# Patient Record
Sex: MALE | Race: WHITE | NOT HISPANIC OR LATINO | Employment: OTHER | URBAN - METROPOLITAN AREA
[De-identification: names, ages, dates, MRNs, and addresses within clinical notes are randomized per-mention and may not be internally consistent; named-entity substitution may affect disease eponyms.]

---

## 2017-05-30 ENCOUNTER — AMBULATORY - RIVER FALLS (OUTPATIENT)
Dept: FAMILY MEDICINE | Facility: CLINIC | Age: 52
End: 2017-05-30

## 2018-04-04 ENCOUNTER — HOSPITAL ENCOUNTER (EMERGENCY)
Facility: CLINIC | Age: 53
Discharge: HOME | End: 2018-04-04
Attending: EMERGENCY MEDICINE | Admitting: EMERGENCY MEDICINE
Payer: MEDICARE

## 2018-04-04 VITALS
HEIGHT: 73 IN | WEIGHT: 225 LBS | BODY MASS INDEX: 29.82 KG/M2 | OXYGEN SATURATION: 95 % | SYSTOLIC BLOOD PRESSURE: 101 MMHG | DIASTOLIC BLOOD PRESSURE: 66 MMHG | TEMPERATURE: 97.7 F | RESPIRATION RATE: 13 BRPM

## 2018-04-04 DIAGNOSIS — F10.229 ACUTE ALCOHOLIC INTOXICATION IN ALCOHOLISM WITH COMPLICATION (H): ICD-10-CM

## 2018-04-04 DIAGNOSIS — F19.10 POLYSUBSTANCE ABUSE (H): ICD-10-CM

## 2018-04-04 DIAGNOSIS — R45.1 AGITATION REQUIRING SEDATION PROTOCOL: ICD-10-CM

## 2018-04-04 DIAGNOSIS — R44.3 HALLUCINATIONS: ICD-10-CM

## 2018-04-04 DIAGNOSIS — R41.82 ALTERED MENTAL STATUS, UNSPECIFIED ALTERED MENTAL STATUS TYPE: ICD-10-CM

## 2018-04-04 LAB
ALBUMIN UR-MCNC: 30 MG/DL
AMPHETAMINES UR QL SCN: POSITIVE
ANION GAP SERPL CALCULATED.3IONS-SCNC: 14 MMOL/L (ref 3–14)
APAP SERPL-MCNC: <2 MG/L (ref 10–20)
APPEARANCE UR: CLEAR
BARBITURATES UR QL: NEGATIVE
BASOPHILS # BLD AUTO: 0.1 10E9/L (ref 0–0.2)
BASOPHILS NFR BLD AUTO: 1.3 %
BENZODIAZ UR QL: POSITIVE
BILIRUB UR QL STRIP: NEGATIVE
BUN SERPL-MCNC: 36 MG/DL (ref 7–30)
CALCIUM SERPL-MCNC: 7.7 MG/DL (ref 8.5–10.1)
CANNABINOIDS UR QL SCN: NEGATIVE
CHLORIDE SERPL-SCNC: 103 MMOL/L (ref 94–109)
CO2 SERPL-SCNC: 18 MMOL/L (ref 20–32)
COCAINE UR QL: NEGATIVE
COLOR UR AUTO: YELLOW
CREAT SERPL-MCNC: 1.03 MG/DL (ref 0.66–1.25)
DIFFERENTIAL METHOD BLD: ABNORMAL
EOSINOPHIL # BLD AUTO: 0 10E9/L (ref 0–0.7)
EOSINOPHIL NFR BLD AUTO: 0.5 %
ERYTHROCYTE [DISTWIDTH] IN BLOOD BY AUTOMATED COUNT: 13.1 % (ref 10–15)
ETHANOL SERPL-MCNC: 0.2 G/DL
GFR SERPL CREATININE-BSD FRML MDRD: 75 ML/MIN/1.7M2
GLUCOSE BLDC GLUCOMTR-MCNC: 58 MG/DL (ref 70–99)
GLUCOSE BLDC GLUCOMTR-MCNC: 81 MG/DL (ref 70–99)
GLUCOSE BLDC GLUCOMTR-MCNC: 87 MG/DL (ref 70–99)
GLUCOSE BLDC GLUCOMTR-MCNC: 98 MG/DL (ref 70–99)
GLUCOSE SERPL-MCNC: 63 MG/DL (ref 70–99)
GLUCOSE UR STRIP-MCNC: NEGATIVE MG/DL
HCT VFR BLD AUTO: 36.5 % (ref 40–53)
HGB BLD-MCNC: 12.4 G/DL (ref 13.3–17.7)
HGB UR QL STRIP: ABNORMAL
HYALINE CASTS #/AREA URNS LPF: 9 /LPF (ref 0–2)
IMM GRANULOCYTES # BLD: 0 10E9/L (ref 0–0.4)
IMM GRANULOCYTES NFR BLD: 0 %
INTERPRETATION ECG - MUSE: NORMAL
KETONES UR STRIP-MCNC: 10 MG/DL
LEUKOCYTE ESTERASE UR QL STRIP: NEGATIVE
LYMPHOCYTES # BLD AUTO: 0.5 10E9/L (ref 0.8–5.3)
LYMPHOCYTES NFR BLD AUTO: 12.1 %
MCH RBC QN AUTO: 31.2 PG (ref 26.5–33)
MCHC RBC AUTO-ENTMCNC: 34 G/DL (ref 31.5–36.5)
MCV RBC AUTO: 92 FL (ref 78–100)
MONOCYTES # BLD AUTO: 0.4 10E9/L (ref 0–1.3)
MONOCYTES NFR BLD AUTO: 10 %
MUCOUS THREADS #/AREA URNS LPF: PRESENT /LPF
NEUTROPHILS # BLD AUTO: 2.8 10E9/L (ref 1.6–8.3)
NEUTROPHILS NFR BLD AUTO: 76.1 %
NITRATE UR QL: NEGATIVE
NRBC # BLD AUTO: 0 10*3/UL
NRBC BLD AUTO-RTO: 0 /100
OPIATES UR QL SCN: NEGATIVE
PCP UR QL SCN: NEGATIVE
PH UR STRIP: 5.5 PH (ref 5–7)
PLATELET # BLD AUTO: 125 10E9/L (ref 150–450)
POTASSIUM SERPL-SCNC: 4 MMOL/L (ref 3.4–5.3)
RBC # BLD AUTO: 3.97 10E12/L (ref 4.4–5.9)
RBC #/AREA URNS AUTO: <1 /HPF (ref 0–2)
SALICYLATES SERPL-MCNC: <2 MG/DL
SODIUM SERPL-SCNC: 135 MMOL/L (ref 133–144)
SOURCE: ABNORMAL
SP GR UR STRIP: 1.02 (ref 1–1.03)
TSH SERPL DL<=0.005 MIU/L-ACNC: 1.3 MU/L (ref 0.4–4)
UROBILINOGEN UR STRIP-MCNC: 2 MG/DL (ref 0–2)
WBC # BLD AUTO: 3.7 10E9/L (ref 4–11)
WBC #/AREA URNS AUTO: 1 /HPF (ref 0–5)

## 2018-04-04 PROCEDURE — 25000128 H RX IP 250 OP 636: Performed by: EMERGENCY MEDICINE

## 2018-04-04 PROCEDURE — 96372 THER/PROPH/DIAG INJ SC/IM: CPT | Mod: XS

## 2018-04-04 PROCEDURE — 84443 ASSAY THYROID STIM HORMONE: CPT | Performed by: EMERGENCY MEDICINE

## 2018-04-04 PROCEDURE — 27210995 ZZH RX 272

## 2018-04-04 PROCEDURE — 80307 DRUG TEST PRSMV CHEM ANLYZR: CPT | Performed by: EMERGENCY MEDICINE

## 2018-04-04 PROCEDURE — 99285 EMERGENCY DEPT VISIT HI MDM: CPT | Mod: 25

## 2018-04-04 PROCEDURE — 96361 HYDRATE IV INFUSION ADD-ON: CPT

## 2018-04-04 PROCEDURE — 80329 ANALGESICS NON-OPIOID 1 OR 2: CPT | Performed by: EMERGENCY MEDICINE

## 2018-04-04 PROCEDURE — 81001 URINALYSIS AUTO W/SCOPE: CPT | Performed by: EMERGENCY MEDICINE

## 2018-04-04 PROCEDURE — 80048 BASIC METABOLIC PNL TOTAL CA: CPT | Performed by: EMERGENCY MEDICINE

## 2018-04-04 PROCEDURE — 85025 COMPLETE CBC W/AUTO DIFF WBC: CPT | Performed by: EMERGENCY MEDICINE

## 2018-04-04 PROCEDURE — 80320 DRUG SCREEN QUANTALCOHOLS: CPT | Performed by: EMERGENCY MEDICINE

## 2018-04-04 PROCEDURE — 93005 ELECTROCARDIOGRAM TRACING: CPT

## 2018-04-04 PROCEDURE — 25800025 ZZH RX 258: Performed by: EMERGENCY MEDICINE

## 2018-04-04 PROCEDURE — 90791 PSYCH DIAGNOSTIC EVALUATION: CPT

## 2018-04-04 PROCEDURE — A9270 NON-COVERED ITEM OR SERVICE: HCPCS | Mod: GY | Performed by: EMERGENCY MEDICINE

## 2018-04-04 PROCEDURE — 00000146 ZZHCL STATISTIC GLUCOSE BY METER IP

## 2018-04-04 PROCEDURE — 96374 THER/PROPH/DIAG INJ IV PUSH: CPT

## 2018-04-04 PROCEDURE — 25000132 ZZH RX MED GY IP 250 OP 250 PS 637: Mod: GY | Performed by: EMERGENCY MEDICINE

## 2018-04-04 PROCEDURE — 25000125 ZZHC RX 250

## 2018-04-04 RX ORDER — BUPRENORPHINE HYDROCHLORIDE AND NALOXONE HYDROCHLORIDE DIHYDRATE 8; 2 MG/1; MG/1
1 TABLET SUBLINGUAL 2 TIMES DAILY
COMMUNITY
End: 2018-12-19

## 2018-04-04 RX ORDER — DIAZEPAM 5 MG
5 TABLET ORAL ONCE
Status: COMPLETED | OUTPATIENT
Start: 2018-04-04 | End: 2018-04-04

## 2018-04-04 RX ORDER — OLANZAPINE 10 MG/2ML
10 INJECTION, POWDER, FOR SOLUTION INTRAMUSCULAR ONCE
Status: COMPLETED | OUTPATIENT
Start: 2018-04-04 | End: 2018-04-04

## 2018-04-04 RX ORDER — DEXTROSE MONOHYDRATE 25 G/50ML
50 INJECTION, SOLUTION INTRAVENOUS ONCE
Status: COMPLETED | OUTPATIENT
Start: 2018-04-04 | End: 2018-04-04

## 2018-04-04 RX ORDER — WATER 10 ML/10ML
INJECTION INTRAMUSCULAR; INTRAVENOUS; SUBCUTANEOUS
Status: COMPLETED
Start: 2018-04-04 | End: 2018-04-04

## 2018-04-04 RX ORDER — DIAZEPAM 5 MG
5 TABLET ORAL EVERY 6 HOURS PRN
Qty: 10 TABLET | Refills: 0 | Status: SHIPPED | OUTPATIENT
Start: 2018-04-04 | End: 2019-01-30

## 2018-04-04 RX ORDER — OLANZAPINE 10 MG/1
10 TABLET, ORALLY DISINTEGRATING ORAL ONCE
Status: DISCONTINUED | OUTPATIENT
Start: 2018-04-04 | End: 2018-04-04

## 2018-04-04 RX ORDER — OLANZAPINE 10 MG/2ML
INJECTION, POWDER, FOR SOLUTION INTRAMUSCULAR
Status: COMPLETED
Start: 2018-04-04 | End: 2018-04-04

## 2018-04-04 RX ORDER — TESTOSTERONE CYPIONATE 200 MG/ML
200 INJECTION, SOLUTION INTRAMUSCULAR
Status: ON HOLD | COMMUNITY
End: 2019-04-27

## 2018-04-04 RX ADMIN — OLANZAPINE 10 MG: 10 INJECTION, POWDER, FOR SOLUTION INTRAMUSCULAR at 04:06

## 2018-04-04 RX ADMIN — SODIUM CHLORIDE 1000 ML: 9 INJECTION, SOLUTION INTRAVENOUS at 03:13

## 2018-04-04 RX ADMIN — DIAZEPAM 5 MG: 5 TABLET ORAL at 13:17

## 2018-04-04 RX ADMIN — WATER: 1 INJECTION INTRAMUSCULAR; INTRAVENOUS; SUBCUTANEOUS at 04:06

## 2018-04-04 RX ADMIN — DEXTROSE MONOHYDRATE 50 ML: 25 INJECTION, SOLUTION INTRAVENOUS at 05:12

## 2018-04-04 RX ADMIN — OLANZAPINE 10 MG: 10 INJECTION, POWDER, LYOPHILIZED, FOR SOLUTION INTRAMUSCULAR at 04:06

## 2018-04-04 NOTE — ED NOTES
"Pt started yelling, calling out, then proceeded to pull out his IV. Pt repositioning self in bed with eyes closed, muttering and yelling, not answering direct questions. Pt agreed to take some oral juice for blood sugar since he dc'd his iv. Pt drank some juice with eyes closed, MD at bedside. Pt then yelling and swearing at staff, but appears to fall asleep in between yelling. Pt agreed to allow staff to give him some medication \"to help him calm down\". Given IM injection with several staff members present.   "

## 2018-04-04 NOTE — ED NOTES
Patient signed out to me by my colleague, Dr. Kenney of the previous care team. Mr Collado is a 52-year-old gentleman who was found intoxicated and was unable to give convincing history or reassuring history upon his arrival.  He is now quite sober, and has been evaluated by our mental health liaison.  I also reexamined him, and as a team both the mental health liaison and I have decided the patient does not appear to pose a risk to himself.  Mr. Collado states adamantly he has no desire to hurt himself hurt other people, he has a place to stay today, and he states that he is here in the ER because he reported a dream that he had, and he does not appear to have any fixed delusions.  Will discharge as patient appears to be of sound mentation and meets no qualification for a legal hold in my opinion.  Will discharge with supportive care and mild treatment for alcohol withdrawal which the patient is exhibiting to a mild extent      Rafael Hurtado MD  04/04/18 0353

## 2018-04-04 NOTE — ED NOTES
Pt attempting to climb out of bed, put feet through side rails and leaning backwards out bottom of bed. Redirected by staff and assisted to reposition himself safely in stretcher.

## 2018-04-04 NOTE — ED AVS SNAPSHOT
Emergency Department    [address] [facility] [address]    Phone:  [phone]    Fax:  [phone]                                       Andrea Collado   MRN: [id number]    Department:   Emergency Department   Date of Visit:  4/4/2018           Patient Information     Date Of Birth          1965        Your diagnoses for this visit were:     Hallucinations     Polysubstance abuse     Acute alcoholic intoxication in alcoholism with complication (H)     Altered mental status, unspecified altered mental status type     Agitation requiring sedation protocol        You were seen by Osmel Kenney MD and Rafael Hurtado MD.      Follow-up Information     Follow up with Rian Myers MD. Schedule an appointment as soon as possible for a visit in 2 days.    Specialty:  Psychiatry    Why:  As needed, For repeat evaluation and symptom check    Contact information:    [address]  [facility] [id number] [phone]          Follow up with  Emergency Department.    Specialty:  EMERGENCY MEDICINE    Why:  If symptoms worsen    Contact information:    [address] [facility] [address] [phone]        Discharge Instructions           Discharge References/Attachments     ALCOHOL INTOXICATION (ENGLISH)    ALCOHOL WITHDRAWAL: WHAT TO EXPECT (ENGLISH)      24 Hour Appointment Hotline       To make an appointment at any [facility], call [phone] ([phone]). If you don't have a family doctor or clinic, we will help you find one. [facility] clinics are conveniently located to serve the needs of you and your family.             Review of your medicines      START taking        Dose / Directions Last dose taken    diazepam 5 MG tablet   Commonly known as:  VALIUM   Dose:  5 mg   Quantity:  10 tablet        Take 1 tablet (5 mg) by mouth every 6 hours as needed for anxiety or agitation (MUSCLE SPASM)   Refills:  0          Our records  show that you are taking the medicines listed below. If these are incorrect, please call your family doctor or clinic.        Dose / Directions Last dose taken    * ADDERALL PO   Dose:  20 mg        Take 20 mg by mouth daily   Refills:  0        * ADDERALL PO   Dose:  10 mg        Take 10 mg by mouth daily as needed (ADD) In the afternoon if needed   Refills:  0        buprenorphine-naloxone 8-2 MG Subl sublingual tablet   Commonly known as:  SUBOXONE   Dose:  1 tablet        Place 1 tablet under the tongue 2 times daily   Refills:  0        KLONOPIN PO   Dose:  0.5 mg        Take 0.5 mg by mouth 2 times daily as needed for anxiety   Refills:  0        testosterone cypionate 200 MG/ML injection   Commonly known as:  DEPOTESTOTERONE   Dose:  200 mg        Inject 200 mg into the muscle every 14 days   Refills:  0        WELLBUTRIN SR PO   Dose:  150 mg        Take 150 mg by mouth daily   Refills:  0        * Notice:  This list has 2 medication(s) that are the same as other medications prescribed for you. Read the directions carefully, and ask your doctor or other care provider to review them with you.            Prescriptions were sent or printed at these locations (1 Prescription)                   [facility] [address] [place], [place] - [address] AT [facility] [address] & [facility]   [address] [facility], [facility] [address]    Telephone:  [phone]   Fax:  [phone]   Hours:                  Printed at Department/Unit printer (1 of 1)         diazepam (VALIUM) 5 MG tablet                Procedures and tests performed during your visit     Procedure/Test Number of Times Performed    Acetaminophen level 1    Alcohol level blood 1    Basic metabolic panel 1    CBC with platelets differential 1    Drug abuse screen 77 urine (FL, RH, SH) 1    EKG 12 lead 1    Glucose by meter 4    Glucose monitor nursing POCT 1    Salicylate level 1    Straight cath for urine 1    TSH with free T4  reflex 1    UA with Microscopic 1      Orders Needing Specimen Collection     None      Pending Results     No orders found from 4/2/2018 to 4/5/2018.            Pending Culture Results     No orders found from 4/2/2018 to 4/5/2018.            Pending Results Instructions     If you had any lab results that were not finalized at the time of your Discharge, you can call the ED Lab Result RN at [phone]. You will be contacted by this team for any positive Lab results or changes in treatment. The nurses are available 7 days a week from 10A to 6:30P.  You can leave a message 24 hours per day and they will return your call.        Test Results From Your Hospital Stay        4/4/2018  3:20 AM      Component Results     Component Value Ref Range & Units Status    WBC 3.7 (L) 4.0 - 11.0 10e9/L Final    RBC Count 3.97 (L) 4.4 - 5.9 10e12/L Final    Hemoglobin 12.4 (L) 13.3 - 17.7 g/dL Final    Hematocrit 36.5 (L) 40.0 - 53.0 % Final    MCV 92 78 - 100 fl Final    MCH 31.2 26.5 - 33.0 pg Final    MCHC 34.0 31.5 - 36.5 g/dL Final    RDW 13.1 10.0 - 15.0 % Final    Platelet Count 125 (L) 150 - 450 10e9/L Final    Diff Method Automated Method  Final    % Neutrophils 76.1 % Final    % Lymphocytes 12.1 % Final    % Monocytes 10.0 % Final    % Eosinophils 0.5 % Final    % Basophils 1.3 % Final    % Immature Granulocytes 0.0 % Final    Nucleated RBCs 0 0 /100 Final    Absolute Neutrophil 2.8 1.6 - 8.3 10e9/L Final    Absolute Lymphocytes 0.5 (L) 0.8 - 5.3 10e9/L Final    Absolute Monocytes 0.4 0.0 - 1.3 10e9/L Final    Absolute Eosinophils 0.0 0.0 - 0.7 10e9/L Final    Absolute Basophils 0.1 0.0 - 0.2 10e9/L Final    Abs Immature Granulocytes 0.0 0 - 0.4 10e9/L Final    Absolute Nucleated RBC 0.0  Final         4/4/2018  3:44 AM      Component Results     Component Value Ref Range & Units Status    TSH 1.30 0.40 - 4.00 mU/L Final         4/4/2018  3:36 AM      Component Results     Component Value Ref Range & Units Status     Sodium 135 133 - 144 mmol/L Final    Potassium 4.0 3.4 - 5.3 mmol/L Final    Chloride 103 94 - 109 mmol/L Final    Carbon Dioxide 18 (L) 20 - 32 mmol/L Final    Anion Gap 14 3 - 14 mmol/L Final    Glucose 63 (L) 70 - 99 mg/dL Final    Urea Nitrogen 36 (H) 7 - 30 mg/dL Final    Creatinine 1.03 0.66 - 1.25 mg/dL Final    GFR Estimate 75 >60 mL/min/1.7m2 Final    Non  GFR Calc    GFR Estimate If Black >90 >60 mL/min/1.7m2 Final    African American GFR Calc    Calcium 7.7 (L) 8.5 - 10.1 mg/dL Final         4/4/2018  3:43 AM      Component Results     Component Value Ref Range & Units Status    Amphetamine Qual Urine Positive (A) NEG^Negative Final    Cutoff for a positive amphetamine is greater than 500 ng/mL. This is an   unconfirmed screening result to be used for medical purposes only.      Barbiturates Qual Urine Negative NEG^Negative Final    Cutoff for a negative barbiturate is 200 ng/mL or less.    Benzodiazepine Qual Urine Positive (A) NEG^Negative Final    Cutoff for a positive benzodiazepine is greater than 200 ng/mL. This is an   unconfirmed screening result to be used for medical purposes only.      Cannabinoids Qual Urine Negative NEG^Negative Final    Cutoff for a negative cannabinoid is 50 ng/mL or less.    Cocaine Qual Urine Negative NEG^Negative Final    Cutoff for a negative cocaine is 300 ng/mL or less.    Opiates Qualitative Urine Negative NEG^Negative Final    Cutoff for a negative opiate is 300 ng/mL or less.    PCP Qual Urine Negative NEG^Negative Final    Cutoff for a negative PCP is 25 ng/mL or less.         4/4/2018  3:34 AM      Component Results     Component Value Ref Range & Units Status    Acetaminophen Level <2 mg/L Final    Therapeutic range: 10-20 mg/L         4/4/2018  3:34 AM      Component Results     Component Value Ref Range & Units Status    Salicylate Level <2 mg/dL Final    Therapeutic:        <20  Anti inflammatory:  15-30           4/4/2018  3:36 AM       Component Results     Component Value Ref Range & Units Status    Ethanol g/dL 0.20 (H) <0.01 g/dL Final         4/4/2018  3:27 AM      Component Results     Component Value Ref Range & Units Status    Color Urine Yellow  Final    Appearance Urine Clear  Final    Glucose Urine Negative NEG^Negative mg/dL Final    Bilirubin Urine Negative NEG^Negative Final    Ketones Urine 10 (A) NEG^Negative mg/dL Final    Specific Gravity Urine 1.021 1.003 - 1.035 Final    Blood Urine Moderate (A) NEG^Negative Final    pH Urine 5.5 5.0 - 7.0 pH Final    Protein Albumin Urine 30 (A) NEG^Negative mg/dL Final    Urobilinogen mg/dL 2.0 0.0 - 2.0 mg/dL Final    Nitrite Urine Negative NEG^Negative Final    Leukocyte Esterase Urine Negative NEG^Negative Final    Source Catheterized Urine  Final    WBC Urine 1 0 - 5 /HPF Final    RBC Urine <1 0 - 2 /HPF Final    Mucous Urine Present (A) NEG^Negative /LPF Final    Hyaline Casts 9 (H) 0 - 2 /LPF Final         4/4/2018  7:04 AM      Component Results     Component Value Ref Range & Units Status    Glucose 81 70 - 99 mg/dL Final         4/4/2018  7:04 AM      Component Results     Component Value Ref Range & Units Status    Glucose 98 70 - 99 mg/dL Final         4/4/2018  8:42 AM      Component Results     Component Value Ref Range & Units Status    Glucose 58 (L) 70 - 99 mg/dL Final         4/4/2018 10:42 AM      Component Results     Component Value Ref Range & Units Status    Glucose 87 70 - 99 mg/dL Final                Clinical Quality Measure: Blood Pressure Screening     Your blood pressure was checked while you were in the emergency department today. The last reading we obtained was  BP: 101/66 . Please read the guidelines below about what these numbers mean and what you should do about them.  If your systolic blood pressure (the top number) is less than 120 and your diastolic blood pressure (the bottom number) is less than 80, then your blood pressure is normal. There is nothing  "more that you need to do about it.  If your systolic blood pressure (the top number) is 120-139 or your diastolic blood pressure (the bottom number) is 80-89, your blood pressure may be higher than it should be. You should have your blood pressure rechecked within a year by a primary care provider.  If your systolic blood pressure (the top number) is 140 or greater or your diastolic blood pressure (the bottom number) is 90 or greater, you may have high blood pressure. High blood pressure is treatable, but if left untreated over time it can put you at risk for heart attack, stroke, or kidney failure. You should have your blood pressure rechecked by a primary care provider within the next 4 weeks.  If your provider in the emergency department today gave you specific instructions to follow-up with your doctor or provider even sooner than that, you should follow that instruction and not wait for up to 4 weeks for your follow-up visit.        Thank you for choosing [facility]       Thank you for choosing [facility] for your care. Our goal is always to provide you with excellent care. Hearing back from our patients is one way we can continue to improve our services. Please take a few minutes to complete the written survey that you may receive in the mail after you visit with us. Thank you!        [employer] Information     [employer] lets you send messages to your doctor, view your test results, renew your prescriptions, schedule appointments and more. To sign up, go to [email or web address] . Click on \"Log in\" on the left side of the screen, which will take you to the Welcome page. Then click on \"Sign up Now\" on the right side of the page.     You will be asked to enter the access code listed below, as well as some personal information. Please follow the directions to create your username and password.     Your access code is: [email or web address]  Expires: 7/3/2018 12:58 PM     Your access code will  in 90 days. If you need help " or a new code, please call your [facility] clinic or [phone].        Care EveryWhere ID     This is your Care EveryWhere ID. This could be used by other organizations to access your [facility] medical records  [id number]        Equal Access to Services     NESS HERNANDEZ : Alvarado Anton, missy serrano, pablo dumontalclaudio jacome, serg gould. [facility] [phone].    ATENCIÓN: Si habla español, tiene a hager disposición servicios gratuitos de asistencia lingüística. Llame al [phone].    We comply with applicable federal civil rights laws and [place] laws. We do not discriminate on the basis of race, color, national origin, age, disability, sex, sexual orientation, or gender identity.            After Visit Summary       This is your record. Keep this with you and show to your community pharmacist(s) and doctor(s) at your next visit.

## 2018-04-04 NOTE — PHARMACY-ADMISSION MEDICATION HISTORY
Admission medication history interview status for the 4/4/2018  admission is complete. See EPIC admission navigator for prior to admission medications     Medication history source reliability:Moderate    Actions taken by pharmacist (provider contacted, etc):called Hannah and interviewed patient     Additional medication history information not noted on PTA med list :None    Medication reconciliation/reorder completed by provider prior to medication history? No    Time spent in this activity: 35 minutes    Prior to Admission medications    Medication Sig Last Dose Taking? Auth Provider   buprenorphine-naloxone (SUBOXONE) 8-2 MG SUBL sublingual tablet Place 1 tablet under the tongue 2 times daily 4/3/2018 at Unknown time Yes Unknown, Entered By History   Amphetamine-Dextroamphetamine (ADDERALL PO) Take 20 mg by mouth daily  Yes Unknown, Entered By History   Amphetamine-Dextroamphetamine (ADDERALL PO) Take 10 mg by mouth daily as needed (ADD) In the afternoon if needed  Yes Unknown, Entered By History   testosterone cypionate (DEPOTESTOTERONE) 200 MG/ML injection Inject 200 mg into the muscle every 14 days past due Yes Unknown, Entered By History   BuPROPion HCl (WELLBUTRIN SR PO) Take 150 mg by mouth daily  Yes Unknown, Entered By History   ClonazePAM (KLONOPIN PO) Take 0.5 mg by mouth 2 times daily as needed for anxiety   Yes Reported, Patient

## 2018-04-04 NOTE — ED AVS SNAPSHOT
Emergency Department    [address] [address]    Phone:  [phone]    Fax:  [phone]                                       Andrea Collado   MRN: [id number]    Department:   Emergency Department   Date of Visit:  4/4/2018           After Visit Summary Signature Page     I have received my discharge instructions, and my questions have been answered. I have discussed any challenges I see with this plan with the nurse or doctor.    ..........................................................................................................................................  Patient/Patient Representative Signature      ..........................................................................................................................................  Patient Representative Print Name and Relationship to Patient    ..................................................               ................................................  Date                                            Time    ..........................................................................................................................................  Reviewed by Signature/Title    ...................................................              ..............................................  Date                                                            Time

## 2018-12-19 ENCOUNTER — HOSPITAL ENCOUNTER (INPATIENT)
Facility: CLINIC | Age: 53
LOS: 2 days | Discharge: HOME | DRG: 897 | End: 2018-12-21
Attending: EMERGENCY MEDICINE | Admitting: HOSPITALIST
Payer: MEDICARE

## 2018-12-19 ENCOUNTER — APPOINTMENT (OUTPATIENT)
Dept: CT IMAGING | Facility: CLINIC | Age: 53
DRG: 897 | End: 2018-12-19
Attending: EMERGENCY MEDICINE
Payer: MEDICARE

## 2018-12-19 DIAGNOSIS — F10.931 ALCOHOL WITHDRAWAL, WITH DELIRIUM (H): ICD-10-CM

## 2018-12-19 DIAGNOSIS — F10.10 ALCOHOL ABUSE: Primary | ICD-10-CM

## 2018-12-19 LAB
ALBUMIN SERPL-MCNC: 4.2 G/DL (ref 3.4–5)
ALBUMIN UR-MCNC: 30 MG/DL
ALP SERPL-CCNC: 97 U/L (ref 40–150)
ALT SERPL W P-5'-P-CCNC: 274 U/L (ref 0–70)
AMPHETAMINES UR QL SCN: POSITIVE
ANION GAP SERPL CALCULATED.3IONS-SCNC: 10 MMOL/L (ref 3–14)
APPEARANCE UR: CLEAR
AST SERPL W P-5'-P-CCNC: 356 U/L (ref 0–45)
BARBITURATES UR QL: NEGATIVE
BASOPHILS # BLD AUTO: 0 10E9/L (ref 0–0.2)
BASOPHILS NFR BLD AUTO: 0.9 %
BENZODIAZ UR QL: POSITIVE
BILIRUB SERPL-MCNC: 0.5 MG/DL (ref 0.2–1.3)
BILIRUB UR QL STRIP: NEGATIVE
BUN SERPL-MCNC: 13 MG/DL (ref 7–30)
CALCIUM SERPL-MCNC: 8.4 MG/DL (ref 8.5–10.1)
CANNABINOIDS UR QL SCN: NEGATIVE
CHLORIDE SERPL-SCNC: 108 MMOL/L (ref 94–109)
CO2 SERPL-SCNC: 26 MMOL/L (ref 20–32)
COCAINE UR QL: NEGATIVE
COLOR UR AUTO: YELLOW
CREAT SERPL-MCNC: 0.67 MG/DL (ref 0.66–1.25)
DIFFERENTIAL METHOD BLD: ABNORMAL
EOSINOPHIL # BLD AUTO: 0.2 10E9/L (ref 0–0.7)
EOSINOPHIL NFR BLD AUTO: 5.2 %
ERYTHROCYTE [DISTWIDTH] IN BLOOD BY AUTOMATED COUNT: 13.3 % (ref 10–15)
ETHANOL SERPL-MCNC: 0.34 G/DL
GFR SERPL CREATININE-BSD FRML MDRD: >90 ML/MIN/{1.73_M2}
GLUCOSE SERPL-MCNC: 76 MG/DL (ref 70–99)
GLUCOSE UR STRIP-MCNC: NEGATIVE MG/DL
HCT VFR BLD AUTO: 44.7 % (ref 40–53)
HGB BLD-MCNC: 16 G/DL (ref 13.3–17.7)
HGB UR QL STRIP: NEGATIVE
IMM GRANULOCYTES # BLD: 0 10E9/L (ref 0–0.4)
IMM GRANULOCYTES NFR BLD: 0.3 %
INR PPP: 0.95 (ref 0.86–1.14)
INTERPRETATION ECG - MUSE: NORMAL
KETONES UR STRIP-MCNC: NEGATIVE MG/DL
LACTATE BLD-SCNC: 0.9 MMOL/L (ref 0.7–2)
LEUKOCYTE ESTERASE UR QL STRIP: NEGATIVE
LIPASE SERPL-CCNC: 151 U/L (ref 73–393)
LYMPHOCYTES # BLD AUTO: 1.2 10E9/L (ref 0.8–5.3)
LYMPHOCYTES NFR BLD AUTO: 37.6 %
MAGNESIUM SERPL-MCNC: 1.9 MG/DL (ref 1.6–2.3)
MCH RBC QN AUTO: 32.5 PG (ref 26.5–33)
MCHC RBC AUTO-ENTMCNC: 35.8 G/DL (ref 31.5–36.5)
MCV RBC AUTO: 91 FL (ref 78–100)
MONOCYTES # BLD AUTO: 0.3 10E9/L (ref 0–1.3)
MONOCYTES NFR BLD AUTO: 10.1 %
MUCOUS THREADS #/AREA URNS LPF: PRESENT /LPF
NEUTROPHILS # BLD AUTO: 1.5 10E9/L (ref 1.6–8.3)
NEUTROPHILS NFR BLD AUTO: 45.9 %
NITRATE UR QL: NEGATIVE
NRBC # BLD AUTO: 0 10*3/UL
NRBC BLD AUTO-RTO: 0 /100
OPIATES UR QL SCN: NEGATIVE
PCP UR QL SCN: NEGATIVE
PH UR STRIP: 5.5 PH (ref 5–7)
PLATELET # BLD AUTO: 134 10E9/L (ref 150–450)
POTASSIUM SERPL-SCNC: 3.9 MMOL/L (ref 3.4–5.3)
PROT SERPL-MCNC: 8.8 G/DL (ref 6.8–8.8)
RBC # BLD AUTO: 4.93 10E12/L (ref 4.4–5.9)
RBC #/AREA URNS AUTO: 0 /HPF (ref 0–2)
SODIUM SERPL-SCNC: 144 MMOL/L (ref 133–144)
SOURCE: ABNORMAL
SP GR UR STRIP: 1.02 (ref 1–1.03)
SQUAMOUS #/AREA URNS AUTO: <1 /HPF (ref 0–1)
UROBILINOGEN UR STRIP-MCNC: NORMAL MG/DL (ref 0–2)
WBC # BLD AUTO: 3.3 10E9/L (ref 4–11)
WBC #/AREA URNS AUTO: 1 /HPF (ref 0–5)

## 2018-12-19 PROCEDURE — 25000125 ZZHC RX 250: Performed by: EMERGENCY MEDICINE

## 2018-12-19 PROCEDURE — 96365 THER/PROPH/DIAG IV INF INIT: CPT

## 2018-12-19 PROCEDURE — 25000128 H RX IP 250 OP 636: Performed by: EMERGENCY MEDICINE

## 2018-12-19 PROCEDURE — 83690 ASSAY OF LIPASE: CPT | Performed by: EMERGENCY MEDICINE

## 2018-12-19 PROCEDURE — 70450 CT HEAD/BRAIN W/O DYE: CPT

## 2018-12-19 PROCEDURE — 80053 COMPREHEN METABOLIC PANEL: CPT | Performed by: EMERGENCY MEDICINE

## 2018-12-19 PROCEDURE — A9270 NON-COVERED ITEM OR SERVICE: HCPCS | Mod: GY | Performed by: HOSPITALIST

## 2018-12-19 PROCEDURE — C9113 INJ PANTOPRAZOLE SODIUM, VIA: HCPCS | Performed by: EMERGENCY MEDICINE

## 2018-12-19 PROCEDURE — 96366 THER/PROPH/DIAG IV INF ADDON: CPT

## 2018-12-19 PROCEDURE — 12000000 ZZH R&B MED SURG/OB

## 2018-12-19 PROCEDURE — 96376 TX/PRO/DX INJ SAME DRUG ADON: CPT

## 2018-12-19 PROCEDURE — 80320 DRUG SCREEN QUANTALCOHOLS: CPT | Performed by: EMERGENCY MEDICINE

## 2018-12-19 PROCEDURE — HZ2ZZZZ DETOXIFICATION SERVICES FOR SUBSTANCE ABUSE TREATMENT: ICD-10-PCS | Performed by: HOSPITALIST

## 2018-12-19 PROCEDURE — 25000132 ZZH RX MED GY IP 250 OP 250 PS 637: Mod: GY | Performed by: HOSPITALIST

## 2018-12-19 PROCEDURE — 99223 1ST HOSP IP/OBS HIGH 75: CPT | Mod: AI | Performed by: HOSPITALIST

## 2018-12-19 PROCEDURE — 99285 EMERGENCY DEPT VISIT HI MDM: CPT | Mod: 25

## 2018-12-19 PROCEDURE — 96375 TX/PRO/DX INJ NEW DRUG ADDON: CPT

## 2018-12-19 PROCEDURE — 83605 ASSAY OF LACTIC ACID: CPT | Performed by: HOSPITALIST

## 2018-12-19 PROCEDURE — 85610 PROTHROMBIN TIME: CPT | Performed by: EMERGENCY MEDICINE

## 2018-12-19 PROCEDURE — 93005 ELECTROCARDIOGRAM TRACING: CPT

## 2018-12-19 PROCEDURE — 36415 COLL VENOUS BLD VENIPUNCTURE: CPT | Performed by: HOSPITALIST

## 2018-12-19 PROCEDURE — 80307 DRUG TEST PRSMV CHEM ANLYZR: CPT | Performed by: EMERGENCY MEDICINE

## 2018-12-19 PROCEDURE — 81001 URINALYSIS AUTO W/SCOPE: CPT | Performed by: EMERGENCY MEDICINE

## 2018-12-19 PROCEDURE — 85025 COMPLETE CBC W/AUTO DIFF WBC: CPT | Performed by: EMERGENCY MEDICINE

## 2018-12-19 PROCEDURE — 83735 ASSAY OF MAGNESIUM: CPT | Performed by: EMERGENCY MEDICINE

## 2018-12-19 PROCEDURE — 25000128 H RX IP 250 OP 636: Performed by: HOSPITALIST

## 2018-12-19 RX ORDER — POTASSIUM CL/LIDO/0.9 % NACL 10MEQ/0.1L
10 INTRAVENOUS SOLUTION, PIGGYBACK (ML) INTRAVENOUS
Status: DISCONTINUED | OUTPATIENT
Start: 2018-12-19 | End: 2018-12-21 | Stop reason: HOSPADM

## 2018-12-19 RX ORDER — ONDANSETRON 4 MG/1
4 TABLET, ORALLY DISINTEGRATING ORAL EVERY 6 HOURS PRN
Status: DISCONTINUED | OUTPATIENT
Start: 2018-12-19 | End: 2018-12-21 | Stop reason: HOSPADM

## 2018-12-19 RX ORDER — DEXTROAMPHETAMINE SACCHARATE, AMPHETAMINE ASPARTATE, DEXTROAMPHETAMINE SULFATE AND AMPHETAMINE SULFATE 5; 5; 5; 5 MG/1; MG/1; MG/1; MG/1
20 TABLET ORAL DAILY
Status: DISCONTINUED | OUTPATIENT
Start: 2018-12-20 | End: 2018-12-20

## 2018-12-19 RX ORDER — AMOXICILLIN 250 MG
1 CAPSULE ORAL 2 TIMES DAILY
Status: DISCONTINUED | OUTPATIENT
Start: 2018-12-19 | End: 2018-12-21 | Stop reason: HOSPADM

## 2018-12-19 RX ORDER — ONDANSETRON 2 MG/ML
4 INJECTION INTRAMUSCULAR; INTRAVENOUS EVERY 30 MIN PRN
Status: DISCONTINUED | OUTPATIENT
Start: 2018-12-19 | End: 2018-12-19

## 2018-12-19 RX ORDER — LORAZEPAM 2 MG/ML
1 INJECTION INTRAMUSCULAR EVERY 30 MIN PRN
Status: DISCONTINUED | OUTPATIENT
Start: 2018-12-19 | End: 2018-12-19

## 2018-12-19 RX ORDER — NALOXONE HYDROCHLORIDE 0.4 MG/ML
.1-.4 INJECTION, SOLUTION INTRAMUSCULAR; INTRAVENOUS; SUBCUTANEOUS
Status: DISCONTINUED | OUTPATIENT
Start: 2018-12-19 | End: 2018-12-21 | Stop reason: HOSPADM

## 2018-12-19 RX ORDER — POTASSIUM CHLORIDE 1.5 G/1.58G
20-40 POWDER, FOR SOLUTION ORAL
Status: DISCONTINUED | OUTPATIENT
Start: 2018-12-19 | End: 2018-12-21 | Stop reason: HOSPADM

## 2018-12-19 RX ORDER — POTASSIUM CHLORIDE 29.8 MG/ML
20 INJECTION INTRAVENOUS
Status: DISCONTINUED | OUTPATIENT
Start: 2018-12-19 | End: 2018-12-21 | Stop reason: HOSPADM

## 2018-12-19 RX ORDER — AMOXICILLIN 250 MG
2 CAPSULE ORAL 2 TIMES DAILY
Status: DISCONTINUED | OUTPATIENT
Start: 2018-12-19 | End: 2018-12-21 | Stop reason: HOSPADM

## 2018-12-19 RX ORDER — ONDANSETRON 2 MG/ML
4 INJECTION INTRAMUSCULAR; INTRAVENOUS EVERY 6 HOURS PRN
Status: DISCONTINUED | OUTPATIENT
Start: 2018-12-19 | End: 2018-12-21 | Stop reason: HOSPADM

## 2018-12-19 RX ORDER — MAGNESIUM SULFATE HEPTAHYDRATE 40 MG/ML
4 INJECTION, SOLUTION INTRAVENOUS EVERY 4 HOURS PRN
Status: DISCONTINUED | OUTPATIENT
Start: 2018-12-19 | End: 2018-12-21 | Stop reason: HOSPADM

## 2018-12-19 RX ORDER — DIAZEPAM 5 MG
5 TABLET ORAL DAILY PRN
COMMUNITY
End: 2019-01-30

## 2018-12-19 RX ORDER — LORAZEPAM 2 MG/ML
1-2 INJECTION INTRAMUSCULAR EVERY 30 MIN PRN
Status: DISCONTINUED | OUTPATIENT
Start: 2018-12-19 | End: 2018-12-21 | Stop reason: HOSPADM

## 2018-12-19 RX ORDER — LANOLIN ALCOHOL/MO/W.PET/CERES
100 CREAM (GRAM) TOPICAL DAILY
Status: DISCONTINUED | OUTPATIENT
Start: 2018-12-20 | End: 2018-12-21 | Stop reason: HOSPADM

## 2018-12-19 RX ORDER — MULTIPLE VITAMINS W/ MINERALS TAB 9MG-400MCG
1 TAB ORAL DAILY
Status: DISCONTINUED | OUTPATIENT
Start: 2018-12-20 | End: 2018-12-21 | Stop reason: HOSPADM

## 2018-12-19 RX ORDER — POTASSIUM CHLORIDE 1500 MG/1
20-40 TABLET, EXTENDED RELEASE ORAL
Status: DISCONTINUED | OUTPATIENT
Start: 2018-12-19 | End: 2018-12-21 | Stop reason: HOSPADM

## 2018-12-19 RX ORDER — LORAZEPAM 1 MG/1
1-2 TABLET ORAL EVERY 30 MIN PRN
Status: DISCONTINUED | OUTPATIENT
Start: 2018-12-19 | End: 2018-12-21 | Stop reason: HOSPADM

## 2018-12-19 RX ORDER — QUETIAPINE FUMARATE 25 MG/1
25-50 TABLET, FILM COATED ORAL EVERY 6 HOURS PRN
Status: DISCONTINUED | OUTPATIENT
Start: 2018-12-19 | End: 2018-12-21 | Stop reason: HOSPADM

## 2018-12-19 RX ORDER — SODIUM CHLORIDE, SODIUM LACTATE, POTASSIUM CHLORIDE, CALCIUM CHLORIDE 600; 310; 30; 20 MG/100ML; MG/100ML; MG/100ML; MG/100ML
INJECTION, SOLUTION INTRAVENOUS CONTINUOUS
Status: DISCONTINUED | OUTPATIENT
Start: 2018-12-19 | End: 2018-12-21 | Stop reason: HOSPADM

## 2018-12-19 RX ORDER — POTASSIUM CHLORIDE 7.45 MG/ML
10 INJECTION INTRAVENOUS
Status: DISCONTINUED | OUTPATIENT
Start: 2018-12-19 | End: 2018-12-21 | Stop reason: HOSPADM

## 2018-12-19 RX ORDER — FOLIC ACID 1 MG/1
1 TABLET ORAL DAILY
Status: DISCONTINUED | OUTPATIENT
Start: 2018-12-20 | End: 2018-12-21 | Stop reason: HOSPADM

## 2018-12-19 RX ORDER — BUPROPION HYDROCHLORIDE 150 MG/1
150 TABLET, EXTENDED RELEASE ORAL DAILY
Status: DISCONTINUED | OUTPATIENT
Start: 2018-12-19 | End: 2018-12-21 | Stop reason: HOSPADM

## 2018-12-19 RX ADMIN — LORAZEPAM 1 MG: 1 TABLET ORAL at 14:20

## 2018-12-19 RX ADMIN — LORAZEPAM 2 MG: 1 TABLET ORAL at 20:21

## 2018-12-19 RX ADMIN — LORAZEPAM 2 MG: 1 TABLET ORAL at 17:19

## 2018-12-19 RX ADMIN — LORAZEPAM 2 MG: 1 TABLET ORAL at 21:27

## 2018-12-19 RX ADMIN — QUETIAPINE 50 MG: 25 TABLET ORAL at 20:21

## 2018-12-19 RX ADMIN — LORAZEPAM 1 MG: 2 INJECTION INTRAMUSCULAR; INTRAVENOUS at 11:25

## 2018-12-19 RX ADMIN — LORAZEPAM 1 MG: 2 INJECTION INTRAMUSCULAR; INTRAVENOUS at 09:34

## 2018-12-19 RX ADMIN — ONDANSETRON 4 MG: 2 INJECTION INTRAMUSCULAR; INTRAVENOUS at 06:52

## 2018-12-19 RX ADMIN — PANTOPRAZOLE SODIUM 40 MG: 40 INJECTION, POWDER, FOR SOLUTION INTRAVENOUS at 06:58

## 2018-12-19 RX ADMIN — LORAZEPAM 1 MG: 1 TABLET ORAL at 15:25

## 2018-12-19 RX ADMIN — SODIUM CHLORIDE, POTASSIUM CHLORIDE, SODIUM LACTATE AND CALCIUM CHLORIDE: 600; 310; 30; 20 INJECTION, SOLUTION INTRAVENOUS at 14:20

## 2018-12-19 RX ADMIN — SENNOSIDES AND DOCUSATE SODIUM 1 TABLET: 8.6; 5 TABLET ORAL at 20:21

## 2018-12-19 RX ADMIN — LORAZEPAM 1 MG: 2 INJECTION INTRAMUSCULAR; INTRAVENOUS at 06:49

## 2018-12-19 RX ADMIN — FOLIC ACID: 5 INJECTION, SOLUTION INTRAMUSCULAR; INTRAVENOUS; SUBCUTANEOUS at 06:49

## 2018-12-19 RX ADMIN — BUPROPION HYDROCHLORIDE 150 MG: 150 TABLET, FILM COATED, EXTENDED RELEASE ORAL at 17:17

## 2018-12-19 RX ADMIN — SODIUM CHLORIDE 1000 ML: 9 INJECTION, SOLUTION INTRAVENOUS at 06:58

## 2018-12-19 RX ADMIN — ENOXAPARIN SODIUM 40 MG: 40 INJECTION SUBCUTANEOUS at 14:20

## 2018-12-19 ASSESSMENT — ENCOUNTER SYMPTOMS
BLOOD IN STOOL: 1
VOMITING: 1
HEMATURIA: 1
ABDOMINAL PAIN: 1

## 2018-12-19 ASSESSMENT — ACTIVITIES OF DAILY LIVING (ADL)
ADLS_ACUITY_SCORE: 14
ADLS_ACUITY_SCORE: 15

## 2018-12-19 NOTE — PLAN OF CARE
RECEIVING UNIT ED HANDOFF REVIEW    ED Nurse Handoff Report was reviewed by: Yehuda Hayden on December 19, 2018 at 1:09 PM

## 2018-12-19 NOTE — H&P
[facility]    History and Physical - Hospitalist Service       Date of Admission:  12/19/2018    Assessment & Plan   Andrea Collado is a 53 year old male with a history of alcoholism and substance abuse, bipolar, low testosterone, and reported seizure history who was admitted 12/19/2018 with alcohol intoxication.     1. Alcohol dependence      Transaminitis likely 2/2 alcohol use  - 1L + of vodka or Everclear daily for multiple years - wishes to quit   - s/p IVF - banana bag in ED; continue maintenance with /hr  - , , Alk phos 97, lipase 151 - likely secondary to alcohol intake - RUQ nontender; will trend  - Folate, thiamine, and multivitamin initiated  - Social work for Chem Dep placed  - CIWA with lorazepam continued (shorter half life preferred in hepatitis/transaminitis)    2. Hallucinations with ?history of depression (bipolar noted in epic)  - wellbutrin 150 daily resumed  - Psychiatry consultation placed given the above and his questionable stories of his history    3. Reported hematochezia and hematuria    - Hgb normal at 16 on admission, will monitor clinically and recheck tomorrow morning  - RN in ED reports grossly normal appearing urine collecting  - Pantoprazole IV started and will continue in case of GI bleed  - With any sign/concern of bleeding will ensure 2 large bore peripheral IVs and type and screen    4. ADHD  - continue adderall 20mg daily tomorrow  - Psychiatry consult pending as above    5. Low testosterone  - will determine when next depotestosterone injection would be due - takes every 14 days, likely not during admission      Diet: regular  DVT Prophylaxis: Enoxaparin (Lovenox) SQ  Lino Catheter: not present  Code Status: Full Code    Disposition Plan   Expected discharge: Possibly 2-3 days or more pending alcohol cessation management and Psychiatry evaluation.   Entered: Yehuda Blevins MD 12/19/2018, 12:42 PM     The patient's care was discussed with  "the Patient and RN.    Yehuda Blevins MD  [facility]    ______________________________________________________________________    Chief Complaint   Alcohol intoxication    History is obtained from the patient    History of Present Illness   Andrea Collado is a 53 year old male with a history of alcoholism and substance abuse, bipolar, low testosterone, and reported seizure history who was admitted 12/19/2018 with alcohol intoxication. He has been drinking at least 1 liter of liquor daily for years and cannot recall the last time he went a couple days without it. He desires to stop drinking but interestingly vehemently refuses \"any treatment program like AA because they do not work. He denies any recent drug use but his chart notes prior cocaine abuse.  He came into the ED today after waking up on the floor around 2am this morning at his home where he lives alone.  He was unsure how he got there and had been drinking prior to that.  He reports frequent falls in the last few weeks but says that when he drinks he does not get intoxicated.  He says he gets dizzy regardless of whether or not he is drinking.  He does think he may have hit his head last night he had as been completed-unremarkable.  There is a reported seizure history and he had told ED staff that he thinks he might of had one last night-of note he had not lost continence of bowel or bladder and he had not bitten his tongue. Looking at ED notes, it seems the stories are not entirely consistent. He previously reported that he did not lose consciousness when he fell.  He endorses visual and auditory hallucinations in past few days and they are now better. The voices are not threatening - often times it's his dad whom passed away this April. He denies suicidal ideation but admits he probably has depression.  He takes bupropion 150 daily and adderall daily/as needed. He also reports that his PCP has given him valium to assist him in alcohol " cessation.    Patient denies any fevers, chills, coughing, shortness of breath, chest pain, or lower extremity swelling.  He does report intermittent lower extremity tingling in his feet but is currently not present.  He also says he has been having bloody urine and blood in his stools.  He is not sure if he has a history of hemorrhoids and is somewhat vague about whether it is filling the bowl or if it is just on the paper when he wipes, but he denies pain during defecation.  No flank pain, no burning with urination or increase in frequency.     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  These happened apparently between the last 3 and 5 years.  He says he was previously a  for [employer] for [employer] internationally.  He had to retire around 3 years ago but reasons for this are somewhat vague-possibly due to injury and/or alcohol use.     Review of Systems    The 10 point Review of Systems is negative other than noted in the HPI or here.     Past Medical History    I have reviewed this patient's medical history and updated it with pertinent information if needed.   Past Medical History:   Diagnosis Date     Chemical dependency (H)      Cocaine abuse (H)      Depressive disorder      DVT (deep venous thrombosis) (H) 5 y ago    left foot, treated with coumadin     Flail chest     broken sterum, wiring      History of total hip arthroplasty     right     Hypertension      Seizures (H)      Substance abuse (H)        Past Surgical History   I have reviewed this patient's surgical history and updated it with pertinent information if needed.  Past Surgical History:   Procedure Laterality Date     CHEST SURGERY  1987    flail chest, sterum fractured     HIP SURGERY       KNEE SURGERY       ORTHOPEDIC SURGERY      KIMBER right. [place] left shoulder surgery, debridement right shoulder, neck surgery- fracture cervical  spine. 6 knee surgeries- bucket handle meniscal tear and debridement. 3 heel surgeries.      ORTHOPEDIC SURGERY         Social History   I have reviewed this patient's social history and updated it with pertinent information if needed.  Social History     Tobacco Use     Smoking status: Current Some Day Smoker     Smokeless tobacco: Current User   Substance Use Topics     Alcohol use: Yes     Drug use: No     Comment: denies taki       Family History   I have reviewed this patient's family history and updated it with pertinent information if needed.   Family History   Problem Relation Age of Onset     Substance Abuse Mother      Substance Abuse Father      Substance Abuse Sister        Prior to Admission Medications   Prior to Admission Medications   Prescriptions Last Dose Informant Patient Reported? Taking?   Amphetamine-Dextroamphetamine (ADDERALL PO) Past Week at Unknown time Self Yes Yes   Sig: Take 20 mg by mouth daily   Amphetamine-Dextroamphetamine (ADDERALL PO) Past Week at Unknown time Self Yes Yes   Sig: Take 10 mg by mouth daily as needed (ADD) In the afternoon if needed   BuPROPion HCl (WELLBUTRIN SR PO) Past Week at Unknown time Self Yes Yes   Sig: Take 150 mg by mouth daily   diazepam (VALIUM) 5 MG tablet  at prn Self Yes Yes   Sig: Take 5 mg by mouth daily as needed for anxiety (Wanting an alcoholic drink)   testosterone cypionate (DEPOTESTOTERONE) 200 MG/ML injection  Self Yes Yes   Sig: Inject 200 mg into the muscle every 14 days      Facility-Administered Medications: None     Allergies   No Known Allergies    Physical Exam   Vital Signs: Temp: 98.1  F (36.7  C) Temp src: Oral BP: 110/74 Pulse: 84 Heart Rate: 87 Resp: 15 SpO2: 96 %      Weight: 0 lbs 0 oz    GEN: nad, pleasant  HEENT: normocephalic, eomi, mmm  CV: RRR, s1s2, no murmur heard  Resp: CTABL, no wheeze or crackle  Abdo: S, NT, ND, +BS  Ext: no edema, well perfused  Neuro: AAOx3, no focal deficits, moving all 4 with normal  strength  Data   Data reviewed today: I reviewed all medications, new labs and imaging results over the last 24 hours. I personally reviewed no images or EKG's today.    Recent Labs   Lab 12/19/18  0600   WBC 3.3*   HGB 16.0   MCV 91   *   INR 0.95      POTASSIUM 3.9   CHLORIDE 108   CO2 26   BUN 13   CR 0.67   ANIONGAP 10   JOHANA 8.4*   GLC 76   ALBUMIN 4.2   PROTTOTAL 8.8   BILITOTAL 0.5   ALKPHOS 97   *   *   LIPASE 151

## 2018-12-19 NOTE — PLAN OF CARE
3p-7: pA&O x4. CIWA 15, 2mg ativan given. Cooperative, anxious.Visual hallucinations reported, tremors, nausea. Up with one. VSS, 2L NC. Lungs clear. Tolerating reg diet. IVF. Voiding without difficulty. Plan is to monitor on CIWA protocol. Pt has LR at 100/hr. Pt requesting soboxone, Dr. Blevins stated that Psych have to address that, but they haven't seen pt yet. Ativan ordered for withdrawal. Continue to monitor.

## 2018-12-19 NOTE — ED PROVIDER NOTES
History     Chief Complaint:  Alcohol intoxication    HPI   Andrea Collado is a 53 year old male who presents with concern for Alcohol intoxication. The patient reports that he has a history of alcohol withdrawal seizures, and states that he fell one hour ago and believes he had a seizure, and so called PD, who transported him here. He does not claim with certainty that he had a seizure, though knows that he fell. He states he did hit his head during the fall, but denies any loss of consciousness, or current head/neck pain. He notes chest pain which he states has been present for a week, as well as abdominal pain which he notes coincides with his multiple episodes of emesis. He endorses hematemesis, hematuria, as well as blood in his stool. He reports that he has not eaten well recently. He states that he takes his Adderall as needed, and denies taking Klonopin but endorses taking valium.     Allergies:  NKDA    Medications:    Depotestosterone  Valium  Adderall    Past Medical History:    Cocaine abuse  Chemical dependency  DVT  Flail chest  Total hip arthroplasty  Seizuers  ETOH abuse  HTN  Depression  Psychosis    Past Surgical History:    Orthopedic surgery    Family History:    Substance abuse    Social History:  Marital Status:  Single [1]  Some day smoker  Positive for alcohol use.     Review of Systems   Cardiovascular: Positive for chest pain.   Gastrointestinal: Positive for abdominal pain, blood in stool and vomiting.   Genitourinary: Positive for hematuria.   all other systems reviewed, but not necessarily accurate due to alcohol withdrawal/intoxication    Physical Exam     Patient Vitals for the past 24 hrs:   BP Temp Temp src Pulse Heart Rate Resp SpO2   12/19/18 0800 110/74 -- -- 84 87 15 96 %   12/19/18 0700 111/83 -- -- 87 95 19 93 %   12/19/18 0645 96/68 -- -- 87 88 14 93 %   12/19/18 0630 120/76 -- -- 92 96 25 97 %   12/19/18 0615 (!) 119/96 -- -- 94 108 8 98 %   12/19/18 0611 -- 98.1  F (36.7   C) Oral -- 100 8 97 %   12/19/18 0539 -- -- -- -- -- -- 94 %   12/19/18 0538 (!) 138/100 -- -- 94 -- 16 --         Physical Exam    Constitutional: white male laying supine. Smells of alcohol. Jittery.   HENT: No signs of trauma. No swelling. Tenderness to right side no ecchymosis.   Eyes: EOM are normal. Pupils are equal, round, and reactive to light. lateral nystagmus present.  Neck: Normal range of motion. No JVD present. No cervical adenopathy.  Cardiovascular: Regular rhythm.  Exam reveals no gallop and no friction rub.    No murmur heard.  Pulmonary/Chest: Bilateral breath sounds normal. No wheezes, rhonchi or rales. Sternotomy incision.   Abdominal: Soft. Mild upper abdominal tenderness no rebound no guarding.   Musculoskeletal: No edema. No tenderness.   Lymphadenopathy: No lymphadenopathy.   Neurological: Awake and alert. Normal strength. Coordination normal.   Skin: Skin is warm and dry. No rash noted. No erythema.       Emergency Department Course   ECG:  Indication: chest pain  Time: 0551  Vent. Rate 91 bpm. AZ interval 182. QRS duration 96. QT/QTc 374/460. P-R-T axis 65 -18 59.  Normal sinus rhythm. Read time: 0557    Imaging:  Radiographic findings were communicated with the patient and Admitting MD who voiced understanding of the findings.    CT Head without contrast:   Normal head CT as per radiology.    Laboratory:  CBC: WBC: 3.3, HGB: 16.0, PLT: 134  CMP: Calcium: 8.4, ALT: 274, AST: 356, o/w WNL (Creatinine: 0.67)  Lipase: 151  INR: 0.95    Alcohol ethyl: 0.34  UA with Microscopic: protein albumin: 30, Mucous: present, o/w WNL  Drug screen: Amphetamine: positive, Benzodiazepine: positive    Interventions:  The patient's symptoms were improved with parenteral benzodiazapines.  0649 Ativan 1 mg IV   NS with INFUVITE 1L IV  0.652 Zofran, 4 mg, IV injection  0658 NS 1L IV   Protonix 40 mg IV  0934 Ativan 1 mg IV      Emergency Department Course:  Nursing notes and vitals reviewed. (3470) I performed  an exam of the patient as documented above.     IV inserted. Medicine administered as documented above. Blood drawn. This was sent to the lab for further testing, results above.    The patient was sent for a head CT while in the emergency department, findings above.     The patient provided a urine sample here in the emergency department. This was sent for laboratory testing, findings above.     (8095) I rechecked the patient and discussed the results of his workup thus far. We discussed his care plan going forward.    (8007)  I consulted with Dr. Blevins of the hospitalist services. They are in agreement to accept the patient for admission.    Findings and plan explained to the Patient who consents to admission. Discussed the patient with Dr. Belvins, who will admit the patient to a psychiatric bed for further monitoring, evaluation, and treatment.    Impression & Plan      Medical Decision Making:    Andrea Collado is a 53 year old male presenting to the ED by police. He was concerned because he had been drinking a lot and had been vomiting. He states he had blood in his vomit, his stool, and his urine. He also thinks he may have had a seizure. He denies other substance use, however he has klonopin, Suboxone, and Adderall listed in his med list. He also has a previous history of bipolar. Patient initially went on about how he came up to take care of his dad who just . He has 120 million dollars, but has to live in a hotel. He also states he is a . Patient seems to be confabulating and it is hard to make much out of his history. On exam, he is tremulous, he smells of alcohol, he has nystagmus, he was not gait tested. Patient had a basic labs obtained, he received IV Protonix, Zofran, ativan, and a banana bag. He did not have emesis here. His urine shows no blood. His labs reveal elevated Alcohol level. Tox screen is positive. Patient still is rather confused , is agitated, showing some mild hypertension, and  does not seem able to go safely to detox so we will admit him to the medical inpatient unit on the sixth floor.     Diagnosis:    ICD-10-CM    1.    2.    3. Alcohol withdrawal, with delirium (H)    Reported Hematemesis    History of Bipolar disorder   F10.231        Disposition:  Admitted to psych bed under care of Dr. Blveins.    Scribe Disclosure:  I, Tyree Durham, am serving as a scribe on 12/19/2018 at 6:39 AM to personally document services performed by Sal Harding MD based on my observations and the provider's statements to me.     Tyree Durham  12/19/2018    EMERGENCY DEPARTMENT       Sal Harding MD  12/19/18 [id number]

## 2018-12-19 NOTE — ED NOTES
Writer introduced self to patient.  Hot breakfast ordered.  CIWA done.  Pt A and O x3  Denies pain, slightly tremulous.

## 2018-12-19 NOTE — ED NOTES
"[facility]  ED Nurse Handoff Report    ED Chief complaint: Alcohol Intoxication (afraid he is withdrawing from alcohol) and Hematemesis (blood in stool and urine)      ED Diagnosis:   Final diagnoses:   Alcohol withdrawal, with delirium (H)       Code Status: Full Code    Allergies: No Known Allergies    Activity level - Baseline/Home:  Independent    Activity Level - Current:   Stand with Assist of 2     Needed?: No    Isolation: No  Infection: Not Applicable  Bariatric?: No    Vital Signs:   Vitals:    12/19/18 0630 12/19/18 0645 12/19/18 0700 12/19/18 0800   BP: 120/76 96/68 111/83 110/74   Pulse: 92 87 87 84   Resp: 25 14 19 15   Temp:       TempSrc:       SpO2: 97% 93% 93% 96%       Cardiac Rhythm: ,        Pain level:      Is this patient confused?: No   Does this patient have a guardian?  No         If yes, is there guardianship documents in the Epic \"Code/ACP\" activity?  N/A         Guardian Notified?  N/A  [place] - Suicide Severity Rating Scale Completed?  Yes  If yes, what color did the patient score?  White    Patient Report: Initial Complaint: Alcohol intoxication  Focused Assessment: Patient arrived at the ED via EMS.  He called 911 as he thought he had had an alcohol withdrawal seizure.  Pt lives at an [facility] at this time.  Pt has a hx of withdrawal seizures.  Seizure pads placed in the ED and pt on all monitors.  Tremulous.  States black s only void urine during my care.  Ativan protocol in the ED for withdrawal.  Pt ate a good breakfast but then felt nauseated.  CIWA 8 in the ED. Labs drawn in ED prior to transfer to floor per Dr. Blevins orders.  Attempt to assist pt to ambulate with standby of 2 staff unsuccessful.  Pt too weak and tremulous  Tests Performed: CT Head, basic labs UA  Abnormal Results:   Labs Ordered and Resulted from Time of ED Arrival Up to the Time of Departure from the ED   CBC WITH PLATELETS DIFFERENTIAL - Abnormal; Notable for the following " components:       Result Value    WBC 3.3 (*)     Platelet Count 134 (*)     Absolute Neutrophil 1.5 (*)     All other components within normal limits   COMPREHENSIVE METABOLIC PANEL - Abnormal; Notable for the following components:    Calcium 8.4 (*)      (*)      (*)     All other components within normal limits   ALCOHOL ETHYL - Abnormal; Notable for the following components:    Ethanol g/dL 0.34 (*)     All other components within normal limits   ROUTINE UA WITH MICROSCOPIC - Abnormal; Notable for the following components:    Protein Albumin Urine 30 (*)     Mucous Urine Present (*)     All other components within normal limits   DRUG ABUSE SCREEN 77 URINE (FL, RH, SH) - Abnormal; Notable for the following components:    Amphetamine Qual Urine Positive (*)     Benzodiazepine Qual Urine Positive (*)     All other components within normal limits   INR   LIPASE   PERIPHERAL IV CATHETER       Treatments provided: IV Banana bag 1 litre and 1 litre NS.  O2 at 2 litre as pt desat to 88 when resting and ativan given, medications and emotional support given    Family Comments: N/A    OBS brochure/video discussed/provided to patient/family: N/A              Name of person given brochure if not patient: NA              Relationship to patient: NA    ED Medications:   Medications   ondansetron (ZOFRAN) injection 4 mg (4 mg Intravenous Given 12/19/18 0652)   LORazepam (ATIVAN) injection 1 mg (1 mg Intravenous Given 12/19/18 1125)   0.9% sodium chloride BOLUS (0 mLs Intravenous Stopped 12/19/18 0933)   pantoprazole (PROTONIX) 40 mg IV push injection (40 mg Intravenous Given 12/19/18 0658)   sodium chloride 0.9 % 1,000 mL with INFUVITE ADULT 10 mL, thiamine 100 mg, folic acid 1 mg infusion ( Intravenous Stopped 12/19/18 0933)       Drips infusing?:  No    For the majority of the shift this patient was Green.   Interventions performed were NA.    Severe Sepsis OR Septic Shock Diagnosis Present: No    To be  done/followed up on inpatient unit:  Alcohol withdrawal protocol. Pt a fall risk    ED NURSE PHONE NUMBER: [phone]

## 2018-12-19 NOTE — PHARMACY-ADMISSION MEDICATION HISTORY
Admission medication history interview status for the 12/19/2018  admission is complete. See EPIC admission navigator for prior to admission medications     Medication history source reliability:Moderate    Actions taken by pharmacist (provider contacted, etc):  Spoke w/ patient.  Reviewed information in Care Everywhere.       Additional medication history information not noted on PTA med list :   - Bupropion SR prescribed as 150 mg PO BID; however, patient states he must have read the prescription wrong as he has only been taking it daily.    Medication reconciliation/reorder completed by provider prior to medication history? No    Time spent in this activity: 15 minutes    Prior to Admission medications    Medication Sig Last Dose Taking? Auth Provider   Amphetamine-Dextroamphetamine (ADDERALL PO) Take 20 mg by mouth daily Past Week at Unknown time Yes Unknown, Entered By History   Amphetamine-Dextroamphetamine (ADDERALL PO) Take 10 mg by mouth daily as needed (ADD) In the afternoon if needed Past Week at Unknown time Yes Unknown, Entered By History   BuPROPion HCl (WELLBUTRIN SR PO) Take 150 mg by mouth daily Past Week at Unknown time Yes Unknown, Entered By History   diazepam (VALIUM) 5 MG tablet Take 5 mg by mouth daily as needed for anxiety (Wanting an alcoholic drink)  at prn Yes Unknown, Entered By History   testosterone cypionate (DEPOTESTOTERONE) 200 MG/ML injection Inject 200 mg into the muscle every 14 days  Yes Unknown, Entered By History     Aracelis Godfrey, PharmD, BCPS

## 2018-12-19 NOTE — PLAN OF CARE
A&O x4. CIWA went from 11-8. Pt received two mg of ativan per shift. Up with one. VSS. Lungs clear. Tolerating diet. IVF. Voiding without difficulty. Plan is to monitor on CIWA protocol. Pt has LR at 100/hr. Pt requesting soboxone. Ativan ordered for withdrawal.

## 2018-12-20 LAB
ABO + RH BLD: NORMAL
ABO + RH BLD: NORMAL
ALBUMIN SERPL-MCNC: 3.3 G/DL (ref 3.4–5)
ALP SERPL-CCNC: 81 U/L (ref 40–150)
ALT SERPL W P-5'-P-CCNC: 184 U/L (ref 0–70)
ANION GAP SERPL CALCULATED.3IONS-SCNC: 10 MMOL/L (ref 3–14)
AST SERPL W P-5'-P-CCNC: 198 U/L (ref 0–45)
BILIRUB SERPL-MCNC: 1.3 MG/DL (ref 0.2–1.3)
BLD GP AB SCN SERPL QL: NORMAL
BLOOD BANK CMNT PATIENT-IMP: NORMAL
BUN SERPL-MCNC: 11 MG/DL (ref 7–30)
CALCIUM SERPL-MCNC: 8.5 MG/DL (ref 8.5–10.1)
CHLORIDE SERPL-SCNC: 102 MMOL/L (ref 94–109)
CO2 SERPL-SCNC: 26 MMOL/L (ref 20–32)
CREAT SERPL-MCNC: 0.67 MG/DL (ref 0.66–1.25)
ERYTHROCYTE [DISTWIDTH] IN BLOOD BY AUTOMATED COUNT: 12.9 % (ref 10–15)
GFR SERPL CREATININE-BSD FRML MDRD: >90 ML/MIN/{1.73_M2}
GLUCOSE SERPL-MCNC: 113 MG/DL (ref 70–99)
HCT VFR BLD AUTO: 37 % (ref 40–53)
HGB BLD-MCNC: 12.8 G/DL (ref 13.3–17.7)
MAGNESIUM SERPL-MCNC: 1.6 MG/DL (ref 1.6–2.3)
MCH RBC QN AUTO: 31.4 PG (ref 26.5–33)
MCHC RBC AUTO-ENTMCNC: 34.6 G/DL (ref 31.5–36.5)
MCV RBC AUTO: 91 FL (ref 78–100)
PLATELET # BLD AUTO: 98 10E9/L (ref 150–450)
POTASSIUM SERPL-SCNC: 3.5 MMOL/L (ref 3.4–5.3)
PROT SERPL-MCNC: 7.1 G/DL (ref 6.8–8.8)
RBC # BLD AUTO: 4.08 10E12/L (ref 4.4–5.9)
SODIUM SERPL-SCNC: 138 MMOL/L (ref 133–144)
SPECIMEN EXP DATE BLD: NORMAL
WBC # BLD AUTO: 3.1 10E9/L (ref 4–11)

## 2018-12-20 PROCEDURE — 99221 1ST HOSP IP/OBS SF/LOW 40: CPT | Performed by: PSYCHIATRY & NEUROLOGY

## 2018-12-20 PROCEDURE — 25000131 ZZH RX MED GY IP 250 OP 636 PS 637: Mod: GY | Performed by: HOSPITALIST

## 2018-12-20 PROCEDURE — 80053 COMPREHEN METABOLIC PANEL: CPT | Performed by: HOSPITALIST

## 2018-12-20 PROCEDURE — 36415 COLL VENOUS BLD VENIPUNCTURE: CPT | Performed by: HOSPITALIST

## 2018-12-20 PROCEDURE — 25000128 H RX IP 250 OP 636: Performed by: HOSPITALIST

## 2018-12-20 PROCEDURE — 86850 RBC ANTIBODY SCREEN: CPT | Performed by: HOSPITALIST

## 2018-12-20 PROCEDURE — 99232 SBSQ HOSP IP/OBS MODERATE 35: CPT | Performed by: HOSPITALIST

## 2018-12-20 PROCEDURE — 85027 COMPLETE CBC AUTOMATED: CPT | Performed by: HOSPITALIST

## 2018-12-20 PROCEDURE — A9270 NON-COVERED ITEM OR SERVICE: HCPCS | Mod: GY | Performed by: HOSPITALIST

## 2018-12-20 PROCEDURE — 86901 BLOOD TYPING SEROLOGIC RH(D): CPT | Performed by: HOSPITALIST

## 2018-12-20 PROCEDURE — 86900 BLOOD TYPING SEROLOGIC ABO: CPT | Performed by: HOSPITALIST

## 2018-12-20 PROCEDURE — 12000000 ZZH R&B MED SURG/OB

## 2018-12-20 PROCEDURE — 25000132 ZZH RX MED GY IP 250 OP 250 PS 637: Mod: GY | Performed by: HOSPITALIST

## 2018-12-20 PROCEDURE — 83735 ASSAY OF MAGNESIUM: CPT | Performed by: HOSPITALIST

## 2018-12-20 PROCEDURE — C9113 INJ PANTOPRAZOLE SODIUM, VIA: HCPCS | Performed by: HOSPITALIST

## 2018-12-20 RX ORDER — PROCHLORPERAZINE MALEATE 5 MG
10 TABLET ORAL EVERY 6 HOURS PRN
Status: DISCONTINUED | OUTPATIENT
Start: 2018-12-20 | End: 2018-12-21 | Stop reason: HOSPADM

## 2018-12-20 RX ORDER — OLANZAPINE 5 MG/1
5 TABLET, ORALLY DISINTEGRATING ORAL EVERY 6 HOURS PRN
Status: DISCONTINUED | OUTPATIENT
Start: 2018-12-20 | End: 2018-12-21 | Stop reason: HOSPADM

## 2018-12-20 RX ORDER — PROCHLORPERAZINE 25 MG
25 SUPPOSITORY, RECTAL RECTAL EVERY 12 HOURS PRN
Status: DISCONTINUED | OUTPATIENT
Start: 2018-12-20 | End: 2018-12-21 | Stop reason: HOSPADM

## 2018-12-20 RX ADMIN — LORAZEPAM 1 MG: 1 TABLET ORAL at 19:52

## 2018-12-20 RX ADMIN — SODIUM CHLORIDE, POTASSIUM CHLORIDE, SODIUM LACTATE AND CALCIUM CHLORIDE: 600; 310; 30; 20 INJECTION, SOLUTION INTRAVENOUS at 10:28

## 2018-12-20 RX ADMIN — PROCHLORPERAZINE EDISYLATE 10 MG: 5 INJECTION INTRAMUSCULAR; INTRAVENOUS at 10:23

## 2018-12-20 RX ADMIN — LORAZEPAM 1 MG: 1 TABLET ORAL at 18:07

## 2018-12-20 RX ADMIN — FOLIC ACID 1 MG: 1 TABLET ORAL at 08:33

## 2018-12-20 RX ADMIN — LORAZEPAM 2 MG: 1 TABLET ORAL at 02:18

## 2018-12-20 RX ADMIN — LORAZEPAM 2 MG: 1 TABLET ORAL at 09:46

## 2018-12-20 RX ADMIN — BUPROPION HYDROCHLORIDE 150 MG: 150 TABLET, FILM COATED, EXTENDED RELEASE ORAL at 08:34

## 2018-12-20 RX ADMIN — LORAZEPAM 1 MG: 1 TABLET ORAL at 13:43

## 2018-12-20 RX ADMIN — LORAZEPAM 2 MG: 1 TABLET ORAL at 16:51

## 2018-12-20 RX ADMIN — ONDANSETRON 4 MG: 4 TABLET, ORALLY DISINTEGRATING ORAL at 20:45

## 2018-12-20 RX ADMIN — LORAZEPAM 1 MG: 2 INJECTION INTRAMUSCULAR; INTRAVENOUS at 23:12

## 2018-12-20 RX ADMIN — MULTIPLE VITAMINS W/ MINERALS TAB 1 TABLET: TAB at 08:34

## 2018-12-20 RX ADMIN — ONDANSETRON 4 MG: 4 TABLET, ORALLY DISINTEGRATING ORAL at 08:33

## 2018-12-20 RX ADMIN — Medication 100 MG: at 08:33

## 2018-12-20 RX ADMIN — SODIUM CHLORIDE, POTASSIUM CHLORIDE, SODIUM LACTATE AND CALCIUM CHLORIDE: 600; 310; 30; 20 INJECTION, SOLUTION INTRAVENOUS at 00:36

## 2018-12-20 RX ADMIN — ONDANSETRON 4 MG: 4 TABLET, ORALLY DISINTEGRATING ORAL at 13:43

## 2018-12-20 RX ADMIN — PANTOPRAZOLE SODIUM 40 MG: 40 INJECTION, POWDER, FOR SOLUTION INTRAVENOUS at 08:33

## 2018-12-20 RX ADMIN — ENOXAPARIN SODIUM 40 MG: 40 INJECTION SUBCUTANEOUS at 13:44

## 2018-12-20 RX ADMIN — DEXTROAMPHETAMINE SACCHARATE, AMPHETAMINE ASPARTATE MONOHYDRATE, DEXTROAMPHETAMINE SULFATE AND AMPHETAMINE SULFATE 20 MG: 5; 5; 5; 5 TABLET ORAL at 08:34

## 2018-12-20 RX ADMIN — LORAZEPAM 2 MG: 1 TABLET ORAL at 08:32

## 2018-12-20 RX ADMIN — PROCHLORPERAZINE MALEATE 10 MG: 5 TABLET, FILM COATED ORAL at 16:51

## 2018-12-20 RX ADMIN — LORAZEPAM 2 MG: 1 TABLET ORAL at 04:22

## 2018-12-20 ASSESSMENT — ACTIVITIES OF DAILY LIVING (ADL)
ADLS_ACUITY_SCORE: 15
ADLS_ACUITY_SCORE: 14
ADLS_ACUITY_SCORE: 15
ADLS_ACUITY_SCORE: 15

## 2018-12-20 ASSESSMENT — PAIN DESCRIPTION - DESCRIPTORS: DESCRIPTORS: HEADACHE

## 2018-12-20 NOTE — PLAN OF CARE
A&Ox4. VSS ex 2L NC. Up with A1, unsteady at times. CWIA scores: 24, 16 & 5. Reports anxious, tremors, hallucinations. PRN Ativan 2mg given x2 and seroquel given x1. Denies pain, n/v. IVF @ 100ml/hr. Plan for psych consult.

## 2018-12-20 NOTE — CONSULTS
Pt seen for initial psychiatric evaluation, please see my dictation for details and recommendations. He is an unreliable historian who denies hx of bipolar disorder but claims to be on Suboxone which he claims he is weaning on his own. Wants Suboxone resumed - pharmacy to verify. Past records suggest 2 psychiatric admissions for bipolar disorder to [facility] in 2014. He is not interested in CD treatment.

## 2018-12-20 NOTE — PLAN OF CARE
Pt A&O x3, disoriented to time occasionally. VSS on room air 95% CIWA 14, 4, 13, 2. PO ativan given, pt calm/ cooperative. Up with SBA to bathroom. X2 episodes of emesis, green. 2x brown loose stools. Pt reporting blood in stools, requested x2 to see pt flushed toilet, next BM had no blood in it and was light brown. Pt repeated requests for suboxone.  Sleeping between cares. Hgb down today, 12.8. LR @ 100/hr. Discharge pending. Psych following.

## 2018-12-20 NOTE — CONSULTS
"Consult Date:  12/20/2018      REASON FOR CONSULTATION:  Query psychosis.  History of bipolar alcoholism.  Query hallucinations, auditory and visual.        REQUESTING PHYSICIAN:  Yehuda Blevins MD      PRIMARY CARE PHYSICIAN: Rian Myers MD      OUTPATIENT PSYCHIATRIST: None.      IDENTIFYING DATA:  Andrea Collado is a 53-year-old man who reports he is  and has 3 adult children.  He tells me he resides independently and had previously worked as a  and wrestler.  He reports chronic pain issues as well as alcohol use problems. He presented to [facility] ED via PD account of the presumed seizure activity as he could not explain why he found himself on the floor of his home.  Information was gathered through direct patient contact as well as chart review.      CHIEF COMPLAINT:  \"I'm withdrawing from Suboxone really badly and I need to get back on my Suboxone.\"      HISTORY OF PRESENT ILLNESS:  Andrea Collado and reports a longstanding history of alcohol use problems.  He reports that he has been consuming a bottle of Everclear vodka daily for several weeks.  He reports that this is 151 proof alcohol.  He reports he has a history of alcohol withdrawal seizures and admits that he has been through chemical use treatment in the past.  He states he just does not accept the philosophy of repeating to oneself that one is an addict, hence his refusal to pursue CD treatment.  He also admits that he had become dependent on opioids following injuries sustained while wrestling and tells me that he had been placed on Suboxone to wean him off opioids and claims that this had worked.  He is unable to tell me the name of his prescribing provider, but reports that he receives his Suboxone through [facility] [address] in [place].  He states he last used half of the 8 mg tablet 3 days ago.  They had been concerns that the patient was experiencing auditory and visual " hallucinations on admission, but these appear to have been in the context of his acute withdrawal.  The patient does not endorse other illicit drug use, but records review suggests that he had abused cocaine in the past.  With respect to his mood, the patient denies history of bipolar disorder even though he has had documented admissions for bipolar disorder on station 77 on 2 locations in 2014.  He reports that he experiences significant anxiety.  Records review suggests that he came to the ED on account of waking up on the floor around 2:00 a.m. in the morning at his home where he lives alone.  He was unsure how he got there had been drinking prior to that.  He reports frequent falls in the last few weeks but indicated that when he drinks, he does not get intoxicated.  He did think he may have hit his head the night prior to presentation.  Of note, the patient has given inconsistent accounts of his presentation and he endorsed visual and auditory hallucinations in the preceding days to his admission.  He claimed to the ED that the voices are not threatening, oftentimes his father's voice who reportedly passed away this past April.  He told the ED that he had been on bupropion 150 mg daily as well as Adderall p.r.n.      PAST PSYCHIATRIC HISTORY:  As described in history of present illness.      CHEMICAL USE HISTORY:  The patient reportedly has been through chemical health treatment on a couple occasions in the past.  He continues to drink alcohol on a regular basis.  He does not see a psychiatrist at this time, but sees a psychologist, Dr. Rahman, in [place] every week.      CHEMICAL USE HISTORY:  As described in history of present illness.      PAST MEDICAL HISTORY:     1.  Deep vein thrombosis of left foot.   2.  Flail chest.    3.  History of total hip arthroplasty on right.   4.  Hypertension.   5.  Seizures.   6.  Polysubstance abuse.   7.  Male hypogonadism.    ALLERGIES:  No known drug allergies.       PAST SURGICAL HISTORY:   1.  Repair of flail chest.   2.  Hip surgery.   3.  Knee surgery.   4.  Orthopedic surgery.      MEDICATIONS PRIOR TO ADMISSION:   1.  Adderall 20 mg p.o. daily.   2.  Adderall 10 mg daily p.r.n.   3.  Wellbutrin- mg p.o. daily.   4.  Valium 5 mg p.o. daily p.r.n.   5.  Depo testosterone 200 mg IM every 14 days.      FAMILY PSYCHIATRIC HISTORY:  None reported.      SOCIAL HISTORY:  The patient reports he was born and raised in [place].  His parents were .  His father is now .  His youngest of his parents' 3 children.  Patient holds a degree in management in [employer] and 3 master's degrees in psychology, business administration and  as well as a PhD of Psychology, per records, this not be verified.  He also reportedly worked as a  for [employer] for several years until he was forced to retire 3 years ago on account of his chemical use problems.  He has been  twice and has 3 children.      REVIEW OF SYSTEMS:  I refer the reader to the 10-point review of systems documented by Yehuda Blevins MD      VITAL SIGNS:  Blood pressure 128/82, pulse 58, respirations 16, temperature 98.6, weight 94.7 kg.      MENTAL STATUS EXAMINATION:  This is a middle-aged man who appears his stated age of 53.  He is seen at his bedside where he is dressed in hospital gown and is notably tremulous.  He is receiving IV hydration.  His speech is clear and coherent.  His mood is anxious, tense affect.  His thought process is mostly logical and relevant.  He denies the presence of auditory or visual hallucinations.  He is somewhat paranoid.  He does not endorse any self-harm thoughts, plans, or intent.  His gait and station are not assessed as he is currently bed bound.  His attention span and concentration are limited.  His muscle strength is good.  His associations are tight and his language is appropriate.  He displays limited insight and  judgment.  His impulse control is marginal.  Risk assessment at this time is considered moderate.      DIAGNOSTIC IMPRESSION:  Arlen Collado is a 53-year-old, twice  father of 3 who previously worked as  but had to retire 3 years ago on account of his persistent alcohol use disorder.  He is currently admitted on account of alcohol withdrawal symptoms with evidence of liver disease.  He also reports opioid use disorder on Suboxone.      DIAGNOSES:   1.  Alcohol use disorder with alcohol withdrawal symptoms.   2.  Cocaine use disorder, in reported remission.   3.  Opioid use disorder, on agonist therapy.   4.  Bipolar disorder, most recent episode depressed.  5.  Male hypogonadism, on testosterone replacement.      RECOMMENDATIONS:   1.  Medical management as you are.   2.  It will be prudent to verify if the patient indeed was on Suboxone prior to his admission, particularly given the fact that he is not a very reliable historian.  He is unable to give me the name of his prescribing provider, but tells me that he gets the prescriptions through [facility] in [place].  He is currently tremulous and obviously in alcohol withdrawal.  He denies history of bipolar disorder but given other reports of auditory or visual hallucinations, it will be recommended that psychostimulants be held during this hospitalization.  He is not interested in pursuing chemical health treatment and at this time he is not holdable.  I will make p.r.n. olanzapine available should he require antipsychotics for breakthrough psychosis or agitation.      Thanks for the consult.         DONOVAN MILLER MD             D: 2018   T: 2018   MT: [id number]      Name:     ARLEN COLLADO   MRN:      [id number]        Account:       [id number]   :      1965           Consult Date:  2018      Document: [id number]       cc: Yehdua Myers MD

## 2018-12-20 NOTE — PROGRESS NOTES
[facility]    Hospitalist Progress Note  December 20, 2018    Assessment & Plan   Andrea Collado is a 53 year old male with a history of alcoholism and substance abuse, bipolar, low testosterone, and reported seizure history who was admitted 12/19/2018 with alcohol intoxication. He had reported BRBPR and bloody urine but has had witnessed BM and voiding without signs of either, multiple times.      1. Alcohol dependence      Transaminitis likely 2/2 alcohol use  - 1L + of vodka or Everclear daily for multiple years - wishes to quit   - s/p IVF - banana bag in ED; continued maintenance with /hr  -  --> 198,  --> 184, Alk phos 97 -- 81 - improving, RUQ/abdo remains nontender  - Folate, thiamine, and multivitamin initiated  - Social work for Chem Dep placed  - CIWA with lorazepam continued (shorter half life preferred in hepatitis/transaminitis)  - CIWA 5-11       2. Hallucinations with ?history of depression (bipolar noted in epic)  - wellbutrin 150 daily resumed  - Psychiatry consultation placed given the above and his questionable stories of his history - await recs, appreciated     3. Reported hematochezia and hematuria    - Hgb normal at 16 on admission, decreased to 12.8, seems a little much for dilutional etiology  --> will Type and Screen today  --> continue Protonix IV  - patient reports bloody BM, RN visualized stool- NO blood/maroon/black noted     4. ADHD  - continue adderall 20mg daily tomorrow  - Psychiatry consult pending as above     5. Low testosterone  - given every 14 days at  home     Diet: regular  DVT Prophylaxis: Enoxaparin (Lovenox) SQ  Lino Catheter: not present  Code Status: Full Code  Dispo: pending resolved withdrawal, tolerating PO intake, plan from Psychiatry    Yehuda Blevins MD    Text Page (7am to 6pm, M-F)    Interval History   Continues withdrawal from alcohol. Some nonbloody vomiting today - confirmed with nursing. Patient reported bloody stool  x2, however RN confirms it was NOT bloody whatsoever and patient apparently attempted to not allow her to see it. Otherwise no new symptoms.     -Data reviewed today: I reviewed all new labs and imaging results over the last 24 hours. I personally reviewed no images or EKG's today.    Physical Exam   Temp: 98.6  F (37  C) Temp src: Oral BP: 128/82 Pulse: 58 Heart Rate: 66 Resp: 16 SpO2: 96 % O2 Device: None (Room air) Oxygen Delivery: 2 LPM  Vitals:    12/20/18 0600   Weight: 94.7 kg (208 lb 12.4 oz)     Vital Signs with Ranges  Temp:  [97.7  F (36.5  C)-98.6  F (37  C)] 98.6  F (37  C)  Pulse:  [58] 58  Heart Rate:  [62-83] 66  Resp:  [16-21] 16  BP: (107-133)/(66-89) 128/82  SpO2:  [91 %-97 %] 96 %  I/O last 3 completed shifts:  In: -   Out: 1250 [Urine:750; Emesis/NG output:500]    General:  Pleasant, no acute distress, but tremors noted at times  HEENT: normocephalic, atraumatic, EOMI, MMM  CV: reg rate and rhythm, s1 s2 heard  Resp: clear to auscultation bilaterally, no crackles or wheezing  Abdo: soft, nontender, nondistended, BS present, no rebound  Ext: nontender, well perfused, no edema  Neuro: AAOx3, no focal deficits, moving all extremities      Medications     lactated ringers 100 mL/hr at 12/20/18 1028       buPROPion  150 mg Oral Daily     enoxaparin  40 mg Subcutaneous Q24H     folic acid  1 mg Oral Daily     multivitamin w/minerals  1 tablet Oral Daily     pantoprazole (PROTONIX) IV  40 mg Intravenous Daily with breakfast     senna-docusate  1 tablet Oral BID    Or     senna-docusate  2 tablet Oral BID     vitamin B1  100 mg Oral Daily       Data   Recent Labs   Lab 12/20/18  1003 12/19/18  0600   WBC 3.1* 3.3*   HGB 12.8* 16.0   MCV 91 91   PLT 98* 134*   INR  --  0.95    144   POTASSIUM 3.5 3.9   CHLORIDE 102 108   CO2 26 26   BUN 11 13   CR 0.67 0.67   ANIONGAP 10 10   JOHANA 8.5 8.4*   * 76   ALBUMIN 3.3* 4.2   PROTTOTAL 7.1 8.8   BILITOTAL 1.3 0.5   ALKPHOS 81 97   * 274*   AST  198* 356*   LIPASE  --  151       No results found for this or any previous visit (from the past 24 hour(s)).

## 2018-12-20 NOTE — PLAN OF CARE
A&Ox3-4. Occ d/o to time. CIWA 18, 4, 17. Medicated as ordered. UOP good. BM x1. Tremulous. Reporting chills, afebrile. Plan for psych consult today.

## 2018-12-21 VITALS
OXYGEN SATURATION: 95 % | SYSTOLIC BLOOD PRESSURE: 123 MMHG | TEMPERATURE: 98.9 F | BODY MASS INDEX: 27.17 KG/M2 | WEIGHT: 205.91 LBS | DIASTOLIC BLOOD PRESSURE: 82 MMHG | HEART RATE: 58 BPM | RESPIRATION RATE: 18 BRPM

## 2018-12-21 LAB
ALBUMIN SERPL-MCNC: 3.7 G/DL (ref 3.4–5)
ALP SERPL-CCNC: 85 U/L (ref 40–150)
ALT SERPL W P-5'-P-CCNC: 273 U/L (ref 0–70)
ANION GAP SERPL CALCULATED.3IONS-SCNC: 11 MMOL/L (ref 3–14)
AST SERPL W P-5'-P-CCNC: 315 U/L (ref 0–45)
BILIRUB SERPL-MCNC: 1.4 MG/DL (ref 0.2–1.3)
BUN SERPL-MCNC: 8 MG/DL (ref 7–30)
CALCIUM SERPL-MCNC: 9.2 MG/DL (ref 8.5–10.1)
CHLORIDE SERPL-SCNC: 103 MMOL/L (ref 94–109)
CO2 SERPL-SCNC: 25 MMOL/L (ref 20–32)
CREAT SERPL-MCNC: 0.71 MG/DL (ref 0.66–1.25)
ERYTHROCYTE [DISTWIDTH] IN BLOOD BY AUTOMATED COUNT: 12.9 % (ref 10–15)
GFR SERPL CREATININE-BSD FRML MDRD: >90 ML/MIN/{1.73_M2}
GLUCOSE SERPL-MCNC: 93 MG/DL (ref 70–99)
HCT VFR BLD AUTO: 40.6 % (ref 40–53)
HGB BLD-MCNC: 14.2 G/DL (ref 13.3–17.7)
MCH RBC QN AUTO: 31.6 PG (ref 26.5–33)
MCHC RBC AUTO-ENTMCNC: 35 G/DL (ref 31.5–36.5)
MCV RBC AUTO: 90 FL (ref 78–100)
PLATELET # BLD AUTO: 119 10E9/L (ref 150–450)
POTASSIUM SERPL-SCNC: 3.5 MMOL/L (ref 3.4–5.3)
PROT SERPL-MCNC: 8 G/DL (ref 6.8–8.8)
RBC # BLD AUTO: 4.5 10E12/L (ref 4.4–5.9)
SODIUM SERPL-SCNC: 139 MMOL/L (ref 133–144)
WBC # BLD AUTO: 3.9 10E9/L (ref 4–11)

## 2018-12-21 PROCEDURE — 25000132 ZZH RX MED GY IP 250 OP 250 PS 637: Mod: GY | Performed by: HOSPITALIST

## 2018-12-21 PROCEDURE — 36415 COLL VENOUS BLD VENIPUNCTURE: CPT | Performed by: HOSPITALIST

## 2018-12-21 PROCEDURE — 25000132 ZZH RX MED GY IP 250 OP 250 PS 637: Mod: GY | Performed by: INTERNAL MEDICINE

## 2018-12-21 PROCEDURE — 25000128 H RX IP 250 OP 636: Performed by: HOSPITALIST

## 2018-12-21 PROCEDURE — A9270 NON-COVERED ITEM OR SERVICE: HCPCS | Mod: GY | Performed by: HOSPITALIST

## 2018-12-21 PROCEDURE — 85027 COMPLETE CBC AUTOMATED: CPT | Performed by: HOSPITALIST

## 2018-12-21 PROCEDURE — 80053 COMPREHEN METABOLIC PANEL: CPT | Performed by: HOSPITALIST

## 2018-12-21 PROCEDURE — C9113 INJ PANTOPRAZOLE SODIUM, VIA: HCPCS | Performed by: HOSPITALIST

## 2018-12-21 PROCEDURE — 99239 HOSP IP/OBS DSCHRG MGMT >30: CPT | Performed by: HOSPITALIST

## 2018-12-21 PROCEDURE — A9270 NON-COVERED ITEM OR SERVICE: HCPCS | Mod: GY | Performed by: INTERNAL MEDICINE

## 2018-12-21 RX ORDER — MULTIPLE VITAMINS W/ MINERALS TAB 9MG-400MCG
1 TAB ORAL DAILY
Qty: 30 TABLET | Refills: 0 | Status: SHIPPED | OUTPATIENT
Start: 2018-12-22 | End: 2019-01-30

## 2018-12-21 RX ORDER — LANOLIN ALCOHOL/MO/W.PET/CERES
100 CREAM (GRAM) TOPICAL DAILY
Qty: 30 TABLET | Refills: 0 | Status: SHIPPED | OUTPATIENT
Start: 2018-12-22 | End: 2019-01-30

## 2018-12-21 RX ORDER — ACETAMINOPHEN 325 MG/1
650 TABLET ORAL EVERY 6 HOURS PRN
Status: DISCONTINUED | OUTPATIENT
Start: 2018-12-21 | End: 2018-12-21 | Stop reason: HOSPADM

## 2018-12-21 RX ORDER — FOLIC ACID 1 MG/1
1 TABLET ORAL DAILY
Qty: 30 TABLET | Refills: 0 | Status: SHIPPED | OUTPATIENT
Start: 2018-12-22 | End: 2019-01-30

## 2018-12-21 RX ADMIN — BUPROPION HYDROCHLORIDE 150 MG: 150 TABLET, FILM COATED, EXTENDED RELEASE ORAL at 08:09

## 2018-12-21 RX ADMIN — LORAZEPAM 1 MG: 1 TABLET ORAL at 02:46

## 2018-12-21 RX ADMIN — FOLIC ACID 1 MG: 1 TABLET ORAL at 08:09

## 2018-12-21 RX ADMIN — ACETAMINOPHEN 650 MG: 325 TABLET, FILM COATED ORAL at 00:15

## 2018-12-21 RX ADMIN — MULTIPLE VITAMINS W/ MINERALS TAB 1 TABLET: TAB at 08:09

## 2018-12-21 RX ADMIN — PANTOPRAZOLE SODIUM 40 MG: 40 INJECTION, POWDER, FOR SOLUTION INTRAVENOUS at 08:09

## 2018-12-21 RX ADMIN — Medication 100 MG: at 08:10

## 2018-12-21 ASSESSMENT — ACTIVITIES OF DAILY LIVING (ADL)
TRANSFERRING: 0-->INDEPENDENT
ADLS_ACUITY_SCORE: 19
SWALLOWING: 0-->SWALLOWS FOODS/LIQUIDS WITHOUT DIFFICULTY
COGNITION: 0 - NO COGNITION ISSUES REPORTED
ADLS_ACUITY_SCORE: 17
AMBULATION: 0-->INDEPENDENT
ADLS_ACUITY_SCORE: 15
DRESS: 0-->INDEPENDENT
RETIRED_EATING: 0-->INDEPENDENT
BATHING: 0-->INDEPENDENT
ADLS_ACUITY_SCORE: 17
NUMBER_OF_TIMES_PATIENT_HAS_FALLEN_WITHIN_LAST_SIX_MONTHS: 2
RETIRED_COMMUNICATION: 0-->UNDERSTANDS/COMMUNICATES WITHOUT DIFFICULTY
FALL_HISTORY_WITHIN_LAST_SIX_MONTHS: YES
TOILETING: 0-->INDEPENDENT

## 2018-12-21 NOTE — PLAN OF CARE
Patient alert/orient X4, up independently in room/hallway.  Lungs clear on RA.  CIWA score 4, anxious at times. Vss, denies any pain.  Discharging to home today.

## 2018-12-21 NOTE — CONSULTS
Care Transition Initial Assessment - CARO     Met with: Patient    Active Problems:    ETOH abuse    Psychosis (H)    Alcohol abuse       DATA  Lives With: alone   Identified issues/concerns regarding health management: Pt will discharge today. CARO provided pt with information on [facility] and how to obtain a CD assessment [facility]. However, pt not stating a strong desire for treatment. Reporting no interest in AA. Reports he can stay sober with thoughts similar to positive thinking. Pt states he has no money for a ride home. SW can provide a ride through [facility]. Pt wants to [facility] [employer] [facility].     ASSESSMENT  Cognitive Status: Appears alert and oriented.  Concerns to be addressed: None further.     PLAN  Financial costs for the patient includes: None.  Patient given options and choices for discharge: Yes.  Patient/family is agreeable to the plan? YES  Patient Goals and Preferences: discharge home.  Patient anticipates discharging to: home.    ED Saxena, SW  [id number]

## 2018-12-21 NOTE — PLAN OF CARE
"A/O x3, disoriented to time. Forgetful. VSS on ra. Pt c/o pain \"everywhere\" and given prn tylenol. Ciwa 3 and 9, treated w oral ativan x1. C/o vomiting and diarrhea but not seen by nurse. One stool that was seen by nurse was soft and formed. Pt repeatedly requesting suboxone. Psychiatry following. Nursing will continue to monitor  "

## 2018-12-21 NOTE — PROGRESS NOTES
"MD Notification    Notified Person: MD    Notified Person Name: Milton    Notification Date/Time: 12/21, 0000    Notification Interaction: phone    Purpose of Notification: Pt in pain \"all over\" requesting tylenol or ibuprofen.    Orders Received: tylenol prn    Comments:      "
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
## 2018-12-22 NOTE — DISCHARGE SUMMARY
[facility]  Hospitalist Discharge Summary       Date of Admission:  12/19/2018  Date of Discharge:  12/21/2018  Discharging Provider: Yehuda Blevins MD      Discharge Diagnoses   1. Alcohol dependence  2. Transaminitis likely 2/2 #1 - improving upon discharge  3. Hallucinations - auditory and visual - resolved at discharge  4. History of depression  5. History of ADHD   6. History of hypogonadism    Follow-ups Needed After Discharge   Follow-up Appointments     Follow-up and recommended labs and tests       PCP in 3-5 days with CMP and CBC               Unresulted Labs Ordered in the Past 30 Days of this Admission     No orders found from 10/20/2018 to 12/20/2018.          Hospital Course      1. Alcohol dependence      Transaminitis likely 2/2 alcohol use  - 1L + of vodka or Everclear daily for multiple years - wishes to quit   - s/p IVF - banana bag in ED; continued maintenance with /hr  -  --> 198,  --> 184, Alk phos 97 -- 81 - improving, RUQ/abdo remains nontender  - Folate, thiamine, and multivitamin initiated  - Social work for Chem Dep placed  - CIWA with lorazepam continued during admission (shorter half life preferred in hepatitis/transaminitis)  - CIWA decreasing prior to discharge; tremors resolving and patient feeling ready to discharge  - Discharged with folate, thiamine and multivitamin daily  - Social work and chemical dependence information provided for patient to seek treatment as outpatient     2. Hallucinations with ?history of depression (bipolar noted in epic)  - wellbutrin 150 daily resumed  - Psychiatry consultation completed - recommendations to avoid psychostimulants given history - adderall discontinued  - Will follow up with PCP for any further Rx     3. ADHD  - initially resumed adderall 20 daily (PTA) but discontinued after Psychiatry consultation as above     4. Low testosterone  - PTA depo injection every 14 days at home - no change during  admission    Of note, patient initially reported hematochezia and hematuria    - Hgb normal at 16 on admission, decreased to 12.8, likely dilutional; prior to discharge Hgb 14.2  - patient reports bloody BM, RN visualized stool- NO blood/maroon/black noted during admission  - PPI and typed and screened during admission    Consultations This Hospital Stay   PSYCHIATRY IP CONSULT  SOCIAL WORK IP CONSULT    Code Status   Full Code    Time Spent on this Encounter   I, Yehuda Blevins, personally saw the patient today and spent greater than 30 minutes discharging this patient.       Yehuda Blevins MD  [facility]  ______________________________________________________________________    Physical Exam   Vital Signs: Temp: 98.9  F (37.2  C) Temp src: Oral BP: 123/82   Heart Rate: 82 Resp: 18 SpO2: 95 % O2 Device: None (Room air)    Weight: 205 lbs 14.55 oz    Gen: NAD, pleasant, withdrawal tremors subsiding  HEENT: normocephalic, eomi, mmm  CV: rrr, s1s2 heard  Resp: ctabl, no crackles or wheezing  Abdo: s, nt, nd, +bs  Ext: well perfused, nontender, no edema  Neuro: AAOx3, no focal deficits, tremors nearly gone       Primary Care Physician   Rian Myers    Discharge Disposition   Discharged to home  Condition at discharge: Stable    Significant Results and Procedures   Most Recent 3 CBC's:  Recent Labs   Lab Test 12/21/18  0847 12/20/18  1003 12/19/18  0600   WBC 3.9* 3.1* 3.3*   HGB 14.2 12.8* 16.0   MCV 90 91 91   * 98* 134*     Most Recent 3 BMP's:  Recent Labs   Lab Test 12/21/18  0847 12/20/18  1003 12/19/18  0600    138 144   POTASSIUM 3.5 3.5 3.9   CHLORIDE 103 102 108   CO2 25 26 26   BUN 8 11 13   CR 0.71 0.67 0.67   ANIONGAP 11 10 10   JOHANA 9.2 8.5 8.4*   GLC 93 113* 76     Most Recent 2 LFT's:  Recent Labs   Lab Test 12/21/18  0847 12/20/18  1003   * 198*   * 184*   ALKPHOS 85 81   BILITOTAL 1.4* 1.3       Discharge Orders      Reason for your hospital stay     Alcohol withdrawal and a fall     Follow-up and recommended labs and tests     PCP in 3-5 days with CMP and CBC     Activity    Your activity upon discharge: activity as tolerated     Discharge Instructions    Abstain from alcohol and drug use. Seek treatment on outpatient basis with information provided.     Full Code     Diet    Follow this diet upon discharge: Orders Placed This Encounter      Combination Diet Regular Diet Adult     Discharge Medications   Discharge Medication List as of 12/21/2018 12:35 PM      START taking these medications    Details   folic acid (FOLVITE) 1 MG tablet Take 1 tablet (1 mg) by mouth daily, Disp-30 tablet, R-0, Local Print      multivitamin w/minerals (THERA-VIT-M) tablet Take 1 tablet by mouth daily, Disp-30 tablet, R-0, E-Prescribe      vitamin B1 (THIAMINE) 100 MG tablet Take 1 tablet (100 mg) by mouth daily, Disp-30 tablet, R-0, E-Prescribe         CONTINUE these medications which have NOT CHANGED    Details   !! Amphetamine-Dextroamphetamine (ADDERALL PO) Take 20 mg by mouth daily, Historical      !! Amphetamine-Dextroamphetamine (ADDERALL PO) Take 10 mg by mouth daily as needed (ADD) In the afternoon if needed, Historical      BuPROPion HCl (WELLBUTRIN SR PO) Take 150 mg by mouth daily, Historical      diazepam (VALIUM) 5 MG tablet Take 5 mg by mouth daily as needed for anxiety (Wanting an alcoholic drink), Historical      testosterone cypionate (DEPOTESTOTERONE) 200 MG/ML injection Inject 200 mg into the muscle every 14 days, Historical       !! - Potential duplicate medications found. Please discuss with provider.        Allergies   No Known Allergies

## 2019-01-30 ENCOUNTER — HOSPITAL ENCOUNTER (INPATIENT)
Facility: CLINIC | Age: 54
LOS: 2 days | Discharge: HOME | DRG: 897 | End: 2019-02-01
Attending: EMERGENCY MEDICINE | Admitting: STUDENT IN AN ORGANIZED HEALTH CARE EDUCATION/TRAINING PROGRAM
Payer: MEDICARE

## 2019-01-30 DIAGNOSIS — F10.220 ACUTE ALCOHOLIC INTOXICATION IN ALCOHOLISM WITHOUT COMPLICATION (H): ICD-10-CM

## 2019-01-30 DIAGNOSIS — F10.930 ALCOHOL WITHDRAWAL SYNDROME WITHOUT COMPLICATION (H): ICD-10-CM

## 2019-01-30 PROBLEM — E34.9 TESTOSTERONE DEFICIENCY: Status: ACTIVE | Noted: 2017-05-09

## 2019-01-30 PROBLEM — G89.4 CHRONIC PAIN DISORDER: Status: ACTIVE | Noted: 2017-12-04

## 2019-01-30 PROBLEM — Z76.5 DRUG-SEEKING BEHAVIOR: Status: ACTIVE | Noted: 2019-01-30

## 2019-01-30 PROBLEM — F17.200 TOBACCO DEPENDENCE: Status: ACTIVE | Noted: 2019-01-30

## 2019-01-30 PROBLEM — F32.A ANXIETY AND DEPRESSION: Status: ACTIVE | Noted: 2017-12-04

## 2019-01-30 PROBLEM — F41.9 ANXIETY AND DEPRESSION: Status: ACTIVE | Noted: 2017-12-04

## 2019-01-30 PROBLEM — R45.851 SUICIDAL IDEATION: Status: ACTIVE | Noted: 2019-01-14

## 2019-01-30 LAB
ALBUMIN SERPL-MCNC: 3.9 G/DL (ref 3.4–5)
ALCOHOL BREATH TEST: 0.2 (ref 0–0.01)
ALP SERPL-CCNC: 72 U/L (ref 40–150)
ALT SERPL W P-5'-P-CCNC: 117 U/L (ref 0–70)
AMPHETAMINES UR QL SCN: NEGATIVE
ANION GAP SERPL CALCULATED.3IONS-SCNC: 6 MMOL/L (ref 3–14)
AST SERPL W P-5'-P-CCNC: 72 U/L (ref 0–45)
BARBITURATES UR QL: NEGATIVE
BASOPHILS # BLD AUTO: 0.1 10E9/L (ref 0–0.2)
BASOPHILS NFR BLD AUTO: 1.6 %
BENZODIAZ UR QL: POSITIVE
BILIRUB SERPL-MCNC: 0.3 MG/DL (ref 0.2–1.3)
BUN SERPL-MCNC: 9 MG/DL (ref 7–30)
CALCIUM SERPL-MCNC: 8.7 MG/DL (ref 8.5–10.1)
CANNABINOIDS UR QL SCN: NEGATIVE
CHLORIDE SERPL-SCNC: 110 MMOL/L (ref 94–109)
CO2 SERPL-SCNC: 31 MMOL/L (ref 20–32)
COCAINE UR QL: NEGATIVE
CREAT SERPL-MCNC: 0.8 MG/DL (ref 0.66–1.25)
DIFFERENTIAL METHOD BLD: ABNORMAL
EOSINOPHIL # BLD AUTO: 0.2 10E9/L (ref 0–0.7)
EOSINOPHIL NFR BLD AUTO: 3.4 %
ERYTHROCYTE [DISTWIDTH] IN BLOOD BY AUTOMATED COUNT: 15.4 % (ref 10–15)
ETHANOL SERPL-MCNC: 0.38 G/DL
GFR SERPL CREATININE-BSD FRML MDRD: >90 ML/MIN/{1.73_M2}
GLUCOSE SERPL-MCNC: 94 MG/DL (ref 70–99)
HCT VFR BLD AUTO: 43.6 % (ref 40–53)
HGB BLD-MCNC: 15 G/DL (ref 13.3–17.7)
IMM GRANULOCYTES # BLD: 0 10E9/L (ref 0–0.4)
IMM GRANULOCYTES NFR BLD: 0.4 %
LYMPHOCYTES # BLD AUTO: 1.6 10E9/L (ref 0.8–5.3)
LYMPHOCYTES NFR BLD AUTO: 31.2 %
MCH RBC QN AUTO: 32.3 PG (ref 26.5–33)
MCHC RBC AUTO-ENTMCNC: 34.4 G/DL (ref 31.5–36.5)
MCV RBC AUTO: 94 FL (ref 78–100)
MONOCYTES # BLD AUTO: 0.6 10E9/L (ref 0–1.3)
MONOCYTES NFR BLD AUTO: 12.5 %
NEUTROPHILS # BLD AUTO: 2.6 10E9/L (ref 1.6–8.3)
NEUTROPHILS NFR BLD AUTO: 50.9 %
NRBC # BLD AUTO: 0 10*3/UL
NRBC BLD AUTO-RTO: 0 /100
OPIATES UR QL SCN: NEGATIVE
PCP UR QL SCN: NEGATIVE
PLATELET # BLD AUTO: 183 10E9/L (ref 150–450)
POTASSIUM SERPL-SCNC: 4 MMOL/L (ref 3.4–5.3)
PROT SERPL-MCNC: 8.1 G/DL (ref 6.8–8.8)
RBC # BLD AUTO: 4.65 10E12/L (ref 4.4–5.9)
SODIUM SERPL-SCNC: 147 MMOL/L (ref 133–144)
WBC # BLD AUTO: 5.1 10E9/L (ref 4–11)

## 2019-01-30 PROCEDURE — 12000000 ZZH R&B MED SURG/OB

## 2019-01-30 PROCEDURE — 85025 COMPLETE CBC W/AUTO DIFF WBC: CPT | Performed by: EMERGENCY MEDICINE

## 2019-01-30 PROCEDURE — 99285 EMERGENCY DEPT VISIT HI MDM: CPT | Mod: 25

## 2019-01-30 PROCEDURE — 96376 TX/PRO/DX INJ SAME DRUG ADON: CPT

## 2019-01-30 PROCEDURE — 96375 TX/PRO/DX INJ NEW DRUG ADDON: CPT

## 2019-01-30 PROCEDURE — 21000001 ZZH R&B HEART CARE

## 2019-01-30 PROCEDURE — 25000128 H RX IP 250 OP 636: Performed by: EMERGENCY MEDICINE

## 2019-01-30 PROCEDURE — 99223 1ST HOSP IP/OBS HIGH 75: CPT | Mod: AI | Performed by: STUDENT IN AN ORGANIZED HEALTH CARE EDUCATION/TRAINING PROGRAM

## 2019-01-30 PROCEDURE — HZ2ZZZZ DETOXIFICATION SERVICES FOR SUBSTANCE ABUSE TREATMENT: ICD-10-PCS | Performed by: INTERNAL MEDICINE

## 2019-01-30 PROCEDURE — 25000125 ZZHC RX 250: Performed by: EMERGENCY MEDICINE

## 2019-01-30 PROCEDURE — 25000132 ZZH RX MED GY IP 250 OP 250 PS 637: Mod: GY | Performed by: STUDENT IN AN ORGANIZED HEALTH CARE EDUCATION/TRAINING PROGRAM

## 2019-01-30 PROCEDURE — 80307 DRUG TEST PRSMV CHEM ANLYZR: CPT | Performed by: EMERGENCY MEDICINE

## 2019-01-30 PROCEDURE — 96365 THER/PROPH/DIAG IV INF INIT: CPT

## 2019-01-30 PROCEDURE — 25800025 ZZH RX 258: Performed by: STUDENT IN AN ORGANIZED HEALTH CARE EDUCATION/TRAINING PROGRAM

## 2019-01-30 PROCEDURE — 96366 THER/PROPH/DIAG IV INF ADDON: CPT

## 2019-01-30 PROCEDURE — A9270 NON-COVERED ITEM OR SERVICE: HCPCS | Mod: GY | Performed by: STUDENT IN AN ORGANIZED HEALTH CARE EDUCATION/TRAINING PROGRAM

## 2019-01-30 PROCEDURE — 80320 DRUG SCREEN QUANTALCOHOLS: CPT | Performed by: EMERGENCY MEDICINE

## 2019-01-30 PROCEDURE — 80053 COMPREHEN METABOLIC PANEL: CPT | Performed by: EMERGENCY MEDICINE

## 2019-01-30 RX ORDER — NITROGLYCERIN 0.4 MG/1
0.4 TABLET SUBLINGUAL EVERY 5 MIN PRN
Status: DISCONTINUED | OUTPATIENT
Start: 2019-01-30 | End: 2019-02-01 | Stop reason: HOSPADM

## 2019-01-30 RX ORDER — LORAZEPAM 2 MG/ML
1 INJECTION INTRAMUSCULAR ONCE
Status: COMPLETED | OUTPATIENT
Start: 2019-01-30 | End: 2019-01-30

## 2019-01-30 RX ORDER — FOLIC ACID 1 MG/1
1 TABLET ORAL DAILY
Status: DISCONTINUED | OUTPATIENT
Start: 2019-01-31 | End: 2019-01-31

## 2019-01-30 RX ORDER — LORAZEPAM 2 MG/ML
4 INJECTION INTRAMUSCULAR ONCE
Status: COMPLETED | OUTPATIENT
Start: 2019-01-30 | End: 2019-01-30

## 2019-01-30 RX ORDER — MULTIPLE VITAMINS W/ MINERALS TAB 9MG-400MCG
1 TAB ORAL DAILY
Status: DISCONTINUED | OUTPATIENT
Start: 2019-01-31 | End: 2019-02-01 | Stop reason: HOSPADM

## 2019-01-30 RX ORDER — LORAZEPAM 2 MG/ML
1-2 INJECTION INTRAMUSCULAR EVERY 30 MIN PRN
Status: DISCONTINUED | OUTPATIENT
Start: 2019-01-30 | End: 2019-01-31

## 2019-01-30 RX ORDER — BUPROPION HYDROCHLORIDE 150 MG/1
150 TABLET, EXTENDED RELEASE ORAL 2 TIMES DAILY
Status: DISCONTINUED | OUTPATIENT
Start: 2019-01-30 | End: 2019-01-31

## 2019-01-30 RX ORDER — QUETIAPINE FUMARATE 25 MG/1
25-50 TABLET, FILM COATED ORAL EVERY 6 HOURS PRN
Status: DISCONTINUED | OUTPATIENT
Start: 2019-01-30 | End: 2019-02-01 | Stop reason: HOSPADM

## 2019-01-30 RX ORDER — LIDOCAINE 40 MG/G
CREAM TOPICAL
Status: DISCONTINUED | OUTPATIENT
Start: 2019-01-30 | End: 2019-02-01 | Stop reason: HOSPADM

## 2019-01-30 RX ORDER — LANOLIN ALCOHOL/MO/W.PET/CERES
100 CREAM (GRAM) TOPICAL DAILY
Status: DISCONTINUED | OUTPATIENT
Start: 2019-01-31 | End: 2019-02-01

## 2019-01-30 RX ORDER — HYDROMORPHONE HYDROCHLORIDE 2 MG/1
2 TABLET ORAL ONCE
Status: COMPLETED | OUTPATIENT
Start: 2019-01-30 | End: 2019-01-30

## 2019-01-30 RX ORDER — LORAZEPAM 2 MG/ML
2 INJECTION INTRAMUSCULAR ONCE
Status: COMPLETED | OUTPATIENT
Start: 2019-01-30 | End: 2019-01-30

## 2019-01-30 RX ORDER — DEXTROAMPHETAMINE SACCHARATE, AMPHETAMINE ASPARTATE, DEXTROAMPHETAMINE SULFATE AND AMPHETAMINE SULFATE 2.5; 2.5; 2.5; 2.5 MG/1; MG/1; MG/1; MG/1
10 TABLET ORAL ONCE
Status: COMPLETED | OUTPATIENT
Start: 2019-01-30 | End: 2019-01-30

## 2019-01-30 RX ORDER — LORAZEPAM 1 MG/1
1-2 TABLET ORAL EVERY 30 MIN PRN
Status: DISCONTINUED | OUTPATIENT
Start: 2019-01-30 | End: 2019-01-31

## 2019-01-30 RX ORDER — NALOXONE HYDROCHLORIDE 0.4 MG/ML
.1-.4 INJECTION, SOLUTION INTRAMUSCULAR; INTRAVENOUS; SUBCUTANEOUS
Status: DISCONTINUED | OUTPATIENT
Start: 2019-01-30 | End: 2019-02-01 | Stop reason: HOSPADM

## 2019-01-30 RX ORDER — KETOROLAC TROMETHAMINE 30 MG/ML
15 INJECTION, SOLUTION INTRAMUSCULAR; INTRAVENOUS ONCE
Status: COMPLETED | OUTPATIENT
Start: 2019-01-30 | End: 2019-01-30

## 2019-01-30 RX ADMIN — FOLIC ACID: 5 INJECTION, SOLUTION INTRAMUSCULAR; INTRAVENOUS; SUBCUTANEOUS at 15:01

## 2019-01-30 RX ADMIN — LORAZEPAM 4 MG: 2 INJECTION INTRAMUSCULAR; INTRAVENOUS at 19:33

## 2019-01-30 RX ADMIN — LORAZEPAM 1 MG: 2 INJECTION INTRAMUSCULAR; INTRAVENOUS at 14:23

## 2019-01-30 RX ADMIN — DEXTROAMPHETAMINE SACCHARATE, AMPHETAMINE ASPARTATE, DEXTROAMPHETAMINE SULFATE AND AMPHETAMINE SULFATE 10 MG: 2.5; 2.5; 2.5; 2.5 TABLET ORAL at 22:54

## 2019-01-30 RX ADMIN — LORAZEPAM 1 MG: 1 TABLET ORAL at 23:47

## 2019-01-30 RX ADMIN — BUPROPION HYDROCHLORIDE 150 MG: 150 TABLET, FILM COATED, EXTENDED RELEASE ORAL at 22:54

## 2019-01-30 RX ADMIN — HYDROMORPHONE HYDROCHLORIDE 2 MG: 2 TABLET ORAL at 22:54

## 2019-01-30 RX ADMIN — DEXTROSE AND SODIUM CHLORIDE: 5; 450 INJECTION, SOLUTION INTRAVENOUS at 22:39

## 2019-01-30 RX ADMIN — KETOROLAC TROMETHAMINE 15 MG: 30 INJECTION, SOLUTION INTRAMUSCULAR at 15:00

## 2019-01-30 RX ADMIN — LORAZEPAM 4 MG: 2 INJECTION, SOLUTION INTRAMUSCULAR; INTRAVENOUS at 20:50

## 2019-01-30 RX ADMIN — LORAZEPAM 2 MG: 2 INJECTION, SOLUTION INTRAMUSCULAR; INTRAVENOUS at 19:32

## 2019-01-30 ASSESSMENT — MIFFLIN-ST. JEOR
SCORE: 1842
SCORE: 1785.19
SCORE: 1841

## 2019-01-30 ASSESSMENT — ENCOUNTER SYMPTOMS
AGITATION: 1
VOMITING: 0

## 2019-01-30 NOTE — ED NOTES
DATE:  1/30/2019   TIME OF RECEIPT FROM LAB: 1445  LAB TEST:  Ethanol  LAB VALUE:  0.38  RESULTS GIVEN WITH READ-BACK TO (PROVIDER):  Len Hart,*  TIME LAB VALUE REPORTED TO PROVIDER:   2:50 PM

## 2019-01-30 NOTE — ED NOTES
Bed: Located within [facility]  Expected date:   Expected time:   Means of arrival:   Comments:  Lucien 432 ETOH 53 male

## 2019-01-31 LAB
ANION GAP SERPL CALCULATED.3IONS-SCNC: 5 MMOL/L (ref 3–14)
BUN SERPL-MCNC: 14 MG/DL (ref 7–30)
CALCIUM SERPL-MCNC: 7.7 MG/DL (ref 8.5–10.1)
CHLORIDE SERPL-SCNC: 105 MMOL/L (ref 94–109)
CO2 SERPL-SCNC: 28 MMOL/L (ref 20–32)
CREAT SERPL-MCNC: 0.76 MG/DL (ref 0.66–1.25)
ERYTHROCYTE [DISTWIDTH] IN BLOOD BY AUTOMATED COUNT: 15 % (ref 10–15)
GFR SERPL CREATININE-BSD FRML MDRD: >90 ML/MIN/{1.73_M2}
GLUCOSE BLDC GLUCOMTR-MCNC: 100 MG/DL (ref 70–99)
GLUCOSE BLDC GLUCOMTR-MCNC: 90 MG/DL (ref 70–99)
GLUCOSE SERPL-MCNC: 104 MG/DL (ref 70–99)
HCT VFR BLD AUTO: 35.3 % (ref 40–53)
HGB BLD-MCNC: 11.9 G/DL (ref 13.3–17.7)
LACTATE BLD-SCNC: 1.5 MMOL/L (ref 0.7–2)
MAGNESIUM SERPL-MCNC: 2.2 MG/DL (ref 1.6–2.3)
MCH RBC QN AUTO: 31.6 PG (ref 26.5–33)
MCHC RBC AUTO-ENTMCNC: 33.7 G/DL (ref 31.5–36.5)
MCV RBC AUTO: 94 FL (ref 78–100)
PHOSPHATE SERPL-MCNC: 3.5 MG/DL (ref 2.5–4.5)
PLATELET # BLD AUTO: 121 10E9/L (ref 150–450)
POTASSIUM SERPL-SCNC: 4 MMOL/L (ref 3.4–5.3)
RBC # BLD AUTO: 3.76 10E12/L (ref 4.4–5.9)
SODIUM SERPL-SCNC: 138 MMOL/L (ref 133–144)
WBC # BLD AUTO: 3.3 10E9/L (ref 4–11)

## 2019-01-31 PROCEDURE — 83735 ASSAY OF MAGNESIUM: CPT | Performed by: STUDENT IN AN ORGANIZED HEALTH CARE EDUCATION/TRAINING PROGRAM

## 2019-01-31 PROCEDURE — 00000146 ZZHCL STATISTIC GLUCOSE BY METER IP

## 2019-01-31 PROCEDURE — 36415 COLL VENOUS BLD VENIPUNCTURE: CPT | Performed by: STUDENT IN AN ORGANIZED HEALTH CARE EDUCATION/TRAINING PROGRAM

## 2019-01-31 PROCEDURE — 83605 ASSAY OF LACTIC ACID: CPT | Performed by: INTERNAL MEDICINE

## 2019-01-31 PROCEDURE — 12000000 ZZH R&B MED SURG/OB

## 2019-01-31 PROCEDURE — 25000132 ZZH RX MED GY IP 250 OP 250 PS 637: Mod: GY | Performed by: STUDENT IN AN ORGANIZED HEALTH CARE EDUCATION/TRAINING PROGRAM

## 2019-01-31 PROCEDURE — 25000125 ZZHC RX 250: Performed by: NURSE PRACTITIONER

## 2019-01-31 PROCEDURE — A9270 NON-COVERED ITEM OR SERVICE: HCPCS | Mod: GY | Performed by: NURSE PRACTITIONER

## 2019-01-31 PROCEDURE — 25800025 ZZH RX 258: Performed by: STUDENT IN AN ORGANIZED HEALTH CARE EDUCATION/TRAINING PROGRAM

## 2019-01-31 PROCEDURE — 25000132 ZZH RX MED GY IP 250 OP 250 PS 637: Mod: GY | Performed by: INTERNAL MEDICINE

## 2019-01-31 PROCEDURE — 99207 ZZC APP CREDIT; MD BILLING SHARED VISIT: CPT | Performed by: NURSE PRACTITIONER

## 2019-01-31 PROCEDURE — 85027 COMPLETE CBC AUTOMATED: CPT | Performed by: STUDENT IN AN ORGANIZED HEALTH CARE EDUCATION/TRAINING PROGRAM

## 2019-01-31 PROCEDURE — 80048 BASIC METABOLIC PNL TOTAL CA: CPT | Performed by: STUDENT IN AN ORGANIZED HEALTH CARE EDUCATION/TRAINING PROGRAM

## 2019-01-31 PROCEDURE — 84100 ASSAY OF PHOSPHORUS: CPT | Performed by: STUDENT IN AN ORGANIZED HEALTH CARE EDUCATION/TRAINING PROGRAM

## 2019-01-31 PROCEDURE — A9270 NON-COVERED ITEM OR SERVICE: HCPCS | Mod: GY | Performed by: INTERNAL MEDICINE

## 2019-01-31 PROCEDURE — 25000128 H RX IP 250 OP 636: Performed by: NURSE PRACTITIONER

## 2019-01-31 PROCEDURE — 25000128 H RX IP 250 OP 636: Performed by: STUDENT IN AN ORGANIZED HEALTH CARE EDUCATION/TRAINING PROGRAM

## 2019-01-31 PROCEDURE — 25000132 ZZH RX MED GY IP 250 OP 250 PS 637: Mod: GY | Performed by: HOSPITALIST

## 2019-01-31 PROCEDURE — 99233 SBSQ HOSP IP/OBS HIGH 50: CPT | Performed by: INTERNAL MEDICINE

## 2019-01-31 PROCEDURE — 25000132 ZZH RX MED GY IP 250 OP 250 PS 637: Mod: GY | Performed by: NURSE PRACTITIONER

## 2019-01-31 PROCEDURE — 25000128 H RX IP 250 OP 636: Performed by: INTERNAL MEDICINE

## 2019-01-31 PROCEDURE — A9270 NON-COVERED ITEM OR SERVICE: HCPCS | Mod: GY | Performed by: HOSPITALIST

## 2019-01-31 PROCEDURE — A9270 NON-COVERED ITEM OR SERVICE: HCPCS | Mod: GY | Performed by: STUDENT IN AN ORGANIZED HEALTH CARE EDUCATION/TRAINING PROGRAM

## 2019-01-31 PROCEDURE — 36415 COLL VENOUS BLD VENIPUNCTURE: CPT | Performed by: INTERNAL MEDICINE

## 2019-01-31 RX ORDER — MAGNESIUM SULFATE HEPTAHYDRATE 40 MG/ML
2 INJECTION, SOLUTION INTRAVENOUS ONCE
Status: COMPLETED | OUTPATIENT
Start: 2019-01-31 | End: 2019-01-31

## 2019-01-31 RX ORDER — BUPROPION HYDROCHLORIDE 150 MG/1
150 TABLET, EXTENDED RELEASE ORAL 2 TIMES DAILY
Status: DISCONTINUED | OUTPATIENT
Start: 2019-01-31 | End: 2019-02-01 | Stop reason: HOSPADM

## 2019-01-31 RX ORDER — LANOLIN ALCOHOL/MO/W.PET/CERES
100 CREAM (GRAM) TOPICAL
Status: DISCONTINUED | OUTPATIENT
Start: 2019-02-05 | End: 2019-02-01 | Stop reason: HOSPADM

## 2019-01-31 RX ORDER — LANOLIN ALCOHOL/MO/W.PET/CERES
100 CREAM (GRAM) TOPICAL DAILY
Status: DISCONTINUED | OUTPATIENT
Start: 2019-02-19 | End: 2019-02-01 | Stop reason: HOSPADM

## 2019-01-31 RX ORDER — GABAPENTIN 300 MG/1
900 CAPSULE ORAL 3 TIMES DAILY
Status: DISCONTINUED | OUTPATIENT
Start: 2019-01-31 | End: 2019-02-01 | Stop reason: HOSPADM

## 2019-01-31 RX ORDER — POTASSIUM CHLORIDE 1.5 G/1.58G
20-40 POWDER, FOR SOLUTION ORAL
Status: DISCONTINUED | OUTPATIENT
Start: 2019-01-31 | End: 2019-02-01 | Stop reason: HOSPADM

## 2019-01-31 RX ORDER — POTASSIUM CHLORIDE 7.45 MG/ML
10 INJECTION INTRAVENOUS
Status: DISCONTINUED | OUTPATIENT
Start: 2019-01-31 | End: 2019-02-01 | Stop reason: HOSPADM

## 2019-01-31 RX ORDER — POTASSIUM CL/LIDO/0.9 % NACL 10MEQ/0.1L
10 INTRAVENOUS SOLUTION, PIGGYBACK (ML) INTRAVENOUS
Status: DISCONTINUED | OUTPATIENT
Start: 2019-01-31 | End: 2019-02-01 | Stop reason: HOSPADM

## 2019-01-31 RX ORDER — IBUPROFEN 200 MG
200 TABLET ORAL EVERY 6 HOURS PRN
Status: DISCONTINUED | OUTPATIENT
Start: 2019-01-31 | End: 2019-02-01 | Stop reason: HOSPADM

## 2019-01-31 RX ORDER — POTASSIUM CHLORIDE 1500 MG/1
20-40 TABLET, EXTENDED RELEASE ORAL
Status: DISCONTINUED | OUTPATIENT
Start: 2019-01-31 | End: 2019-02-01 | Stop reason: HOSPADM

## 2019-01-31 RX ORDER — DEXTROAMPHETAMINE SACCHARATE, AMPHETAMINE ASPARTATE, DEXTROAMPHETAMINE SULFATE AND AMPHETAMINE SULFATE 2.5; 2.5; 2.5; 2.5 MG/1; MG/1; MG/1; MG/1
10 TABLET ORAL DAILY
Status: DISCONTINUED | OUTPATIENT
Start: 2019-02-01 | End: 2019-01-31

## 2019-01-31 RX ORDER — DEXTROAMPHETAMINE SACCHARATE, AMPHETAMINE ASPARTATE, DEXTROAMPHETAMINE SULFATE AND AMPHETAMINE SULFATE 2.5; 2.5; 2.5; 2.5 MG/1; MG/1; MG/1; MG/1
20 TABLET ORAL DAILY
Status: DISCONTINUED | OUTPATIENT
Start: 2019-01-31 | End: 2019-01-31

## 2019-01-31 RX ORDER — POTASSIUM CHLORIDE 29.8 MG/ML
20 INJECTION INTRAVENOUS
Status: DISCONTINUED | OUTPATIENT
Start: 2019-01-31 | End: 2019-02-01 | Stop reason: HOSPADM

## 2019-01-31 RX ORDER — GABAPENTIN 300 MG/1
900 CAPSULE ORAL 3 TIMES DAILY
Status: DISCONTINUED | OUTPATIENT
Start: 2019-01-31 | End: 2019-01-31

## 2019-01-31 RX ORDER — FOLIC ACID 5 MG/ML
1 INJECTION, SOLUTION INTRAMUSCULAR; INTRAVENOUS; SUBCUTANEOUS DAILY
Status: DISCONTINUED | OUTPATIENT
Start: 2019-01-31 | End: 2019-02-01 | Stop reason: HOSPADM

## 2019-01-31 RX ORDER — LORAZEPAM 2 MG/ML
1-4 INJECTION INTRAMUSCULAR
Status: DISCONTINUED | OUTPATIENT
Start: 2019-01-31 | End: 2019-02-01 | Stop reason: HOSPADM

## 2019-01-31 RX ORDER — MAGNESIUM SULFATE HEPTAHYDRATE 40 MG/ML
4 INJECTION, SOLUTION INTRAVENOUS EVERY 4 HOURS PRN
Status: DISCONTINUED | OUTPATIENT
Start: 2019-01-31 | End: 2019-02-01 | Stop reason: HOSPADM

## 2019-01-31 RX ORDER — SODIUM CHLORIDE 9 MG/ML
INJECTION, SOLUTION INTRAVENOUS CONTINUOUS
Status: DISCONTINUED | OUTPATIENT
Start: 2019-01-31 | End: 2019-02-01 | Stop reason: HOSPADM

## 2019-01-31 RX ADMIN — LORAZEPAM 2 MG: 2 INJECTION INTRAMUSCULAR; INTRAVENOUS at 22:42

## 2019-01-31 RX ADMIN — LORAZEPAM 2 MG: 2 INJECTION INTRAMUSCULAR; INTRAVENOUS at 19:23

## 2019-01-31 RX ADMIN — FOLIC ACID 1 MG: 5 INJECTION, SOLUTION INTRAMUSCULAR; INTRAVENOUS; SUBCUTANEOUS at 11:21

## 2019-01-31 RX ADMIN — GABAPENTIN 900 MG: 300 CAPSULE ORAL at 04:53

## 2019-01-31 RX ADMIN — LORAZEPAM 2 MG: 2 INJECTION INTRAMUSCULAR; INTRAVENOUS at 17:05

## 2019-01-31 RX ADMIN — LORAZEPAM 2 MG: 2 INJECTION, SOLUTION INTRAMUSCULAR; INTRAVENOUS at 00:29

## 2019-01-31 RX ADMIN — LORAZEPAM 2 MG: 2 INJECTION, SOLUTION INTRAMUSCULAR; INTRAVENOUS at 01:12

## 2019-01-31 RX ADMIN — THIAMINE HYDROCHLORIDE 250 MG: 100 INJECTION, SOLUTION INTRAMUSCULAR; INTRAVENOUS at 11:21

## 2019-01-31 RX ADMIN — GABAPENTIN 900 MG: 300 CAPSULE ORAL at 17:05

## 2019-01-31 RX ADMIN — LORAZEPAM 4 MG: 2 INJECTION INTRAMUSCULAR; INTRAVENOUS at 03:07

## 2019-01-31 RX ADMIN — LORAZEPAM 2 MG: 2 INJECTION, SOLUTION INTRAMUSCULAR; INTRAVENOUS at 02:20

## 2019-01-31 RX ADMIN — BUPROPION HYDROCHLORIDE 150 MG: 150 TABLET, FILM COATED, EXTENDED RELEASE ORAL at 20:29

## 2019-01-31 RX ADMIN — SODIUM CHLORIDE: 9 INJECTION, SOLUTION INTRAVENOUS at 05:18

## 2019-01-31 RX ADMIN — MAGNESIUM SULFATE HEPTAHYDRATE 2 G: 40 INJECTION, SOLUTION INTRAVENOUS at 04:48

## 2019-01-31 RX ADMIN — LORAZEPAM 2 MG: 2 INJECTION, SOLUTION INTRAMUSCULAR; INTRAVENOUS at 01:40

## 2019-01-31 RX ADMIN — LORAZEPAM 2 MG: 2 INJECTION INTRAMUSCULAR; INTRAVENOUS at 14:08

## 2019-01-31 RX ADMIN — QUETIAPINE 50 MG: 25 TABLET ORAL at 03:05

## 2019-01-31 RX ADMIN — DEXTROSE AND SODIUM CHLORIDE: 5; 450 INJECTION, SOLUTION INTRAVENOUS at 08:38

## 2019-01-31 RX ADMIN — DEXTROAMPHETAMINE SACCHARATE, AMPHETAMINE ASPARTATE, DEXTROAMPHETAMINE SULFATE AND AMPHETAMINE SULFATE 20 MG: 2.5; 2.5; 2.5; 2.5 TABLET ORAL at 17:14

## 2019-01-31 RX ADMIN — LORAZEPAM 4 MG: 2 INJECTION INTRAMUSCULAR; INTRAVENOUS at 03:40

## 2019-01-31 RX ADMIN — GABAPENTIN 900 MG: 300 CAPSULE ORAL at 21:44

## 2019-01-31 RX ADMIN — LORAZEPAM 4 MG: 2 INJECTION INTRAMUSCULAR; INTRAVENOUS at 03:31

## 2019-01-31 RX ADMIN — DEXMEDETOMIDINE 0.2 MCG/KG/HR: 100 INJECTION, SOLUTION, CONCENTRATE INTRAVENOUS at 04:38

## 2019-01-31 RX ADMIN — Medication 1 MG: at 03:05

## 2019-01-31 RX ADMIN — LORAZEPAM 2 MG: 2 INJECTION INTRAMUSCULAR; INTRAVENOUS at 21:44

## 2019-01-31 RX ADMIN — LORAZEPAM 4 MG: 2 INJECTION INTRAMUSCULAR; INTRAVENOUS at 04:17

## 2019-01-31 RX ADMIN — LORAZEPAM 2 MG: 2 INJECTION INTRAMUSCULAR; INTRAVENOUS at 20:28

## 2019-01-31 RX ADMIN — Medication 1 MG: at 20:28

## 2019-01-31 RX ADMIN — LORAZEPAM 4 MG: 2 INJECTION INTRAMUSCULAR; INTRAVENOUS at 03:19

## 2019-01-31 RX ADMIN — IBUPROFEN 200 MG: 200 TABLET, FILM COATED ORAL at 21:44

## 2019-01-31 ASSESSMENT — ACTIVITIES OF DAILY LIVING (ADL)
ADLS_ACUITY_SCORE: 15
ADLS_ACUITY_SCORE: 14
ADLS_ACUITY_SCORE: 15
ADLS_ACUITY_SCORE: 15

## 2019-01-31 ASSESSMENT — MIFFLIN-ST. JEOR: SCORE: 1818

## 2019-01-31 NOTE — PROGRESS NOTES
ICU Multi-Disciplinary Note  Mr. Collado is a 53 year old male with PMH EtOH abuse and withdrawal seizures initially presented to the hospital on 1/30 with diffuse pain and a ROSEMARY of 0.38. He was admitted to [facility] level of care but had increasing lorazepam requirements with concern for DTs and seizures, so transferred to the ICU and started on dexmedetomidine. Patient condition reviewed and discussed while on multidisciplinary rounds today. He is currently hemodynamically stable without the support of vasoactives and oxygenating adequately on room air. He is obtunded with a RASS of -4; he will follow commands once aroused.     Please note these minor interventions that were initiated:  1. Changed folic acid to IV dosing  2. Changed thiamine to IV and increased dosing to 250 mg IV x5 days followed by 100 mg PO x2 weeks followed by 100 mg daily  3. Asked RN to hold precedex while he is obtunded    The Critical Care service will continue to follow peripherally while the patient is within the ICU. We are readily available should issues arise. Please feel free to contact us for critical care issues with which we may be of assistance. For all other concerns, please contact primary service first.   ALYSSIA De Guzman

## 2019-01-31 NOTE — ED NOTES
Patient placed his call light on several times and states that he is still shaking severely and wanted more ativan. Patients vital signs are stable and he has had a significant reduction in his tremors and tachycardia after initial doses of ativan.

## 2019-01-31 NOTE — PROVIDER NOTIFICATION
Brief update:    Paged as pt w/ worsening EtOH withdrawal. 6 mg Ativan in past ~1.5 hr w/o improvement; CIWA in 30 range, now with hallucinosis.    Transferring to ICU for increasing ativan needs. May require precedex (not currently ordered) pending total ativan needs.    Discussed with ICU staff to alert them of transfer    Carlitos Dooley MD  1:58 AM

## 2019-01-31 NOTE — ED PROVIDER NOTES
Patient signed out to my by my partner Dr. Hart at 1500. Please see his note for patient care prior to that time.    Laboratory:   1845: Alcohol breath test POCT: 0.197    Interventions:  1501: Infuvite adult 2 G IV  1932: Ativan 2 MG IV  1933: Ativan 4 MG IV    ED course:  1500: Patient signed out to me by my partner, Dr. Hart.    1842: I rechecked the patient, who reported he was not feeling well.    1845: Alcohol breath test performed, result above.    Findings and plan explained to the Patient who consents to admission.     1942: Discussed the patient with Dr. Wilkerson, who will admit the patient to a [facility] SUDS bed for further monitoring, evaluation, and treatment.     Medical Decision Making:  Andrea Collado is a 53 year old male who was signed out to me by previous team pending metabolization of his alcohol and reassessment. While here in the ER, he has begun to develop signs of alcohol withdrawal which have ameliorated with IV Ativan. He is clear to me he does not want to drink, and would like to detox here in the ER. I think that he needs IV antibiotics and admission in order to do this. No suicidality, no indication for psychiatric help at this time, will plan for medical treatment of his alcohol withdrawal and admit to the care of Dr. Monk, with whom I have spoken.     Diagnosis:   Encounter Diagnoses   Name Primary?     Acute alcoholic intoxication in alcoholism without complication (H)      Alcohol withdrawal syndrome without complication (H)        Disposition:  Admitted    Scribe Disclosure:  I, Sarah Castillo, am serving as a scribe at 3:00 PM on 1/30/2019 to document services personally performed by Rafael Hurtado MD based on my observations and the provider's statements to me.        Rafael Hurtado MD  01/30/19 7257

## 2019-01-31 NOTE — PLAN OF CARE
Pt obtunded this am, precedex gtt stopped 1030am. At 1400 Pt alert, disoriented to time. Calm and cooperative, very pleasant. CIWA 12/10, given 2mg IV ativan. VSS on room air. Tolerating regular diet well. Lino patent with adequate UO. Plan to tx to st [facility]. Report called to RN. Will cont to monitor.

## 2019-01-31 NOTE — CONSULTS
1/31/2019    CD consult acknowledged. Per EMR, patient has Medicare and would need to seek substance use services from Medicare eligible facility for substance use. Social work can assist with resource and referral for patient.     Nikkie Graves, [facility]  [phone]

## 2019-01-31 NOTE — ED NOTES
"Patient is resting in bed with television on. He is continuously pressing his call light and requesting food and beverages. After giving 4mg more ativan he states he is finally feeling a bit better and shows little to no tremors. Patient did however make comments while giving the ativan that the Nurse was pushing the ativan in to the IV to slow. Nurse told him that ativan should not be given to fast in order to prevent the patient from getting a head rush. Patient stated \"Well there has to be some good feelings to go along with this.\"   "

## 2019-01-31 NOTE — PROGRESS NOTES
[facility]    Medicine Progress Note - Hospitalist Service       Date of Admission:  1/30/2019  Assessment & Plan     Andrea Collado is a 53 year old male with a history of alcoholism and substance abuse, bipolar, low testosterone, and reported seizure history who was admitted 01/30/19 with alcohol intoxication.      Alcohol dependence      Alcohol Withdrawl      Transaminitis likely 2/2 alcohol use  Drinks 1L + of vodka or Everclear daily for multiple years - wishes to quit. On admission, mildly elevated transaminases otherwise unremarkable. Patient reports that he does not wish for any inpatient treatment programs.  No suicidal ideations.  -Was on benzodiazepine with CIWA however overnight became agitated needing increased dose of benzos so was transferred to ICU and started on Precedex drip.  Precedex drip has been discontinued since earlier this morning when he became obtunded.  Transfer back to floor and continue CIWA with benzodiazepine     2. Hallucinations      Depression (bipolar noted in epic)   ADHD  - Wellbutrin 150 daily resumed  - Psychiatry consultation last admission recommended hold psychostimulants during the previous admission on December where he was on withdrawal, but patient reports he is still taking as he mentions that he has been taking it for several years for his ADHD  - One dose of adderall 10 mg given on admission, patient request further doses of Adderall as he has been taking it for several years.    -Looking at care everywhere he had been requesting Adderall through his PCP.  His PCP had declined and advised him to follow-up with psychiatry at which point he apparently was upset and wanted to change PCP.  -He has also not followed up with psychiatry in a while and has missed appointment.  Will consult psychiatry to readdress his medication    Low testosterone  - Continue outpatient testosterone injections, follow with PCP.     Chronic pain syndrome   Assessment: past  "regular use of opioids, the suboxone, now off both. Did have some abdominal pain in ED, possibly from gastritis.  Was given one dose of oral dilaudid. But was explicitly told by previous provider patient that we would not provide him with anymore opioids either during this admission or at discharge.  - avoid opioids, can order ibuprofen as needed    Diet: Combination Diet Regular Diet Adult    DVT Prophylaxis: Pneumatic Compression Devices  Lino Catheter: in place, indication: Strict 1-2 Hour I&O  Code Status: Full Code      Disposition Plan   Expected discharge: 2 - 3 days, recommended to prior living arrangement once Out of withdrawal.  Entered: Ann Cotton MD 01/31/2019, 3:17 PM       The patient's care was discussed with the Bedside Nurse and Patient.    Ann Cotton MD  Hospitalist Service  [facility]    ______________________________________________________________________    Interval History   Overnight event noted.  RRT was called for worsening withdrawal and patient had to be transferred to ICU and started on Precedex drip.  Patient appeared obtunded earlier today so ICU staff stopped Precedex drip around 1030.  Patient was wide awake at the time of my evaluation around 3:00.  Requesting to be put back on Precedex as it gave him really good \"KICK\".  Did advise him Precedex is not a long-term solution.  Agreed to be managed with as needed Ativan.    Data reviewed today: I reviewed all medications, new labs and imaging results over the last 24 hours. I personally reviewed no images or EKG's today.    Physical Exam   Vital Signs: Temp: 97.4  F (36.3  C) Temp src: Axillary BP: 122/85 Pulse: 61 Heart Rate: 72 Resp: 14 SpO2: 98 % O2 Device: None (Room air)    Weight: 207 lbs 3.72 oz  Exam:  Constitutional: Awake, alert and no distress. Appears comfortable  Head: Normocephalic. No masses, lesions, tenderness or abnormalities  ENT: ENT exam normal, no neck nodes or sinus " tenderness  Cardiovascular: RRR.  No murmurs, no rubs or JVD  Respiratory: Normal WOB,b/l equal air entry, no wheezes or crackles   Gastrointestinal: Abdomen soft, non-tender. BS normal. No masses, organomegaly  : Deferred   extremities : No edema , no clubbing or cyanosis      Data   Recent Labs   Lab 01/31/19  0640 01/30/19  1405   WBC 3.3* 5.1   HGB 11.9* 15.0   MCV 94 94   * 183    147*   POTASSIUM 4.0 4.0   CHLORIDE 105 110*   CO2 28 31   BUN 14 9   CR 0.76 0.80   ANIONGAP 5 6   JOHANA 7.7* 8.7   * 94   ALBUMIN  --  3.9   PROTTOTAL  --  8.1   BILITOTAL  --  0.3   ALKPHOS  --  72   ALT  --  117*   AST  --  72*     No results found for this or any previous visit (from the past 24 hour(s)).  Medications     dextrose 5% and 0.45% NaCl Stopped (01/31/19 1500)     sodium chloride Stopped (01/31/19 1500)       buPROPion  150 mg Oral BID     folic acid  1 mg Intravenous Daily     gabapentin  900 mg Oral TID     multivitamin w/minerals  1 tablet Oral Daily     sodium chloride (PF)  3 mL Intracatheter Q8H     thiamine  250 mg Intravenous Daily    Followed by     [START ON 2/5/2019] vitamin B1  100 mg Oral TID    Followed by     [START ON 2/19/2019] vitamin B1  100 mg Oral Daily     vitamin B1  100 mg Oral Daily

## 2019-01-31 NOTE — CODE/RAPID RESPONSE
[facility]  House DEEJAY Consult Note  1/31/2019   Time Called: 0255  RRT called for: Alcohol Withdrawal  Code Status: Full Code    Assessment & Plan   I was paged to the bedside to evaluate Mr. Andrea Collado for an acute worsening of alcohol withdrawal symptoms. Patient was originally admitted to station 66, our SUDS unit, for alcohol withdrawal with a  ETOH level of 0.38 on admission. Patient was originally supposed to be placed in IMC and subsequently moved to CCU bed for appropriate level of care. While in CCU there was concern for delirium tremens and seizures consistent with prior reported history. Patient was then transferred to ICU for increased ativan requirements.  My initial exam was concerning for anxiety, agitation, tangential thought process, severe tremors with tongue tremor, rapid pressured speech, visual hallucinations, and diaphoresis. Patient reports he has been drinking 1L of grain alcohol 151 for the last week, additionally he reports that he will drink up to 3 liters of vodka a day and has had reported ETOH levels of 0.7 in the past. Ativan 4mg IV ordered x5 for observed symptoms of withdrawal while bedside. Patient continues to have visual hallucinations with only mild improvement in tremors after ativan administration plan to initiate Dexmedetomidine infusion and high dose gabapentin 900mg TID x4 with plan for hospitalist to taper dosing after 4 days. Patient asked about hepatitis C and HIV testing as he has previously used IV drugs.     Diagnosis:  -- Alcohol Withdrawal with Delirium Tremens    Interventions ordered/provided:  -- Ativan 4mg IV q15min x5 doses while at bedside for symptom control  -- Dexmedetomidine infusion, RASS goal 0- -1  -- Gabapentin 900mg PO TID x4 days for alcohol withdrawal, hospitalist to taper down after 4 days  -- Magnesium 2gm IV once now  -- Wellbutrin discontinued due to lowering seizure threshold  -- Would caution in the future administration of  adderall with lowering seizure threshold  -- HIV/Hep C testing deferred to day-time hospitalist    At the conclusion of this evaluation patient remains in ICU for monitoring of alcohol withdrawal with dexemetomidine infusion. I will update the intensivist about this patient and the potential need for airway management.     Interval History     Mr. Andrea Collado is a 53 year old male who was admitted on 1/30/2019 for alcohol withdrawal.    His history is significant for:  Past Medical History:   Diagnosis Date     Chemical dependency (H)      Cocaine abuse (H)      Depressive disorder      DVT (deep venous thrombosis) (H) 5 y ago    left foot, treated with coumadin     Flail chest     broken sterum, wiring      History of total hip arthroplasty     right     Hypertension      Seizures (H)      Substance abuse (H)      Past Surgical History:   Procedure Laterality Date     CHEST SURGERY  1987    flail chest, sterum fractured     HIP SURGERY       KNEE SURGERY       ORTHOPEDIC SURGERY      KIMBER right. Stephanie left shoulder surgery, debridement right shoulder, neck surgery- fracture cervical spine. 6 knee surgeries- bucket handle meniscal tear and debridement. 3 heel surgeries.      ORTHOPEDIC SURGERY         Allergies   No Known Allergies    Physical Exam   Physical Exam   Constitutional: He is oriented to person, place, and time. He appears distressed.   HENT:   Head: Normocephalic and atraumatic.   Eyes: EOM are normal. Pupils are equal, round, and reactive to light.   Neck: Normal range of motion. No JVD present. No tracheal deviation present.   Cardiovascular: Normal rate, regular rhythm and normal heart sounds. Exam reveals no friction rub.   No murmur heard.  Pulmonary/Chest: Effort normal and breath sounds normal. No respiratory distress.   Abdominal: Soft. He exhibits no distension.   Musculoskeletal: Normal range of motion.   Neurological: He is alert and oriented to person, place, and time. No cranial nerve  deficit or sensory deficit. GCS eye subscore is 4. GCS verbal subscore is 5. GCS motor subscore is 6.   Skin: Skin is warm. Capillary refill takes less than 2 seconds. He is diaphoretic.   Psychiatric: His mood appears anxious. His affect is labile. His speech is tangential and slurred. He is agitated, hyperactive and actively hallucinating. Thought content is paranoid. He expresses impulsivity. He exhibits a depressed mood. He exhibits abnormal recent memory. He is inattentive.       Vital Signs with Ranges:  Temp:  [97.5  F (36.4  C)-98.9  F (37.2  C)] 98.9  F (37.2  C)  Pulse:  [] 89  Heart Rate:  [] 92  Resp:  [15-25] 16  BP: (102-137)/(69-96) 102/92  SpO2:  [93 %-98 %] 94 %  No intake/output data recorded.    Data     EKG: -- Not performed  ABG:  -No lab results found in last 7 days.    Troponin:    Recent Labs   Lab Test 04/25/13 2051  09/28/12  2141   TROPI  --   --  0.012   TROPONIN 0.01   < >  --     < > = values in this interval not displayed.       IMAGING: (X-ray/CT/MRI)   No results found for this or any previous visit (from the past 24 hour(s)).    CBC with Diff:  Recent Labs   Lab Test 01/30/19  1405  12/19/18  0600   WBC 5.1   < > 3.3*   HGB 15.0   < > 16.0   MCV 94   < > 91      < > 134*   INR  --   --  0.95    < > = values in this interval not displayed.      No results found for: RETICABSCT  No results found for: RETP    Lactic Acid:    No results found for: LACT  Lactate for Sepsis Protocol   Date Value Ref Range Status   12/19/2018 0.9 0.7 - 2.0 mmol/L Final        Comprehensive Metabolic Panel:  Recent Labs   Lab 01/30/19  1405   *   POTASSIUM 4.0   CHLORIDE 110*   CO2 31   ANIONGAP 6   GLC 94   BUN 9   CR 0.80   GFRESTIMATED >90   GFRESTBLACK >90   JOHANA 8.7   PROTTOTAL 8.1   ALBUMIN 3.9   BILITOTAL 0.3   ALKPHOS 72   AST 72*   *       INR:    Recent Labs   Lab Test 12/19/18  0600   INR 0.95       D-DIMER:  No components found for: DDIMER    BNP:  No results  found for: BNP    UA:  No results for input(s): COLOR, APPEARANCE, URINEGLC, URINEBILI, URINEKETONE, SG, UBLD, URINEPH, PROTEIN, UROBILINOGEN, NITRITE, LEUKEST, RBCU, WBCU in the last 168 hours.    Time Spent on this Encounter   I spent 80 minutes (5818 - 1117) of critical care time on the unit/floor managing the care of Andrea Collado. Upon evaluation, this patient had a high probability of imminent or life-threatening deterioration due to acute alcohol withdrawal and subsequent hemodynamic instability/demise, which required my direct attention, intervention, and personal management. 100% of my time was spent at the bedside counseling the patient and/or coordinating care regarding services listed in this note.    Triny Martinez, ACNP student participated in this patient's care as part of a learning opportunity. I provided all medical decision making as listed in this note.     RAYMUNDO Clements, CNP  Hospitalist - House DEEJAY  Text Page  [phone])

## 2019-01-31 NOTE — PROGRESS NOTES
RECEIVING UNIT ED HANDOFF REVIEW    ED Nurse Handoff Report was reviewed by: Brenda Mckeon on January 30, 2019 at 9:54 PM

## 2019-01-31 NOTE — PROGRESS NOTES
Hand-off report given to Altaf NORRIS in ICU. Pt transferred on cart and monitor accompanied by flying squad. Pt sent with all belongings. Pt's glasses and cell phone on his lap.

## 2019-01-31 NOTE — H&P
[facility]    History and Physical - Hospitalist Service       Date of Admission:  1/30/2019    Assessment & Plan      Andrea Collado is a 53 year old male with a history of alcoholism and substance abuse, bipolar, low testosterone, and reported seizure history who was admitted 01/30/19 with alcohol intoxication.      1. Alcohol dependence      Alcohol Withdrawl      Transaminitis likely 2/2 alcohol use  Drinks 1L + of vodka or Everclear daily for multiple years - wishes to quit. On admission, no significant metabolic derangements.  AST 72, , Alk phos 72.  Patient reports that he does not wish for any inpatient treatment programs.  No suicidal ideations.  Plan:  - Admit to inpatient with IMC  - Telemetry  - continue maintenance with Dextrose with 0.45NS  - Folate, thiamine, and multivitamin PO   - Social work for Chem Dep placed  - CIWA with lorazepam continued (shorter half life preferred in hepatitis/transaminitis)     2. Hallucinations      Depression (bipolar noted in epic)  - Wellbutrin 150 daily resumed  - Psychiatry consultation last admission recommended hold Adderrall and stimulants, but patient still taking     4. ADHD  - One dose of adderall 10 mg given on admission, no further doses will be ordered. agreeable.     5. Low testosterone  - Continue outpatient testosterone injections, follow with PCP.    6. Chronic pain syndrome   Assessment: past regular use of opioids, the suboxone, now off both. Did have some abdominal pain in ED, possibly from gastritis. Will give one dose of oral dilaudid. But explicitly told patient that we would not provide him with anymore opioids either during this admission or at discharge.  Plan:   - avoid opioids, can order ibuprofen as needed       Diet: Regular Diet Adult    DVT Prophylaxis: Ambulate every shift  Lino Catheter: not present  Code Status: FULL CODE    Disposition Plan   Expected discharge: 2 - 3 days, recommended to prior living  arrangement once alcohol withdrawls stable.  Entered: Stuart Wilkerson MD 01/30/2019, 8:03 PM     The patient's care was discussed with the alcohol withdrawl treated.    Stuart Wilkerson MD  [facility]    ______________________________________________________________________    Chief Complaint     Alcohol dependence and withdrwal    History is obtained from the patient    History of Present Illness      Andrea Collado is a 53 year old male with PMH of with history of alcohol and polysubstance abuse, withdrawal seizures who presents for further evaluation of alcohol intoxication withdrawal.    Patient reports that at baseline he drinks Everclear, roughly 1.75 L to 3 L some days.  His last drink was earlier this morning.  He reports that he developed diffuse pain and thus called the police to bring him to the emergency department for evaluation.  He reports that he knows he drinks far too much, and he is seeking help.  He reports that he does not believe in Alcoholics Anonymous or NA.  He does not endorse any suicidal ideations or hallucinations, he does not endorse any other drug abuse.  He reports he takes Adderall for ADD.  He reports that he had a history of withdrawal seizures in the past as he was trying to quit his alcohol dependence.  He otherwise denies any chest pain/shortness of breath, he denies any nausea/vomiting.  Patient reports he does not want his family notified of this admission.    Review of Systems    The 10 point Review of Systems is negative other than noted in the HPI or here.     Past Medical History    I have reviewed this patient's medical history and updated it with pertinent information if needed.   Past Medical History:   Diagnosis Date     Chemical dependency (H)      Cocaine abuse (H)      Depressive disorder      DVT (deep venous thrombosis) (H) 5 y ago    left foot, treated with coumadin     Flail chest     broken sterum, wiring      History of total hip arthroplasty     right      Hypertension      Seizures (H)      Substance abuse (H)        Past Surgical History   I have reviewed this patient's surgical history and updated it with pertinent information if needed.  Past Surgical History:   Procedure Laterality Date     CHEST SURGERY  1987    flail chest, sterum fractured     HIP SURGERY       KNEE SURGERY       ORTHOPEDIC SURGERY      KIMBER right. Stephanie left shoulder surgery, debridement right shoulder, neck surgery- fracture cervical spine. 6 knee surgeries- bucket handle meniscal tear and debridement. 3 heel surgeries.      ORTHOPEDIC SURGERY         Social History   I have reviewed this patient's social history and updated it with pertinent information if needed.  Social History     Tobacco Use     Smoking status: Current Some Day Smoker     Smokeless tobacco: Current User   Substance Use Topics     Alcohol use: Yes     Comment: drinks daily     Drug use: No     Comment: denies taki       Family History   I have reviewed this patient's family history and updated it with pertinent information if needed.   Family History   Problem Relation Age of Onset     Substance Abuse Mother      Substance Abuse Father      Substance Abuse Sister      Prior to Admission Medications   Prior to Admission Medications   Prescriptions Last Dose Informant Patient Reported? Taking?   Amphetamine-Dextroamphetamine (ADDERALL PO)  Self Yes No   Sig: Take 20 mg by mouth daily   Amphetamine-Dextroamphetamine (ADDERALL PO)  Self Yes No   Sig: Take 10 mg by mouth daily as needed (ADD) In the afternoon if needed   BuPROPion HCl (WELLBUTRIN SR PO)  Self Yes No   Sig: Take 150 mg by mouth daily   diazepam (VALIUM) 5 MG tablet   No No   Sig: Take 1 tablet (5 mg) by mouth every 6 hours as needed for anxiety or agitation (MUSCLE SPASM)   diazepam (VALIUM) 5 MG tablet  Self Yes No   Sig: Take 5 mg by mouth daily as needed for anxiety (Wanting an alcoholic drink)   folic acid (FOLVITE) 1 MG tablet   No No   Sig: Take 1  tablet (1 mg) by mouth daily   multivitamin w/minerals (THERA-VIT-M) tablet   No No   Sig: Take 1 tablet by mouth daily   testosterone cypionate (DEPOTESTOTERONE) 200 MG/ML injection  Self Yes No   Sig: Inject 200 mg into the muscle every 14 days   vitamin B1 (THIAMINE) 100 MG tablet   No No   Sig: Take 1 tablet (100 mg) by mouth daily      Facility-Administered Medications: None     Allergies   No Known Allergies    Physical Exam   Vital Signs: Temp: 97.5  F (36.4  C) Temp src: Oral BP: (!) 120/91 Pulse: 140 Heart Rate: 98 Resp: 16 SpO2: 96 % O2 Device: None (Room air)    Weight: 200 lbs 0 oz    Constitutional: Disheveled appearing, otherwise in no acute distress.  Eyes: Lids and lashes normal, pupils equal, round and reactive to light, extra ocular muscles intact, sclera clear, conjunctiva normal  ENT: Normocephalic, without obvious abnormality, atraumatic, sinuses nontender on palpation, external ears without lesions, oral pharynx with moist mucous membranes.  Hematologic / Lymphatic: no cervical lymphadenopathy  Respiratory: No increased work of breathing, good air exchange, clear to auscultation bilaterally, no crackles or wheezing  Cardiovascular: Normal apical impulse, tachycardic rate and regular rhythm, normal S1 and S2, no S3 or S4, and no murmur noted  GI: No scars, normal bowel sounds, soft, non-distended, mild RUQ tenderness, no masses palpated, no hepatosplenomegally  Skin: normal skin color, texture, turgor  Musculoskeletal: There is no redness, warmth, or swelling of the joints.  Full range of motion noted.  Motor strength is 5 out of 5 all extremities bilaterally.  Tone is normal.  Neurologic: Awake, alert, oriented to name, place and time.  Cranial nerves II-XII are grossly intact.  Motor is 5 out of 5 bilaterally. Patient is tremulous.  Neuropsychiatric: anxious appearing    Data   Data reviewed today: I reviewed all medications, new labs and imaging results over the last 24 hours. I personally  reviewed no images or EKG's today.    Most Recent 3 CBC's:  Recent Labs   Lab Test 01/30/19  1405 12/21/18  0847 12/20/18  1003   WBC 5.1 3.9* 3.1*   HGB 15.0 14.2 12.8*   MCV 94 90 91    119* 98*     Most Recent 3 BMP's:  Recent Labs   Lab Test 01/30/19  1405 12/21/18  0847 12/20/18  1003   * 139 138   POTASSIUM 4.0 3.5 3.5   CHLORIDE 110* 103 102   CO2 31 25 26   BUN 9 8 11   CR 0.80 0.71 0.67   ANIONGAP 6 11 10   JOHANA 8.7 9.2 8.5   GLC 94 93 113*     Most Recent 2 LFT's:  Recent Labs   Lab Test 01/30/19  1405 12/21/18  0847   AST 72* 315*   * 273*   ALKPHOS 72 85   BILITOTAL 0.3 1.4*     Most Recent 3 Troponin's:  Recent Labs   Lab Test 04/25/13  2051 12/13/12  1724 12/09/12  1809  09/28/12  2141 09/22/12  1805   TROPI  --   --   --   --  0.012 <0.012   TROPONIN 0.01 0.00 0.00   < >  --   --     < > = values in this interval not displayed.     Most Recent 3 BNP's:No lab results found.  No results found for this or any previous visit (from the past 24 hour(s)).

## 2019-01-31 NOTE — PHARMACY-ADMISSION MEDICATION HISTORY
Admission medication history interview status for the 1/30/2019  admission is complete. See EPIC admission navigator for prior to admission medications     Medication history source reliability:Good    Actions taken by pharmacist (provider contacted, etc):None     Additional medication history information not noted on PTA med list :None    Medication reconciliation/reorder completed by provider prior to medication history? No    Time spent in this activity: 10 min    Prior to Admission medications    Medication Sig Last Dose Taking? Auth Provider   Amphetamine-Dextroamphetamine (ADDERALL PO) Take 10-30 mg by mouth daily as needed (ADD) In the afternoon if needed  1/29/2019 at Unknown time Yes Unknown, Entered By History   BuPROPion HCl (WELLBUTRIN SR PO) Take 150 mg by mouth 2 times daily  1/29/2019 at Unknown time Yes Unknown, Entered By History   testosterone cypionate (DEPOTESTOTERONE) 200 MG/ML injection Inject 200 mg into the muscle every 14 days 2 wks ago Yes Unknown, Entered By History

## 2019-01-31 NOTE — PROVIDER NOTIFICATION
Pt having increasing CIWA scores. Have given a total of 6 mg Ativan IVP in the past ~ 1.5 hours. Dr. Dooley notified. Will transfer pt to ICU.

## 2019-01-31 NOTE — PLAN OF CARE
Pt neurologically when transferred to ICU approx. 0300 was A&Ox4, slurred speech, cooperative but anxious/restless. Since pt is hallucinating and inappropriate orientation to place/situation/time. Pt has been given ativan dosing q.15 mins since transfer but was deemed to benefit from precedex gtt. Precedex titrated throughout morning hours. Pt is calm with improvement in tremors/withdrawal symptoms. Pt NSR with VSS. Pt has good appetite. Pt is cooperative with plan of care/treatment and made comments about treatment for etoh use. All questions were answered and reassurance provided.

## 2019-01-31 NOTE — ED NOTES
"[facility]  ED Nurse Handoff Report    ED Chief complaint: Alcohol Intoxication (Pt called 911 drinking Everclear all day. Off suboxone for 10 months. Hurts all over.)      ED Diagnosis:   Final diagnoses:   Acute alcoholic intoxication in alcoholism without complication (H)   Alcohol withdrawal syndrome without complication (H)       Code Status: Full Code    Allergies: No Known Allergies    Activity level - Baseline/Home:  Independent    Activity Level - Current:   Stand with Assist     Needed?: No    Isolation: No  Infection: Not Applicable  Bariatric?: No    Vital Signs:   Vitals:    19 1341 19 1800   BP: 118/79 (!) 110/94   Pulse:  140   Resp: 16    Temp: 97.5  F (36.4  C)    TempSrc: Oral    SpO2: 94% 96%   Weight: 90.7 kg (200 lb)    Height: 1.829 m (6')        Cardiac Rhythm: ,        Pain level: 0-10 Pain Scale: 5    Is this patient confused?: No   Does this patient have a guardian?  No         If yes, is there guardianship documents in the Epic \"Code/ACP\" activity?  N/A         Guardian Notified?  N/A  [place] - Suicide Severity Rating Scale Completed?  Yes  If yes, what color did the patient score?  White    Patient Report: Initial Complaint: alcoholism, worried about withdrawal seizures  Focused Assessment: Andrea Collado is a 53 year old male with a history of alcohol and substance abuse, as well as withdrawal seizures who presents to the emergency department via the police department for evaluation of alcohol intoxication. Today, he reports drinking Everclear all day long and now states he \"hurts all over.\" He called the police from his home to present to the ED for evaluation. Here, he reports that he moved to the area to care for his dad 14 months ago, however his dad has recently . Since then, he reports he has been drinking \"too much\" and now has pain all over his body. He wants to get help and \"find peace,\" however due to bad experiences in the past with " NA and AA, he is very resistant to the idea of hospitalization or treatment programs. He denies any suicidal ideations, nor has he taken any other drugs today. He states his last drink was sometime today. He reports a history of withdrawal seizures and that they occur every time he tries to quit. He denies any other medical problems. Of note, he is adamant in not wanting his family to be notified.  He has now decided he does want help with his drinking.       Tests Performed: labs, urine  Abnormal Results:   Results for orders placed or performed during the hospital encounter of 01/30/19   Alcohol ethyl   Result Value Ref Range    Ethanol g/dL 0.38 (HH) <0.01 g/dL   CBC with platelets differential   Result Value Ref Range    WBC 5.1 4.0 - 11.0 10e9/L    RBC Count 4.65 4.4 - 5.9 10e12/L    Hemoglobin 15.0 13.3 - 17.7 g/dL    Hematocrit 43.6 40.0 - 53.0 %    MCV 94 78 - 100 fl    MCH 32.3 26.5 - 33.0 pg    MCHC 34.4 31.5 - 36.5 g/dL    RDW 15.4 (H) 10.0 - 15.0 %    Platelet Count 183 150 - 450 10e9/L    Diff Method Automated Method     % Neutrophils 50.9 %    % Lymphocytes 31.2 %    % Monocytes 12.5 %    % Eosinophils 3.4 %    % Basophils 1.6 %    % Immature Granulocytes 0.4 %    Nucleated RBCs 0 0 /100    Absolute Neutrophil 2.6 1.6 - 8.3 10e9/L    Absolute Lymphocytes 1.6 0.8 - 5.3 10e9/L    Absolute Monocytes 0.6 0.0 - 1.3 10e9/L    Absolute Eosinophils 0.2 0.0 - 0.7 10e9/L    Absolute Basophils 0.1 0.0 - 0.2 10e9/L    Abs Immature Granulocytes 0.0 0 - 0.4 10e9/L    Absolute Nucleated RBC 0.0    Comprehensive metabolic panel   Result Value Ref Range    Sodium 147 (H) 133 - 144 mmol/L    Potassium 4.0 3.4 - 5.3 mmol/L    Chloride 110 (H) 94 - 109 mmol/L    Carbon Dioxide 31 20 - 32 mmol/L    Anion Gap 6 3 - 14 mmol/L    Glucose 94 70 - 99 mg/dL    Urea Nitrogen 9 7 - 30 mg/dL    Creatinine 0.80 0.66 - 1.25 mg/dL    GFR Estimate >90 >60 mL/min/[1.73_m2]    GFR Estimate If Black >90 >60 mL/min/[1.73_m2]    Calcium 8.7  8.5 - 10.1 mg/dL    Bilirubin Total 0.3 0.2 - 1.3 mg/dL    Albumin 3.9 3.4 - 5.0 g/dL    Protein Total 8.1 6.8 - 8.8 g/dL    Alkaline Phosphatase 72 40 - 150 U/L     (H) 0 - 70 U/L    AST 72 (H) 0 - 45 U/L   Drug abuse screen 77 urine ([place],[facility])   Result Value Ref Range    Amphetamine Qual Urine Negative NEG^Negative    Barbiturates Qual Urine Negative NEG^Negative    Benzodiazepine Qual Urine Positive (A) NEG^Negative    Cannabinoids Qual Urine Negative NEG^Negative    Cocaine Qual Urine Negative NEG^Negative    Opiates Qualitative Urine Negative NEG^Negative    PCP Qual Urine Negative NEG^Negative   Alcohol breath test POCT   Result Value Ref Range    Alcohol Breath Test 0.197 (A) 0.00 - 0.01     Treatments provided: IV fluids, pain meds, po meal    Family Comments: no family here but patient has been on the phone with family    OBS brochure/video discussed/provided to patient/family: No              Name of person given brochure if not patient: n/a              Relationship to patient: n/a    ED Medications:   Medications   LORazepam (ATIVAN) injection 2 mg (not administered)   sodium chloride 0.9 % 1,000 mL with INFUVITE ADULT 10 mL, thiamine 100 mg, folic acid 1 mg, magnesium sulfate 2 g infusion ( Intravenous Stopped 1/30/19 1742)   LORazepam (ATIVAN) injection 1 mg (1 mg Intravenous Given 1/30/19 1423)   ketorolac (TORADOL) injection 15 mg (15 mg Intravenous Given 1/30/19 1500)       Drips infusing?:  No    For the majority of the shift this patient was Green.   Interventions performed were n/a    Severe Sepsis OR Septic Shock Diagnosis Present: No    To be done/followed up on inpatient unit:  n/a    ED NURSE PHONE NUMBER: *[phone]

## 2019-01-31 NOTE — ED NOTES
Report received. Patient given 6mg ativan per MD order. Patient is visibly withdrawing from alcohol and complains of pain, discomfort and has severe tremors. Patient reconnected to the vital signs monitors and is in sinus tachycardia. Will continue to monitor.

## 2019-02-01 VITALS
TEMPERATURE: 97.8 F | SYSTOLIC BLOOD PRESSURE: 132 MMHG | DIASTOLIC BLOOD PRESSURE: 86 MMHG | HEIGHT: 72 IN | HEART RATE: 75 BPM | RESPIRATION RATE: 18 BRPM | WEIGHT: 206.79 LBS | OXYGEN SATURATION: 98 % | BODY MASS INDEX: 28.01 KG/M2

## 2019-02-01 LAB
ALBUMIN SERPL-MCNC: 3.5 G/DL (ref 3.4–5)
ALP SERPL-CCNC: 78 U/L (ref 40–150)
ALT SERPL W P-5'-P-CCNC: 85 U/L (ref 0–70)
ANION GAP SERPL CALCULATED.3IONS-SCNC: 7 MMOL/L (ref 3–14)
AST SERPL W P-5'-P-CCNC: 48 U/L (ref 0–45)
BILIRUB SERPL-MCNC: 0.8 MG/DL (ref 0.2–1.3)
BUN SERPL-MCNC: 12 MG/DL (ref 7–30)
CALCIUM SERPL-MCNC: 8.7 MG/DL (ref 8.5–10.1)
CHLORIDE SERPL-SCNC: 105 MMOL/L (ref 94–109)
CO2 SERPL-SCNC: 26 MMOL/L (ref 20–32)
CREAT SERPL-MCNC: 0.76 MG/DL (ref 0.66–1.25)
ERYTHROCYTE [DISTWIDTH] IN BLOOD BY AUTOMATED COUNT: 14.6 % (ref 10–15)
GFR SERPL CREATININE-BSD FRML MDRD: >90 ML/MIN/{1.73_M2}
GLUCOSE SERPL-MCNC: 118 MG/DL (ref 70–99)
HCT VFR BLD AUTO: 40 % (ref 40–53)
HGB BLD-MCNC: 13.9 G/DL (ref 13.3–17.7)
MAGNESIUM SERPL-MCNC: 1.9 MG/DL (ref 1.6–2.3)
MCH RBC QN AUTO: 32.6 PG (ref 26.5–33)
MCHC RBC AUTO-ENTMCNC: 34.8 G/DL (ref 31.5–36.5)
MCV RBC AUTO: 94 FL (ref 78–100)
PLATELET # BLD AUTO: 140 10E9/L (ref 150–450)
POTASSIUM SERPL-SCNC: 4.2 MMOL/L (ref 3.4–5.3)
PROT SERPL-MCNC: 7.4 G/DL (ref 6.8–8.8)
RBC # BLD AUTO: 4.26 10E12/L (ref 4.4–5.9)
SODIUM SERPL-SCNC: 138 MMOL/L (ref 133–144)
WBC # BLD AUTO: 4.5 10E9/L (ref 4–11)

## 2019-02-01 PROCEDURE — 36415 COLL VENOUS BLD VENIPUNCTURE: CPT | Performed by: INTERNAL MEDICINE

## 2019-02-01 PROCEDURE — 83735 ASSAY OF MAGNESIUM: CPT | Performed by: INTERNAL MEDICINE

## 2019-02-01 PROCEDURE — A9270 NON-COVERED ITEM OR SERVICE: HCPCS | Mod: GY | Performed by: INTERNAL MEDICINE

## 2019-02-01 PROCEDURE — 99239 HOSP IP/OBS DSCHRG MGMT >30: CPT | Performed by: INTERNAL MEDICINE

## 2019-02-01 PROCEDURE — 25000125 ZZHC RX 250: Performed by: NURSE PRACTITIONER

## 2019-02-01 PROCEDURE — 99222 1ST HOSP IP/OBS MODERATE 55: CPT | Performed by: PSYCHIATRY & NEUROLOGY

## 2019-02-01 PROCEDURE — 80053 COMPREHEN METABOLIC PANEL: CPT | Performed by: INTERNAL MEDICINE

## 2019-02-01 PROCEDURE — 25000128 H RX IP 250 OP 636: Performed by: NURSE PRACTITIONER

## 2019-02-01 PROCEDURE — 25000132 ZZH RX MED GY IP 250 OP 250 PS 637: Mod: GY | Performed by: STUDENT IN AN ORGANIZED HEALTH CARE EDUCATION/TRAINING PROGRAM

## 2019-02-01 PROCEDURE — 25000132 ZZH RX MED GY IP 250 OP 250 PS 637: Mod: GY | Performed by: INTERNAL MEDICINE

## 2019-02-01 PROCEDURE — 85027 COMPLETE CBC AUTOMATED: CPT | Performed by: INTERNAL MEDICINE

## 2019-02-01 RX ADMIN — THIAMINE HYDROCHLORIDE 250 MG: 100 INJECTION, SOLUTION INTRAMUSCULAR; INTRAVENOUS at 09:02

## 2019-02-01 RX ADMIN — MULTIPLE VITAMINS W/ MINERALS TAB 1 TABLET: TAB at 08:53

## 2019-02-01 RX ADMIN — BUPROPION HYDROCHLORIDE 150 MG: 150 TABLET, FILM COATED, EXTENDED RELEASE ORAL at 08:53

## 2019-02-01 RX ADMIN — FOLIC ACID 1 MG: 5 INJECTION, SOLUTION INTRAMUSCULAR; INTRAVENOUS; SUBCUTANEOUS at 09:02

## 2019-02-01 ASSESSMENT — ACTIVITIES OF DAILY LIVING (ADL)
ADLS_ACUITY_SCORE: 15

## 2019-02-01 ASSESSMENT — MIFFLIN-ST. JEOR: SCORE: 1816

## 2019-02-01 NOTE — PROGRESS NOTES
X cover [id number]    Ibuprofen prn ordered for back pain; reviewed the notes with plans to avoid narcotics; has chronic pain syndrome, withdrawing from alcohol

## 2019-02-01 NOTE — CONSULTS
Patient seen for initial psychiatric consultation. Refer to dictated note. Not holdable. Advised cessation of Adderall given history of bipolar disorder and reported history of seizures and Wellbutrin given reported history of seizures.    Jeff Cazares DO

## 2019-02-01 NOTE — DISCHARGE SUMMARY
"[facility]  Hospitalist Discharge Summary       Date of Admission:  1/30/2019  Date of Discharge:  2/1/2019 12:32 PM  Discharging Provider: Ann Cotton MD      Discharge Diagnoses   Alcohol dependence  Alcohol withdrawal  Transaminitis secondary to alcohol use improved prior to discharge  History of bipolar  History of ADHD  History of low testosterone  Chronic pain syndrome    Follow-ups Needed After Discharge   Follow-up Appointments     Follow-up and recommended labs and tests       Follow up with primary care provider, Rian Myers, within 7 days for hospital follow- up.  The following labs/tests are recommended: CBC/CMP.  Follow-up with your regular psychiatrist next available               Unresulted Labs Ordered in the Past 30 Days of this Admission     No orders found from 12/1/2018 to 1/31/2019.          Hospital Course        Andrea Collado is a 53 year old male with a history of alcoholism and substance abuse, bipolar, low testosterone, and reported seizure history who was admitted 01/30/19 with alcohol intoxication.      Alcohol dependence      Alcohol Withdrawl      Transaminitis likely 2/2 alcohol use  Drinks 1L + of vodka or Everclear daily for multiple years - wishes to quit. On admission, mildly elevated transaminases otherwise unremarkable. Patient reports that he does not wish for any inpatient treatment programs.  No suicidal ideations.  -Was on benzodiazepine with CIWA however the night of admission became agitated needing increased dose of benzos so was transferred to ICU and started on Precedex drip.  Precedex drip was discontinued  he became obtunded.  Subsequently he woke up with holding his Precedex and his withdrawals were being managed with Ativan  -At some point patient also requested if Precedex could be reinitiated as it helped him \"be out\".  -On the day of discharge he was not needing any further Ativan.  He demanded to be discharged as his Adderall was not " reinitiated in the hospital(see below)  -Psychiatry was consulted for recommendation regarding his ongoing alcohol abuse, to see if any medication needed to be adjusted.  Patient refused inpatient treatment.  He was advised to stay in the hospital for possibility of going into glen withdrawal, however he refused to stay.  Psychiatry did not deem him holdable.     2. Hallucinations      Depression (bipolar noted in epic)   ADHD  - Wellbutrin 150 daily resumed  - Psychiatry consultation last admission had recommended hold psychostimulants however patient insisted that he was still taking it.  On reviewing records from care everywhere he recently fired his PCP as PCP referred him to psychiatry and he was not prescribing him his Adderall.  He had missed multiple psychiatry appointment. - One dose of adderall 10 mg given on admission, patient request further doses of Adderall as he has been taking it for several years.    -Psychiatry was consulted in-house who reetiriated to hold stimulants and Wellbutrin during hospitalization given his potential history of seizures in the past overlaid with a history of bipolar disorder.    Low testosterone  - Continue outpatient testosterone injections, follow with PCP.     Chronic pain syndrome   Assessment: past regular use of opioids, the suboxone, now off both. Did have some abdominal pain in ED, possibly from gastritis.  Was given one dose of oral dilaudid.  No more opoids during hospital stay.  - ibuprofen as needed      Consultations This Hospital Stay   CHEMICAL DEPENDENCY IP CONSULT  PSYCHIATRY IP CONSULT    Code Status   Full Code    Time Spent on this Encounter   IAnn, personally saw the patient today and spent greater than 30 minutes discharging this patient.       Ann Cotton MD  [facility]  ______________________________________________________________________    Physical Exam   Vital Signs: Temp: 97.8  F (36.6  C) Temp src: Oral BP: 132/86  Pulse: 75 Heart Rate: 95 Resp: 18 SpO2: 98 % O2 Device: None (Room air)    Weight: 206 lbs 12.66 oz  Exam:  Constitutional: Awake, alert and no distress. Appears comfortable  Head: Normocephalic. No masses, lesions, tenderness or abnormalities  ENT: ENT exam normal, no neck nodes or sinus tenderness  Cardiovascular: RRR.  No murmurs, no rubs or JVD  Respiratory: Normal WOB,b/l equal air entry, no wheezes or crackles   Gastrointestinal: Abdomen soft, non-tender. BS normal. No masses, organomegaly  : Deferred   extremities : No edema , no clubbing or cyanosis         Primary Care Physician   Rian Myers    Discharge Disposition   Discharged to home  Condition at discharge: Stable    Significant Results and Procedures   Most Recent 3 CBC's:  Recent Labs   Lab Test 02/01/19  0753 01/31/19  0640 01/30/19  1405   WBC 4.5 3.3* 5.1   HGB 13.9 11.9* 15.0   MCV 94 94 94   * 121* 183     Most Recent 3 BMP's:  Recent Labs   Lab Test 02/01/19  0753 01/31/19  0640 01/30/19  1405    138 147*   POTASSIUM 4.2 4.0 4.0   CHLORIDE 105 105 110*   CO2 26 28 31   BUN 12 14 9   CR 0.76 0.76 0.80   ANIONGAP 7 5 6   JOHANA 8.7 7.7* 8.7   * 104* 94     Most Recent 2 LFT's:  Recent Labs   Lab Test 02/01/19  0753 01/30/19  1405   AST 48* 72*   ALT 85* 117*   ALKPHOS 78 72   BILITOTAL 0.8 0.3     Most Recent 3 INR's:  Recent Labs   Lab Test 12/19/18  0600 10/03/12  1440   INR 0.95 0.91   ,   Results for orders placed or performed during the hospital encounter of 12/19/18   Head CT w/o contrast    Narrative    CT OF THE HEAD WITHOUT CONTRAST 12/19/2018 11:40 AM     COMPARISON: Head CT 7/14/2014    HISTORY: See the clinical information for interpreting provider.  Alcohol withdrawal; question of seizure.    TECHNIQUE: Axial CT images of the head from the skull base to the  vertex were acquired without IV contrast.    FINDINGS: The ventricles and basal cisterns are within normal limits  in configuration. There is no midline  shift. There are no extra-axial  fluid collections. Gray-white differentiation is well maintained.    No intracranial hemorrhage, mass or recent infarct.    The visualized paranasal sinuses are well-aerated. There is no  mastoiditis. There are no fractures of the visualized bones.      Impression    IMPRESSION: Normal head CT.      Radiation dose for this scan was reduced using automated exposure  control, adjustment of the mA and/or kV according to patient size, or  iterative reconstruction technique.    SHIRA PICKARD MD       Discharge Orders      Reason for your hospital stay    Alcohol withdrawal     Follow-up and recommended labs and tests     Follow up with primary care provider, Rian Myers, within 7 days for hospital follow- up.  The following labs/tests are recommended: CBC/CMP.  Follow-up with your regular psychiatrist next available     Activity    Your activity upon discharge: activity as tolerated     Discharge Instructions    Do not drink alcohol     Full Code     Diet    Follow this diet upon discharge: Orders Placed This Encounter      Combination Diet Regular Diet Adult     Discharge Medications   Current Discharge Medication List      CONTINUE these medications which have NOT CHANGED    Details   Amphetamine-Dextroamphetamine (ADDERALL PO) Take 10-30 mg by mouth daily as needed (ADD) In the afternoon if needed       BuPROPion HCl (WELLBUTRIN SR PO) Take 150 mg by mouth 2 times daily       testosterone cypionate (DEPOTESTOTERONE) 200 MG/ML injection Inject 200 mg into the muscle every 14 days           Allergies   No Known Allergies

## 2019-02-01 NOTE — PLAN OF CARE
VSS. AOx3, intermittently disoriented to time, thought Jos was still president. Up with A1, and GB, IV SL. IV ativan given for withdrawal symptoms. Pt insists he is doing better and has argued with bedside nurse that he'd like to leave asap and I clearly mentioned I am not the one making that decision. Lino out voiding adequately, skin WDL. Lactic acid advisory popped up and lactic came back negative. C/o pain in back and some joints, ibuprofen PRN available.

## 2019-02-01 NOTE — PLAN OF CARE
Discharge    Patient discharged to home via cab.  Care plan note A&O x4. VSS on RA. SBA. LS clear, denies SOB. BS+, tolerating regular diet. Denies pain. CIWA 6, anxious. Seen by psych, no changes. Discharge home today.     Listed belongings gathered and returned to patient. Yes  Care Plan and Patient education resolved: Yes  Prescriptions if needed, hard copies sent with patient  NA  Home and hospital acquired medications returned to patient: NA  Medication Bin checked and emptied on discharge Yes  Follow up appointment made for patient: No

## 2019-02-01 NOTE — PLAN OF CARE
Pt alert and oriented x3: disoriented to time.  He slept most of the night. CIWA 0(sleeping),1.VSS on RA. Tolerating regular diet. He is voiding adequately. He is insisting that he will be discharging today, agreed to wait until doctors round on him this morning. Pt refused Tele, stated that he does not need it and will not wear it. Up with assist of 1.

## 2019-02-01 NOTE — CONSULTS
Consult Date:  02/01/2019      REASON FOR CONSULTATION:  Assess holdability, history of ADHD (medication recommendation).      REQUESTING PHYSICIAN:  Stuart Wilkerson MD      CHIEF COMPLIANT:  I want to go home.       HISTORY OF PRESENT ILLNESS:  Andrea Collado is a 53-year-old male with a history of alcohol and substance use disorder, bipolar disorder, low testosterone, and potential complicated alcohol withdrawal to include seizures.  He was admitted to the hospital after contacting the police for help for diffuse pain.  There are reports of withdrawal seizures in the past, though none present on this hospitalization.  He has been managed via the [facility] protocol and has received significant Ativan dosings targeting withdrawal symptoms.  Of note, the patient was recently seen by psychiatry on 12/20/2018, by our consult service, also for alcohol withdrawal overlaid with endorsements of withdrawing from Suboxone, though the veracity of that Suboxone prescription was in question, as he was not able to give the name of the prescriber and was deemed to be an unreliable historian.  He was not interested in chemical health treatment at that time and was deemed non-holdable and no psychiatric or chemical dependency commitment was pursued.      On my interview today, the patient states that he would like to go home and feels as though he is no longer in withdrawal.  He tells me he is not tremulous and holds up his hands demonstrating such.  He says that his LFTs are reducing and that he is comfortable returning home and has no intention of doing anything unsafe.  He reports 17 past chemical dependency treatments of various intensities, but tells me he has not done any in about 4 years (last was in [place] and he cannot recall the name of the facility).  He tells me that formal treatment does not work well for him, as he does not believe in consistently endorsing a label that he is an alcoholic, as he feels as though this will imprint  "upon him a negative internal label and he prefers a more optimistic approach.  He does report things have been somewhat difficult for him since his dad passed away on 04/28, but denies any notable decompensations from a mental health perspective.  He denies any suicidal ideation.  He tells me, \"I stay in my hotel when I drink.  If I want to have a beer or so, I'll have a beer, I'm not hurting anybody.\"  He indicates he has been off Suboxone for about a year, and [id number] Adderall, he has been off of for about a month.  He tells me he continues to take Wellbutrin however, and finds it helpful for restoration of energy.  He reports Adderall is historically helpful for calming him down and making him more focused.  He tells me these were last prescribed by Dr. Davis at [facility], but at present there is no prescriber.  Ultimately he expresses a desire to return home and cites his 2 friends as Andreina and Brenda as people who will him maintain sobriety.  He denies hallucinosis or any other aberrant sensory phenomenon.        PAST PSYCHIATRIC HISTORY: Patient with reported past diagnoses of alcohol use disorder, cocaine use disorder, opioid use disorder, and bipolar disorder.  He has been [place] [address] on 2 occasions for bipolar disorder in 2014.  Reports previously seeing Dr. Davis at [facility], but states he has no current prescriber.  He has been on Suboxone in the past, but states he has not been on this in over a year (which conflicts with endorsements to Dr. Taylor on his 12/20/2019 consultation).        PAST MEDICAL HISTORY:      1.  DVT, left foot.    2.  Flail chest.    3.  Total hip arthroplasty on the right.    4.  Hypertension.    5.  Seizures.   6.  Polysubstance abuse.    7.  Hypogonadism.       FAMILY PSYCHIATRIC HISTORY:  None.       SOCIAL HISTORY:  The patient was born and raised in [place].  His father passed away on 04/28/2018.  He has 2 siblings.  Reports a degree in " management, in [employer] and 3 Masters degrees, in psychology, business administration and , as well as a psychology PhD (per records, none of this has been independently verified).  He indicated he was a  for [employer] for several years, but was forced to retire 3 years ago due to chemical use problems.   twice with 3 children.        REVIEW OF SYSTEMS:  Ten-point review of systems completed and negative other than noted in HPI.      ALLERGIES:  NO KNOWN DRUG ALLERGIES.      [id number] MEDICATIONS:  Adderall 10-30 mg daily p.r.n. (reports being off for over a month), Wellbutrin  mg b.i.d., testosterone injection 200 mg every 2 weeks.        MENTAL STATUS EXAMINATION:  Age-appearing male with situationally appropriate grooming and hygiene.  Calm and cooperative with good eye contact.  Awake, alert and globally oriented.  Cooperative, though the reliability of his history is suspect.  Mood was described as euthymic.  Affect was mood congruent, stable and appropriately reactive.  Speech was fluent, spontaneous, clear, and nonpressured.  Thoughts were linear, logical and goal directed.  No observation of delusional content.  No observation of response to internal stimuli.  No fluctuation in cognition appreciated.  Intelligence estimate is average by way of vocabulary and conversational understanding.  Memory is grossly intact.  Attention and concentration are well maintained.  Muscle strength and tone appeared normal on visual exam.  Coordination, station and gait were not assessed.  Insight and judgment are fair.  Denies suicidal or homicidal ideation, intent or plan.      VITAL SIGNS:  Temperature 97.8, pulse 75, respirations 18, blood pressure 132/86, oxygen saturation 98% on room air.      DIAGNOSES:   1.  Major depressive disorder, recurrent, mild to moderate.   2.  Panic attacks.   3.  Multiple medical comorbidities.      LABORATORY DATA:  Sodium 138, potassium 4,  creatinine 0.76.  Glucose 100.  White count 3.3, hemoglobin 11.9 and platelets 121,000.  MCV 94.  Ethanol 0.38 on 01/30/2019.  UDAS positive for benzodiazepines.         DIAGNOSES:   1.  Alcohol use disorder leading to alcohol withdrawal.    2.  Cocaine use disorder in reported remission.    3.  Opioid use disorder, previously on Suboxone, though none currently.    4.  History of bipolar disorder, most recent episode depressed.     5.  Hypogonadism on testosterone replacement.       IMPRESSION:  Andrea Collado is a 53-year-old male with a psychiatric history of alcohol use disorder; bipolar disorder; cocaine use disorder, in reported remission, and opioid use disorder, in reported remission.  The patient was brought to the hospital after he independently sought help for concerns of diffuse body pain.  The patient was intoxicated on arrival with a blood alcohol of 0.38 and has been treated for alcohol withdrawal on the [facility] protocol with benzodiazepines.  The patient was previously on both Wellbutrin and Adderall, though states he has been off the Adderall for about a month, but continues on the Wellbutrin.  He also has been on Suboxone in the past as well, though states he has been off this medication for about a year.  Thus far, the patient is disinterested in chemical dependency resources, as he feels philosophically opposed to the treatment strategies of labeling oneself an alcoholic.  At this time the patient is requesting to leave the hospital and I do not see indication that he is holdable.  We will not pursue chemical dependency commitment at this time.  The patient is understanding of the risks of discharge from the hospital to include potentially fatal alcohol withdrawal, but tells me that he would seek help should any concerns in this regard reemerge.  With respect to his hwlgw-kf-dmlsygtjj medications, I would not recommend reinitiation of a stimulant, nor would I recommend reinitiating Wellbutrin, given the  potential history of seizures in the past, overlaid with a history of bipolar disorder (referring specifically to the Adderall).  Wellbutrin is used for bipolar depression, but again, if he is vulnerable to seizures and has a history therein, I would avoid this medication.        PLAN:   1.  Withhold stimulants and Wellbutrin during hospitalization.    2.  The patient is deemed not holdable from a psychiatric perspective.    3.  Discharge per medical team.         URIEL PRATT DO             D: 2019   T: 2019   MT: CRUZ      Name:     ARLEN MARX   MRN:      [id number]        Account:       [id number]   :      1965           Consult Date:  2019      Document: [id number]

## 2019-02-01 NOTE — PROGRESS NOTES
SW:  D:  Patient requested transportation to the residence he is currently living at, it is an extended type hotel in [place].  He reports he does not have money to pay for a taxi and is not on MA.  He requests a vocher.  Writer did set him up with an account ride.

## 2019-02-17 ENCOUNTER — HOSPITAL ENCOUNTER (INPATIENT)
Facility: CLINIC | Age: 54
LOS: 3 days | Discharge: HOME | DRG: 897 | End: 2019-02-20
Attending: PSYCHIATRY & NEUROLOGY | Admitting: PSYCHIATRY & NEUROLOGY
Payer: MEDICARE

## 2019-02-17 ENCOUNTER — HOSPITAL ENCOUNTER (EMERGENCY)
Facility: CLINIC | Age: 54
Discharge: ANOTHER HOSPITAL | End: 2019-02-17
Attending: EMERGENCY MEDICINE | Admitting: EMERGENCY MEDICINE
Payer: MEDICARE

## 2019-02-17 VITALS
HEART RATE: 106 BPM | DIASTOLIC BLOOD PRESSURE: 77 MMHG | RESPIRATION RATE: 26 BRPM | HEIGHT: 71 IN | SYSTOLIC BLOOD PRESSURE: 111 MMHG | TEMPERATURE: 98.5 F | OXYGEN SATURATION: 97 % | BODY MASS INDEX: 28.84 KG/M2

## 2019-02-17 DIAGNOSIS — F10.930 ALCOHOL WITHDRAWAL SYNDROME WITHOUT COMPLICATION (H): ICD-10-CM

## 2019-02-17 DIAGNOSIS — B17.10 ACUTE HEPATITIS C VIRUS INFECTION WITHOUT HEPATIC COMA: ICD-10-CM

## 2019-02-17 DIAGNOSIS — E55.9 VITAMIN D DEFICIENCY: ICD-10-CM

## 2019-02-17 DIAGNOSIS — F19.10 POLYSUBSTANCE ABUSE (H): ICD-10-CM

## 2019-02-17 DIAGNOSIS — F10.10 ETOH ABUSE: ICD-10-CM

## 2019-02-17 DIAGNOSIS — F10.10 ALCOHOL ABUSE: ICD-10-CM

## 2019-02-17 DIAGNOSIS — F90.1 ATTENTION DEFICIT HYPERACTIVITY DISORDER (ADHD), PREDOMINANTLY HYPERACTIVE TYPE: Primary | ICD-10-CM

## 2019-02-17 PROBLEM — F19.20 CHEMICAL DEPENDENCY (H): Status: ACTIVE | Noted: 2019-02-17

## 2019-02-17 LAB
ALBUMIN SERPL-MCNC: 4 G/DL (ref 3.4–5)
ALBUMIN UR-MCNC: NEGATIVE MG/DL
ALP SERPL-CCNC: 82 U/L (ref 40–150)
ALT SERPL W P-5'-P-CCNC: 190 U/L (ref 0–70)
AMPHETAMINES UR QL SCN: POSITIVE
ANION GAP SERPL CALCULATED.3IONS-SCNC: 12 MMOL/L (ref 3–14)
APPEARANCE UR: CLEAR
AST SERPL W P-5'-P-CCNC: 159 U/L (ref 0–45)
BARBITURATES UR QL: NEGATIVE
BASOPHILS # BLD AUTO: 0.1 10E9/L (ref 0–0.2)
BASOPHILS NFR BLD AUTO: 1.1 %
BENZODIAZ UR QL: POSITIVE
BILIRUB SERPL-MCNC: 1.3 MG/DL (ref 0.2–1.3)
BILIRUB UR QL STRIP: NEGATIVE
BUN SERPL-MCNC: 17 MG/DL (ref 7–30)
CALCIUM SERPL-MCNC: 8.8 MG/DL (ref 8.5–10.1)
CANNABINOIDS UR QL SCN: NEGATIVE
CHLORIDE SERPL-SCNC: 102 MMOL/L (ref 94–109)
CO2 SERPL-SCNC: 21 MMOL/L (ref 20–32)
COCAINE UR QL: NEGATIVE
COLOR UR AUTO: YELLOW
CREAT SERPL-MCNC: 0.89 MG/DL (ref 0.66–1.25)
DIFFERENTIAL METHOD BLD: ABNORMAL
EOSINOPHIL # BLD AUTO: 0.1 10E9/L (ref 0–0.7)
EOSINOPHIL NFR BLD AUTO: 1.5 %
ERYTHROCYTE [DISTWIDTH] IN BLOOD BY AUTOMATED COUNT: 14.1 % (ref 10–15)
ETHANOL SERPL-MCNC: 0.06 G/DL
GFR SERPL CREATININE-BSD FRML MDRD: >90 ML/MIN/{1.73_M2}
GLUCOSE SERPL-MCNC: 78 MG/DL (ref 70–99)
GLUCOSE UR STRIP-MCNC: NEGATIVE MG/DL
HCT VFR BLD AUTO: 38.6 % (ref 40–53)
HGB BLD-MCNC: 13.6 G/DL (ref 13.3–17.7)
HGB UR QL STRIP: NEGATIVE
IMM GRANULOCYTES # BLD: 0 10E9/L (ref 0–0.4)
IMM GRANULOCYTES NFR BLD: 0.2 %
KETONES UR STRIP-MCNC: 5 MG/DL
LEUKOCYTE ESTERASE UR QL STRIP: ABNORMAL
LIPASE SERPL-CCNC: 172 U/L (ref 73–393)
LYMPHOCYTES # BLD AUTO: 0.7 10E9/L (ref 0.8–5.3)
LYMPHOCYTES NFR BLD AUTO: 12.5 %
MCH RBC QN AUTO: 32.9 PG (ref 26.5–33)
MCHC RBC AUTO-ENTMCNC: 35.2 G/DL (ref 31.5–36.5)
MCV RBC AUTO: 94 FL (ref 78–100)
MONOCYTES # BLD AUTO: 0.7 10E9/L (ref 0–1.3)
MONOCYTES NFR BLD AUTO: 12.5 %
NEUTROPHILS # BLD AUTO: 3.9 10E9/L (ref 1.6–8.3)
NEUTROPHILS NFR BLD AUTO: 72.2 %
NITRATE UR QL: NEGATIVE
NRBC # BLD AUTO: 0 10*3/UL
NRBC BLD AUTO-RTO: 0 /100
OPIATES UR QL SCN: NEGATIVE
PCP UR QL SCN: NEGATIVE
PH UR STRIP: 6 PH (ref 5–7)
PLATELET # BLD AUTO: 153 10E9/L (ref 150–450)
POTASSIUM SERPL-SCNC: 3.5 MMOL/L (ref 3.4–5.3)
PROT SERPL-MCNC: 8.3 G/DL (ref 6.8–8.8)
RBC # BLD AUTO: 4.13 10E12/L (ref 4.4–5.9)
RBC #/AREA URNS AUTO: 1 /HPF (ref 0–2)
SODIUM SERPL-SCNC: 135 MMOL/L (ref 133–144)
SOURCE: ABNORMAL
SP GR UR STRIP: 1.01 (ref 1–1.03)
UROBILINOGEN UR STRIP-MCNC: 2 MG/DL (ref 0–2)
WBC # BLD AUTO: 5.3 10E9/L (ref 4–11)
WBC #/AREA URNS AUTO: 3 /HPF (ref 0–5)

## 2019-02-17 PROCEDURE — 25800030 ZZH RX IP 258 OP 636: Performed by: EMERGENCY MEDICINE

## 2019-02-17 PROCEDURE — A9270 NON-COVERED ITEM OR SERVICE: HCPCS | Mod: GY | Performed by: EMERGENCY MEDICINE

## 2019-02-17 PROCEDURE — 81001 URINALYSIS AUTO W/SCOPE: CPT | Performed by: EMERGENCY MEDICINE

## 2019-02-17 PROCEDURE — 99285 EMERGENCY DEPT VISIT HI MDM: CPT | Mod: 25

## 2019-02-17 PROCEDURE — 80053 COMPREHEN METABOLIC PANEL: CPT | Performed by: EMERGENCY MEDICINE

## 2019-02-17 PROCEDURE — 96361 HYDRATE IV INFUSION ADD-ON: CPT

## 2019-02-17 PROCEDURE — 25000128 H RX IP 250 OP 636: Performed by: EMERGENCY MEDICINE

## 2019-02-17 PROCEDURE — 25000132 ZZH RX MED GY IP 250 OP 250 PS 637: Mod: GY | Performed by: PSYCHIATRY & NEUROLOGY

## 2019-02-17 PROCEDURE — 83690 ASSAY OF LIPASE: CPT | Performed by: EMERGENCY MEDICINE

## 2019-02-17 PROCEDURE — 25000125 ZZHC RX 250: Performed by: EMERGENCY MEDICINE

## 2019-02-17 PROCEDURE — 96365 THER/PROPH/DIAG IV INF INIT: CPT

## 2019-02-17 PROCEDURE — 96376 TX/PRO/DX INJ SAME DRUG ADON: CPT

## 2019-02-17 PROCEDURE — 80307 DRUG TEST PRSMV CHEM ANLYZR: CPT | Performed by: EMERGENCY MEDICINE

## 2019-02-17 PROCEDURE — 96366 THER/PROPH/DIAG IV INF ADDON: CPT

## 2019-02-17 PROCEDURE — 25000132 ZZH RX MED GY IP 250 OP 250 PS 637: Mod: GY | Performed by: EMERGENCY MEDICINE

## 2019-02-17 PROCEDURE — 80320 DRUG SCREEN QUANTALCOHOLS: CPT | Performed by: EMERGENCY MEDICINE

## 2019-02-17 PROCEDURE — A9270 NON-COVERED ITEM OR SERVICE: HCPCS | Mod: GY | Performed by: PSYCHIATRY & NEUROLOGY

## 2019-02-17 PROCEDURE — 12800008 ZZH R&B CD ADULT

## 2019-02-17 PROCEDURE — 85025 COMPLETE CBC W/AUTO DIFF WBC: CPT | Performed by: EMERGENCY MEDICINE

## 2019-02-17 PROCEDURE — HZ2ZZZZ DETOXIFICATION SERVICES FOR SUBSTANCE ABUSE TREATMENT: ICD-10-PCS | Performed by: PSYCHIATRY & NEUROLOGY

## 2019-02-17 PROCEDURE — 96374 THER/PROPH/DIAG INJ IV PUSH: CPT | Mod: 59

## 2019-02-17 RX ORDER — DIAZEPAM 5 MG
5 TABLET ORAL ONCE
Status: COMPLETED | OUTPATIENT
Start: 2019-02-17 | End: 2019-02-17

## 2019-02-17 RX ORDER — LORAZEPAM 2 MG/ML
1 INJECTION INTRAMUSCULAR EVERY 30 MIN PRN
Status: DISCONTINUED | OUTPATIENT
Start: 2019-02-17 | End: 2019-02-17 | Stop reason: HOSPADM

## 2019-02-17 RX ORDER — ATENOLOL 50 MG/1
50 TABLET ORAL DAILY PRN
Status: DISCONTINUED | OUTPATIENT
Start: 2019-02-17 | End: 2019-02-20 | Stop reason: HOSPADM

## 2019-02-17 RX ORDER — LORAZEPAM 2 MG/ML
1 INJECTION INTRAMUSCULAR
Status: COMPLETED | OUTPATIENT
Start: 2019-02-17 | End: 2019-02-17

## 2019-02-17 RX ORDER — ACETAMINOPHEN 325 MG/1
650 TABLET ORAL EVERY 4 HOURS PRN
Status: DISCONTINUED | OUTPATIENT
Start: 2019-02-17 | End: 2019-02-20 | Stop reason: HOSPADM

## 2019-02-17 RX ORDER — HYDROXYZINE HYDROCHLORIDE 25 MG/1
25 TABLET, FILM COATED ORAL EVERY 4 HOURS PRN
Status: DISCONTINUED | OUTPATIENT
Start: 2019-02-17 | End: 2019-02-20 | Stop reason: HOSPADM

## 2019-02-17 RX ORDER — DIAZEPAM 5 MG
5-20 TABLET ORAL EVERY 30 MIN PRN
Status: DISCONTINUED | OUTPATIENT
Start: 2019-02-17 | End: 2019-02-20 | Stop reason: HOSPADM

## 2019-02-17 RX ORDER — FOLIC ACID 1 MG/1
1 TABLET ORAL DAILY
Status: DISCONTINUED | OUTPATIENT
Start: 2019-02-17 | End: 2019-02-20 | Stop reason: HOSPADM

## 2019-02-17 RX ORDER — LANOLIN ALCOHOL/MO/W.PET/CERES
100 CREAM (GRAM) TOPICAL DAILY
Status: COMPLETED | OUTPATIENT
Start: 2019-02-17 | End: 2019-02-19

## 2019-02-17 RX ORDER — TRAZODONE HYDROCHLORIDE 50 MG/1
50 TABLET, FILM COATED ORAL
Status: DISCONTINUED | OUTPATIENT
Start: 2019-02-17 | End: 2019-02-20 | Stop reason: HOSPADM

## 2019-02-17 RX ORDER — MULTIPLE VITAMINS W/ MINERALS TAB 9MG-400MCG
1 TAB ORAL DAILY
Status: DISCONTINUED | OUTPATIENT
Start: 2019-02-17 | End: 2019-02-20 | Stop reason: HOSPADM

## 2019-02-17 RX ORDER — BISACODYL 10 MG
10 SUPPOSITORY, RECTAL RECTAL DAILY PRN
Status: DISCONTINUED | OUTPATIENT
Start: 2019-02-17 | End: 2019-02-20 | Stop reason: HOSPADM

## 2019-02-17 RX ORDER — ALUMINA, MAGNESIA, AND SIMETHICONE 2400; 2400; 240 MG/30ML; MG/30ML; MG/30ML
30 SUSPENSION ORAL EVERY 4 HOURS PRN
Status: DISCONTINUED | OUTPATIENT
Start: 2019-02-17 | End: 2019-02-20 | Stop reason: HOSPADM

## 2019-02-17 RX ADMIN — LORAZEPAM 1 MG: 2 INJECTION INTRAMUSCULAR; INTRAVENOUS at 10:06

## 2019-02-17 RX ADMIN — LORAZEPAM 1 MG: 2 INJECTION INTRAMUSCULAR; INTRAVENOUS at 16:52

## 2019-02-17 RX ADMIN — DIAZEPAM 10 MG: 5 TABLET ORAL at 19:08

## 2019-02-17 RX ADMIN — TRAZODONE HYDROCHLORIDE 50 MG: 50 TABLET ORAL at 21:05

## 2019-02-17 RX ADMIN — LORAZEPAM 1 MG: 2 INJECTION INTRAMUSCULAR; INTRAVENOUS at 08:31

## 2019-02-17 RX ADMIN — FOLIC ACID 1 MG: 1 TABLET ORAL at 19:08

## 2019-02-17 RX ADMIN — Medication 100 MG: at 19:08

## 2019-02-17 RX ADMIN — DIAZEPAM 10 MG: 5 TABLET ORAL at 21:05

## 2019-02-17 RX ADMIN — MULTIPLE VITAMINS W/ MINERALS TAB 1 TABLET: TAB at 19:08

## 2019-02-17 RX ADMIN — FOLIC ACID: 5 INJECTION, SOLUTION INTRAMUSCULAR; INTRAVENOUS; SUBCUTANEOUS at 06:34

## 2019-02-17 RX ADMIN — LORAZEPAM 1 MG: 2 INJECTION INTRAMUSCULAR; INTRAVENOUS at 05:48

## 2019-02-17 RX ADMIN — SODIUM CHLORIDE 1000 ML: 9 INJECTION, SOLUTION INTRAVENOUS at 10:14

## 2019-02-17 RX ADMIN — DIAZEPAM 5 MG: 5 TABLET ORAL at 06:46

## 2019-02-17 RX ADMIN — LORAZEPAM 1 MG: 2 INJECTION INTRAMUSCULAR; INTRAVENOUS at 14:36

## 2019-02-17 ASSESSMENT — ENCOUNTER SYMPTOMS
HEMATURIA: 1
WOUND: 1

## 2019-02-17 ASSESSMENT — MIFFLIN-ST. JEOR: SCORE: 1882.72

## 2019-02-17 ASSESSMENT — ACTIVITIES OF DAILY LIVING (ADL): HYGIENE/GROOMING: INDEPENDENT

## 2019-02-17 NOTE — ED PROVIDER NOTES
History     Chief Complaint:  Withdrawal     HPI   Andrea Collado is a 53 year old male with a history of alcohol abuse and polysubstance abuse who presents to the emergency department today for evaluation of withdrawal. Patients states he is going through withdrawal from alcohol and his last drink was 12 hours ago. He endorses injecting methamphetamines again and has been injecting for 5 days straight, his last injection being yesterday. Patient states he started drinking again after his father passed away back in April and before that he was sober from alcohol for 5 years and sober from methamphetamines for 24 years. He states that the site he has been injecting on his left arm in his AC is starting to get swollen because he has been constantly using the same site. Additionally, the patient reports that he was vomiting bright red blood three weeks ago, had bright red bloody and some tarry stools two weeks ago, and has had hematuria for the past two weeks. He endorses that the bloody emesis and stools have subsided, however, he states he still slightly has some blood in his urine. The patient decided to come to the ED because he wants to get help and go to detox because he states he knows he can get sober again if he goes. He denies suicidal ideation.    Allergies:  No Known Drug Allergies    Medications:    Adderall  Wellbutrin  Depotestosterone     Past Medical History:    Chemical dependency  Depressive disorder  DVT  Hypertension  Seizures  Substance abuse  Alcohol abuse  Depression  Polysubstance abuse    Past Surgical History:    Flail chest surgery  Hip surgery  Knee surgery  Orthopedic surgery x2    Family History:    Mother: substance abuse  Father: substance abuse  Sister: substance abuse    Social History:  The patient was accompanied to the ED by himself.  Smoking Status: Current every day smoker  Smokeless Tobacco: current user  Alcohol Use: Positive  Drug Use: Positive methamphetamines  Marital  "Status:  Single     Review of Systems   Genitourinary: Positive for hematuria.   Skin: Positive for wound (left AC swollen injection site).   Psychiatric/Behavioral: Negative for suicidal ideas.   All other systems reviewed and are negative.      Physical Exam     Patient Vitals for the past 24 hrs:   BP Temp Temp src Pulse Heart Rate Resp SpO2 Height   02/17/19 1315 -- -- -- -- 116 26 -- --   02/17/19 1300 107/71 -- -- 106 105 13 -- --   02/17/19 1230 118/74 -- -- 101 -- -- -- --   02/17/19 1130 102/73 -- -- 108 110 24 95 % --   02/17/19 1100 101/67 -- -- 112 110 24 -- --   02/17/19 1030 101/61 -- -- 121 120 23 95 % --   02/17/19 1000 116/72 -- -- 120 125 18 -- --   02/17/19 0910 96/59 -- -- 128 129 15 97 % --   02/17/19 0855 -- -- -- -- 123 14 93 % --   02/17/19 0830 -- -- -- -- 118 20 93 % --   02/17/19 0800 101/75 -- -- 116 122 13 -- --   02/17/19 0730 101/66 -- -- 128 117 15 94 % --   02/17/19 0715 -- -- -- -- 108 12 95 % --   02/17/19 0630 117/78 -- -- 121 105 17 94 % --   02/17/19 0615 (!) 107/91 -- -- 118 118 15 94 % --   02/17/19 0600 (!) 84/77 -- -- 128 122 14 95 % --   02/17/19 0550 116/82 -- -- 128 124 23 97 % --   02/17/19 0529 -- 98.5  F (36.9  C) Temporal -- -- -- -- --   02/17/19 0520 133/80 -- -- -- 126 18 96 % 1.803 m (5' 11\")       Physical Exam  Nursing note and vitals reviewed.  Constitutional:  Oriented to person, place, and time. Cooperative. Smells of alcohol.  HENT:   Nose:    Nose normal.   Mouth/Throat:   Mucous membranes are normal.   Eyes:    Conjunctivae normal and EOM are normal.      Pupils are equal, round, and reactive to light.   Neck:    Trachea normal.   Cardiovascular:  Tachycardic, regular rhythm, normal heart sounds and normal pulses. No murmur heard.  Pulmonary/Chest:  Effort normal and breath sounds normal.   Abdominal:   Soft. Normal appearance and bowel sounds are normal.      Mild diffuse tenderness to palpation.     There is no rebound and no CVA tenderness. "   Musculoskeletal:  Extremities atraumatic x 4.   Lymphadenopathy:  No cervical adenopathy.   Neurological:   Alert and oriented to person, place, and time. Normal strength. Tongue fasciculations and some tremors present. No cranial nerve deficit or sensory deficit. GCS eye subscore is 4. GCS verbal subscore is 5. GCS motor subscore is 6.   Skin:    Track marks in the left AC with what appears to be a small amount of scar tissue present, but no significant erythema, fluctuance, or warmth.  Psychiatric:   Denies any suicidal ideation.    Emergency Department Course     Laboratory:  Laboratory findings were communicated with the patient who voiced understanding of the findings.    UA with microscopic: ketones 5(A), leukocyte esterase moderate(A) o/w WNL  Drug abuse screen 77 urine: amphetamine positive(A), benzodiazepines positive(A) o/w WNL  Lipase: 172  Alcohol ethyl: 0.06(H)  CBC: WBC 5.3, HGB 13.6,   CMP: (H), (H) o/w WNL (Creatinine 0.89)    Interventions:  0548 Ativan 1 mg IV  0634 NS banana bag 1000 ml IV  0646 Valium 5 mg PO  0831 ativan 1 mg IV  1006 ativan 1 mg IV  1014 NS 1000 ml IV    Emergency Department Course:    0538 IV was inserted and blood was drawn for laboratory testing, results above.    0545 The patient provided a urine sample here in the emergency department. This was sent for laboratory testing, findings above.    0602 Nursing notes and vitals reviewed.    0626 I performed an exam of the patient as documented above.     0800 Patient is waiting on a bed for detox.    1410 Patient will be signed out to my colleague, Dr. Estrada    Impression & Plan      Medical Decision Making:  Andrea Collado is a 53 year old male who presents to the emergency department today for evaluation of alcohol withdrawal.  Apparently he was brought into the fact that he kept calling the police department multiple times last night.  He indicates that he is not suicidal currently and that he wants to  get into detox and treatment.  He had a few other complaints as well including the blood in his urine, emesis, and stools.  He apparently no longer has the hematemesis or bloody stools though.  While he does appear to be in alcohol withdrawal, he does not appear septic or toxic or in DTs. The area where he has been injecting methamphetamines does not look infected currently, although I recommended using warm wet compresses to the area and seeking immediate reevaluation if it does start to show signs of infection.  I am not completely convinced that he has been vomiting blood or having bloody stools, as his urine does not have currently despite the fact that he claims he has ongoing hematuria.  His hemoglobin also is stable and normal.  He was provided with IV Ativan and oral Valium for his withdrawal symptoms.  He preferred to go to a detox facility, and therefore we are looking into that, however he then became more tachycardic and somewhat confused.  Therefore he had to be watched further and required more IV fluids and Ativan.  Both his heart rate and his confusion improved though.  Ultimately, we were able to obtain a bed at [facility].  All the appropriate paperwork has been filled out for transfer.  I am signing him out to my partner Dr. Estrada in case anything else comes up that needs to be addressed.    Diagnosis:    ICD-10-CM    1. Alcohol withdrawal syndrome without complication (H) F10.230    2. Alcohol abuse F10.10    3. Polysubstance abuse (H) F19.10      Disposition:   The patient is signed out to my colleague, Dr. Estrada, pending transfer to [facility].    Scribe Disclosure:  I, Jodi Chery, am serving as a scribe at 7:12 AM on 2/17/2019 to document services personally performed by Jose Alfredo Chang MD based on my observations and the provider's statements to me.      EMERGENCY DEPARTMENT       Jose Alfredo Chang MD  02/17/19 [id number]

## 2019-02-18 LAB
ALBUMIN SERPL-MCNC: 3.8 G/DL (ref 3.4–5)
ALP SERPL-CCNC: 74 U/L (ref 40–150)
ALT SERPL W P-5'-P-CCNC: 159 U/L (ref 0–70)
ANION GAP SERPL CALCULATED.3IONS-SCNC: 11 MMOL/L (ref 3–14)
AST SERPL W P-5'-P-CCNC: 127 U/L (ref 0–45)
BILIRUB SERPL-MCNC: 0.9 MG/DL (ref 0.2–1.3)
BUN SERPL-MCNC: 15 MG/DL (ref 7–30)
CALCIUM SERPL-MCNC: 9.4 MG/DL (ref 8.5–10.1)
CHLORIDE SERPL-SCNC: 104 MMOL/L (ref 94–109)
CO2 SERPL-SCNC: 21 MMOL/L (ref 20–32)
CREAT SERPL-MCNC: 0.82 MG/DL (ref 0.66–1.25)
DEPRECATED CALCIDIOL+CALCIFEROL SERPL-MC: 15 UG/L (ref 20–75)
GFR SERPL CREATININE-BSD FRML MDRD: >90 ML/MIN/{1.73_M2}
GGT SERPL-CCNC: 363 U/L (ref 0–75)
GLUCOSE SERPL-MCNC: 84 MG/DL (ref 70–99)
HIV 1+2 AB+HIV1 P24 AG SERPL QL IA: NONREACTIVE
MAGNESIUM SERPL-MCNC: 1.5 MG/DL (ref 1.6–2.3)
PHOSPHATE SERPL-MCNC: 2.6 MG/DL (ref 2.5–4.5)
POTASSIUM SERPL-SCNC: 3.8 MMOL/L (ref 3.4–5.3)
PROT SERPL-MCNC: 7.7 G/DL (ref 6.8–8.8)
SODIUM SERPL-SCNC: 136 MMOL/L (ref 133–144)

## 2019-02-18 PROCEDURE — 83735 ASSAY OF MAGNESIUM: CPT | Performed by: NURSE PRACTITIONER

## 2019-02-18 PROCEDURE — 87389 HIV-1 AG W/HIV-1&-2 AB AG IA: CPT | Performed by: PSYCHIATRY & NEUROLOGY

## 2019-02-18 PROCEDURE — A9270 NON-COVERED ITEM OR SERVICE: HCPCS | Mod: GY | Performed by: PSYCHIATRY & NEUROLOGY

## 2019-02-18 PROCEDURE — 83735 ASSAY OF MAGNESIUM: CPT | Performed by: PSYCHIATRY & NEUROLOGY

## 2019-02-18 PROCEDURE — 99232 SBSQ HOSP IP/OBS MODERATE 35: CPT | Performed by: NURSE PRACTITIONER

## 2019-02-18 PROCEDURE — 12800008 ZZH R&B CD ADULT

## 2019-02-18 PROCEDURE — 82977 ASSAY OF GGT: CPT | Performed by: PSYCHIATRY & NEUROLOGY

## 2019-02-18 PROCEDURE — 99207 ZZC CONSULT E&M CHANGED TO SUBSEQUENT LEVEL: CPT | Performed by: NURSE PRACTITIONER

## 2019-02-18 PROCEDURE — 25000132 ZZH RX MED GY IP 250 OP 250 PS 637: Mod: GY | Performed by: NURSE PRACTITIONER

## 2019-02-18 PROCEDURE — 25000132 ZZH RX MED GY IP 250 OP 250 PS 637: Mod: GY | Performed by: PSYCHIATRY & NEUROLOGY

## 2019-02-18 PROCEDURE — A9270 NON-COVERED ITEM OR SERVICE: HCPCS | Mod: GY | Performed by: NURSE PRACTITIONER

## 2019-02-18 PROCEDURE — 87902 NFCT AGT GNTYP ALYS HEP C: CPT | Performed by: PSYCHIATRY & NEUROLOGY

## 2019-02-18 PROCEDURE — 36415 COLL VENOUS BLD VENIPUNCTURE: CPT | Performed by: PSYCHIATRY & NEUROLOGY

## 2019-02-18 PROCEDURE — 87522 HEPATITIS C REVRS TRNSCRPJ: CPT | Performed by: PSYCHIATRY & NEUROLOGY

## 2019-02-18 PROCEDURE — 80053 COMPREHEN METABOLIC PANEL: CPT | Performed by: PSYCHIATRY & NEUROLOGY

## 2019-02-18 PROCEDURE — 82306 VITAMIN D 25 HYDROXY: CPT | Performed by: PSYCHIATRY & NEUROLOGY

## 2019-02-18 PROCEDURE — G0472 HEP C SCREEN HIGH RISK/OTHER: HCPCS | Performed by: PSYCHIATRY & NEUROLOGY

## 2019-02-18 PROCEDURE — 84100 ASSAY OF PHOSPHORUS: CPT | Performed by: PSYCHIATRY & NEUROLOGY

## 2019-02-18 RX ORDER — ATOMOXETINE 18 MG/1
18 CAPSULE ORAL DAILY
Status: DISCONTINUED | OUTPATIENT
Start: 2019-02-18 | End: 2019-02-20 | Stop reason: HOSPADM

## 2019-02-18 RX ORDER — MAGNESIUM OXIDE 400 MG/1
400 TABLET ORAL 2 TIMES DAILY
Status: DISCONTINUED | OUTPATIENT
Start: 2019-02-18 | End: 2019-02-20 | Stop reason: HOSPADM

## 2019-02-18 RX ORDER — MAGNESIUM OXIDE 400 MG/1
400 TABLET ORAL 2 TIMES DAILY
Status: DISCONTINUED | OUTPATIENT
Start: 2019-02-18 | End: 2019-02-18

## 2019-02-18 RX ADMIN — MAGNESIUM OXIDE TAB 400 MG (241.3 MG ELEMENTAL MG) 400 MG: 400 (241.3 MG) TAB at 20:46

## 2019-02-18 RX ADMIN — VITAMIN D, TAB 1000IU (100/BT) 1000 UNITS: 25 TAB at 16:27

## 2019-02-18 RX ADMIN — DIAZEPAM 10 MG: 5 TABLET ORAL at 16:27

## 2019-02-18 RX ADMIN — FOLIC ACID 1 MG: 1 TABLET ORAL at 08:35

## 2019-02-18 RX ADMIN — HYDROXYZINE HYDROCHLORIDE 25 MG: 25 TABLET ORAL at 08:35

## 2019-02-18 RX ADMIN — NICOTINE POLACRILEX 8 MG: 4 GUM, CHEWING ORAL at 09:16

## 2019-02-18 RX ADMIN — DIAZEPAM 10 MG: 5 TABLET ORAL at 04:00

## 2019-02-18 RX ADMIN — Medication 100 MG: at 08:35

## 2019-02-18 RX ADMIN — DIAZEPAM 10 MG: 5 TABLET ORAL at 12:43

## 2019-02-18 RX ADMIN — MULTIPLE VITAMINS W/ MINERALS TAB 1 TABLET: TAB at 08:35

## 2019-02-18 RX ADMIN — DIAZEPAM 10 MG: 5 TABLET ORAL at 00:55

## 2019-02-18 RX ADMIN — MAGNESIUM OXIDE TAB 400 MG (241.3 MG ELEMENTAL MG) 400 MG: 400 (241.3 MG) TAB at 16:27

## 2019-02-18 RX ADMIN — DIAZEPAM 10 MG: 5 TABLET ORAL at 08:35

## 2019-02-18 RX ADMIN — DIAZEPAM 10 MG: 5 TABLET ORAL at 20:45

## 2019-02-18 ASSESSMENT — ACTIVITIES OF DAILY LIVING (ADL)
HYGIENE/GROOMING: INDEPENDENT
LAUNDRY: WITH SUPERVISION
DRESS: SCRUBS (BEHAVIORAL HEALTH)
ORAL_HYGIENE: INDEPENDENT

## 2019-02-18 NOTE — H&P
Admitted:     02/17/2019      Mr. Collado is a 53-year-old man admitted to the [facility] detox unit on 2/17/19.  He was admitted to the hospital to detoxify from alcohol.  He has been drinking 1 liter per day and using methamphetamine with his last use of methamphetamine being on the morning of admission.  He notes that he moved from [place] to take care of his ailing father about 2 years ago, and in April 2018, his father passed away.  He notes that due to property settlement issues with the house which was left to him and coveted by his siblings, he was living in a hotel and relapsed with alcohol.  He had been taking Adderall for attention deficit disorder and he states that his brother who had previously been a  reported that the patient has been abusing his Adderall falsely and this was stopped.  Since he was on no Adderall, a friend suggested he try methine and that got him started on methamphetamine.  He notes a history of chemical dependency treatment about 20 years ago when he had been using cocaine while playing hockey, and again for opiates at age 41 when he had been using opiates while he was a .  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  He notes attention deficit disorder has been diagnosed, but no mood disorder, although the chart does list a mood disorder diagnosis of depression.  Laboratory work came back with vitamin D being low at 15, glucose normal at 84, GGT elevated at 363, AST at 127, ALT at 154 and the remainder of liver functions and chemistry profile are normal.  Platelets are 153, on 02/01.  They were 140, and white count is normal.  The remainder of his CBC is unremarkable.  Urinalysis is unremarkable.  He had an alcohol level on the 17th and 0.06 and HIV came back reactive for the antibody and nonreactive for HIV.      MENTAL  STATUS EXAMINATION:  Shows an alert, cooperative man.  Speech production is increased, and he is mildly circumstantial with mild pressure of speech.  He has some mood lability and has becomes tearful, especially when talking about the death of his father.  There are no signs of psychosis, no signs of delusions, no signs of hallucinations, and his associations are intact.      DIAGNOSTIC IMPRESSION:   1.  Alcohol use disorder, severe with withdrawal.   2.  Attention deficit disorder.  I believe his current presentation is likely heavily influenced by alcohol withdrawal and methamphetamine.      PLAN:  Strattera for the ADHD and naltrexone for alcohol.  He will be detoxed using an [facility] protocol.  Estimated length of stay is 3-4 days to stabilize.         ARLEN CARDOZA MD             D: 2019   T: 2019   MT: MS      Name:     ARLEN MARX   MRN:      [id number]        Account:      [id number]   :      1965        Admitted:     2019                   Document: [id number]

## 2019-02-18 NOTE — PLAN OF CARE
Behavioral Team Discussion: (2/18/2019)    Continued Stay Criteria/Rationale: Patient admitted for Chemical Use Issues.  Plan: The following services will be provided to the patient; psychiatric assessment, medication management, therapeutic milieu, individual and group support, and skills groups.   Participants: 3A Provider: Dr. Andrea Escoto MD; 3A RN's: Aubrey Dale, RN, Tyree Barros, RN, Sarah Capone, RN and Sally Selisker, RN; 3A CM's: Duane Scherer and Swapna Figueroa.  Summary/Recommendation: Providers will assess today for treatment recommendations, discharge planning, and aftercare plans. CM will meet with pt for discharge planning.   Medical/Physical: Deferred (see medical notes).  Precautions:   Behavioral Orders   Procedures     Code 1 - Restrict to Unit     Routine Programming     As clinically indicated     Status 15     Every 15 minutes.     Withdrawal precautions     Rationale for change in precautions or plan: N/A  Progress: Improving.

## 2019-02-18 NOTE — CONSULTS
"  Internal Medicine Consult - Initial Visit       Andrea Collado MRN# [id number]   YOB: 1965 Age: 53 year old   Date of Admission: 2/17/2019  PCP: Rian Myers  Date of Service: 2/18/2019    Referring Provider: Andrea Escoto MD  Reason for Consult: Medical co-management of detox          Assessment and Recommendations:   Andrea Colaldo is a 53 year old male with a history of polysubstance abuse, depression, anxiety, and ADHD admitted to station 3A for detox from alcohol and IV drug use.     # Alcohol withdrawal, hx of polysubstance abuse - MSSA 10 this shift.  No hx withdrawal seizures.  Utox positive for benzos and amphetamines.  Reportedly uses valium PRN for \"anxiety attacks\".      - Continue MSSA   - Folvite, multi-vites, thiamine supplementation   - Further management per Psychiatry     # Depression, ADHD - Previously on Wellbutrin 150mg BID and Adderall 10-30mg BID PRN.  Very anxious and hyperverbal on interview.  Has been lifelong issue for pt, reports needing to take stimulants since grade school.    - Defer resuming Wellbutrin until discharge due to potential for lowered seizure threshold in setting of acute alcohol withdrawal   - Further management per Psychiatry     # Elevated LFTs - Initially elevated on admission, , , AP wnl.  Likely 2/2 alcohol use.  Improved this am, , .  Asymptomatic.    - Recheck CMP in 1 week w/ PCP  - Avoid/minimize hepatotoxic agents     # Hypomagnesemia - Mag 1.5 this admission.  Likely 2/2 dehydration vs alcohol use.    - Start MagOx 400mg BID x 3 days   - Recheck mag level prior to discharge     # Vitamin D deficiency - Vitamin D level 15 this admission.    - Start vitamin D3 1000 units daily, continue at discharge         Medicine will sign off, no further recommendations at this time.  Please feel free to reconsult if patient's symptoms worsen or if new problems arise.  Thank you for the opportunity to care for this patient. " "      Josefina Lezama CNP  Hospitalist Service   Pager: [phone]             History of Present Illness:   History is obtained from the patient and medical record.     This patient is a 53 year old male with a history of polysubstance abuse, depression, anxiety, and ADHD admitted to station 3A for detox from alcohol and IV drug use.     Internal Medicine service was asked to see patient for medical co-management of detox.  Andrea is pleasant and cooperative with interview.  He is quite fixated on complicated family dynamics surrounding the legality of his father's will.  He tells me that he moved up to [place] from [place] to care for his ailing father in March 2017, and has been struggling with substance abuse since his father passed away in April 2018.  He reports taking \"a nip of hard liquor here and there\" but that has since escalated.  Mostly drinking hard liquor but also reports drinking beer.    He reports abusing cocaine and opiates in the past, with the opiate abuse stemming from multiple surgeries and sports injuries.  He reports having been on Suboxone previously but claims to have stopped this on his own about 1 year ago (unclear how long he was on Suboxone).      He reports chronic issues with depression, anxiety, and ADHD and injected meth for the first time recently because he no longer has access to his Adderall.  He denies previous IVDU and reports using clean needles.  Does not share needles.  He denies alcohol withdrawal seizures but did experience visual hallucinations after using meth.  He reports period of insomnia over the last 3 weeks and then claims to have slept \"for 32 hours\" on this admission.  Otherwise he has no acute medical complaints.             Review of Systems:   A 10 point ROS was performed and negative unless otherwise noted in HPI.           Past Medical History:   Reviewed and updated in Epic.  Past Medical History:   Diagnosis Date     Chemical dependency (H)      Cocaine abuse (H)  "     Depressive disorder      DTs (delirium tremens) (H)      DVT (deep venous thrombosis) (H) 5 y ago    left foot, treated with coumadin     Flail chest     broken sterum, wiring      History of total hip arthroplasty     right     Hypertension      Seizures (H)      Substance abuse (H)     alcohol, opiates             Past Surgical History:   Reviewed and updated in Epic.  Past Surgical History:   Procedure Laterality Date     CHEST SURGERY  1987    flail chest, sterum fractured     HIP SURGERY       KNEE SURGERY       ORTHOPEDIC SURGERY      KIMBER right. Stephanie left shoulder surgery, debridement right shoulder, neck surgery- fracture cervical spine. 6 knee surgeries- bucket handle meniscal tear and debridement. 3 heel surgeries.      ORTHOPEDIC SURGERY               Social History:   Reviewed and updated in [employer].  Social History     Socioeconomic History     Marital status: Single     Spouse name: Not on file     Number of children: Not on file     Years of education: Not on file     Highest education level: Not on file   Social Needs     Financial resource strain: Not on file     Food insecurity - worry: Not on file     Food insecurity - inability: Not on file     Transportation needs - medical: Not on file     Transportation needs - non-medical: Not on file   Occupational History     Not on file   Tobacco Use     Smoking status: Current Some Day Smoker     Smokeless tobacco: Current User   Substance and Sexual Activity     Alcohol use: Yes     Comment: drinks 1.75L grain alcohol daily since 4/28/18     Drug use: Yes     Types: Methamphetamines     Comment: pt has been injecting meth for past 5 days     Sexual activity: Not Currently   Other Topics Concern     Parent/sibling w/ CABG, MI or angioplasty before 65F 55M? Not Asked   Social History Narrative    ** Merged History Encounter **                   Family History:   Reviewed and updated in Epic.  Family History   Problem Relation Age of Onset     Substance  "Abuse Mother      Substance Abuse Father      Substance Abuse Sister              Allergies:   No Known Allergies          Medications:     Current Facility-Administered Medications   Medication     acetaminophen (TYLENOL) tablet 650 mg     alum & mag hydroxide-simethicone (MYLANTA ES/MAALOX  ES) suspension 30 mL     atenolol (TENORMIN) tablet 50 mg     bisacodyl (DULCOLAX) Suppository 10 mg     diazepam (VALIUM) tablet 5-20 mg     folic acid (FOLVITE) tablet 1 mg     hydrOXYzine (ATARAX) tablet 25 mg     magnesium hydroxide (MILK OF MAGNESIA) suspension 30 mL     multivitamin w/minerals (THERA-VIT-M) tablet 1 tablet     nicotine polacrilex (NICORETTE) gum 4-8 mg     traZODone (DESYREL) tablet 50 mg     vitamin B1 (THIAMINE) tablet 100 mg            Physical Exam:   Blood pressure 116/79, pulse 76, temperature 99.5  F (37.5  C), temperature source Tympanic, resp. rate 16, height 1.803 m (5' 11\"), weight 102.1 kg (225 lb).  Body mass index is 31.38 kg/m .    GENERAL: Alert and oriented x 3. Well nourished, well developed.  Anxious, tangential, and hyperverbal.   HEENT: Normocephalic, atraumatic. Anicteric sclera. Mucous membranes moist.   CV: RRR. S1, S2. No murmurs appreciated.   RESPIRATORY: Effort normal on room air. Lungs CTAB with no wheezing, rales, or rhonchi.   GI: Abdomen soft and non distended, bowel sounds present x all 4 quadrants. No tenderness, rebound, or guarding.   NEUROLOGICAL: No focal deficits. Follows commands.  Strength equal in upper and lower extremities.   MUSCULOSKELETAL: No joint swelling or tenderness. Moves all extremities.   EXTREMITIES: No gross deformities. No peripheral edema.   SKIN: Grossly warm, dry, and intact. No jaundice. No rashes. Left AC injection w/ slight bruising but no erythema, swelling, or drainage             Data:   I personally reviewed the following studies:    ROUTINE IP LABS (Last four results)  CMP   Recent Labs   Lab 02/17/19  0538      POTASSIUM 3.5 "   CHLORIDE 102   CO2 21   ANIONGAP 12   GLC 78   BUN 17   CR 0.89   JOHANA 8.8   PROTTOTAL 8.3   ALBUMIN 4.0   BILITOTAL 1.3   ALKPHOS 82   *   *     CBC   Recent Labs   Lab 02/17/19  0538   WBC 5.3   RBC 4.13*   HGB 13.6   HCT 38.6*   MCV 94   MCH 32.9   MCHC 35.2   RDW 14.1        INR No lab results found in last 7 days.        Unresulted Labs Ordered in the Past 30 Days of this Admission     Date and Time Order Name Status Description    2/18/2019 0030 Hepatitis C Screen Reflex to HCV RNA Quant and Genotype In process     2/18/2019 0030 HIV Antigen Antibody Combo In process     2/18/2019 0030 Vitamin D In process

## 2019-02-18 NOTE — PROGRESS NOTES
The patient was up around 4:00 and he asked if he could have a couple of crystal light and a couple of string cheese. He was given what he requested and he ate it in the lounge.         He also requested if he could request a special breakfast. Robertoigned said that he could write it down and I will send it to the dietary but it is not assurance that they will send him what he requested.        The patient again got out of his room at around 4:45 am and met another patient in the nix (Terry Polk) and they both start to do jumping benedict in the hallway.I called their attention and informed them that they cannot do that because all the patients are still sleeping.          Andrea asked where they could do it, I offered the lounge for only 10 mins ( Terry was having a hard time sleeping)  because we don't open it till 6:00 am. He tried to bargain for 20.   I said that  if I find another patient going in the lounge I will send them all back to their rooms           In less than 5 mins. Another patient came out and went to the lounge.So,  I sent them all back to their rooms. Andrea even asked if they could do it in Terry's room since it is a private room and I said no because no one is allowed in another patient's room. The patient was not happy about it.

## 2019-02-18 NOTE — PROGRESS NOTES
"ADWOA      Andrea Collado is a 53 year old year old male with a chief complaint of alcohol problem  S = Situation:   Admit  B  = Background:   Pt admitted for alcohol withdrawal.  Pt reports drinking about a liter of vodka a day.  He has been drinking this volume for approximately the last 10 months.  He has had many family stressors including the death of his father.  He has been in a legal magaña with his siblings over his father's will.  Pt has been living in a hotel.  Pt reports using methamphetamines IV and oral benzodiazepines occasionally.  A  =  Assessment:   Vital Signs: /67 (BP Location: Left arm)   Pulse 119   Temp 97.4  F (36.3  C) (Oral)   Resp 16   Ht 1.803 m (5' 11\")   Wt 102.1 kg (225 lb)   BMI 31.38 kg/m    Alert and oriented X 3, no SI, no SIB.  Pt reports that he has been on Wellbutrin and adderall for a long time.  When he isn't drinking, the medications work.  He understands that he will not be receiving either of these medications during his stay here and he says, \"that's okay for a while.\"  Pt is hyperverbal, hypomanic, tachycardic and anxious.  He reports that he only wants detox for now.  He intends to go back to his hotel room after discharge.  Instructed to meet with  and consider his options when he is not in acute withdrawal.  R =   Request or Recommendation:   Alcohol withdrawal monitoring, Dr. Escoto to evaluate,  to see     "

## 2019-02-18 NOTE — PLAN OF CARE
Pt MSSA scores today are 10 and 10, 10 mg po valium x 2 administered. Pt reports seeing spiders and his towel move when in the shower today. Denies any  suicidal ideation plans or intent. Endorses anxiety 10 of 10, given prn hydroxyzine. Pt states wants to return home after detox is complete.     Given handouts from [facility] [employer] for naltrexone and strattera and informed him if he has any questions after reading the material to ask any RN.

## 2019-02-18 NOTE — PROGRESS NOTES
SPIRITUAL HEALTH SERVICES  SPIRITUAL ASSESSMENT Progress Note  [facility] [place] [place] [facility] [place]   ON-CALL VISIT    REFERRAL SOURCE: Epic request at Admission    Reviewed documentation and spoke with unit staff who indicated that it would be best to have a visit tomorrow.    PLAN: I will communicate with unit  regarding the request for a visit on 2/19.    Alison Reddy  Chaplain Resident  Pager  [phone]

## 2019-02-18 NOTE — PROGRESS NOTES
02/17/19 1820   Patient Belongings   Did you bring any home meds/supplements to the hospital?  No   Patient Belongings other (see comments)   Belongings Search Yes   Clothing Search Yes   Second Staff MYRNA Bolivar   Comment See Notes     Big Bin: pair of jeans, belt, Tennis shoes, Jacket, sweat shirt  Small Bin: Necklace, phone, , ear buzz  NOTHING at Security  A               Admission:  I am responsible for any personal items that are not sent to the safe or pharmacy.  [facility] is not responsible for loss, theft or damage of any property in my possession.    Signature:  _________________________________ Date: _______  Time: _____                                              Staff Signature:  ____________________________ Date: ________  Time: _____      2nd Staff person, if patient is unable/unwilling to sign:    Signature: ________________________________ Date: ________  Time: _____     Discharge:  [facility] has returned all of my personal belongings:    Signature: _________________________________ Date: ________  Time: _____                                          Staff Signature:  ____________________________ Date: ________  Time: _____

## 2019-02-18 NOTE — PROGRESS NOTES
Case Management Note  2/18/2019    Writer met with pt to initiate discharge planning. Pt reports his plan is to find housing secure housing, continue working with his therapist and exercise to maintain sobriety. Pt reports he has been to treatment before. He does not like AA or NA. He reports he has a PhD in psychology, so he is aware of what he needs to do, to address his mental health. Pt reports he works with a therapist weekly. Pt declined the need for case management, assessment and/or referral at this time. Writer provided pt with resources for [place] [facility]. Pt encouraged to seek assistance as needed. Case management complete.    Duane Scherer MA, LADC

## 2019-02-18 NOTE — PROGRESS NOTES
"Pt reports that Dr. Escoto informed him of positive Hep C results and he had several follow up questions. He wants to know how he got Hep C and when we are going to redraw his blood.  I explained pending RNA quantitative lab.  I gave him printed material and asked him to write down all questions for NP to discuss with him tomorrow. He declined to take the strattera \"it might be bad for my liver.\" He did take valium for MSSA 11. He is quite anxious, rambling, hyper verbal, repeats questions, is impulse and interrupted  several conversations with another patient. Pt is redirectable and was  reassured we would do all necessary lab work and MD could discuss all of his questions and concerns tomorrow.   "

## 2019-02-18 NOTE — PLAN OF CARE
"MSSA score for alcohol withdrawal is a 10, 10 mg po valium adminstered. Pt appears to have somewhat affected tremulousness when he presents to the nursing station more so than when he is seen drinking a cup of hot coffee (very minor tremulousness noted when drinking from a cup).  He also states he is having visual hallucinations \"I see spiders\" and \"I see towels move\" when in the shower.  Does state \"I feel agitated\". Endorse anxiety rated 10 of 10 in severity. Denies depression. Denies any suicidal ideation plans or intent. Does state he is \"happy I'm doing this\". Says plan after detox is to return home.  "

## 2019-02-19 PROCEDURE — 25000132 ZZH RX MED GY IP 250 OP 250 PS 637: Mod: GY | Performed by: PSYCHIATRY & NEUROLOGY

## 2019-02-19 PROCEDURE — A9270 NON-COVERED ITEM OR SERVICE: HCPCS | Mod: GY | Performed by: NURSE PRACTITIONER

## 2019-02-19 PROCEDURE — 12800008 ZZH R&B CD ADULT

## 2019-02-19 PROCEDURE — A9270 NON-COVERED ITEM OR SERVICE: HCPCS | Mod: GY | Performed by: PSYCHIATRY & NEUROLOGY

## 2019-02-19 PROCEDURE — 25000132 ZZH RX MED GY IP 250 OP 250 PS 637: Mod: GY | Performed by: NURSE PRACTITIONER

## 2019-02-19 RX ADMIN — MULTIPLE VITAMINS W/ MINERALS TAB 1 TABLET: TAB at 08:48

## 2019-02-19 RX ADMIN — VITAMIN D, TAB 1000IU (100/BT) 1000 UNITS: 25 TAB at 08:48

## 2019-02-19 RX ADMIN — MAGNESIUM OXIDE TAB 400 MG (241.3 MG ELEMENTAL MG) 400 MG: 400 (241.3 MG) TAB at 08:46

## 2019-02-19 RX ADMIN — FOLIC ACID 1 MG: 1 TABLET ORAL at 08:47

## 2019-02-19 RX ADMIN — NICOTINE POLACRILEX 8 MG: 4 GUM, CHEWING ORAL at 08:48

## 2019-02-19 RX ADMIN — Medication 100 MG: at 08:47

## 2019-02-19 RX ADMIN — TRAZODONE HYDROCHLORIDE 50 MG: 50 TABLET ORAL at 20:47

## 2019-02-19 RX ADMIN — DIAZEPAM 10 MG: 5 TABLET ORAL at 01:15

## 2019-02-19 RX ADMIN — DIAZEPAM 10 MG: 5 TABLET ORAL at 05:09

## 2019-02-19 RX ADMIN — MAGNESIUM OXIDE TAB 400 MG (241.3 MG ELEMENTAL MG) 400 MG: 400 (241.3 MG) TAB at 20:47

## 2019-02-19 ASSESSMENT — ACTIVITIES OF DAILY LIVING (ADL)
DRESS: SCRUBS (BEHAVIORAL HEALTH)
LAUNDRY: WITH SUPERVISION
ORAL_HYGIENE: INDEPENDENT
HYGIENE/GROOMING: INDEPENDENT

## 2019-02-19 NOTE — PROGRESS NOTES
"Patient presents to staff saying, \"Hey. I am really shaky man. I feel really anxious etc.\" Patient was hyperverbal in conversation but pleasant and cooperative with staff. He was appropriate when awaiting RN to perform vital sign assessment.   "

## 2019-02-19 NOTE — PROGRESS NOTES
Patient consistently wants to exercise in the early morning hours (0445am) (per chart review 2nd night in a row see previous notes). Patient luckily did not have a roommate tonight, so he could exercise and not be upset by not being able to. Patient may need a private room to accommodate this behavior as he is unwilling or unable to not exercise while he at detox. Will continue to monitor.

## 2019-02-19 NOTE — PROGRESS NOTES
SPIRITUAL HEALTH SERVICES    [facility] ([facility] [place]) Unit 3[facility]      REFERRAL SOURCE: patient/family request at admission for chaplaincy support    Brief visit with pt Andrea who clarified that his only request was for a Bible, and that he had no request for conversation or other SHS support at this time. No Bible on unit, and we agreed I would bring one down for him.    PLAN: SHS remains available to Andrea for the duration of hospitalization.                                                                                                                          Lashay Smart MDiv, [facility]  Lead , Adult Behavioral Health  Pager [phone]

## 2019-02-20 VITALS
OXYGEN SATURATION: 97 % | DIASTOLIC BLOOD PRESSURE: 70 MMHG | RESPIRATION RATE: 16 BRPM | BODY MASS INDEX: 31.5 KG/M2 | TEMPERATURE: 97.5 F | HEIGHT: 71 IN | SYSTOLIC BLOOD PRESSURE: 101 MMHG | WEIGHT: 225 LBS | HEART RATE: 58 BPM

## 2019-02-20 PROCEDURE — G0472 HEP C SCREEN HIGH RISK/OTHER: HCPCS | Performed by: PSYCHIATRY & NEUROLOGY

## 2019-02-20 PROCEDURE — A9270 NON-COVERED ITEM OR SERVICE: HCPCS | Mod: GY | Performed by: PSYCHIATRY & NEUROLOGY

## 2019-02-20 PROCEDURE — 25000132 ZZH RX MED GY IP 250 OP 250 PS 637: Mod: GY | Performed by: PSYCHIATRY & NEUROLOGY

## 2019-02-20 PROCEDURE — 25000132 ZZH RX MED GY IP 250 OP 250 PS 637: Mod: GY | Performed by: NURSE PRACTITIONER

## 2019-02-20 PROCEDURE — A9270 NON-COVERED ITEM OR SERVICE: HCPCS | Mod: GY | Performed by: NURSE PRACTITIONER

## 2019-02-20 PROCEDURE — 36415 COLL VENOUS BLD VENIPUNCTURE: CPT | Performed by: PSYCHIATRY & NEUROLOGY

## 2019-02-20 RX ORDER — ACAMPROSATE CALCIUM 333 MG/1
666 TABLET, DELAYED RELEASE ORAL 3 TIMES DAILY
Status: DISCONTINUED | OUTPATIENT
Start: 2019-02-20 | End: 2019-02-20 | Stop reason: HOSPADM

## 2019-02-20 RX ORDER — FOLIC ACID 1 MG/1
1 TABLET ORAL DAILY
Qty: 30 TABLET | Refills: 0 | Status: SHIPPED | OUTPATIENT
Start: 2019-02-21 | End: 2019-03-12

## 2019-02-20 RX ORDER — ACAMPROSATE CALCIUM 333 MG/1
666 TABLET, DELAYED RELEASE ORAL 3 TIMES DAILY
Qty: 180 TABLET | Refills: 0 | Status: SHIPPED | OUTPATIENT
Start: 2019-02-20 | End: 2019-03-12

## 2019-02-20 RX ORDER — ATOMOXETINE 18 MG/1
18 CAPSULE ORAL DAILY
Qty: 30 CAPSULE | Refills: 0 | Status: SHIPPED | OUTPATIENT
Start: 2019-02-21 | End: 2019-03-12

## 2019-02-20 RX ORDER — MULTIPLE VITAMINS W/ MINERALS TAB 9MG-400MCG
1 TAB ORAL DAILY
Qty: 30 TABLET | Refills: 0 | Status: ON HOLD | OUTPATIENT
Start: 2019-02-21 | End: 2019-04-27

## 2019-02-20 RX ADMIN — FOLIC ACID 1 MG: 1 TABLET ORAL at 08:21

## 2019-02-20 RX ADMIN — Medication 25 MG: at 08:21

## 2019-02-20 RX ADMIN — MULTIPLE VITAMINS W/ MINERALS TAB 1 TABLET: TAB at 08:22

## 2019-02-20 RX ADMIN — ATOMOXETINE HYDROCHLORIDE 18 MG: 18 CAPSULE ORAL at 08:23

## 2019-02-20 RX ADMIN — VITAMIN D, TAB 1000IU (100/BT) 1000 UNITS: 25 TAB at 08:21

## 2019-02-20 RX ADMIN — MAGNESIUM OXIDE TAB 400 MG (241.3 MG ELEMENTAL MG) 400 MG: 400 (241.3 MG) TAB at 08:21

## 2019-02-20 NOTE — PROGRESS NOTES
PT. Expressed anxiety over discharge and pending labs for HCV RNA.  Pt indicated frustration with his addiction and shame with relapse. Pt anxious to go home. He will discharge as soon as his results are in for RNA.

## 2019-02-20 NOTE — DISCHARGE SUMMARY
Admitted:     02/17/2019      Mr. Collado is a 53-year-old man admitted to the [facility] detox unit on 2/17/19.  He was admitted to the hospital to detoxify from alcohol.  He has been drinking 1 liter per day and using methamphetamine with his last use of methamphetamine being on the morning of admission.  He notes that he moved from [place] to take care of his ailing father about 2 years ago, and in April 2018, his father passed away.  He notes that due to property settlement issues with the house which was left to him and coveted by his siblings, he was living in a hotel and relapsed with alcohol.  He had been taking Adderall for attention deficit disorder and he states that his brother who had previously been a  reported that the patient has been abusing his Adderall falsely and this was stopped.  Since he was on no Adderall, a friend suggested he try methine and that got him started on methamphetamine.  He notes a history of chemical dependency treatment about 20 years ago when he had been using cocaine while playing hockey, and again for opiates at age 41 when he had been using opiates while he was a .  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  He notes attention deficit disorder has been diagnosed, but no mood disorder, although the chart does list a mood disorder diagnosis of depression.  Laboratory work came back with vitamin D being low at 15, glucose normal at 84, GGT elevated at 363, AST at 127, ALT at 154 and the remainder of liver functions and chemistry profile are normal.  Platelets are 153, on 02/01.  They were 140, and white count is normal.  The remainder of his CBC is unremarkable.  Urinalysis is unremarkable.  He had an alcohol level on the 17th and 0.06 and HIV came back reactive for the antibody and nonreactive for HIV.      MENTAL  STATUS EXAMINATION:  Shows an alert, cooperative man.  Speech production is increased, and he is mildly circumstantial with mild pressure of speech.  He has some mood lability and has becomes tearful, especially when talking about the death of his father.  There are no signs of psychosis, no signs of delusions, no signs of hallucinations, and his associations are intact.      DIAGNOSTIC IMPRESSION:   1.  Alcohol use disorder, severe with withdrawal.   2.  Attention deficit disorder.  I believe his current presentation is likely heavily influenced by alcohol withdrawal and methamphetamine.      PLAN:  Strattera for the ADHD and naltrexone for alcohol.  He will be detoxed using an [facility] protocol.  Estimated length of stay is 3-4 days to stabilize.      Naltrexone was recommended, but changed to   Campral when Hep C antibody returned as positive.    RNA was pending at time of discharge.      He was discharged to OP care.      Current Facility-Administered Medications:      acamprosate (CAMPRAL) EC tablet 666 mg, 666 mg, Oral, TID, Andrea Escoto MD     acetaminophen (TYLENOL) tablet 650 mg, 650 mg, Oral, Q4H PRN, Andrea Escoto MD     alum & mag hydroxide-simethicone (MYLANTA ES/MAALOX  ES) suspension 30 mL, 30 mL, Oral, Q4H PRN, Andrea Escoto MD     atenolol (TENORMIN) tablet 50 mg, 50 mg, Oral, Daily PRN, Andrea Escoto MD     atomoxetine (STRATTERA) capsule 18 mg, 18 mg, Oral, Daily, Andrea Escoto MD, 18 mg at 02/20/19 0823     bisacodyl (DULCOLAX) Suppository 10 mg, 10 mg, Rectal, Daily PRN, Andrea Escoto MD     diazepam (VALIUM) tablet 5-20 mg, 5-20 mg, Oral, Q30 Min PRN, Andrea Escoto MD, 10 mg at 02/19/19 0509     folic acid (FOLVITE) tablet 1 mg, 1 mg, Oral, Daily, Andrea Escoto MD, 1 mg at 02/20/19 0821     hydrOXYzine (ATARAX) tablet 25 mg, 25 mg, Oral, Q4H PRN, Andrea Escoto MD, 25 mg at 02/18/19 0835     magnesium hydroxide (MILK OF MAGNESIA) suspension 30 mL, 30 mL, Oral, At Bedtime PRN, Andrea Escoto  MD CRUZITO     magnesium oxide (MAG-OX) tablet 400 mg, 400 mg, Oral, BID, Josefina Lezama APRN CNP, 400 mg at 02/20/19 0821     multivitamin w/minerals (THERA-VIT-M) tablet 1 tablet, 1 tablet, Oral, Daily, Andrea Escoto MD, 1 tablet at 02/20/19 0822     nicotine polacrilex (NICORETTE) gum 4-8 mg, 4-8 mg, Buccal, Q1H PRN, Andrea Escoto MD, 8 mg at 02/19/19 0848     traZODone (DESYREL) tablet 50 mg, 50 mg, Oral, At Bedtime PRN, Andrea Escoto MD, 50 mg at 02/19/19 2047     vitamin D3 (CHOLECALCIFEROL) 1000 units (25 mcg) tablet 1,000 Units, 1,000 Units, Oral, Daily, Josefina Lezama APRN CNP, 1,000 Units at 02/20/19 0821  Recent Results (from the past 168 hour(s))   CBC with platelets differential    Collection Time: 02/17/19  5:38 AM   Result Value Ref Range    WBC 5.3 4.0 - 11.0 10e9/L    RBC Count 4.13 (L) 4.4 - 5.9 10e12/L    Hemoglobin 13.6 13.3 - 17.7 g/dL    Hematocrit 38.6 (L) 40.0 - 53.0 %    MCV 94 78 - 100 fl    MCH 32.9 26.5 - 33.0 pg    MCHC 35.2 31.5 - 36.5 g/dL    RDW 14.1 10.0 - 15.0 %    Platelet Count 153 150 - 450 10e9/L    Diff Method Automated Method     % Neutrophils 72.2 %    % Lymphocytes 12.5 %    % Monocytes 12.5 %    % Eosinophils 1.5 %    % Basophils 1.1 %    % Immature Granulocytes 0.2 %    Nucleated RBCs 0 0 /100    Absolute Neutrophil 3.9 1.6 - 8.3 10e9/L    Absolute Lymphocytes 0.7 (L) 0.8 - 5.3 10e9/L    Absolute Monocytes 0.7 0.0 - 1.3 10e9/L    Absolute Eosinophils 0.1 0.0 - 0.7 10e9/L    Absolute Basophils 0.1 0.0 - 0.2 10e9/L    Abs Immature Granulocytes 0.0 0 - 0.4 10e9/L    Absolute Nucleated RBC 0.0    Comprehensive metabolic panel    Collection Time: 02/17/19  5:38 AM   Result Value Ref Range    Sodium 135 133 - 144 mmol/L    Potassium 3.5 3.4 - 5.3 mmol/L    Chloride 102 94 - 109 mmol/L    Carbon Dioxide 21 20 - 32 mmol/L    Anion Gap 12 3 - 14 mmol/L    Glucose 78 70 - 99 mg/dL    Urea Nitrogen 17 7 - 30 mg/dL    Creatinine 0.89 0.66 - 1.25 mg/dL    GFR  Estimate >90 >60 mL/min/[1.73_m2]    GFR Estimate If Black >90 >60 mL/min/[1.73_m2]    Calcium 8.8 8.5 - 10.1 mg/dL    Bilirubin Total 1.3 0.2 - 1.3 mg/dL    Albumin 4.0 3.4 - 5.0 g/dL    Protein Total 8.3 6.8 - 8.8 g/dL    Alkaline Phosphatase 82 40 - 150 U/L     (H) 0 - 70 U/L     (H) 0 - 45 U/L   Alcohol ethyl    Collection Time: 02/17/19  5:38 AM   Result Value Ref Range    Ethanol g/dL 0.06 (H) <0.01 g/dL   Lipase    Collection Time: 02/17/19  5:38 AM   Result Value Ref Range    Lipase 172 73 - 393 U/L   Drug abuse screen 77 urine ([place],[facility])    Collection Time: 02/17/19  5:45 AM   Result Value Ref Range    Amphetamine Qual Urine Positive (A) NEG^Negative    Barbiturates Qual Urine Negative NEG^Negative    Benzodiazepine Qual Urine Positive (A) NEG^Negative    Cannabinoids Qual Urine Negative NEG^Negative    Cocaine Qual Urine Negative NEG^Negative    Opiates Qualitative Urine Negative NEG^Negative    PCP Qual Urine Negative NEG^Negative   UA with Microscopic    Collection Time: 02/17/19  5:45 AM   Result Value Ref Range    Color Urine Yellow     Appearance Urine Clear     Glucose Urine Negative NEG^Negative mg/dL    Bilirubin Urine Negative NEG^Negative    Ketones Urine 5 (A) NEG^Negative mg/dL    Specific Gravity Urine 1.010 1.003 - 1.035    Blood Urine Negative NEG^Negative    pH Urine 6.0 5.0 - 7.0 pH    Protein Albumin Urine Negative NEG^Negative mg/dL    Urobilinogen mg/dL 2.0 0.0 - 2.0 mg/dL    Nitrite Urine Negative NEG^Negative    Leukocyte Esterase Urine Moderate (A) NEG^Negative    Source Midstream Urine     WBC Urine 3 0 - 5 /HPF    RBC Urine 1 0 - 2 /HPF   Vitamin D    Collection Time: 02/18/19  7:27 AM   Result Value Ref Range    Vitamin D Deficiency screening 15 (L) 20 - 75 ug/L   GGT    Collection Time: 02/18/19  7:27 AM   Result Value Ref Range     (H) 0 - 75 U/L   HIV Antigen Antibody Combo    Collection Time: 02/18/19  7:27 AM   Result Value Ref Range    HIV Antigen  Antibody Combo Nonreactive NR^Nonreactive       Hepatitis C Screen Reflex to HCV RNA Quant and Genotype    Collection Time: 02/18/19  7:27 AM   Result Value Ref Range    Hepatitis C Antibody Reactive (AA) NR^Nonreactive   Comprehensive metabolic panel    Collection Time: 02/18/19  7:27 AM   Result Value Ref Range    Sodium 136 133 - 144 mmol/L    Potassium 3.8 3.4 - 5.3 mmol/L    Chloride 104 94 - 109 mmol/L    Carbon Dioxide 21 20 - 32 mmol/L    Anion Gap 11 3 - 14 mmol/L    Glucose 84 70 - 99 mg/dL    Urea Nitrogen 15 7 - 30 mg/dL    Creatinine 0.82 0.66 - 1.25 mg/dL    GFR Estimate >90 >60 mL/min/[1.73_m2]    GFR Estimate If Black >90 >60 mL/min/[1.73_m2]    Calcium 9.4 8.5 - 10.1 mg/dL    Bilirubin Total 0.9 0.2 - 1.3 mg/dL    Albumin 3.8 3.4 - 5.0 g/dL    Protein Total 7.7 6.8 - 8.8 g/dL    Alkaline Phosphatase 74 40 - 150 U/L     (H) 0 - 70 U/L     (H) 0 - 45 U/L   Magnesium    Collection Time: 02/18/19  7:27 AM   Result Value Ref Range    Magnesium 1.5 (L) 1.6 - 2.3 mg/dL   Phosphorus    Collection Time: 02/18/19  7:27 AM   Result Value Ref Range    Phosphorus 2.6 2.5 - 4.5 mg/dL

## 2019-02-21 LAB
HCV AB SERPL QL IA: REACTIVE
HCV AB SERPL QL IA: REACTIVE
HCV RNA SERPL NAA+PROBE-ACNC: ABNORMAL [IU]/ML
HCV RNA SERPL NAA+PROBE-LOG IU: 6.1 LOG IU/ML

## 2019-02-21 NOTE — PROGRESS NOTES
S:  Pt called and asked for the results of his quantitative RNA results from his blood draw for Hep C.  The results have not been posted as of yet.  R:  Advised pt he can call the unit up to four days post discharge and a nurse can look it up.  After that he would need to contact his primary care physician.

## 2019-02-22 ENCOUNTER — TELEPHONE (OUTPATIENT)
Dept: BEHAVIORAL HEALTH | Facility: CLINIC | Age: 54
End: 2019-02-22

## 2019-02-22 ENCOUNTER — NURSE TRIAGE (OUTPATIENT)
Dept: NURSING | Facility: CLINIC | Age: 54
End: 2019-02-22

## 2019-02-22 NOTE — TELEPHONE ENCOUNTER
Andrea's level of frustration is at the top.He and I were on the phone for 35 minutes with him asking questions and me researching to find answers.    He was told he has or has had hepatitis C    He's done his own research and wants to know what his numbers are for Hep C RNA Quantitative.      At the time of my note, that test is still in process.    I will check back throughout the day today to see if final results are posted.     Andrea would like a call back if/when the result is in.    Routed: Dr Andrea Escoto [id number] I'm unsure of a pool number so am routing directly to provider.  Stacia GLOVER RN [facility]     Close encounter once addressed

## 2019-02-22 NOTE — TELEPHONE ENCOUNTER
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 He can't get to the detox clinic to have other labs drawn. The MD said they could add on to what was drawn to get a \"viral load\" done.. I connected the patient, after giving the phone number to the [facility]. Dr Escoto may practice with them or they may have phone numbers to Detox so they can follow through with this.  Dayna Dugan RN-[facility]    "

## 2019-02-22 NOTE — TELEPHONE ENCOUNTER
Andrea's level of frustration is at the top.He and I were on the phone for 35 minutes with him asking questions and me researching to find answers.    He was told he has or has had hepatitis C    He's done his own research and wants to know what his numbers are for Hep C RNA Quantitative.      At the time of my note, that test is still in process. The results are reported within 4 days.    Hepatitis C RNA, Quantitation      Sunquest Code: HCQNT     Epic Code: [id number] Epic Name: Hepatitis C RNA Quantitative   Synonyms: HCV Viral Load; Hep C; HEPC; HCQNT   Methodology: Real-time polymerase chain reaction (PCR)   Turnaround Time: Performed twice weekly; results are reported within 4 days.   Associated Links: Hep C Screening with Reflex Cascade Algorithm         I will check back throughout the day today to see if final results are posted.     Andrea would like a call back if/when the result is in.    Routed: Dr Andrea Escoto [id number] I'm unsure of a pool number so am routing directly to provider.  Stacia GLOVER RN [facility]     Close encounter once addressed

## 2019-02-25 LAB
HCV GENTYP SERPL NAA+PROBE: NORMAL
HCV RNA SERPL NAA+PROBE-ACNC: ABNORMAL [IU]/ML
HCV RNA SERPL NAA+PROBE-LOG IU: 6.1 LOG IU/ML

## 2019-03-11 ENCOUNTER — HOSPITAL ENCOUNTER (EMERGENCY)
Facility: CLINIC | Age: 54
Discharge: HOME | End: 2019-03-12
Attending: EMERGENCY MEDICINE | Admitting: EMERGENCY MEDICINE
Payer: MEDICARE

## 2019-03-11 DIAGNOSIS — F10.920 ALCOHOLIC INTOXICATION WITHOUT COMPLICATION (H): ICD-10-CM

## 2019-03-11 DIAGNOSIS — R46.89 AGGRESSIVE BEHAVIOR: ICD-10-CM

## 2019-03-11 DIAGNOSIS — S00.531A CONTUSION OF LIP, INITIAL ENCOUNTER: ICD-10-CM

## 2019-03-11 PROCEDURE — 25000125 ZZHC RX 250

## 2019-03-11 PROCEDURE — 82075 ASSAY OF BREATH ETHANOL: CPT

## 2019-03-11 PROCEDURE — 99285 EMERGENCY DEPT VISIT HI MDM: CPT | Mod: 25

## 2019-03-11 PROCEDURE — 96372 THER/PROPH/DIAG INJ SC/IM: CPT

## 2019-03-11 PROCEDURE — 25800030 ZZH RX IP 258 OP 636: Performed by: EMERGENCY MEDICINE

## 2019-03-11 PROCEDURE — 25000128 H RX IP 250 OP 636: Performed by: EMERGENCY MEDICINE

## 2019-03-11 RX ORDER — PIPERACILLIN SODIUM, TAZOBACTAM SODIUM 4; .5 G/20ML; G/20ML
4.5 INJECTION, POWDER, LYOPHILIZED, FOR SOLUTION INTRAVENOUS ONCE
Status: DISCONTINUED | OUTPATIENT
Start: 2019-03-11 | End: 2019-03-11

## 2019-03-11 RX ORDER — SODIUM CHLORIDE, SODIUM LACTATE, POTASSIUM CHLORIDE, CALCIUM CHLORIDE 600; 310; 30; 20 MG/100ML; MG/100ML; MG/100ML; MG/100ML
INJECTION, SOLUTION INTRAVENOUS CONTINUOUS
Status: DISCONTINUED | OUTPATIENT
Start: 2019-03-11 | End: 2019-03-11

## 2019-03-11 RX ORDER — WATER 10 ML/10ML
INJECTION INTRAMUSCULAR; INTRAVENOUS; SUBCUTANEOUS
Status: COMPLETED
Start: 2019-03-11 | End: 2019-03-11

## 2019-03-11 RX ORDER — OLANZAPINE 10 MG/2ML
10 INJECTION, POWDER, FOR SOLUTION INTRAMUSCULAR DAILY PRN
Status: DISCONTINUED | OUTPATIENT
Start: 2019-03-11 | End: 2019-03-12 | Stop reason: HOSPADM

## 2019-03-11 RX ADMIN — OLANZAPINE 10 MG: 10 INJECTION, POWDER, FOR SOLUTION INTRAMUSCULAR at 23:06

## 2019-03-11 RX ADMIN — WATER 10 ML: 1 INJECTION INTRAMUSCULAR; INTRAVENOUS; SUBCUTANEOUS at 23:12

## 2019-03-11 ASSESSMENT — ENCOUNTER SYMPTOMS
WOUND: 1
AGITATION: 1

## 2019-03-11 NOTE — ED AVS SNAPSHOT
Emergency Department  [address] [facility] [address]  Phone:  [phone]  Fax:  [phone]                                    Andrea Collado   MRN: [id number]    Department:   Emergency Department   Date of Visit:  3/11/2019           After Visit Summary Signature Page    I have received my discharge instructions, and my questions have been answered. I have discussed any challenges I see with this plan with the nurse or doctor.    ..........................................................................................................................................  Patient/Patient Representative Signature      ..........................................................................................................................................  Patient Representative Print Name and Relationship to Patient    ..................................................               ................................................  Date                                   Time    ..........................................................................................................................................  Reviewed by Signature/Title    ...................................................              ..............................................  Date                                               Time          22EPIC Rev 08/18

## 2019-03-12 VITALS
RESPIRATION RATE: 18 BRPM | HEART RATE: 78 BPM | TEMPERATURE: 97.8 F | BODY MASS INDEX: 31.38 KG/M2 | SYSTOLIC BLOOD PRESSURE: 130 MMHG | DIASTOLIC BLOOD PRESSURE: 74 MMHG | HEIGHT: 71 IN | OXYGEN SATURATION: 100 %

## 2019-03-12 PROCEDURE — 25000132 ZZH RX MED GY IP 250 OP 250 PS 637: Mod: GY | Performed by: EMERGENCY MEDICINE

## 2019-03-12 PROCEDURE — A9270 NON-COVERED ITEM OR SERVICE: HCPCS | Mod: GY | Performed by: EMERGENCY MEDICINE

## 2019-03-12 RX ORDER — OLANZAPINE 10 MG/2ML
10 INJECTION, POWDER, FOR SOLUTION INTRAMUSCULAR
Status: DISCONTINUED | OUTPATIENT
Start: 2019-03-12 | End: 2019-03-12 | Stop reason: HOSPADM

## 2019-03-12 RX ORDER — FOLIC ACID 1 MG/1
1 TABLET ORAL ONCE
Status: COMPLETED | OUTPATIENT
Start: 2019-03-12 | End: 2019-03-12

## 2019-03-12 RX ORDER — LORAZEPAM 1 MG/1
1 TABLET ORAL EVERY 8 HOURS PRN
Status: DISCONTINUED | OUTPATIENT
Start: 2019-03-12 | End: 2019-03-12 | Stop reason: HOSPADM

## 2019-03-12 RX ORDER — OLANZAPINE 10 MG/1
10 TABLET, ORALLY DISINTEGRATING ORAL
Status: DISCONTINUED | OUTPATIENT
Start: 2019-03-12 | End: 2019-03-12 | Stop reason: HOSPADM

## 2019-03-12 RX ORDER — MAGNESIUM OXIDE 400 MG/1
800 TABLET ORAL ONCE
Status: COMPLETED | OUTPATIENT
Start: 2019-03-12 | End: 2019-03-12

## 2019-03-12 RX ORDER — MULTIVITAMIN,THERAPEUTIC
1 TABLET ORAL ONCE
Status: COMPLETED | OUTPATIENT
Start: 2019-03-12 | End: 2019-03-12

## 2019-03-12 RX ORDER — LANOLIN ALCOHOL/MO/W.PET/CERES
100 CREAM (GRAM) TOPICAL ONCE
Status: COMPLETED | OUTPATIENT
Start: 2019-03-12 | End: 2019-03-12

## 2019-03-12 RX ADMIN — FOLIC ACID 1 MG: 1 TABLET ORAL at 08:09

## 2019-03-12 RX ADMIN — Medication 100 MG: at 08:09

## 2019-03-12 RX ADMIN — MAGNESIUM OXIDE TAB 400 MG (241.3 MG ELEMENTAL MG) 800 MG: 400 (241.3 MG) TAB at 08:09

## 2019-03-12 RX ADMIN — THERA TABS 1 TABLET: TAB at 08:09

## 2019-03-12 NOTE — DISCHARGE INSTRUCTIONS
"    Alcohol Intoxication  Alcohol intoxication occurs when you drink alcohol faster than your liver can remove it from your system. The following facts are important to remember:    It can take 10 minutes or more to start to feel the effects of a drink, so you can easily get more intoxicated than you intended.    One drink may be more than 1 serving of alcohol. Depending on the drink, it can be 2 to 4 servings.    It takes about an hour for your body to metabolize (clear) 1 serving. If you have more than 1 drink, it can take a couple of hours or more.    Many things affect how drinks will affect you, including whether you ve eaten, how fast you drink, your size, how much you normally drink (or not), medicines you take, chronic diseases you have, and gender.  Signs and symptoms of alcohol poisoning  The following are signs and symptoms of alcohol poisoning:  Mild impairment    Reduced inhibitions    Slurred speech    Drowsiness    Decreased fine motor skills  Moderate impairment    Erratic behavior, aggression, depression    Impaired judgment    Confusion    Concentration difficulties    Coordination problems  Severe impairment    Vomiting    Seizures    Unconsciousness    Cold, clammy    Slow or irregular breathing    Hypothermia (low body temperature)    Coma  Health effects  Alcohol abuse causes health problems. Sometimes this can happen after only drinking a  little.\" There is no set number of drinks or amount of alcohol that defines too much. The more you drink at one time, and the more frequently you drink determine both the short-term and long-term health effects. It affects all parts of your body and your health, including your:    Brain. Alcohol is a central nervous system depressant. It can damage parts of the brain that affect your balance, memory, thinking, and emotions. It can cause memory loss, blackouts, depression, agitation, sleep cycle changes, and seizures. These changes may or may not be " reversible.    Heart and vascular system. Alcohol affects multiple areas. It can damage heart muscle causing cardiomyopathy, which is a weakening and stretching of the heart muscle. This can lead to trouble breathing, an irregular heartbeat, atrial fibrillation, leg swelling, and heart failure. It makes the blood vessels stiffen causing hypertension (high blood pressure). All of these problems increase your risk of having heart attacks or strokes.    Liver. Alcohol causes fat to build up in the liver, affecting its normal function. This increases the risk for hepatitis, leading to abdominal pain, appetite loss, jaundice, bleeding problems, liver fibrosis, and cirrhosis. This in turn can affect your ability to fight off infections, and can cause diabetes. The liver changes prevent it from removing toxins in your blood that can cause encephalopathy. Signs of this are confusion, altered level of consciousness, personality changes, memory loss, seizures, coma, and death.    Pancreas. Alcohol can cause inflammation of the pancreas, or pancreatitis. This can cause pain in your abdomen, fever, and diabetes.    Immune system. Alcohol weakens your immune system in a number of ways. It suppresses your immune system making it harder to fight off infections and colds. You will also have a higher risk of certain infections like pneumonia and tuberculosis.    Cancer risk. Alcohol raises your risk of cancer of the mouth, esophagus, pharynx, larynx, liver, and breast.    Sexual function. Alcohol abuse can also lead to sexual problems.  Alcohol use during pregnancy may cause permanent damage to the growing baby.  Home care  The following guidelines will help you care for yourself at home:    Don't drink any more alcohol.    Don't drive until all effects of the alcohol have worn off.    Don't operate machinery that can cause injuries.    Get lots of rest over the next few days. Drink plenty of water and other non-alcoholic liquids.  Try to eat regular meals.    If you have been drinking heavily on a daily basis, you may go through alcohol withdrawal. The usual symptoms last 3 to 4 days and may include nervousness, shakiness, nausea, sweating, sleeplessness, and can even cause seizures and a serious withdrawal symptom called delirium tremens, or DTs. During this time, it is best that you stay with family or friends who can help and support you. You can also admit yourself to a residential detox program. If your symptoms are severe (seizures, severe shakiness, confusion), contact your doctor or call an ambulance for help (see below).   Follow-up care  If alcohol is a problem in your life, these are some organizations that can help you:    Alcoholics Anonymous offers support through a self-help fellowship. There are no dues or fees. See the Yellow Pages and call for time and place of meetings. Find AA online at www.aa.org.    [employer] offers support to families of alcohol users. Contact 871-279-6083, or online at www.al-anoleah.org.    National [place] on Alcoholism and Drug Dependence can be reached at 275-195-9809, or online at www.ncadd.org.    There are also inpatient and residential alcohol detox programs. Check the Internet or phonebook Yellow Pages under  D[facility] Abuse [facility] Treatment [facility].   Call 911  Call 911 if any of these occur:    Trouble breathing or slow irregular breathing    Chest pain    Sudden weakness on one side of your body or sudden trouble speaking    Heavy bleeding or vomiting blood    Very drowsy or trouble awakening    Fainting or loss of consciousness    Rapid heart rate    Seizure  When to seek medical advice  Call your healthcare provider right away if any of these occur:    Severe shakiness     Fever of 100.4 F (38 C) or higher, or as directed by your healthcare provider    Confusion or hallucinations (seeing, hearing, or feeling things that are not there)    Pain in your upper abdomen that gets worse    Repeated  vomiting  Date Last Reviewed: 6/1/2016 2000-2018 [employer] [address] [place] [address], [place] [address]. All rights reserved. This information is not intended as a substitute for professional medical care. Always follow your healthcare professional's instructions.          Bruises (Contusions)    A contusion is a bruise. A bruise happens when a blow to your body doesn't break the skin but does break blood vessels beneath the skin. Blood leaking from the broken vessels causes redness and swelling. As it heals, your bruise is likely to turn colors like purple, green, and yellow. This is normal. The bruise should fade in 2 or 3 weeks.  Factors that make you more likely to bruise  Almost everyone bruises now and then. Certain people do bruise more easily than others. You're more prone to bruising as you get older. That's because blood vessels become more fragile with age. You're also more likely to bruise if you have a clotting disorder such as hemophilia or take medicines that reduce clotting, including aspirin and coumadin.  When to go to the emergency room (ER)  Bruises almost always heal on their own without special treatment. But for some people, a bad bruise can be serious. Seek medical care if you:    Have a clotting disorder such as hemophilia    Have cirrhosis or other serious liver disease    Take blood-thinning medicines such as warfarin  What to expect in the ER  A doctor will examine your bruise and ask about any health conditions you have. In some cases, you may have a test to check how well your blood clots. Other treatment will depend on your needs.  Follow-up care  Sometimes a bruise gets worse instead of better. It may become larger and more swollen. This can occur when your body walls off a small pool of blood under the skin (hematoma). In very rare cases, your doctor may need to drain extra blood from the area.  Tip:  Apply an ice pack or bag of frozen peas to a bruise. Keep a thin  cloth between the ice or frozen peas and your skin. The cold can help reduce redness and swelling.   Date Last Reviewed: 12/1/2016 2000-2018 [facility]. [address], [place] [address]. All rights reserved. This information is not intended as a substitute for professional medical care. Always follow your healthcare professional's instructions.            Discharge Instructions  Alcohol Intoxication    You have been seen today with alcohol intoxication. This means that you have enough alcohol in your system to impair your ability to mentally and physically function, perhaps to the extent that you were unable to care for yourself.    Generally, every Emergency Department visit should have a follow-up clinic visit with either a primary or a specialty clinic/provider. Please follow-up as instructed by your emergency provider today.    You may have come to the Emergency Department because of your intoxication, or for another reason, such as because of an injury. No matter what the case is, this visit is a  red flag  regarding alcohol use, and you should consider whether your drinking pattern is a problem for you.     You may be at risk for alcohol-related problems if:      Men: you drink more than 14 drinks per week, or more than 4 drinks per occasion.      Women: you drink more than 7 drinks per week or more than 3 drinks per occasion.      You have black-outs.    You do things you regret while drinking.    You have legal problems because of drinking.    You have job problems because of drinking (you call in sick to work because of drinking).    CAGE Questions    Have you ever felt you should cut down on your drinking?    Have people annoyed you by criticizing your drinking?    Have you ever felt bad or guilty about your drinking?    Have you ever had a drink first thing in the morning to steady your nerves or get rid of a hangover (eye opener)?    If you answer yes to any of the CAGE  questions, you may have a problem with alcohol.      Return to the Emergency Department if:    You become shaky or tremble when you try to stop drinking.     You have severe abdominal pain (belly pain).     You have a seizure or pass out.      You vomit (throw up) blood or have blood in your stool. This may be bright red or it may look like black coffee grounds.    You become lightheaded or faint.      For further help, contact:     Your caregiver.      Alcoholics Anonymous (AA).    o [facility]: [phone]  o [facility] Central Office: [phone]     A drug or alcohol rehabilitation program.      You can get information on alcohol resources and groups by calling the number 211 or 1-160.816.7915 on any phone.     Seek medical care if:    You have persistent vomiting.     You have persistent pain in any part of your body.      You do not feel better after a few days.    If you were given a prescription for medicine here today, be sure to read all of the information (including the package insert) that comes with your prescription.  This will include important information about the medicine, its side effects, and any warnings that you need to know about.  The pharmacist who fills the prescription can provide more information and answer questions you may have about the medicine.  If you have questions or concerns that the pharmacist cannot address, please call or return to the Emergency Department.   Remember that you can always come back to the Emergency Department if you are not able to see your regular doctor in the amount of time listed above, if you get any new symptoms, or if there is anything that worries you.
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
## 2019-03-12 NOTE — PHARMACY-ADMISSION MEDICATION HISTORY
Admission medication history interview status for the 3/11/2019  admission is complete. See EPIC admission navigator for prior to admission medications     Medication history source reliability:Good    Actions taken by pharmacist (provider contacted, etc): spoke to pt     Additional medication history information not noted on PTA med list :None    Medication reconciliation/reorder completed by provider prior to medication history? No    Time spent in this activity: 5 minutes    Prior to Admission medications    Medication Sig Last Dose Taking? Auth Provider   BuPROPion HCl (WELLBUTRIN SR PO) Take 150 mg by mouth 2 times daily  3/11/2019 at am Yes Unknown, Entered By History   multivitamin w/minerals (THERA-VIT-M) tablet Take 1 tablet by mouth daily 3/11/2019 at Unknown time Yes Andrea Escoto MD   testosterone cypionate (DEPOTESTOTERONE) 200 MG/ML injection Inject 200 mg into the muscle every 14 days 2/26/2019 Yes Unknown, Entered By History   Alondra Tamez, PharmD

## 2019-03-12 NOTE — PROGRESS NOTES
Reviewed discharge information with patient, education provided about Alcohol Intake and symptoms.
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
## 2019-03-12 NOTE — ED NOTES
Patient no longer fighting against restraints.  Patient no longer yelling at staff.  Restraints removed.

## 2019-03-12 NOTE — ED NOTES
Patient continues to yell during assessments.  Patient does not follow commands.  Will continue to monitor.

## 2019-04-22 ENCOUNTER — HOSPITAL ENCOUNTER (INPATIENT)
Facility: CLINIC | Age: 54
LOS: 4 days | Discharge: HOME | DRG: 897 | End: 2019-04-27
Attending: EMERGENCY MEDICINE | Admitting: INTERNAL MEDICINE
Payer: MEDICARE

## 2019-04-22 DIAGNOSIS — F15.10 METHAMPHETAMINE ABUSE (H): ICD-10-CM

## 2019-04-22 DIAGNOSIS — F10.10 ALCOHOL ABUSE: ICD-10-CM

## 2019-04-22 DIAGNOSIS — F33.3 SEVERE EPISODE OF RECURRENT MAJOR DEPRESSIVE DISORDER, WITH PSYCHOTIC FEATURES (H): Primary | ICD-10-CM

## 2019-04-22 LAB
ALBUMIN SERPL-MCNC: 3.9 G/DL (ref 3.4–5)
ALCOHOL BREATH TEST: 0.07 (ref 0–0.01)
ALP SERPL-CCNC: 92 U/L (ref 40–150)
ALT SERPL W P-5'-P-CCNC: 234 U/L (ref 0–70)
AMPHETAMINES UR QL SCN: POSITIVE
ANION GAP SERPL CALCULATED.3IONS-SCNC: 11 MMOL/L (ref 3–14)
AST SERPL W P-5'-P-CCNC: 115 U/L (ref 0–45)
BARBITURATES UR QL: NEGATIVE
BASOPHILS # BLD AUTO: 0 10E9/L (ref 0–0.2)
BASOPHILS NFR BLD AUTO: 0.9 %
BENZODIAZ UR QL: POSITIVE
BILIRUB SERPL-MCNC: 1.3 MG/DL (ref 0.2–1.3)
BUN SERPL-MCNC: 20 MG/DL (ref 7–30)
CALCIUM SERPL-MCNC: 8.3 MG/DL (ref 8.5–10.1)
CANNABINOIDS UR QL SCN: NEGATIVE
CHLORIDE SERPL-SCNC: 104 MMOL/L (ref 94–109)
CO2 SERPL-SCNC: 25 MMOL/L (ref 20–32)
COCAINE UR QL: NEGATIVE
CREAT SERPL-MCNC: 0.84 MG/DL (ref 0.66–1.25)
DIFFERENTIAL METHOD BLD: ABNORMAL
EOSINOPHIL # BLD AUTO: 0.1 10E9/L (ref 0–0.7)
EOSINOPHIL NFR BLD AUTO: 3.1 %
ERYTHROCYTE [DISTWIDTH] IN BLOOD BY AUTOMATED COUNT: 13.1 % (ref 10–15)
ETHANOL UR QL SCN: POSITIVE
GFR SERPL CREATININE-BSD FRML MDRD: >90 ML/MIN/{1.73_M2}
GLUCOSE SERPL-MCNC: 79 MG/DL (ref 70–99)
HCT VFR BLD AUTO: 39.2 % (ref 40–53)
HGB BLD-MCNC: 13.4 G/DL (ref 13.3–17.7)
IMM GRANULOCYTES # BLD: 0 10E9/L (ref 0–0.4)
IMM GRANULOCYTES NFR BLD: 0.2 %
LYMPHOCYTES # BLD AUTO: 0.6 10E9/L (ref 0.8–5.3)
LYMPHOCYTES NFR BLD AUTO: 13.4 %
MCH RBC QN AUTO: 32.8 PG (ref 26.5–33)
MCHC RBC AUTO-ENTMCNC: 34.2 G/DL (ref 31.5–36.5)
MCV RBC AUTO: 96 FL (ref 78–100)
MONOCYTES # BLD AUTO: 0.8 10E9/L (ref 0–1.3)
MONOCYTES NFR BLD AUTO: 16.9 %
NEUTROPHILS # BLD AUTO: 2.9 10E9/L (ref 1.6–8.3)
NEUTROPHILS NFR BLD AUTO: 65.5 %
NRBC # BLD AUTO: 0 10*3/UL
NRBC BLD AUTO-RTO: 0 /100
OPIATES UR QL SCN: NEGATIVE
PLATELET # BLD AUTO: 197 10E9/L (ref 150–450)
POTASSIUM SERPL-SCNC: 3.2 MMOL/L (ref 3.4–5.3)
PROT SERPL-MCNC: 7.5 G/DL (ref 6.8–8.8)
RBC # BLD AUTO: 4.09 10E12/L (ref 4.4–5.9)
SODIUM SERPL-SCNC: 140 MMOL/L (ref 133–144)
WBC # BLD AUTO: 4.5 10E9/L (ref 4–11)

## 2019-04-22 PROCEDURE — 99285 EMERGENCY DEPT VISIT HI MDM: CPT | Mod: Z6 | Performed by: EMERGENCY MEDICINE

## 2019-04-22 PROCEDURE — 25000128 H RX IP 250 OP 636: Performed by: EMERGENCY MEDICINE

## 2019-04-22 PROCEDURE — 80307 DRUG TEST PRSMV CHEM ANLYZR: CPT | Performed by: EMERGENCY MEDICINE

## 2019-04-22 PROCEDURE — 85025 COMPLETE CBC W/AUTO DIFF WBC: CPT | Performed by: EMERGENCY MEDICINE

## 2019-04-22 PROCEDURE — 96365 THER/PROPH/DIAG IV INF INIT: CPT | Performed by: EMERGENCY MEDICINE

## 2019-04-22 PROCEDURE — 25800025 ZZH RX 258: Performed by: EMERGENCY MEDICINE

## 2019-04-22 PROCEDURE — 25000125 ZZHC RX 250: Performed by: EMERGENCY MEDICINE

## 2019-04-22 PROCEDURE — 80320 DRUG SCREEN QUANTALCOHOLS: CPT | Performed by: EMERGENCY MEDICINE

## 2019-04-22 PROCEDURE — 80053 COMPREHEN METABOLIC PANEL: CPT | Performed by: EMERGENCY MEDICINE

## 2019-04-22 PROCEDURE — 99285 EMERGENCY DEPT VISIT HI MDM: CPT | Mod: 25 | Performed by: EMERGENCY MEDICINE

## 2019-04-22 RX ORDER — OLANZAPINE 10 MG/1
10 TABLET, ORALLY DISINTEGRATING ORAL ONCE
Status: COMPLETED | OUTPATIENT
Start: 2019-04-22 | End: 2019-04-23

## 2019-04-22 RX ADMIN — FOLIC ACID: 5 INJECTION, SOLUTION INTRAMUSCULAR; INTRAVENOUS; SUBCUTANEOUS at 19:52

## 2019-04-22 ASSESSMENT — ENCOUNTER SYMPTOMS
HALLUCINATIONS: 1
AGITATION: 1
SLEEP DISTURBANCE: 1

## 2019-04-22 NOTE — ED NOTES
Bed: HW03  Expected date: 4/22/19  Expected time: 6:10 PM  Means of arrival:   Comments:  [id number] 54yo etoh withdrawal / hallucinations

## 2019-04-23 PROBLEM — F10.239 ALCOHOL DEPENDENCE WITH WITHDRAWAL (H): Status: ACTIVE | Noted: 2019-04-23

## 2019-04-23 LAB
ALBUMIN SERPL-MCNC: 3 G/DL (ref 3.4–5)
ALP SERPL-CCNC: 81 U/L (ref 40–150)
ALT SERPL W P-5'-P-CCNC: 193 U/L (ref 0–70)
ANION GAP SERPL CALCULATED.3IONS-SCNC: 8 MMOL/L (ref 3–14)
AST SERPL W P-5'-P-CCNC: 92 U/L (ref 0–45)
BILIRUB SERPL-MCNC: 1 MG/DL (ref 0.2–1.3)
BUN SERPL-MCNC: 13 MG/DL (ref 7–30)
CALCIUM SERPL-MCNC: 8 MG/DL (ref 8.5–10.1)
CHLORIDE SERPL-SCNC: 105 MMOL/L (ref 94–109)
CO2 SERPL-SCNC: 26 MMOL/L (ref 20–32)
CREAT SERPL-MCNC: 0.74 MG/DL (ref 0.66–1.25)
GFR SERPL CREATININE-BSD FRML MDRD: >90 ML/MIN/{1.73_M2}
GLUCOSE SERPL-MCNC: 77 MG/DL (ref 70–99)
MAGNESIUM SERPL-MCNC: 2 MG/DL (ref 1.6–2.3)
PHOSPHATE SERPL-MCNC: 2.8 MG/DL (ref 2.5–4.5)
POTASSIUM SERPL-SCNC: 3.8 MMOL/L (ref 3.4–5.3)
PROT SERPL-MCNC: 6.1 G/DL (ref 6.8–8.8)
SODIUM SERPL-SCNC: 139 MMOL/L (ref 133–144)

## 2019-04-23 PROCEDURE — 25000132 ZZH RX MED GY IP 250 OP 250 PS 637: Performed by: EMERGENCY MEDICINE

## 2019-04-23 PROCEDURE — A9270 NON-COVERED ITEM OR SERVICE: HCPCS | Performed by: HOSPITALIST

## 2019-04-23 PROCEDURE — 25000132 ZZH RX MED GY IP 250 OP 250 PS 637: Performed by: INTERNAL MEDICINE

## 2019-04-23 PROCEDURE — A9270 NON-COVERED ITEM OR SERVICE: HCPCS | Performed by: EMERGENCY MEDICINE

## 2019-04-23 PROCEDURE — 84100 ASSAY OF PHOSPHORUS: CPT | Performed by: INTERNAL MEDICINE

## 2019-04-23 PROCEDURE — 83735 ASSAY OF MAGNESIUM: CPT | Performed by: INTERNAL MEDICINE

## 2019-04-23 PROCEDURE — 25800030 ZZH RX IP 258 OP 636: Performed by: EMERGENCY MEDICINE

## 2019-04-23 PROCEDURE — 99221 1ST HOSP IP/OBS SF/LOW 40: CPT | Performed by: PSYCHIATRY & NEUROLOGY

## 2019-04-23 PROCEDURE — 12000001 ZZH R&B MED SURG/OB UMMC

## 2019-04-23 PROCEDURE — HZ2ZZZZ DETOXIFICATION SERVICES FOR SUBSTANCE ABUSE TREATMENT: ICD-10-PCS | Performed by: INTERNAL MEDICINE

## 2019-04-23 PROCEDURE — A9270 NON-COVERED ITEM OR SERVICE: HCPCS | Performed by: INTERNAL MEDICINE

## 2019-04-23 PROCEDURE — 80053 COMPREHEN METABOLIC PANEL: CPT | Performed by: INTERNAL MEDICINE

## 2019-04-23 PROCEDURE — 96361 HYDRATE IV INFUSION ADD-ON: CPT | Performed by: EMERGENCY MEDICINE

## 2019-04-23 PROCEDURE — 96367 TX/PROPH/DG ADDL SEQ IV INF: CPT | Performed by: EMERGENCY MEDICINE

## 2019-04-23 PROCEDURE — 25000128 H RX IP 250 OP 636: Performed by: EMERGENCY MEDICINE

## 2019-04-23 PROCEDURE — 36415 COLL VENOUS BLD VENIPUNCTURE: CPT | Performed by: INTERNAL MEDICINE

## 2019-04-23 PROCEDURE — 25000132 ZZH RX MED GY IP 250 OP 250 PS 637: Performed by: HOSPITALIST

## 2019-04-23 RX ORDER — ONDANSETRON 2 MG/ML
4 INJECTION INTRAMUSCULAR; INTRAVENOUS EVERY 6 HOURS PRN
Status: DISCONTINUED | OUTPATIENT
Start: 2019-04-23 | End: 2019-04-27 | Stop reason: HOSPADM

## 2019-04-23 RX ORDER — ONDANSETRON 4 MG/1
4 TABLET, ORALLY DISINTEGRATING ORAL EVERY 6 HOURS PRN
Status: DISCONTINUED | OUTPATIENT
Start: 2019-04-23 | End: 2019-04-27 | Stop reason: HOSPADM

## 2019-04-23 RX ORDER — SODIUM CHLORIDE 9 MG/ML
INJECTION, SOLUTION INTRAVENOUS ONCE
Status: COMPLETED | OUTPATIENT
Start: 2019-04-23 | End: 2019-04-23

## 2019-04-23 RX ORDER — POTASSIUM CHLORIDE 750 MG/1
40 TABLET, EXTENDED RELEASE ORAL ONCE
Status: COMPLETED | OUTPATIENT
Start: 2019-04-23 | End: 2019-04-23

## 2019-04-23 RX ORDER — OLANZAPINE 2.5 MG/1
2.5 TABLET, FILM COATED ORAL 2 TIMES DAILY PRN
Status: DISCONTINUED | OUTPATIENT
Start: 2019-04-23 | End: 2019-04-27 | Stop reason: HOSPADM

## 2019-04-23 RX ORDER — DIAZEPAM 5 MG
5-20 TABLET ORAL EVERY 30 MIN PRN
Status: DISCONTINUED | OUTPATIENT
Start: 2019-04-23 | End: 2019-04-25

## 2019-04-23 RX ORDER — POTASSIUM CHLORIDE 7.45 MG/ML
10 INJECTION INTRAVENOUS CONTINUOUS
Status: DISCONTINUED | OUTPATIENT
Start: 2019-04-23 | End: 2019-04-24

## 2019-04-23 RX ORDER — BUPROPION HYDROCHLORIDE 150 MG/1
150 TABLET, EXTENDED RELEASE ORAL 2 TIMES DAILY
Status: DISCONTINUED | OUTPATIENT
Start: 2019-04-23 | End: 2019-04-23

## 2019-04-23 RX ORDER — POTASSIUM CHLORIDE 1.5 G/1.58G
40 POWDER, FOR SOLUTION ORAL ONCE
Status: DISCONTINUED | OUTPATIENT
Start: 2019-04-23 | End: 2019-04-23

## 2019-04-23 RX ORDER — DIAZEPAM 5 MG
5-20 TABLET ORAL EVERY 30 MIN PRN
Status: DISCONTINUED | OUTPATIENT
Start: 2019-04-23 | End: 2019-04-24

## 2019-04-23 RX ORDER — LANOLIN ALCOHOL/MO/W.PET/CERES
100 CREAM (GRAM) TOPICAL DAILY
Status: DISCONTINUED | OUTPATIENT
Start: 2019-04-23 | End: 2019-04-27 | Stop reason: HOSPADM

## 2019-04-23 RX ORDER — NALOXONE HYDROCHLORIDE 0.4 MG/ML
.1-.4 INJECTION, SOLUTION INTRAMUSCULAR; INTRAVENOUS; SUBCUTANEOUS
Status: DISCONTINUED | OUTPATIENT
Start: 2019-04-23 | End: 2019-04-27 | Stop reason: HOSPADM

## 2019-04-23 RX ORDER — CALCIUM CARBONATE 500 MG/1
1000 TABLET, CHEWABLE ORAL 4 TIMES DAILY PRN
Status: DISCONTINUED | OUTPATIENT
Start: 2019-04-23 | End: 2019-04-27 | Stop reason: HOSPADM

## 2019-04-23 RX ADMIN — POTASSIUM CHLORIDE 10 MEQ: 10 INJECTION, SOLUTION INTRAVENOUS at 05:47

## 2019-04-23 RX ADMIN — SODIUM CHLORIDE: 9 INJECTION, SOLUTION INTRAVENOUS at 05:46

## 2019-04-23 RX ADMIN — SODIUM CHLORIDE: 9 INJECTION, SOLUTION INTRAVENOUS at 03:56

## 2019-04-23 RX ADMIN — DIAZEPAM 10 MG: 5 TABLET ORAL at 20:51

## 2019-04-23 RX ADMIN — DIAZEPAM 10 MG: 5 TABLET ORAL at 17:11

## 2019-04-23 RX ADMIN — DIAZEPAM 20 MG: 5 TABLET ORAL at 03:10

## 2019-04-23 RX ADMIN — DIAZEPAM 10 MG: 5 TABLET ORAL at 12:21

## 2019-04-23 RX ADMIN — POTASSIUM CHLORIDE 40 MEQ: 750 TABLET, EXTENDED RELEASE ORAL at 08:52

## 2019-04-23 RX ADMIN — DIAZEPAM 10 MG: 5 TABLET ORAL at 08:52

## 2019-04-23 RX ADMIN — DIAZEPAM 10 MG: 5 TABLET ORAL at 07:11

## 2019-04-23 RX ADMIN — DIAZEPAM 5 MG: 5 TABLET ORAL at 10:10

## 2019-04-23 RX ADMIN — DIAZEPAM 10 MG: 5 TABLET ORAL at 14:41

## 2019-04-23 RX ADMIN — THIAMINE HCL TAB 100 MG 100 MG: 100 TAB at 08:52

## 2019-04-23 RX ADMIN — NICOTINE POLACRILEX 2 MG: 2 GUM, CHEWING BUCCAL at 22:13

## 2019-04-23 RX ADMIN — OLANZAPINE 10 MG: 10 TABLET, ORALLY DISINTEGRATING ORAL at 01:48

## 2019-04-23 RX ADMIN — DIAZEPAM 10 MG: 5 TABLET ORAL at 01:02

## 2019-04-23 ASSESSMENT — ACTIVITIES OF DAILY LIVING (ADL)
TRANSFERRING: 0-->INDEPENDENT
DRESS: 0-->INDEPENDENT
COGNITION: 0 - NO COGNITION ISSUES REPORTED
AMBULATION: 0-->INDEPENDENT
RETIRED_EATING: 0-->INDEPENDENT
TOILETING: 0-->INDEPENDENT
ADLS_ACUITY_SCORE: 16
RETIRED_COMMUNICATION: 0-->UNDERSTANDS/COMMUNICATES WITHOUT DIFFICULTY
FALL_HISTORY_WITHIN_LAST_SIX_MONTHS: YES
BATHING: 0-->INDEPENDENT
SWALLOWING: 0-->SWALLOWS FOODS/LIQUIDS WITHOUT DIFFICULTY
ADLS_ACUITY_SCORE: 16

## 2019-04-23 ASSESSMENT — MIFFLIN-ST. JEOR: SCORE: 1762.51

## 2019-04-23 NOTE — PLAN OF CARE
VS:   /55 (BP Location: Right arm)   Pulse 93   Temp 97.4  F (36.3  C) (Oral)   Resp 16   Ht 1.829 m (6')   Wt 88.5 kg (195 lb)   SpO2 97%   BMI 26.45 kg/m       Output:   WDL    Lungs LS clear equal bilaterally on RA   Activity:   Independent. Up w/ SBA    Skin: WDL   Pain:   denies   Neuro/CMS:   A & O x3. Some confusion to time/date   Dressing(s):   none   Diet:   Regular diet w/ boosts   LDA:   PIV L forearm SL   Equipment:   none   Plan:   Continue current POC. Pt. Able to make needs known.    Additional Info:   MSSA scoring last score 16 Valium given at 1440.

## 2019-04-23 NOTE — CONSULTS
Consult Date:  04/23/2019      INITIAL PSYCHIATRIC CONSULT      REASON FOR CONSULTATION:  Chronic depression.      REQUESTING PHYSICIAN:  Dr. Rm Barbour.      IDENTIFYING INFORMATION:  The patient is a 53-year-old  male.  He is presently unemployed on disability living in a hotel.  He has a therapist that he sees by the name of Dr. Rahman.      HISTORY OF PRESENT ILLNESS:  The patient came to the emergency room when he called 911 from his hotel.  Apparently, he was here in detox in February and his plan was to do outpatient treatment.  He did not follow through and relapsed back to using alcohol and meth.  He called 911.  He was drinking alcohol and he was having hallucinations.  He says that he sees the law [employer] putting cameras in the head of his skin.  He also says he saw  pull a gun in the laundry room of the hotel and he did meth 3 days ago.      The patient's drugs of choice include alcohol and meth.  He has tolerance to alcohol, withdrawal, progressive use, spent more time, more amount, used despite having negative consequences, impacting his family, money, relationships.  He is also using meth since last year.  Apparently he gives a very distorted story about his use of meth.  Apparently has been taking Adderall for ADHD and he says that his brother was previously a  and he had reported that the patient was abusing his Adderall and this was stopped.  Since he was not using Adderall a friend suggested that he try methamphetamine and methine and that got him into methamphetamine.  He has been using methamphetamine.  He rates it.  He has progressive use with loss of control, use despite negative consequences, family, money, relationships.  He does not use any street drugs, he does not torres.  He has a previous issue opiate addiction, but she does not have any at this time.  He is presently in alcohol withdrawal and he sees spiders and whines and he feels like things are moving  and he feels like  are working out.      He has numerous stressors.  He says that he moved here to take care of his ailing father and his father passed away and there are property settlement.  He feels like his siblings have not included him and are trying to sell his father's house.  He feels he is lonely.  His mother is in a nursing home.  Apparently, believes his siblings changed her name.  He also lives in a hotel and he says this is depressing for him because he cannot date.  He also has hepatitis C.  He says that he barbie poorly using substances.  He says he feels hopeless.  He feels nothing can change.  He isolates.  He feels like there is no light at the end of the tunnel.  He has lack of interest in working out.  His energy, motivation is down.  He does not have any active suicidal ideation, plan or intent.      PAST PSYCHIATRIC HISTORY:  Psychiatrically hospitalized numerous times.  He has been in 2 chemical dependency treatments.      FAMILY HISTORY:  No known family psychiatric or chemical dependency issues.      SOCIAL HISTORY:  Born and raised in [place].  Parents are .  Father is .  He has a degree in management in [employer] and 3 master's degrees in psychology, business administration and  as well as a PhD of Psychology, as per records, this not be verified.  He also reported that he worked as a  for several years until he was forced to retire but this is also not confirmed.  He is  and has 3 children.        PAST MEDICAL HISTORY:  Please review the detailed physical examination and review of systems done on this patient by Dr. Pawel Carroll on 2019.  The patient's vitals are as below.      VITAL SIGNS:  Temperature of 97.4, pulse of 104, respiratory rate of 16, blood pressure 110/55.      MENTAL STATUS EXAMINATION:  The patient is a 53-year-old  male lying in bed.  He has IV fluids running through him.  He is disheveled with  poor grooming, poor hygiene, cooperative.  His mood is sad and hopeless.  Affect is congruent.  Speech is spontaneous, a normal in rate and volume.  There is logical in thinking, no loose association.  Judgment is limited.  Thought process is tangential and circumstantial.  No loose association.  Insight and judgment are partial.  Alert, oriented x3.  Recent and remote memory, language, fund of knowledge are all adequate.  The patient does not have any active suicidal or homicidal ideation, plan or intent.      DIAGNOSES:   1.  Alcohol use disorder, severe.   2.  Methamphetamine use disorder, severe.   3.  Adjustment disorder with depressed mood.   4.  Rule out major depressive disorder.      RECOMMENDATIONS:   1.  Medical stabilization as per Internal Medicine.   2.  Detox off alcohol using MSSA protocol and Valium.   3.  Wellbutrin will be held until detox is completed.   4.  The patient is willing to do CD treatment.  If the patient's mentation decompensates and patient becomes suicidal please consult Psychiatry again.      Thank you for this interesting consult.  Consult was given by Dr. Barbour.         POLLO MICHAEL MD             D: 2019   T: 2019   MT: [id number]      Name:     ARLEN MARX   MRN:      [id number]        Account:       [id number]   :      1965           Consult Date:  2019      Document: [id number]       cc: Rm Barbour MD

## 2019-04-23 NOTE — DISCHARGE INSTRUCTIONS
There are no detox beds available at this time at [facility]  You may call the following numbers tomorrow to check on bed availability:    [facility]    [phone] [address] [facility]         [phone]  [facility]        [phone]    Get a Rule 25 evaluation and get into chemical dependency treatment

## 2019-04-23 NOTE — PROGRESS NOTES
SW attempted to meet w/pt, He did not wake to writer's voice, knock or presence. SW attempted twice to meet w/pt and will return when pt is awake.

## 2019-04-23 NOTE — ED NOTES
Attempted to call report. After being on hold prolonged period of time, informed the admitting RN doesn't have phone and on her and can't be located. To call ED back.

## 2019-04-23 NOTE — ED PROVIDER NOTES
Emergency Department Patient Sign-out       Brief HPI:  This is a 53 year old male signed out to me by Dr. Galloway.  See initial ED Provider note for details of the presentation.       Significant Events prior to my assuming care: Patient with alcohol intoxication and methamphetamine use.  Plan to discharge when sober.      Exam:   Patient Vitals for the past 24 hrs:   BP Temp Temp src Pulse Heart Rate Resp SpO2   04/23/19 0306 99/75 98.2  F (36.8  C) Oral 110 -- 18 100 %   04/23/19 0141 -- -- -- -- -- -- 100 %   04/23/19 0140 124/72 98.2  F (36.8  C) Oral 132 -- 18 98 %   04/23/19 0033 -- 97.8  F (36.6  C) -- -- 98 -- 99 %   04/22/19 2100 -- -- -- -- -- -- 97 %   04/22/19 2000 -- -- -- -- -- -- 96 %   04/22/19 1900 -- -- -- -- -- -- 99 %   04/22/19 1824 114/87 97.8  F (36.6  C) Oral 94 -- 20 96 %           ED RESULTS:   Results for orders placed or performed during the hospital encounter of 04/22/19 (from the past 24 hour(s))   Alcohol breath test POCT     Status: Abnormal    Collection Time: 04/22/19  6:39 PM   Result Value Ref Range    Alcohol Breath Test 0.068 (A) 0.00 - 0.01   CBC with platelets differential     Status: Abnormal    Collection Time: 04/22/19  7:45 PM   Result Value Ref Range    WBC 4.5 4.0 - 11.0 10e9/L    RBC Count 4.09 (L) 4.4 - 5.9 10e12/L    Hemoglobin 13.4 13.3 - 17.7 g/dL    Hematocrit 39.2 (L) 40.0 - 53.0 %    MCV 96 78 - 100 fl    MCH 32.8 26.5 - 33.0 pg    MCHC 34.2 31.5 - 36.5 g/dL    RDW 13.1 10.0 - 15.0 %    Platelet Count 197 150 - 450 10e9/L    Diff Method Automated Method     % Neutrophils 65.5 %    % Lymphocytes 13.4 %    % Monocytes 16.9 %    % Eosinophils 3.1 %    % Basophils 0.9 %    % Immature Granulocytes 0.2 %    Nucleated RBCs 0 0 /100    Absolute Neutrophil 2.9 1.6 - 8.3 10e9/L    Absolute Lymphocytes 0.6 (L) 0.8 - 5.3 10e9/L    Absolute Monocytes 0.8 0.0 - 1.3 10e9/L    Absolute Eosinophils 0.1 0.0 - 0.7 10e9/L    Absolute Basophils 0.0 0.0 - 0.2 10e9/L    Abs  Immature Granulocytes 0.0 0 - 0.4 10e9/L    Absolute Nucleated RBC 0.0    Comprehensive metabolic panel     Status: Abnormal    Collection Time: 04/22/19  7:45 PM   Result Value Ref Range    Sodium 140 133 - 144 mmol/L    Potassium 3.2 (L) 3.4 - 5.3 mmol/L    Chloride 104 94 - 109 mmol/L    Carbon Dioxide 25 20 - 32 mmol/L    Anion Gap 11 3 - 14 mmol/L    Glucose 79 70 - 99 mg/dL    Urea Nitrogen 20 7 - 30 mg/dL    Creatinine 0.84 0.66 - 1.25 mg/dL    GFR Estimate >90 >60 mL/min/[1.73_m2]    GFR Estimate If Black >90 >60 mL/min/[1.73_m2]    Calcium 8.3 (L) 8.5 - 10.1 mg/dL    Bilirubin Total 1.3 0.2 - 1.3 mg/dL    Albumin 3.9 3.4 - 5.0 g/dL    Protein Total 7.5 6.8 - 8.8 g/dL    Alkaline Phosphatase 92 40 - 150 U/L     (H) 0 - 70 U/L     (H) 0 - 45 U/L   Drug abuse screen 6 urine (chem dep)     Status: Abnormal    Collection Time: 04/22/19 10:28 PM   Result Value Ref Range    Amphetamine Qual Urine Positive (A) NEG^Negative    Barbiturates Qual Urine Negative NEG^Negative    Benzodiazepine Qual Urine Positive (A) NEG^Negative    Cannabinoids Qual Urine Negative NEG^Negative    Cocaine Qual Urine Negative NEG^Negative    Ethanol Qual Urine Positive (A) NEG^Negative    Opiates Qualitative Urine Negative NEG^Negative       ED MEDICATIONS:   Medications   diazepam (VALIUM) tablet 5-20 mg (20 mg Oral Given 4/23/19 0310)   potassium chloride (KLOR-CON) Packet 40 mEq (has no administration in time range)   dextrose 5% and 0.45% NaCl 1,000 mL with INFUVITE ADULT 10 mL, thiamine 100 mg, folic acid 1 mg infusion ( Intravenous Stopped 4/22/19 2057)   OLANZapine zydis (zyPREXA) ODT tab 10 mg (10 mg Oral Given 4/23/19 0148)         Impression:    ICD-10-CM    1. Methamphetamine abuse (H) F15.10 Drug abuse screen 6 urine (chem dep)   2. Alcohol abuse F10.10        Plan:    Patient started having auditory hallucinations in addition to tremor.  He does have mild tachycardia and does need oral Valium.  There is now a  bed available through detox and the patient would like to be admitted.  I do feel this is reasonable and will try to admit him if there were no bad.  Patient agrees with stay.  He was given 20 mg oral total of Valium at this point..        Gatito Vázquez MD  04/23/19 4180    Addendum: Patient does be coming more agitated and needing redirection.  His heart rate did improve after Valium however he is gone significant amount of Valium at this point and I do not feel that detox bed is appropriate so the patient will be admitted for further care.  [facility] [place]       Gatito Wade MD  04/23/19 0678

## 2019-04-23 NOTE — H&P
Internal Medicine Admission Note    Reason for admission: EtOH withdrawal    History of Present Illness:    Mr. Collado is a 52 y/o male with history of EtOH abuse, methamphetamine use, hepatitis C, with prior ER visits and Detox admissions who presented to the ER today intoxicated with agitation, auditory and visual hallucinations secondary to EtOH and self-reported methamphetamine use.  In the ER he was hoping to be admitted to Detox and endorsed homelessness and family conflict.      In the ER he was given valium and stabilized.  He was noted to be tachycardiac and ultimately the decision to admit him to a monitor inpatient unit was made.  I visited with the patient in the ER prior to transfer to the floor.  During my visit he was very sleepy (after having received valium) and did not provide any additional history.    ROS:  Unable to obtain as patient is sleeping (he is able to have arousal)    Past Medical History:   Diagnosis Date     Chemical dependency (H)      Cocaine abuse (H)      Depressive disorder      DTs (delirium tremens) (H)      DVT (deep venous thrombosis) (H) 5 y ago    left foot, treated with coumadin     Flail chest     broken sterum, wiring      History of total hip arthroplasty     right     Hypertension      Seizures (H)      Substance abuse (H)     alcohol, opiates     Family History   Problem Relation Age of Onset     Substance Abuse Mother      Substance Abuse Father      Substance Abuse Sister      /69   Pulse 85   Temp 97.6  F (36.4  C) (Oral)   Resp 16   SpO2 97%     Exam:  Constitutional: healthy, no distress and sleeping, smells of urine  Head: Normocephalic. No masses, lesions, tenderness or abnormalities  Neck: Neck supple. No adenopathy. Thyroid symmetric, normal size,  Cardiovascular: negative, PMI normal. No lifts, heaves, or thrills. tachycardic. No murmurs, clicks gallops or rub  Respiratory: negative, Good diaphragmatic excursion. Lungs clear  Gastrointestinal:  negative, Abdomen soft, non-tender. BS normal. No masses, organomegaly  : Deferred  Musculoskeletal: extremities normal- no gross deformities noted and normal muscle tone  Skin: no suspicious lesions or rashes  Neurologic: Tongue fasciculations reported in ER, patient would not open his mouth  Psychiatric: asleep but arouses with some difficulty  Hematologic/Lymphatic/Immunologic: Normal cervical lymph nodes  Negative    Assessment and Plan:    #1 EtOH withdrawal  #2 Methamphetamine use  #3 Hallucinations  #4Hepatitis C  #5 Multiple psychosocial stressors  --Admit for detox and treatment of withdrawal  --MSSA withdrawal protocol ordered  --Restart home bupropion  --Monitored bed due to tachycardia  --monitor daily electrolytes    Code status: Full code - patient nodded his head regarding being Full Code but did not engage in conversation regarding this.

## 2019-04-23 NOTE — ED NOTES
Hallucinations getting progressively worse. Now auditory and visual. MSSA=25 on last check. Dr. Wade updated and into see pt.

## 2019-04-23 NOTE — PROGRESS NOTES
CLINICAL NUTRITION SERVICES - ASSESSMENT NOTE     Nutrition Prescription    RECOMMENDATIONS FOR MDs/PROVIDERS TO ORDER:  None today    Malnutrition Status:    Patient does not meet two of the criteria necessary for diagnosing malnutrition    Recommendations already ordered by Registered Dietitian (RD):  Boost Plus QID (meals and HS snack) + PRN (variety of flavors)    Future/Additional Recommendations:  Adjust supplements pending pt preference      REASON FOR ASSESSMENT  Andrea Collado is a/an 53 year old male assessed by the dietitian for Admission Nutrition Risk Screen for unintentional loss of 10# or more in the past two months    NUTRITION HISTORY  - Pt reports not eating for 2 week PTA. He may have eaten snacks but difficult to obtain history.  - He likes drinking Boost Plus and typically drinks 4 daily. Unclear if he was drinking these just PTA.     CURRENT NUTRITION ORDERS  Diet: Regular, Boost Plus TID  Intake/Tolerance: pt reports he ate all of his breakfast and lunch.     LABS  Labs reviewed    MEDICATIONS  Medications reviewed  - Vitamin B1    ANTHROPOMETRICS  Ht Readings from Last 1 Encounters:   04/23/19 1.829 m (6')   Most Recent Weight: 88.5 kg (195 lb)  IBW: 80.9 kg (109% IBW)  BMI: Overweight BMI 25-29.9  Weight History: pt has lost 12 lbs (6%) over the last ~3 months. Pt reports UBW of 200-205 lbs. Pt reports losing 25/30 lbs over the last 2 weeks but this is not documented.   Wt Readings from Last 10 Encounters:   04/23/19 88.5 kg (195 lb)   02/01/19 93.8 kg (206 lb 12.7 oz)   12/21/18 93.4 kg (205 lb 14.6 oz)   04/04/18 102.1 kg (225 lb)   01/10/15 95.3 kg (210 lb)     Dosing Weight: 89 kg - current wt    ASSESSED NUTRITION NEEDS  Estimated Energy Needs: 2462-5819 kcals/day (20 - 25 kcals/kg)  Justification: Maintenance and Overweight  Estimated Protein Needs: 71-89 grams protein/day (0.8 - 1 grams of pro/kg)  Justification: Maintenance  Estimated Fluid Needs: 1 mL/kcal  Justification: Per  provider pending fluid status    PHYSICAL FINDINGS  See malnutrition section below.    MALNUTRITION  % Intake: Decreased intake does not meet criteria  % Weight Loss: Up to 7.5% in 3 months (non-severe)  Subcutaneous Fat Loss: None observed  Muscle Loss: None observed  Fluid Accumulation/Edema: None noted  Malnutrition Diagnosis: Patient does not meet two of the above criteria necessary for diagnosing malnutrition    NUTRITION DIAGNOSIS  No nutrition diagnosis at this time      INTERVENTIONS  Implementation  Discussed nutrition history and PO since admission. Discussed menu ordering and snacks available on the unit. Showed pt the menu and how to call and he was able to order lunch himself. Discussed oral nutrition supplements and he would like Boost Plus QID. He prefers Ensure Enlive but this is not available. Pt feels he is currently eating well and denies any questions or concerns. Has many nutrition books on bed and reports he is studying nutrition. Encouraged adequate PO of food and fluids.     Monitoring/Evaluation  No nutrition follow-up warranted at this time. RD to sign off. Please consult if further needs arise.       Selena Curtis RD, LD  Unit Pager: [phone]

## 2019-04-23 NOTE — ED NOTES
[facility], [place]   ED Nurse to Floor Handoff     Andrea Collado is a 53 year old male who speaks English and lives unknown,  in a home  They arrived in the ED by ambulance from home    ED Chief Complaint: Alcohol Intoxication (hallucinations  last drink today at 1724)    ED Dx;   Final diagnoses:   Methamphetamine abuse (H)   Alcohol abuse         Needed?: No    Allergies: No Known Allergies.  Past Medical Hx:   Past Medical History:   Diagnosis Date     Chemical dependency (H)      Cocaine abuse (H)      Depressive disorder      DTs (delirium tremens) (H)      DVT (deep venous thrombosis) (H) 5 y ago    left foot, treated with coumadin     Flail chest     broken sterum, wiring      History of total hip arthroplasty     right     Hypertension      Seizures (H)      Substance abuse (H)     alcohol, opiates      Baseline Mental status: WDL  Current Mental Status changes: at basesline    Infection present or suspected this encounter: no  Sepsis suspected: No  Isolation type: No active isolations     Activity level - Baseline/Home:  Independent  Activity Level - Current:   Independent    Bariatric equipment needed?: No       In the ED these meds were given:   Medications   diazepam (VALIUM) tablet 5-20 mg (20 mg Oral Given 4/23/19 0310)   potassium chloride (KLOR-CON) Packet 40 mEq (0 mEq Oral Hold 4/23/19 0355)   sodium chloride 0.9% infusion (has no administration in time range)   naloxone (NARCAN) injection 0.1-0.4 mg (has no administration in time range)   melatonin tablet 1 mg (has no administration in time range)   ondansetron (ZOFRAN-ODT) ODT tab 4 mg (has no administration in time range)     Or   ondansetron (ZOFRAN) injection 4 mg (has no administration in time range)   calcium carbonate (TUMS) chewable tablet 1,000 mg (has no administration in time range)   diazepam (VALIUM) tablet 5-20 mg (has no administration in time range)   vitamin B1 (THIAMINE) tablet 100 mg (has  no administration in time range)   dextrose 5% and 0.45% NaCl 1,000 mL with INFUVITE ADULT 10 mL, thiamine 100 mg, folic acid 1 mg infusion ( Intravenous Stopped 4/22/19 2057)   OLANZapine zydis (zyPREXA) ODT tab 10 mg (10 mg Oral Given 4/23/19 0148)   sodium chloride 0.9% infusion ( Intravenous New Bag 4/23/19 0377)       Drips running?  No    Home pump  No           Current LDAs  Peripheral IV 04/22/19 Left;Posterior Upper forearm (Active)   Number of days: 1       Labs results:   Labs Ordered and Resulted from Time of ED Arrival Up to the Time of Departure from the ED   DRUG ABUSE SCREEN 6 CHEM DEP URINE ([facility]) - Abnormal; Notable for the following components:       Result Value    Amphetamine Qual Urine Positive (*)     Benzodiazepine Qual Urine Positive (*)     Ethanol Qual Urine Positive (*)     All other components within normal limits   CBC WITH PLATELETS DIFFERENTIAL - Abnormal; Notable for the following components:    RBC Count 4.09 (*)     Hematocrit 39.2 (*)     Absolute Lymphocytes 0.6 (*)     All other components within normal limits   COMPREHENSIVE METABOLIC PANEL - Abnormal; Notable for the following components:    Potassium 3.2 (*)     Calcium 8.3 (*)      (*)      (*)     All other components within normal limits   ALCOHOL BREATH TEST POCT - Abnormal; Notable for the following components:    Alcohol Breath Test 0.068 (*)     All other components within normal limits   MSSA SCORE AND VS   NOTIFY   IP ASSIGN PROVIDER TEAM TO TREATMENT TEAM   VITAL SIGNS   TELEMETRY MONITORING MED/SURG   PAIN ASSESSMENT   INTAKE AND OUTPUT   NO INDWELLING URINARY CATHETER (ORTIZ) PRESENT OR NEEDED   BLADDER SCAN   APPLY PNEUMATIC COMPRESSION DEVICE (PCD)   MSSA SCORE AND VS   NOTIFY   ALCOHOL BREATH TEST POCT       Imaging Studies: No results found for this or any previous visit (from the past 24 hour(s)).    Recent vital signs:   BP 99/73   Pulse 93   Temp 97.6  F (36.4  C) (Oral)   Resp 16   SpO2  96%             Cardiac Rhythm: Other N/A  Pt needs tele? No  Skin/wound Issues: None    Code Status: Full Code    Pain control: pt had none    Nausea control: pt had none    Abnormal labs/tests/findings requiring intervention: Potassium 3.2, unable to take oral replacement due to drowsy state post valium, will attempt before pt comes up    Family present during ED course? No   Family Comments/Social Situation comments: None    Tasks needing completion: None    Uma Murphy, RN  [phone] [facility]

## 2019-04-23 NOTE — PROGRESS NOTES
Pt arrived on 10A via cart and security staff. Pt was given cell phone. Vital signs, height and weight were obtained. MSSA 14, pt has tremors and reports visual and auditory hallucinations. Tele initiated and is NSR. Pt is cooperative, slightly unsteady. Changed clothes. Oriented about call light and ordering from menu. Call light is in reach cont to assess.

## 2019-04-23 NOTE — ED PROVIDER NOTES
History     Chief Complaint   Patient presents with     Alcohol Intoxication     hallucinations  last drink today at 1724     The history is provided by the patient and medical records.     Andrea Collado is a 53 year old male with a history of alcohol abuse, currently intoxicated.  He also is a history of methamphetamine abuse, currently agitated with poor sleeping and having auditory and visual hallucinations.  He acknowledges his symptoms are related to his meth abuse and says he likes to come back in the hospital to the place he was previously.  He felt that that was very good for him.  He is unwilling to go to any other form of detox.  He acknowledges he needs to stop using the methamphetamine.  He is currently homeless and in conflict with other family members.  He is not suicidal.  He says the meth has made it difficult for him to sleep and this is exacerbating his agitation hallucinations.    This part of the medical record was transcribed by Doris Cloud Medical Scribe, from a dictation done by Andreas Galloway MD.  Past Medical History:   Diagnosis Date     Chemical dependency (H)      Cocaine abuse (H)      Depressive disorder      DTs (delirium tremens) (H)      DVT (deep venous thrombosis) (H) 5 y ago    left foot, treated with coumadin     Flail chest     broken sterum, wiring      History of total hip arthroplasty     right     Hypertension      Seizures (H)      Substance abuse (H)     alcohol, opiates     Social History     Socioeconomic History     Marital status: Single     Spouse name: Not on file     Number of children: Not on file     Years of education: Not on file     Highest education level: Not on file   Occupational History     Not on file   Social Needs     Financial resource strain: Not on file     Food insecurity:     Worry: Not on file     Inability: Not on file     Transportation needs:     Medical: Not on file     Non-medical: Not on file   Tobacco Use     Smoking status: Current  Some Day Smoker     Smokeless tobacco: Current User   Substance and Sexual Activity     Alcohol use: Yes     Alcohol/week: 14.4 oz     Types: 24 Cans of beer per week     Comment: drinks 1.75L grain alcohol daily since 4/28/18     Drug use: Yes     Types: Methamphetamines     Comment: pt has been injecting meth for past 5 days     Sexual activity: Not Currently   Lifestyle     Physical activity:     Days per week: Not on file     Minutes per session: Not on file     Stress: Not on file   Relationships     Social connections:     Talks on phone: Not on file     Gets together: Not on file     Attends Anabaptist service: Not on file     Active member of club or organization: Not on file     Attends meetings of clubs or organizations: Not on file     Relationship status: Not on file     Intimate partner violence:     Fear of current or ex partner: Not on file     Emotionally abused: Not on file     Physically abused: Not on file     Forced sexual activity: Not on file   Other Topics Concern     Parent/sibling w/ CABG, MI or angioplasty before 65F 55M? Not Asked   Social History Narrative    ** Merged History Encounter **            I have reviewed the Medications, Allergies, Past Medical and Surgical History, and Social History in the Epic system.    Review of Systems   Constitutional:        Positive for alcohol intoxication   Psychiatric/Behavioral: Positive for agitation, hallucinations (Auditory and visual) and sleep disturbance. Negative for suicidal ideas.   All other systems reviewed and are negative.      Physical Exam   BP: 114/87  Pulse: 94  Heart Rate: 98  Temp: 97.8  F (36.6  C)  Resp: 20  Height: 182.9 cm (6')  Weight: 88.5 kg (195 lb)  SpO2: 96 %      Physical Exam   Constitutional: He appears distressed.   Disheveled and agitated   HENT:   Head: Atraumatic.   Eyes: Pupils are equal, round, and reactive to light.   Cardiovascular: Normal rate and regular rhythm.   Pulmonary/Chest: Effort normal and breath  sounds normal.   Neurological: He is alert.   Psychiatric: His mood appears anxious. His speech is rapid and/or pressured. He is agitated. Thought content is delusional. He expresses impulsivity and inappropriate judgment.   Nursing note and vitals reviewed.      ED Course        Procedures               Labs Ordered and Resulted from Time of ED Arrival Up to the Time of Departure from the ED   DRUG ABUSE SCREEN 6 CHEM DEP URINE ([facility]) - Abnormal; Notable for the following components:       Result Value    Amphetamine Qual Urine Positive (*)     Benzodiazepine Qual Urine Positive (*)     Ethanol Qual Urine Positive (*)     All other components within normal limits   CBC WITH PLATELETS DIFFERENTIAL - Abnormal; Notable for the following components:    RBC Count 4.09 (*)     Hematocrit 39.2 (*)     Absolute Lymphocytes 0.6 (*)     All other components within normal limits   COMPREHENSIVE METABOLIC PANEL - Abnormal; Notable for the following components:    Potassium 3.2 (*)     Calcium 8.3 (*)      (*)      (*)     All other components within normal limits   ALCOHOL BREATH TEST POCT - Abnormal; Notable for the following components:    Alcohol Breath Test 0.068 (*)     All other components within normal limits            Assessments & Plan (with Medical Decision Making)   Homeless 53-year-old male with history of alcohol and other substance abuse primarily methamphetamine.  Here with decompensation either secondary to alcohol abuse or methamphetamine use.  He is agitated and anxious complaining of auditory and visual hallucinations.  Drug screen confirms alcohol and methamphetamine.  He will be allowed to sleep in the department overnight and be reassessed in the morning with respect to the need for inpatient versus discharge.    I have reviewed the nursing notes.    I have reviewed the findings, diagnosis, plan and need for follow up with the patient.       Medication List      Modified    buPROPion 150 MG  12 hr tablet  Commonly known as:  WELLBUTRIN SR  150 mg, Oral, 2 TIMES DAILY  What changed:  medication strength        Discontinued    multivitamin w/minerals tablet     testosterone cypionate 200 MG/ML injection  Commonly known as:  DEPOTESTOSTERONE            Final diagnoses:   Methamphetamine abuse (H)   Alcohol abuse       4/22/2019   [facility], [place], EMERGENCY DEPARTMENT     Andreas Galloway MD  04/29/19 2206

## 2019-04-24 LAB
ALBUMIN SERPL-MCNC: 3.1 G/DL (ref 3.4–5)
ALP SERPL-CCNC: 89 U/L (ref 40–150)
ALT SERPL W P-5'-P-CCNC: 164 U/L (ref 0–70)
ANION GAP SERPL CALCULATED.3IONS-SCNC: 4 MMOL/L (ref 3–14)
AST SERPL W P-5'-P-CCNC: 67 U/L (ref 0–45)
BILIRUB SERPL-MCNC: 0.5 MG/DL (ref 0.2–1.3)
BUN SERPL-MCNC: 14 MG/DL (ref 7–30)
CALCIUM SERPL-MCNC: 8.5 MG/DL (ref 8.5–10.1)
CHLORIDE SERPL-SCNC: 109 MMOL/L (ref 94–109)
CO2 SERPL-SCNC: 27 MMOL/L (ref 20–32)
CREAT SERPL-MCNC: 0.76 MG/DL (ref 0.66–1.25)
GFR SERPL CREATININE-BSD FRML MDRD: >90 ML/MIN/{1.73_M2}
GLUCOSE SERPL-MCNC: 75 MG/DL (ref 70–99)
POTASSIUM SERPL-SCNC: 3.6 MMOL/L (ref 3.4–5.3)
PROT SERPL-MCNC: 6.3 G/DL (ref 6.8–8.8)
SODIUM SERPL-SCNC: 140 MMOL/L (ref 133–144)

## 2019-04-24 PROCEDURE — 80053 COMPREHEN METABOLIC PANEL: CPT | Performed by: INTERNAL MEDICINE

## 2019-04-24 PROCEDURE — A9270 NON-COVERED ITEM OR SERVICE: HCPCS | Performed by: INTERNAL MEDICINE

## 2019-04-24 PROCEDURE — 36415 COLL VENOUS BLD VENIPUNCTURE: CPT | Performed by: INTERNAL MEDICINE

## 2019-04-24 PROCEDURE — 25000132 ZZH RX MED GY IP 250 OP 250 PS 637: Performed by: INTERNAL MEDICINE

## 2019-04-24 PROCEDURE — 12000001 ZZH R&B MED SURG/OB UMMC

## 2019-04-24 PROCEDURE — 99232 SBSQ HOSP IP/OBS MODERATE 35: CPT | Performed by: INTERNAL MEDICINE

## 2019-04-24 RX ADMIN — DIAZEPAM 10 MG: 5 TABLET ORAL at 01:10

## 2019-04-24 RX ADMIN — DIAZEPAM 10 MG: 5 TABLET ORAL at 05:43

## 2019-04-24 RX ADMIN — THIAMINE HCL TAB 100 MG 100 MG: 100 TAB at 07:58

## 2019-04-24 RX ADMIN — DIAZEPAM 10 MG: 5 TABLET ORAL at 07:58

## 2019-04-24 RX ADMIN — DIAZEPAM 5 MG: 5 TABLET ORAL at 15:53

## 2019-04-24 RX ADMIN — OLANZAPINE 2.5 MG: 2.5 TABLET, FILM COATED ORAL at 14:40

## 2019-04-24 ASSESSMENT — ACTIVITIES OF DAILY LIVING (ADL)
ADLS_ACUITY_SCORE: 16
ADLS_ACUITY_SCORE: 15

## 2019-04-24 NOTE — PLAN OF CARE
VS:   /75 (BP Location: Left arm)   Pulse 102   Temp 97.4  F (36.3  C) (Oral)   Resp 18   Ht 1.829 m (6')   Wt 88.5 kg (195 lb)   SpO2 99%   BMI 26.45 kg/m       Output:   Independent in bathroom.  passing flatus, last BM 4/23/19   Lungs No SOB LS Clear equal bilaterally    Activity:   Independent    Skin: WDL    Pain:   Denies   Neuro/CMS:   Intact A & O x4   Dressing(s):   none   Diet:   Regular plus boost   LDA:   PIV L forearm intact SL    Equipment:   none   Plan:   Continue POC. MSSA score 14 this AM Valium given, reassessed score 7 no interventions.  Zyprexa given for hallucinations.  Continue to monitor. Pt. Anxious.   Additional Info:   Hopes to discontinue to chemical dependency treatment. Pt very stressed about family issues active listening/support provided.

## 2019-04-24 NOTE — CONSULTS
4/24/2019    CD consult acknowledged. Per EMR, patient has Medicare insurance. Patient would need to seek substance use services from a Medicare eligible facility for substance use. Social work can assist with resource and referral for patient.     Nikkie Graves, [facility]  [phone]

## 2019-04-24 NOTE — PROGRESS NOTES
SW attempted to see pt today, but he was not in his room or on unit. SW  to see when pt is on unit

## 2019-04-24 NOTE — PLAN OF CARE
VS: /88 (BP Location: Left arm)   Pulse 100   Temp 97.3  F (36.3  C) (Oral)   Resp 18   Ht 1.829 m (6')   Wt 88.5 kg (195 lb)   SpO2 98%   BMI 26.45 kg/m    A&O x4 at start of shift. Pt more confused at end of shift.   O2: Room air, denies SOB.   Output: Voiding independently in bathroom.   Last BM: 4/23/19   Activity: Independent.   Skin: Intact.   Pain: Denies.   CMS: Intact. Denies N/T.   Dressing: None.   Diet: Regular, tolerating well.   LDA: Left FA PIV SL.   Equipment:    Plan: Continue to monitor.   Additional Info: MSSA 11 & 15. Valium 10mg x2.

## 2019-04-24 NOTE — PLAN OF CARE
VS:    Temp: 97.7  F (36.5  C) Temp src: Oral BP: 124/70 Pulse: 89 Heart Rate: 104 Resp: 18 SpO2: 97 % O2 Device: None (Room air)    Vitals stable.   Lung sounds clear.    Output:    Voiding spontaneously without difficulty. Passing flatus. Pt unsure of date of last BM.    Activity:    Ambulating in room and to bathroom independently.    Skin: Intact.    Pain:    Denies.   Neuro/CMS:    Disoriented to time, alert and otherwise oriented. Denies numbness/tingling.    Dressing(s):    None.   Diet:    Tolerating regular diet with no nausea/vomitting.    LDA:    Left forearm PIV - Saline locked.    Equipment:    None.   Plan:    Pt would like to go to inpatient treatment.    Additional Info:    MSSA 11 and 14  Valium 10mg given X2 (see eMAR)   Able to make needs known. Will continue with plan of care.

## 2019-04-24 NOTE — PROGRESS NOTES
Patient has been educated on potential risks of choosing to leave the unit and the responsibility for patient well-being will belong to the patient. Pt has been informed that admission to hospital is due to need for medical treatment. Education given to the patient on some of the potential risks included but is not limited to:            lack of access to nursing and medical intervention            possible missed appointments with MD, therapies, tests            possible missed medications, antibiotics, management of IV's    Patient Response: understood     Pt. Acknowledged risk and returned safely during shift.

## 2019-04-24 NOTE — PROGRESS NOTES
Patient was seen, case reviewed with nursing staff and .    Patient reports feeling improved overall.  He denies acute physical concerns.  He notes that auditory and visual hallucinations are present though less intense and less bothersome to him.    He states he would like to discharge to either a mental health program or a combination, dependency and mental health program.    He relates his recent chemical use to stress related to family issues related to his father's will.  He currently is homeless and has no finances.    He has been using diazepam 10 mg every 3 hours since midnight.      He has been noted to be walking around the unit without any obvious discomfort.      Afebrile blood pressure normal heart rate 80s to low 100s    Patient is alert, pleasant.  Speech seems somewhat pressured.  Thoughts seem grandiose.  He is focused on contacting his  regarding matters of his father's will  HEENT exam oral mucosa moist sclera anicteric  Lungs clear  CV RRR   Adomen soft, nontender   no tremors, patient is fully oriented    Results for FRANCISCO J ARLEN PROSPER (MRN [id number]) as of 4/24/2019 10:13   Ref. Range 4/24/2019 06:34   Sodium Latest Ref Range: 133 - 144 mmol/L 140   Potassium Latest Ref Range: 3.4 - 5.3 mmol/L 3.6   Chloride Latest Ref Range: 94 - 109 mmol/L 109   Carbon Dioxide Latest Ref Range: 20 - 32 mmol/L 27   Urea Nitrogen Latest Ref Range: 7 - 30 mg/dL 14   Creatinine Latest Ref Range: 0.66 - 1.25 mg/dL 0.76   GFR Estimate Latest Ref Range: >60 mL/min/1.73_m2 >90   GFR Estimate If Black Latest Ref Range: >60 mL/min/1.73_m2 >90   Calcium Latest Ref Range: 8.5 - 10.1 mg/dL 8.5   Anion Gap Latest Ref Range: 3 - 14 mmol/L 4   Albumin Latest Ref Range: 3.4 - 5.0 g/dL 3.1 (L)   Protein Total Latest Ref Range: 6.8 - 8.8 g/dL 6.3 (L)   Bilirubin Total Latest Ref Range: 0.2 - 1.3 mg/dL 0.5   Alkaline Phosphatase Latest Ref Range: 40 - 150 U/L 89   ALT Latest Ref Range: 0 - 70 U/L 164 (H)   AST  Latest Ref Range: 0 - 45 U/L 67 (H)   Glucose Latest Ref Range: 70 - 99 mg/dL 75         Assessment    Polysubstance abuse including current use of ethanol and methamphetamines, continuous.  Patient has been receiving significant amounts of oral diazepam for MSSA scoring, primarily related to hallucinations     Tachycardia, likely secondary to volume depletion as well as chemical use, resolved with IV fluids.  Oral intake remains good good     Mild hypokalemia likely secondary to volume depletion and increased adrenergic state, resolved.    Auditory and visual hallucinations on presentation to the ER, likely secondary to alcohol withdrawal and or methamphetamine use.    The patient appears quite comfortable describing hallucinations.  There may be some component of malingering as the patient is homeless.  He continues to desire CD and/or inpatient mental health treatment.    Elevated transaminases patient with history of hepatitis C, not yet treated,  improved Possible acute exacerbation in the setting of chemical use     History of depression, not recently compliant with Wellbutrin.  Psychiatry recommends resumption of Wellbutrin following stabilization of withdrawal    Plan  Continue to monitor mental status and vital signs.  Suspect patient can rapidly be weaned off diazepam.   will assist patient in discharge planning.  It is not clear if the patient requires inpatient CD or mental health care at this time

## 2019-04-25 PROCEDURE — 12000001 ZZH R&B MED SURG/OB UMMC

## 2019-04-25 PROCEDURE — 99231 SBSQ HOSP IP/OBS SF/LOW 25: CPT | Performed by: INTERNAL MEDICINE

## 2019-04-25 PROCEDURE — A9270 NON-COVERED ITEM OR SERVICE: HCPCS | Performed by: INTERNAL MEDICINE

## 2019-04-25 PROCEDURE — A9270 NON-COVERED ITEM OR SERVICE: HCPCS | Performed by: HOSPITALIST

## 2019-04-25 PROCEDURE — 25000132 ZZH RX MED GY IP 250 OP 250 PS 637: Performed by: INTERNAL MEDICINE

## 2019-04-25 PROCEDURE — 25000132 ZZH RX MED GY IP 250 OP 250 PS 637: Performed by: HOSPITALIST

## 2019-04-25 RX ORDER — BUPROPION HYDROCHLORIDE 150 MG/1
150 TABLET, EXTENDED RELEASE ORAL 2 TIMES DAILY
Status: DISCONTINUED | OUTPATIENT
Start: 2019-04-25 | End: 2019-04-27 | Stop reason: HOSPADM

## 2019-04-25 RX ORDER — DIAZEPAM 5 MG
5 TABLET ORAL EVERY 6 HOURS PRN
Status: DISCONTINUED | OUTPATIENT
Start: 2019-04-25 | End: 2019-04-27 | Stop reason: HOSPADM

## 2019-04-25 RX ADMIN — NICOTINE POLACRILEX 2 MG: 2 GUM, CHEWING BUCCAL at 19:12

## 2019-04-25 RX ADMIN — BUPROPION HYDROCHLORIDE 150 MG: 150 TABLET, FILM COATED, EXTENDED RELEASE ORAL at 18:16

## 2019-04-25 RX ADMIN — DIAZEPAM 5 MG: 5 TABLET ORAL at 09:29

## 2019-04-25 RX ADMIN — DIAZEPAM 5 MG: 5 TABLET ORAL at 01:15

## 2019-04-25 RX ADMIN — BUPROPION HYDROCHLORIDE 150 MG: 150 TABLET, FILM COATED, EXTENDED RELEASE ORAL at 13:42

## 2019-04-25 RX ADMIN — NICOTINE POLACRILEX 2 MG: 2 GUM, CHEWING BUCCAL at 10:29

## 2019-04-25 RX ADMIN — THIAMINE HCL TAB 100 MG 100 MG: 100 TAB at 09:20

## 2019-04-25 RX ADMIN — DIAZEPAM 5 MG: 5 TABLET ORAL at 23:50

## 2019-04-25 ASSESSMENT — ACTIVITIES OF DAILY LIVING (ADL)
ADLS_ACUITY_SCORE: 16
ADLS_ACUITY_SCORE: 15

## 2019-04-25 NOTE — PLAN OF CARE
VS: /85 (BP Location: Left arm)   Pulse 101   Temp 97.1  F (36.2  C) (Oral)   Resp 18   Ht 1.829 m (6')   Wt 88.5 kg (195 lb)   SpO2 97%   BMI 26.45 kg/m    A&O, confused at times.   O2: Room air, denies SOB.   Output: Voiding spontaneously in bathroom.   Last BM: Passing flatus.   Activity: Independent.   Skin: Intact.   Pain: Denies.   CMS: Intact, denies N/T.   Dressing: None.   Diet: Regular, tolerating well.   LDA: L CAROLINA SL.   Equipment:    Plan: Continue to monitor.   Additional Info: MSSA 11 & 6 Valuim 5mg given x1.

## 2019-04-25 NOTE — PROGRESS NOTES
"Pt was seen, case reviewed with team, .    Patient states he is experiencing more severe auditory and visual hallucinations.  He describes \"lines moving from my head then disappearing in front of my hands\"  He notes increased tremors this morning, the staff have not noted this interactions with him.  Patient states he thinks he needs to go to a mental health type facility where his hallucinations and anxiety can be better managed.  He states he generally experiences hallucinations for 4 to 5 days after drinking alcohol, that he requires Valium during this time.  He does not desire antipsychotic medications for his hallucinations as he states the cause \"restless legs\".  He remains quite focused regarding legal issues surrounding his father's will.  He is anxious to meet with  to review his homeless situation.  He does not feel he is ready to discharge secondary to his anxiety, tremors and hallucinations.  Appetite has been good.  He would like to resume Wellbutrin as this is helped anxiety and tremors in the past.    Nursing staff notes that he is often off the unit downstairs or on the telephone discussing legal matters      Afebrile  Blood pressure normal  Heart rate 80s to low 100s  Alert, fully oriented, speech is somewhat pressured though he is pleasant.  He has coarse resting tremors of both hands, increased in intensity since yesterday, though per staff quite variable as he has been able to write notes without apparent difficulty  Lungs clear  CV RRR  Abdomen soft      Assessment     polysubstance abuse including current use of ethanol and methamphetamines, continuous.    Diazepam use has decreased significantly the last 24 hours as his withdrawal symptoms and signs have been difficult to assess given the variability of his tremors and complaints of hallucinations.  Suspect significant functional component to his symptoms.  Vital signs remained very stable     Mild hypokalemia " likely secondary to volume depletion and increased adrenergic state, resolved.     Auditory and visual hallucinations on presentation to the ER, likely secondary to alcohol withdrawal and or methamphetamine use.   Differential would also include primary psychiatric illness versus malingering.     Elevated transaminases patient with history of hepatitis C, not yet treated,  improved Possible acute exacerbation in the setting of chemical use     History of depression, not recently compliant with Wellbutrin.  Psychiatry recommends resumption of Wellbutrin following stabilization of withdrawal.  This is complicated by homeless status with expectation that  will find a place for him.    Plan  Use diazepam as needed for anxiety and signs of autonomic instability  Resume Wellbutrin  Psychiatry reassessment   to assist in discharge planning

## 2019-04-25 NOTE — PROGRESS NOTES
"SPIRITUAL HEALTH SERVICES  SPIRITUAL ASSESSMENT Progress Note  [facility] ([facility]) Unit 10A     REFERRAL SOURCE: Hospital  visit request    I visited patient, Andrea Rehman, in response to his visit request. He said, \"All I really want is a Bible. Can you bring me one?\"  I provided Andrea with a Alex New Testament/Psalms Bible. He said he was grateful, and reiterated that he didn't need anything else at this time.    PLAN: No follow-up. [facility] support available if requested.    Luis Wade  Chaplain Resident  Pager [phone]    "

## 2019-04-25 NOTE — CONSULTS
"Social Work: Assessment with Discharge Plan    Patient Name:  Andrea Collado  :  1965  Age:  53 year old  MRN:  [id number]  Risk/Complexity Score:  Filed Complexity Screen Score: 8  Completed assessment with: 10A IDT, pt    Presenting Information   Reason for Referral:  Discharge plan  Date of Intake:  2019  Referral Source:  Physician  Decision Maker:  pt  Alternate Decision Maker:  Not identified. Pt describes conflict with his siblings    Living Situation:  Homeless  Previous Functional Status:  Independent  Patient and family understanding of hospitalization:  Seeking detox from alcohol and methamphetamine  Cultural/Language/Spiritual Considerations:  , having difficulty accepting barriers to entering CD treatment (Medicare insurance), barriers to entering sober housing (lack of [facility] funding source and need for R 25 Funding)  Adjustment to Illness:  Anxious about discharge \"If I go out to the street with no where to go, I will use and end up back in detox\"    Physical Health  Reason for Admission:    1. Methamphetamine abuse (H)    2. Alcohol abuse      Services Needed/Recommended:  Other:  pt needs R25 funding due to Medicare insurance    Mental Health/Chemical Dependency  Diagnosis:  Alcohol use disorder, severe, Methamphetamine use disorder, severe, adjustment disorder with depressed mood, R/O major depressive disorder  Support/Services in Place:  Pt has psychiatry-Dr Rahman  Services Needed/Recommended:  MI/CD treatment. Barrier to this is that pt need R 25 assessment    Support System  Significant relationship at present time:  Not evident at bedside  Family of origin is available for support:  None noted-pt reports having strained relationships with his siblings  Other support available:  None noted  Gaps in support system:  Sober support, stable housing  Patient is caregiver to:  None     Provider Information   Primary Care Physician:  Rian Myers   [phone]   Clinic:  " "[address] [id number] / [facility] [address]      :      Financial   Income Source:  SSDI  Financial Concerns:  Pt states he has no money to pay for hotel  Insurance:    Payor/Plan Subscriber Name Rel Member # Group #   MEDICARE - MEDICARE ARLEN MARX  [id number]       ATTN CLAIMS, [address]       Discharge Plan   Patient and family discharge goal:  Pt expresses desire to enter residential MI/CD treatment directly from hospital  Provided education on discharge plan:  YES  Patient agreeable to discharge plan:  NO- \"If I go out of here with no place to go or stay, I will use. And when I use, I use A LOT.\" SW counseled pt on using resources available-sober friends, family, AA/NA meetings. Pt declined    General information regarding anticipated insurance coverage and possible out of pocket cost was discussed. Patient and patient's family are aware patient may incur the cost of transportation to the facility, pending insurance payment: YES  Barriers to discharge:  Pt's statements of not feeling safe    Discharge Recommendations   Anticipated Disposition:  shelter v family or friend home  Transportation Needs:  Other:  to be determined  Name of Transportation Company and Phone:  To be determined    Additional comments   CARO introduced role/reason for visit. Pt was receptive. He states hope that he can go directly into residential treatment for MI/CD. Pt has not had R 25 assessment and only has Medicare insurance. He denies having worked to have R 25 prior to seeking detox.    CARO provided education about resources, pt refuses to consider homeless shelters, he thinks he can pay privately for sober living (he currently has no money but is anticipating his SSDI check on 5/3/19).    CARO provided pt w/contact information for R 25 in [facility]. CARO provided list of Sober Living resources that may accept pt on Private pay basis in [place] and [facility].    Pt eager to see Psychiatry as he c/o of " hallucinations and believes he is experiencing DTs. SW reminded pt of role of MD in determining when someone is healthy enough to leave hospital setting, provided education about need for R25 for getting into CD treatment, discussed wait list for  [facility] and [facility] programs. Pt is aware [facility] is no longer medicare certified. Pt and SW discussed accessing shelter system through [facility], each time this is discussed, pt states intent to use upon discharge from hospital or inability to carry his stuff.  SW to remain available per request/referral

## 2019-04-25 NOTE — PLAN OF CARE
VSS- occasionally tachycardic low 100-110. Discontinued from MSSA protocol, started Wellbutrin today and has PRN Valium for anxiety. Up independently. Voiding without difficulty, passing gas. Skin intact. Oriented x4, pt states he is still having visual hallucinations. Reports numbness and tingling in bilateral hands and feet- states this has been intermittent since his withdrawal started. Tolerating regular diet well. Plans to discharge today or tomorrow. Waiting for psych consult. Able to make needs known.

## 2019-04-25 NOTE — PLAN OF CARE
VS:    Temp: 98.1  F (36.7  C) Temp src: Oral BP: 111/77 Pulse: 94   Resp: 18 SpO2: 99 % O2 Device: None (Room air)    Vitals stable.   Lung sounds clear.    Output:    Voiding spontaneously without difficulty. Passing flatus. Pt unsure of date of last BM.    Activity:    Ambulating independently in room and nix.    Skin: Intact.    Pain:    Denies.   Neuro/CMS:    Disoriented to time, alert and otherwise oriented. Denies numbness/tingling.    Dressing(s):    None.   Diet:    Tolerating regular diet with no nausea/vomitting.    LDA:    Left forearm PIV - Saline locked.    Equipment:    None.   Plan:    Pt would like to go to inpatient treatment.    Additional Info:    MSSA 11 and 7  Valium 5mg given X1 (see eMAR)   Able to make needs known. Will continue with plan of care.

## 2019-04-26 PROCEDURE — 99231 SBSQ HOSP IP/OBS SF/LOW 25: CPT | Performed by: PSYCHIATRY & NEUROLOGY

## 2019-04-26 PROCEDURE — 99231 SBSQ HOSP IP/OBS SF/LOW 25: CPT | Performed by: INTERNAL MEDICINE

## 2019-04-26 PROCEDURE — 25000132 ZZH RX MED GY IP 250 OP 250 PS 637: Performed by: INTERNAL MEDICINE

## 2019-04-26 PROCEDURE — A9270 NON-COVERED ITEM OR SERVICE: HCPCS | Performed by: INTERNAL MEDICINE

## 2019-04-26 PROCEDURE — 12000001 ZZH R&B MED SURG/OB UMMC

## 2019-04-26 RX ADMIN — BUPROPION HYDROCHLORIDE 150 MG: 150 TABLET, FILM COATED, EXTENDED RELEASE ORAL at 07:54

## 2019-04-26 RX ADMIN — THIAMINE HCL TAB 100 MG 100 MG: 100 TAB at 07:54

## 2019-04-26 RX ADMIN — BUPROPION HYDROCHLORIDE 150 MG: 150 TABLET, FILM COATED, EXTENDED RELEASE ORAL at 16:48

## 2019-04-26 RX ADMIN — DIAZEPAM 5 MG: 5 TABLET ORAL at 09:39

## 2019-04-26 RX ADMIN — DIAZEPAM 5 MG: 5 TABLET ORAL at 20:59

## 2019-04-26 ASSESSMENT — ACTIVITIES OF DAILY LIVING (ADL)
ADLS_ACUITY_SCORE: 16

## 2019-04-26 ASSESSMENT — PAIN DESCRIPTION - DESCRIPTORS: DESCRIPTORS: ACHING

## 2019-04-26 NOTE — PLAN OF CARE
VS:    Temp: 98.4  F (36.9  C) Temp src: Oral BP: 122/80 Pulse: 104   Resp: 18 SpO2: 99 % O2 Device: None (Room air)    Vitals stable.   Lung sounds clear.    Output:    Voiding spontaneously without difficulty. Passing flatus.    Activity:    Ambulating independently in room and nix.    Skin: Intact.    Pain:    Denies.   Neuro/CMS:    On previous assessment pt denied numbness/tingling - now states he has had N/T since the start of his withdrawal. Alert and oriented X4. States he continues to have visual hallucinations - refuses zyprexa.     Dressing(s):    None.   Diet:    Tolerating regular diet with no nausea/vomitting.    LDA:    None.    Equipment:    None.   Plan:    Pt would like to go to inpatient treatment.    Additional Info:    Able to make needs known. Will continue with plan of care.

## 2019-04-26 NOTE — CONSULTS
"[facility], [place]   Psychiatry Consultation - Follow-up note      Interim History:   Reason for consultation: Evaluate subjective reports of psychosis.    On examination today, the patient was in his room sleeping.  He awoke easily and comfortably participated in the interview.     He tells me that his mood is fair without significant depressed or anxious mood.  He adamantly denied suicidal and homicidal thoughts.    The patient reports that over the past several days, he continues to experience visual hallucinations which she describes as seeing spiders on the curtains and walls, as if they are coming down from a spiderweb.  These hallucinations do not cause him any significant distress as he explains \"in the hospital I can tell that are not real but outside of here I might really start the freak out.\"  He explains that he is hopeful to gain admission to a residential treatment facility in [place] on Monday.  He is hoping to stay with his mother on Sunday after she returns home from an out of town trip.    He emphasized the importance of continuing hospitalization to prevent relapse on alcohol.  He does not believe he can maintain sobriety outside of a controlled setting.  He does not care to utilize the shelter system.  He currently does not have money to stay in a motel however anticipates he will be able to do so upon receiving his next disability check at the beginning of the month.    He is currently content with his medications and is not seeking any adjustments to his psychotropic medications today.  He explains that Wellbutrin works very well at maintaining mood stability and preventing depressive symptoms.           Medications:       buPROPion  150 mg Oral BID     vitamin B1  100 mg Oral Daily          Allergies:   No Known Allergies       Labs:   No results found for this or any previous visit (from the past 24 hour(s)).       Psychiatric Examination:     /78 (BP " Location: Left arm)   Pulse 98   Temp 97.5  F (36.4  C) (Oral)   Resp 18   Ht 1.829 m (6')   Wt 88.5 kg (195 lb)   SpO2 98%   BMI 26.45 kg/m    Weight is 195 lbs 0 oz  Body mass index is 26.45 kg/m .  Orthostatic Vitals     None            Appearance: awake, alert  Attitude:  cooperative  Eye Contact:  fair  Mood:  better  Affect:  appropriate and in normal range  Speech:  clear, coherent  Psychomotor Behavior:  no evidence of tardive dyskinesia, dystonia, or tics  Throught Process:  linear  Associations:  no loose associations  Thought Content:  no evidence of suicidal ideation or homicidal ideation and no evidence of psychotic thought  Insight:  fair  Judgement:  intact  Oriented to:  time, person, and place  Attention Span and Concentration:  intact  Recent and Remote Memory:  intact           DIagnoses:     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  There are no overt signs of psychosis on exam today and he does not exhibit any impairments in his sensorium or instability of his vital signs to suspect DTs.  There does appear to be a strong focus for secondary gain as he hopes to continue hospitalization until he can gain admission to treatment or to his mother's house on Sunday evening (she will be returning from an out-of-town trip).    Low-dose Zyprexa remains available as needed for hallucinations.  The patient did not seem very interested or motivated to utilize antipsychotic medications to alleviate his reported visual hallucinations which again lessens the concern for a primary psychiatric illness contributing to this issue.    At this time, the  patient does not meet criteria for inpatient psychiatric hospitalization noting that he is denying suicidal and homicidal thoughts and the subjective visual hallucinations that he is reporting does not seem to place him at any immediate risk of self-harm or harm to others.    Please reconsult with Psychiatry as needed.

## 2019-04-26 NOTE — PLAN OF CARE
VS:   /79 (BP Location: Left arm)   Pulse 89   Temp 97.3  F (36.3  C) (Oral)   Resp 16   Ht 1.829 m (6')   Wt 88.5 kg (195 lb)   SpO2 98%   BMI 26.45 kg/m       Output:   Independent    Lungs LS clear equal bilaterally. No SOB    Activity:   Independent. Ambulated in hallway    Skin: WDL    Pain:   Denies   Neuro/CMS:   A & O x4. Numbness and tingling reported baseline.    Dressing(s):   none   Diet:   Regular with boosts   LDA:   none   Equipment:      Plan:   Awaiting another psych consult. Pt able to call appropriately    Additional Info:   Pt stated to nurse that he has been having bloody stools and blood when he wipes. Pt advised to let staff see next BM.

## 2019-04-26 NOTE — PLAN OF CARE
Pt c/o generalized aches over body. Refused pain medication. Up ad reese in room doing exercises. Pt stating his goal is to go to [place] and get into chemical dependency program there. Appetite good. Pt admits that he still continues to see spiders and floating threads.

## 2019-04-26 NOTE — PLAN OF CARE
1500: Patient denies pain but anxiety. Gave Valium 5 mg with relief. LS clear, BS active.Tolerating intakes. Numbness and tingling in hands and feet LBM 4/26.

## 2019-04-26 NOTE — PROGRESS NOTES
Patient was seen, course reviewed with nursing staff.    Patient continues to complain of anxiety and hallucinations.  He was started on Wellbutrin yesterday which he states generally takes 24 hours to kick in.  He is anxious to see psychiatry.  He now plans to enter a treatment program in [place] and states that he will be flying down to [place] tomorrow.  We do not have independent confirmation of this.    He has used 2 doses of diazepam 5 mg over the last 12 hours    He denies any specific physical concerns      Vital signs stable  Afebrile  Alert, pleasant, fully oriented  Lungs clear  CV regular rate and rhythm  Abdomen soft  Coarse resting tremors of hands      Assessment    Polysubstance abuse involving ethanol and methamphetamines, continuous.  Continue complaints of hallucinations.  No clear evidence of significant withdrawal from diazepam.  Unclear if hallucinations represent withdrawal versus effects of methamphetamine use versus malingering.    Plan  Continue low-dose diazepam as needed  Psychiatry to see  Wellbutrin has been started  Attempt to confirm patient plans for discharge tomorrow.  He is medically stable

## 2019-04-27 VITALS
SYSTOLIC BLOOD PRESSURE: 114 MMHG | HEART RATE: 93 BPM | DIASTOLIC BLOOD PRESSURE: 79 MMHG | TEMPERATURE: 97.6 F | RESPIRATION RATE: 16 BRPM | WEIGHT: 195 LBS | HEIGHT: 72 IN | BODY MASS INDEX: 26.41 KG/M2 | OXYGEN SATURATION: 97 %

## 2019-04-27 PROCEDURE — 99238 HOSP IP/OBS DSCHRG MGMT 30/<: CPT | Performed by: INTERNAL MEDICINE

## 2019-04-27 PROCEDURE — 25000132 ZZH RX MED GY IP 250 OP 250 PS 637: Performed by: INTERNAL MEDICINE

## 2019-04-27 PROCEDURE — A9270 NON-COVERED ITEM OR SERVICE: HCPCS | Performed by: INTERNAL MEDICINE

## 2019-04-27 RX ORDER — BUPROPION HYDROCHLORIDE 150 MG/1
150 TABLET, EXTENDED RELEASE ORAL 2 TIMES DAILY
Qty: 60 TABLET | Refills: 1 | Status: ON HOLD | OUTPATIENT
Start: 2019-04-27 | End: 2019-11-22

## 2019-04-27 RX ORDER — BUPROPION HYDROCHLORIDE 150 MG/1
150 TABLET, EXTENDED RELEASE ORAL 2 TIMES DAILY
Qty: 60 TABLET | Refills: 1 | Status: SHIPPED | OUTPATIENT
Start: 2019-04-27 | End: 2019-04-27

## 2019-04-27 RX ADMIN — DIAZEPAM 5 MG: 5 TABLET ORAL at 10:05

## 2019-04-27 RX ADMIN — THIAMINE HCL TAB 100 MG 100 MG: 100 TAB at 08:39

## 2019-04-27 RX ADMIN — BUPROPION HYDROCHLORIDE 150 MG: 150 TABLET, FILM COATED, EXTENDED RELEASE ORAL at 08:39

## 2019-04-27 ASSESSMENT — ACTIVITIES OF DAILY LIVING (ADL)
ADLS_ACUITY_SCORE: 16

## 2019-04-27 NOTE — PROGRESS NOTES
"Patient seen, case reviewed with nursing staff.    Patient states he continues to feel better from the physical standpoint.  Anxiety and hallucinations are gradually improving.  He has been working feverishly to arrange for admission to a CD program in [place] and expects to enter the program on Wednesday.  He is hoping to discharge from the hospital tomorrow and stay with friends until he leaves for [place].    He states he had a good meeting with psychiatry yesterday and agrees a plan to continue Wellbutrin and to gradually wean off Valium.    He is using minimal amounts of Valium for anxiety    He is frequently off the unit, on the telephone apparently making plans for discharge    Vital signs stable  BP normal  Alert, fully oriented, pleasant  Lungs clear  CV RRR  No tremors of hands noted this morning    Assessment       Polysubstance abuse involving ethanol and methamphetamines, continuous.    Patient reports gradual improvement in anxiety and hallucinations but states \"usually takes about a week for the hallucinations to completely go away\"  As above, he is planning to discharge tomorrow and enter a CD program in [place] mid week    Plan  Continue Wellbutrin  Continue very low-dose diazepam with plan to discontinue diazepam on discharge.  Discharge plan for tomorrow      "

## 2019-04-27 NOTE — PLAN OF CARE
VS:   /79   Pulse 93   Temp 97.6  F (36.4  C) (Oral)   Resp 16   Ht 1.829 m (6')   Wt 88.5 kg (195 lb)   SpO2 97%   BMI 26.45 kg/m    LS clear   Output:   Voiding spontaneously, last BM 4/26 passing flatus BS+   Activity:   Independent walked down to lobby to charge phone   Skin: intact   Pain:   denies   Neuro/CMS:   Baseline numbness/tingling BLEs   Dressing(s):   none   Diet:   reg   LDA:   none   Equipment:   Call light within reach   Plan:   Possible discharge tomorrow to go to [place] for cd treatment   Additional Info:

## 2019-04-27 NOTE — DISCHARGE SUMMARY
Admit Date:     04/22/2019   Discharge Date:           Andrea Collado is a 53-year-old male with a history of chemical abuse involving alcohol and methamphetamines, ADHD, hepatitis C who was admitted through the emergency room to the medical unit for evaluation of presumed alcohol withdrawal.  The patient presented with symptoms of agitation and anxiety, auditory and visual hallucinations.  He is noted to be tachycardic though otherwise hemodynamically stable on presentation to the ER.  Laboratory studies on admission were remarkable for mild hypokalemia as well as ALT of 234, AST of 115.  The patient has a history of hepatitis C infection and has a prescription to start Mavyret, though he has not started this yet.  He actually brought his medication supply to the hospital.      The patient has a significant psychosocial as a significant psychosocial complications.  He is homeless and reports having no money as he is involved in a legal dispute with other family members regarding the distribution of his father's will.       The patient was admitted for the management of the above symptoms which were felt secondary to either alcohol withdrawal versus methamphetamine use.  The patient does have a history of depression, ADHD and does take Wellbutrin chronically, though not recently been taking this.      HOSPITAL COURSE: The patient was admitted to the hospital, was given intravenous fluids.  Potassium was replaced.  He was started on diazepam per MSSA protocol.  He did require considerable amount of diazepam for the first 48 hours.  MSSA score was primarily high secondary to tremors and hallucinations.  In spite of his complaints of hallucinations, he seemed quite comfortable.  There is some thought that his tremors were intentional and not necessarily reflective of alcohol withdrawal.  He was seen by Psychiatry on 2 occasions.  Chemical dependency treatment was recommended.  The patient was restarted on Wellbutrin after  the third day of hospitalization.  Low-dose Zyprexa was offered to the patient, but refused.  The patient's diazepam use did rapidly decline in the 24-48 hours prior to discharge.      The patient's LFTs were monitored.  ALT prior to discharge was down to 164, AST is 67.  Bilirubin remained normal.      The patient was seen by chemical dependency counselors as well as by .  He was at times vague regarding his desires regarding further psychiatric assessment versus CD treatment.  Complicating this was his financial and homeless status.      Ultimately, the patient did arrange to enter a CD program in [place], which will occur on 05/01.  He will discharge to home of a friend in the meantime.      MEDICATIONS ON DISCHARGE:  Only Wellbutrin- mg twice daily.  The patient did not desire Zyprexa, as noted above.  The patient was encouraged to start treatment for hepatitis C when he has been absent from chemicals for a few weeks and when he is reasonably sure that he will be able to take an uninterrupted course of therapy.      On the day of discharge, the patient was alert, fully oriented, quite pleasant.  He had intermittent mild tremors of his hands and described mild visual and auditory hallucinations.  He was independent with his cares, was eating and drinking well, had normal vital signs.        ASSESSMENT:     1.  Chemical dependency involving alcohol and methamphetamines with continuous use of both.   2.  Tremors and hallucinations secondary either to alcohol withdrawal versus methamphetamine use.  There was some question as to whether there was a malingering factor as well.   3.  Hepatitis C with treatment ordered as noted above.     4.  Discharge medications are Wellbutrin  mg twice daily.     5.  The patient will start treatment for hepatitis C when he has abstained from chemicals for 2 weeks and the conditions as noted above.     6.  The patient was strongly advised to follow through  with plans to enter CD treatment in [place] next week.         MIRZA CARUSO MD             D: 2019   T: 2019   MT: BILLY      Name:     ARLEN MARX   MRN:      [id number]        Account:        [id number]   :      1965           Admit Date:     2019                                  Discharge Date:       Document: [id number]

## 2019-04-27 NOTE — PLAN OF CARE
States he still has visual hallucination which is not new and states it usually fades away in a mth of sobriety.Out to hallway/family lounge multiple times last night Valium given for anxiety.Did sleep on and off.Makes needs known.Appetite excellent.Plans to go back to [place] on Wednesday to pursue CD tx.Doesn't have family to hang in to here and no money to pay for hotel in case he's to be d/cd today.He thinks he needs to stay here yet for his safety.

## 2019-04-28 ENCOUNTER — PATIENT OUTREACH (OUTPATIENT)
Dept: CARE COORDINATION | Facility: CLINIC | Age: 54
End: 2019-04-28

## 2019-11-19 ENCOUNTER — HOSPITAL ENCOUNTER (INPATIENT)
Facility: CLINIC | Age: 54
LOS: 3 days | Discharge: PSYCHIATRIC OR SUBSTANCE USE FACILITY | DRG: 896 | End: 2019-11-23
Attending: EMERGENCY MEDICINE | Admitting: INTERNAL MEDICINE
Payer: MEDICARE

## 2019-11-19 DIAGNOSIS — F33.3 SEVERE EPISODE OF RECURRENT MAJOR DEPRESSIVE DISORDER, WITH PSYCHOTIC FEATURES (H): ICD-10-CM

## 2019-11-19 DIAGNOSIS — R45.1 AGITATION: ICD-10-CM

## 2019-11-19 DIAGNOSIS — J69.0 ASPIRATION PNEUMONIA DUE TO VOMIT, UNSPECIFIED LATERALITY, UNSPECIFIED PART OF LUNG (H): Primary | ICD-10-CM

## 2019-11-19 DIAGNOSIS — T50.904A INGESTION OF UNKNOWN DRUG, UNDETERMINED INTENT, INITIAL ENCOUNTER: ICD-10-CM

## 2019-11-19 DIAGNOSIS — B00.9 HERPES SIMPLEX INFECTION: ICD-10-CM

## 2019-11-19 PROCEDURE — 93005 ELECTROCARDIOGRAM TRACING: CPT

## 2019-11-19 PROCEDURE — 99291 CRITICAL CARE FIRST HOUR: CPT | Mod: 25

## 2019-11-19 PROCEDURE — 99292 CRITICAL CARE ADDL 30 MIN: CPT

## 2019-11-20 ENCOUNTER — APPOINTMENT (OUTPATIENT)
Dept: GENERAL RADIOLOGY | Facility: CLINIC | Age: 54
DRG: 896 | End: 2019-11-20
Attending: INTERNAL MEDICINE
Payer: MEDICARE

## 2019-11-20 ENCOUNTER — APPOINTMENT (OUTPATIENT)
Dept: GENERAL RADIOLOGY | Facility: CLINIC | Age: 54
DRG: 896 | End: 2019-11-20
Attending: EMERGENCY MEDICINE
Payer: MEDICARE

## 2019-11-20 ENCOUNTER — APPOINTMENT (OUTPATIENT)
Dept: CT IMAGING | Facility: CLINIC | Age: 54
DRG: 896 | End: 2019-11-20
Attending: EMERGENCY MEDICINE
Payer: MEDICARE

## 2019-11-20 PROBLEM — R41.82 ALTERED MENTAL STATUS: Status: ACTIVE | Noted: 2019-11-20

## 2019-11-20 LAB
ALBUMIN SERPL-MCNC: 2.6 G/DL (ref 3.4–5)
ALBUMIN SERPL-MCNC: 3.3 G/DL (ref 3.4–5)
ALP SERPL-CCNC: 206 U/L (ref 40–150)
ALP SERPL-CCNC: 334 U/L (ref 40–150)
ALT SERPL W P-5'-P-CCNC: 152 U/L (ref 0–70)
ALT SERPL W P-5'-P-CCNC: 202 U/L (ref 0–70)
AMPHETAMINES UR QL SCN: POSITIVE
ANION GAP SERPL CALCULATED.3IONS-SCNC: 5 MMOL/L (ref 3–14)
ANION GAP SERPL CALCULATED.3IONS-SCNC: 8 MMOL/L (ref 3–14)
AST SERPL W P-5'-P-CCNC: 153 U/L (ref 0–45)
AST SERPL W P-5'-P-CCNC: 304 U/L (ref 0–45)
BARBITURATES UR QL: NEGATIVE
BASOPHILS # BLD AUTO: 0 10E9/L (ref 0–0.2)
BASOPHILS NFR BLD AUTO: 0 %
BENZODIAZ UR QL: POSITIVE
BILIRUB DIRECT SERPL-MCNC: 0.2 MG/DL (ref 0–0.2)
BILIRUB SERPL-MCNC: 0.4 MG/DL (ref 0.2–1.3)
BILIRUB SERPL-MCNC: 0.9 MG/DL (ref 0.2–1.3)
BUN SERPL-MCNC: 20 MG/DL (ref 7–30)
BUN SERPL-MCNC: 24 MG/DL (ref 7–30)
CALCIUM SERPL-MCNC: 7.5 MG/DL (ref 8.5–10.1)
CALCIUM SERPL-MCNC: 8.1 MG/DL (ref 8.5–10.1)
CANNABINOIDS UR QL SCN: NEGATIVE
CHLORIDE SERPL-SCNC: 106 MMOL/L (ref 94–109)
CHLORIDE SERPL-SCNC: 109 MMOL/L (ref 94–109)
CK SERPL-CCNC: 112 U/L (ref 30–300)
CK SERPL-CCNC: 217 U/L (ref 30–300)
CO2 BLDCOV-SCNC: 21 MMOL/L (ref 21–28)
CO2 BLDCOV-SCNC: 23 MMOL/L (ref 21–28)
CO2 SERPL-SCNC: 22 MMOL/L (ref 20–32)
CO2 SERPL-SCNC: 23 MMOL/L (ref 20–32)
COCAINE UR QL: NEGATIVE
CREAT SERPL-MCNC: 0.91 MG/DL (ref 0.66–1.25)
CREAT SERPL-MCNC: 1.11 MG/DL (ref 0.66–1.25)
DIFFERENTIAL METHOD BLD: ABNORMAL
EOSINOPHIL # BLD AUTO: 0 10E9/L (ref 0–0.7)
EOSINOPHIL NFR BLD AUTO: 0.5 %
ERYTHROCYTE [DISTWIDTH] IN BLOOD BY AUTOMATED COUNT: 13.4 % (ref 10–15)
ERYTHROCYTE [DISTWIDTH] IN BLOOD BY AUTOMATED COUNT: 13.9 % (ref 10–15)
ETHANOL SERPL-MCNC: 0.06 G/DL
FLUAV+FLUBV AG SPEC QL: NEGATIVE
FLUAV+FLUBV AG SPEC QL: NEGATIVE
GFR SERPL CREATININE-BSD FRML MDRD: 74 ML/MIN/{1.73_M2}
GFR SERPL CREATININE-BSD FRML MDRD: >90 ML/MIN/{1.73_M2}
GLUCOSE BLDC GLUCOMTR-MCNC: 101 MG/DL (ref 70–99)
GLUCOSE BLDC GLUCOMTR-MCNC: 102 MG/DL (ref 70–99)
GLUCOSE BLDC GLUCOMTR-MCNC: 105 MG/DL (ref 70–99)
GLUCOSE BLDC GLUCOMTR-MCNC: 95 MG/DL (ref 70–99)
GLUCOSE SERPL-MCNC: 115 MG/DL (ref 70–99)
GLUCOSE SERPL-MCNC: 92 MG/DL (ref 70–99)
HCT VFR BLD AUTO: 33.8 % (ref 40–53)
HCT VFR BLD AUTO: 39.6 % (ref 40–53)
HGB BLD-MCNC: 11.3 G/DL (ref 13.3–17.7)
HGB BLD-MCNC: 13.2 G/DL (ref 13.3–17.7)
IMM GRANULOCYTES # BLD: 0 10E9/L (ref 0–0.4)
IMM GRANULOCYTES NFR BLD: 0.5 %
INTERPRETATION ECG - MUSE: NORMAL
INTERPRETATION ECG - MUSE: NORMAL
LACTATE BLD-SCNC: 1.5 MMOL/L (ref 0.7–2)
LACTATE BLD-SCNC: 1.6 MMOL/L (ref 0.7–2.1)
LACTATE BLD-SCNC: 1.8 MMOL/L (ref 0.7–2)
LACTATE BLD-SCNC: 2.5 MMOL/L (ref 0.7–2)
LACTATE BLD-SCNC: 2.9 MMOL/L (ref 0.7–2.1)
LYMPHOCYTES # BLD AUTO: 0.1 10E9/L (ref 0.8–5.3)
LYMPHOCYTES NFR BLD AUTO: 2.5 %
MAGNESIUM SERPL-MCNC: 1.2 MG/DL (ref 1.6–2.3)
MAGNESIUM SERPL-MCNC: 1.9 MG/DL (ref 1.6–2.3)
MCH RBC QN AUTO: 29.9 PG (ref 26.5–33)
MCH RBC QN AUTO: 30.1 PG (ref 26.5–33)
MCHC RBC AUTO-ENTMCNC: 33.3 G/DL (ref 31.5–36.5)
MCHC RBC AUTO-ENTMCNC: 33.4 G/DL (ref 31.5–36.5)
MCV RBC AUTO: 90 FL (ref 78–100)
MCV RBC AUTO: 90 FL (ref 78–100)
MONOCYTES # BLD AUTO: 0 10E9/L (ref 0–1.3)
MONOCYTES NFR BLD AUTO: 0.5 %
NEUTROPHILS # BLD AUTO: 1.9 10E9/L (ref 1.6–8.3)
NEUTROPHILS NFR BLD AUTO: 96 %
NRBC # BLD AUTO: 0 10*3/UL
NRBC BLD AUTO-RTO: 0 /100
OPIATES UR QL SCN: NEGATIVE
PCO2 BLDV: 36 MM HG (ref 40–50)
PCO2 BLDV: 37 MM HG (ref 40–50)
PCP UR QL SCN: NEGATIVE
PH BLDV: 7.37 PH (ref 7.32–7.43)
PH BLDV: 7.4 PH (ref 7.32–7.43)
PHOSPHATE SERPL-MCNC: 3.3 MG/DL (ref 2.5–4.5)
PLATELET # BLD AUTO: 108 10E9/L (ref 150–450)
PLATELET # BLD AUTO: 99 10E9/L (ref 150–450)
PO2 BLDV: 26 MM HG (ref 25–47)
PO2 BLDV: 29 MM HG (ref 25–47)
POTASSIUM SERPL-SCNC: 3.7 MMOL/L (ref 3.4–5.3)
POTASSIUM SERPL-SCNC: 3.8 MMOL/L (ref 3.4–5.3)
PROT SERPL-MCNC: 5.4 G/DL (ref 6.8–8.8)
PROT SERPL-MCNC: 6.5 G/DL (ref 6.8–8.8)
RBC # BLD AUTO: 3.75 10E12/L (ref 4.4–5.9)
RBC # BLD AUTO: 4.41 10E12/L (ref 4.4–5.9)
SAO2 % BLDV FROM PO2: 49 %
SAO2 % BLDV FROM PO2: 55 %
SODIUM SERPL-SCNC: 136 MMOL/L (ref 133–144)
SODIUM SERPL-SCNC: 137 MMOL/L (ref 133–144)
SPECIMEN SOURCE: NORMAL
TROPONIN I SERPL-MCNC: <0.015 UG/L (ref 0–0.04)
WBC # BLD AUTO: 2 10E9/L (ref 4–11)
WBC # BLD AUTO: 8.2 10E9/L (ref 4–11)

## 2019-11-20 PROCEDURE — 83605 ASSAY OF LACTIC ACID: CPT | Performed by: INTERNAL MEDICINE

## 2019-11-20 PROCEDURE — 40000275 ZZH STATISTIC RCP TIME EA 10 MIN

## 2019-11-20 PROCEDURE — 83735 ASSAY OF MAGNESIUM: CPT | Performed by: EMERGENCY MEDICINE

## 2019-11-20 PROCEDURE — 25000128 H RX IP 250 OP 636: Performed by: INTERNAL MEDICINE

## 2019-11-20 PROCEDURE — 96366 THER/PROPH/DIAG IV INF ADDON: CPT

## 2019-11-20 PROCEDURE — 36415 COLL VENOUS BLD VENIPUNCTURE: CPT | Performed by: INTERNAL MEDICINE

## 2019-11-20 PROCEDURE — 84100 ASSAY OF PHOSPHORUS: CPT | Performed by: INTERNAL MEDICINE

## 2019-11-20 PROCEDURE — 82550 ASSAY OF CK (CPK): CPT | Performed by: INTERNAL MEDICINE

## 2019-11-20 PROCEDURE — 96361 HYDRATE IV INFUSION ADD-ON: CPT

## 2019-11-20 PROCEDURE — 96376 TX/PRO/DX INJ SAME DRUG ADON: CPT

## 2019-11-20 PROCEDURE — 87040 BLOOD CULTURE FOR BACTERIA: CPT | Performed by: EMERGENCY MEDICINE

## 2019-11-20 PROCEDURE — 85025 COMPLETE CBC W/AUTO DIFF WBC: CPT | Performed by: EMERGENCY MEDICINE

## 2019-11-20 PROCEDURE — 87040 BLOOD CULTURE FOR BACTERIA: CPT | Performed by: INTERNAL MEDICINE

## 2019-11-20 PROCEDURE — 40000914 ZZH STATISTIC SITTER, DAY HOURS

## 2019-11-20 PROCEDURE — 85027 COMPLETE CBC AUTOMATED: CPT | Performed by: INTERNAL MEDICINE

## 2019-11-20 PROCEDURE — 99223 1ST HOSP IP/OBS HIGH 75: CPT | Mod: AI | Performed by: INTERNAL MEDICINE

## 2019-11-20 PROCEDURE — 99207 ZZC CDG-MDM COMPONENT: MEETS MODERATE - UP CODED: CPT | Performed by: INTERNAL MEDICINE

## 2019-11-20 PROCEDURE — 80320 DRUG SCREEN QUANTALCOHOLS: CPT | Performed by: EMERGENCY MEDICINE

## 2019-11-20 PROCEDURE — 71045 X-RAY EXAM CHEST 1 VIEW: CPT

## 2019-11-20 PROCEDURE — HZ2ZZZZ DETOXIFICATION SERVICES FOR SUBSTANCE ABUSE TREATMENT: ICD-10-PCS | Performed by: EMERGENCY MEDICINE

## 2019-11-20 PROCEDURE — 12000000 ZZH R&B MED SURG/OB

## 2019-11-20 PROCEDURE — 87804 INFLUENZA ASSAY W/OPTIC: CPT | Performed by: INTERNAL MEDICINE

## 2019-11-20 PROCEDURE — 25000128 H RX IP 250 OP 636: Performed by: EMERGENCY MEDICINE

## 2019-11-20 PROCEDURE — 25000132 ZZH RX MED GY IP 250 OP 250 PS 637: Mod: GY | Performed by: INTERNAL MEDICINE

## 2019-11-20 PROCEDURE — 80307 DRUG TEST PRSMV CHEM ANLYZR: CPT | Performed by: EMERGENCY MEDICINE

## 2019-11-20 PROCEDURE — 25800030 ZZH RX IP 258 OP 636: Performed by: EMERGENCY MEDICINE

## 2019-11-20 PROCEDURE — 80053 COMPREHEN METABOLIC PANEL: CPT | Performed by: EMERGENCY MEDICINE

## 2019-11-20 PROCEDURE — 83605 ASSAY OF LACTIC ACID: CPT

## 2019-11-20 PROCEDURE — 82803 BLOOD GASES ANY COMBINATION: CPT

## 2019-11-20 PROCEDURE — 96375 TX/PRO/DX INJ NEW DRUG ADDON: CPT

## 2019-11-20 PROCEDURE — 96365 THER/PROPH/DIAG IV INF INIT: CPT

## 2019-11-20 PROCEDURE — 25000125 ZZHC RX 250: Performed by: EMERGENCY MEDICINE

## 2019-11-20 PROCEDURE — 71046 X-RAY EXAM CHEST 2 VIEWS: CPT

## 2019-11-20 PROCEDURE — 80048 BASIC METABOLIC PNL TOTAL CA: CPT | Performed by: INTERNAL MEDICINE

## 2019-11-20 PROCEDURE — 70450 CT HEAD/BRAIN W/O DYE: CPT

## 2019-11-20 PROCEDURE — 83735 ASSAY OF MAGNESIUM: CPT | Performed by: INTERNAL MEDICINE

## 2019-11-20 PROCEDURE — 93005 ELECTROCARDIOGRAM TRACING: CPT | Mod: 76

## 2019-11-20 PROCEDURE — 00000146 ZZHCL STATISTIC GLUCOSE BY METER IP

## 2019-11-20 PROCEDURE — 25000125 ZZHC RX 250: Performed by: INTERNAL MEDICINE

## 2019-11-20 PROCEDURE — 80076 HEPATIC FUNCTION PANEL: CPT | Performed by: INTERNAL MEDICINE

## 2019-11-20 PROCEDURE — 25000128 H RX IP 250 OP 636

## 2019-11-20 PROCEDURE — 25800030 ZZH RX IP 258 OP 636: Performed by: INTERNAL MEDICINE

## 2019-11-20 PROCEDURE — 25000132 ZZH RX MED GY IP 250 OP 250 PS 637: Mod: GY | Performed by: EMERGENCY MEDICINE

## 2019-11-20 PROCEDURE — 82550 ASSAY OF CK (CPK): CPT | Performed by: EMERGENCY MEDICINE

## 2019-11-20 PROCEDURE — 84484 ASSAY OF TROPONIN QUANT: CPT | Performed by: EMERGENCY MEDICINE

## 2019-11-20 RX ORDER — ETOMIDATE 2 MG/ML
30 INJECTION INTRAVENOUS ONCE
Status: DISCONTINUED | OUTPATIENT
Start: 2019-11-20 | End: 2019-11-20

## 2019-11-20 RX ORDER — TESTOSTERONE CYPIONATE 200 MG/ML
100 INJECTION, SOLUTION INTRAMUSCULAR
Status: ON HOLD | COMMUNITY
End: 2021-12-25

## 2019-11-20 RX ORDER — PROCHLORPERAZINE 25 MG
25 SUPPOSITORY, RECTAL RECTAL EVERY 12 HOURS PRN
Status: DISCONTINUED | OUTPATIENT
Start: 2019-11-20 | End: 2019-11-23 | Stop reason: HOSPADM

## 2019-11-20 RX ORDER — DEXMEDETOMIDINE HYDROCHLORIDE 4 UG/ML
0.2-0.7 INJECTION, SOLUTION INTRAVENOUS CONTINUOUS
Status: DISCONTINUED | OUTPATIENT
Start: 2019-11-20 | End: 2019-11-20

## 2019-11-20 RX ORDER — BISACODYL 10 MG
10 SUPPOSITORY, RECTAL RECTAL DAILY PRN
Status: DISCONTINUED | OUTPATIENT
Start: 2019-11-20 | End: 2019-11-23 | Stop reason: HOSPADM

## 2019-11-20 RX ORDER — METOPROLOL TARTRATE 1 MG/ML
5 INJECTION, SOLUTION INTRAVENOUS EVERY 6 HOURS PRN
Status: DISCONTINUED | OUTPATIENT
Start: 2019-11-20 | End: 2019-11-20

## 2019-11-20 RX ORDER — DEXMEDETOMIDINE HYDROCHLORIDE 4 UG/ML
.2-.7 INJECTION, SOLUTION INTRAVENOUS CONTINUOUS
Status: DISCONTINUED | OUTPATIENT
Start: 2019-11-20 | End: 2019-11-20

## 2019-11-20 RX ORDER — FOLIC ACID 5 MG/ML
1 INJECTION, SOLUTION INTRAMUSCULAR; INTRAVENOUS; SUBCUTANEOUS ONCE
Status: COMPLETED | OUTPATIENT
Start: 2019-11-20 | End: 2019-11-20

## 2019-11-20 RX ORDER — POTASSIUM CHLORIDE 1500 MG/1
20-40 TABLET, EXTENDED RELEASE ORAL
Status: DISCONTINUED | OUTPATIENT
Start: 2019-11-20 | End: 2019-11-23 | Stop reason: HOSPADM

## 2019-11-20 RX ORDER — PIPERACILLIN SODIUM, TAZOBACTAM SODIUM 3; .375 G/15ML; G/15ML
3.38 INJECTION, POWDER, LYOPHILIZED, FOR SOLUTION INTRAVENOUS EVERY 6 HOURS
Status: DISCONTINUED | OUTPATIENT
Start: 2019-11-20 | End: 2019-11-21

## 2019-11-20 RX ORDER — LANOLIN ALCOHOL/MO/W.PET/CERES
100 CREAM (GRAM) TOPICAL 3 TIMES DAILY
Status: DISCONTINUED | OUTPATIENT
Start: 2019-11-22 | End: 2019-11-20

## 2019-11-20 RX ORDER — DIAZEPAM 10 MG/2ML
INJECTION, SOLUTION INTRAMUSCULAR; INTRAVENOUS
Status: COMPLETED
Start: 2019-11-20 | End: 2019-11-20

## 2019-11-20 RX ORDER — ACETAMINOPHEN 500 MG
1000 TABLET ORAL ONCE
Status: DISCONTINUED | OUTPATIENT
Start: 2019-11-20 | End: 2019-11-20

## 2019-11-20 RX ORDER — DIAZEPAM 10 MG/2ML
5 INJECTION, SOLUTION INTRAMUSCULAR; INTRAVENOUS ONCE
Status: COMPLETED | OUTPATIENT
Start: 2019-11-20 | End: 2019-11-20

## 2019-11-20 RX ORDER — OXYCODONE HYDROCHLORIDE 5 MG/1
5 TABLET ORAL EVERY 4 HOURS PRN
Status: COMPLETED | OUTPATIENT
Start: 2019-11-20 | End: 2019-11-21

## 2019-11-20 RX ORDER — LIDOCAINE 40 MG/G
CREAM TOPICAL
Status: DISCONTINUED | OUTPATIENT
Start: 2019-11-20 | End: 2019-11-23 | Stop reason: HOSPADM

## 2019-11-20 RX ORDER — LANOLIN ALCOHOL/MO/W.PET/CERES
100 CREAM (GRAM) TOPICAL DAILY
Status: DISCONTINUED | OUTPATIENT
Start: 2019-11-27 | End: 2019-11-23 | Stop reason: HOSPADM

## 2019-11-20 RX ORDER — ACETAMINOPHEN 650 MG/1
650 SUPPOSITORY RECTAL ONCE
Status: COMPLETED | OUTPATIENT
Start: 2019-11-20 | End: 2019-11-20

## 2019-11-20 RX ORDER — DIAZEPAM 10 MG/2ML
2.5 INJECTION, SOLUTION INTRAMUSCULAR; INTRAVENOUS ONCE
Status: COMPLETED | OUTPATIENT
Start: 2019-11-20 | End: 2019-11-20

## 2019-11-20 RX ORDER — LORAZEPAM 1 MG/1
1-2 TABLET ORAL EVERY 30 MIN PRN
Status: DISCONTINUED | OUTPATIENT
Start: 2019-11-20 | End: 2019-11-23 | Stop reason: HOSPADM

## 2019-11-20 RX ORDER — SODIUM CHLORIDE, SODIUM LACTATE, POTASSIUM CHLORIDE, CALCIUM CHLORIDE 600; 310; 30; 20 MG/100ML; MG/100ML; MG/100ML; MG/100ML
INJECTION, SOLUTION INTRAVENOUS ONCE
Status: COMPLETED | OUTPATIENT
Start: 2019-11-20 | End: 2019-11-20

## 2019-11-20 RX ORDER — MAGNESIUM SULFATE HEPTAHYDRATE 40 MG/ML
2 INJECTION, SOLUTION INTRAVENOUS ONCE
Status: COMPLETED | OUTPATIENT
Start: 2019-11-20 | End: 2019-11-20

## 2019-11-20 RX ORDER — FOLIC ACID 1 MG/1
1 TABLET ORAL DAILY
Status: DISCONTINUED | OUTPATIENT
Start: 2019-11-23 | End: 2019-11-23 | Stop reason: HOSPADM

## 2019-11-20 RX ORDER — AMOXICILLIN 250 MG
2 CAPSULE ORAL 2 TIMES DAILY PRN
Status: DISCONTINUED | OUTPATIENT
Start: 2019-11-20 | End: 2019-11-23 | Stop reason: HOSPADM

## 2019-11-20 RX ORDER — LORAZEPAM 2 MG/ML
1-2 INJECTION INTRAMUSCULAR EVERY 30 MIN PRN
Status: DISCONTINUED | OUTPATIENT
Start: 2019-11-20 | End: 2019-11-23 | Stop reason: HOSPADM

## 2019-11-20 RX ORDER — FOLIC ACID 5 MG/ML
1 INJECTION, SOLUTION INTRAMUSCULAR; INTRAVENOUS; SUBCUTANEOUS DAILY
Status: DISCONTINUED | OUTPATIENT
Start: 2019-11-21 | End: 2019-11-20

## 2019-11-20 RX ORDER — AMOXICILLIN 250 MG
1 CAPSULE ORAL 2 TIMES DAILY PRN
Status: DISCONTINUED | OUTPATIENT
Start: 2019-11-20 | End: 2019-11-23 | Stop reason: HOSPADM

## 2019-11-20 RX ORDER — ONDANSETRON 2 MG/ML
4 INJECTION INTRAMUSCULAR; INTRAVENOUS EVERY 6 HOURS PRN
Status: DISCONTINUED | OUTPATIENT
Start: 2019-11-20 | End: 2019-11-23 | Stop reason: HOSPADM

## 2019-11-20 RX ORDER — PROPOFOL 10 MG/ML
INJECTION, EMULSION INTRAVENOUS
Status: DISCONTINUED
Start: 2019-11-20 | End: 2019-11-20 | Stop reason: HOSPADM

## 2019-11-20 RX ORDER — POTASSIUM CHLORIDE 1.5 G/1.58G
20-40 POWDER, FOR SOLUTION ORAL
Status: DISCONTINUED | OUTPATIENT
Start: 2019-11-20 | End: 2019-11-23 | Stop reason: HOSPADM

## 2019-11-20 RX ORDER — SODIUM CHLORIDE 9 MG/ML
INJECTION, SOLUTION INTRAVENOUS CONTINUOUS
Status: DISCONTINUED | OUTPATIENT
Start: 2019-11-20 | End: 2019-11-21

## 2019-11-20 RX ORDER — POTASSIUM CHLORIDE 29.8 MG/ML
20 INJECTION INTRAVENOUS
Status: DISCONTINUED | OUTPATIENT
Start: 2019-11-20 | End: 2019-11-23 | Stop reason: HOSPADM

## 2019-11-20 RX ORDER — ONDANSETRON 4 MG/1
4 TABLET, ORALLY DISINTEGRATING ORAL EVERY 6 HOURS PRN
Status: DISCONTINUED | OUTPATIENT
Start: 2019-11-20 | End: 2019-11-23 | Stop reason: HOSPADM

## 2019-11-20 RX ORDER — LORAZEPAM 2 MG/ML
1 INJECTION INTRAMUSCULAR ONCE
Status: COMPLETED | OUTPATIENT
Start: 2019-11-20 | End: 2019-11-20

## 2019-11-20 RX ORDER — POTASSIUM CHLORIDE 7.45 MG/ML
10 INJECTION INTRAVENOUS
Status: DISCONTINUED | OUTPATIENT
Start: 2019-11-20 | End: 2019-11-23 | Stop reason: HOSPADM

## 2019-11-20 RX ORDER — MULTIPLE VITAMINS W/ MINERALS TAB 9MG-400MCG
1 TAB ORAL DAILY
Status: DISCONTINUED | OUTPATIENT
Start: 2019-11-20 | End: 2019-11-23 | Stop reason: HOSPADM

## 2019-11-20 RX ORDER — NALOXONE HYDROCHLORIDE 0.4 MG/ML
.1-.4 INJECTION, SOLUTION INTRAMUSCULAR; INTRAVENOUS; SUBCUTANEOUS
Status: DISCONTINUED | OUTPATIENT
Start: 2019-11-20 | End: 2019-11-23 | Stop reason: HOSPADM

## 2019-11-20 RX ORDER — ACETAMINOPHEN 325 MG/1
650 TABLET ORAL EVERY 4 HOURS PRN
Status: DISCONTINUED | OUTPATIENT
Start: 2019-11-20 | End: 2019-11-23 | Stop reason: HOSPADM

## 2019-11-20 RX ORDER — PROCHLORPERAZINE MALEATE 5 MG
10 TABLET ORAL EVERY 6 HOURS PRN
Status: DISCONTINUED | OUTPATIENT
Start: 2019-11-20 | End: 2019-11-23 | Stop reason: HOSPADM

## 2019-11-20 RX ORDER — POTASSIUM CL/LIDO/0.9 % NACL 10MEQ/0.1L
10 INTRAVENOUS SOLUTION, PIGGYBACK (ML) INTRAVENOUS
Status: DISCONTINUED | OUTPATIENT
Start: 2019-11-20 | End: 2019-11-23 | Stop reason: HOSPADM

## 2019-11-20 RX ORDER — AMPICILLIN AND SULBACTAM 2; 1 G/1; G/1
3 INJECTION, POWDER, FOR SOLUTION INTRAMUSCULAR; INTRAVENOUS EVERY 6 HOURS
Status: DISCONTINUED | OUTPATIENT
Start: 2019-11-20 | End: 2019-11-20

## 2019-11-20 RX ADMIN — SODIUM CHLORIDE 1000 ML: 9 INJECTION, SOLUTION INTRAVENOUS at 19:08

## 2019-11-20 RX ADMIN — SODIUM CHLORIDE, POTASSIUM CHLORIDE, SODIUM LACTATE AND CALCIUM CHLORIDE: 600; 310; 30; 20 INJECTION, SOLUTION INTRAVENOUS at 04:36

## 2019-11-20 RX ADMIN — SODIUM CHLORIDE, POTASSIUM CHLORIDE, SODIUM LACTATE AND CALCIUM CHLORIDE: 600; 310; 30; 20 INJECTION, SOLUTION INTRAVENOUS at 02:04

## 2019-11-20 RX ADMIN — OXYCODONE HYDROCHLORIDE 5 MG: 5 TABLET ORAL at 23:41

## 2019-11-20 RX ADMIN — MAGNESIUM SULFATE HEPTAHYDRATE 2 G: 40 INJECTION, SOLUTION INTRAVENOUS at 00:58

## 2019-11-20 RX ADMIN — SODIUM CHLORIDE: 9 INJECTION, SOLUTION INTRAVENOUS at 16:52

## 2019-11-20 RX ADMIN — DIAZEPAM 5 MG: 10 INJECTION, SOLUTION INTRAMUSCULAR; INTRAVENOUS at 04:52

## 2019-11-20 RX ADMIN — FOLIC ACID 1 MG: 5 INJECTION, SOLUTION INTRAMUSCULAR; INTRAVENOUS; SUBCUTANEOUS at 06:19

## 2019-11-20 RX ADMIN — ACETAMINOPHEN 650 MG: 650 SUPPOSITORY RECTAL at 01:35

## 2019-11-20 RX ADMIN — AMPICILLIN SODIUM AND SULBACTAM SODIUM 3 G: 2; 1 INJECTION, POWDER, FOR SOLUTION INTRAMUSCULAR; INTRAVENOUS at 08:34

## 2019-11-20 RX ADMIN — DIAZEPAM 5 MG: 10 INJECTION, SOLUTION INTRAMUSCULAR; INTRAVENOUS at 04:14

## 2019-11-20 RX ADMIN — DIAZEPAM 5 MG: 5 INJECTION, SOLUTION INTRAMUSCULAR; INTRAVENOUS at 04:52

## 2019-11-20 RX ADMIN — DIAZEPAM 2.5 MG: 5 INJECTION, SOLUTION INTRAMUSCULAR; INTRAVENOUS at 02:25

## 2019-11-20 RX ADMIN — SODIUM CHLORIDE, POTASSIUM CHLORIDE, SODIUM LACTATE AND CALCIUM CHLORIDE 1000 ML: 600; 310; 30; 20 INJECTION, SOLUTION INTRAVENOUS at 00:10

## 2019-11-20 RX ADMIN — MIDAZOLAM HYDROCHLORIDE 2 MG: 1 INJECTION, SOLUTION INTRAMUSCULAR; INTRAVENOUS at 01:23

## 2019-11-20 RX ADMIN — MIDAZOLAM HYDROCHLORIDE 1 MG: 1 INJECTION, SOLUTION INTRAMUSCULAR; INTRAVENOUS at 00:43

## 2019-11-20 RX ADMIN — AMPICILLIN SODIUM AND SULBACTAM SODIUM 3 G: 2; 1 INJECTION, POWDER, FOR SOLUTION INTRAMUSCULAR; INTRAVENOUS at 13:42

## 2019-11-20 RX ADMIN — DIAZEPAM 5 MG: 5 INJECTION, SOLUTION INTRAMUSCULAR; INTRAVENOUS at 04:14

## 2019-11-20 RX ADMIN — LORAZEPAM 2 MG: 2 INJECTION INTRAMUSCULAR; INTRAVENOUS at 21:00

## 2019-11-20 RX ADMIN — MULTIPLE VITAMINS W/ MINERALS TAB 1 TABLET: TAB at 08:42

## 2019-11-20 RX ADMIN — SODIUM CHLORIDE, POTASSIUM CHLORIDE, SODIUM LACTATE AND CALCIUM CHLORIDE 1000 ML: 600; 310; 30; 20 INJECTION, SOLUTION INTRAVENOUS at 00:19

## 2019-11-20 RX ADMIN — DEXMEDETOMIDINE 0.2 MCG/KG/HR: 100 INJECTION, SOLUTION, CONCENTRATE INTRAVENOUS at 04:26

## 2019-11-20 RX ADMIN — LORAZEPAM 1 MG: 2 INJECTION INTRAMUSCULAR; INTRAVENOUS at 19:08

## 2019-11-20 RX ADMIN — PIPERACILLIN AND TAZOBACTAM 3.38 G: 3; .375 INJECTION, POWDER, LYOPHILIZED, FOR SOLUTION INTRAVENOUS at 22:21

## 2019-11-20 RX ADMIN — ACETAMINOPHEN 650 MG: 325 TABLET, FILM COATED ORAL at 21:27

## 2019-11-20 RX ADMIN — SODIUM CHLORIDE: 9 INJECTION, SOLUTION INTRAVENOUS at 05:32

## 2019-11-20 RX ADMIN — THIAMINE HYDROCHLORIDE 200 MG: 100 INJECTION, SOLUTION INTRAMUSCULAR; INTRAVENOUS at 08:38

## 2019-11-20 RX ADMIN — LORAZEPAM 1 MG: 2 INJECTION, SOLUTION INTRAMUSCULAR; INTRAVENOUS at 01:38

## 2019-11-20 RX ADMIN — LORAZEPAM 2 MG: 2 INJECTION INTRAMUSCULAR; INTRAVENOUS at 22:20

## 2019-11-20 ASSESSMENT — ACTIVITIES OF DAILY LIVING (ADL)
ADLS_ACUITY_SCORE: 15
ADLS_ACUITY_SCORE: 16
ADLS_ACUITY_SCORE: 14
ADLS_ACUITY_SCORE: 16

## 2019-11-20 NOTE — ED TRIAGE NOTES
Agitation on scene.  Admits to taking Adderall and ETOH.  Droperidol 5mg and Zofran 4mg.  Now appropriate answering questions.

## 2019-11-20 NOTE — PROGRESS NOTES
Pt transferred from ED via cart, monitored and accompanied by ER personnel. Pt is confused, agitated, hostile, moves all extremities.  Pt is  hypotensive, NP 2L

## 2019-11-20 NOTE — ED NOTES
Pt continues to have intermittent outbursts of thrashing in bed and yelling out. Will drift off to sleep for a short time.

## 2019-11-20 NOTE — ED NOTES
Pt attempting to sit up in bed. Short burst of yelling. Pt told he was going upstairs to the ICU. Pt calmed back down. Transport monitor placed on pt.

## 2019-11-20 NOTE — PROGRESS NOTES
"[facility]    Hospitalist Progress Note    Interval History   - Agitated this morning requiring continuing restraints, Precedex. Precedex was weaned off and restraints removed around lunchtime  - Patient notes that yesterday, his friend gave him something to \"sip\" without telling him what it was, and then over two hours developed severe pain, agitation, uncontrolled, and was \"flopping around like a fish.\" Vomited. Drank 3 beers. He felt as if he was going to die. Currently the patient reports feeling much better. He still feels mildly agitated but otherwise is hungry. Denies pain, chest pain, nausea, vomiting. Denies any other drug use in the past week, and reports drinking only 3 beers just yesterday, otherwise no beer in the past week. No cough currently.  - Transfer out of ICU for continued monitoring    Assessment & Plan   Summary: Andrea Collado is a 54 year old male with PMH polysubstance abuse including alcohol and meth, tobacco use disorder, hepatitis C, who was admitted on 11/19/2019 with agitated delirium secondary to unknown drug ingestion.    Agitated delirium secondary to unknown drug ingestion, suspect methamphetamine, improved  Fever, tachycardia, agitation secondary to above  Patient reports friend gave him unknown substance which caused him to have significant pain, vomit, and become agitated. In ED was significantly agitated requiring Precedex and restraints. Noted fever and tachycardia. Restraints removed around noon on 11/20. Urine tox positive for benzos and amphetamine.   No clear evidence of infection or aspiration--patient's lungs are clear, and all vital signs back to normal, other than soft blood pressure.  - Stop antibiotics, restraints, I&Os, cardiac monitoring  - Continue IVF  - Okay to continue 1:1 for now  - Psychiatry consult  - Chem dep consult    Polysubstance abuse  Alcohol use disorder  Prior hospitalizations for alcohol withdrawal and substance abuse. EtOh level " "on admission was 0.06. Patient reports only 3 beers yesterday, and no other alcohol use this week  - CIWA, thiamine, folate, Ativan PRN    DVT Prophylaxis: Pneumatic Compression Devices  Code Status: Full Code  PT/OT: not needed    Disposition: Expected discharge probably tomorrow    Claudio Hall MD  Text Page  (7am to 6pm)  -Data reviewed today: I reviewed all new labs and imaging results over the last 24 hours.    Physical Exam   Temp: 97  F (36.1  C) Temp src: Bladder BP: (!) 80/63 Pulse: 66 Heart Rate: 71 Resp: 24 SpO2: 96 % O2 Device: None (Room air) Oxygen Delivery: 2 LPM  Vitals:    11/20/19 0321 11/20/19 0527   Weight: 90 kg (198 lb 6.6 oz) 92.5 kg (203 lb 14.8 oz)     Vital Signs with Ranges  Temp:  [97  F (36.1  C)-104.3  F (40.2  C)] 97  F (36.1  C)  Pulse:  [] 66  Heart Rate:  [] 71  Resp:  [7-31] 24  BP: ()/(37-75) 80/63  SpO2:  [91 %-100 %] 96 %  I/O last 3 completed shifts:  In: 1057.82 [I.V.:1057.82]  Out: 250 [Urine:250]  O2 requirements: none    Constitutional: Male in NAD  HEENT: Eyes nonicteric, oral mucosa moist, sore just superior to right upper lip  Cardiovascular: RRR, normal S1/2, no m/r/g  Respiratory: CTAB, no wheezing or crackles  Vascular: No LE pitting edema  GI: Normoactive bowel sounds, nontender, nondistended  Skin/Integumen: No rashes  Neuro/Psych: \"Fidgety\" otherwise appropriate mood and affect. A&Ox3, moves all extremities    Medications     dexmedetomidine Stopped (11/20/19 1230)     sodium chloride 100 mL/hr at 11/20/19 0532       ampicillin-sulbactam (UNASYN) IV  3 g Intravenous Q6H     [START ON 11/23/2019] folic acid  1 mg Oral Daily     [START ON 11/21/2019] folic acid  1 mg Intravenous Daily     multivitamin w/minerals  1 tablet Oral Daily     rocuronium  90 mg Intravenous Once     sodium chloride (PF)  3 mL Intracatheter Q8H     thiamine  200 mg Intravenous TID     [START ON 11/22/2019] thiamine  100 mg Oral TID     [START ON 11/27/2019] thiamine  " 100 mg Oral Daily       Data   Recent Labs   Lab 11/20/19  0000   WBC 2.0*   HGB 13.2*   MCV 90   *      POTASSIUM 3.7   CHLORIDE 106   CO2 22   BUN 20   CR 1.11   ANIONGAP 8   JOHANA 8.1*   GLC 92   ALBUMIN 3.3*   PROTTOTAL 6.5*   BILITOTAL 0.9   ALKPHOS 334*   *   *   TROPI <0.015       Imaging:   Recent Results (from the past 24 hour(s))   XR Chest Port 1 View    Narrative    CHEST SINGLE VIEW PORTABLE  11/20/2019 12:07 AM     HISTORY: Altered mental status.    COMPARISON: 4/25/2013.    FINDINGS: Hypoinflated lungs. No convincing pulmonary opacities.      Impression    IMPRESSION: No convincing evidence of active cardiopulmonary disease.    PACO BYNUM MD   Head CT w/o contrast    Narrative    EXAM: CT HEAD W/O CONTRAST  LOCATION: [facility]  DATE/TIME: 11/20/2019 12:36 AM    INDICATION: Altered mental status.  COMPARISON: None.  TECHNIQUE: Routine without IV contrast. Multiplanar reformats. Dose reduction techniques were used.    FINDINGS:  INTRACRANIAL CONTENTS: No intracranial hemorrhage, extraaxial collection, or mass effect.  No CT evidence of acute infarct. Normal parenchymal attenuation. Normal ventricles and sulci.     VISUALIZED ORBITS/SINUSES/MASTOIDS: No intraorbital abnormality. Mild mucosal thickening scattered about the paranasal sinuses. No middle ear or mastoid effusion.    BONES/SOFT TISSUES: No acute abnormality.      Impression    IMPRESSION:  1.  Normal intracranial contents.  2.  Mild paranasal sinus inflammation. No air-fluid level.

## 2019-11-20 NOTE — PHARMACY-ADMISSION MEDICATION HISTORY
Pharmacy Medication History  Admission medication history interview status for the 11/19/2019  admission is complete. See EPIC admission navigator for prior to admission medications     Medication history sources: Patient  Medication history source reliability: Moderate  Adherence assessment: Good    Significant changes made to the medication list:  Called Tamara to clarify strength of Testosterone      Additional medication history information:   .    Medication reconciliation completed by provider prior to medication history? No    Time spent in this activity: 10      Prior to Admission medications    Medication Sig Last Dose Taking? Auth Provider   buPROPion (WELLBUTRIN SR) 150 MG 12 hr tablet Take 1 tablet (150 mg) by mouth 2 times daily 11/19/2019 at am Yes Rm Barbour MD   testosterone cypionate (DEPOTESTOSTERONE) 200 MG/ML injection Inject 200 mg into the muscle every 14 days 11/6/2019 Yes Unknown, Entered By History

## 2019-11-20 NOTE — H&P
[facility]    History and Physical - Hospitalist Service       Date of Admission:  11/19/2019    Assessment & Plan   Andrea Collado is a 54 year old male past medical history of polysubstance abuse including alcohol and methamphetamine, tobacco use disorder, chronic hepatitis C who is brought into the ED due to altered mental status and agitation.  He is initially noted to be febrile to 104, tachycardic to 130s.  He had received fluids, benzodiazepines with improving temperature as well as heart rate.  Due to ongoing short outbursts, he is initiated on Precedex drip and admission requested for further management.      Altered mental status  Suspected unknown substance ingestion versus overdose  Hx of polysubstance abuse  Presented with agitation, noted to be febrile to 104, tachycardic and altered mental status.  Patient has a history of paralysis but does abuse.  He did also ingest alcohol with ethanol level of 0.06 in the ED.  Concern is it with his fever, agitation, tachycardia, concern for some stimulant ingestion.  This seems to have improved with Tylenol, Valium, IV fluids.  He is initiated on Precedex drip due to ongoing short outbursts and agitation.  -Continue with Precedex drip and wean as able  -Initiated him on CIWA protocol with Ativan  -Urine drug screen was able to urinate  -Monitor mental status  - consider psych and CD consult once he is able to participate    Fever  -Suspect this is related to his substance ingestion.  At this time no clear source of infection.  Chest x-ray is clear.  -We will monitor fever curve  -Follow blood culture results  -Monitor off antibiotics for now    Abnormal LFTs  Suspect this is related to alcohol consumption.  AST is greater than ALT.  Bilirubin is within normal range.  CK is also in the normal range.  -Monitor LFTs    Pancytopenia  -All his counts including WBC, hemoglobin and platelets are low.  Suspect this may be related to alcohol.  -Monitor  mental counts         Diet: NPO  DVT Prophylaxis: Pneumatic Compression Devices  Lino Catheter: not present  Code Status: Full by default, unable to discuss with patient due to mental status    Disposition Plan   Expected discharge: 2 - 3 days, recommended to prior living arrangement once Pending improved mental status..  Entered: Fanta Rose MD 11/20/2019, 5:18 AM         Fanta Rose MD  [facility]    ______________________________________________________________________    Chief Complaint   After mental status, agitation    History is obtained from chart review and discussion with ED physician.  Unable to obtain history from patient due to his altered mental status.    History of Present Illness   Andrea Collado is a 54 year old male past medical history of polysubstance abuse including alcohol and methamphetamine, tobacco use disorder, chronic hepatitis C who is brought into the ED due to altered mental status and agitation.  Per ED note, patient was found by EMS agitated and laying on the ground.  Per EMS report, patient took 6 Adderall.  However patient was recently able to say in the ED that he took 2 Adderall and had a bottle of alcohol.  He also denied other drug use it to the ED physician.    During my visit to the ED, he is sedated with Precedex and has also received Valium.  He does wake up however he yells out  And is  agitated when he is awaken,then goes back to sleep.  I was not able to get any meaningful history from the patient.    In the ED, he was tachycardic in the 130s and febrile to 104 upon arrival.  He was given IV fluids and benzodiazepine due to concern for stimulant use.  He remained calm, however upon awakening or attempting to track patient continues to have brief outbursts in the ED.    Laboratory shows a normal basic metabolic panel with sodium of 136, potassium 3.7, BUN 20, creatinine 1.11.  Magnesium was low at 1.2.  LFTs showed alk phos of 334, ALT  of 152, AST of 304, , lactic acid normalized to 1.6 after IV fluids.  Troponin is below the reference range.  Alcohol is 0.06.  CT head is negative.  CXR was negative for acute abnormality.  Urine drug screen was ordered, but not obtained since patient did not make urine.  He is initiated on Precedex drip while in the emergency room and admission requested for further management.        Review of Systems    Review of systems not obtained due to patient factors - mental status    Past Medical History    I have reviewed this patient's medical history and updated it with pertinent information if needed.   Past Medical History:   Diagnosis Date     Chemical dependency (H)      Cocaine abuse (H)      Depressive disorder      DTs (delirium tremens) (H)      DVT (deep venous thrombosis) (H) 5 y ago    left foot, treated with coumadin     Flail chest     broken sterum, wiring      History of total hip arthroplasty     right     Hypertension      Seizures (H)      Substance abuse (H)     alcohol, opiates   Polysubstance abuse,    Past Surgical History   I have reviewed this patient's surgical history and updated it with pertinent information if needed.  Past Surgical History:   Procedure Laterality Date     CHEST SURGERY  1987    flail chest, sterum fractured     HIP SURGERY       KNEE SURGERY       ORTHOPEDIC SURGERY      KIMBER right. [place] left shoulder surgery, debridement right shoulder, neck surgery- fracture cervical spine. 6 knee surgeries- bucket handle meniscal tear and debridement. 3 heel surgeries.      ORTHOPEDIC SURGERY         Social History   I have reviewed this patient's social history and updated it with pertinent information if needed.  Social History     Tobacco Use     Smoking status: Current Some Day Smoker     Smokeless tobacco: Current User   Substance Use Topics     Alcohol use: Yes     Alcohol/week: 24.0 standard drinks     Types: 24 Cans of beer per week     Comment: drinks 1.75L grain  alcohol daily since 4/28/18     Drug use: Yes     Types: Methamphetamines     Comment: pt has been injecting meth for past 5 days       Family History   I have reviewed this patient's family history and updated it with pertinent information if needed.   Family History   Problem Relation Age of Onset     Substance Abuse Mother      Substance Abuse Father      Substance Abuse Sister        Prior to Admission Medications   Prior to Admission Medications   Prescriptions Last Dose Informant Patient Reported? Taking?   buPROPion (WELLBUTRIN SR) 150 MG 12 hr tablet   No No   Sig: Take 1 tablet (150 mg) by mouth 2 times daily      Facility-Administered Medications: None     Allergies   No Known Allergies    Physical Exam   Vital Signs: Temp: 100.3  F (37.9  C) Temp src: Temporal BP: 92/50 Pulse: 123 Heart Rate: 108 Resp: 25 SpO2: 98 % O2 Device: Nasal cannula Oxygen Delivery: 4 LPM  Weight: 198 lbs 6.62 oz    General Appearance: Sedated, he yells out and agitated when awakening, but goes back to sleep  HEENT: normocephalic, pupils appear constricted, mild conjunctival injection, oral mucosa not examined  Respiratory: clear to auscultation bilaterally, no wheezing  Cardiovascular: Regular rate and rhythm, tachycardic  GI: Soft and nontender  Skin: Warm and dry  Musculoskeletal: No obvious joint effusion or swelling   Neurologic: Moves all extremities, sedated, but agitated and yells out upon awakening then goes back to sleep  Psychiatric: Unable to assess    Data   Data reviewed today: I reviewed all medications, new labs and imaging results over the last 24 hours. I personally reviewed the chest x-ray image(s) showing As mentioned above and the head CT image(s) showing As mentioned above.    Recent Labs   Lab 11/20/19  0000   WBC 2.0*   HGB 13.2*   MCV 90   *      POTASSIUM 3.7   CHLORIDE 106   CO2 22   BUN 20   CR 1.11   ANIONGAP 8   JOHANA 8.1*   GLC 92   ALBUMIN 3.3*   PROTTOTAL 6.5*   BILITOTAL 0.9   ALKPHOS  334*   *   *   TROPI <0.015     Recent Results (from the past 24 hour(s))   XR Chest Port 1 View    Narrative    CHEST SINGLE VIEW PORTABLE  11/20/2019 12:07 AM     HISTORY: Altered mental status.    COMPARISON: 4/25/2013.    FINDINGS: Hypoinflated lungs. No convincing pulmonary opacities.      Impression    IMPRESSION: No convincing evidence of active cardiopulmonary disease.    PACO BYNUM MD   Head CT w/o contrast    Narrative    EXAM: CT HEAD W/O CONTRAST  LOCATION: [facility]  DATE/TIME: 11/20/2019 12:36 AM    INDICATION: Altered mental status.  COMPARISON: None.  TECHNIQUE: Routine without IV contrast. Multiplanar reformats. Dose reduction techniques were used.    FINDINGS:  INTRACRANIAL CONTENTS: No intracranial hemorrhage, extraaxial collection, or mass effect.  No CT evidence of acute infarct. Normal parenchymal attenuation. Normal ventricles and sulci.     VISUALIZED ORBITS/SINUSES/MASTOIDS: No intraorbital abnormality. Mild mucosal thickening scattered about the paranasal sinuses. No middle ear or mastoid effusion.    BONES/SOFT TISSUES: No acute abnormality.      Impression    IMPRESSION:  1.  Normal intracranial contents.  2.  Mild paranasal sinus inflammation. No air-fluid level.

## 2019-11-20 NOTE — PROGRESS NOTES
[facility]  History and Physical/ Consult  Critical Care Service  Date of Admission:  11/19/2019  Date of Service (when I saw the patient): 11/20/19  Assessment & Plan   Andrea Collado is a 54 year old male with a past medical history of polysubstance abuse including alcohol, methamphetamine and tobacco use disorder, and also a history of chronic hepatitis C. He was brought into the ED due to altered mental status and agitation.  He is initially noted to be febrile to 104, tachycardic to 130's.  He received fluids and benzodiazepines with improving temperature as well as heart rate.  Due to ongoing outbursts and agitation, he was initiated on a Precedex drip and admitted to the ICU for further management.    Neuro  #Encephalopathy 2/2 polysubstance ingestion  #Agitation  Plan:  -- Precedex for agitation  -- CIWA protocol  -- Monitor mental status    CV  #Tachycardia, resolved  #Hypotensive  -- Secondary to agitation and drug ingestion  -- Continue to monitor  -- Resuscitated with IVF  -- Titrate dex drip down to help soft blood pressures    Resp:  No acute issues  -- Continue to monitor  -- Supplemental oxygen to keep Sp02 >90%    GI/Nutrition  #Chronic hepatitis C  #Abnormal LFTs  -- AST greater than ALT, likely related to alcohol consumption  -- Continue to monitor liver function    Renal  No acute issues  -- Lino catheter in place with adequate output    ID  #Febrile, resolved  -- Temp up to 104 on admission, normothermic now.    Endocrine  No acute issue    Heme:  #Pancytopenia  -- WBC, Hemoglobin, and Platelets are low, presumably related to alcohol ingestion  -- Monitor CBC    MSK  No acute issues    Skin  No acute issues    General cares:  DVT Prophylaxis: Pneumatic Compression Devices  GI Prophylaxis: Not indicated  Restraints: Restraints for medical healing needed: YES  Family update by me today: No  Current lines are required for patient management  Access:      Renuka Ballard  Spent on this Encounter   Billing:  I spent 30 minutes bedside and on the inpatient unit today managing the critical care of Andrea PROSPER Collado in relation to the issues listed in this note.    Code Status   Full Code    Primary Care Physician   Rian Myers    Chief Complaint   Altered Mental Status and agitation  History is obtained from the patient and electronic health record    History of Present Illness   See Above    Past Medical History    I have reviewed this patient's medical history and updated it with pertinent information if needed.   Past Medical History:   Diagnosis Date     Chemical dependency (H)      Cocaine abuse (H)      Depressive disorder      DTs (delirium tremens) (H)      DVT (deep venous thrombosis) (H) 5 y ago    left foot, treated with coumadin     Flail chest     broken sterum, wiring      History of total hip arthroplasty     right     Hypertension      Seizures (H)      Substance abuse (H)     alcohol, opiates       Past Surgical History   I have reviewed this patient's surgical history and updated it with pertinent information if needed.  Past Surgical History:   Procedure Laterality Date     CHEST SURGERY  1987    flail chest, sterum fractured     HIP SURGERY       KNEE SURGERY       ORTHOPEDIC SURGERY      KIMBER right. [place] left shoulder surgery, debridement right shoulder, neck surgery- fracture cervical spine. 6 knee surgeries- bucket handle meniscal tear and debridement. 3 heel surgeries.      ORTHOPEDIC SURGERY         Prior to Admission Medications   Prior to Admission Medications   Prescriptions Last Dose Informant Patient Reported? Taking?   buPROPion (WELLBUTRIN SR) 150 MG 12 hr tablet   No No   Sig: Take 1 tablet (150 mg) by mouth 2 times daily      Facility-Administered Medications: None     Allergies   No Known Allergies    Social History   I have reviewed this patient's social history and updated it with pertinent information if needed. Andrea Collado  reports that he  has been smoking. He uses smokeless tobacco. He reports current alcohol use of about 24.0 standard drinks of alcohol per week. He reports current drug use. Drug: Methamphetamines.    Family History   I have reviewed this patient's family history and updated it with pertinent information if needed.   Family History   Problem Relation Age of Onset     Substance Abuse Mother      Substance Abuse Father      Substance Abuse Sister        Review of Systems   The 10 point Review of Systems is negative other than noted in the HPI or here.     Physical Exam   Temp: 97.9  F (36.6  C) Temp src: Bladder Temp  Min: 97.9  F (36.6  C)  Max: 104.3  F (40.2  C) BP: 100/65 Pulse: 91 Heart Rate: 79 Resp: 20 SpO2: 98 % O2 Device: Nasal cannula Oxygen Delivery: 2 LPM  Vital Signs with Ranges  Temp:  [97.9  F (36.6  C)-104.3  F (40.2  C)] 97.9  F (36.6  C)  Pulse:  [] 91  Heart Rate:  [] 79  Resp:  [7-31] 20  BP: ()/(37-75) 100/65  SpO2:  [91 %-99 %] 98 %  203 lbs 14.81 oz    GEN: Sleeping, arousable to voice.  He is oriented x4, appropriate conversation  EYES: PERRL,  HEENT:  Normocephalic, atraumatic  CV: RRR, no gallops, rubs, or murmurs  PULM/CHEST: Clear breath sounds bilaterally without rhonchi, crackles or wheeze, symmetric chest rise  GI: normal bowel sounds, soft, non-tender, no rebound tenderness or guarding, no masses  : polanco catheter in place, urine yellow and clear  EXTREMITIES: no peripheral edema, moving all extremities, peripheral pulses intact  SKIN: No rashes, sores or ulcerations  PSYCH:  Affect: appropriate     Data   Results for orders placed or performed during the hospital encounter of 11/19/19 (from the past 24 hour(s))   EKG 12-lead, tracing only   Result Value Ref Range    Interpretation ECG Click View Image link to view waveform and result    CBC with platelets differential   Result Value Ref Range    WBC 2.0 (L) 4.0 - 11.0 10e9/L    RBC Count 4.41 4.4 - 5.9 10e12/L    Hemoglobin 13.2 (L)  13.3 - 17.7 g/dL    Hematocrit 39.6 (L) 40.0 - 53.0 %    MCV 90 78 - 100 fl    MCH 29.9 26.5 - 33.0 pg    MCHC 33.3 31.5 - 36.5 g/dL    RDW 13.4 10.0 - 15.0 %    Platelet Count 108 (L) 150 - 450 10e9/L    Diff Method Automated Method     % Neutrophils 96.0 %    % Lymphocytes 2.5 %    % Monocytes 0.5 %    % Eosinophils 0.5 %    % Basophils 0.0 %    % Immature Granulocytes 0.5 %    Nucleated RBCs 0 0 /100    Absolute Neutrophil 1.9 1.6 - 8.3 10e9/L    Absolute Lymphocytes 0.1 (L) 0.8 - 5.3 10e9/L    Absolute Monocytes 0.0 0.0 - 1.3 10e9/L    Absolute Eosinophils 0.0 0.0 - 0.7 10e9/L    Absolute Basophils 0.0 0.0 - 0.2 10e9/L    Abs Immature Granulocytes 0.0 0 - 0.4 10e9/L    Absolute Nucleated RBC 0.0    Comprehensive metabolic panel   Result Value Ref Range    Sodium 136 133 - 144 mmol/L    Potassium 3.7 3.4 - 5.3 mmol/L    Chloride 106 94 - 109 mmol/L    Carbon Dioxide 22 20 - 32 mmol/L    Anion Gap 8 3 - 14 mmol/L    Glucose 92 70 - 99 mg/dL    Urea Nitrogen 20 7 - 30 mg/dL    Creatinine 1.11 0.66 - 1.25 mg/dL    GFR Estimate 74 >60 mL/min/[1.73_m2]    GFR Estimate If Black 86 >60 mL/min/[1.73_m2]    Calcium 8.1 (L) 8.5 - 10.1 mg/dL    Bilirubin Total 0.9 0.2 - 1.3 mg/dL    Albumin 3.3 (L) 3.4 - 5.0 g/dL    Protein Total 6.5 (L) 6.8 - 8.8 g/dL    Alkaline Phosphatase 334 (H) 40 - 150 U/L     (H) 0 - 70 U/L     (H) 0 - 45 U/L   Troponin I   Result Value Ref Range    Troponin I ES <0.015 0.000 - 0.045 ug/L   Alcohol ethyl   Result Value Ref Range    Ethanol g/dL 0.06 (H) <0.01 g/dL   Magnesium   Result Value Ref Range    Magnesium 1.2 (L) 1.6 - 2.3 mg/dL   CK total   Result Value Ref Range    CK Total 217 30 - 300 U/L   XR Chest Port 1 View    Narrative    CHEST SINGLE VIEW PORTABLE  11/20/2019 12:07 AM     HISTORY: Altered mental status.    COMPARISON: 4/25/2013.    FINDINGS: Hypoinflated lungs. No convincing pulmonary opacities.      Impression    IMPRESSION: No convincing evidence of active  cardiopulmonary disease.    PACO BYNUM MD   ISTAT gases lactate [place] POCT   Result Value Ref Range    Ph Venous 7.37 7.32 - 7.43 pH    PCO2 Venous 36 (L) 40 - 50 mm Hg    PO2 Venous 29 25 - 47 mm Hg    Bicarbonate Venous 21 21 - 28 mmol/L    O2 Sat Venous 55 %    Lactic Acid 2.9 (H) 0.7 - 2.1 mmol/L   Head CT w/o contrast    Narrative    EXAM: CT HEAD W/O CONTRAST  LOCATION: [facility]  DATE/TIME: 11/20/2019 12:36 AM    INDICATION: Altered mental status.  COMPARISON: None.  TECHNIQUE: Routine without IV contrast. Multiplanar reformats. Dose reduction techniques were used.    FINDINGS:  INTRACRANIAL CONTENTS: No intracranial hemorrhage, extraaxial collection, or mass effect.  No CT evidence of acute infarct. Normal parenchymal attenuation. Normal ventricles and sulci.     VISUALIZED ORBITS/SINUSES/MASTOIDS: No intraorbital abnormality. Mild mucosal thickening scattered about the paranasal sinuses. No middle ear or mastoid effusion.    BONES/SOFT TISSUES: No acute abnormality.      Impression    IMPRESSION:  1.  Normal intracranial contents.  2.  Mild paranasal sinus inflammation. No air-fluid level.   Blood culture   Result Value Ref Range    Specimen Description Blood Left Arm     Special Requests Aerobic and anaerobic bottles received     Culture Micro No growth after 5 hours    Blood culture   Result Value Ref Range    Specimen Description Blood Left Arm     Special Requests Aerobic and anaerobic bottles received     Culture Micro No growth after 4 hours    ISTAT gases lactate [place] POCT   Result Value Ref Range    Ph Venous 7.40 7.32 - 7.43 pH    PCO2 Venous 37 (L) 40 - 50 mm Hg    PO2 Venous 26 25 - 47 mm Hg    Bicarbonate Venous 23 21 - 28 mmol/L    O2 Sat Venous 49 %    Lactic Acid 1.6 0.7 - 2.1 mmol/L   EKG 12-lead, tracing only   Result Value Ref Range    Interpretation ECG Click View Image link to view waveform and result    Drug abuse screen 77 urine ([place], [place], SH)   Result Value Ref Range     Amphetamine Qual Urine Positive (A) NEG^Negative    Barbiturates Qual Urine Negative NEG^Negative    Benzodiazepine Qual Urine Positive (A) NEG^Negative    Cannabinoids Qual Urine Negative NEG^Negative    Cocaine Qual Urine Negative NEG^Negative    Opiates Qualitative Urine Negative NEG^Negative    PCP Qual Urine Negative NEG^Negative   Glucose by meter   Result Value Ref Range    Glucose 105 (H) 70 - 99 mg/dL   Magnesium   Result Value Ref Range    Magnesium 1.9 1.6 - 2.3 mg/dL   Phosphorus   Result Value Ref Range    Phosphorus 3.3 2.5 - 4.5 mg/dL   Lactic acid whole blood   Result Value Ref Range    Lactic Acid 1.5 0.7 - 2.0 mmol/L   Glucose by meter   Result Value Ref Range    Glucose 95 70 - 99 mg/dL

## 2019-11-20 NOTE — ED PROVIDER NOTES
"  History     Chief Complaint:  Altered Mental Status and Agitation    The history is provided by the patient and the EMS personnel. The history is limited by the condition of the patient.      Andrea Collado is a 54 year old male with a history of attention deficit disorder, cocaine abuse, and methamphetamine use disorder who presents with altered mental status and agitation. The patient was found by EMS tonight highly agitated and laying on the ground. EMS reports that the patient took 6 adderall which he is prescribed. The patient was not witnessed by anyone taking the adderall. EMS reports a blood pressure of 100/64 and a heart rate of 160 while en route. The patient's blood sugar was 77.  EMS gave the patient 5 mg Droperidol and 4 mg of Zofran. While in the ER, the patient admits to taking only 2 adderall and states that he had a bottle of alcohol. He denies any drug use, IV drug use, or taking other pills. He states that he feels his heart racing and feels like he can't breathe. He also notes that he felt sick today.     Later on, patient reported his friend had given him a drug to take orally.  He did not know what it was, but only that is \"messed me up.\"    Allergies:  No Known Allergies     Medications:    Wellbutrin   adderall   Atarax   mavyret   Vistaril   Testosterone   Mavyret     Past Medical History:    Chemical dependency    Cocaine abuse    Depressive disorder   delirium tremens  deep venous thrombosis   Flail chest    Hypertension   Seizures   Substance abuse  Attention deficit disorder   Methamphetamine use disorder   Alcohol use disorder   Chronic pain syndrome   Anxiety   Tobacco dependence   Polysubstance abuse   Chronic anemia   Psychosis     Past Surgical History:    Chest surgery   Hip surgery   Knee surgery   Orthopedic surgery     Family History:    Substance abuse     Social History:  Smoking Status: current some day smoker  Smokeless Tobacco: current user  Alcohol Use: Yes  Drug Use: " yes  PCP: Rian Myers   Marital Status:  Single     Review of Systems   Unable to perform ROS: Acuity of condition     Physical Exam   First Vitals:  Patient Vitals for the past 24 hrs:   BP Temp Temp src Pulse Heart Rate Resp SpO2 Weight   11/20/19 0507 92/50 -- -- -- 108 -- 98 % --   11/20/19 0501 92/50 -- -- 123 121 25 99 % --   11/20/19 0450 92/46 -- -- 118 -- -- -- --   11/20/19 0445 101/53 -- -- 130 129 25 97 % --   11/20/19 0442 105/57 -- -- 133 122 15 98 % --   11/20/19 0426 -- -- -- -- 115 (!) 31 -- --   11/20/19 0345 99/41 -- -- 122 128 -- 97 % --   11/20/19 0330 124/75 -- -- 123 131 20 93 % --   11/20/19 0321 -- -- -- -- -- -- -- 90 kg (198 lb 6.6 oz)   11/20/19 0320 103/46 -- -- 121 121 25 94 % --   11/20/19 0306 -- 100.3  F (37.9  C) Temporal -- -- -- -- --   11/20/19 0239 108/56 -- -- 121 -- -- -- --   11/20/19 0221 -- 102.2  F (39  C) Temporal -- -- -- -- --   11/20/19 0153 111/63 -- -- -- -- -- -- --   11/20/19 0145 -- -- -- -- 123 -- 96 % --   11/20/19 0129 110/57 -- -- 128 126 -- -- --   11/20/19 0108 -- -- -- -- 131 23 -- --   11/20/19 0048 107/49 -- -- 140 130 -- -- --   11/20/19 0008 -- -- -- -- 149 -- -- --   11/19/19 2358 -- 104.3  F (40.2  C) -- -- -- -- -- --   11/19/19 2357 110/41 -- Axillary 156 -- 20 92 % --          Physical Exam   General: Appears dry.  Calm when lying still, but yells out when interacted with.  Redirectable.    Head: No signs of trauma.   Mouth/Throat: Oropharynx is clear and moist.   Eyes: Conjunctivae are normal. Pupils are equal, small, round, and reactive to light.   Neck: Normal range of motion.   CV: Tachycardic and regular rhythm.    Resp: Effort normal and breath sounds normal. No respiratory distress.   GI: Soft. There is no tenderness.  No rebound or guarding.  Normal bowel sounds.    MSK: Normal range of motion. no edema. No Calf tenderness.  Neuro: The patient initially sedated with short outbursts.  Later on could give some history, but still  limited.  Moving all extremities.    Skin: Skin is warm and dry.   Psych: calm with brief periods of agitation if overly stimulated.  Hallucinations present.    Emergency Department Course   ECG:  ECG taken at 2353  Sinus tachycardia   Nonspecific ST and T wave abnormality   Abnormal ECG  Rate 152 bpm. OH interval 138 ms. QRS duration 80 ms. QT/QTc 328/521 ms. P-R-T axes 49 17 64.     ECG:  ECG taken at 0232, ECG read at 0234  Sinus tachycardia  Otherwise normal ECG  Rate 133 bpm. OH interval 146 ms. QRS duration 88 ms. QT/QTc 286/425 ms. P-R-T axes 72 48 74.     Imaging:  Radiology findings were communicated with the patient who voiced understanding of the findings.    Head CT w/o contrast  1.  Normal intracranial contents.  2.  Mild paranasal sinus inflammation. No air-fluid level.  Reading per radiology     XR Chest Port 1 View  No convincing evidence of active cardiopulmonary disease.  PACO BYNUM MD  Reading per radiology     Laboratory:    ISTAT gases lactate [place] POCT (0010): pH: 7.37, PCO2: 36 (L), PO2: 29, Bicarbonate: 21, O2 Sat: 55, Lactic acid: 2.9 (H)  ISTAT gases lactate [place] POCT(0219): pH: 7.40, PCO2: 37 (L), PO2: 26, Bicarbonate: 23, O2 Sat: 49, Lactic acid: 1.6    Blood culture: pending    Blood culture: pending    CBC: WBC 2.0 (L), HGB 13.2 (L),  (L)  CMP: calcium 8.1 (L), albumin 3.3 (L), protein total 6.5 (L), alkphos 334 (H), alt 152 (H), ast 304 (H) o/w WNL (Creatinine 1.11)   Troponin(0000):  <0.015     Alcohol ethyl: 0.06 (H)    Magnesium: 1.2 (L)  CK total: 217     Interventions:  0010 Lactated ringers bolus 1000 mL IV  0019 Lactated ringers bolus 1000 mL IV  0043 Versed 1 mg IV  0054 Tylenol 1000 mg oral   0058 magnesium sulfate 2 g in water intermittent infusion 2 g IV  0123 Versed 2 mg IV  0135 Tylenol 650 mg rectal   0138 Ativan 1 mg IV  0204 lactated ringers infusion  0225 Valium 2.5 mg IV  0414 Valium 5 mg IV  0426 precedex titrated to effect    0436 lactated ringers infusion  IV  0452 Valium 5 mg IV    Emergency Department Course:  Past medical records, nursing notes, and vitals reviewed.    2346  The patient arrived by EMS.     2347 I performed an exam of the patient as documented above.     2353  The nurse took the patient's axillary temperature which was 104.3.    An EKG was obtained.      IV was inserted and blood was drawn for laboratory testing, results above.      2356  The patient was given a liter of LR.     2357  I ordered ice packs for the patient.     2359 The patient was placed on a nasal canula.     0000  X-ray arrived      The patient was received a chest x-ray and head CT while in the emergency department, results above.       0010  I returned to check on the patient.     0025 Patient rechecked and updated.      0043 Patient rechecked and updated.      0052 Temporal temperature was taken and was 104.7     0253 I rechecked the patient     0247 I spoke with Dr. Rose of the Hospitalist service from [facility] regarding patient's presentation, findings, and plan of care.      0330 I returned to check on the patient.     0442 I spoke with Dr. Rose of the Hospitalist service from [facility] regarding patient's presentation, findings, and plan of care.     0443 I rechecked the patient.     Findings and plan explained to the Patient who consents to admission. Discussed the patient with Dr. Rose, who will admit the patient to a adult ICU bed for further monitoring, evaluation, and treatment.      Impression & Plan   Medical Decision Making:  Andrea Collado is a 54 year old male who presents to the emergency department today with agitation.  Apparently people he was with called EMS given patient was agitated.  EMS reports that he did seem altered and agitated and was ultimately given droperidol in route.  On arrival he was calm although when stimulated would yell out some but then would again be calm.  There is report that he had taken Adderall and there is concern that he  may have crushed some pills and snorted them as there was straw with the patient.  Patient also has a history of other drug use such as cocaine and methamphetamine along with alcohol.  On arrival, the patient was tachycardic and febrile along with dry.  These findings would be consistent with stimulant use and overdose.  He was given IV fluids along with benzodiazepine and over time temperature did improve along with his heart rate.  He would remain calm until he was interacted with and then would have a short outbursts.  His initial EKG did show somewhat long QT but after magnesium and fluids and above treatments, this did normalize.  Lactic acid was mildly elevated which is not surprising given the tachycardia, but this also normalized.  I did obtain cultures, but given the history of stimulant use and reported use this evening, I believe his exam findings are secondary to this as opposed to an infectious etiology.  Patient's sensorium did clear somewhat and he did report that he had taken a drug that his friend had given him, although he could not state what it was.  Withdrawal from alcohol may also be a consideration, although this would not typical cause the dry mouth and fever findings, which is more consistent with stimulant use.  There was some delay with getting the patient admitted as initially there were no ICU beds available here at [facility].  I then proceeded with attempting transfer to the , but they were dealing with a critical patient as well and were unable to call back until they had stabilize the patient.  During this time, the house supervisor was able to make some changes and ultimately an ICU bed did become available here.  Given this, I did initiate the patient on Precedex to help with sedation and he was given repeated doses of Valium.  Given he is not being transported by ambulance, I do not feel that intubation and paralyzation was necessary at this time as he would remain calm and  redirectable overall and did not represent an immediate danger to himself or others at time of admission.  Patient was admitted to the ICU for continued monitoring.    Critical Care Time for this patient, exclusive of procedures, is 120 minutes.      Discharge Diagnosis:    ICD-10-CM    1. Agitation R45.1    2. Ingestion of unknown drug, undetermined intent, initial encounter T50.904A      Disposition:  The patient is admitted into the care of Dr. Rose.     Scribe Disclosure:  I, Jose Luis Joseph, am serving as a scribe at 12:15 AM on 11/20/2019 to document services personally performed by Andrea Mccann MD based on my observations and the provider's statements to me.       I, Janna Hollis, am serving as a scribe at 12:46 AM on 11/20/2019 to document services personally performed by Andrea Mccann MD based on my observations and the provider's statements to me.  11/19/2019    EMERGENCY DEPARTMENT       Andrea Mccann MD  11/20/19 0615

## 2019-11-20 NOTE — ED NOTES
Bed: ST01  Expected date:   Expected time:   Means of arrival:   Comments:  E  54M Adderall/Agitated   1564

## 2019-11-20 NOTE — ED NOTES
Pt thrashing around bed and yelling. States he needs to use a urinal. Urinal placed. Unable to follow command void.

## 2019-11-21 LAB
ALBUMIN SERPL-MCNC: 2.8 G/DL (ref 3.4–5)
ALP SERPL-CCNC: 177 U/L (ref 40–150)
ALT SERPL W P-5'-P-CCNC: 169 U/L (ref 0–70)
ANION GAP SERPL CALCULATED.3IONS-SCNC: 2 MMOL/L (ref 3–14)
AST SERPL W P-5'-P-CCNC: 100 U/L (ref 0–45)
BILIRUB SERPL-MCNC: 0.5 MG/DL (ref 0.2–1.3)
BUN SERPL-MCNC: 15 MG/DL (ref 7–30)
CALCIUM SERPL-MCNC: 8 MG/DL (ref 8.5–10.1)
CHLORIDE SERPL-SCNC: 109 MMOL/L (ref 94–109)
CO2 SERPL-SCNC: 25 MMOL/L (ref 20–32)
CREAT SERPL-MCNC: 0.87 MG/DL (ref 0.66–1.25)
ERYTHROCYTE [DISTWIDTH] IN BLOOD BY AUTOMATED COUNT: 14.1 % (ref 10–15)
GFR SERPL CREATININE-BSD FRML MDRD: >90 ML/MIN/{1.73_M2}
GLUCOSE SERPL-MCNC: 113 MG/DL (ref 70–99)
HCT VFR BLD AUTO: 36.6 % (ref 40–53)
HGB BLD-MCNC: 12.1 G/DL (ref 13.3–17.7)
MCH RBC QN AUTO: 29.7 PG (ref 26.5–33)
MCHC RBC AUTO-ENTMCNC: 33.1 G/DL (ref 31.5–36.5)
MCV RBC AUTO: 90 FL (ref 78–100)
PLATELET # BLD AUTO: 109 10E9/L (ref 150–450)
POTASSIUM SERPL-SCNC: 4 MMOL/L (ref 3.4–5.3)
PROT SERPL-MCNC: 5.9 G/DL (ref 6.8–8.8)
RBC # BLD AUTO: 4.07 10E12/L (ref 4.4–5.9)
SODIUM SERPL-SCNC: 136 MMOL/L (ref 133–144)
WBC # BLD AUTO: 7.4 10E9/L (ref 4–11)

## 2019-11-21 PROCEDURE — 99221 1ST HOSP IP/OBS SF/LOW 40: CPT | Performed by: PSYCHIATRY & NEUROLOGY

## 2019-11-21 PROCEDURE — 99238 HOSP IP/OBS DSCHRG MGMT 30/<: CPT | Performed by: INTERNAL MEDICINE

## 2019-11-21 PROCEDURE — 25000132 ZZH RX MED GY IP 250 OP 250 PS 637: Mod: GY | Performed by: INTERNAL MEDICINE

## 2019-11-21 PROCEDURE — 12000000 ZZH R&B MED SURG/OB

## 2019-11-21 PROCEDURE — 85027 COMPLETE CBC AUTOMATED: CPT | Performed by: INTERNAL MEDICINE

## 2019-11-21 PROCEDURE — 36415 COLL VENOUS BLD VENIPUNCTURE: CPT | Performed by: INTERNAL MEDICINE

## 2019-11-21 PROCEDURE — 40000007 ZZH STATISTIC ADULT CD FACE TO FACE-NO CHRG

## 2019-11-21 PROCEDURE — 80053 COMPREHEN METABOLIC PANEL: CPT | Performed by: INTERNAL MEDICINE

## 2019-11-21 PROCEDURE — 25000128 H RX IP 250 OP 636: Performed by: INTERNAL MEDICINE

## 2019-11-21 PROCEDURE — 25800030 ZZH RX IP 258 OP 636: Performed by: INTERNAL MEDICINE

## 2019-11-21 RX ORDER — BUPROPION HYDROCHLORIDE 150 MG/1
150 TABLET, EXTENDED RELEASE ORAL 2 TIMES DAILY
Status: DISCONTINUED | OUTPATIENT
Start: 2019-11-21 | End: 2019-11-23 | Stop reason: HOSPADM

## 2019-11-21 RX ORDER — IBUPROFEN 400 MG/1
400 TABLET, FILM COATED ORAL EVERY 6 HOURS PRN
Status: DISCONTINUED | OUTPATIENT
Start: 2019-11-21 | End: 2019-11-23 | Stop reason: HOSPADM

## 2019-11-21 RX ORDER — MULTIPLE VITAMINS W/ MINERALS TAB 9MG-400MCG
1 TAB ORAL DAILY
Qty: 30 TABLET | Refills: 0 | Status: SHIPPED | OUTPATIENT
Start: 2019-11-22 | End: 2021-12-23

## 2019-11-21 RX ADMIN — AMOXICILLIN AND CLAVULANATE POTASSIUM 1 TABLET: 875; 125 TABLET, FILM COATED ORAL at 14:21

## 2019-11-21 RX ADMIN — OXYCODONE HYDROCHLORIDE 5 MG: 5 TABLET ORAL at 04:08

## 2019-11-21 RX ADMIN — AMOXICILLIN AND CLAVULANATE POTASSIUM 1 TABLET: 875; 125 TABLET, FILM COATED ORAL at 21:22

## 2019-11-21 RX ADMIN — LORAZEPAM 1 MG: 1 TABLET ORAL at 03:02

## 2019-11-21 RX ADMIN — OXYCODONE HYDROCHLORIDE 5 MG: 5 TABLET ORAL at 12:32

## 2019-11-21 RX ADMIN — BUPROPION HYDROCHLORIDE 150 MG: 150 TABLET, EXTENDED RELEASE ORAL at 21:22

## 2019-11-21 RX ADMIN — PIPERACILLIN AND TAZOBACTAM 3.38 G: 3; .375 INJECTION, POWDER, LYOPHILIZED, FOR SOLUTION INTRAVENOUS at 03:07

## 2019-11-21 RX ADMIN — MULTIPLE VITAMINS W/ MINERALS TAB 1 TABLET: TAB at 09:13

## 2019-11-21 RX ADMIN — PIPERACILLIN AND TAZOBACTAM 3.38 G: 3; .375 INJECTION, POWDER, LYOPHILIZED, FOR SOLUTION INTRAVENOUS at 09:13

## 2019-11-21 RX ADMIN — SODIUM CHLORIDE: 9 INJECTION, SOLUTION INTRAVENOUS at 07:24

## 2019-11-21 RX ADMIN — IBUPROFEN 400 MG: 400 TABLET ORAL at 17:10

## 2019-11-21 RX ADMIN — BUPROPION HYDROCHLORIDE 150 MG: 150 TABLET, EXTENDED RELEASE ORAL at 10:51

## 2019-11-21 RX ADMIN — OXYCODONE HYDROCHLORIDE 5 MG: 5 TABLET ORAL at 08:34

## 2019-11-21 ASSESSMENT — ACTIVITIES OF DAILY LIVING (ADL)
ADLS_ACUITY_SCORE: 14
ADLS_ACUITY_SCORE: 10.5
ADLS_ACUITY_SCORE: 10.5
ADLS_ACUITY_SCORE: 11
ADLS_ACUITY_SCORE: 10
ADLS_ACUITY_SCORE: 11

## 2019-11-21 NOTE — PROGRESS NOTES
"Ambulated halls with patient after breakfast. Patient complained of shortness of breath and dizziness. Patient had to sit 2 times to prevent himself from \"passing out\". While in room patient continues to complain of light headedness, and dizziness. Patient wobbly while ambulating to bathroom through out shift. Orthostatic blood pressure taken where sitting to standing dropped from 111/65 to 90/65, pt staggering feet while standing to maintain balance.     Sam Drew RN on 11/21/2019 at 2:51 PM   "

## 2019-11-21 NOTE — PROGRESS NOTES
Sepsis Evaluation Progress Note    I was called to see Andrea Collado due to abnormal vital signs triggering the Sepsis SIRS screening alert. He is not known to have an infection.     Physical Exam   Vital Signs:  Temp: 98.2  F (36.8  C) Temp src: Oral BP: 137/47 Pulse: 90 Heart Rate: 89 Resp: 20 SpO2: 99 % O2 Device: None (Room air) Oxygen Delivery: 2 LPM    Lab:  Lactic Acid   Date Value Ref Range Status   11/20/2019 1.5 0.7 - 2.0 mmol/L Final     Lactate for Sepsis Protocol   Date Value Ref Range Status   11/20/2019 2.5 (H) 0.7 - 2.0 mmol/L Final     Comment:     Significant value called to and read back by  GINA VILLALOBOS IN 66 AT 1832 MA         The patient is at baseline mental status.     The rest of their physical exam is significant for he is alert and fully oriented but slightly anxious, clear to auscultation bilaterally, regular rate and rhythm, not tachycardic, breathing easily, abdomen soft nondistended.  Normal bowel sounds possible discomfort in the right upper quadrant but vague.  No rash or edema.  From a neurologic standpoint he is fully oriented.  No abnormal movements care.  Normal tone.  No clonus.  No increased tone.  Strength 5 out of 5 in extremities x4 normal speech and mentation.   Lino catheter in place, neck is supple    Assessment & Plan   Andrea Collado meets SIRS criteria . does have a lactate >2 or other evidence of acute organ damage.  These vital sign, lab and physical exam findings are consistent with possible SEPSIS.  Did have a temperature of 104.  Now down to 100 with Tylenol  Complains of diffuse muscle aches.  Alk phos 334 and ALT  100 respectively.  Chest x-ray earlier today without infiltrate.  Head CT without new findings.  Normal ciwa   No tremors or diaphoresis.  Fever of unclear etiology.    Sepsis Time-Zero (time Sepsis diagnosis confirmed): 1900 11/20/19    Anti-infectives (From now, onward)    None        Current antibiotic coverage no antibiotics are  indicated at this time as there are no clinical signs of infection.     Disposition: The patient will remain on the current unit. We will continue to monitor this patient closely.  Will obtain blood cultures, urinalysis, chest x-ray, LFTs and lipase.  Will check influenza testing.  Hold on empiric antibiotics.  1 L normal saline bolus with recheck of lactate in 2 hours.  Following bolus, check LFTs, BMP, CBC, CK.  Influenza, blood cultures    Addendum.  Patient received 1 L normal saline bolus.  White blood cell count is increased from 2-8.  Influenza swab is pending.  Chest x-ray shows possible new left lower lobe infiltrate.  LFTs are improved.  Alk phos is elevated but bilirubin is normal therefore doubt cholangitis or clear obstructive pattern to the LFT changes.  Likely alcohol-related hepatitis.  May have had aspiration pneumonia.  Will cover with Zosyn.  CK normal.  Per RN patient is improved.  He has received Ativan per protocol.  He is without focal complaints.  He has generalized muscle aches.  Gordon Mcdonald MD    Sepsis Criteria   Sepsis: 2+ SIRS criteria due to infection  Severe Sepsis: Sepsis AND 1+ new sign of acute organ dysfunction (Note: lactate >2 is organ dysfunction)  Septic Shock: Sepsis AND hypotension despite volume resuscitation with 30 ml/kg crystalloid

## 2019-11-21 NOTE — PLAN OF CARE
DATE & TIME: 11/21 Day                  Cognitive Concerns/ Orientation : A&Ox4.   BEHAVIOR & AGGRESSION TOOL COLOR: Green  CIWA SCORE: n/a- order discontinued  ABNL VS/O2: VSS on RA, orthostatic drop in BP noted.  MOBILITY: Independent  PAIN MANAGMENT: Oxy twice for generalized soreness, reported relief.  DIET: Regular  BOWEL/BLADDER: Continent, Lino removed and has voided.  ABNL LAB/BG: AST & ALT elevated.  DRAIN/DEVICES: PIV X2 SL  TELEMETRY RHYTHM: n/a  SKIN: Intact  TESTS/PROCEDURES: n/a  D/C DAY/GOALS/PLACE: Discharge order in place, patient stating that he will appeal order d/t not wanting to leave. CM following. Contacted MD who canceled order. Probably discharge tomorrow.  OTHER IMPORTANT INFO: Reported CAMARA, dizziness and lightheadedness w/ ambulation.

## 2019-11-21 NOTE — PROGRESS NOTES
MD Notification    Notified Person: MD    Notified Person Name: Dino    Notification Date/Time: 2255 11/20/2019    Notification Interaction: phone call    Purpose of Notification: Pt shouting in pain continually, no relief from tylenol and ativan.    Orders Received: 4 doses of PRN Oxy    Comments:

## 2019-11-21 NOTE — PLAN OF CARE
Patient alert and oriented x 4, moves all extremities, vital signs stable. Precedex weaned to off earlier this afternoon.Patient transferred to station 66, report called to RN. All patient belongings sent with patient.

## 2019-11-21 NOTE — CONSULTS
"11/21/19 chem dep consult completed.      Met with patient.  He reported that he has been sober for 24 years, then 6 months and then 3 months and then used this one time.  He reported he has been trying to get into sober housing and had been staying in a \"bad place.\"  He reports he has been in treatment at least 17 times and he is not interested in any further treatment.  He reported he does attend [facility] meetings and CBT workbooks.  During our conversation he got a phone call from a sober house he would like to go to and he said they have a room for him.  I left him with a business card and we dicussed programs that would accept Medicare should he be interested in additional treatment and he reported he did like [facility] and would return there.  I gave him name of [facility] as well.    Swapna Mora, [facility]  [phone]  "

## 2019-11-21 NOTE — DISCHARGE SUMMARY
[facility]    Hospitalist Discharge Summary       Date of Admission:  11/19/2019  Date of Discharge:  11/22/2019  Discharging Provider: Claudio Hall MD      Discharge Diagnoses   Agitated delirium secondary to unknown drug ingestion, suspect methamphetamine  Polysubstance abuse  Probably aspiration pneumonia  Elevated LFTs    Follow-ups Needed After Discharge   Follow-up Appointments     Follow-up and recommended labs and tests       Follow up with primary care provider, Rian Myers, within 7 days   for hospital follow- up.  Recommend checking complete metabolic panel in   one week.           Unresulted Labs Ordered in the Past 30 Days of this Admission     Date and Time Order Name Status Description    11/20/2019 1914 Blood culture Preliminary     11/20/2019 1914 Blood culture Preliminary     11/20/2019 0132 Blood culture Preliminary     11/20/2019 0132 Blood culture Preliminary       These results will be followed up by PCP    Hospital Course   Andrea Collado is a 54 year old male with PMH polysubstance abuse including alcohol and meth, tobacco use disorder, hepatitis C, who was admitted on 11/19/2019 with agitated delirium secondary to unknown drug ingestion. Patient reports friend gave him unknown substance which caused him to have significant pain, vomit, and become agitated. In ED was significantly agitated requiring Precedex and restraints. Noted fever and tachycardia. Restraints removed around noon on 11/20. Urine tox positive for benzos and amphetamine. Patient symptomatically continued to improve with supportive treatment.   Patient developed lactic acidosis and mildly hypotensive on 11/20, repeat CXR shows left sided infiltrate, patient endorsing cough--probably aspiration pneumonia. Will complete 7 day treatment for aspiration pneumonia at discharge.    Polysubstance abuse  Alcohol use disorder  Prior hospitalizations for alcohol withdrawal and substance abuse. EtOh level on  admission was 0.06. Patient reports only 3 beers yesterday, and no other alcohol use this week No alcohol withdrawal noted this admission. LFTs were elevated this admission, suspect more likely due to drug ingestion rather than alcohol use.  - Recheck CMP in about one week    Consultations This Hospital Stay   CHEMICAL DEPENDENCY IP CONSULT  PSYCHIATRY IP CONSULT    Code Status   Full Code    Time Spent on this Encounter   I, Claudio Hall, personally saw the patient today and spent approximately 25 minutes discharging this patient.       Claudio Hall MD  [facility]  ______________________________________________________________________    Physical Exam   Vital Signs: Temp: 97.7  F (36.5  C) Temp src: Oral BP: 106/62 Pulse: 81 Heart Rate: 81 Resp: 18 SpO2: 95 % O2 Device: None (Room air)    Weight: 196 lbs 12.8 oz    Constitutional: Male in NAD  HEENT: Eyes nonicteric, oral mucosa moist, sore just superior to right upper lip  Cardiovascular: RRR, normal S1/2, no m/r/g  Respiratory: CTAB, no wheezing or crackles  Vascular: No LE pitting edema  GI: Normoactive bowel sounds, nontender, nondistended  Skin/Integumen: No rashes  Neuro/Psych: Apropriate mood and affect. A&Ox3, moves all extremities       Primary Care Physician   Rian Myers    Discharge Disposition   Discharged to home  Condition at discharge: Stable    Significant Results and Procedures   Most Recent 3 CBC's:  Recent Labs   Lab Test 11/21/19  0846 11/20/19  1932 11/20/19  0000   WBC 7.4 8.2 2.0*   HGB 12.1* 11.3* 13.2*   MCV 90 90 90   * 99* 108*     Most Recent 3 BMP's:  Recent Labs   Lab Test 11/21/19  0846 11/20/19  1932 11/20/19  0000    137 136   POTASSIUM 4.0 3.8 3.7   CHLORIDE 109 109 106   CO2 25 23 22   BUN 15 24 20   CR 0.87 0.91 1.11   ANIONGAP 2* 5 8   JOHANA 8.0* 7.5* 8.1*   * 115* 92     Most Recent 2 LFT's:  Recent Labs   Lab Test 11/21/19  0846 11/20/19 1932   * 153*   * 202*    ALKPHOS 177* 206*   BILITOTAL 0.5 0.4   ,   Results for orders placed or performed during the hospital encounter of 11/19/19   XR Chest Port 1 View    Narrative    CHEST SINGLE VIEW PORTABLE  11/20/2019 12:07 AM     HISTORY: Altered mental status.    COMPARISON: 4/25/2013.    FINDINGS: Hypoinflated lungs. No convincing pulmonary opacities.      Impression    IMPRESSION: No convincing evidence of active cardiopulmonary disease.    PACO BYNUM MD   Head CT w/o contrast    Narrative    EXAM: CT HEAD W/O CONTRAST  LOCATION: [facility]  DATE/TIME: 11/20/2019 12:36 AM    INDICATION: Altered mental status.  COMPARISON: None.  TECHNIQUE: Routine without IV contrast. Multiplanar reformats. Dose reduction techniques were used.    FINDINGS:  INTRACRANIAL CONTENTS: No intracranial hemorrhage, extraaxial collection, or mass effect.  No CT evidence of acute infarct. Normal parenchymal attenuation. Normal ventricles and sulci.     VISUALIZED ORBITS/SINUSES/MASTOIDS: No intraorbital abnormality. Mild mucosal thickening scattered about the paranasal sinuses. No middle ear or mastoid effusion.    BONES/SOFT TISSUES: No acute abnormality.      Impression    IMPRESSION:  1.  Normal intracranial contents.  2.  Mild paranasal sinus inflammation. No air-fluid level.   XR Chest 2 Views    Narrative    CHEST TWO VIEWS  11/20/2019 7:53 PM     HISTORY: Fever.    COMPARISON: 11/20/2019.      Impression    IMPRESSION: Mild hazy opacity in the left midlung could represent a  mild pneumonia. There is mild scarring or linear atelectasis in the  right lower lung laterally. Heart size and pulmonary vascularity are  within normal limits.    KALEN PEACE MD       Discharge Orders      Comprehensive metabolic panel     Reason for your hospital stay    You were hospitalized for a drug overdose, possibly methamphetamine. You also developed pneumonia.     Activity    Your activity upon discharge: activity as tolerated     When to  contact your care team    Call your primary doctor if you have any of the following: fever over 100.4F, worsening cough, worsening shortness of breath     Follow-up and recommended labs and tests     Follow up with primary care provider, Rian Myers, within 7 days for hospital follow- up.  Recommend checking complete metabolic panel in one week.     Full Code     Diet    Follow this diet upon discharge: Orders Placed This Encounter      Regular Diet Adult     Discharge Medications   Current Discharge Medication List      START taking these medications    Details   amoxicillin-clavulanate (AUGMENTIN) 875-125 MG tablet Take 1 tablet by mouth every 12 hours for 5 days  Qty: 10 tablet, Refills: 0    Associated Diagnoses: Aspiration pneumonia due to vomit, unspecified laterality, unspecified part of lung (H)      multivitamin w/minerals (THERA-VIT-M) tablet Take 1 tablet by mouth daily  Qty: 30 tablet, Refills: 0    Associated Diagnoses: Ingestion of unknown drug, undetermined intent, initial encounter      valACYclovir (VALTREX) 1000 mg tablet Take 1 tablet (1,000 mg) by mouth every 12 hours for 2 days  Qty: 4 tablet, Refills: 0    Associated Diagnoses: Herpes simplex infection         CONTINUE these medications which have CHANGED    Details   buPROPion (WELLBUTRIN SR) 150 MG 12 hr tablet Take 1 tablet (150 mg) by mouth 2 times daily  Qty: 6 tablet, Refills: 0    Associated Diagnoses: Severe episode of recurrent major depressive disorder, with psychotic features (H)         CONTINUE these medications which have NOT CHANGED    Details   testosterone cypionate (DEPOTESTOSTERONE) 200 MG/ML injection Inject 200 mg into the muscle every 14 days           Allergies   No Known Allergies

## 2019-11-21 NOTE — PROGRESS NOTES
D: CARO following for discharge planning, per protocol.   I: CARO met with pt. He reports he made plans to discharge to a sober living house on Friday. He declines SW assistance with this. However, he will need a ride. He is waiting for the admissions coordinator to call him back with the address.   P: SW will assist Friday with transportation.     ED Saxena, LGSW  [phone]  [facility]

## 2019-11-21 NOTE — CONSULTS
Pt seen for initial psychiatric evaluation, please see my dictation for details and recommendations. He says he'll be going to a sober house tomorrow and will retain his current Wellbutrin prescription.      Drew Taylor MD

## 2019-11-21 NOTE — PLAN OF CARE
"DATE & TIME: 11/21/2019 3275-8907  Cognitive Concerns/ Orientation : A&Ox3; disoriented to place - pt goes from oriented to confused frequently, improved throughout night    BEHAVIOR & AGGRESSION TOOL COLOR: green/yellow, can be agitated at times   CIWA SCORE: 7,10, 7 - Ativan 1mg given on shift   ABNL VS/O2: VSS on RA  MOBILITY: SBA to bathroom  PAIN MANAGMENT: oxycodone 5mg x 2 on shift w/ relief. Pt states generalized pain, and \"doesn't know where it's coming from\"    DIET: regular, tolerating well per pt   BOWEL/BLADDER: Polanco in place - pt complaining of pain from polanco, and feeling the urge to void. Polanco had great output, bladder scanned to give reassurance to pt it is working, 2mL and 11mL scanned. No BM on shift   ABNL LAB/BG: na  DRAIN/DEVICES: Polanco, PIV infusing NS 100ml/hr. PIV SL.   TELEMETRY RHYTHM: na  SKIN: WDL  TESTS/PROCEDURES: none  D/C DAY/GOALS/PLACE: pending improvement   OTHER IMPORTANT INFO: Pt can become agitated quickly, pleasant otherwise. UA not obtained because pt complains of \"the worst pain ever\" when clamping the polanco. Pt up throughout night. Hep C antibodies positive.      "

## 2019-11-21 NOTE — PROGRESS NOTES
visited with pt who was agitated over not knowing where he would be going when he was discharged. Pt had a long period of recovery from drug and alcohol use but has been using again recently. Pt recounted a lengthy story of the lead-up to his hospitalization. Story was interrupted by visit from staff psychiatrist.  Pt asked that he receive a visit tomorrow. SH will follow up as time allows.

## 2019-11-21 NOTE — PROGRESS NOTES
Reported positive orthostatic hypotension, SBP 110s to 90/60, HR increasing from 80 to 97 between laying down and standing, per nurse. Okay to encourage PO intake and hold discharge and reassess tomorrow.

## 2019-11-21 NOTE — PROGRESS NOTES
Admission    Patient arrives to room 633-1 via cart from ICU.  Care plan note: Pt arrived to unit, SBA to bed. A&Ox4    Inpatient nursing criteria listed below were met:    PCD's Documented: Yes  Skin issues/needs documented :Yes  Isolation education started/completed NA  Patient allergies verified with patient: Yes  Verified completion of [place] Risk Assessment Tool:  Yes  Verified completion of Guardianship screening tool: Yes  Fall Prevention: Care plan updated, Education given and documented Yes  Care Plan initiated: Yes  Home medications documented in belongings flowsheet: Yes  Patient belongings documented in belongings flowsheet: Yes  Reminder note (belongings/ medications) placed in discharge instructions:Yes  Admission profile/ required documentation complete: Yes  Bedside Report Letter given and explained to patient Yes

## 2019-11-21 NOTE — PLAN OF CARE
"DATE & TIME: 11/20/2019 4011-3718         Cognitive Concerns/ Orientation : A&Ox4   BEHAVIOR & AGGRESSION TOOL COLOR: Yellow at times  CIWA SCORE: 9, 13,12         ABNL VS/O2: VSS on RA  MOBILITY: SBA to BR  PAIN MANAGMENT: generalized pain \"all over\", PRN tylenol given as well as PRN ativan  DIET: Reg- tolerating well  BOWEL/BLADDER: continent of bowel, polanco in place for retention.  ABNL LAB/BG: Lactic acid 2.5- bolus, labs, chest XR. Lactic recheck 1.8  DRAIN/DEVICES: Polanco, 2 PIV in L arm  TELEMETRY RHYTHM: n/a  SKIN: WDL  TESTS/PROCEDURES: none  D/C DAY/GOALS/PLACE: pending  OTHER IMPORTANT INFO: Pt getting agitated quickly when he can't find license (in belongings bag), and when nurse clamped polanco for 10 min to collect urine sample said it felt like his bladder \"was going to explode\".   "

## 2019-11-21 NOTE — PROGRESS NOTES
MD Notification    Notified Person: MD    Notified Person Name: Tamara    Notification Date/Time: 1835 11/20/2019    Notification Interaction: text    Purpose of Notification: Lactic acid 2.5    Orders Received: pending    Comments:

## 2019-11-22 PROCEDURE — 25000132 ZZH RX MED GY IP 250 OP 250 PS 637: Mod: GY | Performed by: INTERNAL MEDICINE

## 2019-11-22 PROCEDURE — 12000000 ZZH R&B MED SURG/OB

## 2019-11-22 PROCEDURE — 99231 SBSQ HOSP IP/OBS SF/LOW 25: CPT | Performed by: INTERNAL MEDICINE

## 2019-11-22 RX ORDER — VALACYCLOVIR HYDROCHLORIDE 1 G/1
1000 TABLET, FILM COATED ORAL EVERY 12 HOURS SCHEDULED
Status: DISCONTINUED | OUTPATIENT
Start: 2019-11-22 | End: 2019-11-23 | Stop reason: HOSPADM

## 2019-11-22 RX ORDER — HYDROXYZINE HYDROCHLORIDE 25 MG/1
25 TABLET, FILM COATED ORAL EVERY 6 HOURS PRN
Status: DISCONTINUED | OUTPATIENT
Start: 2019-11-22 | End: 2019-11-23 | Stop reason: HOSPADM

## 2019-11-22 RX ORDER — BUPROPION HYDROCHLORIDE 150 MG/1
150 TABLET, EXTENDED RELEASE ORAL 2 TIMES DAILY
Qty: 6 TABLET | Refills: 0 | Status: SHIPPED | OUTPATIENT
Start: 2019-11-22 | End: 2021-12-23

## 2019-11-22 RX ORDER — VALACYCLOVIR HYDROCHLORIDE 1 G/1
1000 TABLET, FILM COATED ORAL EVERY 12 HOURS
Qty: 4 TABLET | Refills: 0 | Status: SHIPPED | OUTPATIENT
Start: 2019-11-22 | End: 2020-12-01

## 2019-11-22 RX ADMIN — IBUPROFEN 400 MG: 400 TABLET ORAL at 18:48

## 2019-11-22 RX ADMIN — VALACYCLOVIR HYDROCHLORIDE 1000 MG: 1 TABLET, FILM COATED ORAL at 20:45

## 2019-11-22 RX ADMIN — HYDROXYZINE HYDROCHLORIDE 25 MG: 25 TABLET, FILM COATED ORAL at 15:09

## 2019-11-22 RX ADMIN — AMOXICILLIN AND CLAVULANATE POTASSIUM 1 TABLET: 875; 125 TABLET, FILM COATED ORAL at 09:53

## 2019-11-22 RX ADMIN — AMOXICILLIN AND CLAVULANATE POTASSIUM 1 TABLET: 875; 125 TABLET, FILM COATED ORAL at 22:12

## 2019-11-22 RX ADMIN — VALACYCLOVIR HYDROCHLORIDE 1000 MG: 1 TABLET, FILM COATED ORAL at 14:36

## 2019-11-22 RX ADMIN — BUPROPION HYDROCHLORIDE 150 MG: 150 TABLET, EXTENDED RELEASE ORAL at 20:45

## 2019-11-22 RX ADMIN — BUPROPION HYDROCHLORIDE 150 MG: 150 TABLET, EXTENDED RELEASE ORAL at 08:35

## 2019-11-22 RX ADMIN — MULTIPLE VITAMINS W/ MINERALS TAB 1 TABLET: TAB at 08:35

## 2019-11-22 ASSESSMENT — ACTIVITIES OF DAILY LIVING (ADL)
ADLS_ACUITY_SCORE: 12
ADLS_ACUITY_SCORE: 12
ADLS_ACUITY_SCORE: 13
ADLS_ACUITY_SCORE: 14
ADLS_ACUITY_SCORE: 13
ADLS_ACUITY_SCORE: 12

## 2019-11-22 NOTE — CONSULTS
"Consult Date:  11/21/2019      REASON FOR CONSULTATION:  Polysubstance abuse.      REQUESTING PHYSICIAN:  Claudio Hall MD      PRIMARY CARE PHYSICIAN:  Rian Myers MD      IDENTIFYING DATA:  Andrea Collado is a 54-year-old man who reports he is a  father of 3, who is an unemployed  and form a wrestler, who presented to [facility] ED on 11/19/2019 as a result of altered mental status and agitation.  He has a history of ADHD and polysubstance dependence.  Psychiatry was consulted to render an opinion on his chemical use problems.  Information was gathered through direct patient contact as well as chart review.      CHIEF COMPLAINT:  \"I am going to a sober house tomorrow.\"      HISTORY OF PRESENT ILLNESS:  Andrea Collado reports a longstanding history of sobriety and chemical use problems and treatments.  He tells me that after his first foray into drug and alcohol treatment, he stayed sober for 24 years.  He reports that he was able to go out into the world and earn a living for himself.  He claims it was after his initial CD treatment that he completed several degree programs, qualified as a psychologist and  and secured employment with [employer], before moving on to [employer] that was subsequently acquired by [facility].  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  He states he underwent multiple surgeries on " his lower extremities.  He also reports that he remained an avid fitness freak, who used a lot of testosterone cypionate to facilitate his workouts.  It was not long after this that he got on Suboxone and was able to successfully get off narcotics, about 5 or 6 years ago.  Unfortunately for him, he picked up alcohol and before long, was abusing cocaine and methamphetamines.  He tells me he has a history of ADHD, for which at some point he was on psychostimulants, which he ultimately ended up abusing.  He states he has been in treatment more than 17 times, but he found his last foray very beneficial.  He states he was in treatment at [facility] in [place] and claims he found that very helpful.  Per his account, he had been sober for several weeks until the day prior to presentation when a friend offered him a drink to help with his fatigue, which he, per his report, did not question.  He states after taking a couple sips, he realized that he was drinking meth, which reportedly got him to start feeling very sick.  However, in the ED, he admitted to taking 2 Adderall pills along with a bottle of alcohol.  He described his heart racing and felt he could not breathe and also felt like he was burning up.  He claims he was staying with an alcoholic who was manifesting psychotic symptoms, as a result of which he had moved out and was looking for a sober house.  The patient also reports other psychosocial stressors including his estrangement from his brother and sister, who reportedly are trying to dupe him out of his inheritance.  He states he also has 3 children that do not talk to him.  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  He is projecting that this will occur in about a year.  He states he has found Wellbutrin very beneficial in addressing both depression and his attention problems.       PAST PSYCHIATRIC HISTORY:  Records suggest previous diagnoses of bipolar disorder, opioid use disorder, cocaine use disorder, amphetamine use disorder, as well as alcohol use disorder.  He was admitted on [address] on 2 occasions in  and was seeing Dr. Mcgill at [facility].      CHEMICAL USE HISTORY:  As indicated in the history of present illness.      PAST MEDICAL HISTORY:   1.  History of delirium tremens.   2.  Deep venous thrombosis of the left foot.   3.  Flail chest.   4.  History of total hip arthroplasty of the right.   5.  Hypertension.   6.  Seizures.   7.  Polysubstance abuse.      PAST SURGICAL HISTORY:   1.  Chest surgery for flail chest, fractured sternum.     2.  Right hip surgery.   3.  Knee surgery.   4.  Orthopedic surgery.      MEDICATIONS PRIOR TO ADMISSION:   1.  Wellbutrin- mg p.o. b.i.d.   2.  Depo-Testosterone 200 mg IM every 2 weeks.      ALLERGIES:  NO KNOWN DRUG ALLERGIES.      SOCIAL HISTORY:  The patient was born and raised in [place].  His parents were .  His father is now .  His mother is in a nursing home.  He has an older brother and sister.  He reportedly holds multiple degrees in liberal arts, psychology and aeronautical engineering.  He was forced to retire from working as a  4 years ago, due to his chemical health problems.  He has been  twice and has 3 children.  He denies  or criminal history.      REVIEW OF SYSTEMS:  I refer the reader to the review of systems documented by Fanta Rose MD on 2019 at 5:18 a.m.      VITAL SIGNS:  Blood pressure 114/66, pulse 90, respirations 18, temperature 97.3, 95% oxygen sats.      MENTAL STATUS EXAMINATION:  This is a tall, heavyset, muscular man, who appears his stated age of 54.  He is dressed in hospital garb and makes good eye contact.  He is very boisterous and speaks rather rapidly.  His mood is described as good and his affect is expansive and dramatic.  His thought process is  logical, relevant and goal directed.  He denies the presence of auditory or visual hallucinations.  There are no delusions elicited.  He is future oriented.  His muscle strength is good.  His gait and station are not assessed, as he is currently bed bound.  His impulse control is marginal.  His recall of recent and remote events is adequate.  His attention and concentration are fair.  He displays limited insight and judgment.  Risk assessment at this time is considered moderate to high.      DIAGNOSTIC IMPRESSION:  Arlen Collado is a 54-year-old man with established history of mental illness and chemical use problems, who presented to the hospital following ingestion of methamphetamines and alcohol, resulting in his altered mental status on admission.  He tells me he is not interested in pursuing CD treatment, but has been accepted to a sober house in [place] tomorrow.  He denies any self-harm thoughts, plans or intent and reports future orientation.      DIAGNOSES:   1.  Alcohol use disorder.   2.  Amphetamine use disorder.   3.  Attention deficit hyperactivity disorder by history.   4.  Adjustment disorder with depressed mood.   5.  Male hypogonadism, on testosterone replacement.      RECOMMENDATIONS:   1.  Medical management as you are.   2.  The patient is not interested in pursuing chemical health treatment at this point and is only focused on returning to sober housing.  He claims he has secured accommodation at a facility in [place] for tomorrow.  He has been as advised to abstain from psychostimulants and other illicit chemicals and he verbalized understanding and willingness to be sober.  No further recommendations are given at this time.      Thanks for the consult.         DONOVAN MILLER MD             D: 2019   T: 2019   MT: VINEET      Name:     ARLEN COLLADO   MRN:      [id number]        Account:       [id number]   :      1965           Consult Date:  2019       Document: [id number]

## 2019-11-22 NOTE — PLAN OF CARE
DATE & TIME: 11/22/2019 2844-9269         Cognitive Concerns/ Orientation : A&Ox4.   BEHAVIOR & AGGRESSION TOOL COLOR: Green  CIWA SCORE: n/a  ABNL VS/O2: VSS on RA  MOBILITY: Independent   PAIN MANAGMENT: denies pain   DIET: Regular  BOWEL/BLADDER: Continent, voiding adequately  ABNL LAB/BG: n/a  DRAIN/DEVICES: n/a IVs removed for discharge  TELEMETRY RHYTHM: n/a  SKIN: Rash to L cheek, MD aware, valtrex ordered.   TESTS/PROCEDURES: n/a  D/C DAY/GOALS/PLACE: Discharged planned for today, pt appealed discharge, pending   OTHER IMPORTANT INFO: Orthostatic BPs neg, pt c/o some dizziness. Emesis x1 per pt. Pt c/o anxiety, atarax ordered and given.

## 2019-11-22 NOTE — PROGRESS NOTES
Nsg note from yesterday noted that his discharge was cancelled due to him threatening to appeal his discharge.  The reason discharge  Hospitalist cancelled discharge was because he was orthostatic and complaining of dizziness.  Planning hospital discharge today if no longer symptomatic.  It is pt's Right to appeal to medicare if he feels he is not medically stable for discharge.

## 2019-11-22 NOTE — PROGRESS NOTES
D: CARO following for discharge planning.   I: Met with pt. He reports he needs $600 to get in to his sober living house that he does not have. He is now looking for other options. Provided him information with [facility], as another option.   P: CARO following.     ED Saxena, LGSW  [phone]  [facility]

## 2019-11-22 NOTE — PROGRESS NOTES
"SPIRITUAL HEALTH SERVICES Progress Note  [facility]    Initiated visit due to pt request for f/u.  Pt stated that he was feeling \"overwhelmed,\" but stated that he \"had nothing to say,\" and did not want visit.  However, pt was open to prayer.  SH provided prayer.  SH will f/u per pt request.      Mika Randle  Chaplain Resident    "

## 2019-11-22 NOTE — PROGRESS NOTES
Progress Note    Andrea Collado is a 53yo M with PMH of polysubstance abuse who was admitted for agitated delirium and developed aspiration pneumonia while inpatient. He was planned to discharge yesterday but was orthostatic with walking and discharge was held. He reports feeling improved this morning and less lightheaded.    /62 (BP Location: Left arm)   Pulse 81   Temp 97.7  F (36.5  C) (Oral)   Resp 18   Wt 89.3 kg (196 lb 12.8 oz)   SpO2 95%   BMI 26.69 kg/m    Male in NAD  RRR no m/r/g  CTAB  Abd nontender  No LE edema    A/P  1. Andrea is stable to discharge to sober house today. Antibiotic duration adjusted, otherwise no changes made to discharge, please refer to discharge summary written yesterday  2. Addendum: Patient complaining of a rash on his left cheek. He notes it appeared today and is slightly itchy. On exam, there is a vesicular slightly draining rash in a circular distribution. He denies any pain associated with the rash. Of note, he has a healing cold sore above the right side of his lip. This rash appears to be most consistent with orolabial herpes, and does not have the clinical presentation of Shingles at this early stage.  - Valacyclovir 1g BID x3 days    Claudio Hall MD

## 2019-11-22 NOTE — PLAN OF CARE
DATE & TIME: 11/22/2019 0500      Cognitive Concerns/ Orientation : A&Ox4.   BEHAVIOR & AGGRESSION TOOL COLOR: Green  CIWA SCORE: n/a  ABNL VS/O2: VSS on RA  MOBILITY: Independent   PAIN MANAGMENT: NA  DIET: Regular  BOWEL/BLADDER: Continent, voiding adequately  ABNL LAB/BG: AST & ALT elevated.  DRAIN/DEVICES: PIV X2 SL  TELEMETRY RHYTHM: n/a  SKIN: Intact  TESTS/PROCEDURES: n/a  D/C DAY/GOALS/PLACE: Discharge order in place, leaving today hopefully, refused to discharge yesterday  OTHER IMPORTANT INFO: slept throughout shift

## 2019-11-22 NOTE — PLAN OF CARE
DATE & TIME: 11/21/2019 7275-0241         Cognitive Concerns/ Orientation : A&Ox4.   BEHAVIOR & AGGRESSION TOOL COLOR: Green  CIWA SCORE: n/a  ABNL VS/O2: VSS on RA, BP soft  MOBILITY: SBA  PAIN MANAGMENT: Ibuprofen given x1- relief  DIET: Regular  BOWEL/BLADDER: Continent. Lino removed in AM shift, voiding adequately  ABNL LAB/BG: AST & ALT elevated.  DRAIN/DEVICES: PIV X2 SL  TELEMETRY RHYTHM: n/a  SKIN: Intact  TESTS/PROCEDURES: n/a  D/C DAY/GOALS/PLACE: Discharge order in place, pt agreeing to leave tomorrow  OTHER IMPORTANT INFO: Reported CAMARA, dizziness and lightheadedness w/ ambulation

## 2019-11-23 VITALS
BODY MASS INDEX: 27.18 KG/M2 | OXYGEN SATURATION: 93 % | HEART RATE: 63 BPM | WEIGHT: 200.4 LBS | SYSTOLIC BLOOD PRESSURE: 102 MMHG | TEMPERATURE: 97.8 F | DIASTOLIC BLOOD PRESSURE: 68 MMHG | RESPIRATION RATE: 18 BRPM

## 2019-11-23 PROCEDURE — 25000132 ZZH RX MED GY IP 250 OP 250 PS 637: Mod: GY | Performed by: INTERNAL MEDICINE

## 2019-11-23 PROCEDURE — 99231 SBSQ HOSP IP/OBS SF/LOW 25: CPT | Performed by: HOSPITALIST

## 2019-11-23 RX ADMIN — BUPROPION HYDROCHLORIDE 150 MG: 150 TABLET, EXTENDED RELEASE ORAL at 09:09

## 2019-11-23 RX ADMIN — HYDROXYZINE HYDROCHLORIDE 25 MG: 25 TABLET, FILM COATED ORAL at 09:11

## 2019-11-23 RX ADMIN — VALACYCLOVIR HYDROCHLORIDE 1000 MG: 1 TABLET, FILM COATED ORAL at 09:09

## 2019-11-23 RX ADMIN — MULTIPLE VITAMINS W/ MINERALS TAB 1 TABLET: TAB at 09:09

## 2019-11-23 RX ADMIN — AMOXICILLIN AND CLAVULANATE POTASSIUM 1 TABLET: 875; 125 TABLET, FILM COATED ORAL at 09:09

## 2019-11-23 RX ADMIN — FOLIC ACID 1 MG: 1 TABLET ORAL at 09:09

## 2019-11-23 ASSESSMENT — ACTIVITIES OF DAILY LIVING (ADL)
ADLS_ACUITY_SCORE: 12

## 2019-11-23 NOTE — PLAN OF CARE
Discharge    Patient discharged to sober house via car with friend  Care plan note:  DATE & TIME: 11/23/2019 0700-discharge      Cognitive Concerns/ Orientation : A&Ox4; anxious at times  BEHAVIOR & AGGRESSION TOOL COLOR: Green  CIWA SCORE: n/a  ABNL VS/O2: VSS on RA  MOBILITY: Independent   PAIN MANAGMENT: denies pain   DIET: Regular, tolerating, good appetite  BOWEL/BLADDER: Continent, up to bathroom   ABNL LAB/BG: n/a  DRAIN/DEVICES: n/a   TELEMETRY RHYTHM: n/a  SKIN: Rash to L cheek  TESTS/PROCEDURES: n/a  D/C DAY/GOALS/PLACE: Today   OTHER IMPORTANT INFO: Continues with some dizziness, but improving per pt. Atarax x1 for anxiety.      Listed belongings gathered and returned to patient. Yes  Care Plan and Patient education resolved: Yes  Prescriptions if needed, hard copies sent with patient  Yes  Home and hospital acquired medications returned to patient: NA  Medication Bin checked and emptied on discharge Yes  Follow up appointment made for patient: No

## 2019-11-23 NOTE — PLAN OF CARE
DATE & TIME: 11/22/2019 6060-3060      Cognitive Concerns/ Orientation : A&Ox4.   BEHAVIOR & AGGRESSION TOOL COLOR: Green  CIWA SCORE: n/a  ABNL VS/O2: VSS on RA  MOBILITY: Independent   PAIN MANAGMENT: c/o generalized pain, 7/10. Tylenol given x1 with decrease in pain  DIET: Regular  BOWEL/BLADDER: Continent, voiding adequately  ABNL LAB/BG: n/a  DRAIN/DEVICES: n/a IVs removed for discharge  TELEMETRY RHYTHM: n/a  SKIN: Rash to L cheek, on scheduled valtrex.   TESTS/PROCEDURES: n/a  D/C DAY/GOALS/PLACE: Discharged planned for today, pt appealed discharge, pending   OTHER IMPORTANT INFO: Orthostatic BPs neg, pt c/o some dizziness. Pt c/o anxiety, atarax available PRN.

## 2019-11-23 NOTE — PLAN OF CARE
DATE & TIME: 11/23/2019 9848-3240    Cognitive Concerns/ Orientation : A&Ox4; pleasant on shift    BEHAVIOR & AGGRESSION TOOL COLOR: green  CIWA SCORE: na   ABNL VS/O2: VSS on RA  MOBILITY: independent in room   PAIN MANAGMENT: denies on shift  DIET: regular diet   BOWEL/BLADDER: continent of bowel and bladder, ambulates to bathroom, no BM on shift   ABNL LAB/BG: na   DRAIN/DEVICES: na - no IV access   TELEMETRY RHYTHM: na   SKIN: Rash to L cheek, on scheduled valtrex.  TESTS/PROCEDURES: na  D/C DAY/GOALS/PLACE: 11/22 discharge, pt appealed discharge, pending process   OTHER IMPORTANT INFO: pt slept through shift, no complaints of dizziness

## 2019-11-23 NOTE — DISCHARGE SUMMARY
[facility]    Discharge Summary  Hospitalist    Date of Admission:  11/19/2019  Date of Discharge:  11/23/2019  Discharging Provider: Long Brar  Date of Service (when I saw the patient): 11/23/19    History of Present Illness   Andrea Collado is a 54 year old male past medical history of polysubstance abuse including alcohol and methamphetamine, tobacco use disorder, chronic hepatitis C who is brought into the ED due to altered mental status and agitation.  Per ED note, patient was found by EMS agitated and laying on the ground.  Per EMS report, patient took 6 Adderall.  However patient was recently able to say in the ED that he took 2 Adderall and had a bottle of alcohol.  He also denied other drug use it to the ED physician.     During my visit to the ED, he is sedated with Precedex and has also received Valium.  He does wake up however he yells out  And is  agitated when he is awaken,then goes back to sleep.  I was not able to get any meaningful history from the patient.     In the ED, he was tachycardic in the 130s and febrile to 104 upon arrival.  He was given IV fluids and benzodiazepine due to concern for stimulant use.  He remained calm, however upon awakening or attempting to track patient continues to have brief outbursts in the ED.    Laboratory shows a normal basic metabolic panel with sodium of 136, potassium 3.7, BUN 20, creatinine 1.11.  Magnesium was low at 1.2.  LFTs showed alk phos of 334, ALT of 152, AST of 304, , lactic acid normalized to 1.6 after IV fluids.  Troponin is below the reference range.  Alcohol is 0.06.  CT head is negative.  CXR was negative for acute abnormality.  Urine drug screen was ordered, but not obtained since patient did not make urine.  He is initiated on Precedex drip while in the emergency room and admission requested for further management.    Hospital Course   Andrea Collado was admitted on 11/19/2019.  The following problems were  addressed during his hospitalization:    Andrea Collado is a 54 year old male with PMH polysubstance abuse including alcohol and meth, tobacco use disorder, hepatitis C, who was admitted on 11/19/2019 with agitated delirium secondary to unknown drug ingestion. Patient reports friend gave him unknown substance which caused him to have significant pain, vomit, and become agitated. In ED was significantly agitated requiring Precedex and restraints. Noted fever and tachycardia. Restraints removed around noon on 11/20. Urine tox positive for benzos and amphetamine. Patient symptomatically continued to improve with supportive treatment.                Patient developed lactic acidosis and mildly hypotensive on 11/20, repeat CXR shows left sided infiltrate, patient endorsing cough--probably aspiration pneumonia. Will complete 7 day treatment for aspiration pneumonia at discharge.     Polysubstance abuse  Alcohol use disorder  Prior hospitalizations for alcohol withdrawal and substance abuse. EtOh level on admission was 0.06. Patient reports only 3 beers yesterday, and no other alcohol use this week No alcohol withdrawal noted this admission. LFTs were elevated this admission, suspect more likely due to drug ingestion rather than alcohol use.  - Recheck CMP in about one week  - Close outpatient follow up.     Orolabial herpes: Patient complaining of a rash on his left cheek. He notes it is slightly itchy. vesicular slightly draining rash. He denies any pain associated with the rash. he has a healing cold sore above the right side of his lip. This rash appears to be most consistent with orolabial herpes, and does not have the clinical presentation of Shingles at this early stage.  - Valacyclovir 1g BID x3 days    Pending Results   These results will be followed up by PCP  Unresulted Labs Ordered in the Past 30 Days of this Admission     Date and Time Order Name Status Description    11/20/2019 1914 Blood culture Preliminary      11/20/2019 1914 Blood culture Preliminary     11/20/2019 0132 Blood culture Preliminary     11/20/2019 0132 Blood culture Preliminary           Code Status   Full Code       Primary Care Physician   Rian Myers    Physical Exam   Temp: 97.8  F (36.6  C) Temp src: Oral BP: 102/68 Pulse: 63 Heart Rate: 66 Resp: 18 SpO2: 93 % O2 Device: None (Room air)    Vitals:    11/20/19 0527 11/22/19 0533 11/23/19 0628   Weight: 92.5 kg (203 lb 14.8 oz) 89.3 kg (196 lb 12.8 oz) 90.9 kg (200 lb 6.4 oz)     Vital Signs with Ranges  Temp:  [97.6  F (36.4  C)-97.8  F (36.6  C)] 97.8  F (36.6  C)  Pulse:  [63-68] 63  Heart Rate:  [66-68] 66  Resp:  [17-18] 18  BP: ()/(59-68) 102/68  SpO2:  [93 %-97 %] 93 %  No intake/output data recorded.    GENERAL: Alert and oriented. NAD. Conversational, appropriate.   HEENT: Normocephalic. EOMI. No icterus or injection. Nares normal. Vesicular rash to lip, stable.   LUNGS: Clear to auscultation. No dyspnea at rest.   HEART: Regular rate. Extremities perfused.   ABDOMEN: Soft, nontender, and nondistended. Positive bowel sounds.   EXTREMITIES: No LE edema noted.   NEUROLOGIC: Moves extremities x4 on command. No acute focal neurologic abnormalities noted.       Discharge Disposition   Discharged to outpatient treatment facility.   Condition at discharge: Stable    Consultations This Hospital Stay   CHEMICAL DEPENDENCY IP CONSULT  PSYCHIATRY IP CONSULT  CARE TRANSITION RN/SW IP CONSULT    Time Spent on this Encounter   ILong DO, personally saw the patient today and spent greater than 30 minutes discharging this patient.    Discharge Orders      Comprehensive metabolic panel     Reason for your hospital stay    You were hospitalized for a drug overdose, possibly methamphetamine. You also developed pneumonia.     Activity    Your activity upon discharge: activity as tolerated     When to contact your care team    Call your primary doctor if you have any of the following:  fever over 100.4F, worsening cough, worsening shortness of breath     Follow-up and recommended labs and tests     Follow up with primary care provider, Rian Myers, within 7 days for hospital follow- up.  Recommend checking complete metabolic panel in one week.     Full Code     Diet    Follow this diet upon discharge: Orders Placed This Encounter      Regular Diet Adult     Discharge Medications   Current Discharge Medication List      START taking these medications    Details   amoxicillin-clavulanate (AUGMENTIN) 875-125 MG tablet Take 1 tablet by mouth every 12 hours for 5 days  Qty: 10 tablet, Refills: 0    Associated Diagnoses: Aspiration pneumonia due to vomit, unspecified laterality, unspecified part of lung (H)      multivitamin w/minerals (THERA-VIT-M) tablet Take 1 tablet by mouth daily  Qty: 30 tablet, Refills: 0    Associated Diagnoses: Ingestion of unknown drug, undetermined intent, initial encounter      valACYclovir (VALTREX) 1000 mg tablet Take 1 tablet (1,000 mg) by mouth every 12 hours for 2 days  Qty: 4 tablet, Refills: 0    Associated Diagnoses: Herpes simplex infection         CONTINUE these medications which have CHANGED    Details   buPROPion (WELLBUTRIN SR) 150 MG 12 hr tablet Take 1 tablet (150 mg) by mouth 2 times daily  Qty: 6 tablet, Refills: 0    Associated Diagnoses: Severe episode of recurrent major depressive disorder, with psychotic features (H)         CONTINUE these medications which have NOT CHANGED    Details   testosterone cypionate (DEPOTESTOSTERONE) 200 MG/ML injection Inject 200 mg into the muscle every 14 days           Allergies   No Known Allergies  Data   Most Recent 3 CBC's:  Recent Labs   Lab Test 11/21/19  0846 11/20/19 1932 11/20/19  0000   WBC 7.4 8.2 2.0*   HGB 12.1* 11.3* 13.2*   MCV 90 90 90   * 99* 108*      Most Recent 3 BMP's:  Recent Labs   Lab Test 11/21/19  0846 11/20/19 1932 11/20/19  0000    137 136   POTASSIUM 4.0 3.8 3.7   CHLORIDE  109 109 106   CO2 25 23 22   BUN 15 24 20   CR 0.87 0.91 1.11   ANIONGAP 2* 5 8   JOHANA 8.0* 7.5* 8.1*   * 115* 92     Most Recent 2 LFT's:  Recent Labs   Lab Test 11/21/19  0846 11/20/19 1932   * 153*   * 202*   ALKPHOS 177* 206*   BILITOTAL 0.5 0.4     Most Recent INR's and Anticoagulation Dosing History:  Anticoagulation Dose History     Recent Dosing and Labs Latest Ref Rng & Units 10/3/2012 12/19/2018    INR 0.86 - 1.14 0.91 0.95        Most Recent 3 Troponin's:  Recent Labs   Lab Test 11/20/19  0000 04/25/13  2051 12/13/12  1724 12/09/12  1809  09/28/12  2141 09/22/12  1805   TROPI <0.015  --   --   --   --  0.012 <0.012   TROPONIN  --  0.01 0.00 0.00   < >  --   --     < > = values in this interval not displayed.     Most Recent Cholesterol Panel:No lab results found.  Most Recent 6 Bacteria Isolates From Any Culture (See EPIC Reports for Culture Details):  Recent Labs   Lab Test 11/20/19 1959 11/20/19 1931 11/20/19  0217 11/20/19  0147 06/27/14  0615 06/27/14  0020   CULT No growth after 3 days No growth after 3 days No growth after 3 days No growth after 3 days <10,000 colonies/mL Alpha hemolytic Streptococcus  Susceptibility testing not routinely done  * 10,000 to 50,000 colonies/mL Mixed gram positive rajeev  Multiple species present, probable perineal contamination.  Susceptibility testing not routinely done       Most Recent TSH, T4 and A1c Labs:  Recent Labs   Lab Test 04/04/18  0310   TSH 1.30     Results for orders placed or performed during the hospital encounter of 11/19/19   XR Chest Port 1 View    Narrative    CHEST SINGLE VIEW PORTABLE  11/20/2019 12:07 AM     HISTORY: Altered mental status.    COMPARISON: 4/25/2013.    FINDINGS: Hypoinflated lungs. No convincing pulmonary opacities.      Impression    IMPRESSION: No convincing evidence of active cardiopulmonary disease.    PACO BYNUM MD   Head CT w/o contrast    Narrative    EXAM: CT HEAD W/O CONTRAST  LOCATION:  [facility]  DATE/TIME: 11/20/2019 12:36 AM    INDICATION: Altered mental status.  COMPARISON: None.  TECHNIQUE: Routine without IV contrast. Multiplanar reformats. Dose reduction techniques were used.    FINDINGS:  INTRACRANIAL CONTENTS: No intracranial hemorrhage, extraaxial collection, or mass effect.  No CT evidence of acute infarct. Normal parenchymal attenuation. Normal ventricles and sulci.     VISUALIZED ORBITS/SINUSES/MASTOIDS: No intraorbital abnormality. Mild mucosal thickening scattered about the paranasal sinuses. No middle ear or mastoid effusion.    BONES/SOFT TISSUES: No acute abnormality.      Impression    IMPRESSION:  1.  Normal intracranial contents.  2.  Mild paranasal sinus inflammation. No air-fluid level.   XR Chest 2 Views    Narrative    CHEST TWO VIEWS  11/20/2019 7:53 PM     HISTORY: Fever.    COMPARISON: 11/20/2019.      Impression    IMPRESSION: Mild hazy opacity in the left midlung could represent a  mild pneumonia. There is mild scarring or linear atelectasis in the  right lower lung laterally. Heart size and pulmonary vascularity are  within normal limits.    KALEN PEACE MD

## 2019-11-26 LAB
BACTERIA SPEC CULT: NO GROWTH
Lab: NORMAL
SPECIMEN SOURCE: NORMAL

## 2020-07-08 ENCOUNTER — HOSPITAL ENCOUNTER (EMERGENCY)
Facility: CLINIC | Age: 55
Discharge: HOME | End: 2020-07-08
Attending: EMERGENCY MEDICINE | Admitting: EMERGENCY MEDICINE
Payer: MEDICARE

## 2020-07-08 VITALS
RESPIRATION RATE: 20 BRPM | BODY MASS INDEX: 26.6 KG/M2 | DIASTOLIC BLOOD PRESSURE: 67 MMHG | WEIGHT: 190 LBS | HEIGHT: 71 IN | OXYGEN SATURATION: 99 % | SYSTOLIC BLOOD PRESSURE: 105 MMHG | TEMPERATURE: 98.7 F

## 2020-07-08 DIAGNOSIS — R44.3 HALLUCINATIONS: ICD-10-CM

## 2020-07-08 DIAGNOSIS — F19.10 POLYSUBSTANCE ABUSE (H): ICD-10-CM

## 2020-07-08 DIAGNOSIS — R45.1 AGITATION: ICD-10-CM

## 2020-07-08 LAB
ALBUMIN SERPL-MCNC: 4 G/DL (ref 3.4–5)
ALCOHOL BREATH TEST: NORMAL (ref 0–0.01)
ALP SERPL-CCNC: 81 U/L (ref 40–150)
ALT SERPL W P-5'-P-CCNC: 52 U/L (ref 0–70)
ANION GAP SERPL CALCULATED.3IONS-SCNC: 8 MMOL/L (ref 3–14)
APAP SERPL-MCNC: <2 MG/L (ref 10–20)
AST SERPL W P-5'-P-CCNC: 39 U/L (ref 0–45)
BASOPHILS # BLD AUTO: 0 10E9/L (ref 0–0.2)
BASOPHILS NFR BLD AUTO: 0.6 %
BILIRUB SERPL-MCNC: 0.7 MG/DL (ref 0.2–1.3)
BUN SERPL-MCNC: 21 MG/DL (ref 7–30)
CALCIUM SERPL-MCNC: 9.1 MG/DL (ref 8.5–10.1)
CHLORIDE SERPL-SCNC: 109 MMOL/L (ref 94–109)
CO2 SERPL-SCNC: 22 MMOL/L (ref 20–32)
CREAT SERPL-MCNC: 1 MG/DL (ref 0.66–1.25)
DIFFERENTIAL METHOD BLD: ABNORMAL
EOSINOPHIL # BLD AUTO: 0 10E9/L (ref 0–0.7)
EOSINOPHIL NFR BLD AUTO: 0.3 %
ERYTHROCYTE [DISTWIDTH] IN BLOOD BY AUTOMATED COUNT: 13.1 % (ref 10–15)
ETHANOL SERPL-MCNC: <0.01 G/DL
GFR SERPL CREATININE-BSD FRML MDRD: 84 ML/MIN/{1.73_M2}
GLUCOSE SERPL-MCNC: 91 MG/DL (ref 70–99)
HCT VFR BLD AUTO: 40.9 % (ref 40–53)
HGB BLD-MCNC: 13.6 G/DL (ref 13.3–17.7)
HIV EXPOSURE DRAW AND HOLD: NORMAL
HIV1+2 AB SPEC QL IA.RAPID: NONREACTIVE
IMM GRANULOCYTES # BLD: 0 10E9/L (ref 0–0.4)
IMM GRANULOCYTES NFR BLD: 0.1 %
LYMPHOCYTES # BLD AUTO: 0.5 10E9/L (ref 0.8–5.3)
LYMPHOCYTES NFR BLD AUTO: 7.1 %
MAGNESIUM SERPL-MCNC: 2.3 MG/DL (ref 1.6–2.3)
MCH RBC QN AUTO: 30.4 PG (ref 26.5–33)
MCHC RBC AUTO-ENTMCNC: 33.3 G/DL (ref 31.5–36.5)
MCV RBC AUTO: 92 FL (ref 78–100)
MONOCYTES # BLD AUTO: 0.4 10E9/L (ref 0–1.3)
MONOCYTES NFR BLD AUTO: 5.7 %
NEUTROPHILS # BLD AUTO: 6.2 10E9/L (ref 1.6–8.3)
NEUTROPHILS NFR BLD AUTO: 86.2 %
NRBC # BLD AUTO: 0 10*3/UL
NRBC BLD AUTO-RTO: 0 /100
PLATELET # BLD AUTO: 173 10E9/L (ref 150–450)
POTASSIUM SERPL-SCNC: 3.8 MMOL/L (ref 3.4–5.3)
PROT SERPL-MCNC: 7.4 G/DL (ref 6.8–8.8)
RBC # BLD AUTO: 4.47 10E12/L (ref 4.4–5.9)
SALICYLATES SERPL-MCNC: 2 MG/DL
SODIUM SERPL-SCNC: 139 MMOL/L (ref 133–144)
WBC # BLD AUTO: 7.2 10E9/L (ref 4–11)

## 2020-07-08 PROCEDURE — 83735 ASSAY OF MAGNESIUM: CPT | Performed by: EMERGENCY MEDICINE

## 2020-07-08 PROCEDURE — G0499 HEPB SCREEN HIGH RISK INDIV: HCPCS | Performed by: EMERGENCY MEDICINE

## 2020-07-08 PROCEDURE — 87389 HIV-1 AG W/HIV-1&-2 AB AG IA: CPT | Performed by: EMERGENCY MEDICINE

## 2020-07-08 PROCEDURE — 99284 EMERGENCY DEPT VISIT MOD MDM: CPT | Mod: 25

## 2020-07-08 PROCEDURE — 25800030 ZZH RX IP 258 OP 636: Performed by: EMERGENCY MEDICINE

## 2020-07-08 PROCEDURE — 96372 THER/PROPH/DIAG INJ SC/IM: CPT | Mod: 59

## 2020-07-08 PROCEDURE — 96360 HYDRATION IV INFUSION INIT: CPT

## 2020-07-08 PROCEDURE — 80329 ANALGESICS NON-OPIOID 1 OR 2: CPT | Mod: 59 | Performed by: EMERGENCY MEDICINE

## 2020-07-08 PROCEDURE — 96361 HYDRATE IV INFUSION ADD-ON: CPT

## 2020-07-08 PROCEDURE — 25000125 ZZHC RX 250

## 2020-07-08 PROCEDURE — 87522 HEPATITIS C REVRS TRNSCRPJ: CPT | Performed by: EMERGENCY MEDICINE

## 2020-07-08 PROCEDURE — 85025 COMPLETE CBC W/AUTO DIFF WBC: CPT | Performed by: EMERGENCY MEDICINE

## 2020-07-08 PROCEDURE — 80329 ANALGESICS NON-OPIOID 1 OR 2: CPT | Performed by: EMERGENCY MEDICINE

## 2020-07-08 PROCEDURE — 80320 DRUG SCREEN QUANTALCOHOLS: CPT | Performed by: EMERGENCY MEDICINE

## 2020-07-08 PROCEDURE — 25000128 H RX IP 250 OP 636: Performed by: EMERGENCY MEDICINE

## 2020-07-08 PROCEDURE — 80053 COMPREHEN METABOLIC PANEL: CPT | Performed by: EMERGENCY MEDICINE

## 2020-07-08 PROCEDURE — 86703 HIV-1/HIV-2 1 RESULT ANTBDY: CPT | Performed by: EMERGENCY MEDICINE

## 2020-07-08 PROCEDURE — 36415 COLL VENOUS BLD VENIPUNCTURE: CPT | Performed by: EMERGENCY MEDICINE

## 2020-07-08 RX ORDER — WATER 10 ML/10ML
INJECTION INTRAMUSCULAR; INTRAVENOUS; SUBCUTANEOUS
Status: COMPLETED
Start: 2020-07-08 | End: 2020-07-08

## 2020-07-08 RX ORDER — OLANZAPINE 10 MG/2ML
10 INJECTION, POWDER, FOR SOLUTION INTRAMUSCULAR ONCE
Status: COMPLETED | OUTPATIENT
Start: 2020-07-08 | End: 2020-07-08

## 2020-07-08 RX ORDER — DIAZEPAM 10 MG/2ML
5 INJECTION, SOLUTION INTRAMUSCULAR; INTRAVENOUS ONCE
Status: DISCONTINUED | OUTPATIENT
Start: 2020-07-08 | End: 2020-07-08

## 2020-07-08 RX ADMIN — OLANZAPINE 10 MG: 10 INJECTION, POWDER, FOR SOLUTION INTRAMUSCULAR at 08:55

## 2020-07-08 RX ADMIN — SODIUM CHLORIDE 1000 ML: 9 INJECTION, SOLUTION INTRAVENOUS at 12:53

## 2020-07-08 RX ADMIN — WATER 2.1 ML: 1 INJECTION INTRAMUSCULAR; INTRAVENOUS; SUBCUTANEOUS at 08:55

## 2020-07-08 ASSESSMENT — MIFFLIN-ST. JEOR: SCORE: 1718.96

## 2020-07-08 ASSESSMENT — ENCOUNTER SYMPTOMS
AGITATION: 1
HALLUCINATIONS: 1
NERVOUS/ANXIOUS: 1
HYPERACTIVE: 1

## 2020-07-08 NOTE — ED NOTES
"Pt fidgety, placed a pillow between his legs.  Rocking back and forth on the cart and sticking his hands down his pants.  Writer walked into room with  and asked pt if he needed a urinal to pee. Pt asked \"why? Don't you do that?\" Writer redirected conversation.  Pt denied need for urinal and was mumbling wwords   "

## 2020-07-08 NOTE — ED NOTES
Aleyda GUZMAN RN attempted IV access x 2 with US and assist of Alyx NICHOLE.  Again pt exhibiting intermittent flailing of arms and legs which is not seizure activity.  Pt states he has restless leg syndrome.  Dr. Mathews notified.  VORB to butterfly stick pt to obtain basic blood work.  Pt is able to swallow and take oral meds if needed.

## 2020-07-08 NOTE — ED AVS SNAPSHOT
Emergency Department  [address] [facility] [address]  Phone:  [phone]  Fax:  [phone]                                    Andrea Collado   MRN: [id number]    Department:   Emergency Department   Date of Visit:  7/8/2020           After Visit Summary Signature Page    I have received my discharge instructions, and my questions have been answered. I have discussed any challenges I see with this plan with the nurse or doctor.    ..........................................................................................................................................  Patient/Patient Representative Signature      ..........................................................................................................................................  Patient Representative Print Name and Relationship to Patient    ..................................................               ................................................  Date                                   Time    ..........................................................................................................................................  Reviewed by Signature/Title    ...................................................              ..............................................  Date                                               Time          22EPIC Rev 08/18
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
## 2020-07-08 NOTE — ED NOTES
"Pt hyperactive on arrival flailing arms and legs with paranoid statements of seeing \"People in the TV(the TV was off0 and verbally responding to what he was seeing with nonsensical sentences. Pt was asking his hallucinations questions.  Seizure pads placed.  Emotional support given.  2 security officers at bedside with writer.  Belongings secured in front of patient and pt/writer/Alyx NICHOLE all signed the seal.     "
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
## 2020-07-08 NOTE — ED NOTES
Writer attempted IV x 1 with assist of security.  Pt has a startle response when he intermittently wakes.  Unable to obtain access and pt moving arms and legs anxiously.

## 2020-07-09 LAB
HBV SURFACE AG SERPL QL IA: NONREACTIVE
HCV RNA SERPL NAA+PROBE-ACNC: NORMAL [IU]/ML
HCV RNA SERPL NAA+PROBE-LOG IU: NORMAL LOG IU/ML
HIV 1+2 AB+HIV1 P24 AG SERPL QL IA: NONREACTIVE

## 2020-07-18 ENCOUNTER — HOSPITAL ENCOUNTER (EMERGENCY)
Facility: CLINIC | Age: 55
Discharge: HOME | End: 2020-07-19
Attending: EMERGENCY MEDICINE | Admitting: EMERGENCY MEDICINE
Payer: MEDICARE

## 2020-07-18 DIAGNOSIS — F19.10 POLYSUBSTANCE ABUSE (H): ICD-10-CM

## 2020-07-18 DIAGNOSIS — G89.29 OTHER CHRONIC PAIN: ICD-10-CM

## 2020-07-18 DIAGNOSIS — R45.1 AGITATION: ICD-10-CM

## 2020-07-18 LAB
BASOPHILS # BLD AUTO: 0 10E9/L (ref 0–0.2)
BASOPHILS NFR BLD AUTO: 0.4 %
DIFFERENTIAL METHOD BLD: ABNORMAL
EOSINOPHIL # BLD AUTO: 0 10E9/L (ref 0–0.7)
EOSINOPHIL NFR BLD AUTO: 0.2 %
ERYTHROCYTE [DISTWIDTH] IN BLOOD BY AUTOMATED COUNT: 12.8 % (ref 10–15)
HCT VFR BLD AUTO: 44.1 % (ref 40–53)
HGB BLD-MCNC: 15 G/DL (ref 13.3–17.7)
IMM GRANULOCYTES # BLD: 0 10E9/L (ref 0–0.4)
IMM GRANULOCYTES NFR BLD: 0.2 %
LYMPHOCYTES # BLD AUTO: 0.7 10E9/L (ref 0.8–5.3)
LYMPHOCYTES NFR BLD AUTO: 8.1 %
MCH RBC QN AUTO: 30.9 PG (ref 26.5–33)
MCHC RBC AUTO-ENTMCNC: 34 G/DL (ref 31.5–36.5)
MCV RBC AUTO: 91 FL (ref 78–100)
MONOCYTES # BLD AUTO: 0.3 10E9/L (ref 0–1.3)
MONOCYTES NFR BLD AUTO: 3.5 %
NEUTROPHILS # BLD AUTO: 7.5 10E9/L (ref 1.6–8.3)
NEUTROPHILS NFR BLD AUTO: 87.6 %
NRBC # BLD AUTO: 0 10*3/UL
NRBC BLD AUTO-RTO: 0 /100
PLATELET # BLD AUTO: 239 10E9/L (ref 150–450)
RBC # BLD AUTO: 4.85 10E12/L (ref 4.4–5.9)
WBC # BLD AUTO: 8.6 10E9/L (ref 4–11)

## 2020-07-18 PROCEDURE — 84484 ASSAY OF TROPONIN QUANT: CPT | Performed by: EMERGENCY MEDICINE

## 2020-07-18 PROCEDURE — 80320 DRUG SCREEN QUANTALCOHOLS: CPT | Performed by: EMERGENCY MEDICINE

## 2020-07-18 PROCEDURE — 99285 EMERGENCY DEPT VISIT HI MDM: CPT | Mod: 25

## 2020-07-18 PROCEDURE — 96375 TX/PRO/DX INJ NEW DRUG ADDON: CPT

## 2020-07-18 PROCEDURE — 85025 COMPLETE CBC W/AUTO DIFF WBC: CPT | Performed by: EMERGENCY MEDICINE

## 2020-07-18 PROCEDURE — 80329 ANALGESICS NON-OPIOID 1 OR 2: CPT | Performed by: EMERGENCY MEDICINE

## 2020-07-18 PROCEDURE — 25000128 H RX IP 250 OP 636: Performed by: EMERGENCY MEDICINE

## 2020-07-18 PROCEDURE — 80053 COMPREHEN METABOLIC PANEL: CPT | Performed by: EMERGENCY MEDICINE

## 2020-07-18 RX ORDER — LORAZEPAM 2 MG/ML
1-2 INJECTION INTRAMUSCULAR
Status: DISCONTINUED | OUTPATIENT
Start: 2020-07-18 | End: 2020-07-19 | Stop reason: HOSPADM

## 2020-07-18 RX ORDER — SODIUM CHLORIDE 9 MG/ML
INJECTION, SOLUTION INTRAVENOUS CONTINUOUS
Status: DISCONTINUED | OUTPATIENT
Start: 2020-07-19 | End: 2020-07-19 | Stop reason: HOSPADM

## 2020-07-18 RX ADMIN — LORAZEPAM 2 MG: 2 INJECTION INTRAMUSCULAR; INTRAVENOUS at 23:50

## 2020-07-18 ASSESSMENT — MIFFLIN-ST. JEOR: SCORE: 1576.08

## 2020-07-18 NOTE — ED AVS SNAPSHOT
Emergency Department  [address] [facility] [address]  Phone:  [phone]  Fax:  [phone]                                    Andrea Collado   MRN: [id number]    Department:   Emergency Department   Date of Visit:  7/18/2020           After Visit Summary Signature Page    I have received my discharge instructions, and my questions have been answered. I have discussed any challenges I see with this plan with the nurse or doctor.    ..........................................................................................................................................  Patient/Patient Representative Signature      ..........................................................................................................................................  Patient Representative Print Name and Relationship to Patient    ..................................................               ................................................  Date                                   Time    ..........................................................................................................................................  Reviewed by Signature/Title    ...................................................              ..............................................  Date                                               Time          22EPIC Rev 08/18

## 2020-07-19 VITALS
SYSTOLIC BLOOD PRESSURE: 110 MMHG | HEIGHT: 72 IN | HEART RATE: 82 BPM | RESPIRATION RATE: 20 BRPM | OXYGEN SATURATION: 99 % | DIASTOLIC BLOOD PRESSURE: 78 MMHG | WEIGHT: 155 LBS | TEMPERATURE: 98.3 F | BODY MASS INDEX: 20.99 KG/M2

## 2020-07-19 LAB
ALBUMIN SERPL-MCNC: 4.5 G/DL (ref 3.4–5)
ALP SERPL-CCNC: 81 U/L (ref 40–150)
ALT SERPL W P-5'-P-CCNC: 52 U/L (ref 0–70)
ANION GAP SERPL CALCULATED.3IONS-SCNC: 10 MMOL/L (ref 3–14)
APAP SERPL-MCNC: <2 MG/L (ref 10–20)
AST SERPL W P-5'-P-CCNC: 36 U/L (ref 0–45)
BILIRUB SERPL-MCNC: 0.7 MG/DL (ref 0.2–1.3)
BUN SERPL-MCNC: 21 MG/DL (ref 7–30)
CALCIUM SERPL-MCNC: 8.9 MG/DL (ref 8.5–10.1)
CHLORIDE SERPL-SCNC: 107 MMOL/L (ref 94–109)
CO2 SERPL-SCNC: 21 MMOL/L (ref 20–32)
CREAT SERPL-MCNC: 1.21 MG/DL (ref 0.66–1.25)
ETHANOL SERPL-MCNC: 0.05 G/DL
GFR SERPL CREATININE-BSD FRML MDRD: 67 ML/MIN/{1.73_M2}
GLUCOSE SERPL-MCNC: 74 MG/DL (ref 70–99)
INTERPRETATION ECG - MUSE: NORMAL
POTASSIUM SERPL-SCNC: 4.1 MMOL/L (ref 3.4–5.3)
PROT SERPL-MCNC: 7.9 G/DL (ref 6.8–8.8)
SODIUM SERPL-SCNC: 138 MMOL/L (ref 133–144)
TROPONIN I SERPL-MCNC: <0.015 UG/L (ref 0–0.04)

## 2020-07-19 PROCEDURE — 93005 ELECTROCARDIOGRAM TRACING: CPT

## 2020-07-19 PROCEDURE — 25000125 ZZHC RX 250: Performed by: EMERGENCY MEDICINE

## 2020-07-19 PROCEDURE — 96372 THER/PROPH/DIAG INJ SC/IM: CPT

## 2020-07-19 PROCEDURE — 96375 TX/PRO/DX INJ NEW DRUG ADDON: CPT

## 2020-07-19 PROCEDURE — 25000128 H RX IP 250 OP 636: Performed by: EMERGENCY MEDICINE

## 2020-07-19 PROCEDURE — 96365 THER/PROPH/DIAG IV INF INIT: CPT

## 2020-07-19 PROCEDURE — 25800030 ZZH RX IP 258 OP 636: Performed by: EMERGENCY MEDICINE

## 2020-07-19 PROCEDURE — 96376 TX/PRO/DX INJ SAME DRUG ADON: CPT

## 2020-07-19 RX ORDER — OLANZAPINE 10 MG/2ML
10 INJECTION, POWDER, FOR SOLUTION INTRAMUSCULAR
Status: COMPLETED | OUTPATIENT
Start: 2020-07-19 | End: 2020-07-19

## 2020-07-19 RX ADMIN — LORAZEPAM 1 MG: 2 INJECTION INTRAMUSCULAR; INTRAVENOUS at 12:08

## 2020-07-19 RX ADMIN — SODIUM CHLORIDE 1000 ML: 9 INJECTION, SOLUTION INTRAVENOUS at 00:01

## 2020-07-19 RX ADMIN — OLANZAPINE 10 MG: 10 INJECTION, POWDER, FOR SOLUTION INTRAMUSCULAR at 01:10

## 2020-07-19 RX ADMIN — LORAZEPAM 2 MG: 2 INJECTION INTRAMUSCULAR; INTRAVENOUS at 07:38

## 2020-07-19 RX ADMIN — LORAZEPAM 2 MG: 2 INJECTION INTRAMUSCULAR; INTRAVENOUS at 00:45

## 2020-07-19 RX ADMIN — FOLIC ACID 1000 ML/HR: 5 INJECTION, SOLUTION INTRAMUSCULAR; INTRAVENOUS; SUBCUTANEOUS at 00:15

## 2020-07-19 RX ADMIN — FAMOTIDINE 20 MG: 10 INJECTION, SOLUTION INTRAVENOUS at 00:13

## 2020-07-19 NOTE — ED TRIAGE NOTES
PT states last drink 6 hours ago.  States vomited blood.  Brought in by friends who picked him up at hotel

## 2020-07-19 NOTE — ED NOTES
Pt alternating between resting on cot with eyes closed and being restless in bed and calling out. Speech remains mumbled and does not answer appropriately but is able to follow commands, such as lifting arm to apply BP cuff.

## 2020-07-19 NOTE — DISCHARGE INSTRUCTIONS

## 2020-07-19 NOTE — ED NOTES
Pt attempted to get out of bed, pt swung at RN, he has a difficult time following directions, he attempt to urinate in urinal, he was unsuccessful; pt assistant back to bed with assist of 3

## 2020-07-19 NOTE — ED NOTES
EKG delayed due to a new patient arriving that needed assistance. Patient was also too agitated at this time.

## 2020-07-19 NOTE — ED NOTES
Friends Andreina Walden [phone] and [address] [phone].  Friends state pt has been committed for drinking and mental health problems in the past.  Friends also state they are willing to come get him when he is discharged.

## 2020-07-19 NOTE — ED PROVIDER NOTES
History     Chief Complaint:    Alcohol Problem and Hematemesis       HPI   Andrea Collado is a 54 year old male who presents with concern for alcohol withdrawal.  He said he had a drink on his way here, because he was afraid to have seizures.  He also describes all over body pain, due to chronic low back pain and joint replacements.  He is so concerned because he has a feeling of a lump in his left neck.  He said it makes it difficult for him to swallow.  The patient is feeling anxious and agitated.  He admits to using some methamphetamine today.  He was brought in by his friends because of his symptoms.    Patient also reports his chest feels tight and he feels short of breath.  He has not been coughing.  He has not had fever or chills.  He is not feeling suicidal or homicidal.  Patient's history is limited by his agitation and likely intoxication    Allergies:  No Known Allergies     Medications:    buPROPion (WELLBUTRIN SR) 150 MG 12 hr tablet  multivitamin w/minerals (THERA-VIT-M) tablet  testosterone cypionate (DEPOTESTOSTERONE) 200 MG/ML injection  valACYclovir (VALTREX) 1000 mg tablet        Past Medical History:    Past Medical History:   Diagnosis Date     Chemical dependency (H)      Cocaine abuse (H)      Depressive disorder      DTs (delirium tremens) (H)      DVT (deep venous thrombosis) (H) 5 y ago     Flail chest      History of total hip arthroplasty      Hypertension      Seizures (H)      Substance abuse (H)        Patient Active Problem List    Diagnosis Date Noted     Altered mental status 11/20/2019     Priority: Medium     Alcohol dependence with withdrawal (H) 04/23/2019     Priority: Medium     Chemical dependency (H) 02/17/2019     Priority: Medium     Drug-seeking behavior 01/30/2019     Priority: Medium     Tobacco dependence 01/30/2019     Priority: Medium     Suicidal ideation 01/14/2019     Priority: Medium     Alcohol abuse 12/19/2018     Priority: Medium     Anxiety and depression  "12/04/2017     Priority: Medium     Chronic pain disorder 12/04/2017     Priority: Medium     Testosterone deficiency 05/09/2017     Priority: Medium     Right hip pain 11/01/2014     Priority: Medium     Overview:    Unclear if he is really having pain as he claims given very inconsistent exams when he claims \"he has dislocated his right hip\" when he can move in bed, stand up and bear weight just fine. Like drug seeking behavior. Acetaminophen, NSAIDs, lidoderm and heating pad as needed.       Depression, major, severe recurrence (H) 07/16/2014     Priority: Medium     Bipolar affective disorder, current episode manic with psychotic symptoms (H) 07/15/2014     Priority: Medium     Psychosis (H) 06/27/2014     Priority: Medium     Polysubstance abuse (H) 07/20/2013     Priority: Medium     Alcohol withdrawal (H) 01/23/2013     Priority: Medium     CARDIOVASCULAR SCREENING; LDL GOAL LESS THAN 160 12/18/2012     Priority: Medium     Low testosterone 12/07/2012     Priority: Medium     Depression 10/03/2012     Priority: Medium     ETOH abuse 09/18/2012     Priority: Medium        Past Surgical History:    Past Surgical History:   Procedure Laterality Date     CHEST SURGERY  1987    flail chest, sterum fractured     HIP SURGERY       KNEE SURGERY       ORTHOPEDIC SURGERY      KIMBER right. Stephanie left shoulder surgery, debridement right shoulder, neck surgery- fracture cervical spine. 6 knee surgeries- bucket handle meniscal tear and debridement. 3 heel surgeries.      ORTHOPEDIC SURGERY          Family History:    family history includes Substance Abuse in his father, mother, and sister.    Social History:   reports that he has been smoking. He uses smokeless tobacco. He reports current alcohol use of about 24.0 standard drinks of alcohol per week. He reports current drug use. Drug: Methamphetamines.    PCP: Rian Myers     Review of Systems    Review of systems positive and negative as stated in the HPI, " otherwise reviewed and negative, but unreliable given the patient's mental state and intoxication    Physical Exam     Patient Vitals for the past 24 hrs:   BP Temp Temp src Heart Rate Resp SpO2 Height Weight   07/18/20 2241 (!) 89/47 98.3  F (36.8  C) Oral 106 18 97 % 1.829 m (6') 70.3 kg (155 lb)        Physical Exam  Constitutional:  Disheveled, anxious, agitated, rapid and pressured speech.  Hyperverbal  HENT:   Head:    Atraumatic.   Mouth/Throat:   Oropharynx is without erythema or exudate and mucous     membranes are tacky.   Eyes:    Conjunctivae normal and EOM are normal.      Pupils are equal, round, and reactive to light.   Neck:    Normal range of motion. Neck supple.   Cardiovascular:  Cardiac rate, regular rhythm, normal heart sounds and radial and    dorsalis pedis pulses are 2+ and symmetric.    Pulmonary/Chest:  Effort normal and breath sounds normal.   Abdominal:   Soft. Bowel sounds are normal.      No splenomegaly or hepatomegaly. No tenderness. No rebound.   Musculoskeletal:  Normal range of motion. No edema and no tenderness.   Neurological:  Alert. Normal strength. No cranial nerve deficit. GCS 15.  Skin:    Skin is warm and dry.   Psychiatric:   Rapid, pressured speech, hyperverbal, anxious, good eye contact, animated affect  Emergency Department Course     EKG:  Sinus tachycardia with a rate of 110.  Intervals are normal.  Axis is normal.  Compared to an EKG from November 20 of 2019 his rate slowed, but is otherwise not significantly changed.  Aside from the tachycardia this is a normal EKG.    Laboratory:    Labs Ordered and Resulted from Time of ED Arrival Up to the Time of Departure from the ED   CBC WITH PLATELETS DIFFERENTIAL - Abnormal; Notable for the following components:       Result Value    Absolute Lymphocytes 0.7 (*)     All other components within normal limits   ALCOHOL ETHYL - Abnormal; Notable for the following components:    Ethanol g/dL 0.05 (*)     All other components  within normal limits   COMPREHENSIVE METABOLIC PANEL   ACETAMINOPHEN LEVEL   TROPONIN I   PULSE OXIMETRY NURSING   CARDIAC CONTINUOUS MONITORING        Interventions:    Medications   0.9% sodium chloride BOLUS (has no administration in time range)     Followed by   sodium chloride 0.9% infusion (has no administration in time range)   sodium chloride 0.9 % 1,000 mL with Infuvite Adult 10 mL, thiamine 100 mg, folic acid 1 mg, magnesium sulfate 2 g infusion (has no administration in time range)   LORazepam (ATIVAN) injection 1-2 mg (has no administration in time range)   famotidine (PEPCID) injection 20 mg (has no administration in time range)        Emergency Department Course:  Past medical records, nursing notes, and vitals reviewed.  I performed an exam of the patient and obtained history, as documented above.    I rechecked the patient he is sleeping soundly following Ativan and then Zyprexa.  I attempted to discuss findings and plan, but he does not appear to be fully able to understand me at this point.    Patient was signed out to , awaiting sober reassessment    Impression & Plan      Medical Decision Making:  Patient presents with concern for alcohol withdrawal.  He also describes allover body pain, anxiety, and reported an episode of hematemesis, one time yesterday.  He has a diffusely positive review of systems, but is anxious, agitated and likely intoxicated.    EKG looked unremarkable aside from tachycardia.  Laboratory testing does not suggest coingestions.  Patient denies being suicidal or homicidal.  He admits to being intoxicated with methamphetamine.  He got Ativan for agitation, and with concern for possible alcohol withdrawal.  He was tachycardic but hypotensive and not tremulous.  I suspect the agitation is more due to his meth use.  He continued to be restless, and was given some Zyprexa.  After this he was able to sleep.    With his complaint of chest pain and hematemesis I am not  finding any sign of acute coronary syndrome.  His hemoglobin is stable.  He did not have any vomiting or bloody stools here.  Abdominal exam is benign.    The plan is to let him sleep and he will need a reevaluation when he is more awake and alert.  With our initial conversation he is quite grandiose and with some flight of ideas.  He has a history of psychiatric illness.  Is unclear if tonight's episode was purely due to intoxication or if he is decompensating psychiatrically.    I signed him out to Dr. Cruz at 10 AM, awaiting sober reevaluation.    Diagnosis:    ICD-10-CM    1. Polysubstance abuse (H)  F19.10    2. Other chronic pain  G89.29    3. Agitation  R45.1         Discharge Medications:     Medication List      There are no discharge medications for this visit.          7/18/2020   Canelo Powell MD Isely, Karel Lynn, MD  07/19/20 1002       Canelo Powell MD  08/07/20 0736

## 2020-07-19 NOTE — ED NOTES
Pt lying on cot with eyes closed and pants pulled USP down. Pt's speech mumbled and difficult to understand. Assisted to pull up pants and covered with blanket.

## 2020-08-02 ENCOUNTER — AMBULATORY - HEALTHEAST (OUTPATIENT)
Dept: BEHAVIORAL HEALTH | Facility: CLINIC | Age: 55
End: 2020-08-02

## 2020-08-03 ENCOUNTER — AMBULATORY - HEALTHEAST (OUTPATIENT)
Dept: BEHAVIORAL HEALTH | Facility: CLINIC | Age: 55
End: 2020-08-03

## 2020-08-03 ENCOUNTER — COMMUNICATION - HEALTHEAST (OUTPATIENT)
Dept: SCHEDULING | Facility: CLINIC | Age: 55
End: 2020-08-03

## 2020-09-07 NOTE — ED NOTES
Bed: [facility]  Expected date:   Expected time:   Means of arrival:   Comments:  ángel - 512 - 54 M psych restrained eta 4815   · Prior lab work reviewed, baseline Cr appears to be closer to 1 3-1 5   · Hold ACE-I   · Avoid nephrotoxins and hypotension  · Monitor intake and output

## 2020-11-30 ENCOUNTER — HOSPITAL ENCOUNTER (EMERGENCY)
Facility: CLINIC | Age: 55
Discharge: HOME | End: 2020-12-02
Attending: FAMILY MEDICINE | Admitting: FAMILY MEDICINE
Payer: MEDICARE

## 2020-11-30 DIAGNOSIS — R41.82 ALTERED MENTAL STATUS, UNSPECIFIED ALTERED MENTAL STATUS TYPE: ICD-10-CM

## 2020-11-30 DIAGNOSIS — F19.10 POLYSUBSTANCE ABUSE (H): ICD-10-CM

## 2020-11-30 LAB
ALBUMIN SERPL-MCNC: 3.8 G/DL (ref 3.4–5)
ALP SERPL-CCNC: 95 U/L (ref 40–150)
ALT SERPL W P-5'-P-CCNC: 20 U/L (ref 0–70)
ANION GAP SERPL CALCULATED.3IONS-SCNC: 9 MMOL/L (ref 3–14)
APAP SERPL-MCNC: <2 MG/L (ref 10–20)
AST SERPL W P-5'-P-CCNC: 24 U/L (ref 0–45)
BASOPHILS # BLD AUTO: 0.1 10E9/L (ref 0–0.2)
BASOPHILS NFR BLD AUTO: 1.1 %
BILIRUB SERPL-MCNC: 0.9 MG/DL (ref 0.2–1.3)
BUN SERPL-MCNC: 31 MG/DL (ref 7–30)
CALCIUM SERPL-MCNC: 9 MG/DL (ref 8.5–10.1)
CHLORIDE SERPL-SCNC: 104 MMOL/L (ref 94–109)
CO2 SERPL-SCNC: 23 MMOL/L (ref 20–32)
CREAT SERPL-MCNC: 1.33 MG/DL (ref 0.66–1.25)
DIFFERENTIAL METHOD BLD: ABNORMAL
EOSINOPHIL # BLD AUTO: 0.1 10E9/L (ref 0–0.7)
EOSINOPHIL NFR BLD AUTO: 1.5 %
ERYTHROCYTE [DISTWIDTH] IN BLOOD BY AUTOMATED COUNT: 13.2 % (ref 10–15)
ETHANOL SERPL-MCNC: <0.01 G/DL
GFR SERPL CREATININE-BSD FRML MDRD: 59 ML/MIN/{1.73_M2}
GLUCOSE SERPL-MCNC: 75 MG/DL (ref 70–99)
HCT VFR BLD AUTO: 39.7 % (ref 40–53)
HGB BLD-MCNC: 12.9 G/DL (ref 13.3–17.7)
IMM GRANULOCYTES # BLD: 0 10E9/L (ref 0–0.4)
IMM GRANULOCYTES NFR BLD: 0.5 %
LIPASE SERPL-CCNC: 296 U/L (ref 73–393)
LYMPHOCYTES # BLD AUTO: 1.3 10E9/L (ref 0.8–5.3)
LYMPHOCYTES NFR BLD AUTO: 17.1 %
MCH RBC QN AUTO: 28.4 PG (ref 26.5–33)
MCHC RBC AUTO-ENTMCNC: 32.5 G/DL (ref 31.5–36.5)
MCV RBC AUTO: 87 FL (ref 78–100)
MONOCYTES # BLD AUTO: 0.6 10E9/L (ref 0–1.3)
MONOCYTES NFR BLD AUTO: 8.3 %
NEUTROPHILS # BLD AUTO: 5.3 10E9/L (ref 1.6–8.3)
NEUTROPHILS NFR BLD AUTO: 71.5 %
NRBC # BLD AUTO: 0 10*3/UL
NRBC BLD AUTO-RTO: 0 /100
PLATELET # BLD AUTO: 218 10E9/L (ref 150–450)
POTASSIUM SERPL-SCNC: 4 MMOL/L (ref 3.4–5.3)
PROT SERPL-MCNC: 7.8 G/DL (ref 6.8–8.8)
RBC # BLD AUTO: 4.54 10E12/L (ref 4.4–5.9)
SALICYLATES SERPL-MCNC: <2 MG/DL
SODIUM SERPL-SCNC: 136 MMOL/L (ref 133–144)
WBC # BLD AUTO: 7.4 10E9/L (ref 4–11)

## 2020-11-30 PROCEDURE — 85025 COMPLETE CBC W/AUTO DIFF WBC: CPT | Performed by: FAMILY MEDICINE

## 2020-11-30 PROCEDURE — 258N000003 HC RX IP 258 OP 636: Performed by: FAMILY MEDICINE

## 2020-11-30 PROCEDURE — 99292 CRITICAL CARE ADDL 30 MIN: CPT | Mod: 25 | Performed by: FAMILY MEDICINE

## 2020-11-30 PROCEDURE — 96361 HYDRATE IV INFUSION ADD-ON: CPT | Performed by: FAMILY MEDICINE

## 2020-11-30 PROCEDURE — 96376 TX/PRO/DX INJ SAME DRUG ADON: CPT | Performed by: FAMILY MEDICINE

## 2020-11-30 PROCEDURE — 250N000011 HC RX IP 250 OP 636

## 2020-11-30 PROCEDURE — 80329 ANALGESICS NON-OPIOID 1 OR 2: CPT | Performed by: FAMILY MEDICINE

## 2020-11-30 PROCEDURE — 96375 TX/PRO/DX INJ NEW DRUG ADDON: CPT | Performed by: FAMILY MEDICINE

## 2020-11-30 PROCEDURE — 99291 CRITICAL CARE FIRST HOUR: CPT | Performed by: FAMILY MEDICINE

## 2020-11-30 PROCEDURE — 99291 CRITICAL CARE FIRST HOUR: CPT | Mod: 25 | Performed by: FAMILY MEDICINE

## 2020-11-30 PROCEDURE — 96372 THER/PROPH/DIAG INJ SC/IM: CPT | Mod: 59 | Performed by: FAMILY MEDICINE

## 2020-11-30 PROCEDURE — 99292 CRITICAL CARE ADDL 30 MIN: CPT | Performed by: FAMILY MEDICINE

## 2020-11-30 PROCEDURE — 96374 THER/PROPH/DIAG INJ IV PUSH: CPT | Performed by: FAMILY MEDICINE

## 2020-11-30 PROCEDURE — 99292 CRITICAL CARE ADDL 30 MIN: CPT | Mod: 25,59

## 2020-11-30 PROCEDURE — 80053 COMPREHEN METABOLIC PANEL: CPT | Performed by: FAMILY MEDICINE

## 2020-11-30 PROCEDURE — 83690 ASSAY OF LIPASE: CPT | Performed by: FAMILY MEDICINE

## 2020-11-30 PROCEDURE — 250N000011 HC RX IP 250 OP 636: Performed by: FAMILY MEDICINE

## 2020-11-30 PROCEDURE — 80307 DRUG TEST PRSMV CHEM ANLYZR: CPT | Performed by: FAMILY MEDICINE

## 2020-11-30 PROCEDURE — 36415 COLL VENOUS BLD VENIPUNCTURE: CPT | Performed by: FAMILY MEDICINE

## 2020-11-30 PROCEDURE — 80320 DRUG SCREEN QUANTALCOHOLS: CPT | Performed by: FAMILY MEDICINE

## 2020-11-30 PROCEDURE — 250N000009 HC RX 250: Performed by: FAMILY MEDICINE

## 2020-11-30 PROCEDURE — 80329 ANALGESICS NON-OPIOID 1 OR 2: CPT | Mod: 59 | Performed by: FAMILY MEDICINE

## 2020-11-30 RX ORDER — HALOPERIDOL 5 MG/ML
5 INJECTION INTRAMUSCULAR ONCE
Status: COMPLETED | OUTPATIENT
Start: 2020-11-30 | End: 2020-11-30

## 2020-11-30 RX ORDER — OLANZAPINE 10 MG/2ML
10 INJECTION, POWDER, FOR SOLUTION INTRAMUSCULAR ONCE
Status: COMPLETED | OUTPATIENT
Start: 2020-11-30 | End: 2020-11-30

## 2020-11-30 RX ORDER — LORAZEPAM 2 MG/ML
1 INJECTION INTRAMUSCULAR ONCE
Status: COMPLETED | OUTPATIENT
Start: 2020-11-30 | End: 2020-11-30

## 2020-11-30 RX ORDER — LORAZEPAM 2 MG/ML
INJECTION INTRAMUSCULAR
Status: DISPENSED
Start: 2020-11-30 | End: 2020-12-01

## 2020-11-30 RX ORDER — LORAZEPAM 2 MG/ML
1 INJECTION INTRAMUSCULAR EVERY 10 MIN PRN
Status: COMPLETED | OUTPATIENT
Start: 2020-11-30 | End: 2020-11-30

## 2020-11-30 RX ORDER — KETAMINE HYDROCHLORIDE 100 MG/ML
3 INJECTION, SOLUTION INTRAMUSCULAR; INTRAVENOUS ONCE
Status: COMPLETED | OUTPATIENT
Start: 2020-11-30 | End: 2020-11-30

## 2020-11-30 RX ORDER — DIPHENHYDRAMINE HYDROCHLORIDE 50 MG/ML
INJECTION INTRAMUSCULAR; INTRAVENOUS
Status: COMPLETED
Start: 2020-11-30 | End: 2020-11-30

## 2020-11-30 RX ORDER — DIPHENHYDRAMINE HYDROCHLORIDE 50 MG/ML
50 INJECTION INTRAMUSCULAR; INTRAVENOUS ONCE
Status: COMPLETED | OUTPATIENT
Start: 2020-11-30 | End: 2020-11-30

## 2020-11-30 RX ADMIN — HALOPERIDOL LACTATE 5 MG: 5 INJECTION, SOLUTION INTRAMUSCULAR at 21:47

## 2020-11-30 RX ADMIN — LORAZEPAM 1 MG: 2 INJECTION INTRAMUSCULAR; INTRAVENOUS at 22:04

## 2020-11-30 RX ADMIN — KETAMINE HYDROCHLORIDE 211 MG: 100 INJECTION INTRAMUSCULAR; INTRAVENOUS at 22:18

## 2020-11-30 RX ADMIN — LORAZEPAM 1 MG: 2 INJECTION INTRAMUSCULAR; INTRAVENOUS at 20:29

## 2020-11-30 RX ADMIN — OLANZAPINE 10 MG: 10 INJECTION, POWDER, FOR SOLUTION INTRAMUSCULAR at 20:05

## 2020-11-30 RX ADMIN — LORAZEPAM 1 MG: 2 INJECTION INTRAMUSCULAR; INTRAVENOUS at 20:05

## 2020-11-30 RX ADMIN — DIPHENHYDRAMINE HYDROCHLORIDE 50 MG: 50 INJECTION INTRAMUSCULAR; INTRAVENOUS at 21:03

## 2020-11-30 RX ADMIN — LORAZEPAM 1 MG: 2 INJECTION INTRAMUSCULAR; INTRAVENOUS at 20:40

## 2020-11-30 RX ADMIN — DIPHENHYDRAMINE HYDROCHLORIDE 50 MG: 50 INJECTION, SOLUTION INTRAMUSCULAR; INTRAVENOUS at 21:03

## 2020-11-30 RX ADMIN — SODIUM CHLORIDE, POTASSIUM CHLORIDE, SODIUM LACTATE AND CALCIUM CHLORIDE 1000 ML: 600; 310; 30; 20 INJECTION, SOLUTION INTRAVENOUS at 20:07

## 2020-11-30 ASSESSMENT — MIFFLIN-ST. JEOR: SCORE: 1555.21

## 2020-11-30 NOTE — ED AVS SNAPSHOT
[facility] Emergency Dept  [address] [facility] [address]  Phone: [phone]  Fax: [phone]                                    Andrea Collado   MRN: [id number]    Department: [facility] Emergency Dept   Date of Visit: 11/30/2020           After Visit Summary Signature Page    I have received my discharge instructions, and my questions have been answered. I have discussed any challenges I see with this plan with the nurse or doctor.    ..........................................................................................................................................  Patient/Patient Representative Signature      ..........................................................................................................................................  Patient Representative Print Name and Relationship to Patient    ..................................................               ................................................  Date                                   Time    ..........................................................................................................................................  Reviewed by Signature/Title    ...................................................              ..............................................  Date                                               Time          22EPIC Rev 08/18

## 2020-12-01 ENCOUNTER — APPOINTMENT (OUTPATIENT)
Dept: CT IMAGING | Facility: CLINIC | Age: 55
End: 2020-12-01
Attending: EMERGENCY MEDICINE
Payer: MEDICARE

## 2020-12-01 LAB
ALBUMIN SERPL-MCNC: 3.4 G/DL (ref 3.4–5)
ALP SERPL-CCNC: 96 U/L (ref 40–150)
ALT SERPL W P-5'-P-CCNC: 20 U/L (ref 0–70)
AMPHETAMINES UR QL SCN: POSITIVE
ANION GAP SERPL CALCULATED.3IONS-SCNC: 6 MMOL/L (ref 3–14)
AST SERPL W P-5'-P-CCNC: 28 U/L (ref 0–45)
BARBITURATES UR QL: NEGATIVE
BASOPHILS # BLD AUTO: 0 10E9/L (ref 0–0.2)
BASOPHILS NFR BLD AUTO: 0.5 %
BENZODIAZ UR QL: POSITIVE
BILIRUB SERPL-MCNC: 0.7 MG/DL (ref 0.2–1.3)
BUN SERPL-MCNC: 16 MG/DL (ref 7–30)
CALCIUM SERPL-MCNC: 8.5 MG/DL (ref 8.5–10.1)
CANNABINOIDS UR QL SCN: NEGATIVE
CHLORIDE SERPL-SCNC: 107 MMOL/L (ref 94–109)
CO2 SERPL-SCNC: 24 MMOL/L (ref 20–32)
COCAINE UR QL: NEGATIVE
CREAT SERPL-MCNC: 0.82 MG/DL (ref 0.66–1.25)
DIFFERENTIAL METHOD BLD: NORMAL
EOSINOPHIL # BLD AUTO: 0.2 10E9/L (ref 0–0.7)
EOSINOPHIL NFR BLD AUTO: 3.6 %
ERYTHROCYTE [DISTWIDTH] IN BLOOD BY AUTOMATED COUNT: 13.4 % (ref 10–15)
GFR SERPL CREATININE-BSD FRML MDRD: >90 ML/MIN/{1.73_M2}
GLUCOSE BLDC GLUCOMTR-MCNC: 71 MG/DL (ref 70–99)
GLUCOSE SERPL-MCNC: 83 MG/DL (ref 70–99)
HCT VFR BLD AUTO: 42.3 % (ref 40–53)
HGB BLD-MCNC: 13.8 G/DL (ref 13.3–17.7)
IMM GRANULOCYTES # BLD: 0 10E9/L (ref 0–0.4)
IMM GRANULOCYTES NFR BLD: 0.2 %
LYMPHOCYTES # BLD AUTO: 1.1 10E9/L (ref 0.8–5.3)
LYMPHOCYTES NFR BLD AUTO: 19.4 %
MCH RBC QN AUTO: 28.3 PG (ref 26.5–33)
MCHC RBC AUTO-ENTMCNC: 32.6 G/DL (ref 31.5–36.5)
MCV RBC AUTO: 87 FL (ref 78–100)
MONOCYTES # BLD AUTO: 0.4 10E9/L (ref 0–1.3)
MONOCYTES NFR BLD AUTO: 6.8 %
NEUTROPHILS # BLD AUTO: 3.9 10E9/L (ref 1.6–8.3)
NEUTROPHILS NFR BLD AUTO: 69.5 %
NRBC # BLD AUTO: 0 10*3/UL
NRBC BLD AUTO-RTO: 0 /100
OPIATES UR QL SCN: NEGATIVE
PCP UR QL SCN: NEGATIVE
PLATELET # BLD AUTO: 228 10E9/L (ref 150–450)
POTASSIUM SERPL-SCNC: 3.8 MMOL/L (ref 3.4–5.3)
PROT SERPL-MCNC: 7.3 G/DL (ref 6.8–8.8)
RBC # BLD AUTO: 4.87 10E12/L (ref 4.4–5.9)
SODIUM SERPL-SCNC: 137 MMOL/L (ref 133–144)
WBC # BLD AUTO: 5.6 10E9/L (ref 4–11)

## 2020-12-01 PROCEDURE — 80053 COMPREHEN METABOLIC PANEL: CPT | Performed by: EMERGENCY MEDICINE

## 2020-12-01 PROCEDURE — 250N000011 HC RX IP 250 OP 636

## 2020-12-01 PROCEDURE — 85025 COMPLETE CBC W/AUTO DIFF WBC: CPT | Performed by: EMERGENCY MEDICINE

## 2020-12-01 PROCEDURE — 96361 HYDRATE IV INFUSION ADD-ON: CPT | Performed by: FAMILY MEDICINE

## 2020-12-01 PROCEDURE — 250N000011 HC RX IP 250 OP 636: Performed by: FAMILY MEDICINE

## 2020-12-01 PROCEDURE — 999N001017 HC STATISTIC GLUCOSE BY METER IP

## 2020-12-01 PROCEDURE — 258N000003 HC RX IP 258 OP 636: Performed by: EMERGENCY MEDICINE

## 2020-12-01 PROCEDURE — 250N000009 HC RX 250: Performed by: EMERGENCY MEDICINE

## 2020-12-01 PROCEDURE — 250N000011 HC RX IP 250 OP 636: Performed by: EMERGENCY MEDICINE

## 2020-12-01 PROCEDURE — 96365 THER/PROPH/DIAG IV INF INIT: CPT | Performed by: FAMILY MEDICINE

## 2020-12-01 PROCEDURE — 250N000013 HC RX MED GY IP 250 OP 250 PS 637: Mod: 59 | Performed by: EMERGENCY MEDICINE

## 2020-12-01 PROCEDURE — 96375 TX/PRO/DX INJ NEW DRUG ADDON: CPT | Performed by: FAMILY MEDICINE

## 2020-12-01 PROCEDURE — 258N000003 HC RX IP 258 OP 636: Performed by: FAMILY MEDICINE

## 2020-12-01 PROCEDURE — 70450 CT HEAD/BRAIN W/O DYE: CPT

## 2020-12-01 PROCEDURE — 36415 COLL VENOUS BLD VENIPUNCTURE: CPT | Performed by: EMERGENCY MEDICINE

## 2020-12-01 PROCEDURE — 96376 TX/PRO/DX INJ SAME DRUG ADON: CPT | Performed by: FAMILY MEDICINE

## 2020-12-01 RX ORDER — FOLIC ACID 5 MG/ML
1 INJECTION, SOLUTION INTRAMUSCULAR; INTRAVENOUS; SUBCUTANEOUS ONCE
Status: COMPLETED | OUTPATIENT
Start: 2020-12-01 | End: 2020-12-01

## 2020-12-01 RX ORDER — SODIUM CHLORIDE, SODIUM LACTATE, POTASSIUM CHLORIDE, CALCIUM CHLORIDE 600; 310; 30; 20 MG/100ML; MG/100ML; MG/100ML; MG/100ML
1000 INJECTION, SOLUTION INTRAVENOUS CONTINUOUS
Status: DISCONTINUED | OUTPATIENT
Start: 2020-12-01 | End: 2020-12-02 | Stop reason: HOSPADM

## 2020-12-01 RX ORDER — MULTIPLE VITAMINS W/ MINERALS TAB 9MG-400MCG
1 TAB ORAL DAILY
COMMUNITY
Start: 2020-08-07 | End: 2020-12-01

## 2020-12-01 RX ORDER — HALOPERIDOL 5 MG/ML
5 INJECTION INTRAMUSCULAR ONCE
Status: COMPLETED | OUTPATIENT
Start: 2020-12-01 | End: 2020-12-01

## 2020-12-01 RX ORDER — LANOLIN ALCOHOL/MO/W.PET/CERES
3 CREAM (GRAM) TOPICAL
COMMUNITY
Start: 2020-08-06 | End: 2021-12-23

## 2020-12-01 RX ORDER — HALOPERIDOL 5 MG/ML
5 INJECTION INTRAMUSCULAR ONCE
Status: DISCONTINUED | OUTPATIENT
Start: 2020-12-01 | End: 2020-12-01

## 2020-12-01 RX ORDER — LORAZEPAM 0.5 MG/1
0.5 TABLET ORAL ONCE
Status: COMPLETED | OUTPATIENT
Start: 2020-12-01 | End: 2020-12-01

## 2020-12-01 RX ORDER — BUPROPION HYDROCHLORIDE 150 MG/1
150 TABLET, EXTENDED RELEASE ORAL 2 TIMES DAILY
COMMUNITY
Start: 2020-06-10 | End: 2020-12-01

## 2020-12-01 RX ORDER — LORAZEPAM 0.5 MG/1
0.25 TABLET ORAL
COMMUNITY
Start: 2020-11-23 | End: 2021-12-23

## 2020-12-01 RX ORDER — DIVALPROEX SODIUM 500 MG/1
1000 TABLET, EXTENDED RELEASE ORAL AT BEDTIME
COMMUNITY
Start: 2020-11-05 | End: 2022-12-12

## 2020-12-01 RX ORDER — LORAZEPAM 2 MG/ML
1 INJECTION INTRAMUSCULAR
Status: DISCONTINUED | OUTPATIENT
Start: 2020-12-01 | End: 2020-12-02 | Stop reason: HOSPADM

## 2020-12-01 RX ORDER — LORAZEPAM 2 MG/ML
1 INJECTION INTRAMUSCULAR EVERY 10 MIN PRN
Status: DISCONTINUED | OUTPATIENT
Start: 2020-12-01 | End: 2020-12-01 | Stop reason: DRUGHIGH

## 2020-12-01 RX ORDER — SILDENAFIL 100 MG/1
100 TABLET, FILM COATED ORAL DAILY PRN
COMMUNITY
Start: 2020-03-10 | End: 2021-12-23

## 2020-12-01 RX ADMIN — LORAZEPAM 0.5 MG: 0.5 TABLET ORAL at 23:52

## 2020-12-01 RX ADMIN — MIDAZOLAM 5 MG: 1 INJECTION INTRAMUSCULAR; INTRAVENOUS at 05:45

## 2020-12-01 RX ADMIN — THIAMINE HYDROCHLORIDE 500 MG: 100 INJECTION, SOLUTION INTRAMUSCULAR; INTRAVENOUS at 23:38

## 2020-12-01 RX ADMIN — LORAZEPAM 2 MG: 2 INJECTION INTRAMUSCULAR; INTRAVENOUS at 01:10

## 2020-12-01 RX ADMIN — FOLIC ACID 1 MG: 5 INJECTION, SOLUTION INTRAMUSCULAR; INTRAVENOUS; SUBCUTANEOUS at 23:38

## 2020-12-01 RX ADMIN — SODIUM CHLORIDE, POTASSIUM CHLORIDE, SODIUM LACTATE AND CALCIUM CHLORIDE 500 ML: 600; 310; 30; 20 INJECTION, SOLUTION INTRAVENOUS at 00:21

## 2020-12-01 RX ADMIN — HALOPERIDOL LACTATE 5 MG: 5 INJECTION, SOLUTION INTRAMUSCULAR at 00:21

## 2020-12-01 NOTE — ED PROVIDER NOTES
"  HPI   The patient is a 55-year-old male presenting with agitation and a report of polysubstance abuse.  He has been staying in a motel locally.  He reports starting methamphetamine about 4 days ago.  He tells me he is extremely hungry and dehydrated.  He has been drinking alcohol over the past 2 days he reports.  He denies other drugs of abuse.  He denies having any complaint outside of the above.  He denies recent trauma or injury.  He denies feeling sick or having a fever.  He was apparently yelling and causing a scene and complaining of people entering his motel room which is what prompted staff to call the police.  The police found him agitated and obviously intoxicated and so brought him here for further evaluation.        Allergies:  No Known Allergies  Problem List:    Patient Active Problem List    Diagnosis Date Noted     Altered mental status 11/20/2019     Priority: Medium     Alcohol dependence with withdrawal (H) 04/23/2019     Priority: Medium     Chemical dependency (H) 02/17/2019     Priority: Medium     Drug-seeking behavior 01/30/2019     Priority: Medium     Tobacco dependence 01/30/2019     Priority: Medium     Suicidal ideation 01/14/2019     Priority: Medium     Alcohol abuse 12/19/2018     Priority: Medium     Anxiety and depression 12/04/2017     Priority: Medium     Chronic pain disorder 12/04/2017     Priority: Medium     Testosterone deficiency 05/09/2017     Priority: Medium     Right hip pain 11/01/2014     Priority: Medium     Overview:    Unclear if he is really having pain as he claims given very inconsistent exams when he claims \"he has dislocated his right hip\" when he can move in bed, stand up and bear weight just fine. Like drug seeking behavior. Acetaminophen, NSAIDs, lidoderm and heating pad as needed.       Depression, major, severe recurrence (H) 07/16/2014     Priority: Medium     Bipolar affective disorder, current episode manic with psychotic symptoms (H) 07/15/2014     " Priority: Medium     Psychosis (H) 06/27/2014     Priority: Medium     Polysubstance abuse (H) 07/20/2013     Priority: Medium     Alcohol withdrawal (H) 01/23/2013     Priority: Medium     CARDIOVASCULAR SCREENING; LDL GOAL LESS THAN 160 12/18/2012     Priority: Medium     Low testosterone 12/07/2012     Priority: Medium     Depression 10/03/2012     Priority: Medium     ETOH abuse 09/18/2012     Priority: Medium      Past Medical History:    Past Medical History:   Diagnosis Date     Chemical dependency (H)      Cocaine abuse (H)      Depressive disorder      DTs (delirium tremens) (H)      DVT (deep venous thrombosis) (H) 5 y ago     Flail chest      History of total hip arthroplasty      Hypertension      Seizures (H)      Substance abuse (H)      Past Surgical History:    Past Surgical History:   Procedure Laterality Date     CHEST SURGERY  1987    flail chest, sterum fractured     HIP SURGERY       KNEE SURGERY       ORTHOPEDIC SURGERY      KIMBER right. Stephanie left shoulder surgery, debridement right shoulder, neck surgery- fracture cervical spine. 6 knee surgeries- bucket handle meniscal tear and debridement. 3 heel surgeries.      ORTHOPEDIC SURGERY       Family History:    Family History   Problem Relation Age of Onset     Substance Abuse Mother      Substance Abuse Father      Substance Abuse Sister      Social History:  Marital Status:  Single [1]  Social History     Tobacco Use     Smoking status: Current Some Day Smoker     Smokeless tobacco: Current User   Substance Use Topics     Alcohol use: Yes     Alcohol/week: 24.0 standard drinks     Types: 24 Cans of beer per week     Comment: drinks 1.75L grain alcohol daily since 4/28/18     Drug use: Yes     Types: Methamphetamines     Comment: pt has been injecting meth for past 5 days      Medications:         buPROPion (WELLBUTRIN SR) 150 MG 12 hr tablet       multivitamin w/minerals (THERA-VIT-M) tablet       testosterone cypionate (DEPOTESTOSTERONE) 200  "MG/ML injection       valACYclovir (VALTREX) 1000 mg tablet      Review of Systems   Reason unable to perform ROS: Patient denies anything on review of systems but his history is suspect.   All other systems reviewed and are negative.      PE   BP: 115/66  Pulse: 105  Temp: 98.1  F (36.7  C)  Resp: 22  Height: 180.3 cm (5' 11\")  Weight: 70.3 kg (155 lb)  SpO2: 100 %  Physical Exam  Vitals signs and nursing note reviewed.   Constitutional:       General: He is in acute distress.      Comments: The patient is wide eyed and agitated.  He is picking at the IV and then his bed.  He cannot stop moving.  He is pleasant as I am talking to him and will answer questions.  Occasionally, he yells out.   HENT:      Head: Atraumatic.      Right Ear: External ear normal.      Left Ear: External ear normal.      Nose: Nose normal.      Mouth/Throat:      Mouth: Mucous membranes are dry.      Pharynx: Oropharynx is clear.   Eyes:      General: No scleral icterus.     Extraocular Movements: Extraocular movements intact.      Conjunctiva/sclera: Conjunctivae normal.   Neck:      Musculoskeletal: Normal range of motion. No muscular tenderness.   Cardiovascular:      Rate and Rhythm: Regular rhythm. Tachycardia present.   Pulmonary:      Effort: Pulmonary effort is normal. No respiratory distress.   Abdominal:      Palpations: Abdomen is soft.      Tenderness: There is no abdominal tenderness.   Musculoskeletal: Normal range of motion.         General: No swelling or tenderness.   Skin:     General: Skin is warm and dry.      Comments: Healing wounds on his lower extremities.  There is a large scabbed over wound along his medial left calf.  No evidence of expanding redness, tenderness, or swelling away from this.   Neurological:      General: No focal deficit present.      Mental Status: He is alert.      Comments: Agitated.   Psychiatric:      Comments: Agitated.  He appears obviously intoxicated.         ED COURSE and [facility]   2005.  The " patient presents by police escort with obvious intoxication and agitation.  He reports taking methamphetamine over the past 4 days.  He reports taking alcohol over the past 2 days.  He has no complaint other than being hungry, tired, and thirsty.  There are skin wounds present but nothing to suggest active cellulitis.  No other findings on exam, as above.  Lab values pending.  Fluid bolus.  Ativan and Zyprexa ordered.    2120.  The patient has continued to escalate despite medication given.  The patient is requiring soft restraints for his own and our safety.  Benadryl and Haldol are the most recent medications ordered.  We will continue to provide chemical restraint until his safe and we are able to remove the restraints.  Lab results are not concerning.    2151.  Pt still agitated and yelling out.    2211.  Despite the medicine given the patient is still yelling and fighting against the restraints.  He has damaged his IV.  No IV access at this time and he is still fighting with multiple people trying to hold him down.  Ketamine 3 mg/kg IM.    0104.  Improved but still agitated.  I will focus on benzodiazepines alone going forward and avoid any further antileptics. Will remove restraints as soon as possible.  Not diaphoretic or hyperthermic.  Pulse improved.  Signed out to Dr. Dooley.     LABS  Labs Ordered and Resulted from Time of ED Arrival Up to the Time of Departure from the ED   CBC WITH PLATELETS DIFFERENTIAL - Abnormal; Notable for the following components:       Result Value    Hemoglobin 12.9 (*)     Hematocrit 39.7 (*)     All other components within normal limits   COMPREHENSIVE METABOLIC PANEL - Abnormal; Notable for the following components:    Urea Nitrogen 31 (*)     Creatinine 1.33 (*)     GFR Estimate 59 (*)     All other components within normal limits   LIPASE   DRUG ABUSE SCREEN 77 URINE (FL, RH, SH)   ALCOHOL ETHYL   ACETAMINOPHEN LEVEL   SALICYLATE LEVEL       IMAGING  Images reviewed by me.   Radiology report also reviewed.  No orders to display       Procedures    Medications   lactated ringers BOLUS 500 mL (500 mLs Intravenous New Bag 12/1/20 0021)   lactated ringers infusion (has no administration in time range)   LORazepam (ATIVAN) injection 1 mg (has no administration in time range)   LORazepam (ATIVAN) injection 1 mg (1 mg Intravenous Given 11/30/20 2040)   OLANZapine (zyPREXA) injection 10 mg (10 mg Intramuscular Given 11/30/20 2005)   lactated ringers BOLUS 1,000 mL (0 mLs Intravenous Stopped 11/30/20 2246)   diphenhydrAMINE (BENADRYL) injection 50 mg (50 mg Intravenous Given 11/30/20 2103)   haloperidol lactate (HALDOL) injection 5 mg (5 mg Intravenous Given 11/30/20 2147)   LORazepam (ATIVAN) injection 1 mg (1 mg Intravenous Canceled Entry 12/1/20 0054)   ketamine (KETALAR) (HIGH CONC) inj 211 mg (211 mg Intramuscular Given 11/30/20 2218)   haloperidol lactate (HALDOL) injection 5 mg (5 mg Intravenous Given 12/1/20 0021)         IMPRESSION   Acute methamphetamine intoxication       Medication List      There are no discharge medications for this visit.                 Critical Care Documentation  My initial assessment included review of prehospital provider report, review of nursing observations, review of vital signs, focused history, physical exam and review of cardiac rhythm monitor.  It was determined that the patient had altered mental status.  This required immediate intervention and critical care decision-making.     Critical care time: 72 + 30 + 30 + 30 minutes.            Carlitos Phillip MD  12/01/20 0106

## 2020-12-01 NOTE — ED NOTES
Pt agitated, trying to get out of bed.  RN updated MD.  Order obtained for 50mg benadryl (see MAR). MEdication administered as ordered.

## 2020-12-01 NOTE — ED PROVIDER NOTES
Emergency Department Patient Sign-out       Brief HPI:  This is a 55 year old male signed out to me by Dr. Dooley .  See initial ED Provider note for details of the presentation.  Found in a hotel confused.  Known user of methamphetamine.  Takes multiple other mood altering drugs.  Not able to be fully assessed due to impaired cognitive state.       Significant Events prior to my assuming care: agitated; unable to get a history. Known history of methamphetamine abuse      Exam:   Patient Vitals for the past 24 hrs:   BP Temp Temp src Pulse Resp SpO2 Height Weight   12/01/20 0530 110/75 -- -- 80 16 99 % -- --   12/01/20 0500 106/78 -- -- 90 15 100 % -- --   12/01/20 0445 115/83 -- -- 94 24 100 % -- --   12/01/20 0434 -- 97.6  F (36.4  C) Axillary -- -- -- -- --   12/01/20 0430 -- -- -- 86 20 99 % -- --   12/01/20 0340 -- -- -- 89 19 98 % -- --   12/01/20 0300 112/74 -- -- 90 22 97 % -- --   12/01/20 0245 -- -- -- 98 23 96 % -- --   12/01/20 0230 (!) 116/101 -- -- 106 24 98 % -- --   12/01/20 0215 -- -- -- 93 22 98 % -- --   12/01/20 0200 119/81 -- -- 100 20 93 % -- --   12/01/20 0145 -- -- -- 98 21 97 % -- --   12/01/20 0130 109/76 -- -- 107 20 99 % -- --   12/01/20 0115 -- -- -- 101 23 97 % -- --   12/01/20 0100 107/75 -- -- 109 19 -- -- --   12/01/20 0045 -- -- -- 103 21 93 % -- --   12/01/20 0030 122/86 -- -- 105 21 97 % -- --   12/01/20 0015 -- -- -- 106 (!) 32 97 % -- --   12/01/20 0000 (!) 125/91 -- -- 112 (!) 31 96 % -- --   11/30/20 2345 -- -- -- 111 11 98 % -- --   11/30/20 2330 134/88 -- -- 112 22 96 % -- --   11/30/20 2315 -- -- -- 98 22 97 % -- --   11/30/20 2300 115/81 -- -- 110 (!) 38 98 % -- --   11/30/20 2245 -- -- -- 105 24 -- -- --   11/30/20 2230 112/74 -- -- 107 18 -- -- --   11/30/20 2217 -- 98  F (36.7  C) Axillary -- -- -- -- --   11/30/20 2215 -- -- -- 101 25 97 % -- --   11/30/20 2200 117/74 -- -- 116 14 97 % -- --   11/30/20 2145 -- -- -- -- -- 98 % -- --   11/30/20 2130 111/72 -- -- 96  "-- 98 % -- --   11/30/20 2115 -- -- -- -- -- 99 % -- --   11/30/20 2100 (!) 113/93 -- -- 104 -- 99 % -- --   11/30/20 2045 -- -- -- 105 27 99 % -- --   11/30/20 2030 -- -- -- -- 27 97 % -- --   11/30/20 2020 105/79 -- -- 101 14 99 % -- --   11/30/20 2015 105/79 -- -- 105 20 99 % -- --   11/30/20 2000 -- -- -- 87 16 97 % -- --   11/30/20 1950 115/66 98.1  F (36.7  C) Oral 105 22 100 % 1.803 m (5' 11\") 70.3 kg (155 lb)           ED RESULTS:   Results for orders placed or performed during the hospital encounter of 11/30/20 (from the past 24 hour(s))   CBC with platelets differential     Status: Abnormal    Collection Time: 11/30/20  8:28 PM   Result Value Ref Range    WBC 7.4 4.0 - 11.0 10e9/L    RBC Count 4.54 4.4 - 5.9 10e12/L    Hemoglobin 12.9 (L) 13.3 - 17.7 g/dL    Hematocrit 39.7 (L) 40.0 - 53.0 %    MCV 87 78 - 100 fl    MCH 28.4 26.5 - 33.0 pg    MCHC 32.5 31.5 - 36.5 g/dL    RDW 13.2 10.0 - 15.0 %    Platelet Count 218 150 - 450 10e9/L    Diff Method Automated Method     % Neutrophils 71.5 %    % Lymphocytes 17.1 %    % Monocytes 8.3 %    % Eosinophils 1.5 %    % Basophils 1.1 %    % Immature Granulocytes 0.5 %    Nucleated RBCs 0 0 /100    Absolute Neutrophil 5.3 1.6 - 8.3 10e9/L    Absolute Lymphocytes 1.3 0.8 - 5.3 10e9/L    Absolute Monocytes 0.6 0.0 - 1.3 10e9/L    Absolute Eosinophils 0.1 0.0 - 0.7 10e9/L    Absolute Basophils 0.1 0.0 - 0.2 10e9/L    Abs Immature Granulocytes 0.0 0 - 0.4 10e9/L    Absolute Nucleated RBC 0.0    Comprehensive metabolic panel     Status: Abnormal    Collection Time: 11/30/20  8:28 PM   Result Value Ref Range    Sodium 136 133 - 144 mmol/L    Potassium 4.0 3.4 - 5.3 mmol/L    Chloride 104 94 - 109 mmol/L    Carbon Dioxide 23 20 - 32 mmol/L    Anion Gap 9 3 - 14 mmol/L    Glucose 75 70 - 99 mg/dL    Urea Nitrogen 31 (H) 7 - 30 mg/dL    Creatinine 1.33 (H) 0.66 - 1.25 mg/dL    GFR Estimate 59 (L) >60 mL/min/[1.73_m2]    GFR Estimate If Black 69 >60 mL/min/[1.73_m2]    Calcium " 9.0 8.5 - 10.1 mg/dL    Bilirubin Total 0.9 0.2 - 1.3 mg/dL    Albumin 3.8 3.4 - 5.0 g/dL    Protein Total 7.8 6.8 - 8.8 g/dL    Alkaline Phosphatase 95 40 - 150 U/L    ALT 20 0 - 70 U/L    AST 24 0 - 45 U/L   Lipase     Status: None    Collection Time: 11/30/20  8:28 PM   Result Value Ref Range    Lipase 296 73 - 393 U/L   Alcohol ethyl     Status: None    Collection Time: 11/30/20  8:28 PM   Result Value Ref Range    Ethanol g/dL <0.01 <0.01 g/dL   Acetaminophen level     Status: None    Collection Time: 11/30/20  8:28 PM   Result Value Ref Range    Acetaminophen Level <2 mg/L   Salicylate level     Status: None    Collection Time: 11/30/20  8:28 PM   Result Value Ref Range    Salicylate Level <2 mg/dL   Glucose by meter     Status: None    Collection Time: 12/01/20  1:25 AM   Result Value Ref Range    Glucose 71 70 - 99 mg/dL   CT Head w/o Contrast     Status: None    Collection Time: 12/01/20  6:19 AM    Narrative    EXAM: CT HEAD W/O CONTRAST  LOCATION: [facility]  DATE/TIME: 12/1/2020 6:05 AM    INDICATION: Altered level of consciousness (LOC), unexplained, confusion  COMPARISON: None.  TECHNIQUE: Routine CT Head without IV contrast. Multiplanar reformats. Dose reduction techniques were used.    FINDINGS:  INTRACRANIAL CONTENTS: No intracranial hemorrhage, extraaxial collection, or mass effect.  No CT evidence of acute infarct. Normal parenchymal attenuation. Normal ventricles and sulci.     VISUALIZED ORBITS/SINUSES/MASTOIDS: No intraorbital abnormality. No paranasal sinus mucosal disease. No middle ear or mastoid effusion.    BONES/SOFT TISSUES: No acute abnormality.      Impression    IMPRESSION:  1.  No acute intracranial process.       ED MEDICATIONS:   Medications   lactated ringers infusion ( Intravenous Rate/Dose Verify 12/1/20 0609)   LORazepam (ATIVAN) injection 1 mg (2 mg Intravenous Given 12/1/20 0110)   LORazepam (ATIVAN) injection 1 mg (1 mg Intravenous Given 11/30/20 2040)    OLANZapine (zyPREXA) injection 10 mg (10 mg Intramuscular Given 11/30/20 2005)   lactated ringers BOLUS 1,000 mL (0 mLs Intravenous Stopped 11/30/20 2246)   diphenhydrAMINE (BENADRYL) injection 50 mg (50 mg Intravenous Given 11/30/20 2103)   haloperidol lactate (HALDOL) injection 5 mg (5 mg Intravenous Given 11/30/20 2147)   LORazepam (ATIVAN) injection 1 mg (1 mg Intravenous Canceled Entry 12/1/20 0054)   ketamine (KETALAR) (HIGH CONC) inj 211 mg (211 mg Intramuscular Given 11/30/20 2218)   haloperidol lactate (HALDOL) injection 5 mg (5 mg Intravenous Given 12/1/20 0021)   lactated ringers BOLUS 500 mL (0 mLs Intravenous Stopped 12/1/20 0100)   midazolam (VERSED) injection 5 mg (5 mg Intravenous Given 12/1/20 0545)     I reassessed the patient multiple times throughout the shift and he was never able to wake up adequately to provide any history or even to open his eyes.  At shift change his care was transitioned to Dr. Wiley awaiting clinical sobriety so he could be further assessed.    Impression:  No diagnosis found.    Plan:    Pending studies include none.        MD Eugenio Park Joseph, MD  12/01/20 0737

## 2020-12-01 NOTE — ED NOTES
"Patient arrives via EMS after found confused & agitated near his hotel that he was staying at. Stating someone \"was after him\" Very fidgety, anxious upon arrival, unable to sit still but cooperative. Admits to daily meth use & heavy drinking for past couple of days.  Has large scabbed over lac to left lower leg with surrounding erythema, states \"I was supposed to have that looked at.\"  Placed on [facility] at 2040 to maintain patient's safety while unable to care for himself due to intoxication.  "

## 2020-12-01 NOTE — ED PROVIDER NOTES
"     Emergency Department Patient Sign-out       Brief HPI:  This is a 55 year old male signed out to me by Dr. Phillip .  See initial ED Provider note for details of the presentation.            Significant Events prior to my assuming care: agitated requiring meds. Urinary retention.       Exam:   Patient Vitals for the past 24 hrs:   BP Temp Temp src Pulse Resp SpO2 Height Weight   12/01/20 0030 122/86 -- -- 105 21 97 % -- --   12/01/20 0015 -- -- -- 106 (!) 32 97 % -- --   12/01/20 0000 (!) 125/91 -- -- 112 (!) 31 96 % -- --   11/30/20 2345 -- -- -- 111 11 98 % -- --   11/30/20 2330 134/88 -- -- 112 22 96 % -- --   11/30/20 2315 -- -- -- 98 22 97 % -- --   11/30/20 2300 115/81 -- -- 110 (!) 38 98 % -- --   11/30/20 2245 -- -- -- 105 24 -- -- --   11/30/20 2230 112/74 -- -- 107 18 -- -- --   11/30/20 2217 -- 98  F (36.7  C) Axillary -- -- -- -- --   11/30/20 2215 -- -- -- 101 25 97 % -- --   11/30/20 2200 117/74 -- -- 116 14 97 % -- --   11/30/20 2145 -- -- -- -- -- 98 % -- --   11/30/20 2130 111/72 -- -- 96 -- 98 % -- --   11/30/20 2115 -- -- -- -- -- 99 % -- --   11/30/20 2100 (!) 113/93 -- -- 104 -- 99 % -- --   11/30/20 2045 -- -- -- 105 27 99 % -- --   11/30/20 2030 -- -- -- -- 27 97 % -- --   11/30/20 2020 105/79 -- -- 101 14 99 % -- --   11/30/20 2015 105/79 -- -- 105 20 99 % -- --   11/30/20 2000 -- -- -- 87 16 97 % -- --   11/30/20 1950 115/66 98.1  F (36.7  C) Oral 105 22 100 % 1.803 m (5' 11\") 70.3 kg (155 lb)           ED RESULTS:   Results for orders placed or performed during the hospital encounter of 11/30/20 (from the past 24 hour(s))   CBC with platelets differential     Status: Abnormal    Collection Time: 11/30/20  8:28 PM   Result Value Ref Range    WBC 7.4 4.0 - 11.0 10e9/L    RBC Count 4.54 4.4 - 5.9 10e12/L    Hemoglobin 12.9 (L) 13.3 - 17.7 g/dL    Hematocrit 39.7 (L) 40.0 - 53.0 %    MCV 87 78 - 100 fl    MCH 28.4 26.5 - 33.0 pg    MCHC 32.5 31.5 - 36.5 g/dL    RDW 13.2 10.0 - 15.0 %    " Platelet Count 218 150 - 450 10e9/L    Diff Method Automated Method     % Neutrophils 71.5 %    % Lymphocytes 17.1 %    % Monocytes 8.3 %    % Eosinophils 1.5 %    % Basophils 1.1 %    % Immature Granulocytes 0.5 %    Nucleated RBCs 0 0 /100    Absolute Neutrophil 5.3 1.6 - 8.3 10e9/L    Absolute Lymphocytes 1.3 0.8 - 5.3 10e9/L    Absolute Monocytes 0.6 0.0 - 1.3 10e9/L    Absolute Eosinophils 0.1 0.0 - 0.7 10e9/L    Absolute Basophils 0.1 0.0 - 0.2 10e9/L    Abs Immature Granulocytes 0.0 0 - 0.4 10e9/L    Absolute Nucleated RBC 0.0    Comprehensive metabolic panel     Status: Abnormal    Collection Time: 11/30/20  8:28 PM   Result Value Ref Range    Sodium 136 133 - 144 mmol/L    Potassium 4.0 3.4 - 5.3 mmol/L    Chloride 104 94 - 109 mmol/L    Carbon Dioxide 23 20 - 32 mmol/L    Anion Gap 9 3 - 14 mmol/L    Glucose 75 70 - 99 mg/dL    Urea Nitrogen 31 (H) 7 - 30 mg/dL    Creatinine 1.33 (H) 0.66 - 1.25 mg/dL    GFR Estimate 59 (L) >60 mL/min/[1.73_m2]    GFR Estimate If Black 69 >60 mL/min/[1.73_m2]    Calcium 9.0 8.5 - 10.1 mg/dL    Bilirubin Total 0.9 0.2 - 1.3 mg/dL    Albumin 3.8 3.4 - 5.0 g/dL    Protein Total 7.8 6.8 - 8.8 g/dL    Alkaline Phosphatase 95 40 - 150 U/L    ALT 20 0 - 70 U/L    AST 24 0 - 45 U/L   Lipase     Status: None    Collection Time: 11/30/20  8:28 PM   Result Value Ref Range    Lipase 296 73 - 393 U/L   Alcohol ethyl     Status: None    Collection Time: 11/30/20  8:28 PM   Result Value Ref Range    Ethanol g/dL <0.01 <0.01 g/dL   Acetaminophen level     Status: None    Collection Time: 11/30/20  8:28 PM   Result Value Ref Range    Acetaminophen Level <2 mg/L   Salicylate level     Status: None    Collection Time: 11/30/20  8:28 PM   Result Value Ref Range    Salicylate Level <2 mg/dL       ED MEDICATIONS:   Medications   lactated ringers BOLUS 500 mL (500 mLs Intravenous New Bag 12/1/20 0021)   lactated ringers infusion (has no administration in time range)   LORazepam (ATIVAN)  injection 1 mg (has no administration in time range)   LORazepam (ATIVAN) injection 1 mg (1 mg Intravenous Given 11/30/20 2040)   OLANZapine (zyPREXA) injection 10 mg (10 mg Intramuscular Given 11/30/20 2005)   lactated ringers BOLUS 1,000 mL (0 mLs Intravenous Stopped 11/30/20 2246)   diphenhydrAMINE (BENADRYL) injection 50 mg (50 mg Intravenous Given 11/30/20 2103)   haloperidol lactate (HALDOL) injection 5 mg (5 mg Intravenous Given 11/30/20 2147)   LORazepam (ATIVAN) injection 1 mg (1 mg Intravenous Canceled Entry 12/1/20 0054)   ketamine (KETALAR) (HIGH CONC) inj 211 mg (211 mg Intramuscular Given 11/30/20 2218)   haloperidol lactate (HALDOL) injection 5 mg (5 mg Intravenous Given 12/1/20 0021)     Patient Vitals for the past 24 hrs:   BP Temp Temp src Pulse Resp SpO2 Height Weight   12/01/20 0340 -- -- -- 89 19 98 % -- --   12/01/20 0300 112/74 -- -- 90 22 97 % -- --   12/01/20 0245 -- -- -- 98 23 96 % -- --   12/01/20 0230 (!) 116/101 -- -- 106 24 98 % -- --   12/01/20 0215 -- -- -- 93 22 98 % -- --   12/01/20 0200 119/81 -- -- 100 20 93 % -- --   12/01/20 0145 -- -- -- 98 21 97 % -- --   12/01/20 0130 109/76 -- -- 107 20 99 % -- --   12/01/20 0115 -- -- -- 101 23 97 % -- --   12/01/20 0100 107/75 -- -- 109 19 -- -- --   12/01/20 0045 -- -- -- 103 21 93 % -- --   12/01/20 0030 122/86 -- -- 105 21 97 % -- --   12/01/20 0015 -- -- -- 106 (!) 32 97 % -- --   12/01/20 0000 (!) 125/91 -- -- 112 (!) 31 96 % -- --   11/30/20 2345 -- -- -- 111 11 98 % -- --   11/30/20 2330 134/88 -- -- 112 22 96 % -- --   11/30/20 2315 -- -- -- 98 22 97 % -- --   11/30/20 2300 115/81 -- -- 110 (!) 38 98 % -- --   11/30/20 2245 -- -- -- 105 24 -- -- --   11/30/20 2230 112/74 -- -- 107 18 -- -- --   11/30/20 2217 -- 98  F (36.7  C) Axillary -- -- -- -- --   11/30/20 2215 -- -- -- 101 25 97 % -- --   11/30/20 2200 117/74 -- -- 116 14 97 % -- --   11/30/20 2145 -- -- -- -- -- 98 % -- --   11/30/20 2130 111/72 -- -- 96 -- 98 % -- --  "  11/30/20 2115 -- -- -- -- -- 99 % -- --   11/30/20 2100 (!) 113/93 -- -- 104 -- 99 % -- --   11/30/20 2045 -- -- -- 105 27 99 % -- --   11/30/20 2030 -- -- -- -- 27 97 % -- --   11/30/20 2020 105/79 -- -- 101 14 99 % -- --   11/30/20 2015 105/79 -- -- 105 20 99 % -- --   11/30/20 2000 -- -- -- 87 16 97 % -- --   11/30/20 1950 115/66 98.1  F (36.7  C) Oral 105 22 100 % 1.803 m (5' 11\") 70.3 kg (155 lb)       Impression:  No diagnosis found.    Plan:    Pending studies include none.  Plan to monitor for clinical sobriety and discharge.    1:04 AM: Assessed patient at bedside.  Patient drowsy however awakens to minimal stimuli and tries to sit up and.  Still confused minimally redirectable.  Additional lorazepam ordered.    3:46 AM Patient re-assessed: Resting comfortably.  Sleeping.  Respiratory rate 20 and regular on end-tidal.  Heart rate in the 90s.  Continues to be sedated but reportedly wakes up around every 15 minutes and moves.  Continues to be nonverbal.  We will continue to monitor    4:21 AM: Patient re-assessed.  Continues to be mostly relaxed and sedated but waking up regularity, sitting up from bed and pulling at his arms.  No seizure-like activity.  Responds to painful stimuli.    6:26 AM: Patient signed out to Dr Becker. I reviewed the CT: no hemorrhage - formal read pending.  Patient still may require additional irrigations for sedation plan to continue to monitor with anticipation for return to normal mental status at which point she will need to be assessed for disposition.    MD Miah Street, Homar Armas MD  12/01/20 0627    "

## 2020-12-01 NOTE — ED TRIAGE NOTES
Patient called 9-1-1 from hotel complaining of Chest Pain. Patient found to have liquor bottles scattered around hotel room, admitted meth use. Patient does not recall the name of the hotel he was staying or what city he is in. Patient hallucinating Believed someone was trying to break in to his hotel room earlier this morning. Patient given 25 mg Benadryl and 5 mg Versed en route via 20 g IV right hand. Left lower extremity wound present.

## 2020-12-01 NOTE — ED NOTES
Awaiting to give versed until we go to CT, Will give meds prior to CT. CT is busy at this time with a critical pt.

## 2020-12-01 NOTE — ED NOTES
Pt continues to thrash in bed with soft restraints in places.  Pt diaphoretic.  Team continues redirection, and soft restraints.  MD updated at this time.

## 2020-12-01 NOTE — ED NOTES
Pt woke up uncooperative climbing out of bed will not open eyes or follow directions yells he needs to go to the bathroom.This writer called Dr. Becker into room to assess. Code 21 called. Pt straight cathed for 200 ml. Pt now back to sleep. No medications given.

## 2020-12-02 VITALS
RESPIRATION RATE: 18 BRPM | HEIGHT: 71 IN | WEIGHT: 155 LBS | BODY MASS INDEX: 21.7 KG/M2 | HEART RATE: 94 BPM | OXYGEN SATURATION: 98 % | TEMPERATURE: 97.6 F | DIASTOLIC BLOOD PRESSURE: 88 MMHG | SYSTOLIC BLOOD PRESSURE: 111 MMHG

## 2020-12-02 NOTE — DISCHARGE INSTRUCTIONS
You need to stop using methamphetamine and alcohol.  Push fluids, rest.  Return to the emergency department if worse or changes.

## 2020-12-02 NOTE — ED NOTES
Patient declining DEC assessment. Clean clothing provided for patient to discharge in. Patient calling friends attempting to find ride home.

## 2020-12-02 NOTE — ED PROVIDER NOTES
Emergency Department Patient Sign-out       Brief HPI:  This is a 55 year old male signed out to me by Dr. Wiley .  See initial ED Provider note for details of the presentation.            Significant Events prior to my assuming care: none      Exam:   Patient Vitals for the past 24 hrs:   BP Temp Temp src Pulse Resp SpO2   12/01/20 2100 91/56 -- -- 86 23 --   12/01/20 2000 119/77 -- -- 77 21 --   12/01/20 1900 114/78 -- -- 79 16 --   12/01/20 1615 125/83 -- -- 115 11 --   12/01/20 1515 96/70 -- -- 78 25 100 %   12/01/20 1300 119/80 -- -- 74 15 --   12/01/20 1200 110/77 -- -- 81 18 --   12/01/20 1100 108/69 -- -- 76 20 99 %   12/01/20 1015 -- -- -- 68 27 --   12/01/20 1000 114/68 -- -- 71 28 99 %   12/01/20 0945 -- -- -- 82 18 99 %   12/01/20 0930 119/85 -- -- 67 20 99 %   12/01/20 0915 90/60 -- -- 90 24 100 %   12/01/20 0900 105/79 -- -- 82 (!) 6 96 %   12/01/20 0830 108/79 -- -- 80 19 100 %   12/01/20 0815 -- -- -- 112 28 99 %   12/01/20 0800 119/79 -- -- 108 22 100 %   12/01/20 0745 -- -- -- 113 -- 98 %   12/01/20 0730 106/74 -- -- 76 18 100 %   12/01/20 0715 -- -- -- 73 17 100 %   12/01/20 0700 112/78 -- -- 107 (!) 37 100 %   12/01/20 0645 -- -- -- 72 10 100 %   12/01/20 0630 109/69 -- -- 91 15 90 %   12/01/20 0545 -- -- -- 87 12 100 %   12/01/20 0530 110/75 -- -- 80 16 99 %   12/01/20 0515 -- -- -- 86 8 99 %   12/01/20 0500 106/78 -- -- 90 15 100 %   12/01/20 0445 115/83 -- -- 94 24 100 %   12/01/20 0434 -- 97.6  F (36.4  C) Axillary -- -- --   12/01/20 0430 -- -- -- 86 20 99 %   12/01/20 0415 -- -- -- 89 19 99 %   12/01/20 0400 -- -- -- 87 20 98 %   12/01/20 0340 -- -- -- 89 19 98 %   12/01/20 0300 112/74 -- -- 90 22 97 %   12/01/20 0245 -- -- -- 98 23 96 %   12/01/20 0230 (!) 116/101 -- -- 106 24 98 %   12/01/20 0215 -- -- -- 93 22 98 %   12/01/20 0200 119/81 -- -- 100 20 93 %   12/01/20 0145 -- -- -- 98 21 97 %   12/01/20 0130 109/76 -- -- 107 20 99 %   12/01/20 0115 -- -- -- 101 23 97 %   12/01/20 0100  107/75 -- -- 109 19 --   12/01/20 0045 -- -- -- 103 21 93 %   12/01/20 0030 122/86 -- -- 105 21 97 %   12/01/20 0015 -- -- -- 106 (!) 32 97 %   12/01/20 0000 (!) 125/91 -- -- 112 (!) 31 96 %   11/30/20 2345 -- -- -- 111 11 98 %   11/30/20 2330 134/88 -- -- 112 22 96 %   11/30/20 2315 -- -- -- 98 22 97 %   11/30/20 2300 115/81 -- -- 110 (!) 38 98 %   11/30/20 2245 -- -- -- 105 24 --   11/30/20 2230 112/74 -- -- 107 18 --   11/30/20 2217 -- 98  F (36.7  C) Axillary -- -- --   11/30/20 2215 -- -- -- 101 25 97 %           ED RESULTS:   Results for orders placed or performed during the hospital encounter of 11/30/20 (from the past 24 hour(s))   Glucose by meter     Status: None    Collection Time: 12/01/20  1:25 AM   Result Value Ref Range    Glucose 71 70 - 99 mg/dL   CT Head w/o Contrast     Status: None    Collection Time: 12/01/20  6:19 AM    Narrative    EXAM: CT HEAD W/O CONTRAST  LOCATION: [facility]  DATE/TIME: 12/1/2020 6:05 AM    INDICATION: Altered level of consciousness (LOC), unexplained, confusion  COMPARISON: None.  TECHNIQUE: Routine CT Head without IV contrast. Multiplanar reformats. Dose reduction techniques were used.    FINDINGS:  INTRACRANIAL CONTENTS: No intracranial hemorrhage, extraaxial collection, or mass effect.  No CT evidence of acute infarct. Normal parenchymal attenuation. Normal ventricles and sulci.     VISUALIZED ORBITS/SINUSES/MASTOIDS: No intraorbital abnormality. No paranasal sinus mucosal disease. No middle ear or mastoid effusion.    BONES/SOFT TISSUES: No acute abnormality.      Impression    IMPRESSION:  1.  No acute intracranial process.       ED MEDICATIONS:   Medications   lactated ringers infusion (0 mLs Intravenous Stopped 12/1/20 0735)   LORazepam (ATIVAN) injection 1 mg (has no administration in time range)   LORazepam (ATIVAN) injection 1 mg (1 mg Intravenous Given 11/30/20 2040)   OLANZapine (zyPREXA) injection 10 mg (10 mg Intramuscular Given 11/30/20 2005)    lactated ringers BOLUS 1,000 mL (0 mLs Intravenous Stopped 11/30/20 2246)   diphenhydrAMINE (BENADRYL) injection 50 mg (50 mg Intravenous Given 11/30/20 2103)   haloperidol lactate (HALDOL) injection 5 mg (5 mg Intravenous Given 11/30/20 2147)   LORazepam (ATIVAN) injection 1 mg (1 mg Intravenous Canceled Entry 12/1/20 0054)   ketamine (KETALAR) (HIGH CONC) inj 211 mg (211 mg Intramuscular Given 11/30/20 2218)   haloperidol lactate (HALDOL) injection 5 mg (5 mg Intravenous Given 12/1/20 0021)   lactated ringers BOLUS 500 mL (0 mLs Intravenous Stopped 12/1/20 0100)   midazolam (VERSED) injection 5 mg (5 mg Intravenous Given 12/1/20 0545)         Impression:  No diagnosis found.    Plan:    Pending studies include none    10:14 PM: Patient reassessed at bedside.  Patient sleeping in no distress.  Patient easily awoken with gentle shake of the arm.  Patient groggy and will open his eyes to command.  Follows commands easily but has notably slurred speech as well as an intention tremor of his tongue and a resting tremor of his hands.  No arm or leg drift.  Falls asleep quickly during conversation and has to be reawoken.  Clearly more alert than his exam this morning but clearly not back to what is presumed to the baseline.  Still unable to fully manage his cares.  We will recheck labs and give some vitamins, ? Warnicke's encephalopathy.  Confused to time and place.  Was able to state that he is in [place].  Unable to state his current location hospital.    11:47 PM: Patient reassessed.  Patient dramatically more alert and awake.  Sitting up and talking much more clearly.  Remains awake when talking.  Patient complaining of anxiety and paranoia.  Reports chronic anxiety for which he reportedly takes Ativan.  Patient states that he is afraid that his brother is going to hurt him if he leaves.  Patient admits to depression but denies suicidality.  Admits to occasional methamphetamine use.  Denies any other drug use.   Much more oriented and aware that he is in the hospital in [place].  Thought he was at [place].  Patient reports that he lives in a motel of in [place] currently and is unemployed.  Knows the month, year, and his birthdate.  Will give patient 0.5 mg of oral Ativan and some food and water to eat and drink.  Will try to obtain a DEC assessment due to his ongoing paranoia and anxiety.  Given clinical context, likely meth induced paranoia however underlying psychiatric illness also a possibility.    5:58 AM: Patient reassessed.  Resting comfortably.  Sitter notes that patient has set up several times overnight and appears to be talking to himself.  Unclear if he is hallucinating.  Given his reported recent sleeplessness likely secondary to meth binge, will allow patient to continue to sleep here.  Paranoia and previous agitation all likely secondary to his meth abuse.  Delirium currently may be combination of meth induced delirium as well as sleep deprivation.  We will continue to allow him to sleep and reassess when awake.  Signed out at shift change with this plan to Dr Valdez.      MD Miah Street Christopher James, MD  12/02/20 0600

## 2020-12-02 NOTE — ED NOTES
Yuriy Vega contacted to bring patient to [facility]\"". Cost would be $60, patient reports he will be unable to pay that amount. ED charge updated unable to find discharge ride home. Taxi Voucher obtained. Patient updated on ETA ~ 35 minutes.

## 2020-12-02 NOTE — ED NOTES
Per Dr Dooley he would like pt be left to let sleep as he had not slept for days prior to coming into the ER due to meth use and pt gets very paranoid and delirious when he does awaken, MD is hoping the delirium will clear the more he sleeps. Will attempt DEC when pt is awake.

## 2020-12-02 NOTE — ED NOTES
"10:28 AM  The patient was allowed to wake up, he demanded breakfast and a shower.  He was given both.  He was very rude and profane to his nurse and refused DEC assessment stating emphatically that he does not want help and that he is not suicidal or homicidal.  He wants to get the \"fuck\" out of here as we have done nothing for him.  He tried to negotiate for an Ativan prescription to go home with which was refused.  He declined an offer for help with substance abuse as outpatient.     Sundeep Valdez MD  12/02/20 1030    "

## 2020-12-02 NOTE — ED NOTES
Pt awake and yelling at RN to get him Ativan, refusing to straighten his arm for medications to get infused. MD notified and is at bedside speaking with pt. Pt is much clearer and alert and oriented, very paranoid and anxious.

## 2020-12-02 NOTE — ED NOTES
"Patient awake, eating and drinking. Patient upset upon entry to room. \"You guys have just left me here to rot.\" Patient informed of his condition upon arrival to ED and the attempts to wake up over the last ~2 days. Patient requesting shower and change of clothing. Also requested dose of Ativan now and prescription to discharge with. Provider informed of requests. No additional Ativan to be administered.   "

## 2020-12-02 NOTE — ED NOTES
6:41 AM       Emergency Department Psychiatric Patient Sign-out       Brief HPI:  This is a 55 year old male signed out to me by Dr. Dooley .  See initial ED Provider note for details of the presentation.     Patient is not medically cleared for admission to a Behavioral Health unit.      Pending studies include none.      The patient is not on a hold.      The patient has not required medication for agitation.    Medications   lactated ringers infusion (0 mLs Intravenous Stopped 12/1/20 0735)   LORazepam (ATIVAN) injection 1 mg (has no administration in time range)   LORazepam (ATIVAN) injection 1 mg (1 mg Intravenous Given 11/30/20 2040)   OLANZapine (zyPREXA) injection 10 mg (10 mg Intramuscular Given 11/30/20 2005)   lactated ringers BOLUS 1,000 mL (0 mLs Intravenous Stopped 11/30/20 2246)   diphenhydrAMINE (BENADRYL) injection 50 mg (50 mg Intravenous Given 11/30/20 2103)   haloperidol lactate (HALDOL) injection 5 mg (5 mg Intravenous Given 11/30/20 2147)   LORazepam (ATIVAN) injection 1 mg (1 mg Intravenous Canceled Entry 12/1/20 0054)   ketamine (KETALAR) (HIGH CONC) inj 211 mg (211 mg Intramuscular Given 11/30/20 2218)   haloperidol lactate (HALDOL) injection 5 mg (5 mg Intravenous Given 12/1/20 0021)   lactated ringers BOLUS 500 mL (0 mLs Intravenous Stopped 12/1/20 0100)   midazolam (VERSED) injection 5 mg (5 mg Intravenous Given 12/1/20 0545)   thiamine (B-1) 500 mg in sodium chloride 0.9 % 50 mL intermittent infusion (0 mg Intravenous Stopped 12/2/20 0108)   folic acid injection 1 mg (1 mg Intravenous Given 12/1/20 2338)   LORazepam (ATIVAN) tablet 0.5 mg (0.5 mg Oral Given 12/1/20 2352)       Exam:   Patient Vitals for the past 24 hrs:   BP Pulse Resp SpO2   12/02/20 0530 109/75 -- -- --   12/02/20 0430 104/74 -- -- --   12/02/20 0330 100/71 -- -- --   12/02/20 0325 100/71 65 -- --   12/02/20 0225 109/71 87 -- 98 %   12/02/20 0125 104/71 86 18 98 %   12/02/20 0030 110/63 80 21 --   12/01/20 2330 98/67 93  -- 95 %   12/01/20 2200 105/73 89 14 --   12/01/20 2100 91/56 86 23 --   12/01/20 2000 119/77 77 21 --   12/01/20 1900 114/78 79 16 --   12/01/20 1615 125/83 115 11 --   12/01/20 1515 96/70 78 25 100 %   12/01/20 1300 119/80 74 15 --   12/01/20 1200 110/77 81 18 --   12/01/20 1100 108/69 76 20 99 %   12/01/20 1015 -- 68 27 --   12/01/20 1000 114/68 71 28 99 %   12/01/20 0945 -- 82 18 99 %   12/01/20 0930 119/85 67 20 99 %   12/01/20 0915 90/60 90 24 100 %   12/01/20 0900 105/79 82 (!) 6 96 %   12/01/20 0830 108/79 80 19 100 %   12/01/20 0815 -- 112 28 99 %   12/01/20 0800 119/79 108 22 100 %   12/01/20 0745 -- 113 -- 98 %   12/01/20 0730 106/74 76 18 100 %   12/01/20 0715 -- 73 17 100 %   12/01/20 0700 112/78 107 (!) 37 100 %   12/01/20 0645 -- 72 10 100 %         ED Course:    There were no significant events while under my care.      Patient was signed out to the oncoming provider. Dr. Valdez      Impression:  No diagnosis found.    Plan:    1.  Waiting for the patient to clear sensorium and then evaluation by DEC.      RESULTS:   Results for orders placed or performed during the hospital encounter of 11/30/20 (from the past 24 hour(s))   CBC with platelets differential     Status: None    Collection Time: 12/01/20 11:14 PM   Result Value Ref Range    WBC 5.6 4.0 - 11.0 10e9/L    RBC Count 4.87 4.4 - 5.9 10e12/L    Hemoglobin 13.8 13.3 - 17.7 g/dL    Hematocrit 42.3 40.0 - 53.0 %    MCV 87 78 - 100 fl    MCH 28.3 26.5 - 33.0 pg    MCHC 32.6 31.5 - 36.5 g/dL    RDW 13.4 10.0 - 15.0 %    Platelet Count 228 150 - 450 10e9/L    Diff Method Automated Method     % Neutrophils 69.5 %    % Lymphocytes 19.4 %    % Monocytes 6.8 %    % Eosinophils 3.6 %    % Basophils 0.5 %    % Immature Granulocytes 0.2 %    Nucleated RBCs 0 0 /100    Absolute Neutrophil 3.9 1.6 - 8.3 10e9/L    Absolute Lymphocytes 1.1 0.8 - 5.3 10e9/L    Absolute Monocytes 0.4 0.0 - 1.3 10e9/L    Absolute Eosinophils 0.2 0.0 - 0.7 10e9/L    Absolute  Basophils 0.0 0.0 - 0.2 10e9/L    Abs Immature Granulocytes 0.0 0 - 0.4 10e9/L    Absolute Nucleated RBC 0.0    Comprehensive metabolic panel     Status: None    Collection Time: 12/01/20 11:14 PM   Result Value Ref Range    Sodium 137 133 - 144 mmol/L    Potassium 3.8 3.4 - 5.3 mmol/L    Chloride 107 94 - 109 mmol/L    Carbon Dioxide 24 20 - 32 mmol/L    Anion Gap 6 3 - 14 mmol/L    Glucose 83 70 - 99 mg/dL    Urea Nitrogen 16 7 - 30 mg/dL    Creatinine 0.82 0.66 - 1.25 mg/dL    GFR Estimate >90 >60 mL/min/[1.73_m2]    GFR Estimate If Black >90 >60 mL/min/[1.73_m2]    Calcium 8.5 8.5 - 10.1 mg/dL    Bilirubin Total 0.7 0.2 - 1.3 mg/dL    Albumin 3.4 3.4 - 5.0 g/dL    Protein Total 7.3 6.8 - 8.8 g/dL    Alkaline Phosphatase 96 40 - 150 U/L    ALT 20 0 - 70 U/L    AST 28 0 - 45 U/L         MD José Miguel Mckeon, Sundeep Ryan MD  12/02/20 0655

## 2021-01-15 ENCOUNTER — HEALTH MAINTENANCE LETTER (OUTPATIENT)
Age: 56
End: 2021-01-15

## 2021-03-18 ENCOUNTER — OFFICE VISIT - HEALTHEAST (OUTPATIENT)
Dept: ADDICTION MEDICINE | Facility: CLINIC | Age: 56
End: 2021-03-18

## 2021-03-18 DIAGNOSIS — F19.10 POLYSUBSTANCE ABUSE (H): ICD-10-CM

## 2021-03-18 DIAGNOSIS — F10.20 ALCOHOL USE DISORDER, SEVERE, DEPENDENCE (H): ICD-10-CM

## 2021-03-18 DIAGNOSIS — F11.20 OPIOID USE DISORDER, SEVERE, DEPENDENCE (H): ICD-10-CM

## 2021-03-18 DIAGNOSIS — F14.20 COCAINE USE DISORDER, SEVERE, DEPENDENCE (H): ICD-10-CM

## 2021-03-18 DIAGNOSIS — F15.20 METHAMPHETAMINE USE DISORDER, SEVERE, DEPENDENCE (H): ICD-10-CM

## 2021-04-01 ENCOUNTER — OFFICE VISIT - HEALTHEAST (OUTPATIENT)
Dept: ADDICTION MEDICINE | Facility: HOSPITAL | Age: 56
End: 2021-04-01

## 2021-04-01 DIAGNOSIS — F11.20 OPIOID USE DISORDER, SEVERE, DEPENDENCE (H): ICD-10-CM

## 2021-04-01 DIAGNOSIS — F15.20 METHAMPHETAMINE USE DISORDER, SEVERE, DEPENDENCE (H): ICD-10-CM

## 2021-04-01 DIAGNOSIS — F14.20 COCAINE USE DISORDER, SEVERE, DEPENDENCE (H): ICD-10-CM

## 2021-04-01 DIAGNOSIS — F10.20 ALCOHOL USE DISORDER, SEVERE, DEPENDENCE (H): ICD-10-CM

## 2021-04-05 ENCOUNTER — OFFICE VISIT - HEALTHEAST (OUTPATIENT)
Dept: ADDICTION MEDICINE | Facility: HOSPITAL | Age: 56
End: 2021-04-05

## 2021-04-05 ENCOUNTER — AMBULATORY - HEALTHEAST (OUTPATIENT)
Dept: ADDICTION MEDICINE | Facility: HOSPITAL | Age: 56
End: 2021-04-05

## 2021-04-05 DIAGNOSIS — F15.20 METHAMPHETAMINE USE DISORDER, SEVERE, DEPENDENCE (H): ICD-10-CM

## 2021-04-06 ENCOUNTER — COMMUNICATION - HEALTHEAST (OUTPATIENT)
Dept: ADDICTION MEDICINE | Facility: HOSPITAL | Age: 56
End: 2021-04-06

## 2021-04-07 ENCOUNTER — OFFICE VISIT - HEALTHEAST (OUTPATIENT)
Dept: ADDICTION MEDICINE | Facility: HOSPITAL | Age: 56
End: 2021-04-07

## 2021-04-07 DIAGNOSIS — F15.20 METHAMPHETAMINE USE DISORDER, SEVERE, DEPENDENCE (H): ICD-10-CM

## 2021-04-08 ENCOUNTER — AMBULATORY - HEALTHEAST (OUTPATIENT)
Dept: ADDICTION MEDICINE | Facility: HOSPITAL | Age: 56
End: 2021-04-08

## 2021-04-09 ENCOUNTER — AMBULATORY - HEALTHEAST (OUTPATIENT)
Dept: ADDICTION MEDICINE | Facility: HOSPITAL | Age: 56
End: 2021-04-09

## 2021-04-12 ENCOUNTER — OFFICE VISIT - HEALTHEAST (OUTPATIENT)
Dept: ADDICTION MEDICINE | Facility: HOSPITAL | Age: 56
End: 2021-04-12

## 2021-04-12 DIAGNOSIS — F15.20 METHAMPHETAMINE USE DISORDER, SEVERE, DEPENDENCE (H): ICD-10-CM

## 2021-04-14 ENCOUNTER — AMBULATORY - HEALTHEAST (OUTPATIENT)
Dept: ADDICTION MEDICINE | Facility: HOSPITAL | Age: 56
End: 2021-04-14

## 2021-04-14 ENCOUNTER — OFFICE VISIT - HEALTHEAST (OUTPATIENT)
Dept: ADDICTION MEDICINE | Facility: HOSPITAL | Age: 56
End: 2021-04-14

## 2021-04-14 DIAGNOSIS — F15.20 METHAMPHETAMINE USE DISORDER, SEVERE, DEPENDENCE (H): ICD-10-CM

## 2021-04-15 ENCOUNTER — COMMUNICATION - HEALTHEAST (OUTPATIENT)
Dept: ADDICTION MEDICINE | Facility: HOSPITAL | Age: 56
End: 2021-04-15

## 2021-04-16 ENCOUNTER — OFFICE VISIT - HEALTHEAST (OUTPATIENT)
Dept: ADDICTION MEDICINE | Facility: HOSPITAL | Age: 56
End: 2021-04-16

## 2021-04-16 ENCOUNTER — AMBULATORY - HEALTHEAST (OUTPATIENT)
Dept: ADDICTION MEDICINE | Facility: HOSPITAL | Age: 56
End: 2021-04-16

## 2021-04-16 DIAGNOSIS — F15.20 METHAMPHETAMINE USE DISORDER, SEVERE, DEPENDENCE (H): ICD-10-CM

## 2021-04-19 ENCOUNTER — OFFICE VISIT - HEALTHEAST (OUTPATIENT)
Dept: BEHAVIORAL HEALTH | Facility: CLINIC | Age: 56
End: 2021-04-19

## 2021-04-19 ENCOUNTER — AMBULATORY - HEALTHEAST (OUTPATIENT)
Dept: ADDICTION MEDICINE | Facility: HOSPITAL | Age: 56
End: 2021-04-19

## 2021-04-19 DIAGNOSIS — F10.21 ALCOHOL USE DISORDER, SEVERE, IN SUSTAINED REMISSION, DEPENDENCE (H): ICD-10-CM

## 2021-04-19 DIAGNOSIS — F15.20 METHAMPHETAMINE USE DISORDER, SEVERE, DEPENDENCE (H): ICD-10-CM

## 2021-04-19 DIAGNOSIS — F31.2 BIPOLAR AFFECTIVE DISORDER, CURRENT EPISODE MANIC WITH PSYCHOTIC SYMPTOMS (H): ICD-10-CM

## 2021-04-19 DIAGNOSIS — B18.2 CHRONIC HEPATITIS C WITHOUT HEPATIC COMA (H): ICD-10-CM

## 2021-04-19 ASSESSMENT — ANXIETY QUESTIONNAIRES
1. FEELING NERVOUS, ANXIOUS, OR ON EDGE: NOT AT ALL
6. BECOMING EASILY ANNOYED OR IRRITABLE: SEVERAL DAYS
2. NOT BEING ABLE TO STOP OR CONTROL WORRYING: NOT AT ALL
IF YOU CHECKED OFF ANY PROBLEMS ON THIS QUESTIONNAIRE, HOW DIFFICULT HAVE THESE PROBLEMS MADE IT FOR YOU TO DO YOUR WORK, TAKE CARE OF THINGS AT HOME, OR GET ALONG WITH OTHER PEOPLE: SOMEWHAT DIFFICULT
4. TROUBLE RELAXING: NOT AT ALL
7. FEELING AFRAID AS IF SOMETHING AWFUL MIGHT HAPPEN: NOT AT ALL
GAD7 TOTAL SCORE: 2
5. BEING SO RESTLESS THAT IT IS HARD TO SIT STILL: SEVERAL DAYS
3. WORRYING TOO MUCH ABOUT DIFFERENT THINGS: NOT AT ALL

## 2021-04-19 ASSESSMENT — PATIENT HEALTH QUESTIONNAIRE - PHQ9: SUM OF ALL RESPONSES TO PHQ QUESTIONS 1-9: 4

## 2021-04-21 ENCOUNTER — OFFICE VISIT - HEALTHEAST (OUTPATIENT)
Dept: ADDICTION MEDICINE | Facility: HOSPITAL | Age: 56
End: 2021-04-21

## 2021-04-21 DIAGNOSIS — F15.20 METHAMPHETAMINE USE DISORDER, SEVERE, DEPENDENCE (H): ICD-10-CM

## 2021-04-22 ENCOUNTER — OFFICE VISIT - HEALTHEAST (OUTPATIENT)
Dept: ADDICTION MEDICINE | Facility: HOSPITAL | Age: 56
End: 2021-04-22

## 2021-04-22 DIAGNOSIS — F31.2 BIPOLAR AFFECTIVE DISORDER, CURRENT EPISODE MANIC WITH PSYCHOTIC SYMPTOMS (H): ICD-10-CM

## 2021-04-23 ENCOUNTER — AMBULATORY - HEALTHEAST (OUTPATIENT)
Dept: ADDICTION MEDICINE | Facility: HOSPITAL | Age: 56
End: 2021-04-23

## 2021-04-23 ENCOUNTER — OFFICE VISIT - HEALTHEAST (OUTPATIENT)
Dept: ADDICTION MEDICINE | Facility: HOSPITAL | Age: 56
End: 2021-04-23

## 2021-04-23 DIAGNOSIS — F15.20 METHAMPHETAMINE USE DISORDER, SEVERE, DEPENDENCE (H): ICD-10-CM

## 2021-04-26 ENCOUNTER — OFFICE VISIT - HEALTHEAST (OUTPATIENT)
Dept: ADDICTION MEDICINE | Facility: HOSPITAL | Age: 56
End: 2021-04-26

## 2021-04-26 DIAGNOSIS — F15.20 METHAMPHETAMINE USE DISORDER, SEVERE, DEPENDENCE (H): ICD-10-CM

## 2021-04-28 ENCOUNTER — OFFICE VISIT - HEALTHEAST (OUTPATIENT)
Dept: ADDICTION MEDICINE | Facility: HOSPITAL | Age: 56
End: 2021-04-28

## 2021-04-28 DIAGNOSIS — F15.20 METHAMPHETAMINE USE DISORDER, SEVERE, DEPENDENCE (H): ICD-10-CM

## 2021-04-30 ENCOUNTER — AMBULATORY - HEALTHEAST (OUTPATIENT)
Dept: ADDICTION MEDICINE | Facility: HOSPITAL | Age: 56
End: 2021-04-30

## 2021-04-30 ENCOUNTER — OFFICE VISIT - HEALTHEAST (OUTPATIENT)
Dept: ADDICTION MEDICINE | Facility: HOSPITAL | Age: 56
End: 2021-04-30

## 2021-04-30 DIAGNOSIS — F15.20 METHAMPHETAMINE USE DISORDER, SEVERE, DEPENDENCE (H): ICD-10-CM

## 2021-05-03 ENCOUNTER — OFFICE VISIT - HEALTHEAST (OUTPATIENT)
Dept: ADDICTION MEDICINE | Facility: HOSPITAL | Age: 56
End: 2021-05-03

## 2021-05-03 DIAGNOSIS — F15.20 METHAMPHETAMINE USE DISORDER, SEVERE, DEPENDENCE (H): ICD-10-CM

## 2021-05-05 ENCOUNTER — OFFICE VISIT - HEALTHEAST (OUTPATIENT)
Dept: ADDICTION MEDICINE | Facility: HOSPITAL | Age: 56
End: 2021-05-05

## 2021-05-05 ENCOUNTER — AMBULATORY - HEALTHEAST (OUTPATIENT)
Dept: ADDICTION MEDICINE | Facility: HOSPITAL | Age: 56
End: 2021-05-05

## 2021-05-05 DIAGNOSIS — F15.20 METHAMPHETAMINE USE DISORDER, SEVERE, DEPENDENCE (H): ICD-10-CM

## 2021-05-07 ENCOUNTER — AMBULATORY - HEALTHEAST (OUTPATIENT)
Dept: ADDICTION MEDICINE | Facility: HOSPITAL | Age: 56
End: 2021-05-07

## 2021-05-10 ENCOUNTER — OFFICE VISIT - HEALTHEAST (OUTPATIENT)
Dept: ADDICTION MEDICINE | Facility: HOSPITAL | Age: 56
End: 2021-05-10

## 2021-05-10 DIAGNOSIS — F15.20 METHAMPHETAMINE USE DISORDER, SEVERE, DEPENDENCE (H): ICD-10-CM

## 2021-05-11 ENCOUNTER — AMBULATORY - HEALTHEAST (OUTPATIENT)
Dept: ADDICTION MEDICINE | Facility: HOSPITAL | Age: 56
End: 2021-05-11

## 2021-05-15 ENCOUNTER — RECORDS - HEALTHEAST (OUTPATIENT)
Dept: LAB | Facility: CLINIC | Age: 56
End: 2021-05-15

## 2021-05-15 LAB
SARS-COV-2 PCR COMMENT: NORMAL
SARS-COV-2 RNA SPEC QL NAA+PROBE: NEGATIVE
SARS-COV-2 VIRUS SPECIMEN SOURCE: NORMAL

## 2021-05-17 ENCOUNTER — OFFICE VISIT - HEALTHEAST (OUTPATIENT)
Dept: ADDICTION MEDICINE | Facility: HOSPITAL | Age: 56
End: 2021-05-17

## 2021-05-17 DIAGNOSIS — F15.20 METHAMPHETAMINE USE DISORDER, SEVERE, DEPENDENCE (H): ICD-10-CM

## 2021-05-19 ENCOUNTER — AMBULATORY - HEALTHEAST (OUTPATIENT)
Dept: ADDICTION MEDICINE | Facility: HOSPITAL | Age: 56
End: 2021-05-19

## 2021-05-21 ENCOUNTER — AMBULATORY - HEALTHEAST (OUTPATIENT)
Dept: ADDICTION MEDICINE | Facility: HOSPITAL | Age: 56
End: 2021-05-21

## 2021-05-24 ENCOUNTER — OFFICE VISIT - HEALTHEAST (OUTPATIENT)
Dept: BEHAVIORAL HEALTH | Facility: CLINIC | Age: 56
End: 2021-05-24

## 2021-05-24 DIAGNOSIS — F15.20 METHAMPHETAMINE USE DISORDER, SEVERE, DEPENDENCE (H): ICD-10-CM

## 2021-05-24 DIAGNOSIS — F10.21 ALCOHOL USE DISORDER, SEVERE, IN SUSTAINED REMISSION, DEPENDENCE (H): ICD-10-CM

## 2021-05-25 ENCOUNTER — AMBULATORY - HEALTHEAST (OUTPATIENT)
Dept: ADDICTION MEDICINE | Facility: HOSPITAL | Age: 56
End: 2021-05-25

## 2021-05-27 ENCOUNTER — AMBULATORY - HEALTHEAST (OUTPATIENT)
Dept: ADDICTION MEDICINE | Facility: HOSPITAL | Age: 56
End: 2021-05-27

## 2021-05-27 ENCOUNTER — OFFICE VISIT - HEALTHEAST (OUTPATIENT)
Dept: ADDICTION MEDICINE | Facility: HOSPITAL | Age: 56
End: 2021-05-27

## 2021-05-27 DIAGNOSIS — F15.20 METHAMPHETAMINE USE DISORDER, SEVERE, DEPENDENCE (H): ICD-10-CM

## 2021-05-27 ASSESSMENT — PATIENT HEALTH QUESTIONNAIRE - PHQ9: SUM OF ALL RESPONSES TO PHQ QUESTIONS 1-9: 4

## 2021-05-28 ASSESSMENT — ANXIETY QUESTIONNAIRES: GAD7 TOTAL SCORE: 2

## 2021-06-01 ENCOUNTER — OFFICE VISIT - HEALTHEAST (OUTPATIENT)
Dept: ADDICTION MEDICINE | Facility: HOSPITAL | Age: 56
End: 2021-06-01

## 2021-06-01 DIAGNOSIS — F15.20 METHAMPHETAMINE USE DISORDER, SEVERE, DEPENDENCE (H): ICD-10-CM

## 2021-06-04 ENCOUNTER — AMBULATORY - HEALTHEAST (OUTPATIENT)
Dept: ADDICTION MEDICINE | Facility: HOSPITAL | Age: 56
End: 2021-06-04

## 2021-06-04 PROCEDURE — 80307 DRUG TEST PRSMV CHEM ANLYZR: CPT | Mod: 90 | Performed by: FAMILY MEDICINE

## 2021-06-04 PROCEDURE — 36415 COLL VENOUS BLD VENIPUNCTURE: CPT | Performed by: FAMILY MEDICINE

## 2021-06-08 ENCOUNTER — OFFICE VISIT - HEALTHEAST (OUTPATIENT)
Dept: ADDICTION MEDICINE | Facility: HOSPITAL | Age: 56
End: 2021-06-08

## 2021-06-08 ENCOUNTER — AMBULATORY - HEALTHEAST (OUTPATIENT)
Dept: ADDICTION MEDICINE | Facility: HOSPITAL | Age: 56
End: 2021-06-08

## 2021-06-08 DIAGNOSIS — F15.20 METHAMPHETAMINE USE DISORDER, SEVERE, DEPENDENCE (H): ICD-10-CM

## 2021-06-09 DIAGNOSIS — F15.20 AMPHETAMINE DEPENDENCE (H): Primary | ICD-10-CM

## 2021-06-10 NOTE — PROGRESS NOTES
R:  2:45 PM  Patient accepted on 4500 by Dr. White with Dr. Crespo attending.    R:  3:15 PM  Placed in 4500 queue, notified [facility] ED, and will give disposition to 4500 charge nurse when out of report.

## 2021-06-10 NOTE — PROGRESS NOTES
S: Triny, [facility] ED, 54/M, paranoia     B: hx of MDD w psychosis, hx of bipolar   Pt is very manic and diorganized, paranoid that he will be committed by his family, that people are trying to video tape him, grandios thinking, guarded w    No HI, no SI, no hallucinations   Pt reports he is not taking his meds, has not seen by OP services for 6 months - pt reports he would like to get back on track    reports criteria for IP MH bed and stabilization is paranoia     Medically cleared, eating, drinking, ambulating indep   No covid concerns, no covid swab ordered     A: Voluntary     R: Pt placed on the work list until an appropriate bed is available     519pm - ED RN notified the pt will need a negative covid swab in order to be reviewed at outside facilities as [facility] does not have an appropriate bed and [facility] is at capacity.   Pt placed on work list until an appropriate bed becomes available

## 2021-06-10 NOTE — PROGRESS NOTES
S:  8/3/20  8AM  Patient in 's ED awaiting placement.    R:  12 PM  Patient accepted by Dr. Woods on 55C pending a discharge.  Placed in 55C queue.  Call to patient's ED RN-she is unavailable.  Writer asked that nurse call Intake back when able.

## 2021-06-10 NOTE — PROGRESS NOTES
S:  8/3/20  8AM  Patient in 's ED awaiting placement.    R:  12 PM  Patient accepted by Dr. Woods on 55C pending a discharge.  Placed in 55C queue.  Call to patient's ED RN-she is unavailable.  Writer asked that nurse call Intake back when able.    R:  1:15  PM  Per charge nurse, there will not be an open bed on 55C today.  Writer called Dr. White to present for 4500.  Left M, awaiting call back.    R:  2:15 PM  Call to Dr. White-left [facility] asking for a rteturn call to Intake.

## 2021-06-14 DIAGNOSIS — F15.20 AMPHETAMINE DEPENDENCE (H): ICD-10-CM

## 2021-06-14 PROCEDURE — 99000 SPECIMEN HANDLING OFFICE-LAB: CPT | Performed by: FAMILY MEDICINE

## 2021-06-14 PROCEDURE — 82570 ASSAY OF URINE CREATININE: CPT | Mod: 59 | Performed by: FAMILY MEDICINE

## 2021-06-14 PROCEDURE — 80307 DRUG TEST PRSMV CHEM ANLYZR: CPT | Mod: 90 | Performed by: FAMILY MEDICINE

## 2021-06-14 PROCEDURE — 80352 CANNABINOID SYNTHETIC 7/MORE: CPT | Mod: 90 | Performed by: FAMILY MEDICINE

## 2021-06-14 PROCEDURE — 36415 COLL VENOUS BLD VENIPUNCTURE: CPT | Performed by: FAMILY MEDICINE

## 2021-06-14 PROCEDURE — 80299 QUANTITATIVE ASSAY DRUG: CPT | Mod: 90 | Performed by: FAMILY MEDICINE

## 2021-06-15 ENCOUNTER — OFFICE VISIT - HEALTHEAST (OUTPATIENT)
Dept: ADDICTION MEDICINE | Facility: HOSPITAL | Age: 56
End: 2021-06-15

## 2021-06-15 ENCOUNTER — AMBULATORY - HEALTHEAST (OUTPATIENT)
Dept: ADDICTION MEDICINE | Facility: HOSPITAL | Age: 56
End: 2021-06-15

## 2021-06-15 DIAGNOSIS — F15.20 METHAMPHETAMINE USE DISORDER, SEVERE, DEPENDENCE (H): ICD-10-CM

## 2021-06-15 LAB — CREAT UR-MCNC: 60 MG/DL

## 2021-06-16 LAB
AMPHETAMINES UR QL SCN: NEGATIVE
BARBITURATES UR QL: NEGATIVE
BENZODIAZ UR QL: NEGATIVE
CANNABINOIDS UR QL SCN: NEGATIVE
COCAINE UR QL: NEGATIVE
ETHANOL UR QL SCN: NEGATIVE
ETHYL GLUCURONIDE UR QL: NORMAL
OPIATES UR QL SCN: NEGATIVE
PCP UR QL SCN: NEGATIVE

## 2021-06-16 NOTE — PROGRESS NOTES
Andrea Collado attended 3 hours of group on 04/07/2021.     The group topic was 8 Dimensions of Wellness, patient was responsive to topic.     Patient's engagement in the group session: high     Total number of patients present 3.     Counselor(s) present: GAYLE Walden    Supervising MD: GAYLE Marin  4/7/2021, 2:18 PM

## 2021-06-16 NOTE — PROGRESS NOTES
This video/telephone visit will be conducted via a call between you and your physician/provider. We have found that certain health care needs can be provided without the need for an in-person physical exam. This service lets us provide the care you need with a video /telephone conversation. If a prescription is necessary we can send it directly to your pharmacy. If lab work is needed we can place an order for that and you can then stop by our lab to have the test done at a later time.    Just as we bill insurance for in-person visits, we also bill insurance for video/telephone visits. If you have questions about your insurance coverage, we recommend that you speak with your insurance company.    Patient has given verbal consent for video visit? Yes   Patient would like the video visit invitation sent by: Text to cell phone: [phone]  MIGUEL A/TERI CLEMENTE CMA   Referral from GAYLE Aceves   Pt does have a lot of pain in his hips and surgery is scheduled for 5/11/2021.     Patient verified allergies, medications and pharmacy via phone. PHQ: 4 somewhat difficult and TIEN: 2 somewhat difficult done verbally with writer.  Patient states he is ready for visit.   [place]  to be reviewed by provider.

## 2021-06-16 NOTE — PROGRESS NOTES
Andrea Collado attended 3 hours of group on 04/05/2021.     The group topic was 8 Dimensions of Wellness, patient was responsive to topic.     Patient's engagement in the group session: high     Total number of patients present 6.     Counselor(s) present: GAYLE Walden    Supervising MD: GAYEL Marin  4/5/2021, 1:42 PM

## 2021-06-16 NOTE — PROGRESS NOTES
Telemedicine Visit: The patient's condition can be safely assessed and treated via synchronous audio and visual telemedicine encounter.      Reason for Telemedicine Visit: Suspension of face to face services due to COVID-19    Originating Site (Patient Location): Patient's home    Distant Site (Provider Location): Provider Remote Setting- Home Office    Consent:  The patient/guardian has verbally consented to: the potential risks and benefits of telemedicine (video visit) versus in person care; bill my insurance or make self-payment for services provided; and responsibility for payment of non-covered services.     Mode of Communication:  Video Conference via Zoom    Call Started at: 9:30 AM  Call Ended at: 12:15 PM    As the provider I attest to compliance with applicable laws and regulations related to telemedicine.

## 2021-06-16 NOTE — PROGRESS NOTES
Andrea Collado attended 3 hours of group on 04/16/2021.     The group topic was Acceptance, patient was responsive to topic.     Patient's engagement in the group session: high     Total number of patients present 4.     Counselor(s) present: GAYLE Walden    Supervising MD: GAYLE Marin  4/16/2021, 3:32 PM

## 2021-06-16 NOTE — PROGRESS NOTES
ABSENT NOTE:    The patient was absent from group 4/19/2021 . This absence was excused. This writer attempted to contact patient via telephone. Patient is expected to be in group on 04/21/2020.     JAZLYN Funes, [facility]  4/19/2021 , 12:11 PM

## 2021-06-16 NOTE — PROGRESS NOTES
Telemedicine Visit: The patient's condition can be safely assessed and treated via synchronous audio and visual telemedicine encounter.      Reason for Telemedicine Visit: Suspension of face to face services due to COVID-19     Originating Site (Patient Location): Patient's home    Distant Site (Provider Location): Provider Remote Setting- Home Office    Consent:  The patient/guardian has verbally consented to: the potential risks and benefits of telemedicine (video visit) versus in person care; bill my insurance or make self-payment for services provided; and responsibility for payment of non-covered services.     Mode of Communication:  Video Conference via Zoom    Call Started at: 9:30 AM  Call Ended at: 12:10 PM    As the provider I attest to compliance with applicable laws and regulations related to telemedicine.

## 2021-06-16 NOTE — PROGRESS NOTES
"Individual Phone Session    Andrea Collado is a 55 y.o. male who is being evaluated via telephone visit.      The patient has been notified of the following:      \"We have found that certain health care needs can be provided without the need for a face to face visit.  This service lets us provide the care you need with a phone conversation. I will have full access to your [facility] medical record during this entire phone call. I will be taking notes for your medical record. Since this is like an office visit, we will bill your insurance company for this service. There are potential benefits and risks of telephone visits (e.g. limits to patient confidentiality) that differ from in-person visits.?  Confidentiality still applies for telephone services, and nobody will record the visit.  It is important to be in a quiet, private space that is free of distractions (including cell phone or other devices) during the visit.?If during the course of the call I believe a telephone visit is not appropriate, you will not be charged for this service\"    Counselor and patient spoke via phone for 72 minutes to discuss treatment progress.     Call Notes: Counselor contacted patient for continuity of care during suspension of in person services.     Patient was actively and directly involved in the assessment interview and treatment planning. I have reviewed the note as documented above.  This accurately captures the substance of my conversation with the patient.    Provider location: remote  Patient location: home  Mode of Transmission: phone    Call Started at: 10:30 AM  Call Ended at: 11:42 AM    Patient's engagement in the telephone visit: high     Supervising MD: Dr Altaf Boyd, MEd, [facility]  3/18/2021, 11:44 AM   "

## 2021-06-16 NOTE — PROGRESS NOTES
Telemedicine Visit: The patient's condition can be safely assessed and treated via synchronous audio and visual telemedicine encounter.      Reason for Telemedicine Visit: Services only offered telehealth    Originating Site (Patient Location): Patient's home    Distant Site (Provider Location): Provider Remote Setting- Home Office    Consent:  The patient/guardian has verbally consented to: the potential risks and benefits of telemedicine (video visit) versus in person care; bill my insurance or make self-payment for services provided; and responsibility for payment of non-covered services.     Mode of Communication:  Video Conference via Zoom    Call Started at: 10:45 AM  Call Ended at: 11:45 AM    As the provider I attest to compliance with applicable laws and regulations related to telemedicine.

## 2021-06-16 NOTE — PROGRESS NOTES
Andrea Collado attended 3 hours of group today.     The group topic was Addiction 101, patient was responsive to topic.     Patient's engagement in the group session: high     Total group size: 4      JAZLYN Funes, [facility]  4/21/2021, 12:22 PM

## 2021-06-16 NOTE — PROGRESS NOTES
Andrea Collado attended 3 hours of group on 04/12/2021.     The group topic was Acceptance, patient was responsive to topic.     Patient's engagement in the group session: high     Total number of patients present 6.     Counselor(s) present: GAYLE Walden    Supervising MD: GAYLE Marin  4/12/2021, 1:04 PM

## 2021-06-16 NOTE — PROGRESS NOTES
Telemedicine Visit: The patient's condition can be safely assessed and treated via synchronous audio and visual telemedicine encounter.      Reason for Telemedicine Visit: Suspension of face to face services due to COVID-19    Originating Site (Patient Location): Patient's home    Distant Site (Provider Location): Provider Remote Setting- Home Office    Consent:  The patient/guardian has verbally consented to: the potential risks and benefits of telemedicine (video visit) versus in person care; bill my insurance or make self-payment for services provided; and responsibility for payment of non-covered services.     Mode of Communication:  Video Conference via Zoom    Call Started at: 9:30 AM  Call Ended at: 12:20 PM    As the provider I attest to compliance with applicable laws and regulations related to telemedicine.

## 2021-06-16 NOTE — PROGRESS NOTES
Weekly Progress Note 4/4/21-4/11/21  Andrea Collado  1965  [id number]      D) Pt attended 2 groups this week with 1 absences. Patient attended 0 individual sessions this week. A) Staff facilitated groups and reviewed tx progress. Assessed for [facility]. R) No VAP needed at this time.   Any significant events, defines as events that impact patients relationship with others inside and outside of treatment: Patient is working on relationship with family members  Indicate any changes or monitoring of physical or mental health problems: None at this time.     Indicate involvement by any outside supports: Patient is on a stay of committment through [facility] reviewed and modified as needed. NA  Pt working on the following dimensions:  Dimension #1 - Withdrawal Potential - Risk 0. Patient reports significant daily use of multiple substances up until his sobriety date of 1/6/21. Patient has a history of injection, snorting and smoking.    Specific goals from treatment plan addressed this week:   1. Maintain abstinence  Effectiveness of strategies: Strategies appear to be effective    Dimension #2 - Biomedical - Risk 1. Patient reports hip and sinus issues.Patient has met with providers about an upcoming hip replacement, he is having conversations with providers surrounding the use of opioids for pain management with this surgery. Patient reports his history of injection has resulted in biomedical concerns such as abscesses in both arms and legs. Patient has a PCP, reports taking his medications as prescribed. Patient does currently vape however reports no interest in stopping. No new or emergent concerns endorsed over the last week.   Specific goals from treatment plan addressed this week:   1. Manage biomedical concerns  2. Remain medication compliant  Effectiveness of strategies: Strategies appear to be effective.    Dimension #3 - Emotional/Behavioral/Cognitive - Risk 2. Patient has diagnoses of depression, anxiety,  ADHD and drug-induced psychosis. Patient is currently on a stay of commitment through [facility]. Patient is seeing Dr. Ruiz through [facility]. Patient does not have a therapist however would like one. Patient does not have a current DA.   Specific goals from treatment plan addressed this week:   1. Manage mental health concerns  2. Develop emotional regulation skills   Effectiveness of strategies: Strategies appear to be effective    Dimension #4 - Treatment Acceptance/Resistance - Risk 0. Patient has a strong desire for treatment services. He does report that services need to be via Zoom as transportation is an issue  Specific goals from treatment plan addressed this week:   1. Engage in group  Effectiveness of strategies: Strategies appear to be emerging    Dimension #5 - Relapse Potential - Risk 3. Patient has had multiple treatment episodes most recently this year. Patient lacks understanding of the correlation between mental health and substance use. Patient has few coping skills to manage triggers and urges. Patient does not have a relapse prevention plan. Patient is at moderate risk for relapse. Patient is not endorsing any urges or triggers. No changes over the last week.   Specific goals from treatment plan addressed this week:   1. Develop relapse prevention skills  2. Identify triggers and urges   Effectiveness of strategies: Strategies appear to be effective    Dimension #6 - Recovery Environment - Risk 1. Patient is on a stay of commitment at this time. Patient has a safe and supportive living environment. Patient has a daily routine that he has been enjoying and finding benefit from. Patient reports no current legal involvement. Patient is not attending any sober support groups or meetings at this time.   Specific goals from treatment plan addressed this week:   1. Attend sober support groups   2. Develop daily structure  Effectiveness of strategies: Strategies appear to be emerging    T) Treatment  plan updated no and co-signed by Dr. Leal.  Patient notified and in agreement Yes.  Patient educated on Acceptance. Patient has completed 12 of 84 phase I hours. Projected discharge date is 10/1/21. Current discharge plan is Pending.     Jeny Gomez [facility]  4/16/2021, 8:19 PM          Psycho-Educational Curriculum  Date Attended  Psycho-Educational Curriculum  Date Attended    8 Dimensions of wellness  4/5/21-4/9/21 Grief and Loss     Emotional   Stages of grief    Physical   Memorialized     Intellectual   Letters to loved ones     Occupational   Grief group    Spiritual   Loneliness      Acceptance  4/12/21-4/18/21   Environmental   Purpose and Hobbies    Financial   Values    Social   Hobbies    Access to Care   Naida      Service Access   Volunteer Work     Pain Management   Experiential Learning     Memory   Value of movement     Physical Wellness  Relationships     Medication   Self-Love     PAWS   Resentment    Hygiene (Sleep, Physical, etc)   Communication Skills     Emotional Wellbeing   Amends     Healthy vs. Unhealthy Feelings  Family     Affirmations  Social Anxieties     Co-Occurring Disorders  Legacy     Anxiety   Support(+ & -)    Depression  Assertive Communication     Grounding  Codependency    Trauma/Victim Identity   Boundaries    MH/CD Acceptance   Defense Mechanisms     Sober Structure   Relapse Prevention     Sober support groups   Triggers and High Risk Situations    Needds  Relapse Prevention Plan     Spirituality   Coping Skills     Schedule   Addictive Thoughts    Sobriety Vs. Recovery   Relapse Process     Power of Now   What is Addiction     Truth in life   Impulsive/Compulsive Behaviors     Recovery Investement   Cross Addiction     Recovery Influences   Early Recovery     Educational Videos   Mindfulness    No Kidding Me 2!       Anonymous People       Leroy's Story       Pleasure Unwoven

## 2021-06-16 NOTE — PROGRESS NOTES
PATIENT INSTRUCTIONS    Treatment:  Post Injection Instructions Post Injection English   You have been given an injection (shot) at the [facility] department.  Injections are given into joints, tendons, or soft tissue for the treatment of pain and inflammation.  The medicine is a corticosteroid, but is often called a steroid or cortisone shot.  The steroid used was Kenalog or Depo-Medrol.  A fast acting pain killer such as Lidocaine may be injected with the steroid to dull the pain for a few hours.  A long acting anesthetic, such as Marcaine, may also be injected with the steroid.  It may last up to 30 hours or wear off as soon as 6 hours.     The steroid begins to work in 24 to 48 hours and may take 2 weeks to reach full effect.     Take all of your regular medications, including pain medicine (unless instructed otherwise).     Some increased pain may occur in the first 24 hours after the injection.  You may apply a cold pack or ice to the injected area for 15 to 20 minutes every 1 to 2 hours for 24 hours to lessen the pain.     For people with diabetes, your blood sugar may be increased for 1 to 2 days. If you have any questions regarding your blood sugar, please contact your primary care physician.       Call your Orthopaedic physician if you develop any of the following symptoms:  Fever  Increased redness of injection site  If it is not better in 2 weeks     As any other injection, intra-articular hip injections carry their own risk profile.  One of the uncommon but potential undesired effects after the injection would be a temporary numbness in parts of the leg and foot. This generally resolves by itself over a few hours and does not leave any lasting effect.  Due to the increased fall risk associated with this, we recommend that you should rest after the injection and not drive yourself for the rest of the day.  Should you experience this and the symptoms do not improve over the course  Weekly Progress Note 4/4/21-4/11/21  Andrea Collado  1965  [id number]      D) Pt attended 2 groups this week with 0 absences. Patient attended 0 individual sessions this week. A) Staff facilitated groups and reviewed tx progress. Assessed for [facility]. R) No VAP needed at this time.   Any significant events, defines as events that impact patients relationship with others inside and outside of treatment: Patient is working on relationship with family members  Indicate any changes or monitoring of physical or mental health problems: None at this time.     Indicate involvement by any outside supports: Patient is on a stay of committment through [facility] reviewed and modified as needed. NA  Pt working on the following dimensions:  Dimension #1 - Withdrawal Potential - Risk 0. Patient reports significant daily use of multiple substances up until his sobriety date of 1/6/21. Patient has a history of injection, snorting and smoking.    Specific goals from treatment plan addressed this week:   1. Maintain abstinence  Effectiveness of strategies: Strategies appear to be effective    Dimension #2 - Biomedical - Risk 1. Patient reports hip and sinus issues.Patient has met with providers about an upcoming hip replacement, he is having conversations with providers surrounding the use of opioids for pain management with this surgery. Patient reports his history of injection has resulted in biomedical concerns such as abscesses in both arms and legs. Patient has a PCP, reports taking his medications as prescribed. Patient does currently vape however reports no interest in stopping  Specific goals from treatment plan addressed this week:   1. Manage biomedical concerns  2. Remain medication compliant  Effectiveness of strategies: Strategies appear to be effective.    Dimension #3 - Emotional/Behavioral/Cognitive - Risk 2. Patient has diagnoses of depression, anxiety, ADHD and drug-induced psychosis. Patient is currently on a  of the day or get worse, please contact us, urgent care, EMS.        Follow-Up:  Call or return to the clinic as needed if these symptoms worsen fail to improve as anticipated, or if new symptoms develop.           Time left: 8/25/2020 3:51 PM     Next appointment:        Location:        Provider:         Please note: 24 hour notice for cancellation of appointment is required.    You may receive a survey in the mail, or via the e-mail address that you have provided.  We would appreciate if you could fill out the survey and provide us with any feedback on your experience regarding your visit today. Thank you for allowing us to provide you with your health care needs.     Do not hesitate to call if you are experiencing severe pain, worsening or change in your pain, have symptoms of infection (fever, warmth, redness, increased drainage), or have any other problem that concerns you ~ [phone] (or [phone] after hours).    Please remember when requesting refills on pain medication that the request should be made by Thursday at the latest. [facility] is open Monday-Friday, 8am-5pm, and closed on the weekends.  No narcotic refills will be filled after hours.    Additional Educational Resources:  For additional resources regarding your symptoms, diagnosis, or further health information, please visit the Health Resources section on [email or web address] or the Online Health Resources section in [employer].         stay of commitment through [facility]. Patient is seeing Dr. Ruiz through [facility] however may want to switch providers depending on how appointment goes on 4/7/21. Patient has an appointment with Dr. Dos Santos on 4/19/21 if he chooses. Patient does not have a therapist however would like one. Patient does not have a current DA.   Specific goals from treatment plan addressed this week:   1. Manage mental health concerns  2. Develop emotional regulation skills   Effectiveness of strategies: Strategies appear to be effective    Dimension #4 - Treatment Acceptance/Resistance - Risk 0. Patient has a strong desire for treatment services. He does report that services need to be via Zoom as transportation is an issue  Specific goals from treatment plan addressed this week:   1. Engage in group  Effectiveness of strategies: Strategies appear to be emerging    Dimension #5 - Relapse Potential - Risk 3. Patient has had multiple treatment episodes most recently this year. Patient lacks understanding of the correlation between mental health and substance use. Patient has few coping skills to manage triggers and urges. Patient does not have a relapse prevention plan. Patient is at moderate risk for relapse. Patient is not endorsing any urges or triggers.   Specific goals from treatment plan addressed this week:   1. Develop relapse prevention skills  2. Identify triggers and urges   Effectiveness of strategies: Strategies appear to be effective    Dimension #6 - Recovery Environment - Risk 1. Patient is on a stay of commitment at this time. Patient has a safe and supportive living environment. Patient has a daily routine that he has been enjoying and finding benefit from. Patient reports no current legal involvement  Specific goals from treatment plan addressed this week:   1. Attend sober support groups   2. Develop daily structure  Effectiveness of strategies: Strategies appear to be emerging    T) Treatment plan updated yes  and co-signed by Dr. Leal.  Patient notified and in agreement Yes.  Patient educated on 8 Dimensions of Wellness. Patient has completed 6 of 84 phase I hours. Projected discharge date is 10/1/21. Current discharge plan is Pending.     Jeny Gomez [facility]  4/8/2021, 1:59 PM          Psycho-Educational Curriculum  Date Attended  Psycho-Educational Curriculum  Date Attended    8 Dimensions of wellness  4/5/21-4/9/21 Grief and Loss     Emotional   Stages of grief    Physical   Memorialized     Intellectual   Letters to loved ones     Occupational   Grief group    Spiritual   Loneliness    Environmental   Purpose and Hobbies    Financial   Values    Social   Hobbies    Access to Care   Naida      Service Access   Volunteer Work     Pain Management   Experiential Learning     Memory   Value of movement     Physical Wellness  Relationships     Medication   Self-Love     PAWS   Resentment    Hygiene (Sleep, Physical, etc)   Communication Skills     Emotional Wellbeing   Amends     Healthy vs. Unhealthy Feelings  Family     Affirmations  Social Anxieties     Co-Occurring Disorders  Legacy     Anxiety   Support(+ & -)    Depression  Assertive Communication     Grounding  Codependency    Trauma/Victim Identity   Boundaries    MH/CD Acceptance   Defense Mechanisms     Sober Structure   Relapse Prevention     Sober support groups   Triggers and High Risk Situations    Needds  Relapse Prevention Plan     Spirituality   Coping Skills     Schedule   Addictive Thoughts    Sobriety Vs. Recovery   Relapse Process     Power of Now   What is Addiction     Truth in life   Impulsive/Compulsive Behaviors     Recovery Investement   Cross Addiction     Recovery Influences   Early Recovery     Educational Videos   Mindfulness    No Kidding Me 2!       Anonymous People       Leroy's Story       Pleasure Unwoven

## 2021-06-16 NOTE — PROGRESS NOTES
Addiction Outpatient Weekly Clinical Staffing     Andrea Collado was staffed on 4/14/2021 . Andrea Collado was staffed on recovery strengths, barriers and treatment progress.     Staff present: Jeff Hartman, [facility], GAYLE Walden, [facility], GAYLE Edouard, [facility] and [facility]     Date: 4/14/2021 Time: 3:23 PM    Staff Signature: GAYLE Walden

## 2021-06-16 NOTE — PROGRESS NOTES
.Tele-Visit Details    Type of service:  Video Visit  Video Start Time: 1031  Video End Time 1115  Originating Location (pt. Location): Patient Home  Distant Location (provider location): [facility] office  Reason for Televisit: COVID 19  Mode of Communication:  Video Conference via [employer]      Assessment and Plan:       1. Methamphetamine use disorder, severe, dependence- buPROPion (WELLBUTRIN XL) 150 MG 24 hr tablet; Take 3 tablets (450 mg total) by mouth daily.  Dispense: 90 tablet; Refill: 1 (increased dose from 300 mg daily)  2. Chronic hepatitis C without hepatic coma (H) need to check LFTs  3. Bipolar affective disorder, current episode manic with psychotic symptoms (H) continue other medications unchanged  4. Alcohol use disorder, severe, in sustained remission, dependence (H)  5. Tobacco use-recommend complete abstinence in anticipation of surgery to help with his recovery.  >10 minutes spent in counseling of tobacco cessation. This included medication review and management (nicotine replacement versus e-cigarette), stress reduction strategies,  general tobacco cessation strategies including meditation and deep breathing, use of nicotine replacement, the effects of tobacco on her overall health, discussion of studies to support tobacco cessation reducing relapse to alcohol and drugs. (25074)  6. Anticipated hip surgery (replacement) for severe arthritis: Recommend that he hold naltrexone x48 hours prior to the procedure.  Also discussed that untreated pain is a high risk for relapse on the substances.   7. Severe opioid use disorder, dependence, in sustained remission-continue naltrexone apart from the surgery and recommend frequent follow-up shortly thereafter    He does not like 12-step recovery but, is developing a sober social network    For questions and concerns, please page the [facility] addiction medicine provider.    Chief Complaint:  Methamphetamine use and wanting to establish care at  the clinic where he also is attending treatment     HPI:    Andrea Collado is a 55 y.o. old male with a past medical history significant for severe alcohol, opioid, methamphetamine use disorders who is establishing care at addiction medicine clinic.  He is currently in [facility] through [facility] and would like to see someone within the same system.  His main concern today is that he thinks that an increase in his Wellbutrin dose may help his motivation, attention, mood and cravings for methamphetamines.  He had a dramatic response when it was first started him again when it was increased to 300 mg.  He continues to have some cravings for methamphetamines and some inability to attend to normal activities.  He used to take Adderall regularly even while he was working and never had difficulty with misusing that medication.    Substance of choice: Methamphetamines    Opioids:  Type and method of use: Heroin and pills, IV  Last use: He has been off buprenorphine MAT for 5 years  Denies any recently use    ETOH:  Denies any current use and had a problem with alcohol in his early 20s.  Last time was January 5, 2021, was drinking 1 pint daily.  He did have 24 years of sobriety until he started using methamphetamines    Amphetamines:  Type and method of use: Cocaine in his 20s, methamphetamine began intermittently 3 years ago, occasionally smoking and then exclusively intravenous use.  First use: 3 years ago  Amount/frequency: Daily  Last use: Several months ago    Benzodiazepines:  Denies    Cannabis:  Denies    Others (synthetic cannabinoids, hallucinogens):  Denies    Any previous IV use: Yes  Screened for Hep C and HIV previously: Yes, positive hep C and negative HIV  Previous history of seizures: Denies  Previous endocarditis: no  Previous infections requiring hospitalization: no    Previous treatments:  Currently living in a sober house and going to [facility]  Just before then was not [employer] [employer]    Tobacco  Use:  Still using an e-cigarette and is interested in stopping    Medical History  Active Ambulatory (Non-Hospital) Problems    Diagnosis     Testosterone deficiency     Cellulitis of left upper arm     IV drug user     Psychosis, unspecified psychosis type (H)     Tachycardia     Chronic anemia     Elevated liver enzymes     Chronic hepatitis C without hepatic coma (H)     Drug-seeking behavior     Right hip pain     Bipolar affective disorder, current episode manic with psychotic symptoms (H)     Psychosis (H)     Polysubstance abuse (H)     Alcohol dependence with withdrawal, unspecified (H)     Depression         Surgical History  Has had numerous surgeries, not elaborating on all of them.  He is scheduled for hip replacement on May 11, 2021.    Social History  Current living situation: Sober house in [place]  Employment: Retired   Has a large inheritance and is planning on not working in committing to his recovery    Social History     Tobacco Use     Smoking status: Former Smoker     Packs/day: 0.50     Years: 10.00     Pack years: 5.00     Quit date: 2019     Years since quittin.0     Smokeless tobacco: Never Used     Tobacco comment: Zane 2019   Substance Use Topics     Alcohol use: Not Currently     Alcohol/week: 6.0 standard drinks     Types: 6 Cans of beer per week       Family History  Reviewed    Mother: Sober    Medications   Current Outpatient Medications on File Prior to Visit   Medication Sig Dispense Refill     acetaminophen (TYLENOL) 325 MG tablet Take 2 tablets (650 mg total) by mouth every 6 (six) hours as needed for pain. 50 tablet 0     buPROPion (WELLBUTRIN XL) 300 MG 24 hr tablet Take 300 mg by mouth daily.       CERTAVITE-ANTIOXIDANT  mg-mcg Tab Take 1 tablet by mouth daily.       divalproex (DEPAKOTE DR) 500 MG 12 hour tablet Take 1,000 mg by mouth 2 (two) times a day.       folic acid (FOLVITE) 1 MG tablet Take 1 mg by mouth daily.       gabapentin  "(NEURONTIN) 300 MG capsule Take 600 mg by mouth 3 (three) times a day.       hydrOXYzine pamoate (VISTARIL) 25 MG capsule Take 25 mg by mouth 4 (four) times a day as needed.       melatonin 3 mg Tab tablet Take 1 tablet (3 mg total) by mouth at bedtime as needed. 30 tablet 0     multivitamin (VITAMINS FOR HAIR) per tablet Take 1 tablet by mouth daily.       multivitamin therapeutic tablet Take 1 tablet by mouth daily. 60 tablet 0     naltrexone (DEPADE) 50 mg tablet Take 50 mg by mouth daily.       OLANZapine (ZYPREXA) 15 MG tablet Take 15 mg by mouth 2 (two) times a day.       risperiDONE (RISPERDAL) 2 MG tablet Take 2 mg by mouth 2 (two) times a day.       syringe with needle (BD LUER-ROSEANN SYRINGE) 3 mL 21 gauge x 1\" Syrg USE AS DIRECTED       syringe with needle 1 mL 20 gauge x 1\" Syrg Use to draw up testosterone       testosterone cypionate (DEPOTESTOTERONE CYPIONATE) 200 mg/mL injection Inject 100 mg into the shoulder, thigh, or buttocks every 14 (fourteen) days.       traZODone (DESYREL) 50 MG tablet Take 75 mg by mouth at bedtime as needed for sleep.       No current facility-administered medications on file prior to visit.          Allergies  No Known Allergies       Review of Systems:    Constitutional:    No fever   Vision and Hearing:    Within normal limits  Respiratory:    No cough or shortness or breath  Cardiovascular:    No chest pain or palpitations  Gastrointestinal:    No nausea, vomiting, diarrhea, or constipation. No hematemesis or melena  Urologic:    Denies dysuria  Neurologic   Denies headache, tremor  Psychiatric   Denies suicidal ideation, plan or intent. Denies homicidal ideaton or hallucinations  Rheumatologic   No joint swelling  Hematologic   Denies easy bruising.  Dermatalogic   No piloerection or diaphoresis. No rash or open sores      Physical Exam:    There were no vitals taken for this visit.    Standard physical exam not performed today since this encounter is via telehealth during " "the COVID-19 pandemic.  The exams performed by previous providers are personally reviewed in the chart.      General appearance   Gen: awake, alert, and oriented   Dermatologic   No rash. No piloerection or diaphoresis. No jaundice  HEENT   EOMI.    No yawning  Pulmonary   No respiratory distress. No cough noted.  Neurologic   Oriented to person, place, time and situation   No Tremor  Psychiatric Mental Status Examination:  Orientation: person, place, date, time  Appearance: The patient appears stated age, appropriately dressed.   Reliability:  appears to be an adequate historian.    Behavior: makes good eye contact, cooperative and engaged in the interview.   There is no evidence of responding to hallucinations or flashbacks.  Speech: spontaneous and coherent, with a normal rate, rhythm and tone.  Associations: connected, intact.  Language:There are no difficulties with expressive or receptive language as observed throughout the interview.    Mood: Described as \" good\"    Affect:  Bright  Judgement: Able to make basic decision regarding safety.  Insight: Good, intact.   Gait and station: Steady, normal gait.    Thought process: Logical   Thought content: No evidence of delusions or paranoia.    Fund of knowledge: Average, intact.   Attention / Concentration: Able to remain focused during the interview with minimal distractibility or need for redirection.  Short Term Memory: Intact  Long Term Memory: Intact  Cognitive Function: Intact      Results:    Lab Results personally reviewed    personally reviewed and shows no concerning prescriptions, all were anticipated    04/07/2021  5   04/07/2021  Gabapentin 300 MG Capsule  210.00  35 [address]   [id number]   [place] ([id number][place]   03/29/2021  5   02/15/2021  Testosterone Cyp 200 Mg/Ml  6.00  [address] [facility]   [id number]   [place] ([id number])   [place]   03/06/2021  5   03/03/2021  Gabapentin 300 MG Capsule  210.00  35 [address]   [id number]   [place] ([id number][place] "   03/01/2021  2   02/15/2021  Testosterone Cyp 200 Mg/Ml  2.00  [address] [facility]   [id number]   [place] ([id number][place]   02/12/2021  2   02/12/2021  Testosterone Cyp 200 Mg/Ml  1.00  28 [address] [facility]   [id number]   [place] ([id number][place]   02/09/2021  4   02/09/2021  Gabapentin 300 MG Capsule  180.00  30 [address]   [id number]   [place] ([id number])   0   [place]   11/23/2020  3   11/23/2020  Lorazepam 0.5 MG Tablet  10.00  20 Me And   [id number]   [place] ([id number])   0  0.25 LME  [place]   11/08/2020  2   11/08/2020  Hydrocodone-Acetamin 5-325 MG  6.00  1 [address]   [id number]   [place] ([id number])   0  30.00 MME  [place]   11/05/2020  3   11/05/2020  Hydrocodone-Acetamin 5-325 MG  28.00  7 [facility]   [id number]   [place] ([id number])   0  20.00 [place]         Supriya Dos Santos MD  Addiction Medicine

## 2021-06-16 NOTE — PROGRESS NOTES
ABSENT NOTE:    The patient was absent from group 2021 . This absence was excused. Patient had a family . Patient is expected to be in group on 21.     GAYLE Walden  2021 , 2:27 PM

## 2021-06-16 NOTE — PROGRESS NOTES
Andrea Collado attended 3 hours of group on 04/14/2021.     The group topic was Acceptance, patient was responsive to topic.     Patient's engagement in the group session: high     Total number of patients present 3.     Counselor(s) present: GAYLE Walden    Supervising MD: GAYLE Marin  4/14/2021, 1:32 PM

## 2021-06-16 NOTE — PROGRESS NOTES
________________________________________  Medications Phoned  to Pharmacy [] yes [x]no  Name of Pharmacist:  List Medications, including dose, quantity and instructions    Medications ordered this visit were e-scribed.  Verified by order class [x] yes  [] no  Wellbutrin  mg (new dose 450 mg)  Medication changes or discontinuations were communicated to patient's pharmacy: [x] yes  [] no  Dc'd remaining refill on file for Wellbutrin  mg at [facility] [phone]    Dictation completed at time of chart check: [] yes  [x] no    I have checked the documentation for today s encounters and the above information has been reviewed and completed.
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
## 2021-06-17 LAB
BUPRENORPHINE GLUCURONIDE URINE: <5 NG/ML
BUPRENORPHINE UR CFM-MCNC: <2 NG/ML
NALOXONE URINE: <100 NG/ML
NORBUPRENORPHINE GLUCURONIDE URINE: <5 NG/ML
NORBUPRENORPHINE UR CFM-MCNC: <2 NG/ML

## 2021-06-17 NOTE — PROGRESS NOTES
Weekly Progress Note    Week of 5/10/21-5/16/21  Andrea Collado  1965  [id number]      D) Pt attended 1 group this week with 2 excused absences due to a scheduled hip replacement surgery. Patient attended 0 individual sessions this week. A) Staff facilitated groups and reviewed tx progress. [facility] for [facility]. R) No VAP needed at this time.     Any significant events, defines as events that impact patients relationship with others inside and outside of treatment No  Indicate any changes or monitoring of physical or mental health problems No    Indicate involvement by any outside supports Yes  IAPP reviewed and modified as needed. NA  Pt working on the following dimensions:    Dimension #1 - Withdrawal Potential - Risk 0. No current concerns.  Specific goals from treatment plan addressed this week:  Patient to maintain abstinence throughout outpatient treatment.   Effectiveness of strategies:  Strategies appear to be effective.     Dimension #2 - Biomedical - Risk 4. Pt is scheduled for hip replacement surgery on 5/11/21 and will be out of programming on 5/12 and 5/14, possibly longer.   Specific goals from treatment plan addressed this week:  Patient to maintain stable health throughout outpatient treatment.   Effectiveness of strategies:  Strategies appear to be effective.    Dimension #3 - Emotional/Behavioral/Cognitive - Risk 2. Pt reports overall stability. He reports continuing medication-compliance. He has declined a referral for individual psychotherapy.   Active interventions to stabilize mental health symptoms:  Daily engagement with friends in recovery.  Specific goals from treatment plan addressed this week:  Establish and maintain mental and emotional health.  Effectiveness of strategies:  Strategies appear to be effective.    Dimension #4 - Treatment Acceptance/Resistance - Risk 0. No current concerns.  Specific goals from treatment plan addressed this week:  Patient to increase motivation towards  recovery by participating in outpatient programming.   Effectiveness of strategies:  Strategies appear to be effective.    Dimension #5 - Relapse Potential - Risk 1. Pt reports a long history of significant chemical use. He denies any current concerns and lives in a supportive environment. He reports near-daily contact with people supportive of his recovery. He notes that his surgeon referred him to a pain specialist whom he disclosed his CD diagnosis to and who has developed a plan by which Pt will transition to Suboxone after opiate pain medications following his hip replacement surgery as needed. He expressed relief for having this conversation with the pain specialist and for having this plan in place.   Specific goals from treatment plan addressed this week:  Develop and utilize practical, effective relapse-prevention strategies and tools.   Effectiveness of strategies:  Strategies appear to be effective.    Dimension #6 - Recovery Environment - Risk 0. Pt reports living in a supportive environment. He notes near daily contact with people supportive of his recovery. He receives medication management through [facility]. He has declined a referral for individual therapy.   Specific goals from treatment plan addressed this week:  Establish and engage a widespread, redundant recovery support network.   Effectiveness of strategies:  Strategies appear to be effective.    T) Treatment plan updated no.  Patient notified and in agreement NA.    Patient educated on Communication. Patient has completed 36 of 115 program hours at this time.     Projected discharge date is 10/01/2021. Current discharge plan is PENDING.     ED Rosen, [facility], [facility] [place]  5/11/2021, 12:15 PM      Weekly Educational Topics Date   1. Understanding Dual Diagnoses, week 1    2. Understanding Dual Diagnoses, week 2    3. Feelings/Emotions    4. Thinking    5. Change/Acceptance 4/12/21; 4/14/21; 4/18/21;    6. Addiction 101  4/19/21; 4/21/21; 4/23/21;    7. Relapse Prevention 4/26/21; 4/28/21; 4/30/21;    8. Building Recovery Support 5/3/21; 5/5/21;    9. Relationships/Boundaries/Communication 5/10/21;    10. Stress Management    11. Grief and Loss    12. Strengths    13. Wellness 4/5/21; 4/7/21; 4/9/21;

## 2021-06-17 NOTE — PROGRESS NOTES
Telemedicine Visit: The patient's condition can be safely assessed and treated via synchronous audio and visual telemedicine encounter.      Reason for Telemedicine Visit: Services only offered telehealth    Originating Site (Patient Location): Patient's home    Distant Site (Provider Location): Provider Remote Setting- Home Office    Consent:  The patient/guardian has verbally consented to: the potential risks and benefits of telemedicine (video visit) versus in person care; bill my insurance or make self-payment for services provided; and responsibility for payment of non-covered services.     Mode of Communication:  Video Conference via Zoom    Call Started at: 9:30 AM  Call Ended at: 12:05 PM    As the provider I attest to compliance with applicable laws and regulations related to telemedicine.

## 2021-06-17 NOTE — PROGRESS NOTES
ABSENT NOTE:    The patient was absent from scheduled individual counseling session on 5/25/2021 . This absence was not excused. This writer attempted to contact patient via telephone and left VM. Patient is expected to reschedule his individual counseling session for later this week.     Yfn Green [facility], [facility]  5/25/2021 , 12:34 PM

## 2021-06-17 NOTE — PROGRESS NOTES
ABSENT NOTE:    The patient was absent from group 5/19/2021 . This absence was not excused. This writer attempted to contact patient via telephone. Patient is expected to be in group on 5/21/21.     JAZLYN Funes, [facility]  5/19/2021 , 2:27 PM

## 2021-06-17 NOTE — PROGRESS NOTES
Addiction Outpatient Weekly Clinical Staffing     Andrea Collado was staffed on 5/19/2021 . Andrea Collado was staffed on recovery strengths, barriers and treatment progress.     Staff present: Jeff Hartman, [facility] [place], Jeny Gomez [facility] [place], Deep Lopez [facility] [place], Yfn Green [facility], [facility] [place], Era Dominguez [facility] [place], Dillan Chavez [facility] and Kathia Carter [facility]     Date: 5/19/2021 Time: 3:01 PM    Staff Signature: TERRA Funes, [facility]

## 2021-06-17 NOTE — PROGRESS NOTES
Weekly Progress Note    Week of 4/26/21-5/2/21  Andrea Collado  1965  [id number]      D) Pt attended 3 groups this week with 0 absences for an appointment. Patient attended 1 individual sessions this week. A) Staff facilitated groups and reviewed tx progress. Assessed for [facility]. R) No VAP needed at this time.     Any significant events, defines as events that impact patients relationship with others inside and outside of treatment No  Indicate any changes or monitoring of physical or mental health problems No    Indicate involvement by any outside supports Yes  IAPP reviewed and modified as needed. NA  Pt working on the following dimensions:    Dimension #1 - Withdrawal Potential - Risk 0. No current concerns.  Specific goals from treatment plan addressed this week:  Patient to maintain abstinence throughout outpatient treatment.   Effectiveness of strategies:  Strategies appear to be effective.     Dimension #2 - Biomedical - Risk 1. Pt continues to address various biomedical concerns.   Specific goals from treatment plan addressed this week:  Patient to maintain stable health throughout outpatient treatment.   Effectiveness of strategies:  Strategies appear to be effective.    Dimension #3 - Emotional/Behavioral/Cognitive - Risk 2. Pt reports overall stability. He reports continuing medication-compliance and had an appointment with Dr. Dos Santos through [facility] clinic to establish services. He declined a referral for individual psychotherapy.   Active interventions to stabilize mental health symptoms:  Daily engagement with friends in recovery.  Specific goals from treatment plan addressed this week:  Establish and maintain mental and emotional health.  Effectiveness of strategies:  Strategies appear to be effective.    Dimension #4 - Treatment Acceptance/Resistance - Risk 0. No current concerns.  Specific goals from treatment plan addressed this week:  Patient to increase motivation towards  recovery by participating in outpatient programming.   Effectiveness of strategies:  Strategies appear to be effective.    Dimension #5 - Relapse Potential - Risk 1. Pt reports a long history of significant chemical use. He denies any current concerns and lives in a supportive environment. He reports near-daily contact with people supportive of his recovery.   Specific goals from treatment plan addressed this week:  Develop and utilize practical, effective relapse-prevention strategies and tools.   Effectiveness of strategies:  Strategies appear to be effective.    Dimension #6 - Recovery Environment - Risk 0. Pt reports living in a supportive environment. He notes near daily contact with people supportive of his recovery. He began medication management through [facility] clinic this week. He has declined a referral for individual therapy at this time.   Specific goals from treatment plan addressed this week:  Establish and engage a widespread, redundant recovery support network.   Effectiveness of strategies:  Strategies appear to be effective.    T) Treatment plan updated no.  Patient notified and in agreement NA.    Patient educated on Relapse Prevention. Patient has completed 31 of 115 program hours at this time.     Projected discharge date is 10/01/2021. Current discharge plan is PENDING.     Yfn Green, ED, LICSW, [facility]  4/30/2021, 1:11 PM      Weekly Educational Topics Date   1. Understanding Dual Diagnoses, week 1    2. Understanding Dual Diagnoses, week 2    3. Feelings/Emotions    4. Thinking    5. Change/Acceptance 4/12/21; 4/14/21; 4/18/21;    6. Addiction 101 4/19/21; 4/21/21; 4/23/21;    7. Relapse Prevention 4/26/21; 4/28/21; 4/30/21;    8. Building Recovery Support    9. Relationships/Boundaries/Communication    10. Stress Management    11. Grief and Loss    12. Strengths    13. Wellness 4/5/21; 4/7/21; 4/9/21;

## 2021-06-17 NOTE — PROGRESS NOTES
Andrea Collado attended 3 hours of group today.     The group topic was Relapse Prevention, patient was responsive to topic.     Patient's engagement in the group session: low     Total group size: 5      JAZLYN Funes, [facility]  4/28/2021, 12:30 PM

## 2021-06-17 NOTE — PROGRESS NOTES
Andrea Collado attended 3 hours of group today.     The group topic was Relapse Prevention, patient was responsive to topic.     Patient's engagement in the group session: medium     Total group size: 7      JAZLYN Funes, [facility]  4/30/2021, 12:28 PM

## 2021-06-17 NOTE — PROGRESS NOTES
Weekly Progress Note    Week of 5/3/21-5/9/21  Andrea Collado  1965  [id number]      D) Pt attended 2 groups this week with 1 unexcused absence for NA. Patient attended 0 individual sessions this week. A) Staff facilitated groups and reviewed tx progress. Assessed for [facility]. R) No VAP needed at this time.     Any significant events, defines as events that impact patients relationship with others inside and outside of treatment No  Indicate any changes or monitoring of physical or mental health problems No    Indicate involvement by any outside supports Yes  IAPP reviewed and modified as needed. NA  Pt working on the following dimensions:    Dimension #1 - Withdrawal Potential - Risk 0. No current concerns.  Specific goals from treatment plan addressed this week:  Patient to maintain abstinence throughout outpatient treatment.   Effectiveness of strategies:  Strategies appear to be effective.     Dimension #2 - Biomedical - Risk 1. Pt continues to address various biomedical concerns. He is scheduled for hip replacement surgery on 5/11/21.   Specific goals from treatment plan addressed this week:  Patient to maintain stable health throughout outpatient treatment.   Effectiveness of strategies:  Strategies appear to be effective.    Dimension #3 - Emotional/Behavioral/Cognitive - Risk 2. Pt reports overall stability. He reports continuing medication-compliance. He has declined a referral for individual psychotherapy.   Active interventions to stabilize mental health symptoms:  Daily engagement with friends in recovery.  Specific goals from treatment plan addressed this week:  Establish and maintain mental and emotional health.  Effectiveness of strategies:  Strategies appear to be effective.    Dimension #4 - Treatment Acceptance/Resistance - Risk 0. No current concerns.  Specific goals from treatment plan addressed this week:  Patient to increase motivation towards recovery by participating in outpatient programming.    Effectiveness of strategies:  Strategies appear to be effective.    Dimension #5 - Relapse Potential - Risk 1. Pt reports a long history of significant chemical use. He denies any current concerns and lives in a supportive environment. He reports near-daily contact with people supportive of his recovery.   Specific goals from treatment plan addressed this week:  Develop and utilize practical, effective relapse-prevention strategies and tools.   Effectiveness of strategies:  Strategies appear to be effective.    Dimension #6 - Recovery Environment - Risk 0. Pt reports living in a supportive environment. He notes near daily contact with people supportive of his recovery. He receives medication management through [facility]. He has declined a referral for individual therapy.   Specific goals from treatment plan addressed this week:  Establish and engage a widespread, redundant recovery support network.   Effectiveness of strategies:  Strategies appear to be effective.    T) Treatment plan updated no.  Patient notified and in agreement NA.    Patient educated on Recovery Support. Patient has completed 35 of 115 program hours at this time.     Projected discharge date is 10/01/2021. Current discharge plan is PENDING.     Yfn Green, ED, [facility], [facility]  5/7/2021, 1:41 PM      Weekly Educational Topics Date   1. Understanding Dual Diagnoses, week 1    2. Understanding Dual Diagnoses, week 2    3. Feelings/Emotions    4. Thinking    5. Change/Acceptance 4/12/21; 4/14/21; 4/18/21;    6. Addiction 101 4/19/21; 4/21/21; 4/23/21;    7. Relapse Prevention 4/26/21; 4/28/21; 4/30/21;    8. Building Recovery Support 5/3/21; 5/5/21;    9. Relationships/Boundaries/Communication    10. Stress Management    11. Grief and Loss    12. Strengths    13. Wellness 4/5/21; 4/7/21; 4/9/21;

## 2021-06-17 NOTE — PROGRESS NOTES
.Tele-Visit Details    Type of service:  Telephone visit  Video Start Time: 0958  Video End Time 1115  Originating Location (pt. Location): Patient Home  Distant Location (provider location): [facility] office  Reason for Televisit: COVID 19  Mode of Communication:  Phone      Assessment and Plan:       1. Methamphetamine use disorder, severe, dependence- has remained abstinent and noticeable improvement on increased dose of bupropion and naltrexone.  Continue bupropion 450 mg daily unchanged.  Naltrexone was held for hip replacement on May 11.  He plans on following up with the pain clinic tomorrow and will await their recommendations as to when to restart naltrexone.  Discussed waiting at least 48 hours but up to 72 hours after the final dose of short acting oxycodone.  2. Chronic hepatitis C without hepatic coma (H) need to check LFTs with primary care provider  3. Bipolar affective disorder, current episode manic with psychotic symptoms (H) continue other medications unchanged.  Patient plans on scheduling an appointment with psychiatry  4. Alcohol use disorder, severe, in sustained remission, dependence (H) continue naltrexone as per #1  5. Tobacco use disorder-not discussed today  6. Severe opioid use disorder, dependence, in sustained remission-continue naltrexone apart from the surgery   He is planning on calling his psychiatrist to schedule follow-up appointment with them although at the same time, he wants all of his care to be through [facility].  He will therefore call this clinic if he is wanting a follow-up appointment with myself.   He does not like 12-step recovery but, is developing a sober social network    For questions and concerns, please page the [facility] addiction medicine provider.    Chief Complaint:  Methamphetamine use disorder     HPI:    Andrea Collado is a 55 y.o. old male with a past medical history significant for severe alcohol, opioid, methamphetamine use disorders who had hip  "surgery on May 11 requiring opiates.  He reports that his last dose of immediate release oxycodone was yesterday midmorning when he took 10 mg.  He has not taken naltrexone since just prior to the surgery.  Since he has a history of addiction, a pain consult was placed while he was in the hospital and they managed the oxycodone.  He is following up with them tomorrow and states \"they are the experts so I will wait to hear from them about what to do with the naltrexone.\"     Wellbutrin: Has had good results with mood and cravings and attention on the increased dose without any known side effects.  He would like to continue this and has plenty of tablets at home.  He is planning on calling his psychiatrist to schedule follow-up appointment with them although at the same time, he wants all of his care to be through [facility].  He will therefore call this clinic if he is wanting a follow-up appointment with myself.      Patient reports that he has remained sober from all his illicit substances and alcohol.    INITIAL INTAKE HISTORY:    Substance of choice: Methamphetamines    Opioids:  Type and method of use: Heroin and pills, IV  Last use: He has been off buprenorphine MAT for 5 years  Denies any recently use    ETOH:  Denies any current use and had a problem with alcohol in his early 20s.  Last time was January 5, 2021, was drinking 1 pint daily.  He did have 24 years of sobriety until he started using methamphetamines    Amphetamines:  Type and method of use: Cocaine in his 20s, methamphetamine began intermittently 3 years ago, occasionally smoking and then exclusively intravenous use.  First use: 3 years ago  Amount/frequency: Daily  Last use: Several months ago    Benzodiazepines:  Denies    Cannabis:  Denies    Others (synthetic cannabinoids, hallucinogens):  Denies    Any previous IV use: Yes  Screened for Hep C and HIV previously: Yes, positive hep C and negative HIV  Previous history of seizures: " Denies  Previous endocarditis: no  Previous infections requiring hospitalization: no    Previous treatments:  Currently living in a sober house and going to [facility]  Just before then was not JIMMY Coch[facility]    Tobacco Use:  Still using an e-cigarette and is interested in stopping    Medical History  Active Ambulatory (Non-Hospital) Problems    Diagnosis     Testosterone deficiency     Cellulitis of left upper arm     IV drug user     Psychosis, unspecified psychosis type (H)     Tachycardia     Chronic anemia     Elevated liver enzymes     Chronic hepatitis C without hepatic coma (H)     Drug-seeking behavior     Right hip pain     Bipolar affective disorder, current episode manic with psychotic symptoms (H)     Psychosis (H)     Polysubstance abuse (H)     Alcohol use disorder, severe, in sustained remission, dependence (H)     Depression         Surgical History  Has had numerous surgeries, not elaborating on all of them.  Hip replacement May 2021    Social History  Current living situation: Sober house in [place]  Employment: Retired   Has a large inheritance and is planning on not working in committing to his recovery    Social History     Tobacco Use     Smoking status: Former Smoker     Packs/day: 0.50     Years: 10.00     Pack years: 5.00     Quit date: 2019     Years since quittin.0     Smokeless tobacco: Never Used     Tobacco comment: Zane 2019   Substance Use Topics     Alcohol use: Not Currently     Alcohol/week: 6.0 standard drinks     Types: 6 Cans of beer per week       Family History  Reviewed-no changes today    Mother: Sober    Medications   Current Outpatient Medications on File Prior to Visit   Medication Sig Dispense Refill     acetaminophen (TYLENOL) 325 MG tablet Take 2 tablets (650 mg total) by mouth every 6 (six) hours as needed for pain. 50 tablet 0     buPROPion (WELLBUTRIN XL) 150 MG 24 hr tablet Take 3 tablets (450 mg total) by mouth daily. 90 tablet 1      "CERTAVITE-ANTIOXIDANT  mg-mcg Tab Take 1 tablet by mouth daily.       divalproex (DEPAKOTE DR) 500 MG 12 hour tablet Take 1,000 mg by mouth 2 (two) times a day.       folic acid (FOLVITE) 1 MG tablet Take 1 mg by mouth daily.       gabapentin (NEURONTIN) 300 MG capsule Take 600 mg by mouth 3 (three) times a day.       hydrOXYzine pamoate (VISTARIL) 25 MG capsule Take 25 mg by mouth 4 (four) times a day as needed.       melatonin 3 mg Tab tablet Take 1 tablet (3 mg total) by mouth at bedtime as needed. 30 tablet 0     multivitamin (VITAMINS FOR HAIR) per tablet Take 1 tablet by mouth daily.       multivitamin therapeutic tablet Take 1 tablet by mouth daily. 60 tablet 0     naltrexone (DEPADE) 50 mg tablet Take 50 mg by mouth daily.       OLANZapine (ZYPREXA) 15 MG tablet Take 15 mg by mouth 2 (two) times a day.       risperiDONE (RISPERDAL) 2 MG tablet Take 2 mg by mouth 2 (two) times a day.       syringe with needle (BD LUER-ROSEANN SYRINGE) 3 mL 21 gauge x 1\" Syrg USE AS DIRECTED       syringe with needle 1 mL 20 gauge x 1\" Syrg Use to draw up testosterone       testosterone cypionate (DEPOTESTOTERONE CYPIONATE) 200 mg/mL injection Inject 100 mg into the shoulder, thigh, or buttocks every 14 (fourteen) days.       traZODone (DESYREL) 50 MG tablet Take 75 mg by mouth at bedtime as needed for sleep.       No current facility-administered medications on file prior to visit.          Allergies  No Known Allergies       Review of Systems:  Pain in the hip following surgery but it is improving  Denies any cravings for opiates    Physical Exam:    There were no vitals taken for this visit.    Standard physical exam not performed today since this encounter is via telehealth during the COVID-19 pandemic.  The exams performed by previous providers are personally reviewed in the chart.      General appearance   Gen: awake, alert, and oriented   No cough    Psychiatric Mental Status Examination:  Orientation: person, place, " "date, time  Reliability:  appears to be an adequate historian.    Behavior:  There is no evidence of responding to hallucinations or flashbacks.  Speech: spontaneous and coherent, with a normal rate, rhythm and tone.  Interrupts frequently  Slightly tangential  Associations: connected, intact.  Language:There are no difficulties with expressive or receptive language as observed throughout the interview.    Mood: Described as \" good\"        Results:    Lab Results personally reviewed      reviewed.  Tramadol was not anticipated however, it was prescribed in anticipation of the surgery.  When asked, he reported that he stopped the naltrexone in order to take tramadol.  He was not entirely forthcoming and only mentioned this when asked.    Prescriptions  Total Prescriptions: 19    Total Private Pay: 0    Fill Date ID   Written Drug Qty Days Prescriber Rx # Pharmacy Refill   Daily Dose* Pymt Type [facility]     05/22/2021  5   05/07/2021  Oxycodone-Acetaminophen   20.00  5 To Milagro   [id number]   [facility] ([id number]   0  [place]   05/18/2021  5   05/14/2021  Oxycodone-Acetaminophen   16.00  4 Ra Fal   [id number]   [facility] ([id number]   [place]  [place]   05/14/2021  5   05/07/2021  Oxycodone-Acetaminophen   20.00  5 To Milagro   [id number]   [facility] ([id number]   [place]  60.[place]   05/07/2021  5   05/07/2021  Tramadol Hcl 50 MG Tablet  16.00  4 To Milagro   [id number]   [facility] ([id number]   [place]  20.[place]   05/07/2021  5   05/07/2021  Pregabalin 100 MG Capsule  90.00  30 To Milagro   [id number]   [facility] ([id number]   0  2.01 [place]   04/29/2021  5   04/29/2021  Tramadol Hcl 50 MG Tablet  25.00  6 Bi Chris   [id number]   [facility] ([id number]   0  20.83 [place]   04/22/2021  5   04/22/2021  Tramadol Hcl 50 MG Tablet  20.00  5 Bi Chris   [id number]   [facility] ([id number]   [place]  20.[place]   04/07/2021  5   04/07/2021  Gabapentin 300 MG Capsule  210.00  35 Al Mas   [id number]   [facility] ([id number]   [place]   03/29/2021  5   " 02/15/2021  Testosterone Cyp 200 Mg/Ml  6.00  90 Dacia Álvaro   [id number]   [id number]   [place]   03/06/2021  5   03/03/2021  Gabapentin 300 MG Capsule  210.00  35 Al Mas   [id number]   [id number]   [place]   03/01/2021  2   02/15/2021  Testosterone Cyp 200 Mg/Ml  2.00  28 [facility]   [id number]   [facility] (8648[place]   02/12/2021  2   02/12/2021  Testosterone Cyp 200 Mg/Ml  1.00  28 [facility]   [id number]   [facility] (8648[place]   02/09/2021  4   02/09/2021  Gabapentin 300 MG Capsule  180.00  30 Al Rebekah   [id number]   All (0577)   0   [facility]   11/23/2020  3   11/23/2020  Lorazepam 0.5 MG Tablet  10.00  20 Me And   [id number]   [facility] ([id number])   0             Supriya Dos Santos MD  Addiction Medicine

## 2021-06-17 NOTE — PROGRESS NOTES
Weekly Progress Note    Week of 5/17/21-5/23/21  Andrea Collado  1965  [id number]      D) Pt attended 1 group this week with 2 absences due to follow-up appointments from his hip replacement surgery. Patient attended 0 individual sessions this week. A) Staff facilitated groups and reviewed tx progress. Assessed for [facility]. R) No VAP needed at this time.     Any significant events, defines as events that impact patients relationship with others inside and outside of treatment No  Indicate any changes or monitoring of physical or mental health problems No    Indicate involvement by any outside supports Yes  IAPP reviewed and modified as needed. NA  Pt working on the following dimensions:    Dimension #1 - Withdrawal Potential - Risk 0. No current concerns.  Specific goals from treatment plan addressed this week:  Patient to maintain abstinence throughout outpatient treatment.   Effectiveness of strategies:  Strategies appear to be effective.     Dimension #2 - Biomedical - Risk 3. Pt is had hip replacement surgery on 5/11/21. He continues recovery receiving various in-home services.   Specific goals from treatment plan addressed this week:  Patient to maintain stable health throughout outpatient treatment.   Effectiveness of strategies:  Strategies appear to be effective.    Dimension #3 - Emotional/Behavioral/Cognitive - Risk 2. Pt reports overall stability. He reports continuing medication-compliance. He has declined a referral for individual psychotherapy.   Active interventions to stabilize mental health symptoms:  Daily engagement with friends in recovery.  Specific goals from treatment plan addressed this week:  Establish and maintain mental and emotional health.  Effectiveness of strategies:  Strategies appear to be effective.    Dimension #4 - Treatment Acceptance/Resistance - Risk 0. No current concerns. Pt will be transitioned to a weekly 1x1 programming scheduled beginning 5/25.   Specific goals from  treatment plan addressed this week:  Patient to increase motivation towards recovery by participating in outpatient programming.   Effectiveness of strategies:  Strategies appear to be effective.    Dimension #5 - Relapse Potential - Risk 1. Pt reports a long history of significant chemical use. He denies any current concerns and lives in a supportive environment. He reports near-daily contact with people supportive of his recovery.    Specific goals from treatment plan addressed this week:  Develop and utilize practical, effective relapse-prevention strategies and tools.   Effectiveness of strategies:  Strategies appear to be effective.    Dimension #6 - Recovery Environment - Risk 0. Pt reports living in a supportive environment. He notes near daily contact with people supportive of his recovery. He receives medication management through [facility] clinic. He has declined a referral for individual therapy.   Specific goals from treatment plan addressed this week:  Establish and engage a widespread, redundant recovery support network.   Effectiveness of strategies:  Strategies appear to be effective.    T) Treatment plan updated no.  Patient notified and in agreement NA.    Patient educated on Stress. Patient has completed 37 of 115 program hours at this time.     Projected discharge date is 10/01/2021. Current discharge plan is PENDING.     Yfn Green, ED, [facility], [facility]  5/21/2021, 12:24 PM      Weekly Educational Topics Date   1. Understanding Dual Diagnoses, week 1    2. Understanding Dual Diagnoses, week 2    3. Feelings/Emotions    4. Thinking    5. Change/Acceptance 4/12/21; 4/14/21; 4/18/21;    6. Addiction 101 4/19/21; 4/21/21; 4/23/21;    7. Relapse Prevention 4/26/21; 4/28/21; 4/30/21;    8. Building Recovery Support 5/3/21; 5/5/21;    9. Relationships/Boundaries/Communication 5/10/21;    10. Stress Management 5/17/21;   11. Grief and Loss    12. Strengths    13. Wellness 4/5/21; 4/7/21;  4/9/21;

## 2021-06-17 NOTE — PROGRESS NOTES
Andrea Collado attended 1 hour of group today.     The group topic was Communication Skills, patient was responsive to topic.     Patient's engagement in the group session: medium     Total group size: 6      JAZLYN Funes, [facility]  5/10/2021, 12:24 PM

## 2021-06-17 NOTE — PROGRESS NOTES
ABSENT NOTE:    The patient was absent from group 5/21/2021 . This absence was excused. This writer attempted to contact patient via telephone. Patient is expected to be in group on 5/24/21.     JAZLYN Funes, [facility]  5/21/2021 , 12:05 PM

## 2021-06-17 NOTE — PROGRESS NOTES
Andrea Collado attended 3 hours of group today.     The group topic was Relapse Prevention, patient was responsive to topic.     Patient's engagement in the group session: medium     Total group size: 6      JAZLYN Funes, [facility]  4/26/2021, 12:04 PM

## 2021-06-17 NOTE — PROGRESS NOTES
Telemedicine Visit: The patient's condition can be safely assessed and treated via synchronous audio and visual telemedicine encounter.      Reason for Telemedicine Visit: Services only offered telehealth    Originating Site (Patient Location): Patient's home    Distant Site (Provider Location): Provider Remote Setting- Home Office    Consent:  The patient/guardian has verbally consented to: the potential risks and benefits of telemedicine (video visit) versus in person care; bill my insurance or make self-payment for services provided; and responsibility for payment of non-covered services.     Mode of Communication:  Video Conference via Zoom    Call Started at: 9:30 AM  Call Ended at: 10:30 AM    As the provider I attest to compliance with applicable laws and regulations related to telemedicine.

## 2021-06-17 NOTE — PROGRESS NOTES
Andrea Collado attended 1 hours of group today.     The group topic was Stress Management, patient was responsive to topic.     Patient's engagement in the group session: medium     Total group size: 6      JAZLYN Funes, [facility]  5/17/2021, 12:19 PM

## 2021-06-17 NOTE — PROGRESS NOTES
ABSENT NOTE:    The patient was absent from group 5/7/2021 . This absence was not excused. This writer attempted to contact patient via phone. Patient is expected to be in group on 05/10/21.     JAZLYN Funes, [facility]  5/7/2021 , 1:27 PM

## 2021-06-17 NOTE — PROGRESS NOTES
Andrea Collado attended 3 hours of group today.     The group topic was Recovery Support, patient was responsive to topic.     Patient's engagement in the group session: medium     Total group size: 4      JAZLYN Funes, [facility]  5/5/2021, 12:06 PM

## 2021-06-17 NOTE — PROGRESS NOTES
This video/telephone visit will be conducted via a call between you and your physician/provider. We have found that certain health care needs can be provided without the need for an in-person physical exam. This service lets us provide the care you need with a video /telephone conversation. If a prescription is necessary we can send it directly to your pharmacy. If lab work is needed we can place an order for that and you can then stop by a lab to have the test done at a later time.    Just as we bill insurance for in-person visits, we also bill insurance for video/telephone visits. If you have questions about your insurance coverage, we recommend that you speak with your insurance company.    Patient has given verbal consent for video/Telephone visit? Yes  Patient would like telephone visit, please call: [phone]  MIGUEL A/TERI : Altaf GONZALEZ LPN    Patient verified pharmacy via phone and reports no change in his allergies or medications. Pt does not feel well today and would like to speak with provider directly .  Patient states he  is ready for visit.    ________________________________________  Medications Phoned  to Pharmacy [] yes [x]no  Name of Pharmacist:  List Medications, including dose, quantity and instructions    Medications ordered this visit were e-scribed.  Verified by order class [] yes  [x] no    Medication changes or discontinuations were communicated to patient's pharmacy: [] yes  [x] no    Dictation completed at time of chart check: [] yes  [] no    I have checked the documentation for today s encounters and the above information has been reviewed and completed.

## 2021-06-17 NOTE — PROGRESS NOTES
Andrea Collado attended 1 hours of group today.     The group topic was Recovery Support, patient was responsive to topic.     Patient's engagement in the group session: low     Total group size: 6      JAZLYN Funes, [facility]  5/3/2021, 12:08 PM

## 2021-06-18 LAB — ETHYL GLUCURONIDE UR QL: NEGATIVE

## 2021-06-23 ENCOUNTER — COMMUNICATION - HEALTHEAST (OUTPATIENT)
Dept: ADDICTION MEDICINE | Facility: HOSPITAL | Age: 56
End: 2021-06-23

## 2021-06-25 NOTE — PROGRESS NOTES
Telemedicine Visit: The patient's condition can be safely assessed and treated via synchronous audio and visual telemedicine encounter.      Reason for Telemedicine Visit: Services only offered telehealth    Originating Site (Patient Location): Patient's home    Distant Site (Provider Location): Provider Remote Setting- Home Office    Consent:  The patient/guardian has verbally consented to: the potential risks and benefits of telemedicine (video visit) versus in person care; bill my insurance or make self-payment for services provided; and responsibility for payment of non-covered services.     Mode of Communication:  Video Conference via Zoom    Call Started at: 12:00 PM  Call Ended at: 12:35 PM    As the provider I attest to compliance with applicable laws and regulations related to telemedicine.
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
## 2021-06-25 NOTE — PROGRESS NOTES
Individual Treatment Plan  Co-occurring Outpatient Treatment    Patient  Name: Andrea Collado  MRN: [id number]   : 1965  Admit Date: 21  Date of Initial Service Plan: 21  Tentative Discharge Date: 21    Diagnosis: Alcohol Use Disorder, severe (F10.20)  Opioid Use Disorder, severe (F11.20)  Stimulant Use Disorder, severe, amphetamine type substance (F15.20)    [facility]      Dimension 1: Acute Intoxication/Withdrawal Potential, Risk level: 0    Problem Statement from Comprehensive Assessment:  Patient reports significant daily use of multiple substances up until his sobriety date of 21. Patient has a history of injection, snorting and smoking. Dates of last use vary quite a bit from his report on his R25. Patient endorses no withdrawal concerns at this time. He is taking naltrexone as prescribed.     Problem: Diagnoses of Alcohol Use Disorder, Severe; Opioid Use Disorder, Severe and Stimulant Use Disorder, Severe Amphetamine Type. Reports date of last use to be 21  Goal: Patient to maintain abstinence throughout outpatient treatment.   Must be reached to complete treatment? Yes  Methods/Strategies (must include amount and frequency):   1. Patient to report any substance/alcohol use to counselor to determine if any changes need to be made to address withdrawal symptoms.   2. Patient to complete any requested UAs.   Target Date: 21  Completion Date:       Dimension 2: Biomedical Conditions/Complications, Risk level: 1    Problem Statement from Comprehensive Assessment:  Patient reports hip and sinus issues. Patient reports he is meeting with providers regarding his hip pain and concerns. Patient reports his history of injection has resulted in biomedical concerns. Patient has a PCP, reports taking his medications as prescribed. Patient does currently vape however reports no interest in stopping.     Problem: Patient has hip and sinus concerns  Goal:  Patient to maintain stable health throughout outpatient treatment.   Must be reached to complete treatment? No  Methods/Strategies (must include amount and frequency):   1. Patient to report any changes to physical health to counselor.   2. Patient to attend all scheduled doctor s appointments.   3. Patient to take medications as prescribed.   Target Date: 7/27/21  Completion Date:     Problem: Patient reports current tobacco use.   Goal: Patient to receive information about smoking cessation.   Must be reached to complete treatment? No  Methods/Strategies (must include amount and frequency):   1. Staff to provide patient with nicotine cessation information and help on how to quit use.   2. Patient to report any progress on stopping nicotine use to staff.   Target Date: 7/27/21  Completion Date:       Dimension 3: Emotional/Behavioral/Cognitive, Risk level: 2    Problem Statement from Comprehensive Assessment:  Patient has diagnoses of depression, anxiety, ADHD and drug-induced psychosis. Patient is currently on a commitment through [facility]. Patient reports he has a psychiatrist however the care is inconsistent as patient is not enrolled in their programing so would like assistance in obtaining a steady provider. Patient does not have a therapist however would like one. No current or past SI/SIB/HI.      Problem: Diagnoses of depression, anxiety and ADHD per Pt report  Goal: manage mental health   Must be reached to complete treatment? Yes  Methods/Strategies (must include amount and frequency):   1. Patient to begin using coping skills learned in therapeutic group (such as grounding, breathing, thought challenging, mindfulness, etc), and share in daily check-in any benefits or challenges that you experience using these skills.  2. Patient to meet with counselor or other  staff to complete a diagnostic assessment for mental health. Patient to discuss with primary counselor potential referrals.  Target Date:  5/27/21  Completion Date: 5/27/21  Target Date: 7/27/21  Completion Date:        Dimension 4: Readiness to Change, Risk level 3    Problem Statement from Comprehensive Assessment:  The patient was calm and cooperative through the assessment. Patient has a strong desire for treatment services. He does report that services need to be via Zoom as transportation is an issue    Problem: Patient expresses ambivalence around the need for treatment services.   Goal: Patient to increase motivation towards recovery by participating in outpatient programming.   Must be reached to complete treatment? Yes  Methods/Strategies (must include amount and frequency):   1. Patient to attend3, 3-hour groups per week and all scheduled 1:1s.  2. Patient will contact staff if unable to attend (Jeny: [phone]) or (Yfn: [phone]).  3. Patient will identify weekly and daily goals to help increase motivation and engagement in recovery.  Target Date: 5/2721  Completion Date: 5/27/21  4. Patient will attend weekly individual counseling sessions weekly on Tuesdays at 12pm.   Target Date: 7/27/21  Completion Date:       Dimension 5: Relapse/Continued Use/Continued Problem Potential, Risk level: 2    Problem Statement from Comprehensive Assessment:  Patient has had multiple treatment episodes most recently this year. Patient lacks understanding of the correlation between mental health and substance use. Patient has few coping skills to manage triggers and urges. Patient does not have a relapse prevention plan. Patient is at moderate risk for relapse    Problem: Patient has a history of repetitive relapse cycle and lack of coping skills.   Goal: Develop coping skills and gain knowledge of relapse process   Must be reached to complete treatment? Yes  Methods/Strategies (must include amount and frequency):   1. Patient to share in daily check-in (Monday, Wednesday, Friday) any urges and addictive thinking to better understand his pattern  of use and to prevent relapse in the future.   Target Date: 5/27/21  Completion Date: 5/27/21  2. Patient will continue to monitor preoccupation and/or cravings around chemical use and engage support if/when needed.   Target Date: 7/27/21  Completion Date:         Dimension 6: Recovery Environment, Risk level: 1    Problem Statement from Comprehensive Assessment:  Patient is on a commitment at this time. Patient has a safe and supportive living environment. Patient has a daily routine that he has been enjoying and finding benefit from. Patient reports no current legal involvement    Problem: Lack of sober support   Goal: Patient to expand sober support and increase time spent in activities that will promote recovery (modify as needed)  Must be reached to complete treatment? yes  Methods/Strategies (must include amount and frequency):   1. Explore and identify sober support meetings to regularly attend.   Target Date: 5/27/21  Completion Date: 5/27/21  2. Patient will continue attending his mandatory weekly recovery support group and engage his support community.  Target Date: 7/27/21  Completion Date:        Resources  Resources to which the patient is being referred for problems when problems are to be addressed concurrently by another provider: Dr. Dos Santos for medication management         By signing this document, I am acknowledging that I was actively and directly involved in the development of my treatment plan.         Patient Signature:______________________________________   Date:_____________     Staff Signature: ED Funes, LICSW, [facility]  Date: 5/27/2021, 12:28 PM

## 2021-06-25 NOTE — PROGRESS NOTES
Telemedicine Visit: The patient's condition can be safely assessed and treated via synchronous audio and visual telemedicine encounter.      Reason for Telemedicine Visit: Services only offered telehealth    Originating Site (Patient Location): Patient's home    Distant Site (Provider Location): Provider Remote Setting- Home Office    Consent:  The patient/guardian has verbally consented to: the potential risks and benefits of telemedicine (video visit) versus in person care; bill my insurance or make self-payment for services provided; and responsibility for payment of non-covered services.     Mode of Communication:  Video Conference via Zoom    Call Started at: 12:30 PM   Call Ended at: 1:10 PM    As the provider I attest to compliance with applicable laws and regulations related to telemedicine.
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
## 2021-06-26 NOTE — PROGRESS NOTES
D: Met with Pt for individual counseling session. Session lasted 35 minutes.     A: Pt reports continuing abstinence from all non-prescribed mood-altering substances. He submitted a UA through [facility] yesterday and results are expected by 6/18. Pt reports feeling good. He plans to stop his relationship with his girlfriend in the [facility]. He noted that he plans to remain at his sober house indefinitely. He denies any outstanding concerns around mental health or chemical dependency.    R: Pt continues to respond to treatment well.     T: Pt will complete treatment next week pending negative UA results.

## 2021-06-26 NOTE — PROGRESS NOTES
Weekly Progress Note    Week of 6/14/21-6/20/21  Andrea Collado  1965  [id number]      D) Pt attended 1 individual session this week. A) Staff reviewed tx progress. Assessed for [facility]. R) No VAP needed at this time.     Any significant events, defines as events that impact patients relationship with others inside and outside of treatment No  Indicate any changes or monitoring of physical or mental health problems No    Indicate involvement by any outside supports Yes  IAPP reviewed and modified as needed. NA  Pt working on the following dimensions:    Dimension #1 - Withdrawal Potential - Risk 0. No current concerns. Pt submitted UA on 6/14; results expected on 6/18.   Specific goals from treatment plan addressed this week:  Patient to maintain abstinence throughout outpatient treatment.   Effectiveness of strategies:  Strategies appear to be effective.     Dimension #2 - Biomedical - Risk 2. Pt reports continuing to heal well from his recent hip replacement surgery.    Specific goals from treatment plan addressed this week:  Patient to maintain stable health throughout outpatient treatment.   Effectiveness of strategies:  Strategies appear to be effective.    Dimension #3 - Emotional/Behavioral/Cognitive - Risk 2. Pt reports overall stability. He reports continuing medication-compliance. He has declined a referral for individual psychotherapy. He continues to present with limited abstract thinking. He notes no current stressors beyond his stay-of-commitment.  Active interventions to stabilize mental health symptoms:  Daily engagement with friends in recovery.  Specific goals from treatment plan addressed this week:  Establish and maintain mental and emotional health.  Effectiveness of strategies:  Strategies appear to be effective.    Dimension #4 - Treatment Acceptance/Resistance - Risk 3. Pt has been transitioned to a weekly individual meeting due to his inability to gel with treatment groups as evidenced by his  continual distraction during groups and difficulty engaging on-topic. He appears to like individual counseling more though continues to insist he neither needs nor wants chemical dependency treatment. Pt has made clear he is committed to his sobriety and maintaining terms of his stay-of-commitment. Pt will be completed next week pending results of his UA submitted 6/14.      Specific goals from treatment plan addressed this week:  Patient to increase motivation towards recovery by participating in outpatient programming.   Effectiveness of strategies:  Strategies appear to be effective.    Dimension #5 - Relapse Potential - Risk 1. Pt reports a long history of significant chemical use. He denies any current concerns and lives in a supportive environment. He reports near-daily contact with people supportive of his recovery. He notes having tapered off of pain medication following his recent surgery and denies any withdrawal concerns. He continues to verbalize insight around chemical use and abstinence that is significant to him. This noted, Andrea continues to replace insight or conceptualization around relapse with simple resolve not to use; he appears to take comfort in this certainty.   Specific goals from treatment plan addressed this week:  Develop and utilize practical, effective relapse-prevention strategies and tools.   Effectiveness of strategies:  Strategies appear to be effective.    Dimension #6 - Recovery Environment - Risk 0. Pt reports living in a supportive environment. He notes near daily contact with people supportive of his recovery. He receives medication management through [facility]. He has declined a referral for individual therapy.   Specific goals from treatment plan addressed this week:  Establish and engage a widespread, redundant recovery support network.   Effectiveness of strategies:  Strategies appear to be effective.    T) Treatment plan updated Yes.  Patient notified  and in agreement Yes.    Patient has completed 41 program hours at this time.     Projected discharge date is 7/27/2021. Current discharge plan is PENDING.     ED Rosen, [facility], [facility]  6/15/2021, 1:28 PM      Weekly Educational Topics Date   1. Understanding Dual Diagnoses, week 1    2. Understanding Dual Diagnoses, week 2    3. Feelings/Emotions    4. Thinking    5. Change/Acceptance 4/12/21; 4/14/21; 4/18/21;    6. Addiction 101 4/19/21; 4/21/21; 4/23/21;    7. Relapse Prevention 4/26/21; 4/28/21; 4/30/21;    8. Building Recovery Support 5/3/21; 5/5/21;    9. Relationships/Boundaries/Communication 5/10/21;    10. Stress Management    11. Grief and Loss    12. Strengths    13. Wellness 4/5/21; 4/7/21; 4/9/21;
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
## 2021-06-26 NOTE — TELEPHONE ENCOUNTER
Called and spoke to Pt to confirm his UA submitted on 6/14 is reading NEG. Pt is being discharged from [facility] with staff approval. [facility]  will be notified.

## 2021-06-26 NOTE — PROGRESS NOTES
Weekly Progress Note    Week of 6/7/21-6/13/21  Andrea Collado  1965  [id number]      D) Pt attended 1 individual session this week. A) Staff reviewed tx progress. Assessed for [facility]. R) No VAP needed at this time.     Any significant events, defines as events that impact patients relationship with others inside and outside of treatment No  Indicate any changes or monitoring of physical or mental health problems No    Indicate involvement by any outside supports Yes  IAPP reviewed and modified as needed. NA  Pt working on the following dimensions:    Dimension #1 - Withdrawal Potential - Risk 0. No current concerns.  Specific goals from treatment plan addressed this week:  Patient to maintain abstinence throughout outpatient treatment.   Effectiveness of strategies:  Strategies appear to be effective.     Dimension #2 - Biomedical - Risk 2. Pt reports continuing to heal well from his recent hip replacement surgery.    Specific goals from treatment plan addressed this week:  Patient to maintain stable health throughout outpatient treatment.   Effectiveness of strategies:  Strategies appear to be effective.    Dimension #3 - Emotional/Behavioral/Cognitive - Risk 2. Pt reports overall stability. He reports continuing medication-compliance. He has declined a referral for individual psychotherapy. He continues to present with heavily blunted affect and limited abstract thinking. He notes no current stressors beyond his stay-of-commitment.  Active interventions to stabilize mental health symptoms:  Daily engagement with friends in recovery.  Specific goals from treatment plan addressed this week:  Establish and maintain mental and emotional health.  Effectiveness of strategies:  Strategies appear to be effective.    Dimension #4 - Treatment Acceptance/Resistance - Risk 3. Pt has been transitioned to a weekly individual meeting due to his inability to gel with treatment groups as evidenced by his continual distraction  during groups and difficulty engaging on-topic. He appears to like individual counseling more though continues to insist he neither needs nor wants chemical dependency treatment. Pt has made clear he is committed to his sobriety and maintaining terms of his stay-of-commitment. Pt may be completed next week.  Specific goals from treatment plan addressed this week:  Patient to increase motivation towards recovery by participating in outpatient programming.   Effectiveness of strategies:  Strategies appear to be effective.    Dimension #5 - Relapse Potential - Risk 1. Pt reports a long history of significant chemical use. He denies any current concerns and lives in a supportive environment. He reports near-daily contact with people supportive of his recovery. He notes having tapered off of pain medication following his recent surgery and denies any withdrawal concerns. He continues to verbalize insight around chemical use and abstinence that is significant to him. This noted, Andrea continues to replace insight or conceptualization around relapse with simple resolve not to use; he appears to take comfort in this certainty.   Specific goals from treatment plan addressed this week:  Develop and utilize practical, effective relapse-prevention strategies and tools.   Effectiveness of strategies:  Strategies appear to be effective.    Dimension #6 - Recovery Environment - Risk 0. Pt reports living in a supportive environment. He notes near daily contact with people supportive of his recovery. He receives medication management through [facility]. He has declined a referral for individual therapy.   Specific goals from treatment plan addressed this week:  Establish and engage a widespread, redundant recovery support network.   Effectiveness of strategies:  Strategies appear to be effective.    T) Treatment plan updated Yes.  Patient notified and in agreement Yes.    Patient has completed 41 program hours at  this time.     Projected discharge date is 7/27/2021. Current discharge plan is PENDING.     Yfn Green, ED, [facility], [facility][note 3]  6/8/2021, 2:51 PM      Weekly Educational Topics Date   1. Understanding Dual Diagnoses, week 1    2. Understanding Dual Diagnoses, week 2    3. Feelings/Emotions    4. Thinking    5. Change/Acceptance 4/12/21; 4/14/21; 4/18/21;    6. Addiction 101 4/19/21; 4/21/21; 4/23/21;    7. Relapse Prevention 4/26/21; 4/28/21; 4/30/21;    8. Building Recovery Support 5/3/21; 5/5/21;    9. Relationships/Boundaries/Communication 5/10/21;    10. Stress Management    11. Grief and Loss    12. Strengths    13. Wellness 4/5/21; 4/7/21; 4/9/21;

## 2021-06-26 NOTE — PROGRESS NOTES
"D: Met w Pt for scheduled individual counseling session. Session lasted 35 minutes.     A: Pt reports continuing abstinence from all non-prescribed mood-altering substances. He notes feeling well and keeping himself busy. He shared a tool he's developed called the \"[employer] Program\" which appears to consist of daily inventory lists that he spends approximately 1 hr daily reviewing before his daily exercise. He notes daily contact with people in the sober house in which he resides. He denies any cravings or preoccupation or outstanding concerns around chemical use.     R: Pt is amenable to completing treatment next week pending his submitting a UA. He continues to display limited insight around mental health and again pronounced that he will never use drugs again.    T: Pt is tentatively scheduled to complete IOP on 6/15/21.   "

## 2021-06-28 LAB — SYNTHETIC CANNABINOID METABOLITES UR: NORMAL
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
## 2021-06-30 NOTE — PROGRESS NOTES
Progress Notes by Jeny Gomez LADC at 2021  2:12 PM     Author: Jeny Gomez LADC Service: -- Author Type: Licensed Alcohol and Drug Counselor    Filed: 2021  9:46 AM Encounter Date: 2021 Status: Attested    : Jeny Gomez LADC (Licensed Alcohol and Drug Counselor) Cosigner: Uday Leal MD at 2021  9:36 AM    **Sensitive Note**    Attestation signed by Uday Leal MD at 2021  9:36 AM    Uday Leal                  Individual Treatment Plan  Co-occurring Outpatient Treatment    Patient  Name: Andrea Collado  MRN: [id number]   : 1965  Admit Date: 21  Date of Initial Service Plan: 21  Tentative Discharge Date: 10/1/21    Diagnosis: Alcohol Use Disorder, severe (F10.20)  Opioid Use Disorder, severe (F11.20)  Stimulant Use Disorder, severe, amphetamine type substance (F15.20)    Counselor: GAYLE Walden and JAZLYN Funes LAD      Dimension 1: Acute Intoxication/Withdrawal Potential, Risk level: 0    Problem Statement from Comprehensive Assessment:  Patient reports significant daily use of multiple substances up until his sobriety date of 21. Patient has a history of injection, snorting and smoking. Dates of last use vary quite a bit from his report on his R25. Patient endorses no withdrawal concerns at this time. He is taking naltrexone as prescribed.     Problem: Diagnoses of Alcohol Use Disorder, Severe; Opioid Use Disorder, Severe and Stimulant Use Disorder, Severe Amphetamine Type. Reports date of last use to be 21  Goal: Patient to maintain abstinence throughout outpatient treatment.   Must be reached to complete treatment? Yes  Methods/Strategies (must include amount and frequency):   1. Patient to report any substance/alcohol use to counselor to determine if any changes need to be made to address withdrawal symptoms.   2. Patient to complete any requested UAs.   Target Date:  10/1/21  Completion Date:     Problem: Patient is on (medication assisted treatment)   Goal: Continue to take medication as prescribed in order to prevent withdrawal.   Must be reached to complete treatment? yes  Methods/Strategies (must include amount and frequency):   1. Take medications as prescribed in order to prevent withdrawal symptoms.   2. Alert psychiatrist of any concerns with medications.   Target Date: 10/1/21  Completion Date:     Dimension 2: Biomedical Conditions/Complications, Risk level: 1    Problem Statement from Comprehensive Assessment:  Patient reports hip and sinus issues. Patient reports he is meeting with providers regarding his hip pain and concerns. Patient reports his history of injection has resulted in biomedical concerns. Patient has a PCP, reports taking his medications as prescribed. Patient does currently vape however reports no interest in stopping.     Problem: Patient has hip and sinus concerns  Goal: Patient to maintain stable health throughout outpatient treatment.   Must be reached to complete treatment? No  Methods/Strategies (must include amount and frequency):   1. Patient to report any changes to physical health to counselor.   2. Patient to attend all scheduled doctors appointments.   3. Patient to take medications as prescribed.   Target Date: 10/1/21  Completion Date:     Problem: Patient reports current tobacco use.   Goal: Patient to receive information about smoking cessation.   Must be reached to complete treatment? No  Methods/Strategies (must include amount and frequency):   1. Staff to provide patient with nicotine cessation information and help on how to quit use.   2. Patient to report any progress on stopping nicotine use to staff.   Target Date: 10/1/21  Completion Date:     Dimension 3: Emotional/Behavioral/Cognitive, Risk level: 2    Problem Statement from Comprehensive Assessment:  Patient has diagnoses of depression, anxiety, ADHD and drug-induced  psychosis. Patient is currently on a commitment through [facility]. Patient reports he has a psychiatrist however the care is inconsistent as patient is not enrolled in their programing so would like assistance in obtaining a steady provider. Patient does not have a therapist however would like one. No current or past SI/SIB/HI.      Problem: Diagnoses of depression, anxiety and ADHD  Goal: manage mental health   Must be reached to complete treatment? Yes  Methods/Strategies (must include amount and frequency):   1. Patient to begin using coping skills learned in therapeutic group (such as grounding, breathing, thought challenging, mindfulness, etc), and share in daily check-in any benefits or challenges that you experience using these skills.  2. Patient to meet with counselor or other  staff to complete a diagnostic assessment for mental health. Patient to discuss with primary counselor potential referrals.  Target Date: 10/1/21  Completion Date:     Dimension 4: Readiness to Change, Risk level 0    Problem Statement from Comprehensive Assessment:  The patient was calm and cooperative through the assessment. Patient has a strong desire for treatment services. He does report that services need to be via Zoom as transportation is an issue    Problem: Patient verbalizes a desire to engage in treatment services at this time   Goal: Patient to increase motivation towards recovery by participating in outpatient programming.   Must be reached to complete treatment? Yes  Methods/Strategies (must include amount and frequency):    1. Patient to attend3, 3-hour groups per week and all scheduled 1:1s.  2. Patient will contact staff if unable to attend (Jeny: [phone]) or (Yfn: [phone]).  3. Patient will identify weekly and daily goals to help increase motivation and engagement in recovery.  Target Date: 10/1/21  Completion Date:     Dimension 5: Relapse/Continued Use/Continued Problem Potential, Risk level:  3    Problem Statement from Comprehensive Assessment:  Patient has had multiple treatment episodes most recently this year. Patient lacks understanding of the correlation between mental health and substance use. Patient has few coping skills to manage triggers and urges. Patient does not have a relapse prevention plan. Patient is at moderate risk for relapse    Problem: Patient has a history of repetitive relapse cycle and lack of coping skills   Goal: Develop coping skills and gain knowledge of relapse process   Must be reached to complete treatment? Yes  Methods/Strategies (must include amount and frequency):   1. Patient to share in daily check-in (Monday, Wednesday, Friday) any urges and addictive thinking to better understand his pattern of use and to prevent relapse in the future.   Target Date: 10/1/21  Completion Date:     Problem: Patient is on naltrexone  Goal: Continue to take medication as prescribed in order to prevent withdrawal.   Must be reached to complete treatment? yes  Methods/Strategies (must include amount and frequency):   1. Take medications as prescribed in order to mitigate cravings.   2. Alert psychiatrist of any concerns with medications.   Target Date: 10/1/21  Completion Date:     Dimension 6: Recovery Environment, Risk level: 1    Problem Statement from Comprehensive Assessment:  Patient is on a commitment at this time. Patient has a safe and supportive living environment. Patient has a daily routine that he has been enjoying and finding benefit from. Patient reports no current legal involvement    Problem: Lack of sober support   Goal: Patient to expand sober support and increase time spent in activities that will promote recovery (modify as needed)  Must be reached to complete treatment? yes  Methods/Strategies (must include amount and frequency):   1. Explore and identify sober support meetings to regularly attend.   Target Date: 10/1/21  Completion Date:       Resources  Resources  to which the patient is being referred for problems when problems are to be addressed concurrently by another provider: Dr. Dos Santos for medication management         By signing this document, I am acknowledging that I was actively and directly involved in the development of my treatment plan.         Patient Signature:______________________________________   Date:_____________     Staff Signature: GAYLE Walden  Date: 4/6/2021, 9:17 AM

## 2021-06-30 NOTE — PROGRESS NOTES
Progress Notes by Jeny Gomez LADC at 2021  1:00 PM     Author: Jeny Gomez LADC Service: -- Author Type: Licensed Alcohol and Drug Counselor    Filed: 2021  7:54 AM Encounter Date: 2021 Status: Attested    : Jeny Gomez LADC (Licensed Alcohol and Drug Counselor) Cosigner: Uday Leal MD at 2021  9:35 AM    **Sensitive Note**    Attestation signed by Uday Leal MD at 2021  9:35 AM    Uday Leal                  Outpatient Substance Use Disorder Treatment - Initial Services Plan     Patient  Name: Andrea Collado  MRN: [id number]   : 1965  Admit Date: 2021        Patient describes their immediate need: To learn recovery skills to prevent relapse.     Are there any immediate Safety Needs such as (physical, stability, mobility):  Pt is able to get medical care as needed. Pt denies immediate concerns.     Immediate Health Needs and Plan:   Medication refills     Vulnerable Adult: No     Issues to be addressed in the first sessions:   Connect patient with provider for medications     Patient strengths and needs:   Strengths identified as people person  Needs identified as relapse prevention skills    Plan for patient for time between intake and completion of the treatment plan:   Attend all group therapy sessions as directed, complete all written and oral assignments as directed, and remain clean and sober. A relapse, if any, must be reported to staff immediately in order to ensure you are receiving the proper level of care. If you cannot attend a group therapy session you must call contact information provided in intake folder and leave a message before or during group hours.       Vulnerable Adult Review   [X] Review of the Facility Abuse Prevention plan was reviewed with the patient   [X] No Individual Abuse Plan is necessary   [ ] In addition to the Facility Abuse Prevention plan, an Individual Abuse Plan will be  put in place       Staff Name/Title: Jeny Gomez [facility]  Date: 4/2/2021  Time: 7:53 AM

## 2021-06-30 NOTE — PROGRESS NOTES
Progress Notes by Jeny Gomez LADC at 4/1/2021  1:00 PM     Author: Jeny Gomez LADC Service: -- Author Type: Licensed Alcohol and Drug Counselor    Filed: 4/2/2021  3:41 PM Encounter Date: 4/1/2021 Status: Attested    : Jeny Gomez LADC (Licensed Alcohol and Drug Counselor) Cosigner: Uday Leal MD at 4/7/2021  9:36 AM    **Sensitive Note**    Attestation signed by Uday Leal MD at 4/7/2021  9:36 AM    Uday Leal                  Substance Use Disorder Outpatient Treatment  Intake Note:     D) Andrea Collado is a 55 y.o.   White or  male who is referred to [facility] Co-occurring [facility] via  with funding from Medicare A&B. Patient orientated x 3. Patient meets criteria for Alcohol Use Disorder, severe (F10.20)  Opioid Use Disorder, severe (F11.20)  Stimulant Use Disorder, severe, amphetamine type substance (F15.20).  Patient appears appropriate for Outpatient co-occurring services.   A) Met with patient for 65 minutes on 04/01/2021.  Completed intake assessment and preliminary paperwork. Patient was given and explained counselor & supervisor license number and contact info, Patient Bill of Rights, program rules/regulations, Program Abuse Prevention Plan, confidentiality & HIPPA policies, grievance procedure, presented ROIs, TB & HIV/AIDS policies & resources, and Vulnerable Adult policy.   Conducted Vulnerable Adult Assessment.   R)No special Vulnerable Adult needed at this time.  Patient signed and agreed to counselor & supervisor license number and contact info, Patient Bill of Rights, group rules/regulations, Program Abuse Prevention Plan, confidentiality & HIPPA policies, grievance procedure,  ROIs, TB & HIV/AIDS policies & resources, and Vulnerable Adult policy. Patient scored low on C-SSRS screen. Patient denied suicidal ideation/intent/plan/means at this time.     Opioid Use Disorder: Yes  "  Provided \"Options for Opioid Treatment in [place] and Overdose Prevention\" Yes     Dimension #1 - Withdrawal Potential - Risk 0. Patient reports significant daily use of multiple substances up until his sobriety date of 1/6/21. Patient has a history of injection, snorting and smoking. Dates of last use vary quite a bit from his report on his R25. Patient endorses no withdrawal concerns at this time. He is taking naltrexone as prescribed.     Dimension #2 - Biomedical - Risk 1. Patient reports hip and sinus issues. Patient reports he is meeting with providers regarding his hip pain and concerns. Patient reports his history of injection has resulted in biomedical concerns. Patient has a PCP, reports taking his medications as prescribed. Patient does currently vape however reports no interest in stopping.     Dimension #3 - Emotional , Behavioral and Cognitive - Risk 2. Patient has diagnoses of depression, anxiety, ADHD and drug-induced psychosis. Patient is currently on a commitment through [facility]. Patient reports he has a psychiatrist however the care is inconsistent as patient is not enrolled in their programing so would like assistance in obtaining a steady provider. Patient does not have a therapist however would like one. No current or past SI/SIB/HI.      Dimension #4 - Readiness for Change - Risk 0. The patient was calm and cooperative through the assessment. Patient has a strong desire for treatment services. He does report that services need to be via Zoom as transportation is an issue.     Dimension #5 - Relapse Potential - Risk 3. Patient has had multiple treatment episodes most recently this year. Patient lacks understanding of the correlation between mental health and substance use. Patient has few coping skills to manage triggers and urges. Patient does not have a relapse prevention plan. Patient is at moderate risk for relapse.     Dimension #6 - Recovery Environment - Risk 1. Patient is on a " commitment at this time. Patient has a safe and supportive living environment. Patient has a daily routine that he has been enjoying and finding benefit from. Patient reports no current legal involvement.     T) Explained counselor & supervisor license number and contact info, Patient Bill of Rights, program rules/regulations, Program Abuse Prevention Plan, confidentiality & HIPPA policies, grievance procedure, presented ROIs, TB & HIV/AIDS policies & resources, and Vulnerable Adult policy. Patient expected to start group on 4/5/21.      GAYLE Walden  4/2/2021, 3:37 PM

## 2021-09-05 ENCOUNTER — HEALTH MAINTENANCE LETTER (OUTPATIENT)
Age: 56
End: 2021-09-05

## 2021-12-23 ENCOUNTER — HOSPITAL ENCOUNTER (INPATIENT)
Facility: CLINIC | Age: 56
LOS: 1 days | Discharge: HOME | DRG: 494 | End: 2021-12-25
Attending: EMERGENCY MEDICINE | Admitting: HOSPITALIST
Payer: MEDICARE

## 2021-12-23 ENCOUNTER — APPOINTMENT (OUTPATIENT)
Dept: GENERAL RADIOLOGY | Facility: CLINIC | Age: 56
DRG: 494 | End: 2021-12-23
Attending: EMERGENCY MEDICINE
Payer: MEDICARE

## 2021-12-23 DIAGNOSIS — S82.891A CLOSED FRACTURE OF RIGHT ANKLE, INITIAL ENCOUNTER: ICD-10-CM

## 2021-12-23 DIAGNOSIS — S92.324A CLOSED NONDISPLACED FRACTURE OF SECOND METATARSAL BONE OF RIGHT FOOT, INITIAL ENCOUNTER: ICD-10-CM
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
## 2021-12-23 LAB
ABO/RH(D): NORMAL
ANION GAP SERPL CALCULATED.3IONS-SCNC: 9 MMOL/L (ref 3–14)
ANTIBODY SCREEN: NEGATIVE
APTT PPP: 27 SECONDS (ref 22–38)
BASOPHILS # BLD AUTO: 0 10E3/UL (ref 0–0.2)
BASOPHILS NFR BLD AUTO: 1 %
BUN SERPL-MCNC: 14 MG/DL (ref 7–30)
CALCIUM SERPL-MCNC: 8 MG/DL (ref 8.5–10.1)
CHLORIDE BLD-SCNC: 101 MMOL/L (ref 94–109)
CO2 SERPL-SCNC: 23 MMOL/L (ref 20–32)
CREAT SERPL-MCNC: 0.81 MG/DL (ref 0.66–1.25)
EOSINOPHIL # BLD AUTO: 0.1 10E3/UL (ref 0–0.7)
EOSINOPHIL NFR BLD AUTO: 1 %
ERYTHROCYTE [DISTWIDTH] IN BLOOD BY AUTOMATED COUNT: 15.5 % (ref 10–15)
GFR SERPL CREATININE-BSD FRML MDRD: >90 ML/MIN/1.73M2
GLUCOSE BLD-MCNC: 88 MG/DL (ref 70–99)
HCT VFR BLD AUTO: 38.7 % (ref 40–53)
HGB BLD-MCNC: 12.6 G/DL (ref 13.3–17.7)
IMM GRANULOCYTES # BLD: 0 10E3/UL
IMM GRANULOCYTES NFR BLD: 1 %
INR PPP: 1.02 (ref 0.85–1.15)
LYMPHOCYTES # BLD AUTO: 0.5 10E3/UL (ref 0.8–5.3)
LYMPHOCYTES NFR BLD AUTO: 8 %
MCH RBC QN AUTO: 29.4 PG (ref 26.5–33)
MCHC RBC AUTO-ENTMCNC: 32.6 G/DL (ref 31.5–36.5)
MCV RBC AUTO: 90 FL (ref 78–100)
MONOCYTES # BLD AUTO: 0.7 10E3/UL (ref 0–1.3)
MONOCYTES NFR BLD AUTO: 11 %
NEUTROPHILS # BLD AUTO: 5.2 10E3/UL (ref 1.6–8.3)
NEUTROPHILS NFR BLD AUTO: 78 %
NRBC # BLD AUTO: 0 10E3/UL
NRBC BLD AUTO-RTO: 0 /100
PLATELET # BLD AUTO: 214 10E3/UL (ref 150–450)
POTASSIUM BLD-SCNC: 4.4 MMOL/L (ref 3.4–5.3)
RBC # BLD AUTO: 4.29 10E6/UL (ref 4.4–5.9)
SARS-COV-2 RNA RESP QL NAA+PROBE: NEGATIVE
SODIUM SERPL-SCNC: 133 MMOL/L (ref 133–144)
SPECIMEN EXPIRATION DATE: NORMAL
WBC # BLD AUTO: 6.6 10E3/UL (ref 4–11)

## 2021-12-23 PROCEDURE — 250N000013 HC RX MED GY IP 250 OP 250 PS 637: Performed by: PHYSICIAN ASSISTANT

## 2021-12-23 PROCEDURE — 85610 PROTHROMBIN TIME: CPT | Performed by: EMERGENCY MEDICINE

## 2021-12-23 PROCEDURE — 250N000011 HC RX IP 250 OP 636: Performed by: EMERGENCY MEDICINE

## 2021-12-23 PROCEDURE — 99285 EMERGENCY DEPT VISIT HI MDM: CPT | Mod: 25

## 2021-12-23 PROCEDURE — 96374 THER/PROPH/DIAG INJ IV PUSH: CPT

## 2021-12-23 PROCEDURE — 86850 RBC ANTIBODY SCREEN: CPT | Performed by: EMERGENCY MEDICINE

## 2021-12-23 PROCEDURE — 96376 TX/PRO/DX INJ SAME DRUG ADON: CPT

## 2021-12-23 PROCEDURE — 82310 ASSAY OF CALCIUM: CPT | Performed by: EMERGENCY MEDICINE

## 2021-12-23 PROCEDURE — 29125 APPL SHORT ARM SPLINT STATIC: CPT | Mod: RT

## 2021-12-23 PROCEDURE — 87635 SARS-COV-2 COVID-19 AMP PRB: CPT | Performed by: EMERGENCY MEDICINE

## 2021-12-23 PROCEDURE — G0378 HOSPITAL OBSERVATION PER HR: HCPCS

## 2021-12-23 PROCEDURE — 73590 X-RAY EXAM OF LOWER LEG: CPT | Mod: RT

## 2021-12-23 PROCEDURE — 36415 COLL VENOUS BLD VENIPUNCTURE: CPT | Performed by: EMERGENCY MEDICINE

## 2021-12-23 PROCEDURE — 73630 X-RAY EXAM OF FOOT: CPT | Mod: RT

## 2021-12-23 PROCEDURE — 73610 X-RAY EXAM OF ANKLE: CPT | Mod: RT

## 2021-12-23 PROCEDURE — 85730 THROMBOPLASTIN TIME PARTIAL: CPT | Performed by: EMERGENCY MEDICINE

## 2021-12-23 PROCEDURE — C9803 HOPD COVID-19 SPEC COLLECT: HCPCS

## 2021-12-23 PROCEDURE — 250N000013 HC RX MED GY IP 250 OP 250 PS 637: Performed by: EMERGENCY MEDICINE

## 2021-12-23 PROCEDURE — 85025 COMPLETE CBC W/AUTO DIFF WBC: CPT | Performed by: EMERGENCY MEDICINE

## 2021-12-23 PROCEDURE — 99220 PR INITIAL OBSERVATION CARE,LEVEL III: CPT | Performed by: PHYSICIAN ASSISTANT

## 2021-12-23 PROCEDURE — 250N000011 HC RX IP 250 OP 636: Performed by: PHYSICIAN ASSISTANT

## 2021-12-23 RX ORDER — LORAZEPAM 0.5 MG/1
.5-1 TABLET ORAL EVERY 4 HOURS PRN
Status: DISPENSED | OUTPATIENT
Start: 2021-12-23 | End: 2021-12-25

## 2021-12-23 RX ORDER — PREGABALIN 100 MG/1
100 CAPSULE ORAL DAILY
COMMUNITY
End: 2022-12-12

## 2021-12-23 RX ORDER — POLYETHYLENE GLYCOL 3350 17 G/17G
17 POWDER, FOR SOLUTION ORAL DAILY
Status: DISCONTINUED | OUTPATIENT
Start: 2021-12-23 | End: 2021-12-25 | Stop reason: HOSPADM

## 2021-12-23 RX ORDER — BUPROPION HYDROCHLORIDE 150 MG/1
150 TABLET ORAL EVERY MORNING
COMMUNITY
End: 2022-12-12

## 2021-12-23 RX ORDER — BACLOFEN 10 MG/1
10 TABLET ORAL AT BEDTIME
Status: ON HOLD | COMMUNITY
End: 2022-07-13

## 2021-12-23 RX ORDER — IBUPROFEN 600 MG/1
600 TABLET, FILM COATED ORAL EVERY 6 HOURS
Status: DISCONTINUED | OUTPATIENT
Start: 2021-12-23 | End: 2021-12-25 | Stop reason: HOSPADM

## 2021-12-23 RX ORDER — BUPROPION HYDROCHLORIDE 150 MG/1
150 TABLET ORAL EVERY MORNING
Status: DISCONTINUED | OUTPATIENT
Start: 2021-12-24 | End: 2021-12-24

## 2021-12-23 RX ORDER — BUPROPION HYDROCHLORIDE 300 MG/1
300 TABLET ORAL EVERY MORNING
COMMUNITY
End: 2022-12-12

## 2021-12-23 RX ORDER — OLANZAPINE 10 MG/1
10 TABLET ORAL AT BEDTIME
Status: ON HOLD | COMMUNITY
End: 2022-10-08

## 2021-12-23 RX ORDER — BACLOFEN 10 MG/1
10 TABLET ORAL AT BEDTIME
Status: DISCONTINUED | OUTPATIENT
Start: 2021-12-23 | End: 2021-12-25 | Stop reason: HOSPADM

## 2021-12-23 RX ORDER — OXYCODONE HYDROCHLORIDE 5 MG/1
5 TABLET ORAL EVERY 4 HOURS PRN
Status: DISCONTINUED | OUTPATIENT
Start: 2021-12-23 | End: 2021-12-25

## 2021-12-23 RX ORDER — TRAZODONE HYDROCHLORIDE 100 MG/1
100 TABLET ORAL AT BEDTIME
Status: DISCONTINUED | OUTPATIENT
Start: 2021-12-23 | End: 2021-12-25 | Stop reason: HOSPADM

## 2021-12-23 RX ORDER — FERROUS GLUCONATE 324(37.5)
324 TABLET ORAL DAILY
Status: ON HOLD | COMMUNITY
End: 2022-10-08

## 2021-12-23 RX ORDER — AMOXICILLIN 250 MG
2 CAPSULE ORAL 2 TIMES DAILY PRN
Status: DISCONTINUED | OUTPATIENT
Start: 2021-12-23 | End: 2021-12-25 | Stop reason: HOSPADM

## 2021-12-23 RX ORDER — ONDANSETRON 2 MG/ML
4 INJECTION INTRAMUSCULAR; INTRAVENOUS EVERY 6 HOURS PRN
Status: DISCONTINUED | OUTPATIENT
Start: 2021-12-23 | End: 2021-12-25 | Stop reason: HOSPADM

## 2021-12-23 RX ORDER — BUPRENORPHINE AND NALOXONE 8; 2 MG/1; MG/1
1 FILM, SOLUBLE BUCCAL; SUBLINGUAL 2 TIMES DAILY
COMMUNITY

## 2021-12-23 RX ORDER — BUPROPION HYDROCHLORIDE 150 MG/1
300 TABLET ORAL EVERY MORNING
Status: DISCONTINUED | OUTPATIENT
Start: 2021-12-24 | End: 2021-12-24

## 2021-12-23 RX ORDER — BUPRENORPHINE AND NALOXONE 8; 2 MG/1; MG/1
1 FILM, SOLUBLE BUCCAL; SUBLINGUAL 2 TIMES DAILY
Status: DISCONTINUED | OUTPATIENT
Start: 2021-12-23 | End: 2021-12-24

## 2021-12-23 RX ORDER — LIDOCAINE 40 MG/G
CREAM TOPICAL
Status: DISCONTINUED | OUTPATIENT
Start: 2021-12-23 | End: 2021-12-24

## 2021-12-23 RX ORDER — LORAZEPAM 1 MG/1
1 TABLET ORAL EVERY 8 HOURS PRN
Status: DISCONTINUED | OUTPATIENT
Start: 2021-12-23 | End: 2021-12-23

## 2021-12-23 RX ORDER — HYDROMORPHONE HCL IN WATER/PF 6 MG/30 ML
0.2 PATIENT CONTROLLED ANALGESIA SYRINGE INTRAVENOUS
Status: DISPENSED | OUTPATIENT
Start: 2021-12-23 | End: 2021-12-25

## 2021-12-23 RX ORDER — HYDROMORPHONE HYDROCHLORIDE 1 MG/ML
0.5 INJECTION, SOLUTION INTRAMUSCULAR; INTRAVENOUS; SUBCUTANEOUS
Status: DISCONTINUED | OUTPATIENT
Start: 2021-12-23 | End: 2021-12-23

## 2021-12-23 RX ORDER — MULTIPLE VITAMINS W/ MINERALS TAB 9MG-400MCG
1 TAB ORAL DAILY
COMMUNITY

## 2021-12-23 RX ORDER — PREGABALIN 100 MG/1
100 CAPSULE ORAL DAILY
Status: DISCONTINUED | OUTPATIENT
Start: 2021-12-24 | End: 2021-12-24

## 2021-12-23 RX ORDER — ONDANSETRON 4 MG/1
4 TABLET, ORALLY DISINTEGRATING ORAL EVERY 6 HOURS PRN
Status: DISCONTINUED | OUTPATIENT
Start: 2021-12-23 | End: 2021-12-25 | Stop reason: HOSPADM

## 2021-12-23 RX ORDER — AMOXICILLIN 250 MG
1 CAPSULE ORAL 2 TIMES DAILY PRN
Status: DISCONTINUED | OUTPATIENT
Start: 2021-12-23 | End: 2021-12-25 | Stop reason: HOSPADM

## 2021-12-23 RX ORDER — LORAZEPAM 1 MG/1
1 TABLET ORAL ONCE
Status: COMPLETED | OUTPATIENT
Start: 2021-12-23 | End: 2021-12-23

## 2021-12-23 RX ORDER — ACETAMINOPHEN 325 MG/1
975 TABLET ORAL EVERY 8 HOURS
Status: DISCONTINUED | OUTPATIENT
Start: 2021-12-23 | End: 2021-12-25 | Stop reason: HOSPADM

## 2021-12-23 RX ORDER — FERROUS GLUCONATE 324(38)MG
324 TABLET ORAL DAILY
Status: DISCONTINUED | OUTPATIENT
Start: 2021-12-23 | End: 2021-12-25 | Stop reason: HOSPADM

## 2021-12-23 RX ORDER — DIVALPROEX SODIUM 500 MG/1
1000 TABLET, EXTENDED RELEASE ORAL AT BEDTIME
Status: DISCONTINUED | OUTPATIENT
Start: 2021-12-23 | End: 2021-12-25 | Stop reason: HOSPADM

## 2021-12-23 RX ORDER — OLANZAPINE 10 MG/1
10 TABLET ORAL AT BEDTIME
Status: DISCONTINUED | OUTPATIENT
Start: 2021-12-23 | End: 2021-12-25 | Stop reason: HOSPADM

## 2021-12-23 RX ORDER — HYDROMORPHONE HYDROCHLORIDE 1 MG/ML
0.5 INJECTION, SOLUTION INTRAMUSCULAR; INTRAVENOUS; SUBCUTANEOUS
Status: COMPLETED | OUTPATIENT
Start: 2021-12-23 | End: 2021-12-23

## 2021-12-23 RX ORDER — TRAZODONE HYDROCHLORIDE 100 MG/1
100 TABLET ORAL AT BEDTIME
Status: ON HOLD | COMMUNITY
End: 2022-10-08

## 2021-12-23 RX ADMIN — BACLOFEN 10 MG: 10 TABLET ORAL at 22:18

## 2021-12-23 RX ADMIN — HYDROMORPHONE HYDROCHLORIDE 0.5 MG: 1 INJECTION, SOLUTION INTRAMUSCULAR; INTRAVENOUS; SUBCUTANEOUS at 12:50

## 2021-12-23 RX ADMIN — TRAZODONE HYDROCHLORIDE 100 MG: 100 TABLET ORAL at 22:18

## 2021-12-23 RX ADMIN — OXYCODONE HYDROCHLORIDE 5 MG: 5 TABLET ORAL at 17:02

## 2021-12-23 RX ADMIN — HYDROMORPHONE HYDROCHLORIDE 0.5 MG: 1 INJECTION, SOLUTION INTRAMUSCULAR; INTRAVENOUS; SUBCUTANEOUS at 11:34

## 2021-12-23 RX ADMIN — HYDROMORPHONE HYDROCHLORIDE 0.5 MG: 1 INJECTION, SOLUTION INTRAMUSCULAR; INTRAVENOUS; SUBCUTANEOUS at 14:58

## 2021-12-23 RX ADMIN — POLYETHYLENE GLYCOL 3350 17 G: 17 POWDER, FOR SOLUTION ORAL at 16:59

## 2021-12-23 RX ADMIN — DIVALPROEX SODIUM 1000 MG: 500 TABLET, FILM COATED, EXTENDED RELEASE ORAL at 22:18

## 2021-12-23 RX ADMIN — TIZANIDINE 4 MG: 4 TABLET ORAL at 22:18

## 2021-12-23 RX ADMIN — ACETAMINOPHEN 975 MG: 325 TABLET, FILM COATED ORAL at 16:58

## 2021-12-23 RX ADMIN — OLANZAPINE 10 MG: 10 TABLET, FILM COATED ORAL at 22:18

## 2021-12-23 RX ADMIN — FERROUS GLUCONATE 324 MG: 324 TABLET ORAL at 16:58

## 2021-12-23 RX ADMIN — HYDROMORPHONE HYDROCHLORIDE 0.2 MG: 0.2 INJECTION, SOLUTION INTRAMUSCULAR; INTRAVENOUS; SUBCUTANEOUS at 23:19

## 2021-12-23 RX ADMIN — HYDROMORPHONE HYDROCHLORIDE 0.2 MG: 0.2 INJECTION, SOLUTION INTRAMUSCULAR; INTRAVENOUS; SUBCUTANEOUS at 18:06

## 2021-12-23 RX ADMIN — LORAZEPAM 1 MG: 1 TABLET ORAL at 16:15

## 2021-12-23 RX ADMIN — OXYCODONE HYDROCHLORIDE 5 MG: 5 TABLET ORAL at 21:14

## 2021-12-23 RX ADMIN — LORAZEPAM 0.5 MG: 0.5 TABLET ORAL at 20:52

## 2021-12-23 RX ADMIN — IBUPROFEN 600 MG: 600 TABLET ORAL at 20:52

## 2021-12-23 ASSESSMENT — MIFFLIN-ST. JEOR: SCORE: 1807.39

## 2021-12-23 NOTE — ED NOTES
Bed: ED02  Expected date:   Expected time:   Means of arrival:   Comments:  BV4 56m ankle deformity going to  2

## 2021-12-23 NOTE — CONSULTS
Care Management Initial Consult    General Information  Assessment completed with: Patient,    Type of CM/SW Visit: Initial Assessment    Primary Care Provider verified and updated as needed:     Readmission within the last 30 days:        Reason for Consult: discharge planning  Advance Care Planning:            Communication Assessment  Patient's communication style: spoken language (English or Bilingual)    Hearing Difficulty or Deaf: no   Wear Glasses or Blind: no    Cognitive  Cognitive/Neuro/Behavioral: WDL                      Living Environment:   People in home: alone     Current living Arrangements: house      Able to return to prior arrangements: yes       Family/Social Support:  Care provided by:  self  Provides care for:  nobody     Sibling(s)          Description of Support System:  limited        Current Resources:   Patient receiving home care services: No     Community Resources: None  Equipment currently used at home: none  Supplies currently used at home:      Employment/Financial:  Employment Status: retired        Financial Concerns:   No concerns          Lifestyle & Psychosocial Needs:  Social Determinants of Health     Tobacco Use: High Risk     Smoking Tobacco Use: Current Every Day Smoker     Smokeless Tobacco Use: Never Used   Alcohol Use: Not on file   Financial Resource Strain: Not on file   Food Insecurity: Not on file   Transportation Needs: Not on file   Physical Activity: Not on file   Stress: Not on file   Social Connections: Not on file   Intimate Partner Violence: Not on file   Depression: Not at risk     PHQ-2 Score: 1   Housing Stability: Not on file       Functional Status:  Prior to admission patient needed assistance: no       Mental Health Status:  Mental Health Status: No Current Concerns       Chemical Dependency Status:  Chemical Dependency Status: No Current Concerns             Values/Beliefs:  Spiritual, Cultural Beliefs, Gnosticism Practices, Values that affect care:                  Additional Information:  Met with pt who reports he lives alone in an apartment. He has no children.  He reports his support system is limited.  He is retired, does not have a car and chooses not to drive. Uses [employer].  Has been independent with all cares.  1 fall in the last 6 months. Reports he has been sober for 6 months.  No current or previous services. No past TCU stays.      Plan:  Waiting on PT consult.  Pt is agreeable to HC, however, he does NOT want the vaccine and TCU's are not accepting unvaccinated pt's. If PT rec's HC he is agreeable to [facility]. He reports he will take an [employer] home.      Annie WARD, [facility]  Inpatient Care Coordination   [facility]   [phone]

## 2021-12-23 NOTE — PHARMACY-ADMISSION MEDICATION HISTORY
Admission medication history interview status for this patient is complete. See [facility] admission navigator for allergy information, prior to admission medications and immunization status.     Medication history interview done, indicate source(s): Patient  Medication history resources (including written lists, pill bottles, clinic record): SureScripts and Care Everywhere  Pharmacy: [facility] for discharge    Changes made to PTA medication list:  Added: suboxone, baclofen, iron, zyprexa, lyrica (has not started yet), tizanidine, trazodone  Changed: bupropion sr 150mg BID --> XL 450mg daily.   Reported as Not Taking: -  Removed: ativan, melatonin, viagra    Actions taken by pharmacist (provider contacted, etc): Spoke with patient about home med list     Additional medication history information: Patient has been off Suboxone recently for pain medications post shoulder surgery which went okay.    Medication reconciliation/reorder completed by provider prior to medication history?  N   (Y/N)     Prior to Admission medications    Medication Sig Last Dose Taking? Auth Provider   baclofen (LIORESAL) 10 MG tablet Take 10 mg by mouth At Bedtime 12/22/2021 at PM Yes Unknown, Entered By History   buprenorphine HCl-naloxone HCl (SUBOXONE) 8-2 MG per film Place 1 Film under the tongue 2 times daily 12/22/2021 at 1500 Yes Unknown, Entered By History   buPROPion (WELLBUTRIN XL) 150 MG 24 hr tablet Take 150 mg by mouth every morning Takes with 300mg tablet for total dose = 450mg 12/22/2021 at AM Yes Unknown, Entered By History   buPROPion (WELLBUTRIN XL) 300 MG 24 hr tablet Take 300 mg by mouth every morning Takes with 150mg tablet for total dose = 450mg 12/22/2021 at AM Yes Unknown, Entered By History   divalproex sodium extended-release (DEPAKOTE ER) 500 MG 24 hr tablet Take 1,000 mg by mouth At Bedtime 12/22/2021 at PM Yes Reported, Patient   Ferrous Gluconate 324 (37.5 Fe) MG TABS Take 324 mg by mouth daily 12/22/2021 at AM Yes Unknown,  Entered By History   multivitamin w/minerals (THERA-VIT-M) tablet Take 1 tablet by mouth every evening 12/22/2021 at PM Yes Unknown, Entered By History   OLANZapine (ZYPREXA) 10 MG tablet Take 10 mg by mouth At Bedtime 12/22/2021 at PM Yes Unknown, Entered By History   pregabalin (LYRICA) 100 MG capsule Take 100 mg by mouth daily HAS NOT STARTED at YET Yes Unknown, Entered By History   testosterone cypionate (DEPOTESTOSTERONE) 200 MG/ML injection Inject 100 mg into the muscle every 14 days MONDAYS 12/20/2021 at AM Yes Unknown, Entered By History   tiZANidine (ZANAFLEX) 4 MG tablet Take 4 mg by mouth At Bedtime 12/22/2021 at PM Yes Unknown, Entered By History   traZODone (DESYREL) 100 MG tablet Take 100 mg by mouth At Bedtime 12/22/2021 at PM Yes Unknown, Entered By History

## 2021-12-23 NOTE — ED TRIAGE NOTES
"Pt tripped and fell at 0200 this morning and felt a \"pop\" in his ankle. Deformity noted, CMS intact. Pt received 100mcg fentanyl en route. ABCs intact.  "

## 2021-12-23 NOTE — H&P
History and Physical     Andrea Collado MRN# [id number]   YOB: 1965 Age: 56 year old      Date of Admission:  12/23/2021    Primary care provider: No Ref-Primary, Physician          Assessment and Plan:   Andrea Collado is a 56 year old male with a PMH significant for bipolar disorder, polysubstance abuse currently sober and chronic hep c who presents with intractable right ankle pain after a fall.     Patient was discussed with Dr. Kraus, who was provider in ED. Chart review of ED work up was reviewed as well as chart review of Care Everywhere, previous visits and admissions.     #Intractable right ankle pain with multiple fractures  -Had a mechanical fall on 12/23 with inability to bear weight and intractable pain.   -X ray imaging reveals posterior malleolar fracture, asymmetric widening of the ankle, oblique fracture of the fibular proximal diaphysis with displacement and transverse fracture of the 2nd metatarsel proximal without displacement.   -Right leg immobilized  -Non weight bearing right leg  -Ortho consult and likely procedure tomorrow  -NPO midnight  -Pre op ECG  -Multi modal pain regimen  -PT/Social work  -Unfortunately, no one to help at home    #Hx of polysubstance abuse--currently sober  -Hx of abuse with methamphetamines, alcohol and opioids   -Continue Suboxone  -Admitted to IP psych 1/2021  -follows with psychiatry and is reportedly stable on medications    #Biopolar disorder  -continue Wellbutrin, Risperidone, Depakote, Olanzapine, Trazadone, Melatonin an Hydroxyzine    #Hx of Chronic hep C    #Recent right shoulder cuff repair       Social: Lives independently  Code: Discussed with patient and they have chosen full code  VTE prophylaxis: PCDs  Disposition: Observation                    Chief Complaint:   Mechanical fall with right ankle pain         History of Present Illness:   Andrea Collado is a 56 year old male who presents after a mechanical fall resulting in intractable  right ankle pain and inability to bear weight. Denies chest pain, shortness of breath, cough, fever, nausea, vomiting, diarrhea, abdominal pain and urinary symptoms but feels very anxious. Does not smoke cigarettes or drink alcohol recently. He had a rotator cuff repair of right shoulder in the last few weeks and can hardly bear weight on that arm. Lives alone              Past Medical History:     Past Medical History:   Diagnosis Date     Chemical dependency (H)      Cocaine abuse (H)      Depressive disorder      DTs (delirium tremens) (H)      DVT (deep venous thrombosis) (H) 5 y ago    left foot, treated with coumadin     Flail chest     broken sterum, wiring      History of total hip arthroplasty     right     Hypertension      Seizures (H)      Substance abuse (H)     alcohol, opiates               Past Surgical History:     Past Surgical History:   Procedure Laterality Date     CHEST SURGERY      flail chest, sterum fractured     HIP SURGERY       KNEE SURGERY       ORTHOPEDIC SURGERY      KIMBER right. Stephanie left shoulder surgery, debridement right shoulder, neck surgery- fracture cervical spine. 6 knee surgeries- bucket handle meniscal tear and debridement. 3 heel surgeries.      ORTHOPEDIC SURGERY                 Social History:     Social History     Socioeconomic History     Marital status: Single     Spouse name: Not on file     Number of children: Not on file     Years of education: Not on file     Highest education level: Not on file   Occupational History     Not on file   Tobacco Use     Smoking status: Current Every Day Smoker     Packs/day: 0.50     Years: 10.00     Pack years: 5.00     Types: Vaping Device     Last attempt to quit: 2019     Years since quittin.6     Smokeless tobacco: Never Used     Tobacco comment: Zane 2019   Substance and Sexual Activity     Alcohol use: Not Currently     Alcohol/week: 6.0 standard drinks     Types: 24 Cans of beer per week     Comment: drinks  1.75L grain alcohol daily since 4/28/18     Drug use: Not Currently     Types: Methamphetamines, Amphetamines     Comment: pt has been injecting meth for past 5 days     Sexual activity: Not Currently   Other Topics Concern     Parent/sibling w/ CABG, MI or angioplasty before 65F 55M? Not Asked   Social History Narrative    ** Merged History Encounter **          Social Determinants of Health     Financial Resource Strain: Not on file   Food Insecurity: Not on file   Transportation Needs: Not on file   Physical Activity: Not on file   Stress: Not on file   Social Connections: Not on file   Intimate Partner Violence: Not on file   Housing Stability: Not on file               Family History:     Family History   Problem Relation Age of Onset     Substance Abuse Mother      Substance Abuse Father      Substance Abuse Sister               Allergies:    No Known Allergies            Medications:     Prior to Admission medications    Medication Sig Last Dose Taking? Auth Provider   baclofen (LIORESAL) 10 MG tablet Take 10 mg by mouth At Bedtime 12/22/2021 at PM Yes Unknown, Entered By History   buprenorphine HCl-naloxone HCl (SUBOXONE) 8-2 MG per film Place 1 Film under the tongue 2 times daily 12/22/2021 at 1500 Yes Unknown, Entered By History   buPROPion (WELLBUTRIN XL) 150 MG 24 hr tablet Take 150 mg by mouth every morning Takes with 300mg tablet for total dose = 450mg 12/22/2021 at AM Yes Unknown, Entered By History   buPROPion (WELLBUTRIN XL) 300 MG 24 hr tablet Take 300 mg by mouth every morning Takes with 150mg tablet for total dose = 450mg 12/22/2021 at AM Yes Unknown, Entered By History   divalproex sodium extended-release (DEPAKOTE ER) 500 MG 24 hr tablet Take 1,000 mg by mouth At Bedtime 12/22/2021 at PM Yes Reported, Patient   Ferrous Gluconate 324 (37.5 Fe) MG TABS Take 324 mg by mouth daily 12/22/2021 at AM Yes Unknown, Entered By History   multivitamin w/minerals (THERA-VIT-M) tablet Take 1 tablet by mouth  every evening 12/22/2021 at PM Yes Unknown, Entered By History   OLANZapine (ZYPREXA) 10 MG tablet Take 10 mg by mouth At Bedtime 12/22/2021 at PM Yes Unknown, Entered By History   pregabalin (LYRICA) 100 MG capsule Take 100 mg by mouth daily HAS NOT STARTED at YET Yes Unknown, Entered By History   testosterone cypionate (DEPOTESTOSTERONE) 200 MG/ML injection Inject 100 mg into the muscle every 14 days MONDAYS 12/20/2021 at AM Yes Unknown, Entered By History   tiZANidine (ZANAFLEX) 4 MG tablet Take 4 mg by mouth At Bedtime 12/22/2021 at PM Yes Unknown, Entered By History   traZODone (DESYREL) 100 MG tablet Take 100 mg by mouth At Bedtime 12/22/2021 at PM Yes Unknown, Entered By History              Review of Systems:   A Comprehensive greater than 10 system review of systems was carried out.  Pertinent positives and negatives are noted above.  Otherwise negative for contributory information.            Physical Exam:   Blood pressure 104/55, pulse 72, temperature 97.8  F (36.6  C), temperature source Oral, resp. rate 18, SpO2 93 %.  Exam:  GENERAL:  Comfortable.  PSYCH: pleasant, oriented, No acute distress.  HEENT:  PERRLA. Normal conjunctiva, normal hearing, nasal mucosa and Oropharynx are normal.  NECK:  Supple, no neck vein distention, adenopathy or bruits, normal thyroid.  HEART:  Normal S1, S2 with no murmur, no pericardial rub, gallops or S3 or S4.  LUNGS:  Clear to auscultation, normal Respiratory effort. No wheezing, rales or ronchi.  ABDOMEN:  Soft, no hepatosplenomegaly, normal bowel sounds. Non-tender, non distended.   EXTREMITIES:  Right lower extremity is immobilized  SKIN:  Dry to touch, No rash, wound or ulcerations.  NEUROLOGIC:  CN 2-12 grossly intact, sensation is intact with no focal deficits.               Data:     Recent Labs   Lab 12/23/21  1308   WBC 6.6   HGB 12.6*   HCT 38.7*   MCV 90        Recent Labs   Lab 12/23/21  1308      POTASSIUM 4.4   CHLORIDE 101   CO2 23   ANIONGAP  9   GLC 88   BUN 14   CR 0.81   GFRESTIMATED >90   JOHANA 8.0*         Recent Results (from the past 24 hour(s))   Ankle XR, G/E 3 views, right    Narrative    RIGHT ANKLE THREE OR MORE VIEWS;  RIGHT FOOT THREE OR MORE VIEWS;  RIGHT TIBIA AND FIBULA TWO VIEWS    12/23/2021 11:46 AM     HISTORY:  Fall, ankle deformity noted.    FINDINGS:  Calcaneal spurring. Hallux valgus.      Impression    IMPRESSION:   1. Posterior malleolar fracture with 3 mm of articular surface  distraction.  2. Asymmetric widening of the ankle mortise medially.  3. Oblique fracture of the fibular proximal diaphysis with 5.5 mm of  displacement.  4. Transverse fracture of the second metatarsal proximally, without  displacement. No TMT dislocation.    XR Tibia & Fibula Right 2 Views    Narrative    RIGHT ANKLE THREE OR MORE VIEWS;  RIGHT FOOT THREE OR MORE VIEWS;  RIGHT TIBIA AND FIBULA TWO VIEWS    12/23/2021 11:46 AM     HISTORY:  Fall, ankle deformity noted.    FINDINGS:  Calcaneal spurring. Hallux valgus.      Impression    IMPRESSION:   1. Posterior malleolar fracture with 3 mm of articular surface  distraction.  2. Asymmetric widening of the ankle mortise medially.  3. Oblique fracture of the fibular proximal diaphysis with 5.5 mm of  displacement.  4. Transverse fracture of the second metatarsal proximally, without  displacement. No TMT dislocation.    XR Foot Right G/E 3 Views    Narrative    RIGHT ANKLE THREE OR MORE VIEWS;  RIGHT FOOT THREE OR MORE VIEWS;  RIGHT TIBIA AND FIBULA TWO VIEWS    12/23/2021 11:46 AM     HISTORY:  Fall, ankle deformity noted.    FINDINGS:  Calcaneal spurring. Hallux valgus.      Impression    IMPRESSION:   1. Posterior malleolar fracture with 3 mm of articular surface  distraction.  2. Asymmetric widening of the ankle mortise medially.  3. Oblique fracture of the fibular proximal diaphysis with 5.5 mm of  displacement.  4. Transverse fracture of the second metatarsal proximally, without  displacement. No TMT  dislocation.          Thais Crouch PA-C

## 2021-12-23 NOTE — ED NOTES
[facility]  ED Nurse Handoff Report    Andrea Collado is a 56 year old male   ED Chief complaint: Ankle Pain  . ED Diagnosis:   Final diagnoses:   Closed fracture of right ankle, initial encounter   Fall, initial encounter     Allergies: No Known Allergies    Code Status: Full Code  Activity level - Baseline/Home:  Independent. Activity Level - Current:   Assist X 1. Lift room needed: No. Bariatric: No   Needed: No   Isolation: No. Infection: Not Applicable.     Vital Signs:   Vitals:    12/23/21 1048 12/23/21 1200   BP: (!) 121/94 (!) 132/91   Pulse: 93    Resp: 20 20   Temp: 97.8  F (36.6  C)    TempSrc: Oral    SpO2: 98% 95%       Cardiac Rhythm:  ,      Pain level:    Patient confused: No. Patient Falls Risk: Yes.   Elimination Status: Has voided   Patient Report - Initial Complaint: Ankle pain. Focused Assessment: Musculoskeletal - Musculoskeletal WDL: .WDL except; mobility  General Mobility: moderately impaired  RLE Extremity Movement: active ROM moderately impaired (R ankle)  Tests Performed: labs and imaging. Abnormal Results: Labs Ordered and Resulted from Time of ED Arrival to Time of ED Departure - No data to display   XR Foot Right G/E 3 Views   Preliminary Result   IMPRESSION:    1. Posterior malleolar fracture with 3 mm of articular surface   distraction.   2. Asymmetric widening of the ankle mortise medially.   3. Oblique fracture of the fibular proximal diaphysis with 5.5 mm of   displacement.   4. Transverse fracture of the second metatarsal proximally, without   displacement. No TMT dislocation.       XR Tibia & Fibula Right 2 Views   Preliminary Result   IMPRESSION:    1. Posterior malleolar fracture with 3 mm of articular surface   distraction.   2. Asymmetric widening of the ankle mortise medially.   3. Oblique fracture of the fibular proximal diaphysis with 5.5 mm of   displacement.   4. Transverse fracture of the second metatarsal proximally, without   displacement. No  TMT dislocation.       Ankle XR, G/E 3 views, right   Preliminary Result   IMPRESSION:    1. Posterior malleolar fracture with 3 mm of articular surface   distraction.   2. Asymmetric widening of the ankle mortise medially.   3. Oblique fracture of the fibular proximal diaphysis with 5.5 mm of   displacement.   4. Transverse fracture of the second metatarsal proximally, without   displacement. No TMT dislocation.       .   Treatments provided: See MAR  Family Comments: None at bedside  OBS brochure/video discussed/provided to patient:  Yes  ED Medications:   Medications   HYDROmorphone (PF) (DILAUDID) injection 0.5 mg (0.5 mg Intravenous Given 12/23/21 1134)   HYDROmorphone (PF) (DILAUDID) injection 0.5 mg (0.5 mg Intravenous Given 12/23/21 1250)     Drips infusing:  No  For the majority of the shift, the patient's behavior Green. Interventions performed were N/A.    Sepsis treatment initiated: No     Patient tested for COVID 19 prior to admission: YES    ED Nurse Name/Phone Number: Mirna Lopez RN,   1:21 PM  RECEIVING UNIT ED HANDOFF REVIEW    Above ED Nurse Handoff Report was reviewed: Yes  Reviewed by: Cristóbal Peterson RN on December 23, 2021 at 3:29 PM

## 2021-12-23 NOTE — ED PROVIDER NOTES
History   Chief Complaint:  Ankle Pain       HPI   Andrea Collado is a 56 year old male with history of hypertension, DVT, and chemical dependency who presents with ankle pain. The patient reports that this morning he got out of his bed, his shoulder sling got caught on something, and he tripped and fell rolling his ankle. He states he heard a pop and felt pain in his right ankle and foot immediately. He states he went back to bed and iced his ankle, but when he woke up this morning it was swollen and he could not ambulate. He denies hitting his head or loss of consciousness at time of fall. Patient denies any other injury from fall.    Review of Systems   All other systems reviewed and are negative.      Allergies:  The patient has no known allergies.     Medications:  Wellbutrin   Ativan   Depakote   Depotestosterone   Viagra     Past Medical History:     Chemical dependency   Cocaine abuse   Depression   DVT   Hypertension   Seizures   Bipolar   Anxiety   Alcohol abuse     Past Surgical History:    Chest surgery- sternum fracture   Hip arthroplasty   Knee surgery x6  Neck surgery     Family History:    Substance abuse- mother, father, sister     Social History:  The patient presents alone.   Patient vapes, but does not use alcohol, street drugs or other tobacco products.     Physical Exam     Patient Vitals for the past 24 hrs:   BP Temp Temp src Pulse Resp SpO2   12/23/21 1357 120/83 -- -- 72 16 95 %   12/23/21 1200 (!) 132/91 -- -- -- 20 95 %   12/23/21 1048 (!) 121/94 97.8  F (36.6  C) Oral 93 20 98 %       Physical Exam  General: Resting on the bed.  Head: No obvious trauma to head.  Ears, Nose, Throat:  External ears normal.  Nose normal.    Eyes:  Conjunctivae clear.  Pupils are equal, round, and reactive.   Neck: Normal range of motion.  Neck supple. non tender c spine  CV: Regular rate and rhythm.  No murmurs.      Respiratory: Effort normal and breath sounds normal.  No wheezing or crackles.    Gastrointestinal: Soft.  No distension. There is no tenderness.   Musculoskeletal: Right lower extremity with tenderness and swelling noted on examination.  Able to wiggle toes.  Sensation intact to light touch.  2+ DP pulses.  Tender over the medial lateral malleolus.  Tender over the proximal tib-fib.  Skin is intact.  Neuro: Alert. Moving all extremities appropriately.  Normal speech.  GCS 15.  Skin: Skin is warm and dry.  No rash noted.     Emergency Department Course     Imaging:  XR Foot Right G/E 3 Views   Preliminary Result   IMPRESSION:    1. Posterior malleolar fracture with 3 mm of articular surface   distraction.   2. Asymmetric widening of the ankle mortise medially.   3. Oblique fracture of the fibular proximal diaphysis with 5.5 mm of   displacement.   4. Transverse fracture of the second metatarsal proximally, without   displacement. No TMT dislocation.       XR Tibia & Fibula Right 2 Views   Preliminary Result   IMPRESSION:    1. Posterior malleolar fracture with 3 mm of articular surface   distraction.   2. Asymmetric widening of the ankle mortise medially.   3. Oblique fracture of the fibular proximal diaphysis with 5.5 mm of   displacement.   4. Transverse fracture of the second metatarsal proximally, without   displacement. No TMT dislocation.       Ankle XR, G/E 3 views, right   Preliminary Result   IMPRESSION:    1. Posterior malleolar fracture with 3 mm of articular surface   distraction.   2. Asymmetric widening of the ankle mortise medially.   3. Oblique fracture of the fibular proximal diaphysis with 5.5 mm of   displacement.   4. Transverse fracture of the second metatarsal proximally, without   displacement. No TMT dislocation.         Report per radiology    Laboratory:  Labs Ordered and Resulted from Time of ED Arrival to Time of ED Departure   BASIC METABOLIC PANEL - Abnormal       Result Value    Sodium 133      Potassium 4.4      Chloride 101      Carbon Dioxide (CO2) 23       Anion Gap 9      Urea Nitrogen 14      Creatinine 0.81      Calcium 8.0 (*)     Glucose 88      GFR Estimate >90     CBC WITH PLATELETS AND DIFFERENTIAL - Abnormal    WBC Count 6.6      RBC Count 4.29 (*)     Hemoglobin 12.6 (*)     Hematocrit 38.7 (*)     MCV 90      MCH 29.4      MCHC 32.6      RDW 15.5 (*)     Platelet Count 214      % Neutrophils 78      % Lymphocytes 8      % Monocytes 11      % Eosinophils 1      % Basophils 1      % Immature Granulocytes 1      NRBCs per 100 WBC 0      Absolute Neutrophils 5.2      Absolute Lymphocytes 0.5 (*)     Absolute Monocytes 0.7      Absolute Eosinophils 0.1      Absolute Basophils 0.0      Absolute Immature Granulocytes 0.0      Absolute NRBCs 0.0     INR - Normal    INR 1.02     PARTIAL THROMBOPLASTIN TIME - Normal    aPTT 27     COVID-19 VIRUS (CORONAVIRUS) BY PCR - Normal    SARS CoV2 PCR Negative     TYPE AND SCREEN, ADULT    ABO/RH(D) A POS      Antibody Screen Negative      SPECIMEN EXPIRATION DATE [id number]     ABO/RH TYPE AND SCREEN        Procedures   Splint Placement     Plastic posterior slab and ankle stirrup splint was applied and after placement I checked and adjusted the fit to ensure proper positioning. Patient was more comfortable with splint in place. Sensation and circulation are intact after splint placement.  Gentle molding done given the mortise widening.   Patient tolerated well.        Emergency Department Course:         Reviewed:  I reviewed nursing notes, vitals, past medical history, Care Everywhere and [facility]    Assessments:  1107 I obtained history and examined the patient as noted above.   1219 I rechecked the patient and explained findings.   1320 I placed splint on displaced fractures. See note above.     Consults:  1214 I spoke with Orthopedics regarding patient's presentation, findings, and plan of care.     1258 I spoke with Cassandra Crouch PA-C for Dr. Guy of the Hospitalist service from [facility] regarding patient's  presentation, findings, and plan of care.    Interventions:  Dilaudid 0.5 mg IV     Disposition:  The patient was admitted to the hospital under the care of Dr. Guy.     Impression & Plan     Medical Decision Making:  Andrea Collado is a 56 year old male who presents for evaluation of ankle pain after fall. CMS is intact distally in the extremity.  Pulses are normal and there are no signs of serious sequelae including compartment syndrome of the leg or foot.   No open wounds.  Neurovascular intact.  Xrays reveal an ankle fracture that does not need reduction at this time.  Splint was applied in the ER.  Given that no reduction was performed, I did not repeat x-rays.  Patient was neurovascular intact following the splint placement.  This is a complex fracture that will need operative management.  I did discuss with orthopedics given the patient has limited mobility and inability use crutches. Due to patient's immobility from recent shoulder rotator cuff procedure and lack of care at home, I recommend he be admitted for observation. The patient was splinted in the ED and he tolerated the procedure well as noted above. The patients head to toe trauma exam is otherwise normal at this time and no further trauma workup is needed as I believe there is no signs of serious head, neck, chest, spinal, extremity, pelvic or abdominal injuries.  Basic labs done for preoperative planning.  Patient was admitted to the hospitalist who graciously accepted.      Diagnosis:    ICD-10-CM    1. Closed fracture of right ankle, initial encounter  S82.891A    2. Fall, initial encounter  W19.XXXA    3. Closed nondisplaced fracture of second metatarsal bone of right foot, initial encounter  S92.324A        Scribe Disclosure:  I, Catalina Boland, am serving as a scribe at 11:07 AM on 12/23/2021 to document services personally performed by Whit Kraus MD based on my observations and the provider's statements to me.            Efra  Whit COSTA MD  12/23/21 [id number]

## 2021-12-24 ENCOUNTER — ANESTHESIA EVENT (OUTPATIENT)
Dept: SURGERY | Facility: CLINIC | Age: 56
DRG: 494 | End: 2021-12-24
Payer: MEDICARE

## 2021-12-24 ENCOUNTER — APPOINTMENT (OUTPATIENT)
Dept: GENERAL RADIOLOGY | Facility: CLINIC | Age: 56
DRG: 494 | End: 2021-12-24
Attending: ORTHOPAEDIC SURGERY
Payer: MEDICARE

## 2021-12-24 ENCOUNTER — ANESTHESIA EVENT (OUTPATIENT)
Dept: MEDSURG UNIT | Facility: CLINIC | Age: 56
DRG: 494 | End: 2021-12-24
Payer: MEDICARE

## 2021-12-24 ENCOUNTER — ANESTHESIA (OUTPATIENT)
Dept: MEDSURG UNIT | Facility: CLINIC | Age: 56
DRG: 494 | End: 2021-12-24
Payer: MEDICARE

## 2021-12-24 ENCOUNTER — ANESTHESIA (OUTPATIENT)
Dept: SURGERY | Facility: CLINIC | Age: 56
DRG: 494 | End: 2021-12-24
Payer: MEDICARE

## 2021-12-24 ENCOUNTER — APPOINTMENT (OUTPATIENT)
Dept: PHYSICAL THERAPY | Facility: CLINIC | Age: 56
DRG: 494 | End: 2021-12-24
Attending: PHYSICIAN ASSISTANT
Payer: MEDICARE

## 2021-12-24 PROBLEM — S92.324A CLOSED NONDISPLACED FRACTURE OF SECOND METATARSAL BONE OF RIGHT FOOT, INITIAL ENCOUNTER: Status: ACTIVE | Noted: 2021-12-24

## 2021-12-24 LAB
ANION GAP SERPL CALCULATED.3IONS-SCNC: 8 MMOL/L (ref 3–14)
BUN SERPL-MCNC: 20 MG/DL (ref 7–30)
CALCIUM SERPL-MCNC: 8.3 MG/DL (ref 8.5–10.1)
CHLORIDE BLD-SCNC: 99 MMOL/L (ref 94–109)
CO2 SERPL-SCNC: 26 MMOL/L (ref 20–32)
CREAT SERPL-MCNC: 0.86 MG/DL (ref 0.66–1.25)
ERYTHROCYTE [DISTWIDTH] IN BLOOD BY AUTOMATED COUNT: 15.4 % (ref 10–15)
GFR SERPL CREATININE-BSD FRML MDRD: >90 ML/MIN/1.73M2
GLUCOSE BLD-MCNC: 121 MG/DL (ref 70–99)
HCT VFR BLD AUTO: 36.4 % (ref 40–53)
HGB BLD-MCNC: 11.8 G/DL (ref 13.3–17.7)
MCH RBC QN AUTO: 29.1 PG (ref 26.5–33)
MCHC RBC AUTO-ENTMCNC: 32.4 G/DL (ref 31.5–36.5)
MCV RBC AUTO: 90 FL (ref 78–100)
PLATELET # BLD AUTO: 208 10E3/UL (ref 150–450)
POTASSIUM BLD-SCNC: 3.9 MMOL/L (ref 3.4–5.3)
RBC # BLD AUTO: 4.06 10E6/UL (ref 4.4–5.9)
SODIUM SERPL-SCNC: 133 MMOL/L (ref 133–144)
WBC # BLD AUTO: 5.1 10E3/UL (ref 4–11)

## 2021-12-24 PROCEDURE — 250N000013 HC RX MED GY IP 250 OP 250 PS 637: Performed by: PHYSICIAN ASSISTANT

## 2021-12-24 PROCEDURE — 120N000004 HC R&B MS OVERFLOW

## 2021-12-24 PROCEDURE — C1713 ANCHOR/SCREW BN/BN,TIS/BN: HCPCS | Performed by: ORTHOPAEDIC SURGERY

## 2021-12-24 PROCEDURE — 36415 COLL VENOUS BLD VENIPUNCTURE: CPT | Performed by: PHYSICIAN ASSISTANT

## 2021-12-24 PROCEDURE — 96376 TX/PRO/DX INJ SAME DRUG ADON: CPT

## 2021-12-24 PROCEDURE — 250N000011 HC RX IP 250 OP 636: Performed by: NURSE ANESTHETIST, CERTIFIED REGISTERED

## 2021-12-24 PROCEDURE — 250N000009 HC RX 250: Performed by: ORTHOPAEDIC SURGERY

## 2021-12-24 PROCEDURE — G0378 HOSPITAL OBSERVATION PER HR: HCPCS

## 2021-12-24 PROCEDURE — 710N000009 HC RECOVERY PHASE 1, LEVEL 1, PER MIN: Performed by: ORTHOPAEDIC SURGERY

## 2021-12-24 PROCEDURE — 250N000011 HC RX IP 250 OP 636: Performed by: PHYSICIAN ASSISTANT

## 2021-12-24 PROCEDURE — 370N000017 HC ANESTHESIA TECHNICAL FEE, PER MIN: Performed by: ORTHOPAEDIC SURGERY

## 2021-12-24 PROCEDURE — 250N000011 HC RX IP 250 OP 636: Performed by: ANESTHESIOLOGY

## 2021-12-24 PROCEDURE — 258N000003 HC RX IP 258 OP 636: Performed by: NURSE ANESTHETIST, CERTIFIED REGISTERED

## 2021-12-24 PROCEDURE — 250N000011 HC RX IP 250 OP 636: Performed by: ORTHOPAEDIC SURGERY

## 2021-12-24 PROCEDURE — 99232 SBSQ HOSP IP/OBS MODERATE 35: CPT | Performed by: PHYSICIAN ASSISTANT

## 2021-12-24 PROCEDURE — 99207 PR CDG-MDM COMPONENT: MEETS LOW - DOWN CODED: CPT | Performed by: PHYSICIAN ASSISTANT

## 2021-12-24 PROCEDURE — 80048 BASIC METABOLIC PNL TOTAL CA: CPT | Performed by: PHYSICIAN ASSISTANT

## 2021-12-24 PROCEDURE — 370N000003 HC ANESTHESIA WARD SERVICE

## 2021-12-24 PROCEDURE — 272N000001 HC OR GENERAL SUPPLY STERILE: Performed by: ORTHOPAEDIC SURGERY

## 2021-12-24 PROCEDURE — 999N000179 XR SURGERY CARM FLUORO LESS THAN 5 MIN W STILLS: Mod: TC

## 2021-12-24 PROCEDURE — 97116 GAIT TRAINING THERAPY: CPT | Mod: GP

## 2021-12-24 PROCEDURE — 0SUF0JZ SUPPLEMENT RIGHT ANKLE JOINT WITH SYNTHETIC SUBSTITUTE, OPEN APPROACH: ICD-10-PCS | Performed by: ORTHOPAEDIC SURGERY

## 2021-12-24 PROCEDURE — 85027 COMPLETE CBC AUTOMATED: CPT | Performed by: PHYSICIAN ASSISTANT

## 2021-12-24 PROCEDURE — 250N000009 HC RX 250: Performed by: NURSE ANESTHETIST, CERTIFIED REGISTERED

## 2021-12-24 PROCEDURE — 250N000013 HC RX MED GY IP 250 OP 250 PS 637: Performed by: ORTHOPAEDIC SURGERY

## 2021-12-24 PROCEDURE — 999N000141 HC STATISTIC PRE-PROCEDURE NURSING ASSESSMENT: Performed by: ORTHOPAEDIC SURGERY

## 2021-12-24 PROCEDURE — 360N000084 HC SURGERY LEVEL 4 W/ FLUORO, PER MIN: Performed by: ORTHOPAEDIC SURGERY

## 2021-12-24 PROCEDURE — 250N000009 HC RX 250: Performed by: ANESTHESIOLOGY

## 2021-12-24 PROCEDURE — 97161 PT EVAL LOW COMPLEX 20 MIN: CPT | Mod: GP

## 2021-12-24 PROCEDURE — 999N000065 XR ANKLE PORT RIGHT G/E 3 VIEWS: Mod: RT

## 2021-12-24 PROCEDURE — 97530 THERAPEUTIC ACTIVITIES: CPT | Mod: GP

## 2021-12-24 PROCEDURE — 0QSJ04Z REPOSITION RIGHT FIBULA WITH INTERNAL FIXATION DEVICE, OPEN APPROACH: ICD-10-PCS | Performed by: ORTHOPAEDIC SURGERY

## 2021-12-24 DEVICE — IMP SCR SYN CORTEX 3.5X32MM SELF TAP SS 204.832
Type: IMPLANTABLE DEVICE | Site: ANKLE | Status: NON-FUNCTIONAL
Removed: 2022-07-13

## 2021-12-24 DEVICE — IMPLANTABLE DEVICE
Type: IMPLANTABLE DEVICE | Site: ANKLE | Status: NON-FUNCTIONAL
Removed: 2022-07-13

## 2021-12-24 RX ORDER — BUPIVACAINE HYDROCHLORIDE AND EPINEPHRINE 2.5; 5 MG/ML; UG/ML
INJECTION, SOLUTION EPIDURAL; INFILTRATION; INTRACAUDAL; PERINEURAL PRN
Status: DISCONTINUED | OUTPATIENT
Start: 2021-12-24 | End: 2021-12-24 | Stop reason: HOSPADM

## 2021-12-24 RX ORDER — BUPROPION HYDROCHLORIDE 150 MG/1
300 TABLET ORAL EVERY MORNING
Status: DISCONTINUED | OUTPATIENT
Start: 2021-12-24 | End: 2021-12-25 | Stop reason: HOSPADM

## 2021-12-24 RX ORDER — LABETALOL HYDROCHLORIDE 5 MG/ML
10 INJECTION, SOLUTION INTRAVENOUS
Status: DISCONTINUED | OUTPATIENT
Start: 2021-12-24 | End: 2021-12-24 | Stop reason: HOSPADM

## 2021-12-24 RX ORDER — FENTANYL CITRATE 50 UG/ML
50 INJECTION, SOLUTION INTRAMUSCULAR; INTRAVENOUS EVERY 5 MIN PRN
Status: CANCELLED | OUTPATIENT
Start: 2021-12-24

## 2021-12-24 RX ORDER — MEPERIDINE HYDROCHLORIDE 25 MG/ML
12.5 INJECTION INTRAMUSCULAR; INTRAVENOUS; SUBCUTANEOUS
Status: DISCONTINUED | OUTPATIENT
Start: 2021-12-24 | End: 2021-12-24 | Stop reason: HOSPADM

## 2021-12-24 RX ORDER — ASPIRIN 325 MG
325 TABLET ORAL DAILY
Status: DISCONTINUED | OUTPATIENT
Start: 2021-12-25 | End: 2021-12-25 | Stop reason: HOSPADM

## 2021-12-24 RX ORDER — CEFAZOLIN SODIUM/WATER 2 G/20 ML
2 SYRINGE (ML) INTRAVENOUS SEE ADMIN INSTRUCTIONS
Status: DISCONTINUED | OUTPATIENT
Start: 2021-12-24 | End: 2021-12-24 | Stop reason: HOSPADM

## 2021-12-24 RX ORDER — AMOXICILLIN 250 MG
1 CAPSULE ORAL 2 TIMES DAILY
Status: DISCONTINUED | OUTPATIENT
Start: 2021-12-24 | End: 2021-12-24

## 2021-12-24 RX ORDER — NALOXONE HYDROCHLORIDE 0.4 MG/ML
0.2 INJECTION, SOLUTION INTRAMUSCULAR; INTRAVENOUS; SUBCUTANEOUS
Status: DISCONTINUED | OUTPATIENT
Start: 2021-12-24 | End: 2021-12-25 | Stop reason: HOSPADM

## 2021-12-24 RX ORDER — OXYCODONE HYDROCHLORIDE 5 MG/1
5 TABLET ORAL EVERY 4 HOURS PRN
Status: DISCONTINUED | OUTPATIENT
Start: 2021-12-24 | End: 2021-12-24 | Stop reason: HOSPADM

## 2021-12-24 RX ORDER — LIDOCAINE 40 MG/G
CREAM TOPICAL
Status: DISCONTINUED | OUTPATIENT
Start: 2021-12-24 | End: 2021-12-24 | Stop reason: HOSPADM

## 2021-12-24 RX ORDER — FENTANYL CITRATE 50 UG/ML
INJECTION, SOLUTION INTRAMUSCULAR; INTRAVENOUS PRN
Status: DISCONTINUED | OUTPATIENT
Start: 2021-12-24 | End: 2021-12-24

## 2021-12-24 RX ORDER — HYDRALAZINE HYDROCHLORIDE 20 MG/ML
2.5-5 INJECTION INTRAMUSCULAR; INTRAVENOUS EVERY 10 MIN PRN
Status: CANCELLED | OUTPATIENT
Start: 2021-12-24

## 2021-12-24 RX ORDER — HYDROMORPHONE HCL IN WATER/PF 6 MG/30 ML
0.4 PATIENT CONTROLLED ANALGESIA SYRINGE INTRAVENOUS EVERY 5 MIN PRN
Status: CANCELLED | OUTPATIENT
Start: 2021-12-24

## 2021-12-24 RX ORDER — NALOXONE HYDROCHLORIDE 0.4 MG/ML
0.4 INJECTION, SOLUTION INTRAMUSCULAR; INTRAVENOUS; SUBCUTANEOUS
Status: DISCONTINUED | OUTPATIENT
Start: 2021-12-24 | End: 2021-12-25 | Stop reason: HOSPADM

## 2021-12-24 RX ORDER — CEFAZOLIN SODIUM 1 G/50ML
1 INJECTION, SOLUTION INTRAVENOUS EVERY 8 HOURS
Status: COMPLETED | OUTPATIENT
Start: 2021-12-24 | End: 2021-12-25

## 2021-12-24 RX ORDER — HYDROMORPHONE HCL IN WATER/PF 6 MG/30 ML
0.2 PATIENT CONTROLLED ANALGESIA SYRINGE INTRAVENOUS EVERY 5 MIN PRN
Status: DISCONTINUED | OUTPATIENT
Start: 2021-12-24 | End: 2021-12-24 | Stop reason: HOSPADM

## 2021-12-24 RX ORDER — ONDANSETRON 2 MG/ML
4 INJECTION INTRAMUSCULAR; INTRAVENOUS EVERY 30 MIN PRN
Status: DISCONTINUED | OUTPATIENT
Start: 2021-12-24 | End: 2021-12-24 | Stop reason: HOSPADM

## 2021-12-24 RX ORDER — SODIUM CHLORIDE, SODIUM LACTATE, POTASSIUM CHLORIDE, CALCIUM CHLORIDE 600; 310; 30; 20 MG/100ML; MG/100ML; MG/100ML; MG/100ML
INJECTION, SOLUTION INTRAVENOUS CONTINUOUS
Status: DISCONTINUED | OUTPATIENT
Start: 2021-12-24 | End: 2021-12-24 | Stop reason: HOSPADM

## 2021-12-24 RX ORDER — FENTANYL CITRATE 50 UG/ML
50 INJECTION, SOLUTION INTRAMUSCULAR; INTRAVENOUS EVERY 5 MIN PRN
Status: DISCONTINUED | OUTPATIENT
Start: 2021-12-24 | End: 2021-12-24 | Stop reason: HOSPADM

## 2021-12-24 RX ORDER — ONDANSETRON 4 MG/1
4 TABLET, ORALLY DISINTEGRATING ORAL EVERY 30 MIN PRN
Status: DISCONTINUED | OUTPATIENT
Start: 2021-12-24 | End: 2021-12-24 | Stop reason: HOSPADM

## 2021-12-24 RX ORDER — DEXAMETHASONE SODIUM PHOSPHATE 4 MG/ML
INJECTION, SOLUTION INTRA-ARTICULAR; INTRALESIONAL; INTRAMUSCULAR; INTRAVENOUS; SOFT TISSUE PRN
Status: DISCONTINUED | OUTPATIENT
Start: 2021-12-24 | End: 2021-12-24

## 2021-12-24 RX ORDER — HYDRALAZINE HYDROCHLORIDE 20 MG/ML
2.5-5 INJECTION INTRAMUSCULAR; INTRAVENOUS EVERY 10 MIN PRN
Status: DISCONTINUED | OUTPATIENT
Start: 2021-12-24 | End: 2021-12-24 | Stop reason: HOSPADM

## 2021-12-24 RX ORDER — EPHEDRINE SULFATE 50 MG/ML
INJECTION, SOLUTION INTRAVENOUS PRN
Status: DISCONTINUED | OUTPATIENT
Start: 2021-12-24 | End: 2021-12-24

## 2021-12-24 RX ORDER — GLYCOPYRROLATE 0.2 MG/ML
INJECTION, SOLUTION INTRAMUSCULAR; INTRAVENOUS PRN
Status: DISCONTINUED | OUTPATIENT
Start: 2021-12-24 | End: 2021-12-24

## 2021-12-24 RX ORDER — FENTANYL CITRATE 50 UG/ML
50 INJECTION, SOLUTION INTRAMUSCULAR; INTRAVENOUS
Status: DISCONTINUED | OUTPATIENT
Start: 2021-12-24 | End: 2021-12-24 | Stop reason: HOSPADM

## 2021-12-24 RX ORDER — BUPROPION HYDROCHLORIDE 150 MG/1
150 TABLET ORAL EVERY MORNING
Status: DISCONTINUED | OUTPATIENT
Start: 2021-12-24 | End: 2021-12-25 | Stop reason: HOSPADM

## 2021-12-24 RX ORDER — ONDANSETRON 2 MG/ML
INJECTION INTRAMUSCULAR; INTRAVENOUS PRN
Status: DISCONTINUED | OUTPATIENT
Start: 2021-12-24 | End: 2021-12-24

## 2021-12-24 RX ORDER — MAGNESIUM HYDROXIDE 1200 MG/15ML
LIQUID ORAL PRN
Status: DISCONTINUED | OUTPATIENT
Start: 2021-12-24 | End: 2021-12-24 | Stop reason: HOSPADM

## 2021-12-24 RX ORDER — ONDANSETRON 4 MG/1
4 TABLET, ORALLY DISINTEGRATING ORAL EVERY 30 MIN PRN
Status: CANCELLED | OUTPATIENT
Start: 2021-12-24

## 2021-12-24 RX ORDER — FENTANYL CITRATE 50 UG/ML
50 INJECTION, SOLUTION INTRAMUSCULAR; INTRAVENOUS
Status: CANCELLED | OUTPATIENT
Start: 2021-12-24

## 2021-12-24 RX ORDER — ONDANSETRON 2 MG/ML
4 INJECTION INTRAMUSCULAR; INTRAVENOUS EVERY 30 MIN PRN
Status: CANCELLED | OUTPATIENT
Start: 2021-12-24

## 2021-12-24 RX ORDER — MEPERIDINE HYDROCHLORIDE 25 MG/ML
12.5 INJECTION INTRAMUSCULAR; INTRAVENOUS; SUBCUTANEOUS
Status: CANCELLED | OUTPATIENT
Start: 2021-12-24

## 2021-12-24 RX ORDER — CEFAZOLIN SODIUM/WATER 2 G/20 ML
2 SYRINGE (ML) INTRAVENOUS
Status: COMPLETED | OUTPATIENT
Start: 2021-12-24 | End: 2021-12-24

## 2021-12-24 RX ORDER — OXYCODONE HYDROCHLORIDE 5 MG/1
5 TABLET ORAL EVERY 4 HOURS PRN
Status: CANCELLED | OUTPATIENT
Start: 2021-12-24

## 2021-12-24 RX ORDER — LABETALOL HYDROCHLORIDE 5 MG/ML
10 INJECTION, SOLUTION INTRAVENOUS
Status: CANCELLED | OUTPATIENT
Start: 2021-12-24

## 2021-12-24 RX ORDER — SODIUM CHLORIDE, SODIUM LACTATE, POTASSIUM CHLORIDE, CALCIUM CHLORIDE 600; 310; 30; 20 MG/100ML; MG/100ML; MG/100ML; MG/100ML
INJECTION, SOLUTION INTRAVENOUS CONTINUOUS
Status: CANCELLED | OUTPATIENT
Start: 2021-12-24

## 2021-12-24 RX ORDER — PREGABALIN 100 MG/1
100 CAPSULE ORAL DAILY
Status: DISCONTINUED | OUTPATIENT
Start: 2021-12-24 | End: 2021-12-25 | Stop reason: HOSPADM

## 2021-12-24 RX ORDER — BUPROPION HYDROCHLORIDE 150 MG/1
150 TABLET ORAL EVERY MORNING
Status: DISCONTINUED | OUTPATIENT
Start: 2021-12-24 | End: 2021-12-24

## 2021-12-24 RX ORDER — SODIUM CHLORIDE, SODIUM LACTATE, POTASSIUM CHLORIDE, CALCIUM CHLORIDE 600; 310; 30; 20 MG/100ML; MG/100ML; MG/100ML; MG/100ML
INJECTION, SOLUTION INTRAVENOUS CONTINUOUS PRN
Status: DISCONTINUED | OUTPATIENT
Start: 2021-12-24 | End: 2021-12-24

## 2021-12-24 RX ORDER — PROPOFOL 10 MG/ML
INJECTION, EMULSION INTRAVENOUS PRN
Status: DISCONTINUED | OUTPATIENT
Start: 2021-12-24 | End: 2021-12-24

## 2021-12-24 RX ORDER — LIDOCAINE HYDROCHLORIDE 10 MG/ML
INJECTION, SOLUTION INFILTRATION; PERINEURAL PRN
Status: DISCONTINUED | OUTPATIENT
Start: 2021-12-24 | End: 2021-12-24

## 2021-12-24 RX ADMIN — DEXAMETHASONE SODIUM PHOSPHATE 4 MG: 4 INJECTION, SOLUTION INTRA-ARTICULAR; INTRALESIONAL; INTRAMUSCULAR; INTRAVENOUS; SOFT TISSUE at 07:44

## 2021-12-24 RX ADMIN — BUPROPION HYDROCHLORIDE 150 MG: 150 TABLET, EXTENDED RELEASE ORAL at 16:06

## 2021-12-24 RX ADMIN — HYDROMORPHONE HYDROCHLORIDE 0.2 MG: 0.2 INJECTION, SOLUTION INTRAMUSCULAR; INTRAVENOUS; SUBCUTANEOUS at 17:10

## 2021-12-24 RX ADMIN — OXYCODONE HYDROCHLORIDE 5 MG: 5 TABLET ORAL at 03:21

## 2021-12-24 RX ADMIN — IBUPROFEN 600 MG: 600 TABLET ORAL at 14:38

## 2021-12-24 RX ADMIN — GLYCOPYRROLATE 0.2 MG: 0.2 INJECTION, SOLUTION INTRAMUSCULAR; INTRAVENOUS at 07:44

## 2021-12-24 RX ADMIN — IBUPROFEN 600 MG: 600 TABLET ORAL at 03:21

## 2021-12-24 RX ADMIN — HYDROMORPHONE HYDROCHLORIDE 0.2 MG: 0.2 INJECTION, SOLUTION INTRAMUSCULAR; INTRAVENOUS; SUBCUTANEOUS at 12:45

## 2021-12-24 RX ADMIN — SODIUM CHLORIDE, POTASSIUM CHLORIDE, SODIUM LACTATE AND CALCIUM CHLORIDE: 600; 310; 30; 20 INJECTION, SOLUTION INTRAVENOUS at 07:30

## 2021-12-24 RX ADMIN — Medication 2 G: at 07:30

## 2021-12-24 RX ADMIN — PREGABALIN 100 MG: 100 CAPSULE ORAL at 17:10

## 2021-12-24 RX ADMIN — BUPROPION HYDROCHLORIDE 300 MG: 150 TABLET, EXTENDED RELEASE ORAL at 16:05

## 2021-12-24 RX ADMIN — EPHEDRINE SULFATE 5 MG: 50 INJECTION, SOLUTION INTRAVENOUS at 07:52

## 2021-12-24 RX ADMIN — OLANZAPINE 10 MG: 10 TABLET, FILM COATED ORAL at 22:52

## 2021-12-24 RX ADMIN — EPHEDRINE SULFATE 5 MG: 50 INJECTION, SOLUTION INTRAVENOUS at 07:48

## 2021-12-24 RX ADMIN — LIDOCAINE HYDROCHLORIDE 40 MG: 10 INJECTION, SOLUTION INFILTRATION; PERINEURAL at 07:44

## 2021-12-24 RX ADMIN — DIVALPROEX SODIUM 1000 MG: 500 TABLET, FILM COATED, EXTENDED RELEASE ORAL at 22:52

## 2021-12-24 RX ADMIN — BACLOFEN 10 MG: 10 TABLET ORAL at 22:52

## 2021-12-24 RX ADMIN — ACETAMINOPHEN 975 MG: 325 TABLET, FILM COATED ORAL at 01:35

## 2021-12-24 RX ADMIN — DEXMEDETOMIDINE HYDROCHLORIDE 0.5 MCG/KG/HR: 100 INJECTION, SOLUTION INTRAVENOUS at 08:00

## 2021-12-24 RX ADMIN — ACETAMINOPHEN 975 MG: 325 TABLET, FILM COATED ORAL at 16:06

## 2021-12-24 RX ADMIN — TIZANIDINE 4 MG: 4 TABLET ORAL at 22:52

## 2021-12-24 RX ADMIN — HYDROMORPHONE HYDROCHLORIDE 0.2 MG: 0.2 INJECTION, SOLUTION INTRAMUSCULAR; INTRAVENOUS; SUBCUTANEOUS at 23:37

## 2021-12-24 RX ADMIN — GLYCOPYRROLATE 0.2 MG: 0.2 INJECTION, SOLUTION INTRAMUSCULAR; INTRAVENOUS at 07:54

## 2021-12-24 RX ADMIN — ONDANSETRON HYDROCHLORIDE 4 MG: 2 INJECTION, SOLUTION INTRAVENOUS at 08:33

## 2021-12-24 RX ADMIN — CEFAZOLIN SODIUM 1 G: 1 INJECTION, SOLUTION INTRAVENOUS at 16:06

## 2021-12-24 RX ADMIN — FENTANYL CITRATE 50 MCG: 50 INJECTION, SOLUTION INTRAMUSCULAR; INTRAVENOUS at 09:27

## 2021-12-24 RX ADMIN — OXYCODONE HYDROCHLORIDE 5 MG: 5 TABLET ORAL at 23:02

## 2021-12-24 RX ADMIN — TRAZODONE HYDROCHLORIDE 100 MG: 100 TABLET ORAL at 22:53

## 2021-12-24 RX ADMIN — MIDAZOLAM 2 MG: 1 INJECTION INTRAMUSCULAR; INTRAVENOUS at 07:32

## 2021-12-24 RX ADMIN — FENTANYL CITRATE 50 MCG: 50 INJECTION, SOLUTION INTRAMUSCULAR; INTRAVENOUS at 10:20

## 2021-12-24 RX ADMIN — FENTANYL CITRATE 100 MCG: 50 INJECTION, SOLUTION INTRAMUSCULAR; INTRAVENOUS at 07:44

## 2021-12-24 RX ADMIN — HYDROMORPHONE HYDROCHLORIDE 0.2 MG: 0.2 INJECTION, SOLUTION INTRAMUSCULAR; INTRAVENOUS; SUBCUTANEOUS at 21:31

## 2021-12-24 RX ADMIN — PROPOFOL 140 MG: 10 INJECTION, EMULSION INTRAVENOUS at 07:44

## 2021-12-24 RX ADMIN — LORAZEPAM 0.5 MG: 0.5 TABLET ORAL at 11:33

## 2021-12-24 RX ADMIN — LORAZEPAM 1 MG: 0.5 TABLET ORAL at 16:05

## 2021-12-24 RX ADMIN — OXYCODONE HYDROCHLORIDE 5 MG: 5 TABLET ORAL at 18:55

## 2021-12-24 RX ADMIN — OXYCODONE HYDROCHLORIDE 5 MG: 5 TABLET ORAL at 14:38

## 2021-12-24 RX ADMIN — IBUPROFEN 600 MG: 600 TABLET ORAL at 21:31

## 2021-12-24 ASSESSMENT — MIFFLIN-ST. JEOR: SCORE: 1804.22

## 2021-12-24 ASSESSMENT — ACTIVITIES OF DAILY LIVING (ADL)
ADLS_ACUITY_SCORE: 9
ADLS_ACUITY_SCORE: 7

## 2021-12-24 ASSESSMENT — ENCOUNTER SYMPTOMS: SEIZURES: 1

## 2021-12-24 NOTE — ANESTHESIA POSTPROCEDURE EVALUATION
Patient: Andrea Collado    Procedure: Procedure(s):  OPEN REDUCTION INTERNAL FIXATION, FRACTURE, ANKLE       Diagnosis:Ankle fracture [S82.893L]  Diagnosis Additional Information: No value filed.    Anesthesia Type:  General    Note:  Disposition: Outpatient   Postop Pain Control: Uneventful            Sign Out: Well controlled pain   PONV: No   Neuro/Psych: Uneventful            Sign Out: Acceptable/Baseline neuro status   Airway/Respiratory: Uneventful            Sign Out: Acceptable/Baseline resp. status   CV/Hemodynamics: Uneventful            Sign Out: Acceptable CV status; No obvious hypovolemia; No obvious fluid overload   Other NRE: NONE   DID A NON-ROUTINE EVENT OCCUR? No           Last vitals:  Vitals Value Taken Time   /65 12/24/21 0925   Temp     Pulse 65 12/24/21 0928   Resp 7 12/24/21 0928   SpO2 90 % 12/24/21 0928   Vitals shown include unvalidated device data.    Electronically Signed By: Ja Mckeon MD  December 24, 2021  9:28 AM

## 2021-12-24 NOTE — PROGRESS NOTES
[facility]  Hospitalist Progress Note  Jackie Gregorio PA-C 12/24/2021    Reason for Stay: s/p ankle fracture          Assessment and Plan:        Andrea Collado is a 56 year old male with a PMH significant for bipolar disorder, polysubstance abuse currently sober and chronic hep c who presents with intractable right ankle pain after a fall.   X ray imaging reveals posterior malleolar fracture, asymmetric widening of the ankle, oblique fracture of the fibular proximal diaphysis with displacement and transverse fracture of the 2nd metatarsel proximal without displacement.   S/p ankle ORIF today       #Right ankle fracture s/p mechanical fall   #S/p ORIF right ankle 12/24 POD 0  --appreciate ortho assistance   --seen by PT, recommending home with outpt PT  --Cont ASA for DVT prophylaxis as per ortho   -- Post of ancef as per ortho protocol      #Hx of polysubstance abuse--currently sober  -Hx of abuse with methamphetamines, alcohol and opioids   -Last dose of Suboxone 2 days ago  -Pt states when he is in acute pain, he takes the prescribed narcotics and once the narcotics are done, he waits 24 hrs and resumes Suboxone BID  -Went through all his pain meds prescribed, pt is in agreement. Will make IV dilaudid and po ativan last dosing tomorrow at 6:00am.   -Cont stool regiment      #Biopolar disorder  -Admitted to IP psych 1/2021 for psychotic d/o, etoh d/o.   -follows with psychiatry and is reportedly stable on medications  -continue Wellbutrin, Risperidone, Depakote, Olanzapine, Trazadone, Melatonin and Hydroxyzine     #Hx of Chronic hep C  -Stable, no active issue      #Recent right shoulder cuff repair   - Stable    DVT Prophylaxis: ASA  Code Status: Full Code  Discharge Dispo: home   Estimated Disch Date / # of Days until Disch: likely tomorrow         Interval History (Subjective):      Doing well, feels a bit anxious but o/w pain ok.                   Physical Exam:      Last Vital Signs:  BP  "101/58 (BP Location: Left arm)   Pulse 68   Temp 98.6  F (37  C) (Oral)   Resp 16   Ht 1.803 m (5' 11\")   Wt 95.7 kg (211 lb)   SpO2 95%   BMI 29.43 kg/m        Constitutional: Awake, alert, cooperative, no apparent distress   Respiratory: Clear to auscultation bilaterally, no crackles or wheezing   Cardiovascular: Regular rate and rhythm, normal S1 and S2, and no murmur noted   Abdomen: Normal bowel sounds, soft, non-distended, non-tender   Skin: No rashes, no cyanosis, dry to touch   Neuro: Alert and oriented x3, no weakness, numbness, memory loss   Extremities: No edema, normal range of motion, right ankle in cast, cms intact   Other(s):        All other systems: Negative          Medications:      All current medications were reviewed with changes reflected in problem list.         Data:      All new lab and imaging data was reviewed.   Labs:       Lab Results   Component Value Date     12/24/2021     12/23/2021     12/01/2020     11/30/2020     08/02/2020     07/18/2020    Lab Results   Component Value Date    CHLORIDE 99 12/24/2021    CHLORIDE 101 12/23/2021    CHLORIDE 107 12/01/2020    CHLORIDE 104 11/30/2020    CHLORIDE 105 08/02/2020    CHLORIDE 107 07/18/2020    Lab Results   Component Value Date    BUN 20 12/24/2021    BUN 14 12/23/2021    BUN 16 12/01/2020    BUN 31 11/30/2020    BUN 18 08/02/2020    BUN 21 07/18/2020      Lab Results   Component Value Date    POTASSIUM 3.9 12/24/2021    POTASSIUM 4.4 12/23/2021    POTASSIUM 3.8 12/01/2020    POTASSIUM 4.0 11/30/2020    POTASSIUM 4.1 08/02/2020    POTASSIUM 4.1 07/18/2020    Lab Results   Component Value Date    CO2 26 12/24/2021    CO2 23 12/23/2021    CO2 24 12/01/2020    CO2 23 11/30/2020    CO2 20 08/02/2020    CO2 21 07/18/2020    Lab Results   Component Value Date    CR 0.86 12/24/2021    CR 0.81 12/23/2021    CR 0.82 12/01/2020    CR 1.33 11/30/2020    CR 0.78 08/02/2020    CR 1.21 07/18/2020    "     Recent Labs   Lab 12/24/21  0436 12/23/21  1308   WBC 5.1 6.6   HGB 11.8* 12.6*   HCT 36.4* 38.7*   MCV 90 90    214      Imaging:   Results for orders placed or performed during the hospital encounter of 12/23/21   Ankle XR, G/E 3 views, right    Narrative    RIGHT ANKLE THREE OR MORE VIEWS;  RIGHT FOOT THREE OR MORE VIEWS;  RIGHT TIBIA AND FIBULA TWO VIEWS    12/23/2021 11:46 AM     HISTORY:  Fall, ankle deformity noted.    FINDINGS:  Calcaneal spurring. Hallux valgus.      Impression    IMPRESSION:   1. Posterior malleolar fracture with 3 mm of articular surface  distraction.  2. Asymmetric widening of the ankle mortise medially.  3. Oblique fracture of the fibular proximal diaphysis with 5.5 mm of  displacement.  4. Transverse fracture of the second metatarsal proximally, without  displacement. No TMT dislocation.     YULISSA UNDERWOOD MD         SYSTEM ID:  [id number]   XR Tibia & Fibula Right 2 Views    Narrative    RIGHT ANKLE THREE OR MORE VIEWS;  RIGHT FOOT THREE OR MORE VIEWS;  RIGHT TIBIA AND FIBULA TWO VIEWS    12/23/2021 11:46 AM     HISTORY:  Fall, ankle deformity noted.    FINDINGS:  Calcaneal spurring. Hallux valgus.      Impression    IMPRESSION:   1. Posterior malleolar fracture with 3 mm of articular surface  distraction.  2. Asymmetric widening of the ankle mortise medially.  3. Oblique fracture of the fibular proximal diaphysis with 5.5 mm of  displacement.  4. Transverse fracture of the second metatarsal proximally, without  displacement. No TMT dislocation.     YULISSA UNDERWOOD MD         SYSTEM ID:  [email or web address]   XR Foot Right G/E 3 Views    Narrative    RIGHT ANKLE THREE OR MORE VIEWS;  RIGHT FOOT THREE OR MORE VIEWS;  RIGHT TIBIA AND FIBULA TWO VIEWS    12/23/2021 11:46 AM     HISTORY:  Fall, ankle deformity noted.    FINDINGS:  Calcaneal spurring. Hallux valgus.      Impression    IMPRESSION:   1. Posterior malleolar fracture with 3 mm of articular surface  distraction.  2. Asymmetric widening of  the ankle mortise medially.  3. Oblique fracture of the fibular proximal diaphysis with 5.5 mm of  displacement.  4. Transverse fracture of the second metatarsal proximally, without  displacement. No TMT dislocation.     YULISSA UNDERWOOD MD         SYSTEM ID:  [email or web address]   XR Surgery JAVIER L/T 5 Min Fluoro w Stills    Narrative    This exam was marked as non-reportable because it will not be read by a   radiologist or a [facility] non-radiologist provider.         XR Ankle Port Right G/E 3 Views    Narrative    ANKLE THREE VIEWS RIGHT  12/24/2021 9:30 AM     HISTORY: Status post ORIF right syndesmotic injury.    COMPARISON: 12/23/2021.      Impression    IMPRESSION: Postoperative changes related to interval syndesmotic  repair with lateral plate and screw fixation hardware.  Intact-appearing ankle mortise and distal syndesmosis. Previously seen  mild widening of the medial ankle clear space is no longer seen. No  significant change in the posterior malleolus fracture. Heel spur. New  splint material obscures fine bone detail.    SUNSHINE VÁZQUEZ MD         SYSTEM ID:  [id number]

## 2021-12-24 NOTE — OP NOTE
Orthopedic Surgery Operative Report    Patient:   Andrea Collado  MRN:   [id number]   :  1965  Facility: [facility]   Date:  21         PREOPERATIVE DIAGNOSIS:    Right ankle Maisonneuve injury  Minimally displaced right posterior malleolus fracture    POSTOPERATIVE DIAGNOSIS:    Right ankle Maisonneuve injury  Minimally displaced right posterior malleolus fracture    PROCEDURE PERFORMED:    1. Open reduction internal fixation right ankle syndesmotic injury    SURGEON:    Leroy Thomason MD    SURGICAL ASSISTANT:    DG Encarnacion     ANESTHESIA:  General    FINDINGS:    Maisonneuve injury present at the right ankle with mortise widening on lateral stress at the beginning of the case.  No associated distal fibula fracture.    COMPLICATIONS:     None apparent    SPECIMENS:    None    ESTIMATED BLOOD LOSS:  1 cc    IMPLANTS:    Synthes Fibulink syndesmosis repair system  Synthes 2 hole one third semitubular plate  One 3.5 mm x 60 mm cortical screw    INDICATION FOR OPERATION:  The patient is a 56-year-old male who sustained a twisting injury to the right ankle yesterday with immediate onset pain.  Imaging demonstrated a Maisonneuve injury with syndesmotic incompetence.  I discussed with him the risks benefits and alternatives of the above operation as a means to stabilize the syndesmosis.  After this discussion he did wish to proceed with the above.    DESCRIPTION OF PROCEDURE:    The patient was met in the preoperative holding area and the operative site was confirmed and marked.  We once again reviewed the risks, benefits, and alternatives of the operation and the patient wished to proceed with the surgery.    The patient was taken back to the operating room and induced under general anesthesia.  The patient was positioned supine with all bony prominences well padded.  The right ankle surgical site was prepped and draped in the usual standard fashion.      Stress view  was obtained prior to making incision and demonstrated lateral talar shifting with external rotation stress, demonstrating the Maisonneuve injury.  I marked out using fluoroscopy the ideal site for the incision along the posterior lateral aspect of the right fibula.  I then injected local anesthetic and made a skin incision over the lateral distal fibula.  I bluntly carried dissection down to the lateral aspect of the fibula, and then used a periosteal elevator to create a resting spot for my 2 hole plate.  I created a small accessory incision along the anteromedial aspect of the tibia and then placed a large articular reduction clamp with 1 raven on the anteromedial tibia and one raven on the posterior lateral fibula to reduce the syndesmosis.    Under fluoroscopic guidance I drilled for and placed the Synthes Fibulink syndesmotic repair system approximately 1 cm proximal to the joint line.  The system was placed with approximately 20 degrees of anterior angulation from the posterior lateral fibula to the anteromedial tibia to maintain the appropriate direction across the syndesmosis.  This device was placed and tensioned with the foot in dorsiflexion.  I then drilled for and placed one 3.5 mm x 60 mm cortical screw the proximal aspect of the 2 hole plates to maintain length of the fibula given the proximal fracture present.    I took final imaging including an external rotation stress view and confirmed stability of the talus on external rotation.    The operative site was irrigated and closure begun.  Deep fascia was closed with 2-0 Vicryl, subcutaneous tissues were closed with 2-0 Vicryl, skin was closed with 3-0 Monocryl.  Bandages and a short leg splint were applied.  The patient was allowed to emerge from anesthesia which occurred without incident.  He was transported to PACU in stable condition.        A surgical assistant was critical for this case to assist in retraction of the soft tissues and evacuating  blood from the surgical field to facilitate a safer operation with improved visualization and less time under anesthesia.     All sponge and needle counts were correct at case conclusion.      POSTOPERATIVE PLAN:  - Activity:   To to be up with crutches or ankle scooter if can maintain NWB RLE  -Weight Bearing Status:  NWB RLE x6 weeks  -Bracing:   Short leg splint x2 weeks, then NWB in CAM boot x4 weeks  -Antibiotics:     Ancef x24h  -Anticoagulation:   Aspirin 325 mg x4 weeks  -Pain control:    PO  -Dressing:    Splint to remain c/d/i until follow up in 2 weeks  -Diet:     ADAT  -Imaging:   XR R ankle 3 view in splint    -Disposition:    Pending PT evaluation; may need social work consult as patient lives alone and will have impaired mobility  -Follow up:     2 weeks in my clinic        Leroy Thomason MD

## 2021-12-24 NOTE — OR NURSING
"Pt had bladder scan for 584. Pt refused straight catheter. Pt attempted to void at  Bedside sitting. Pt unable to relax enough to void due to \"privacy\". Pt insists on voiding in BR in his private patient room.   "

## 2021-12-24 NOTE — ANESTHESIA CARE TRANSFER NOTE
Patient: Andrea Collado    Procedure: Procedure(s):  OPEN REDUCTION INTERNAL FIXATION, FRACTURE, ANKLE       Diagnosis: Ankle fracture [S82.897J]  Diagnosis Additional Information: No value filed.    Anesthesia Type:   General     Note:    Oropharynx: oropharynx clear of all foreign objects and spontaneously breathing  Level of Consciousness: drowsy  Oxygen Supplementation: face mask    Independent Airway: airway patency satisfactory and stable  Dentition: dentition unchanged  Vital Signs Stable: post-procedure vital signs reviewed and stable  Report to RN Given: handoff report given  Patient transferred to: PACU    Handoff Report: Identifed the Patient, Identified the Reponsible Provider, Reviewed the pertinent medical history, Discussed the surgical course, Reviewed Intra-OP anesthesia mangement and issues during anesthesia, Set expectations for post-procedure period and Allowed opportunity for questions and acknowledgement of understanding      Vitals:  Vitals Value Taken Time   BP     Temp     Pulse 66 12/24/21 0902   Resp 10 12/24/21 0902   SpO2 99 % 12/24/21 0902   Vitals shown include unvalidated device data.    Electronically Signed By: RAYMUNDO Bobo CRNA  December 24, 2021  9:04 AM

## 2021-12-24 NOTE — PROGRESS NOTES
12/24/21 1415   Quick Adds   Type of Visit Initial PT Evaluation   Living Environment   People in home alone   Current Living Arrangements apartment   Home Accessibility no concerns   Transportation Anticipated   (patient reports using [employer] for rides)   Self-Care   Usual Activity Tolerance good   Current Activity Tolerance moderate   Equipment Currently Used at Home none   Activity/Exercise/Self-Care Comment Patient independent with all mobility at baseline   Disability/Function   Fall history within last six months yes   Number of times patient has fallen within last six months 1   General Information   Onset of Illness/Injury or Date of Surgery 12/23/21   Referring Physician Leroy Thomason MD   Patient/Family Therapy Goals Statement (PT) return to home   Pertinent History of Current Problem (include personal factors and/or comorbidities that impact the POC) Per medical chart: 55 yo man with bipolar disorder, alcohol abuse history, tobacco abuse, hepatitis C now seen POD #0 s/p ORIF right ankle fracture due to mechanical fall.   Existing Precautions/Restrictions fall   Weight-Bearing Status - RLE nonweight-bearing   Cognition   Orientation Status (Cognition) oriented x 4   Affect/Mental Status (Cognition) anxious   Pain Assessment   Patient Currently in Pain Yes, see Vital Sign flowsheet  (8/10)   Integumentary/Edema   Integumentary/Edema Comments plaster splint/ ace bandage at right LE   Posture    Posture Not impaired   Range of Motion (ROM)   ROM Comment WFL (did not access right ankle ROM)   Strength   Strength Comments WFL (did not access right LE strength)   Bed Mobility   Comment (Bed Mobility) Independent   Transfers   Transfer Safety Comments  Patient performed transfers between sitting and standing using knee scooter with graded assist from CGA/verbal cueing to independent.    Gait/Stairs (Locomotion)   Comment (Gait/Stairs)  Patient negotiated 200 feet with knee scooter with graded assist from CGA to  SBA, able to demonstrate 360 turn, backing up to bed/chair surface.   Balance   Balance Comments Patient with no loss of balance during session using knee scooter   Clinical Impression   Criteria for Skilled Therapeutic Intervention yes, treatment indicated   PT Diagnosis (PT) decreased independence with mobility   Influenced by the following impairments NWB at right LE, pain, decreased activity tolerance   Functional limitations due to impairments pain, decreased activity tolerance   Clinical Presentation Stable/Uncomplicated   Clinical Presentation Rationale complex pmh, stable presentation, limited social support   Clinical Decision Making (Complexity) low complexity   Therapy Frequency (PT) Daily   Predicted Duration of Therapy Intervention (days/wks) 2 days   Planned Therapy Interventions (PT) gait training;patient/family education;transfer training;progressive activity/exercise   Anticipated Equipment Needs at Discharge (PT) tub bench  (knee scooter)   Risk & Benefits of therapy have been explained evaluation/treatment results reviewed;care plan/treatment goals reviewed;risks/benefits reviewed;current/potential barriers reviewed;participants voiced agreement with care plan;participants included;patient   PT Discharge Planning    PT Discharge Recommendation (DC Rec) home   PT Rationale for DC Rec Patient has demonstrated independence with bed mobility, transfers and ambulation with use of knee scooter.  Has information on how to purchase a bath bench.  Will received knee scooter through [facility] prior to discharge. No further inpatient physical therapy needs at this time.  Will keep on physical therapy schedule for check in tomorrow morning in case patient has further questions or equipment needs at discharge.

## 2021-12-24 NOTE — PLAN OF CARE
"Acute Traumatic Pain R/T Right Ankle Fx    1.  Pain controlled with oral analgesia: Yes      2.  Vital signs stable: Yes      3.  Diagnotic testing complete: Yes      4.  Cleared from consultants (if applicable): No      5. Return to near baseline physical activity: No  BP 98/50 (BP Location: Right arm)   Pulse 52   Temp 97.8  F (36.6  C) (Oral)   Resp 16   Ht 1.803 m (5' 11\")   Wt 96 kg (211 lb 11.2 oz)   SpO2 95%   BMI 29.53 kg/m    VSS on RA. AxOx4 and able to make needs known. Ambulating Ax1 w/ walker. Tolerating NPO status for orthopedic procedure tomorrow. PIV SL. Reporting 8-9/10 right ankle pain, PRN Oxy and Dilaudid administered w/ moderate relief. Ice applied, pt appears comfortable. Plan to maintain immobilization and NWB status of right ankle, administer PTA meds and analgesia as ordered (see MAR), while consulting Orthopedics, PT, and SW. ORIF scheduled for 0730 12/24. Pt is agreeable to plan and resting peacefully. Will continue to provide supportive cares.    Pt transported to PACU @ 0630  "

## 2021-12-24 NOTE — PLAN OF CARE
"Acute Traumatic Pain R/T Right Ankle Pain    1.  Pain controlled with oral analgesia: No      2.  Vital signs stable: Yes      3.  Diagnotic testing complete: Yes      4.  Cleared from consultants (if applicable): No      5. Return to near baseline physical activity: No  /66 (BP Location: Right arm)   Pulse 81   Temp 98.2  F (36.8  C) (Oral)   Resp 16   Ht 1.803 m (5' 11\")   Wt 96 kg (211 lb 11.2 oz)   SpO2 93%   BMI 29.53 kg/m    VSS on RA. AxOx4 and able to make needs known. Ambulating Ax1 w/ walker. Tolerating regular diet, now NPO for orthopedic procedure tomorrow. PIV SL. Reporting 8-9/10 right ankle pain, PRN Oxy and Dilaudid administered w/ minimal relief. Ice applied. PRN Ativan administer for anxiety. Plan to maintain immobilization and NWB status of right ankle, administer PTA meds and analgesia as ordered (see MAR), while consulting Orthopedics, PT, and SW. Pt is agreeable to plan and resting peacefully. Will continue to provide supportive cares.  "

## 2021-12-24 NOTE — PROGRESS NOTES
Imaging reviewed.    Maisonneuve fracture of right ankle, unstable fracture pattern.  Will require operative management.    As patient is admitted, will address as an inpatient tomorrow AM if medically cleared.    - NPO after 2400  - No chemical anticoagulation prior to OR      Leroy Thomason MD  [facility]

## 2021-12-24 NOTE — ANESTHESIA PROCEDURE NOTES
Airway       Patient location during procedure: OR       Procedure Start/Stop Times: 12/24/2021 7:46 AM  Staff -        CRNA: Farheen Barrett APRN CRNA       Performed By: CRNA  Consent for Airway        Urgency: elective  Indications and Patient Condition       Indications for airway management: tejinder-procedural       Induction type:intravenous       Mask difficulty assessment: 1 - vent by mask    Final Airway Details       Final airway type: supraglottic airway    Supraglottic Airway Details        Type: LMA       Brand: I-Gel       LMA size: 5    Post intubation assessment        Placement verified by: capnometry        Number of attempts at approach: 1       Secured with: plastic tape       Ease of procedure: easy       Dentition: Intact

## 2021-12-24 NOTE — ANESTHESIA PREPROCEDURE EVALUATION
Anesthesia Pre-Procedure Evaluation    Patient: Andrea Collado   MRN: [id number] : 1965        Preoperative Diagnosis: Ankle fracture [S82.899A]    Procedure : Procedure(s):  OPEN REDUCTION INTERNAL FIXATION, FRACTURE, ANKLE          Past Medical History:   Diagnosis Date     Chemical dependency (H)      Cocaine abuse (H)      Depressive disorder      DTs (delirium tremens) (H)      DVT (deep venous thrombosis) (H) 5 y ago    left foot, treated with coumadin     Flail chest     broken sterum, wiring      History of total hip arthroplasty     right     Hypertension      Seizures (H)      Substance abuse (H)     alcohol, opiates      Past Surgical History:   Procedure Laterality Date     CHEST SURGERY      flail chest, sterum fractured     HIP SURGERY       KNEE SURGERY       ORTHOPEDIC SURGERY      KIMBER right. Stephanie left shoulder surgery, debridement right shoulder, neck surgery- fracture cervical spine. 6 knee surgeries- bucket handle meniscal tear and debridement. 3 heel surgeries.      ORTHOPEDIC SURGERY        No Known Allergies   Social History     Tobacco Use     Smoking status: Current Every Day Smoker     Packs/day: 0.50     Years: 10.00     Pack years: 5.00     Types: Vaping Device     Last attempt to quit: 2019     Years since quittin.6     Smokeless tobacco: Never Used     Tobacco comment: Zane 2019   Substance Use Topics     Alcohol use: Not Currently     Alcohol/week: 6.0 standard drinks     Types: 24 Cans of beer per week     Comment: drinks 1.75L grain alcohol daily since 17      Wt Readings from Last 1 Encounters:   21 96 kg (211 lb 11.2 oz)        Anesthesia Evaluation   Pt has had prior anesthetic.         ROS/MED HX  ENT/Pulmonary:  - neg pulmonary ROS     Neurologic:     (+) seizures,     Cardiovascular:     (+) hypertension-----    METS/Exercise Tolerance:     Hematologic:     (+) History of blood clots,     Musculoskeletal:   (+) fracture, Fracture location: RLE,      GI/Hepatic:     (+) hepatitis type C,     Renal/Genitourinary:  - neg Renal ROS     Endo:  - neg endo ROS     Psychiatric/Substance Use:     (+) psychiatric history depression and bipolar Recreational drug usage: Cocaine and Other (Comment).    Infectious Disease:  - neg infectious disease ROS     Malignancy:       Other:            Physical Exam    Airway        Mallampati: II   TM distance: > 3 FB   Neck ROM: full   Mouth opening: > 3 cm    Respiratory Devices and Support         Dental           Cardiovascular   cardiovascular exam normal          Pulmonary   pulmonary exam normal                OUTSIDE LABS:  CBC:   Lab Results   Component Value Date    WBC 5.1 12/24/2021    WBC 6.6 12/23/2021    HGB 11.8 (L) 12/24/2021    HGB 12.6 (L) 12/23/2021    HCT 36.4 (L) 12/24/2021    HCT 38.7 (L) 12/23/2021     12/24/2021     12/23/2021     BMP:   Lab Results   Component Value Date     12/24/2021     12/23/2021    POTASSIUM 3.9 12/24/2021    POTASSIUM 4.4 12/23/2021    CHLORIDE 99 12/24/2021    CHLORIDE 101 12/23/2021    CO2 26 12/24/2021    CO2 23 12/23/2021    BUN 20 12/24/2021    BUN 14 12/23/2021    CR 0.86 12/24/2021    CR 0.81 12/23/2021     (H) 12/24/2021    GLC 88 12/23/2021     COAGS:   Lab Results   Component Value Date    PTT 27 12/23/2021    INR 1.02 12/23/2021     POC:   Lab Results   Component Value Date    BGM 71 12/01/2020     HEPATIC:   Lab Results   Component Value Date    ALBUMIN 3.4 12/01/2020    PROTTOTAL 7.3 12/01/2020    ALT 20 12/01/2020    AST 28 12/01/2020     (H) 02/18/2019    ALKPHOS 96 12/01/2020    BILITOTAL 0.7 12/01/2020     OTHER:   Lab Results   Component Value Date    LACT 1.8 11/20/2019    JOHANA 8.3 (L) 12/24/2021    PHOS 3.3 11/20/2019    MAG 1.8 08/02/2020    LIPASE 296 11/30/2020    TSH 1.30 04/04/2018       Anesthesia Plan    ASA Status:  3   NPO Status:  NPO Appropriate    Anesthesia Type: General.     - Airway: ETT   Induction:  Intravenous, Propofol.   Maintenance: Balanced.        Consents    Anesthesia Plan(s) and associated risks, benefits, and realistic alternatives discussed. Questions answered and patient/representative(s) expressed understanding.    - Discussed:     - Discussed with:  Patient         Postoperative Care    Pain management: IV analgesics.   PONV prophylaxis: Ondansetron (or other 5HT-3), Dexamethasone or Solumedrol     Comments:                Ja Mckeon MD

## 2021-12-24 NOTE — PLAN OF CARE
[facility]      OUTPATIENT PHYSICAL THERAPY EVALUATION  PLAN OF TREATMENT FOR OUTPATIENT REHABILITATION  (COMPLETE FOR INITIAL CLAIMS ONLY)  Patient's Last Name, First Name, M.I.  YOB: 1965  ZuleykayuAndrea CHENG                        Provider's Name  [facility] Medical Record No.  [id number]                               Onset Date:  12/23/21   Start of Care Date:      12/24/21   Type:     _X_PT   ___OT   ___SLP Medical Diagnosis:                      POD#0 s/p right ankle ORIF     PT Diagnosis:  decreased independence with mobility   Visits from SOC:  1   _________________________________________________________________________________  Plan of Treatment/Functional Goals    Planned Interventions: gait training,patient/family education,transfer training,progressive activity/exercise     Goals: See Physical Therapy Goals on Care Plan in [facility] electronic health record.    Therapy Frequency: Daily  Predicted Duration of Therapy Intervention: 2 days  _________________________________________________________________________________    I CERTIFY THE NEED FOR THESE SERVICES FURNISHED UNDER        THIS PLAN OF TREATMENT AND WHILE UNDER MY CARE     (Physician co-signature of this document indicates review and certification of the therapy plan).                 ,      Referring Physician: Leroy Thomason MD            Initial Assessment        See Physical Therapy evaluation dated   in Epic electronic health record.

## 2021-12-24 NOTE — CONSULTS
Orthopedic Surgery Consult / History and Physical    Andrea Collado MRN# [id number]   Age: 56 year old YOB: 1965     Date of Admission:   12/23/2021  Date of Consult: 12/24/21   Location:    [facility]             Assessment and Plan:   Assessment:  Unstable right ankle Maisonneuve injury with nondisplaced posterior malleolus fracture     Plan:  1. I discussed with the patient the risk benefits and alternatives of operative stabilization of his Maisonneuve injury.  After discussion, patient wishes to proceed with surgery this morning.  2. N.p.o. for OR.  3. Typical postop course includes discharged home same day; given the patient's lack of social support and inability to weight-bear on the right lower extremity for several weeks, will ask social work and physical therapy to determine appropriate discharge plan             Chief Complaint:   Right ankle pain           History of Present Illness:   This patient is a 56 year old male with a significant past medical history of bipolar disorder who presents with right ankle pain.    He sustained a mechanical twisting injury to the right ankle while at home, had immediate onset right ankle pain was unable to ambulate.  Eventually presented to the emergency department and evaluation demonstrated a Maisonneuve injury.  Patient denies any other sites of injury.     Symptom Profile  Location of symptoms:   Right ankle, to smaller degree right proximal fibula  Duration of symptoms:   1 day  Quality of symptoms:   Sharp, achy  Severity:   Severe  Exacerbating:    Movement and weightbearing of the right ankle  Alleviate:   Rest            Past Medical History:     Past Medical History:   Diagnosis Date     Chemical dependency (H)      Cocaine abuse (H)      Depressive disorder      DTs (delirium tremens) (H)      DVT (deep venous thrombosis) (H) 5 y ago    left foot, treated with coumadin     Flail chest     broken sterum, wiring       History of total hip arthroplasty     right     Hypertension      Seizures (H)      Substance abuse (H)     alcohol, opiates            Past Surgical History:     Past Surgical History:   Procedure Laterality Date     CHEST SURGERY      flail chest, sterum fractured     HIP SURGERY       KNEE SURGERY       ORTHOPEDIC SURGERY      KIMBER right. Stephanie left shoulder surgery, debridement right shoulder, neck surgery- fracture cervical spine. 6 knee surgeries- bucket handle meniscal tear and debridement. 3 heel surgeries.      ORTHOPEDIC SURGERY              Social History:   Smokin.5 PPD  Living situation:  alone  Ambulatory status:  independent    Social History     Tobacco Use     Smoking status: Current Every Day Smoker     Packs/day: 0.50     Years: 10.00     Pack years: 5.00     Types: Vaping Device     Last attempt to quit: 2019     Years since quittin.6     Smokeless tobacco: Never Used     Tobacco comment: Zane 2019   Substance Use Topics     Alcohol use: Not Currently     Alcohol/week: 6.0 standard drinks     Types: 24 Cans of beer per week     Comment: drinks 1.75L grain alcohol daily since 18            Family History:   Denies family history of bleeding or clotting disorders  Family History   Problem Relation Age of Onset     Substance Abuse Mother      Substance Abuse Father      Substance Abuse Sister             Allergies:   No Known Allergies         Medications:   Anticoagulants:  none  Medications Prior to Admission   Medication Sig Dispense Refill Last Dose     baclofen (LIORESAL) 10 MG tablet Take 10 mg by mouth At Bedtime   2021 at PM     buprenorphine HCl-naloxone HCl (SUBOXONE) 8-2 MG per film Place 1 Film under the tongue 2 times daily   2021 at 1500     buPROPion (WELLBUTRIN XL) 150 MG 24 hr tablet Take 150 mg by mouth every morning Takes with 300mg tablet for total dose = 450mg   2021 at AM     buPROPion (WELLBUTRIN XL) 300 MG 24 hr tablet Take 300 mg by  "mouth every morning Takes with 150mg tablet for total dose = 450mg   12/22/2021 at AM     divalproex sodium extended-release (DEPAKOTE ER) 500 MG 24 hr tablet Take 1,000 mg by mouth At Bedtime   12/22/2021 at PM     Ferrous Gluconate 324 (37.5 Fe) MG TABS Take 324 mg by mouth daily   12/22/2021 at AM     multivitamin w/minerals (THERA-VIT-M) tablet Take 1 tablet by mouth every evening   12/22/2021 at PM     OLANZapine (ZYPREXA) 10 MG tablet Take 10 mg by mouth At Bedtime   12/22/2021 at PM     pregabalin (LYRICA) 100 MG capsule Take 100 mg by mouth daily   HAS NOT STARTED at YET     testosterone cypionate (DEPOTESTOSTERONE) 200 MG/ML injection Inject 100 mg into the muscle every 14 days MONDAYS 12/20/2021 at AM     tiZANidine (ZANAFLEX) 4 MG tablet Take 4 mg by mouth At Bedtime   12/22/2021 at PM     traZODone (DESYREL) 100 MG tablet Take 100 mg by mouth At Bedtime   12/22/2021 at PM             Review of Systems:   A 10 point review of systems was performed, and was negative except as noted in HPI.         Physical Exam:     Patient Vitals for the past 8 hrs:   BP Temp Temp src Pulse Resp SpO2 Height Weight   12/24/21 0649 106/64 98.1  F (36.7  C) Temporal -- 16 93 % 1.803 m (5' 11\") 95.7 kg (211 lb)   12/24/21 0325 98/50 97.8  F (36.6  C) Oral 52 16 95 % -- --       General: awake, alert, cooperative, no apparent distress, appears stated age  HEENT: normal  Respiratory: breathing non-labored  Cardiovascular: skin warm and well perfused  Skin: no rashes or lesions  Abdomen:  non-distended  Lymph:  No abnormal swelling of uninjured extremities  Heme:  No petechiae  Neurological: CN II-XII grossly intact  Musculoskeletal:        RLE   Splint, dressings C/D/I   Motor: EHL, FHL, 5/5   SILT on SP, DP, and T nerve territories   Circulation: palpable DP, foot warm             Data:     Imaging:  XR right ankle: Maisonneuve injury with nondisplaced posterior malleolus fracture.  Mild widening of the syndesmosis " consistent with the injury.  Proximal fibula fracture.            Leroy Thomason MD  Orthopaedic Spine Surgery  [facility]

## 2021-12-24 NOTE — PROGRESS NOTES
ROOM # 208-2     Living Situation (if not independent, order SW consult): Home alone  Facility name:  : Friend - Mother No    Activity level at baseline: Ind  Activity level on admit: Ax1 walker      Patient registered to observation; given Patient Bill of Rights; given the opportunity to ask questions about observation status and their plan of care.  Patient has been oriented to the observation room, bathroom and call light is in place.    Discussed discharge goals and expectations with patient/family.

## 2021-12-24 NOTE — PLAN OF CARE
"Acute Traumatic Pain R/T Right Ankle Fx    1.  Pain controlled with oral analgesia: No      2.  Vital signs stable: Yes      3.  Diagnotic testing complete: Yes      4.  Cleared from consultants (if applicable): No      5. Return to near baseline physical activity: No, Ax1  /59 (BP Location: Right arm)   Pulse 85   Temp 97.8  F (36.6  C) (Oral)   Resp 16   Ht 1.803 m (5' 11\")   Wt 96 kg (211 lb 11.2 oz)   SpO2 94%   BMI 29.53 kg/m    VSS. AxOx4 and able to make needs known. Ambulating Ax1 w/ walker and NWB on RLE, bed alarm active. Tolerating regular diet. Reporting 8/10 right ankle pain, PRN Oxy administered w/ very little relief, PRN Dilaudid administered, will continue to monitor pain. Pt required one dose of PRN ativan for anxiety upon admission, moderate relief achieved. PIV SL. Pre-Operative ECG obtained. Plan to maintain immobilization and NWB status of right ankle, administer PTA meds and analgesia as ordered (see MAR), while consulting Orthopedics, PT, and SW. Pt is agreeable to plan and resting peacefully. Will continue to provide supportive cares.    "

## 2021-12-24 NOTE — PLAN OF CARE
Pt. Refused CAPNO, o2 sats 93% on room air, agreeable to keep continuous pulse ox in place. Nursing to continue to monitor and assess Pt.

## 2021-12-24 NOTE — UTILIZATION REVIEW
Admission Status; Secondary Review Determination       Under the authority of the Utilization Management Committee, the utilization review process indicated a secondary review on the above patient. The review outcome is based on review of the medical records, discussions with staff, and applying clinical experience noted on the date of the review.     (x) Inpatient Status Appropriate - This patient's medical care is consistent with medical management for inpatient care and reasonable inpatient medical practice.     RATIONALE FOR DETERMINATION   57 yo man with bipolar disorder, alcohol abuse history, tobacco abuse, hepatitis C who had a fall resulting in right ankle pain. Ortho consult obtained for xray evidence of ankle fracture. To OR today for ORIF right ankle syndesmotic injury.  Still needing IV opiate pain coverage. Anticipate will need another 24-48 hours in the hospital and potentially will need TCU placement pending therapy consults.     Due to need for aggressive parenteral analgesia, inpatient hospitalization is recommended and reasonable.   Inpatient admission is appropriate based on the Medicare guidelines. Discussed with Sarah Crouch PA-C.    This document was produced using voice recognition software.    The information on this document is developed by the utilization review team in order for the business office to ensure compliance. This only denotes the appropriateness of proper admission status and does not reflect the quality of care rendered.   The definitions of Inpatient Status and Observation Status used in making the determination above are those provided in the CMS Coverage Manual, Chapter 1 and Chapter 6, section 70.4.     Sincerely,   Susie Kenney MD  Utilization Review  Physician Advisor  [facility].    57 yo man with bipolar disorder, alcohol abuse history, tobacco abuse, hepatitis C who had a fall resulting in right ankle pain. Ortho consult obtained for xray evidence  of ankle fracture. To OR today for ORIF right ankle syndesmotic injury.

## 2021-12-24 NOTE — PLAN OF CARE
"Acute Traumatic Pain R/T Right Ankle Pain    1.  Pain controlled with oral analgesia: No      2.  Vital signs stable: Yes      3.  Diagnotic testing complete: Yes      4.  Cleared from consultants (if applicable): No      5. Return to near baseline physical activity: No  /58 (BP Location: Right arm)   Pulse 77   Temp 98.3  F (36.8  C) (Oral)   Resp 16   Ht 1.803 m (5' 11\")   Wt 96 kg (211 lb 11.2 oz)   SpO2 95%   BMI 29.53 kg/m    VSS. AxOx4 and able to make needs known. Ambulating Ax1 w/ walker and NWB on RLE, bed alarm active. Tolerating regular diet. PRN Dilaudid administered for 7/10 Right ankle pain that was not covered by oral oxy, currently rating pain 5/10. PIV SL. Plan to maintain immobilization and NWB status of right ankle, administer PTA meds and analgesia as ordered (see MAR), while consulting Orthopedics, PT, and SW. Pt is agreeable to plan and resting peacefully. Will continue to provide supportive cares.       "

## 2021-12-24 NOTE — PLAN OF CARE
"*Care resumed for Pt. 1100 (Return from PACU) until 1930.    Pt. A&Ox4, VSS upon return from surgery and throughout shift. Pain has been primarily 8-9/10 since returning from surgery. Have been alternating Oxy, Dilaudid as well as Ativan for anxiety and pain mangment. CMS is intact, toes warm to touch, denies numbness and tingling upon admission-reproted some intermittent tingling later in the evening but resolved with elevation and ice. Unable to assess pulse r/t hard cast location. Pt. Is NWB-moving around in bed well. Able to stand A1 with pivot. PT assessed today for scooter-ordered and Pt. Will receive at discharge. PIV SL between post surgical Ancef cares. PT/SW and ortho surg following. Pt. Was on 1.5 L o2 upon admission now stable on room air. Refused CAPNO-in agreement with continious pulse ox overnight. Regular diet-tolerating well. Voiding appropriately-bowel sounds hypoactive-denies passing gas yet. Sling to RUE r/t recent shoulder repair, Pt. Reports he does not have to wear when in bed. Subaxone on hold while taking narcs. Pt. In agreement of plan for ativan and IV Dilaudid to get throughout night-will be discontinued at 6 AM tomorrow prior to discharge home. Nursing to continue to monitor and assess Pt. And provide supportive cares.     /58 (BP Location: Left arm)   Pulse 68   Temp 98.6  F (37  C) (Oral)   Resp 16   Ht 1.803 m (5' 11\")   Wt 95.7 kg (211 lb)   SpO2 95%   BMI 29.43 kg/m      "

## 2021-12-25 VITALS
SYSTOLIC BLOOD PRESSURE: 99 MMHG | TEMPERATURE: 97.9 F | OXYGEN SATURATION: 97 % | RESPIRATION RATE: 20 BRPM | HEIGHT: 71 IN | WEIGHT: 211 LBS | BODY MASS INDEX: 29.54 KG/M2 | DIASTOLIC BLOOD PRESSURE: 61 MMHG | HEART RATE: 64 BPM

## 2021-12-25 PROCEDURE — 250N000013 HC RX MED GY IP 250 OP 250 PS 637: Performed by: ORTHOPAEDIC SURGERY

## 2021-12-25 PROCEDURE — 250N000011 HC RX IP 250 OP 636: Performed by: PHYSICIAN ASSISTANT

## 2021-12-25 PROCEDURE — 250N000013 HC RX MED GY IP 250 OP 250 PS 637: Performed by: PHYSICIAN ASSISTANT

## 2021-12-25 PROCEDURE — 99207 PR CDG-CODE CATEGORY CHANGED: CPT | Performed by: PHYSICIAN ASSISTANT

## 2021-12-25 PROCEDURE — 99232 SBSQ HOSP IP/OBS MODERATE 35: CPT | Performed by: PHYSICIAN ASSISTANT

## 2021-12-25 PROCEDURE — 250N000011 HC RX IP 250 OP 636: Performed by: ORTHOPAEDIC SURGERY

## 2021-12-25 RX ORDER — HYDROMORPHONE HCL IN WATER/PF 6 MG/30 ML
0.2 PATIENT CONTROLLED ANALGESIA SYRINGE INTRAVENOUS
Status: DISCONTINUED | OUTPATIENT
Start: 2021-12-25 | End: 2021-12-25

## 2021-12-25 RX ORDER — POLYETHYLENE GLYCOL 3350 17 G/17G
17 POWDER, FOR SOLUTION ORAL DAILY
Qty: 30 PACKET | Refills: 0 | Status: SHIPPED | OUTPATIENT
Start: 2021-12-25 | End: 2021-12-25

## 2021-12-25 RX ORDER — HYDROMORPHONE HYDROCHLORIDE 1 MG/ML
0.3 INJECTION, SOLUTION INTRAMUSCULAR; INTRAVENOUS; SUBCUTANEOUS ONCE
Status: COMPLETED | OUTPATIENT
Start: 2021-12-25 | End: 2021-12-25

## 2021-12-25 RX ORDER — LORAZEPAM 0.5 MG/1
0.5 TABLET ORAL 2 TIMES DAILY PRN
Qty: 4 TABLET | Refills: 0 | Status: SHIPPED | OUTPATIENT
Start: 2021-12-25 | End: 2022-09-23

## 2021-12-25 RX ORDER — POLYETHYLENE GLYCOL 3350 17 G/17G
17 POWDER, FOR SOLUTION ORAL 2 TIMES DAILY
Qty: 510 G | Refills: 1 | Status: SHIPPED | OUTPATIENT
Start: 2021-12-25 | End: 2022-09-23

## 2021-12-25 RX ORDER — ASPIRIN 325 MG
325 TABLET ORAL DAILY
Qty: 30 TABLET | Refills: 0 | Status: SHIPPED | OUTPATIENT
Start: 2021-12-25 | End: 2024-02-15

## 2021-12-25 RX ORDER — OXYCODONE HYDROCHLORIDE 10 MG/1
5-10 TABLET ORAL EVERY 4 HOURS PRN
Qty: 40 TABLET | Refills: 0 | Status: ON HOLD | OUTPATIENT
Start: 2021-12-25 | End: 2022-07-13

## 2021-12-25 RX ORDER — ACETAMINOPHEN 325 MG/1
975 TABLET ORAL EVERY 8 HOURS
Qty: 45 TABLET | Refills: 0 | Status: SHIPPED | OUTPATIENT
Start: 2021-12-25 | End: 2021-12-25

## 2021-12-25 RX ORDER — AMOXICILLIN 250 MG
1-2 CAPSULE ORAL 2 TIMES DAILY
Qty: 40 TABLET | Refills: 0 | Status: SHIPPED | OUTPATIENT
Start: 2021-12-25 | End: 2022-09-23

## 2021-12-25 RX ORDER — IBUPROFEN 600 MG/1
600 TABLET, FILM COATED ORAL 4 TIMES DAILY
Qty: 45 TABLET | Refills: 0 | Status: ON HOLD | COMMUNITY
Start: 2021-12-25 | End: 2022-10-08

## 2021-12-25 RX ORDER — KETOROLAC TROMETHAMINE 15 MG/ML
15 INJECTION, SOLUTION INTRAMUSCULAR; INTRAVENOUS EVERY 6 HOURS PRN
Status: DISCONTINUED | OUTPATIENT
Start: 2021-12-25 | End: 2021-12-25 | Stop reason: HOSPADM

## 2021-12-25 RX ORDER — IBUPROFEN 600 MG/1
600 TABLET, FILM COATED ORAL EVERY 6 HOURS
Qty: 45 TABLET | Refills: 0 | Status: SHIPPED | OUTPATIENT
Start: 2021-12-25 | End: 2021-12-25

## 2021-12-25 RX ORDER — OXYCODONE HYDROCHLORIDE 5 MG/1
5 TABLET ORAL EVERY 4 HOURS PRN
Qty: 20 TABLET | Refills: 0 | Status: SHIPPED | OUTPATIENT
Start: 2021-12-25 | End: 2021-12-25

## 2021-12-25 RX ORDER — OXYCODONE HYDROCHLORIDE 5 MG/1
10 TABLET ORAL EVERY 4 HOURS PRN
Status: DISCONTINUED | OUTPATIENT
Start: 2021-12-25 | End: 2021-12-25 | Stop reason: HOSPADM

## 2021-12-25 RX ORDER — ACETAMINOPHEN 500 MG
1000 TABLET ORAL 3 TIMES DAILY
Status: ON HOLD | COMMUNITY
Start: 2021-12-25 | End: 2022-12-12

## 2021-12-25 RX ADMIN — CEFAZOLIN SODIUM 1 G: 1 INJECTION, SOLUTION INTRAVENOUS at 00:37

## 2021-12-25 RX ADMIN — BUPROPION HYDROCHLORIDE 150 MG: 150 TABLET, EXTENDED RELEASE ORAL at 08:59

## 2021-12-25 RX ADMIN — BUPROPION HYDROCHLORIDE 300 MG: 150 TABLET, EXTENDED RELEASE ORAL at 09:00

## 2021-12-25 RX ADMIN — ACETAMINOPHEN 975 MG: 325 TABLET, FILM COATED ORAL at 08:59

## 2021-12-25 RX ADMIN — HYDROMORPHONE HYDROCHLORIDE 0.3 MG: 1 INJECTION, SOLUTION INTRAMUSCULAR; INTRAVENOUS; SUBCUTANEOUS at 08:54

## 2021-12-25 RX ADMIN — ASPIRIN 325 MG ORAL TABLET 325 MG: 325 PILL ORAL at 08:58

## 2021-12-25 RX ADMIN — IBUPROFEN 600 MG: 600 TABLET ORAL at 08:59

## 2021-12-25 RX ADMIN — FERROUS GLUCONATE 324 MG: 324 TABLET ORAL at 08:59

## 2021-12-25 RX ADMIN — PREGABALIN 100 MG: 100 CAPSULE ORAL at 08:58

## 2021-12-25 RX ADMIN — IBUPROFEN 600 MG: 600 TABLET ORAL at 03:34

## 2021-12-25 RX ADMIN — POLYETHYLENE GLYCOL 3350 17 G: 17 POWDER, FOR SOLUTION ORAL at 08:59

## 2021-12-25 RX ADMIN — OXYCODONE HYDROCHLORIDE 10 MG: 5 TABLET ORAL at 08:59

## 2021-12-25 RX ADMIN — ACETAMINOPHEN 975 MG: 325 TABLET, FILM COATED ORAL at 00:36

## 2021-12-25 RX ADMIN — OMEPRAZOLE 20 MG: 20 CAPSULE, DELAYED RELEASE ORAL at 05:46

## 2021-12-25 RX ADMIN — OXYCODONE HYDROCHLORIDE 10 MG: 5 TABLET ORAL at 12:50

## 2021-12-25 RX ADMIN — HYDROMORPHONE HYDROCHLORIDE 0.2 MG: 0.2 INJECTION, SOLUTION INTRAMUSCULAR; INTRAVENOUS; SUBCUTANEOUS at 06:59

## 2021-12-25 RX ADMIN — OXYCODONE HYDROCHLORIDE 5 MG: 5 TABLET ORAL at 05:45

## 2021-12-25 RX ADMIN — HYDROMORPHONE HYDROCHLORIDE 0.2 MG: 0.2 INJECTION, SOLUTION INTRAMUSCULAR; INTRAVENOUS; SUBCUTANEOUS at 03:35

## 2021-12-25 ASSESSMENT — ACTIVITIES OF DAILY LIVING (ADL)
ADLS_ACUITY_SCORE: 9

## 2021-12-25 NOTE — PLAN OF CARE
Physical Therapy Discharge Summary    Reason for therapy discharge:    All goals and outcomes met, no further needs identified.    Progress towards therapy goal(s). See goals on Care Plan in [facility] electronic health record for goal details.  Goals met    Therapy recommendation(s):    **Patient not seen by discharging therapist on this date, note written based on previous treating therapist's notes and recommendations     Patient has demonstrated independence with bed mobility, transfers and ambulation with use of knee scooter.  Has information on how to purchase a bath bench.  Will received knee scooter [facility] prior to discharge. No further inpatient physical therapy needs at this time.

## 2021-12-25 NOTE — DISCHARGE SUMMARY
[facility]  Discharge Summary  Hospitalist    Date of Admission:  12/23/2021  Date of Discharge:  12/25/2021      Discharging Provider: Sarah Crouch PAC    Discharge Diagnoses   1. Mechanical fall with resulting unstable Maissonneuve ankle fracture and fracture of the second proximal metatarsal, s/p ORIF 12/24/2021 with Dr. Thomason of [facility].  Nonweightbearing RLE.  Discharging with a knee scooter.  Postoperative pain control complicated by history of polysubstance abuse and chronic Suboxone.  2. Polysubstance abuse (including methamphetamine and opiate abuse) and history of drug-seeking behaviors; managed with chronic Suboxone.  Patient reports he has a good plan in place to prevent overuse/misuse of opiates.  He will have a friend administer the narcotics.  Plan will be to transition backs to Suboxone as soon as able.  Prescription for Narcan provided as a safety measure.  Also discussed that patient is high risk for relapse and when to seek care.  3. Bipolar disorder with history of psychiatric decompensation requiring IP psych stay 1/2021  4. Alcohol abuse, reportedly in remission  5. Chronic hep C    Discharge Disposition     Discharged to home    Condition at Discharge:  Stable    History of Present Illness   Andrea Collado is a 56 year old male with bipolar disorder, polysubstance abuse in remission on chronic Suboxone, and chronic hep c who presented to [facility] ED on 12/23/2021, 48 hours after mechanical fall with with intractable right ankle pain.  Imaging revealed a right ankle Maisonneuve fracture and a transverse fracture of the 2nd metatarsel proximal without displacement.   Fracture felt to be unstable per orthopedics.  Patient underwent ORIF with [facility] on 12/24/2021.  He will be NWB RLE.  Follow-up has been set up.  Postoperative pain control complicated by chronic pain history and Suboxone use.  Patient reports that he stopped taking Suboxone after his fall and  believes it is currently out of his system.  He is requiring a higher dose than usual for narcotics to achieve pain relief which is not unexpected given that he is opioid tolerant.  Pain poorly controlled on 5 mg oxycodone every 4 hours.  Instead, will transition to oxycodone 5-15 every 4 hours as needed severe pain.  Recommended patient use 5-10 mg for pain control but can use up to 15 mg on a very limited basis for severe pain.  Opiates will be handled by a friend.  Patient understands that he is at risk for relapse.  He also understands how to transition back to Suboxone as he had to do this after a hip surgery a few years ago.  Oxycodone, very limited number of Ativan tablets, and bowel regiment will be provided at discharge.  Patient was also given a knee scooter to use at home.  Return precautions provided.  Additionally, provided patient with a prescription for Narcan as a safety measure.  Discussed that he has very high risk for relapse and for misuse.  Patient understands and voices that he is also at risk for using narcotics for emotional reasons.  Asked him to continue to be open and honest with his care team and loved ones.  He should obtain all further narcotics from either his orthopedist or from his pain team.  No driving if taking narcotics or sedatives.    Patient voiced understanding.    MISHA Villa      Code Status   Full Code       Primary Care Physician   Physician No Ref-Primary    Consultations This Hospital Stay   PHYSICAL THERAPY ADULT IP CONSULT  CARE MANAGEMENT / SOCIAL WORK IP CONSULT  ORTHOPEDIC SURGERY IP CONSULT    Time Spent on this Encounter   I, MISHA Villa, personally saw the patient today and spent greater than 30 minutes discharging this patient.    Allergies   No Known Allergies    Physical Exam   Temp: 97.9  F (36.6  C) Temp src: Oral BP: 99/61 Pulse: 64   Resp: 20 SpO2: 97 % O2 Device: None (Room air)    Vitals:    12/23/21 1628 12/24/21 0649   Weight: 96 kg  (211 lb 11.2 oz) 95.7 kg (211 lb)     Vital Signs with Ranges  Temp:  [96.6  F (35.9  C)-98.6  F (37  C)] 97.9  F (36.6  C)  Pulse:  [52-76] 64  Resp:  [16-20] 20  BP: ()/(56-62) 99/61  SpO2:  [92 %-97 %] 97 %  I/O last 3 completed shifts:  In: 700 [I.V.:700]  Out: 3960 [Urine:3950; Blood:10]      GENERAL:  Pleasant, cooperative, alert. Well developed, well nourished.  Appears very comfortable  HEENT: Normocephalic, atraumatic.  Extra occular mm intact.  Sclera clear. PERRL.    PULMONOLOGY: Clear to auscultation bilaterally  CARDIAC: Regular   ABDOMEN: Soft, nontender no.  MUSCULOSKELETAL:  Moving x 4 spontaneously with CMS intact x4.  RLE in a cast, elevated.  NEURO: Alert and oriented x3.  CN II-XII grossly intact and symmetric.  No ataxia or tremor.  Nonfocal.    Discharge Medications   Discharge Medication List as of 12/25/2021 12:33 PM      START taking these medications    Details   aspirin (ASA) 325 MG tablet Take 1 tablet (325 mg) by mouth daily, Disp-30 tablet, R-0, E-Prescribe      LORazepam (ATIVAN) 0.5 MG tablet Take 1 tablet (0.5 mg) by mouth 2 times daily as needed for anxiety . Do not take with narcotics or other sedating medications. Do not drive if taking this medication., Disp-4 tablet, R-0, E-Prescribe      magnesium hydroxide (MILK OF MAGNESIA) 400 MG/5ML suspension Take 30 mLs by mouth daily as needed for constipation, Disp-354 mL, R-0, E-Prescribe      naloxone (NARCAN) 4 MG/0.1ML nasal spray Spray 1 spray (4 mg) into one nostril alternating nostrils once as needed for opioid reversal every 2-3 minutes until assistance arrives, Disp-2 each, R-0, E-Prescribe      senna-docusate (SENOKOT-S/PERICOLACE) 8.6-50 MG tablet Take 1-2 tablets by mouth 2 times daily For constipation., Disp-40 tablet, R-0, E-Prescribe         CONTINUE these medications which have CHANGED    Details   acetaminophen (TYLENOL) 500 MG tablet Take 2 tablets (1,000 mg) by mouth 3 times daily, OTC      ibuprofen  (ADVIL/MOTRIN) 600 MG tablet Take 1 tablet (600 mg) by mouth 4 times daily, Disp-45 tablet, R-0, OTC      oxyCODONE (ROXICODONE) 10 MG tablet Take 0.5-1 tablets (5-10 mg) by mouth every 4 hours as needed for moderate to severe pain, Disp-40 tablet, R-0, E-Prescribe      polyethylene glycol (MIRALAX) 17 GM/Dose powder Take 17 g by mouth 2 times daily For constipation. Do not take if having loose stools., Disp-510 g, R-1, E-Prescribe         CONTINUE these medications which have NOT CHANGED    Details   baclofen (LIORESAL) 10 MG tablet Take 10 mg by mouth At Bedtime, Historical      buprenorphine HCl-naloxone HCl (SUBOXONE) 8-2 MG per film Place 1 Film under the tongue 2 times daily, Historical      !! buPROPion (WELLBUTRIN XL) 150 MG 24 hr tablet Take 150 mg by mouth every morning Takes with 300mg tablet for total dose = 450mg, Historical      !! buPROPion (WELLBUTRIN XL) 300 MG 24 hr tablet Take 300 mg by mouth every morning Takes with 150mg tablet for total dose = 450mg, Historical      divalproex sodium extended-release (DEPAKOTE ER) 500 MG 24 hr tablet Take 1,000 mg by mouth At Bedtime, Historical      Ferrous Gluconate 324 (37.5 Fe) MG TABS Take 324 mg by mouth daily, Historical      multivitamin w/minerals (THERA-VIT-M) tablet Take 1 tablet by mouth every evening, Historical      OLANZapine (ZYPREXA) 10 MG tablet Take 10 mg by mouth At Bedtime, Historical      pregabalin (LYRICA) 100 MG capsule Take 100 mg by mouth daily, Historical      tiZANidine (ZANAFLEX) 4 MG tablet Take 4 mg by mouth At Bedtime, Historical      traZODone (DESYREL) 100 MG tablet Take 100 mg by mouth At Bedtime, Historical       !! - Potential duplicate medications found. Please discuss with provider.      STOP taking these medications       testosterone cypionate (DEPOTESTOSTERONE) 200 MG/ML injection Comments:   Reason for Stopping:               Data   Most Recent 3 CBC's:  Recent Labs   Lab Test 12/24/21  0436 12/23/21  1308  12/01/20  2314   WBC 5.1 6.6 5.6   HGB 11.8* 12.6* 13.8   MCV 90 90 87    214 228      Most Recent 3 BMP's:  Recent Labs   Lab Test 12/24/21  0436 12/23/21  1308 12/01/20  2314    133 137   POTASSIUM 3.9 4.4 3.8   CHLORIDE 99 101 107   CO2 26 23 24   BUN 20 14 16   CR 0.86 0.81 0.82   ANIONGAP 8 9 6   JOHAAN 8.3* 8.0* 8.5   * 88 83     Most Recent 2 LFT's:  Recent Labs   Lab Test 12/01/20  2314 11/30/20 2028   AST 28 24   ALT 20 20   ALKPHOS 96 95   BILITOTAL 0.7 0.9     Most Recent INR's and Anticoagulation Dosing History:  Anticoagulation Dose History     Recent Dosing and Labs Latest Ref Rng & Units 10/3/2012 12/19/2018 12/23/2021    INR 0.85 - 1.15 0.91 0.95 1.02        Most Recent 3 Troponin's:  Recent Labs   Lab Test 07/18/20  2342 11/20/19  0000   TROPI <0.015 <0.015     Most Recent Cholesterol Panel:No lab results found.  Most Recent 6 Bacteria Isolates From Any Culture (See EPIC Reports for Culture Details):  Recent Labs   Lab Test 11/20/19 1959 11/20/19  1931 11/20/19  0217 11/20/19  0147 06/27/14  0615 06/27/14  0020   CULT No growth No growth No growth No growth <10,000 colonies/mL Alpha hemolytic Streptococcus  Susceptibility testing not routinely done  * 10,000 to 50,000 colonies/mL Mixed gram positive rajeev  Multiple species present, probable perineal contamination.  Susceptibility testing not routinely done       Most Recent TSH, T4 and A1c Labs:  Recent Labs   Lab Test 04/04/18  0310   TSH 1.30     Results for orders placed or performed during the hospital encounter of 12/23/21   Ankle XR, G/E 3 views, right    Narrative    RIGHT ANKLE THREE OR MORE VIEWS;  RIGHT FOOT THREE OR MORE VIEWS;  RIGHT TIBIA AND FIBULA TWO VIEWS    12/23/2021 11:46 AM     HISTORY:  Fall, ankle deformity noted.    FINDINGS:  Calcaneal spurring. Hallux valgus.      Impression    IMPRESSION:   1. Posterior malleolar fracture with 3 mm of articular surface  distraction.  2. Asymmetric widening of the ankle  mortise medially.  3. Oblique fracture of the fibular proximal diaphysis with 5.5 mm of  displacement.  4. Transverse fracture of the second metatarsal proximally, without  displacement. No TMT dislocation.     YULISSA UNDERWOOD MD         SYSTEM ID:  [id number]   XR Tibia & Fibula Right 2 Views    Narrative    RIGHT ANKLE THREE OR MORE VIEWS;  RIGHT FOOT THREE OR MORE VIEWS;  RIGHT TIBIA AND FIBULA TWO VIEWS    12/23/2021 11:46 AM     HISTORY:  Fall, ankle deformity noted.    FINDINGS:  Calcaneal spurring. Hallux valgus.      Impression    IMPRESSION:   1. Posterior malleolar fracture with 3 mm of articular surface  distraction.  2. Asymmetric widening of the ankle mortise medially.  3. Oblique fracture of the fibular proximal diaphysis with 5.5 mm of  displacement.  4. Transverse fracture of the second metatarsal proximally, without  displacement. No TMT dislocation.     YULISSA UNDERWOOD MD         SYSTEM ID:  [id number]   XR Foot Right G/E 3 Views    Narrative    RIGHT ANKLE THREE OR MORE VIEWS;  RIGHT FOOT THREE OR MORE VIEWS;  RIGHT TIBIA AND FIBULA TWO VIEWS    12/23/2021 11:46 AM     HISTORY:  Fall, ankle deformity noted.    FINDINGS:  Calcaneal spurring. Hallux valgus.      Impression    IMPRESSION:   1. Posterior malleolar fracture with 3 mm of articular surface  distraction.  2. Asymmetric widening of the ankle mortise medially.  3. Oblique fracture of the fibular proximal diaphysis with 5.5 mm of  displacement.  4. Transverse fracture of the second metatarsal proximally, without  displacement. No TMT dislocation.     YULISSA UNDERWOOD MD         SYSTEM ID:  [id number]   XR Surgery JAVIER L/T 5 Min Fluoro w Stills    Narrative    This exam was marked as non-reportable because it will not be read by a   radiologist or a [place] non-radiologist provider.         XR Ankle Port Right G/E 3 Views    Narrative    ANKLE THREE VIEWS RIGHT  12/24/2021 9:30 AM     HISTORY: Status post ORIF right syndesmotic injury.    COMPARISON: 12/23/2021.       Impression    IMPRESSION: Postoperative changes related to interval syndesmotic  repair with lateral plate and screw fixation hardware.  Intact-appearing ankle mortise and distal syndesmosis. Previously seen  mild widening of the medial ankle clear space is no longer seen. No  significant change in the posterior malleolus fracture. Heel spur. New  splint material obscures fine bone detail.    SUNSHINE VÁZQUEZ MD         SYSTEM ID:  [id number]       Discharge Orders      Reason for your hospital stay    Right ankle fracture with open reduction and internal fixation     Follow-up and recommended labs and tests     [facility] within 14 days  to evaluate after surgery. No follow up labs or test are needed prior to visit. At this visit x-rays will be taken, sutures/staples removed, and questions to be answered.  Bring any needed forms with to appointment.  Call for appointment: [phone]     Wound care and dressings    Instructions to care for your wound at home: ice to area for comfort, reinforce dressing as needed, and none needed.     Activity    Your activity upon discharge: ambulate in house and no driving while on narcotics or other sedating medications.    Non-weightbearing right leg.  Keep right leg elevated as much as possible for the first two weeks to allow surgical incisions to heal appropriately.  OK to use scooter to get around the house.  Please be very careful if going outside as you are at risk for slipping on ice.     Rolling Knee Walker Order for DME - ONLY FOR DME    DME Documentation:   Describe the reason for need to support medical necessity: NWB right LE.     I, the undersigned, certify that the above prescribed supplies are medically necessary for this patient and is both reasonable and necessary in reference to accepted standards of medical and necessary in reference to accepted standards of medical practice in the  treatment of this patient's condition and is not prescribed as a convenience.     Diet    Follow this diet upon discharge: Orders Placed This Encounter      Regular Diet Adult

## 2021-12-25 NOTE — PROGRESS NOTES
"PRIMARY DIAGNOSIS: R leg   OUTPATIENT/OBSERVATION GOALS TO BE MET BEFORE DISCHARGE:  1. ADLs back to baseline: No    2. Activity and level of assistance: NWB R leg, standby    3. Pain status: Improved but still requiring IV narcotics.    4. Return to near baseline physical activity: No     Discharge Planner Nurse   Safe discharge environment identified: No  Barriers to discharge: TBD       Entered by: Josefina Quezada 12/25/2021 2:52 AM     Please review provider order for any additional goals.   Nurse to notify provider when observation goals have been met and patient is ready for discharge.  Bradycardic at 52, recheck/changed pulse ox and patient was in 60's. Other vs stable. Pain moderate, dilaudid given x 2 for R leg pain. Dorsalis pulses +2 on palpation. R foot warm to touch with cap refill <3. No issues voiding GI/. Voided 600  and last BM was 12/22. Oxycodone 5 mg given for R leg pain 7/10 at 0545.        ./59 (BP Location: Left arm)   Pulse 62   Temp 97.5  F (36.4  C) (Oral)   Resp 20   Ht 1.803 m (5' 11\")   Wt 95.7 kg (211 lb)   SpO2 92%   BMI 29.43 kg/m    "

## 2021-12-25 NOTE — PLAN OF CARE
Patient's After Visit Summary was reviewed with patient.  Patient verbalized understanding of After Visit Summary, recommended follow up and was given an opportunity to ask questions.   Discharge medications sent home with patient/family: yes  Discharged with: self via cab    OBSERVATION patient END time: 5756

## 2021-12-25 NOTE — ANESTHESIA CARE TRANSFER NOTE
Patient: Andrea Collado    Procedure: * No procedures listed *       Diagnosis: * No pre-op diagnosis entered *  Diagnosis Additional Information: No value filed.    Anesthesia Type:   No value filed.     Note:  Anesthesia Care Transfer Notewriter  Vitals:  Vitals Value Taken Time   BP     Temp     Pulse     Resp     SpO2         Electronically Signed By: RAYMUNDO Long CRNA  December 24, 2021  9:15 PM

## 2021-12-25 NOTE — PLAN OF CARE
"/56 (BP Location: Left arm)   Pulse 76   Temp 97.8  F (36.6  C) (Oral)   Resp 20   Ht 1.803 m (5' 11\")   Wt 95.7 kg (211 lb)   SpO2 92%   BMI 29.43 kg/m    Neuro: a/o x4  Cardiac: WNL  Lungs: WNL  GI: WNL, bedside commode  : WNL, urinal  Pain: 7-8/10  IV: Saline locked  Meds: dilaudid, oxy, ancef, ativan  Diet: Reg  Activity: up to edge of bed, R leg non weight  bearing  Misc: PT/social work/ortho following. Leg wrapped on R side, toes warm, no numbness or tingling.  Plan: Possible discharge tomorrow. PT to give scooter before leaving. Will continue to monitor and provide cares.          "

## 2021-12-25 NOTE — DISCHARGE INSTRUCTIONS
Please contact [facility] at [phone] with any questions regarding the surgery, pain, healing, activity recommendations, etc.  There is a doctor on-call 24/7.      Number to the 2nd floor Unit is [phone].    Please stay safe with the narcotic medications.  Do not take narcotics with lorazepam.  Do not drive if taking either of these medications.  Literature suggests people with history of narcotic/alcohol abuse and underlying mental health disorders are very high for relapse and for overdose.  Please have a trusted friend or loved one act as the medication distributor.  Please take narcotics for severe pain, not for emotional reasons.  If you find you are mis-using oxycodone or having a hard time weaning off of it, please consult your pain team or mental health providers.

## 2021-12-25 NOTE — PROGRESS NOTES
Orthopedic Surgery  Andrea Collado  2021  Admit Date:  2021  POD 1 Day Post-Op  S/P Procedure(s):  OPEN REDUCTION INTERNAL FIXATION, FRACTURE, ANKLE    Patient resting comfortably in bed.    Pain controlled with IV meds, encouraged ice and elevation   Tolerating oral intake.    Denies nausea or vomiting  Denies chest pain or shortness of breath  No events overnight.     Alert and orient to person, place, and time.  Vital Sign Ranges  Temperature Temp  Av.6  F (36.4  C)  Min: 96.6  F (35.9  C)  Max: 98.6  F (37  C)   Blood pressure Systolic (24hrs), Av , Min:99 , Max:115        Diastolic (24hrs), Av, Min:56, Max:77      Pulse Pulse  Av.4  Min: 52  Max: 88   Respirations Resp  Av.7  Min: 8  Max: 25   Pulse oximetry SpO2  Av.2 %  Min: 92 %  Max: 97 %       Splint is clean, dry, and intact.   Able to move toes actively  Sensation intact in toes      Labs:  Recent Labs   Lab Test 21  0436 21  1308 20  2314   POTASSIUM 3.9 4.4 3.8     Recent Labs   Lab Test 21  0436 21  1308 20  2314   HGB 11.8* 12.6* 13.8     Recent Labs   Lab Test 21  1308 18  0600   INR 1.02 0.95     Recent Labs   Lab Test 21  0436 21  1308 20  2314    214 228       A/P  1. S/p right ankle ORIF   Continue ASA for DVT prophylaxis.     Mobilize with PT/OT    Non-WB right LE   Leave splint intact   Ice PRN   Encourage elevation of right LE.     Continue current pain regiment.    2. Disposition   Anticipate d/c to home based on pain control.    Rashida Can PA-C

## 2021-12-29 LAB
ATRIAL RATE - MUSE: 83 BPM
DIASTOLIC BLOOD PRESSURE - MUSE: NORMAL MMHG
INTERPRETATION ECG - MUSE: NORMAL
P AXIS - MUSE: 60 DEGREES
PR INTERVAL - MUSE: 176 MS
QRS DURATION - MUSE: 98 MS
QT - MUSE: 380 MS
QTC - MUSE: 446 MS
R AXIS - MUSE: -13 DEGREES
SYSTOLIC BLOOD PRESSURE - MUSE: NORMAL MMHG
T AXIS - MUSE: 42 DEGREES
VENTRICULAR RATE- MUSE: 83 BPM

## 2022-02-08 ENCOUNTER — HOSPITAL ENCOUNTER (EMERGENCY)
Facility: CLINIC | Age: 57
Discharge: HOME | End: 2022-02-08
Attending: EMERGENCY MEDICINE | Admitting: EMERGENCY MEDICINE
Payer: MEDICARE

## 2022-02-08 VITALS
RESPIRATION RATE: 18 BRPM | OXYGEN SATURATION: 100 % | WEIGHT: 201.5 LBS | BODY MASS INDEX: 28.1 KG/M2 | SYSTOLIC BLOOD PRESSURE: 104 MMHG | HEART RATE: 87 BPM | TEMPERATURE: 97.3 F | DIASTOLIC BLOOD PRESSURE: 68 MMHG

## 2022-02-08 DIAGNOSIS — K59.00 CONSTIPATION, UNSPECIFIED CONSTIPATION TYPE: ICD-10-CM

## 2022-02-08 PROCEDURE — 99283 EMERGENCY DEPT VISIT LOW MDM: CPT

## 2022-02-08 PROCEDURE — 250N000013 HC RX MED GY IP 250 OP 250 PS 637: Performed by: EMERGENCY MEDICINE

## 2022-02-08 RX ORDER — MAGNESIUM CARB/ALUMINUM HYDROX 105-160MG
296 TABLET,CHEWABLE ORAL ONCE
Qty: 296 ML | Refills: 0 | Status: SHIPPED | OUTPATIENT
Start: 2022-02-08 | End: 2022-02-08

## 2022-02-08 RX ORDER — MAGNESIUM CARB/ALUMINUM HYDROX 105-160MG
30 TABLET,CHEWABLE ORAL ONCE
Status: COMPLETED | OUTPATIENT
Start: 2022-02-08 | End: 2022-02-08

## 2022-02-08 RX ADMIN — MAGNESIUM CITRATE 30 ML: 1.75 LIQUID ORAL at 15:50

## 2022-02-08 RX ADMIN — MAGNESIUM CITRATE 286 ML: 1.75 LIQUID ORAL at 14:32

## 2022-02-08 ASSESSMENT — ENCOUNTER SYMPTOMS
CONSTIPATION: 1
ABDOMINAL PAIN: 0

## 2022-02-08 NOTE — ED PROVIDER NOTES
History   Chief Complaint:  Constipation       The history is provided by the patient.      Andrea Collado is a 56 year old male with history of polysubstance abuse and hypertension who presents with constipation. The patient reports that he has not had a normal bowel movement in months, stating that his stools are shaped like hard balls and are difficult to pass. He states that he does not feel normal, as if he is not digesting his food, but denies any abdominal cramping. He reports that the last time he was in the hospital he was very backed up, and was given an enema which helped tremendously. He mentions that he still takes the senna-docusate that he was prescribed after breaking his leg, as well as Miralax at night. He states that he eats healthy and is on a low-carbohydrate diet with lots of fruits and vegetables. He denies any family history of colon cancer and states that the last time he had a colonoscopy was approximately 15 years ago.       Review of Systems   Gastrointestinal: Positive for constipation. Negative for abdominal pain.   All other systems reviewed and are negative.      Allergies:  The patient has no known allergies.     Medications:  Aspirin 325 mg  Lioresal   Suboxone  Wellbutrin   Depakote er  Ferrous Gluconate  Ativan  Milk of magnesia  Narcan  Zyprexa   Roxicodone  Miralax  Lyrica   Senna-docusate   Zanaflex  Desyrel    Past Medical History:     Chemical dependency  Cocaine abuse  Depressive disorder  DTs  DVT  Flail chest  Hypertension   Seizures  Polysubstance abuse  EtOH abuse  Low testosterone   Psychosis   Bipolar affective disorder, current episode manic with psychotic symptoms  Anxiety  Alcohol withdrawal  Chronic pain disorder  Drug-seeking behavior  Suicidal ideation  Tobacco dependence  Altered mental status   Closed fracture of right ankle  Rotator cuff tear, non-traumatic, right  Schizoaffective disorder, bipolar type  Johnna-prosthetic fracture of femur following total hip  arthroplasty  Ataxia  Elevated LFTs  Chronic anemia  ADD (attention deficit disorder)  Heroin use disorder  Cocaine use disorder  Wernicke's encephalopathy with cerebellar manifestation  Methamphetamine abuse  IV drug user  Chronic hepatitis C without hepatic coma  Paranoia  Systemic inflammatory response syndrome (SIRS) due to non-infectious process without acute organ dysfunction  Varicose veins of bilateral lower extremities with other complications  Primary osteoarthritis of left knee  Intractable vomiting  Hypernatremia  Acute alcoholic pancreatitis  Sciatica of right side  Upper GI bleeding  Hepatic steatosis    Past Surgical History:    Chest surgery  Hip surgery  Knee surgery  ORIF ankle  Orthopedic surgery x2  Colonoscopy    Family History:    Mother: substance abuse  Father: substance abuse  Sister: substance abuse    Social History:  Presents unaccompanied      Physical Exam     Patient Vitals for the past 24 hrs:   BP Temp Temp src Pulse Resp SpO2 Weight   02/08/22 1640 104/68 -- -- 87 18 100 % --   02/08/22 1400 127/82 -- -- 77 20 99 % --   02/08/22 1310 114/81 97.3  F (36.3  C) Temporal 82 20 100 % 91.4 kg (201 lb 8 oz)       Physical Exam  General: Patient is alert, awake and interactive when I enter the room  Head: The scalp, face, and head appear normal  Eyes: Conjunctivae are normal  ENT: The nose is normal, Pinnae are normal, External acoustic canals are normal  Neck: Trachea midline  CV: Pulses are normal.   Resp: No respiratory distress   GI: No abdominal tenderness, guarding or rebound.  No significant distention.  Musc: Normal muscular tone, moving all extremities.  Skin: No rash or lesions noted  Neuro: Speech is normal and fluent. Face is symmetric.   Psych: Normal affect.  Appropriate interactions.    Emergency Department Course   Emergency Department Course:  Reviewed:  I reviewed nursing notes, vitals, past medical history and Care Everywhere    Assessments:  1401 I obtained history and  examined the patient as noted above.   1625 I rechecked the patient and explained findings.     Interventions:  Medications   Enema Compound (docusate/mag cit/mineral oil/NaPhos) SIMPLE (286 mLs Rectal Given 2/8/22 1432)   magnesium citrate solution 30 mL (30 mLs Oral Given 2/8/22 1550)     Disposition:  The patient was discharged to home.     Impression & Plan     Medical Decision Making:  Patient is an otherwise healthy 56-year-old gentleman who presents to the emergency department with concerns for constipation.  He has had issues with constipation before in the past and has been utilizing MiraLAX, Metamucil and Senokot at home without significant relief.  He has had to come into the emergency department before for enema with good relief.  On initial evaluation today he is hemodynamically stable with normal vital signs.  His abdominal exam is reassuring without any significant guarding, rebound or rigidity.  No indication for advanced imaging.  Patient underwent enema with good production of stool.  Patient will be able to be discharged home with course of magnesium citrate in addition to his normal bowel regime.  Recommended following closely with his primary care physician.  Discussed following up for colonoscopy as well.  Can return the emergency room with any new or worsening symptoms.    Diagnosis:    ICD-10-CM    1. Constipation, unspecified constipation type  K59.00        Discharge Medications:  Discharge Medication List as of 2/8/2022  4:30 PM      START taking these medications    Details   magnesium citrate 1.745 GM/30ML solution Take 296 mLs by mouth once for 1 dose, Disp-296 mL, R-0, Local Print             Scribe Disclosure:  I, Alison Harden, am serving as a scribe at 2:01 PM on 2/8/2022 to document services personally performed by Homar Carbone MD based on my observations and the provider's statements to me.            Homar Carbone MD  02/09/22 7390

## 2022-02-08 NOTE — ED TRIAGE NOTES
Constipation issues for about a month. Has had an enema before that helped. Denies pain and discomfort.

## 2022-02-19 ENCOUNTER — HEALTH MAINTENANCE LETTER (OUTPATIENT)
Age: 57
End: 2022-02-19

## 2022-04-19 ENCOUNTER — NURSE TRIAGE (OUTPATIENT)
Dept: NURSING | Facility: CLINIC | Age: 57
End: 2022-04-19
Payer: MEDICARE

## 2022-04-20 NOTE — TELEPHONE ENCOUNTER
"States he has a problem w/ drinking and has been drinking tonight. Says he wants to stop drinking alcohol but no programs will take him because he also takes suboxone. He is tearful. Says he has visible hand tremor now. Advised ED now. Pt agreed to go to ED w/ another adult driving.     Reason for Disposition    Feeling very shaky (i.e., visible tremors of hands)    Additional Information    Negative: Coma (e.g., not moving, not talking, not responding to stimuli)    Negative: Difficult to awaken or acting confused (e.g., disoriented, slurred speech)    Negative: Seeing, hearing, or feeling things that are not there (i.e., visual, auditory, or tactile hallucinations)    Negative: Slow, shallow and weak breathing    Negative: Seizure    Negative: Violent behavior, or threatening to physically hurt or kill someone    Negative: Patient attempted suicide    Negative: Threatening suicide    Negative: Sounds like a life-threatening emergency to the triager    Negative: Recent significant injury, see that guideline (e.g., head, neck, chest, abdominal or extremity  injury)    Negative: Substance abuse or dependence: question or problem related to    Negative: Depression is main problem or symptom (e.g., feelings of sadness or hopelessness)    Negative: SEVERE abdominal pain    Negative: [1] Constant abdominal pain AND [2] present > 2 hours    Negative: Blood in bowel movements (e.g., black, tarry or red blood)  (Exception: Blood on surface of BM with constipation)    Negative: [1] Vomiting AND [2] contains red blood or black (\"coffee ground\") material  (Exception: few red streaks in vomit that only happened once)    Negative: [1] Vomiting or dry heaves AND [2] occurring frequently (i.e., vomiting > 4 times in last 4 hours)    Protocols used: ALCOHOL ABUSE AND [id number]      "

## 2022-06-14 ENCOUNTER — HOSPITAL ENCOUNTER (EMERGENCY)
Facility: CLINIC | Age: 57
Discharge: HOME | End: 2022-06-14
Attending: EMERGENCY MEDICINE | Admitting: EMERGENCY MEDICINE
Payer: MEDICARE

## 2022-06-14 VITALS
SYSTOLIC BLOOD PRESSURE: 144 MMHG | RESPIRATION RATE: 18 BRPM | HEART RATE: 75 BPM | DIASTOLIC BLOOD PRESSURE: 93 MMHG | WEIGHT: 213.85 LBS | TEMPERATURE: 97.5 F | BODY MASS INDEX: 29.83 KG/M2 | OXYGEN SATURATION: 98 %

## 2022-06-14 DIAGNOSIS — K14.6 TONGUE SORE: ICD-10-CM

## 2022-06-14 PROCEDURE — 99283 EMERGENCY DEPT VISIT LOW MDM: CPT

## 2022-06-14 RX ORDER — DIPHENHYDRAMINE HYDROCHLORIDE AND LIDOCAINE HYDROCHLORIDE AND ALUMINUM HYDROXIDE AND MAGNESIUM HYDRO
5-10 KIT EVERY 6 HOURS PRN
Qty: 80 ML | Refills: 0 | Status: ON HOLD | OUTPATIENT
Start: 2022-06-14 | End: 2022-10-08

## 2022-06-14 RX ORDER — DIPHENHYDRAMINE HYDROCHLORIDE AND LIDOCAINE HYDROCHLORIDE AND ALUMINUM HYDROXIDE AND MAGNESIUM HYDRO
5-10 KIT EVERY 6 HOURS PRN
Qty: 80 ML | Refills: 0 | Status: SHIPPED | OUTPATIENT
Start: 2022-06-14 | End: 2022-06-14

## 2022-06-14 ASSESSMENT — ENCOUNTER SYMPTOMS
NECK PAIN: 0
FACIAL SWELLING: 0
FEVER: 0
SHORTNESS OF BREATH: 0

## 2022-06-14 NOTE — ED PROVIDER NOTES
History     Chief Complaint:  tongue pain      HPI   Andrea Collado is a 56 year old male with a history of chronic pain, drug seeking behavior, polysubstance abuse, ETOH dependence, depression and bipolar disorder who presents to the emergency department for evaluation of tongue pain.  The patient states he was lying in bed tonight when he experienced a 10-second episode of intense pain on the right side of his tongue.  He has had a sore there for about 3 weeks.  He noted after the pain episode the sore was bleeding for a bit.  He has no history of herpes simplex virus.  He denies any fever, facial swelling, drooling. No numbness/tingling, facial droop or neurologic symptoms. He does mention he has a history of canker sores.  The patient states he drank 3 beers tonight and also vapes.  No drug use.    Review of Systems   Constitutional: Negative for fever.   HENT: Positive for mouth sores. Negative for drooling and facial swelling.         Tongue pain   Respiratory: Negative for shortness of breath.    Musculoskeletal: Negative for neck pain.   Skin: Negative for rash.   All other systems reviewed and are negative.    Allergies:  No Known Allergies    Medications:    acetaminophen (TYLENOL) 500 MG tablet  aspirin (ASA) 325 MG tablet  baclofen (LIORESAL) 10 MG tablet  buprenorphine HCl-naloxone HCl (SUBOXONE) 8-2 MG per film  buPROPion (WELLBUTRIN XL) 150 MG 24 hr tablet  buPROPion (WELLBUTRIN XL) 300 MG 24 hr tablet  divalproex sodium extended-release (DEPAKOTE ER) 500 MG 24 hr tablet  Ferrous Gluconate 324 (37.5 Fe) MG TABS  ibuprofen (ADVIL/MOTRIN) 600 MG tablet  LORazepam (ATIVAN) 0.5 MG tablet  magnesium hydroxide (MILK OF MAGNESIA) 400 MG/5ML suspension  multivitamin w/minerals (THERA-VIT-M) tablet  naloxone (NARCAN) 4 MG/0.1ML nasal spray  OLANZapine (ZYPREXA) 10 MG tablet  oxyCODONE (ROXICODONE) 10 MG tablet  polyethylene glycol (MIRALAX) 17 GM/Dose powder  pregabalin (LYRICA) 100 MG capsule  senna-docusate  (SENOKOT-S/PERICOLACE) 8.6-50 MG tablet  tiZANidine (ZANAFLEX) 4 MG tablet  traZODone (DESYREL) 100 MG tablet    Past Medical History:    Chemical dependency  Cocaine abuse  Depression  ETOH use  Seizures  DTs  DVT  Falls  AMS  Drug seeking behavior  Tobacco dependence  Testosterone deficiency  Bipolar  Psychosis    Past Surgical History:    Past Surgical History:   Procedure Laterality Date     CHEST SURGERY  1987    flail chest, sterum fractured     HIP SURGERY       KNEE SURGERY       OPEN REDUCTION INTERNAL FIXATION ANKLE Right 12/24/2021    Procedure: Open reduction internal fixation right ankle syndesmotic injury;  Surgeon: Leroy Thomason MD;  Location: [facility] OR     ORTHOPEDIC SURGERY      KIMBER right. Stephanie left shoulder surgery, debridement right shoulder, neck surgery- fracture cervical spine. 6 knee surgeries- bucket handle meniscal tear and debridement. 3 heel surgeries.      ORTHOPEDIC SURGERY       Family History:    Family History   Problem Relation Age of Onset     Substance Abuse Mother      Substance Abuse Father      Substance Abuse Sister        Social History:  Retired     Vapes  Drinks ETOH regularly  The patient presents to the ED alone    Physical Exam     Patient Vitals for the past 24 hrs:   BP Temp Temp src Pulse Resp SpO2 Weight   06/14/22 0023 (!) 144/93 97.5  F (36.4  C) Temporal 75 18 96 % 97 kg (213 lb 13.5 oz)       Physical Exam  General: Friendly middle aged male sitting calmly on [facility].  HENT: Patient wearing face mask. When taken off, mucous membranes appear moist. No facial swelling. No drooling. Patient has a small ulcer on the lateral right tongue. No active bleeding. No purulent drainage.  Eyes: Conjunctive and sclera clear.  CV: Regular rate and rhythm. Normal S1, S2. No appreciable murmurs, gallops or rubs.  Resp: Normal respiratory effort. Speaks in full sentences. No stridor or cough observed.  GI: Obese abdomen.   MSK: Moves all extremities without  difficulty.   Skin: Warm and dry. No rashes.  Neuro: Awake, alert, oriented x 3. Cranial nerves grossly intact.  Psych: Cooperative on exam.    Emergency Department Course     Emergency Department Course:  Reviewed:  I reviewed nursing notes, vitals and past medical history    Assessments:  0035 I obtained history and examined the patient as noted above. Dr. Rodriguez evaluated the patient after I did.    0055    I rechecked the patient and explained findings.     Disposition:  The patient was discharged to home. He had a safe ride from a friend.    Impression & Plan      Medical Decision Making:  Andrea Collado is a 56-year-old male who presented tonight to the emergency department for evaluation of tongue pain per HPI above.  On arrival, the patient was afebrile with unremarkable vital signs.  No drooling, facial swelling or shortness of breath.  No numbness or tingling or neurologic signs to be concerned for stroke.  On exam, he appears to have an aphthous ulcer on the right lateral aspect of his tongue.  I prescribed him Magic mouthwash for this which he will swish and swallow every 6 hours as needed.  I did provide him a referral to ENT to follow-up if this sore does not resolve.  I explained that a history of alcohol use, vaping and tobacco use puts him at risk for oral cancer and that it is important to follow-up and have this lesion rechecked if it does not go away.  He expressed understanding.  He was discharged home with a safe ride from a friend.    Diagnosis:    ICD-10-CM    1. Tongue sore  K14.6        Discharge Medications:  Current Discharge Medication List      START taking these medications    Details   magic mouthwash suspension, diphenhydrAMINE, lidocaine, aluminum-magnesium & simethicone, (FIRST-MOUTHWASH BLM) compounding kit Swish and swallow 5-10 mLs in mouth every 6 hours as needed for mouth sores  Qty: 80 mL, Refills: 0             Whit TRIVEDI-T  I saw and evaluated this  patient under attending provider Dr. Rodriguez.       Whit Rosario PA-C  06/14/22 0100

## 2022-06-14 NOTE — DISCHARGE INSTRUCTIONS
I wrote you a prescription for Magic Mouthwash ([facility] on [address]) for symptomatic cares. If your mouth sore does not resolve, I placed a referral to [facility] for you to call for recheck.

## 2022-06-14 NOTE — ED TRIAGE NOTES
2330 sharp pain on right side of tongue followed by bleeding no gone lasted 10 sec     Triage Assessment     Row Name 06/14/22 0025       Triage Assessment (Adult)    Airway WDL WDL       Respiratory WDL    Respiratory WDL WDL       Skin Circulation/Temperature WDL    Skin Circulation/Temperature WDL WDL       Cardiac WDL    Cardiac WDL WDL       Peripheral/Neurovascular WDL    Peripheral Neurovascular WDL WDL       Cognitive/Neuro/Behavioral WDL    Cognitive/Neuro/Behavioral WDL WDL

## 2022-06-14 NOTE — ED PROVIDER NOTES
ED ATTENDING PHYSICIAN NOTE:   I evaluated this patient in conjunction with Whit Rosario PA-C  I have participated in the care of the patient and personally performed key elements of the history, exam, and medical decision making.      HPI:   Andrea Collado is a 56 year old male who presents with tongue pain. The patient reports that he had an onset of sharp pain to the right side of his tongue tonight at 2330. He states he had some associated bleeding. He denies biting his tongue or any trauma to tongue. The patient reports he vapes.  He states that he has experienced a lesion and was seen recently for the last 2 weeks but has never caused pain.     EXAM:     HENT:   Superficial ulceration to the right lateral border of the tongue without active bleeding      No additional areas of ulceration or intraoral lesions     No sublingual edema or trismus  Eyes:    Conjunctiva normal  Neck:    Supple, no meningismus.     CV:     Regular rate and rhythm.      No murmurs, rubs or gallops.       No  lower extremity edema.  PULM:    Clear to auscultation bilateral.       No respiratory distress.      Good air exchange.  MSK:     No gross deformity to all four extremities.   LYMPH:   No cervical lymphadenopathy.  NEURO:   Alert, good muscular tone, no atrophy.   Skin:    Warm, dry and intact.    Psych:    Mood is good and affect is appropriate.       MEDICAL DECISION MAKING/ASSESSMENT AND PLAN:     56-year-old male presented to the ED with an ulceration of the right lateral border of the tongue with acute onset of pain today which has spontaneously resolved.  There is a single ulceration with no additional intraoral findings.  This may represent simple aphthous ulcer but cannot rule out underlying malignancy.  Magic mouthwash as needed for discomfort.  Follow-up with ENT unless lesion completely resolves in the next 1 to 2 weeks.     DIAGNOSIS:     ICD-10-CM    1. Tongue sore  K14.6          DISPOSITION:   The patient was  discharged home.      Scribe Disclosure:  I, Catalina Boland, am serving as a scribe at 12:42 AM on 6/14/2022 to document services personally performed by Adarsh Rodriguez MD based on my observations and the provider's statements to me.      6/14/2022  [facility] EMERGENCY DEPT       Adarsh Rodriguez MD  06/14/22 0054

## 2022-07-11 ENCOUNTER — LAB (OUTPATIENT)
Dept: LAB | Facility: CLINIC | Age: 57
End: 2022-07-11
Payer: MEDICARE

## 2022-07-11 DIAGNOSIS — Z20.822 ENCOUNTER FOR LABORATORY TESTING FOR COVID-19 VIRUS: ICD-10-CM

## 2022-07-11 PROCEDURE — U0005 INFEC AGEN DETEC AMPLI PROBE: HCPCS

## 2022-07-12 LAB — SARS-COV-2 RNA RESP QL NAA+PROBE: NEGATIVE

## 2022-07-13 ENCOUNTER — ANESTHESIA EVENT (OUTPATIENT)
Dept: SURGERY | Facility: CLINIC | Age: 57
End: 2022-07-13
Payer: MEDICARE

## 2022-07-13 ENCOUNTER — HOSPITAL ENCOUNTER (OUTPATIENT)
Facility: CLINIC | Age: 57
Discharge: HOME | End: 2022-07-13
Attending: ORTHOPAEDIC SURGERY | Admitting: ORTHOPAEDIC SURGERY
Payer: MEDICARE

## 2022-07-13 ENCOUNTER — APPOINTMENT (OUTPATIENT)
Dept: GENERAL RADIOLOGY | Facility: CLINIC | Age: 57
End: 2022-07-13
Attending: ORTHOPAEDIC SURGERY
Payer: MEDICARE

## 2022-07-13 ENCOUNTER — ANESTHESIA (OUTPATIENT)
Dept: SURGERY | Facility: CLINIC | Age: 57
End: 2022-07-13
Payer: MEDICARE

## 2022-07-13 VITALS
OXYGEN SATURATION: 97 % | WEIGHT: 204.8 LBS | HEART RATE: 76 BPM | TEMPERATURE: 98 F | RESPIRATION RATE: 14 BRPM | DIASTOLIC BLOOD PRESSURE: 66 MMHG | HEIGHT: 71 IN | SYSTOLIC BLOOD PRESSURE: 117 MMHG | BODY MASS INDEX: 28.67 KG/M2

## 2022-07-13 DIAGNOSIS — S82.891A CLOSED FRACTURE OF RIGHT ANKLE, INITIAL ENCOUNTER: Primary | ICD-10-CM

## 2022-07-13 DIAGNOSIS — S92.324A CLOSED NONDISPLACED FRACTURE OF SECOND METATARSAL BONE OF RIGHT FOOT, INITIAL ENCOUNTER: ICD-10-CM

## 2022-07-13 PROCEDURE — 360N000082 HC SURGERY LEVEL 2 W/ FLUORO, PER MIN: Performed by: ORTHOPAEDIC SURGERY

## 2022-07-13 PROCEDURE — 710N000009 HC RECOVERY PHASE 1, LEVEL 1, PER MIN: Performed by: ORTHOPAEDIC SURGERY

## 2022-07-13 PROCEDURE — 250N000009 HC RX 250: Performed by: NURSE ANESTHETIST, CERTIFIED REGISTERED

## 2022-07-13 PROCEDURE — 250N000011 HC RX IP 250 OP 636: Performed by: NURSE ANESTHETIST, CERTIFIED REGISTERED

## 2022-07-13 PROCEDURE — 258N000003 HC RX IP 258 OP 636: Performed by: ANESTHESIOLOGY

## 2022-07-13 PROCEDURE — 250N000011 HC RX IP 250 OP 636: Performed by: ORTHOPAEDIC SURGERY

## 2022-07-13 PROCEDURE — 250N000013 HC RX MED GY IP 250 OP 250 PS 637: Performed by: ANESTHESIOLOGY

## 2022-07-13 PROCEDURE — 999N000179 XR SURGERY CARM FLUORO LESS THAN 5 MIN W STILLS

## 2022-07-13 PROCEDURE — 250N000013 HC RX MED GY IP 250 OP 250 PS 637: Performed by: ORTHOPAEDIC SURGERY

## 2022-07-13 PROCEDURE — 271N000001 HC OR GENERAL SUPPLY NON-STERILE: Performed by: ORTHOPAEDIC SURGERY

## 2022-07-13 PROCEDURE — 272N000001 HC OR GENERAL SUPPLY STERILE: Performed by: ORTHOPAEDIC SURGERY

## 2022-07-13 PROCEDURE — 250N000025 HC SEVOFLURANE, PER MIN: Performed by: ORTHOPAEDIC SURGERY

## 2022-07-13 PROCEDURE — 250N000009 HC RX 250: Performed by: ORTHOPAEDIC SURGERY

## 2022-07-13 PROCEDURE — 370N000017 HC ANESTHESIA TECHNICAL FEE, PER MIN: Performed by: ORTHOPAEDIC SURGERY

## 2022-07-13 PROCEDURE — 999N000141 HC STATISTIC PRE-PROCEDURE NURSING ASSESSMENT: Performed by: ORTHOPAEDIC SURGERY

## 2022-07-13 PROCEDURE — 250N000011 HC RX IP 250 OP 636: Performed by: ANESTHESIOLOGY

## 2022-07-13 RX ORDER — OXYCODONE HYDROCHLORIDE 5 MG/1
5 TABLET ORAL EVERY 4 HOURS PRN
Status: DISCONTINUED | OUTPATIENT
Start: 2022-07-13 | End: 2022-07-13 | Stop reason: HOSPADM

## 2022-07-13 RX ORDER — ONDANSETRON 2 MG/ML
4 INJECTION INTRAMUSCULAR; INTRAVENOUS EVERY 30 MIN PRN
Status: DISCONTINUED | OUTPATIENT
Start: 2022-07-13 | End: 2022-07-13 | Stop reason: HOSPADM

## 2022-07-13 RX ORDER — FENTANYL CITRATE 0.05 MG/ML
25 INJECTION, SOLUTION INTRAMUSCULAR; INTRAVENOUS EVERY 5 MIN PRN
Status: DISCONTINUED | OUTPATIENT
Start: 2022-07-13 | End: 2022-07-13 | Stop reason: HOSPADM

## 2022-07-13 RX ORDER — FENTANYL CITRATE 0.05 MG/ML
50 INJECTION, SOLUTION INTRAMUSCULAR; INTRAVENOUS
Status: DISCONTINUED | OUTPATIENT
Start: 2022-07-13 | End: 2022-07-13 | Stop reason: HOSPADM

## 2022-07-13 RX ORDER — DEXMEDETOMIDINE HYDROCHLORIDE 4 UG/ML
INJECTION, SOLUTION INTRAVENOUS PRN
Status: DISCONTINUED | OUTPATIENT
Start: 2022-07-13 | End: 2022-07-13

## 2022-07-13 RX ORDER — ONDANSETRON 4 MG/1
4 TABLET, ORALLY DISINTEGRATING ORAL EVERY 30 MIN PRN
Status: DISCONTINUED | OUTPATIENT
Start: 2022-07-13 | End: 2022-07-13 | Stop reason: HOSPADM

## 2022-07-13 RX ORDER — FENTANYL CITRATE 50 UG/ML
INJECTION, SOLUTION INTRAMUSCULAR; INTRAVENOUS PRN
Status: DISCONTINUED | OUTPATIENT
Start: 2022-07-13 | End: 2022-07-13

## 2022-07-13 RX ORDER — SODIUM CHLORIDE, SODIUM LACTATE, POTASSIUM CHLORIDE, CALCIUM CHLORIDE 600; 310; 30; 20 MG/100ML; MG/100ML; MG/100ML; MG/100ML
INJECTION, SOLUTION INTRAVENOUS CONTINUOUS
Status: DISCONTINUED | OUTPATIENT
Start: 2022-07-13 | End: 2022-07-13 | Stop reason: HOSPADM

## 2022-07-13 RX ORDER — ONDANSETRON 2 MG/ML
INJECTION INTRAMUSCULAR; INTRAVENOUS PRN
Status: DISCONTINUED | OUTPATIENT
Start: 2022-07-13 | End: 2022-07-13

## 2022-07-13 RX ORDER — EPHEDRINE SULFATE 50 MG/ML
INJECTION, SOLUTION INTRAMUSCULAR; INTRAVENOUS; SUBCUTANEOUS PRN
Status: DISCONTINUED | OUTPATIENT
Start: 2022-07-13 | End: 2022-07-13

## 2022-07-13 RX ORDER — DEXAMETHASONE SODIUM PHOSPHATE 4 MG/ML
INJECTION, SOLUTION INTRA-ARTICULAR; INTRALESIONAL; INTRAMUSCULAR; INTRAVENOUS; SOFT TISSUE PRN
Status: DISCONTINUED | OUTPATIENT
Start: 2022-07-13 | End: 2022-07-13

## 2022-07-13 RX ORDER — HYDROXYZINE HYDROCHLORIDE 25 MG/1
25 TABLET, FILM COATED ORAL
Status: COMPLETED | OUTPATIENT
Start: 2022-07-13 | End: 2022-07-13

## 2022-07-13 RX ORDER — LIDOCAINE HYDROCHLORIDE 20 MG/ML
INJECTION, SOLUTION INFILTRATION; PERINEURAL PRN
Status: DISCONTINUED | OUTPATIENT
Start: 2022-07-13 | End: 2022-07-13

## 2022-07-13 RX ORDER — GLYCOPYRROLATE 0.2 MG/ML
INJECTION, SOLUTION INTRAMUSCULAR; INTRAVENOUS PRN
Status: DISCONTINUED | OUTPATIENT
Start: 2022-07-13 | End: 2022-07-13

## 2022-07-13 RX ORDER — MAGNESIUM HYDROXIDE 1200 MG/15ML
LIQUID ORAL PRN
Status: DISCONTINUED | OUTPATIENT
Start: 2022-07-13 | End: 2022-07-13 | Stop reason: HOSPADM

## 2022-07-13 RX ORDER — HYDROMORPHONE HCL IN WATER/PF 6 MG/30 ML
0.4 PATIENT CONTROLLED ANALGESIA SYRINGE INTRAVENOUS EVERY 5 MIN PRN
Status: DISCONTINUED | OUTPATIENT
Start: 2022-07-13 | End: 2022-07-13 | Stop reason: HOSPADM

## 2022-07-13 RX ORDER — MELOXICAM 15 MG/1
15 TABLET ORAL DAILY
Qty: 10 TABLET | Refills: 0 | Status: SHIPPED | OUTPATIENT
Start: 2022-07-13 | End: 2022-09-23

## 2022-07-13 RX ORDER — CEFAZOLIN SODIUM/WATER 2 G/20 ML
2 SYRINGE (ML) INTRAVENOUS
Status: COMPLETED | OUTPATIENT
Start: 2022-07-13 | End: 2022-07-13

## 2022-07-13 RX ORDER — CEFAZOLIN SODIUM/WATER 2 G/20 ML
2 SYRINGE (ML) INTRAVENOUS SEE ADMIN INSTRUCTIONS
Status: DISCONTINUED | OUTPATIENT
Start: 2022-07-13 | End: 2022-07-13 | Stop reason: HOSPADM

## 2022-07-13 RX ORDER — PROPOFOL 10 MG/ML
INJECTION, EMULSION INTRAVENOUS CONTINUOUS PRN
Status: DISCONTINUED | OUTPATIENT
Start: 2022-07-13 | End: 2022-07-13

## 2022-07-13 RX ORDER — BUPRENORPHINE AND NALOXONE 8; 2 MG/1; MG/1
1 FILM, SOLUBLE BUCCAL; SUBLINGUAL DAILY
Status: DISCONTINUED | OUTPATIENT
Start: 2022-07-13 | End: 2022-07-13

## 2022-07-13 RX ORDER — KETAMINE HYDROCHLORIDE 10 MG/ML
INJECTION INTRAMUSCULAR; INTRAVENOUS PRN
Status: DISCONTINUED | OUTPATIENT
Start: 2022-07-13 | End: 2022-07-13

## 2022-07-13 RX ORDER — OXYCODONE HCL 10 MG/1
10 TABLET, FILM COATED, EXTENDED RELEASE ORAL EVERY 12 HOURS
Status: COMPLETED | OUTPATIENT
Start: 2022-07-13 | End: 2022-07-13

## 2022-07-13 RX ORDER — ACETAMINOPHEN 325 MG/1
975 TABLET ORAL ONCE
Status: COMPLETED | OUTPATIENT
Start: 2022-07-13 | End: 2022-07-13

## 2022-07-13 RX ORDER — BUPIVACAINE HYDROCHLORIDE AND EPINEPHRINE 5; 5 MG/ML; UG/ML
INJECTION, SOLUTION PERINEURAL PRN
Status: DISCONTINUED | OUTPATIENT
Start: 2022-07-13 | End: 2022-07-13 | Stop reason: HOSPADM

## 2022-07-13 RX ORDER — PROPOFOL 10 MG/ML
INJECTION, EMULSION INTRAVENOUS PRN
Status: DISCONTINUED | OUTPATIENT
Start: 2022-07-13 | End: 2022-07-13

## 2022-07-13 RX ORDER — BUPIVACAINE HYDROCHLORIDE AND EPINEPHRINE 5; 5 MG/ML; UG/ML
INJECTION, SOLUTION EPIDURAL; INTRACAUDAL; PERINEURAL
Status: DISCONTINUED
Start: 2022-07-13 | End: 2022-07-13 | Stop reason: HOSPADM

## 2022-07-13 RX ADMIN — PROPOFOL 230 MG: 10 INJECTION, EMULSION INTRAVENOUS at 16:19

## 2022-07-13 RX ADMIN — DEXMEDETOMIDINE HYDROCHLORIDE 12 MCG: 200 INJECTION INTRAVENOUS at 16:21

## 2022-07-13 RX ADMIN — Medication 30 MG: at 16:21

## 2022-07-13 RX ADMIN — DEXAMETHASONE SODIUM PHOSPHATE 4 MG: 4 INJECTION, SOLUTION INTRA-ARTICULAR; INTRALESIONAL; INTRAMUSCULAR; INTRAVENOUS; SOFT TISSUE at 16:21

## 2022-07-13 RX ADMIN — ACETAMINOPHEN 975 MG: 325 TABLET ORAL at 13:11

## 2022-07-13 RX ADMIN — HYDROXYZINE HYDROCHLORIDE 25 MG: 25 TABLET, FILM COATED ORAL at 19:00

## 2022-07-13 RX ADMIN — PROPOFOL 50 MCG/KG/MIN: 10 INJECTION, EMULSION INTRAVENOUS at 16:19

## 2022-07-13 RX ADMIN — OXYCODONE HYDROCHLORIDE 5 MG: 5 TABLET ORAL at 18:56

## 2022-07-13 RX ADMIN — SODIUM CHLORIDE, POTASSIUM CHLORIDE, SODIUM LACTATE AND CALCIUM CHLORIDE: 600; 310; 30; 20 INJECTION, SOLUTION INTRAVENOUS at 16:49

## 2022-07-13 RX ADMIN — FENTANYL CITRATE 50 MCG: 50 INJECTION, SOLUTION INTRAMUSCULAR; INTRAVENOUS at 16:19

## 2022-07-13 RX ADMIN — ONDANSETRON 4 MG: 2 INJECTION INTRAMUSCULAR; INTRAVENOUS at 16:48

## 2022-07-13 RX ADMIN — OXYCODONE HYDROCHLORIDE 10 MG: 10 TABLET, FILM COATED, EXTENDED RELEASE ORAL at 13:11

## 2022-07-13 RX ADMIN — Medication 20 MG: at 16:32

## 2022-07-13 RX ADMIN — Medication 2 G: at 16:10

## 2022-07-13 RX ADMIN — FENTANYL CITRATE 25 MCG: 50 INJECTION, SOLUTION INTRAMUSCULAR; INTRAVENOUS at 18:35

## 2022-07-13 RX ADMIN — FENTANYL CITRATE 50 MCG: 50 INJECTION, SOLUTION INTRAMUSCULAR; INTRAVENOUS at 16:23

## 2022-07-13 RX ADMIN — MIDAZOLAM 2 MG: 1 INJECTION INTRAMUSCULAR; INTRAVENOUS at 16:06

## 2022-07-13 RX ADMIN — GLYCOPYRROLATE 0.2 MG: 0.2 INJECTION, SOLUTION INTRAMUSCULAR; INTRAVENOUS at 16:49

## 2022-07-13 RX ADMIN — SODIUM CHLORIDE, POTASSIUM CHLORIDE, SODIUM LACTATE AND CALCIUM CHLORIDE: 600; 310; 30; 20 INJECTION, SOLUTION INTRAVENOUS at 16:06

## 2022-07-13 RX ADMIN — MIDAZOLAM HYDROCHLORIDE 1 MG: 1 INJECTION, SOLUTION INTRAMUSCULAR; INTRAVENOUS at 15:40

## 2022-07-13 RX ADMIN — LIDOCAINE HYDROCHLORIDE 80 MG: 20 INJECTION, SOLUTION INFILTRATION; PERINEURAL at 16:19

## 2022-07-13 RX ADMIN — SODIUM CHLORIDE, POTASSIUM CHLORIDE, SODIUM LACTATE AND CALCIUM CHLORIDE: 600; 310; 30; 20 INJECTION, SOLUTION INTRAVENOUS at 13:17

## 2022-07-13 RX ADMIN — FENTANYL CITRATE 25 MCG: 50 INJECTION, SOLUTION INTRAMUSCULAR; INTRAVENOUS at 18:07

## 2022-07-13 RX ADMIN — MIDAZOLAM HYDROCHLORIDE 1 MG: 1 INJECTION, SOLUTION INTRAMUSCULAR; INTRAVENOUS at 15:56

## 2022-07-13 RX ADMIN — Medication 5 MG: at 16:42

## 2022-07-13 NOTE — DISCHARGE INSTRUCTIONS
Same Day Surgery Discharge Instructions for  Sedation and General Anesthesia     It's not unusual to feel dizzy, light-headed or faint for up to 24 hours after surgery or while taking pain medication.  If you have these symptoms: sit for a few minutes before standing and have someone assist you when you get up to walk or use the bathroom.    You should rest and relax for the next 24 hours. We recommend you make arrangements to have an adult stay with you for at least 24 hours after your discharge.  Avoid hazardous and strenuous activity.    DO NOT DRIVE any vehicle or operate mechanical equipment for 24 hours following the end of your surgery.  Even though you may feel normal, your reactions may be affected by the medication you have received.    Do not drink alcoholic beverages for 24 hours following surgery.     Slowly progress to your regular diet as you feel able. It's not unusual to feel nauseated and/or vomit after receiving anesthesia.  If you develop these symptoms, drink clear liquids (apple juice, ginger ale, broth, 7-up, etc. ) until you feel better.  If your nausea and vomiting persists for 24 hours, please notify your surgeon.      All narcotic pain medications, along with inactivity and anesthesia, can cause constipation. Drinking plenty of liquids and increasing fiber intake will help.    For any questions of a medical nature, call your surgeon.    Do not make important decisions for 24 hours.    If you had general anesthesia, you may have a sore throat for a couple of days related to the breathing tube used during surgery.  You may use Cepacol lozenges to help with this discomfort.  If it worsens or if you develop a fever, contact your surgeon.     If you feel your pain is not well managed with the pain medications prescribed by your surgeon, please contact your surgeon's office to let them know so they can address your concerns.       Reasons to contact your surgeon:    Signs of possible infection:  Check your incision daily for redness, swelling, warmth, red streaks or foul drainage.   Elevated temperature.  Pain not controlled with pain medication and/or rest.   Uncontrolled nausea or vomiting.  Any questions or concerns.      **If you have questions or concerns about your procedure, call   Dr. Thomason at [phone].**

## 2022-07-13 NOTE — ANESTHESIA PREPROCEDURE EVALUATION
Anesthesia Pre-Procedure Evaluation    Patient: Andrea Pham   MRN: [id number] : 1965        Procedure : Procedure(s):  removal of hardware of right ankle with possible syndemosis fixation          Past Medical History:   Diagnosis Date     Ataxia      Bipolar disorder (H)      Chemical dependency (H)      Chronic hip pain      Chronic pain syndrome      Cocaine abuse (H)      Depressive disorder      DTs (delirium tremens) (H)      DVT (deep venous thrombosis) (H) 5 y ago    left foot, treated with coumadin     Elevated LFTs      Flail chest     broken sterum, wiring      Hepatitis C virus infection, unspecified chronicity      Heroin abuse (H)      History of methamphetamine abuse (H)      History of total hip arthroplasty     right     Hypertension      Seizures (H)      Substance abuse (H)     alcohol, opiates     Wernicke's encephalopathy       Past Surgical History:   Procedure Laterality Date     CHEST SURGERY      flail chest, sterum fractured     HIP SURGERY       KNEE SURGERY       OPEN REDUCTION INTERNAL FIXATION ANKLE Right 2021    Procedure: Open reduction internal fixation right ankle syndesmotic injury;  Surgeon: Leroy Thomason MD;  Location: [facility] OR     ORTHOPEDIC SURGERY      KIMBER right. Stephanie left shoulder surgery, debridement right shoulder, neck surgery- fracture cervical spine. 6 knee surgeries- bucket handle meniscal tear and debridement. 3 heel surgeries.      ORTHOPEDIC SURGERY        No Known Allergies   Social History     Tobacco Use     Smoking status: Current Every Day Smoker     Packs/day: 0.50     Years: 10.00     Pack years: 5.00     Types: Vaping Device     Last attempt to quit: 2019     Years since quitting: 3.2     Smokeless tobacco: Never Used     Tobacco comment: Quit 2019   Substance Use Topics     Alcohol use: Not Currently     Alcohol/week: 24.0 standard drinks     Types: 24 Cans of beer per week     Comment: drinks 1.75L grain alcohol daily since 17,  clean 11 months as of 7/7/2022      Wt Readings from Last 1 Encounters:   06/14/22 97 kg (213 lb 13.5 oz)        Anesthesia Evaluation   Pt has had prior anesthetic.     No history of anesthetic complications       ROS/MED HX  ENT/Pulmonary:    (-) sleep apnea   Neurologic:       Cardiovascular:     (+) hypertension-----    METS/Exercise Tolerance:     Hematologic:     (+) History of blood clots,     Musculoskeletal:       GI/Hepatic:     (+) hepatitis (resolved with treatment.  No cirrhosis per patient) type C,  (-) GERD   Renal/Genitourinary:       Endo:       Psychiatric/Substance Use:     (+) psychiatric history depression and bipolar H/O chronic opiod use  (on suboxone 8/2mg.  He stopped it 2 days ago). Recreational drug usage: Cocaine and Other (Comment).    Infectious Disease:       Malignancy:       Other:      (+) , H/O Chronic Pain,        Physical Exam    Airway        Mallampati: II   TM distance: > 3 FB   Neck ROM: full   Mouth opening: > 3 cm    Respiratory Devices and Support         Dental  no notable dental history     (+) missing      Cardiovascular   cardiovascular exam normal          Pulmonary   pulmonary exam normal                OUTSIDE LABS:  CBC:   Lab Results   Component Value Date    WBC 5.1 12/24/2021    WBC 6.6 12/23/2021    HGB 11.8 (L) 12/24/2021    HGB 12.6 (L) 12/23/2021    HCT 36.4 (L) 12/24/2021    HCT 38.7 (L) 12/23/2021     12/24/2021     12/23/2021     BMP:   Lab Results   Component Value Date     12/24/2021     12/23/2021    POTASSIUM 3.9 12/24/2021    POTASSIUM 4.4 12/23/2021    CHLORIDE 99 12/24/2021    CHLORIDE 101 12/23/2021    CO2 26 12/24/2021    CO2 23 12/23/2021    BUN 20 12/24/2021    BUN 14 12/23/2021    CR 0.86 12/24/2021    CR 0.81 12/23/2021     (H) 12/24/2021    GLC 88 12/23/2021     COAGS:   Lab Results   Component Value Date    PTT 27 12/23/2021    INR 1.02 12/23/2021     POC:   Lab Results   Component Value Date    BGM 71  12/01/2020     HEPATIC:   Lab Results   Component Value Date    ALBUMIN 3.4 12/01/2020    PROTTOTAL 7.3 12/01/2020    ALT 20 12/01/2020    AST 28 12/01/2020     (H) 02/18/2019    ALKPHOS 96 12/01/2020    BILITOTAL 0.7 12/01/2020     OTHER:   Lab Results   Component Value Date    LACT 1.8 11/20/2019    JOHANA 8.3 (L) 12/24/2021    PHOS 3.3 11/20/2019    MAG 1.8 08/02/2020    LIPASE 296 11/30/2020    TSH 1.30 04/04/2018       Anesthesia Plan    ASA Status:  2   NPO Status:  NPO Appropriate    Anesthesia Type: General.     - Airway: LMA   Induction: Intravenous.   Maintenance: Balanced.        Consents    Anesthesia Plan(s) and associated risks, benefits, and realistic alternatives discussed. Questions answered and patient/representative(s) expressed understanding.    - Discussed:     - Discussed with:  Patient         Postoperative Care    Pain management: IV analgesics, Multi-modal analgesia.   PONV prophylaxis: Ondansetron (or other 5HT-3), Dexamethasone or Solumedrol, Background Propofol Infusion     Comments:    Other Comments: H&P reviewed    Offered the patient a PNB, but he chose not to given a concern for nerve injury.  He stopped his suboxone 2 days ago and feels like he is withdrawing a little.      Oxycontin 10mg preop  Ketamine 60mg IV in divided doses in the OR  Surgeon to infiltrate would            Clayton Ramachandran MD

## 2022-07-13 NOTE — OP NOTE
Orthopedic Surgery Operative Report    Patient:   Andrea Pham  MRN:   [id number]   :  1965  Facility: [facility]   Date:  22         PREOPERATIVE DIAGNOSIS:    Symptomatic retained instrumentation right ankle    POSTOPERATIVE DIAGNOSIS:    Symptomatic retained instrumentation right ankle    PROCEDURE PERFORMED:    Removal of deep instrumentation and syndesmotic fixation device from the right ankle    SURGEON:    Leroy Thomason MD    SURGICAL ASSISTANT:    DG Encarnacion    ANESTHESIA:  General    FINDINGS:    Stable syndesmosis on external rotation stress following implant removal    COMPLICATIONS:     None    SPECIMENS:    None    ESTIMATED BLOOD LOSS:  10 cc    IMPLANTS:    None    INDICATION FOR OPERATION:  The patient is a 56 year old male 7 months ago underwent open duction internal fixation of a right leg Maisonneuve injury with syndesmotic fixation and a plate screw construct.  He recovered well from this, retain his normal activities but felt that his right ankle felt restricted and that he believed it was due to the retained instrumentation.  I discussed with him the risks, benefits, and alternatives of the above operation and he wished to proceed.    DESCRIPTION OF PROCEDURE:    The patient was met in the preoperative holding area and the operative site was confirmed and marked.  We once again reviewed the risks, benefits, and alternatives of the operation and the patient wished to proceed with the surgery.    The patient was taken back to the operating room and induced under general anesthesia.  The patient was positioned supine with all bony prominences well padded.  The surgical site was prepped and draped in the usual standard fashion.    I opened the patient's prior incision and carried dissection down to the plate.  I removed the proximal screw lateral portion through this incision, and then used the removal instrumentation for the syndesmotic  fixation was advised to remove that as well.  The plate subsequently came off easily.  I then used fluoroscopy to tate out the ideal site for a medial incision to access the medial portion of the broken screw.  I made this incision and then bluntly spread down to the anteromedial tibia.  The screw prominence was evident at the site, and I was able to place a needle  on it and remove it without incident.  I took another x-ray and confirmed that all instrumentation been removed.    I closed the deep fascia with 0 Vicryl, subcutaneous tissues were closed with 2-0 Vicryl.  Skin was closed with 3-0 Monocryl.  Skin glue and soft dressing were applied.      A surgical assistant was critical for this case to assist in retraction of the soft tissues and evacuating blood from the surgical field to facilitate a safer operation with improved visualization and less time under anesthesia.     All sponge and needle counts were correct at case conclusion.      POSTOPERATIVE PLAN:    -Activity:    Up with assist  -Weight Bearing Status:  WBAT   -Bracing:   None  -Dressing:    May remove dressing POD 3, then ok to shower and incision may get wet.  -Diet:     ADAT      Home from PACU  Follow up 2 weeks          Leroy Thomason MD

## 2022-07-13 NOTE — ANESTHESIA CARE TRANSFER NOTE
Patient: Andrea Pham    Procedure: Procedure(s):  removal of hardware of right ankle       Diagnosis: Pain due to hip joint prosthesis (H) [T84.84XA, Z96.649]  Diagnosis Additional Information: No value filed.    Anesthesia Type:   General     Note:    Oropharynx: oropharynx clear of all foreign objects and spontaneously breathing  Level of Consciousness: drowsy  Oxygen Supplementation: face mask  Level of Supplemental Oxygen (L/min / FiO2): 6  Independent Airway: airway patency satisfactory and stable  Dentition: dentition unchanged  Vital Signs Stable: post-procedure vital signs reviewed and stable  Report to RN Given: handoff report given  Patient transferred to: PACU  Comments: At end of procedure, spontaneous respirations, adequate tidal volumes, followed commands to voice, LMA removed atraumatically, oropharynx suctioned, airway patent after LMA removal. Oxygen via facemask at 6 liters per minute to PACU. Oxygen tubing connected to wall O2 in PACU, SpO2, NiBP, and EKG monitors and alarms on and functioning, Jacki Hugger warmer connected to patient gown, report on patient's clinical status given to PACU RN, RN questions answered.  Handoff Report: Identifed the Patient, Identified the Reponsible Provider, Reviewed the pertinent medical history, Discussed the surgical course, Reviewed Intra-OP anesthesia mangement and issues during anesthesia, Set expectations for post-procedure period and Allowed opportunity for questions and acknowledgement of understanding      Vitals:  Vitals Value Taken Time   BP     Temp     Pulse     Resp     SpO2         Electronically Signed By: RAYMUNDO Smith CRNA  July 13, 2022  5:07 PM

## 2022-07-13 NOTE — ANESTHESIA PROCEDURE NOTES
Airway       Patient location during procedure: OR  Staff -        Anesthesiologist:  Sara Mcfadden       CRNA: Dwight Reza APRN CRNA       Other Anesthesia Staff: Josefina Ha       Performed By: SRNA  Consent for Airway        Urgency: elective  Indications and Patient Condition       Indications for airway management: tejinder-procedural         Mask difficulty assessment: 0 - not attempted    Final Airway Details       Final airway type: supraglottic airway    Supraglottic Airway Details        Type: LMA       Brand: LMA Unique    Post intubation assessment        Placement verified by: capnometry, equal breath sounds and chest rise        Number of attempts at approach: 1       Secured with: plastic tape       Ease of procedure: easy       Dentition: Intact and Unchanged

## 2022-07-14 NOTE — OR NURSING
Discharge instructions reviewed with patient and friend Brenda, questions answered. Sent home with discharge meds and belongings . IV removed. Vitals stable.

## 2022-07-14 NOTE — ANESTHESIA POSTPROCEDURE EVALUATION
Patient: Andrea Pham    Procedure: Procedure(s):  removal of hardware of right ankle       Anesthesia Type:  General    Note:     Postop Pain Control: Uneventful            Sign Out: Well controlled pain   PONV: No   Neuro/Psych: Uneventful            Sign Out: Acceptable/Baseline neuro status   Airway/Respiratory: Uneventful            Sign Out: Acceptable/Baseline resp. status   CV/Hemodynamics: Uneventful            Sign Out: Acceptable CV status; No obvious hypovolemia; No obvious fluid overload   Other NRE: NONE   DID A NON-ROUTINE EVENT OCCUR? No           Last vitals:  Vitals Value Taken Time   /66 07/13/22 1910   Temp 36.7  C (98  F) 07/13/22 1910   Pulse 75 07/13/22 1912   Resp 15 07/13/22 1912   SpO2 95 % 07/13/22 1912   Vitals shown include unvalidated device data.    Electronically Signed By: Jackie Ramachandran MD, MD  July 13, 2022  8:17 PM

## 2022-09-23 ENCOUNTER — HOSPITAL ENCOUNTER (INPATIENT)
Facility: CLINIC | Age: 57
LOS: 15 days | Discharge: HOME | DRG: 896 | End: 2022-10-08
Attending: EMERGENCY MEDICINE | Admitting: STUDENT IN AN ORGANIZED HEALTH CARE EDUCATION/TRAINING PROGRAM
Payer: MEDICARE

## 2022-09-23 DIAGNOSIS — R33.9 URINARY RETENTION: Primary | ICD-10-CM

## 2022-09-23 DIAGNOSIS — F10.930 ALCOHOL WITHDRAWAL SYNDROME WITHOUT COMPLICATION (H): ICD-10-CM

## 2022-09-23 DIAGNOSIS — E87.20 METABOLIC ACIDOSIS: ICD-10-CM

## 2022-09-23 DIAGNOSIS — E83.42 HYPOMAGNESEMIA: ICD-10-CM

## 2022-09-23 LAB
ALBUMIN SERPL BCG-MCNC: 4.6 G/DL (ref 3.5–5.2)
ALBUMIN UR-MCNC: 30 MG/DL
ALP SERPL-CCNC: 69 U/L (ref 40–129)
ALT SERPL W P-5'-P-CCNC: 129 U/L (ref 10–50)
AMPHETAMINES UR QL SCN: NORMAL
ANION GAP SERPL CALCULATED.3IONS-SCNC: 19 MMOL/L (ref 7–15)
ANION GAP SERPL CALCULATED.3IONS-SCNC: 27 MMOL/L (ref 7–15)
APPEARANCE UR: CLEAR
AST SERPL W P-5'-P-CCNC: 174 U/L (ref 10–50)
BARBITURATES UR QL SCN: NORMAL
BASE EXCESS BLDV CALC-SCNC: -2.8 MMOL/L (ref -7.7–1.9)
BASOPHILS # BLD AUTO: 0.1 10E3/UL (ref 0–0.2)
BASOPHILS NFR BLD AUTO: 2 %
BENZODIAZ UR QL SCN: NORMAL
BILIRUB SERPL-MCNC: 0.7 MG/DL
BILIRUB UR QL STRIP: NEGATIVE
BUN SERPL-MCNC: 10.7 MG/DL (ref 6–20)
BUN SERPL-MCNC: 11.6 MG/DL (ref 6–20)
BZE UR QL SCN: NORMAL
CALCIUM SERPL-MCNC: 10.1 MG/DL (ref 8.6–10)
CALCIUM SERPL-MCNC: 8.9 MG/DL (ref 8.6–10)
CANNABINOIDS UR QL SCN: NORMAL
CHLORIDE SERPL-SCNC: 96 MMOL/L (ref 98–107)
CHLORIDE SERPL-SCNC: 98 MMOL/L (ref 98–107)
COLOR UR AUTO: YELLOW
CREAT SERPL-MCNC: 0.81 MG/DL (ref 0.67–1.17)
CREAT SERPL-MCNC: 0.82 MG/DL (ref 0.67–1.17)
DEPRECATED HCO3 PLAS-SCNC: 15 MMOL/L (ref 22–29)
DEPRECATED HCO3 PLAS-SCNC: 20 MMOL/L (ref 22–29)
EOSINOPHIL # BLD AUTO: 0 10E3/UL (ref 0–0.7)
EOSINOPHIL NFR BLD AUTO: 0 %
ERYTHROCYTE [DISTWIDTH] IN BLOOD BY AUTOMATED COUNT: 14.6 % (ref 10–15)
ETHANOL SERPL-MCNC: 0.26 G/DL
GFR SERPL CREATININE-BSD FRML MDRD: >90 ML/MIN/1.73M2
GFR SERPL CREATININE-BSD FRML MDRD: >90 ML/MIN/1.73M2
GLUCOSE SERPL-MCNC: 241 MG/DL (ref 70–99)
GLUCOSE SERPL-MCNC: 99 MG/DL (ref 70–99)
GLUCOSE UR STRIP-MCNC: NEGATIVE MG/DL
HBA1C MFR BLD: 5 %
HCO3 BLDV-SCNC: 22 MMOL/L (ref 21–28)
HCT VFR BLD AUTO: 49.1 % (ref 40–53)
HGB BLD-MCNC: 16.8 G/DL (ref 13.3–17.7)
HGB UR QL STRIP: NEGATIVE
HYALINE CASTS: 6 /LPF
IMM GRANULOCYTES # BLD: 0 10E3/UL
IMM GRANULOCYTES NFR BLD: 0 %
KETONES BLD-SCNC: 1.9 MMOL/L (ref 0–0.6)
KETONES UR STRIP-MCNC: 20 MG/DL
LACTATE SERPL-SCNC: 4.5 MMOL/L (ref 0.7–2)
LEUKOCYTE ESTERASE UR QL STRIP: NEGATIVE
LYMPHOCYTES # BLD AUTO: 1.1 10E3/UL (ref 0.8–5.3)
LYMPHOCYTES NFR BLD AUTO: 25 %
MAGNESIUM SERPL-MCNC: 1.6 MG/DL (ref 1.7–2.3)
MCH RBC QN AUTO: 30.3 PG (ref 26.5–33)
MCHC RBC AUTO-ENTMCNC: 34.2 G/DL (ref 31.5–36.5)
MCV RBC AUTO: 89 FL (ref 78–100)
MONOCYTES # BLD AUTO: 0.5 10E3/UL (ref 0–1.3)
MONOCYTES NFR BLD AUTO: 11 %
NEUTROPHILS # BLD AUTO: 2.8 10E3/UL (ref 1.6–8.3)
NEUTROPHILS NFR BLD AUTO: 62 %
NITRATE UR QL: NEGATIVE
NRBC # BLD AUTO: 0 10E3/UL
NRBC BLD AUTO-RTO: 0 /100
O2/TOTAL GAS SETTING VFR VENT: 0 %
OPIATES UR QL SCN: NORMAL
PCO2 BLDV: 38 MM HG (ref 40–50)
PH BLDV: 7.37 [PH] (ref 7.32–7.43)
PH UR STRIP: 6 [PH] (ref 5–7)
PLATELET # BLD AUTO: 190 10E3/UL (ref 150–450)
PO2 BLDV: 72 MM HG (ref 25–47)
POTASSIUM SERPL-SCNC: 4.2 MMOL/L (ref 3.4–5.3)
POTASSIUM SERPL-SCNC: 4.3 MMOL/L (ref 3.4–5.3)
PROT SERPL-MCNC: 7.7 G/DL (ref 6.4–8.3)
RBC # BLD AUTO: 5.54 10E6/UL (ref 4.4–5.9)
RBC URINE: <1 /HPF
SARS-COV-2 RNA RESP QL NAA+PROBE: NEGATIVE
SODIUM SERPL-SCNC: 137 MMOL/L (ref 136–145)
SODIUM SERPL-SCNC: 138 MMOL/L (ref 136–145)
SP GR UR STRIP: 1.02 (ref 1–1.03)
SQUAMOUS EPITHELIAL: <1 /HPF
UROBILINOGEN UR STRIP-MCNC: NORMAL MG/DL
WBC # BLD AUTO: 4.6 10E3/UL (ref 4–11)
WBC URINE: 2 /HPF

## 2022-09-23 PROCEDURE — 82077 ASSAY SPEC XCP UR&BREATH IA: CPT | Performed by: EMERGENCY MEDICINE

## 2022-09-23 PROCEDURE — 99223 1ST HOSP IP/OBS HIGH 75: CPT | Mod: AI | Performed by: STUDENT IN AN ORGANIZED HEALTH CARE EDUCATION/TRAINING PROGRAM

## 2022-09-23 PROCEDURE — 250N000009 HC RX 250: Performed by: EMERGENCY MEDICINE

## 2022-09-23 PROCEDURE — 250N000011 HC RX IP 250 OP 636: Performed by: STUDENT IN AN ORGANIZED HEALTH CARE EDUCATION/TRAINING PROGRAM

## 2022-09-23 PROCEDURE — 36415 COLL VENOUS BLD VENIPUNCTURE: CPT | Performed by: EMERGENCY MEDICINE

## 2022-09-23 PROCEDURE — 80307 DRUG TEST PRSMV CHEM ANLYZR: CPT | Performed by: EMERGENCY MEDICINE

## 2022-09-23 PROCEDURE — 258N000003 HC RX IP 258 OP 636: Performed by: EMERGENCY MEDICINE

## 2022-09-23 PROCEDURE — 80053 COMPREHEN METABOLIC PANEL: CPT | Performed by: EMERGENCY MEDICINE

## 2022-09-23 PROCEDURE — C9803 HOPD COVID-19 SPEC COLLECT: HCPCS

## 2022-09-23 PROCEDURE — 82310 ASSAY OF CALCIUM: CPT | Performed by: EMERGENCY MEDICINE

## 2022-09-23 PROCEDURE — 99291 CRITICAL CARE FIRST HOUR: CPT | Mod: 25

## 2022-09-23 PROCEDURE — 83036 HEMOGLOBIN GLYCOSYLATED A1C: CPT | Performed by: STUDENT IN AN ORGANIZED HEALTH CARE EDUCATION/TRAINING PROGRAM

## 2022-09-23 PROCEDURE — 96374 THER/PROPH/DIAG INJ IV PUSH: CPT

## 2022-09-23 PROCEDURE — 96361 HYDRATE IV INFUSION ADD-ON: CPT

## 2022-09-23 PROCEDURE — 81001 URINALYSIS AUTO W/SCOPE: CPT | Performed by: EMERGENCY MEDICINE

## 2022-09-23 PROCEDURE — 96376 TX/PRO/DX INJ SAME DRUG ADON: CPT

## 2022-09-23 PROCEDURE — 250N000013 HC RX MED GY IP 250 OP 250 PS 637: Performed by: EMERGENCY MEDICINE

## 2022-09-23 PROCEDURE — 82075 ASSAY OF BREATH ETHANOL: CPT

## 2022-09-23 PROCEDURE — 83735 ASSAY OF MAGNESIUM: CPT | Performed by: EMERGENCY MEDICINE

## 2022-09-23 PROCEDURE — U0005 INFEC AGEN DETEC AMPLI PROBE: HCPCS | Performed by: EMERGENCY MEDICINE

## 2022-09-23 PROCEDURE — 82010 KETONE BODYS QUAN: CPT | Performed by: EMERGENCY MEDICINE

## 2022-09-23 PROCEDURE — HZ2ZZZZ DETOXIFICATION SERVICES FOR SUBSTANCE ABUSE TREATMENT: ICD-10-PCS | Performed by: STUDENT IN AN ORGANIZED HEALTH CARE EDUCATION/TRAINING PROGRAM

## 2022-09-23 PROCEDURE — 250N000011 HC RX IP 250 OP 636: Performed by: EMERGENCY MEDICINE

## 2022-09-23 PROCEDURE — 96375 TX/PRO/DX INJ NEW DRUG ADDON: CPT

## 2022-09-23 PROCEDURE — 82803 BLOOD GASES ANY COMBINATION: CPT | Performed by: EMERGENCY MEDICINE

## 2022-09-23 PROCEDURE — 83605 ASSAY OF LACTIC ACID: CPT | Performed by: EMERGENCY MEDICINE

## 2022-09-23 PROCEDURE — 85025 COMPLETE CBC W/AUTO DIFF WBC: CPT | Performed by: EMERGENCY MEDICINE

## 2022-09-23 PROCEDURE — 120N000001 HC R&B MED SURG/OB

## 2022-09-23 PROCEDURE — 93005 ELECTROCARDIOGRAM TRACING: CPT

## 2022-09-23 PROCEDURE — 250N000013 HC RX MED GY IP 250 OP 250 PS 637: Performed by: STUDENT IN AN ORGANIZED HEALTH CARE EDUCATION/TRAINING PROGRAM

## 2022-09-23 RX ORDER — PREGABALIN 100 MG/1
100 CAPSULE ORAL EVERY EVENING
Status: DISCONTINUED | OUTPATIENT
Start: 2022-09-23 | End: 2022-09-24

## 2022-09-23 RX ORDER — OLANZAPINE 10 MG/1
10 TABLET ORAL AT BEDTIME
Status: DISCONTINUED | OUTPATIENT
Start: 2022-09-23 | End: 2022-10-01

## 2022-09-23 RX ORDER — MULTIPLE VITAMINS W/ MINERALS TAB 9MG-400MCG
1 TAB ORAL DAILY
Status: DISCONTINUED | OUTPATIENT
Start: 2022-09-24 | End: 2022-09-23

## 2022-09-23 RX ORDER — ONDANSETRON 4 MG/1
4 TABLET, ORALLY DISINTEGRATING ORAL EVERY 6 HOURS PRN
Status: DISCONTINUED | OUTPATIENT
Start: 2022-09-23 | End: 2022-10-08 | Stop reason: HOSPADM

## 2022-09-23 RX ORDER — FLUMAZENIL 0.1 MG/ML
0.2 INJECTION, SOLUTION INTRAVENOUS
Status: DISCONTINUED | OUTPATIENT
Start: 2022-09-23 | End: 2022-09-27

## 2022-09-23 RX ORDER — AMOXICILLIN 250 MG
1 CAPSULE ORAL 2 TIMES DAILY PRN
Status: DISCONTINUED | OUTPATIENT
Start: 2022-09-23 | End: 2022-10-04

## 2022-09-23 RX ORDER — ATENOLOL 25 MG/1
25 TABLET ORAL ONCE
Status: COMPLETED | OUTPATIENT
Start: 2022-09-23 | End: 2022-09-23

## 2022-09-23 RX ORDER — DEXTROSE MONOHYDRATE 25 G/50ML
25-50 INJECTION, SOLUTION INTRAVENOUS
Status: DISCONTINUED | OUTPATIENT
Start: 2022-09-23 | End: 2022-10-08 | Stop reason: HOSPADM

## 2022-09-23 RX ORDER — FOLIC ACID 1 MG/1
1 TABLET ORAL DAILY
COMMUNITY

## 2022-09-23 RX ORDER — DIAZEPAM 5 MG
5 TABLET ORAL ONCE
Status: COMPLETED | OUTPATIENT
Start: 2022-09-23 | End: 2022-09-23

## 2022-09-23 RX ORDER — MULTIPLE VITAMINS W/ MINERALS TAB 9MG-400MCG
1 TAB ORAL EVERY EVENING
Status: DISCONTINUED | OUTPATIENT
Start: 2022-09-23 | End: 2022-09-27

## 2022-09-23 RX ORDER — ACETAMINOPHEN 325 MG/1
650 TABLET ORAL EVERY 6 HOURS PRN
Status: DISCONTINUED | OUTPATIENT
Start: 2022-09-23 | End: 2022-10-01

## 2022-09-23 RX ORDER — TRAZODONE HYDROCHLORIDE 100 MG/1
100 TABLET ORAL AT BEDTIME
Status: DISCONTINUED | OUTPATIENT
Start: 2022-09-23 | End: 2022-10-01

## 2022-09-23 RX ORDER — GABAPENTIN 100 MG/1
400 CAPSULE ORAL ONCE
Status: COMPLETED | OUTPATIENT
Start: 2022-09-23 | End: 2022-09-23

## 2022-09-23 RX ORDER — TESTOSTERONE CYPIONATE 200 MG/ML
200 INJECTION, SOLUTION INTRAMUSCULAR
COMMUNITY
End: 2022-12-12

## 2022-09-23 RX ORDER — SODIUM CHLORIDE 9 MG/ML
INJECTION, SOLUTION INTRAVENOUS CONTINUOUS
Status: DISCONTINUED | OUTPATIENT
Start: 2022-09-23 | End: 2022-09-29

## 2022-09-23 RX ORDER — AMOXICILLIN 250 MG
2 CAPSULE ORAL 2 TIMES DAILY PRN
Status: DISCONTINUED | OUTPATIENT
Start: 2022-09-23 | End: 2022-10-04

## 2022-09-23 RX ORDER — DIAZEPAM 10 MG
10 TABLET ORAL EVERY 30 MIN PRN
Status: DISCONTINUED | OUTPATIENT
Start: 2022-09-23 | End: 2022-09-27

## 2022-09-23 RX ORDER — LIDOCAINE 40 MG/G
CREAM TOPICAL
Status: DISCONTINUED | OUTPATIENT
Start: 2022-09-23 | End: 2022-09-27

## 2022-09-23 RX ORDER — OLANZAPINE 5 MG/1
5-10 TABLET, ORALLY DISINTEGRATING ORAL EVERY 6 HOURS PRN
Status: DISCONTINUED | OUTPATIENT
Start: 2022-09-23 | End: 2022-10-03

## 2022-09-23 RX ORDER — DIAZEPAM 10 MG/2ML
5-10 INJECTION, SOLUTION INTRAMUSCULAR; INTRAVENOUS EVERY 30 MIN PRN
Status: DISCONTINUED | OUTPATIENT
Start: 2022-09-23 | End: 2022-09-27

## 2022-09-23 RX ORDER — DIAZEPAM 10 MG/2ML
5 INJECTION, SOLUTION INTRAMUSCULAR; INTRAVENOUS ONCE
Status: COMPLETED | OUTPATIENT
Start: 2022-09-23 | End: 2022-09-23

## 2022-09-23 RX ORDER — BUPROPION HYDROCHLORIDE 150 MG/1
300 TABLET ORAL EVERY MORNING
Status: DISCONTINUED | OUTPATIENT
Start: 2022-09-23 | End: 2022-10-07

## 2022-09-23 RX ORDER — BUPROPION HYDROCHLORIDE 150 MG/1
150 TABLET ORAL EVERY MORNING
Status: DISCONTINUED | OUTPATIENT
Start: 2022-09-23 | End: 2022-10-08 | Stop reason: HOSPADM

## 2022-09-23 RX ORDER — ONDANSETRON 2 MG/ML
4 INJECTION INTRAMUSCULAR; INTRAVENOUS EVERY 6 HOURS PRN
Status: DISCONTINUED | OUTPATIENT
Start: 2022-09-23 | End: 2022-10-08 | Stop reason: HOSPADM

## 2022-09-23 RX ORDER — FOLIC ACID 1 MG/1
1 TABLET ORAL DAILY
Status: DISCONTINUED | OUTPATIENT
Start: 2022-09-24 | End: 2022-09-23

## 2022-09-23 RX ORDER — HALOPERIDOL 5 MG/ML
2.5-5 INJECTION INTRAMUSCULAR EVERY 6 HOURS PRN
Status: DISCONTINUED | OUTPATIENT
Start: 2022-09-23 | End: 2022-10-03

## 2022-09-23 RX ORDER — DIVALPROEX SODIUM 500 MG/1
1000 TABLET, EXTENDED RELEASE ORAL AT BEDTIME
Status: DISCONTINUED | OUTPATIENT
Start: 2022-09-23 | End: 2022-09-27

## 2022-09-23 RX ORDER — FOLIC ACID 1 MG/1
1 TABLET ORAL EVERY EVENING
Status: DISCONTINUED | OUTPATIENT
Start: 2022-09-23 | End: 2022-09-27

## 2022-09-23 RX ORDER — PROCHLORPERAZINE MALEATE 5 MG
10 TABLET ORAL EVERY 6 HOURS PRN
Status: DISCONTINUED | OUTPATIENT
Start: 2022-09-23 | End: 2022-10-08 | Stop reason: HOSPADM

## 2022-09-23 RX ORDER — LANOLIN ALCOHOL/MO/W.PET/CERES
100 CREAM (GRAM) TOPICAL DAILY
Status: ON HOLD | COMMUNITY
End: 2022-10-08

## 2022-09-23 RX ORDER — ONDANSETRON 2 MG/ML
4 INJECTION INTRAMUSCULAR; INTRAVENOUS EVERY 8 HOURS PRN
Status: DISCONTINUED | OUTPATIENT
Start: 2022-09-23 | End: 2022-09-24

## 2022-09-23 RX ORDER — ASPIRIN 325 MG
325 TABLET ORAL EVERY EVENING
Status: DISCONTINUED | OUTPATIENT
Start: 2022-09-23 | End: 2022-10-01

## 2022-09-23 RX ORDER — BUPRENORPHINE AND NALOXONE 8; 2 MG/1; MG/1
1 FILM, SOLUBLE BUCCAL; SUBLINGUAL 2 TIMES DAILY
Status: DISCONTINUED | OUTPATIENT
Start: 2022-09-23 | End: 2022-10-08 | Stop reason: HOSPADM

## 2022-09-23 RX ORDER — PROCHLORPERAZINE 25 MG
25 SUPPOSITORY, RECTAL RECTAL EVERY 12 HOURS PRN
Status: DISCONTINUED | OUTPATIENT
Start: 2022-09-23 | End: 2022-10-08 | Stop reason: HOSPADM

## 2022-09-23 RX ORDER — ACETAMINOPHEN 650 MG/1
650 SUPPOSITORY RECTAL EVERY 6 HOURS PRN
Status: DISCONTINUED | OUTPATIENT
Start: 2022-09-23 | End: 2022-10-08 | Stop reason: HOSPADM

## 2022-09-23 RX ORDER — NICOTINE POLACRILEX 4 MG
15-30 LOZENGE BUCCAL
Status: DISCONTINUED | OUTPATIENT
Start: 2022-09-23 | End: 2022-10-08 | Stop reason: HOSPADM

## 2022-09-23 RX ORDER — HYDRALAZINE HYDROCHLORIDE 20 MG/ML
10 INJECTION INTRAMUSCULAR; INTRAVENOUS EVERY 6 HOURS PRN
Status: DISCONTINUED | OUTPATIENT
Start: 2022-09-23 | End: 2022-09-29

## 2022-09-23 RX ORDER — TIZANIDINE 2 MG/1
4 TABLET ORAL AT BEDTIME
Status: DISCONTINUED | OUTPATIENT
Start: 2022-09-23 | End: 2022-10-01

## 2022-09-23 RX ADMIN — MULTIPLE VITAMINS W/ MINERALS TAB 1 TABLET: TAB at 23:51

## 2022-09-23 RX ADMIN — DIAZEPAM 5 MG: 5 INJECTION, SOLUTION INTRAMUSCULAR; INTRAVENOUS at 17:41

## 2022-09-23 RX ADMIN — ATENOLOL 25 MG: 25 TABLET ORAL at 20:41

## 2022-09-23 RX ADMIN — DIVALPROEX SODIUM 1000 MG: 500 TABLET, FILM COATED, EXTENDED RELEASE ORAL at 23:50

## 2022-09-23 RX ADMIN — BUPROPION HYDROCHLORIDE 150 MG: 150 TABLET, FILM COATED, EXTENDED RELEASE ORAL at 23:50

## 2022-09-23 RX ADMIN — DIAZEPAM 5 MG: 5 TABLET ORAL at 19:54

## 2022-09-23 RX ADMIN — GABAPENTIN 400 MG: 100 CAPSULE ORAL at 17:08

## 2022-09-23 RX ADMIN — OLANZAPINE 10 MG: 10 TABLET, FILM COATED ORAL at 23:51

## 2022-09-23 RX ADMIN — FOLIC ACID 1 MG: 1 TABLET ORAL at 23:46

## 2022-09-23 RX ADMIN — DIAZEPAM 5 MG: 5 INJECTION, SOLUTION INTRAMUSCULAR; INTRAVENOUS at 20:23

## 2022-09-23 RX ADMIN — BUPRENORPHINE HYDROCHLORIDE, NALOXONE HYDROCHLORIDE 1 FILM: 8; 2 FILM, SOLUBLE BUCCAL; SUBLINGUAL at 23:49

## 2022-09-23 RX ADMIN — BUPROPION HYDROCHLORIDE 300 MG: 150 TABLET, FILM COATED, EXTENDED RELEASE ORAL at 23:49

## 2022-09-23 RX ADMIN — FOLIC ACID: 5 INJECTION, SOLUTION INTRAMUSCULAR; INTRAVENOUS; SUBCUTANEOUS at 17:25

## 2022-09-23 RX ADMIN — THIAMINE HCL TAB 100 MG 100 MG: 100 TAB at 23:46

## 2022-09-23 RX ADMIN — SODIUM CHLORIDE 1000 ML: 9 INJECTION, SOLUTION INTRAVENOUS at 17:46

## 2022-09-23 RX ADMIN — ASPIRIN 325 MG ORAL TABLET 325 MG: 325 PILL ORAL at 23:46

## 2022-09-23 RX ADMIN — ONDANSETRON 4 MG: 2 INJECTION INTRAMUSCULAR; INTRAVENOUS at 21:58

## 2022-09-23 RX ADMIN — DIAZEPAM 10 MG: 5 INJECTION INTRAMUSCULAR; INTRAVENOUS at 22:39

## 2022-09-23 ASSESSMENT — ENCOUNTER SYMPTOMS
VOMITING: 1
TREMORS: 1
CONFUSION: 1
FEVER: 1
NERVOUS/ANXIOUS: 1
NAUSEA: 1

## 2022-09-23 ASSESSMENT — ACTIVITIES OF DAILY LIVING (ADL)
ADLS_ACUITY_SCORE: 37

## 2022-09-23 NOTE — ED NOTES
Bed: ED31  Expected date: 9/23/22  Expected time: 4:40 PM  Means of arrival: Ambulance  Comments:  [id number] 57 M ETOH

## 2022-09-23 NOTE — ED PROVIDER NOTES
History   Chief Complaint:  Alcohol Withdrawal    The history is provided by the patient and the EMS personnel.      Andrea Pham is a 57 year old male with history of DVT,  alcohol withdrawal, and pancreatitis who presents with alcohol withdrawal. Per EMS, the patients girlfriend called because the patient is shaking and having worse withdrawal symptoms than normal. Report that he is anxious, has a fever, nauseous, and has vomited. States that the patient was given Zofran en route. The patient states that he is confused and that his last drink of alcohol was about one hour ago. Notes that he has been drinking less alcohol everyday over the past month and his symptoms have been worsening. Adds that at his most, he would have 10 beers in one day and used hard liquor in the past. Reports that he had 5 beers today. Denies history of seizures. Denies use of marijuana, methamphetamines, and cocaine.     Review of Systems   Constitutional: Positive for fever.   Gastrointestinal: Positive for nausea and vomiting.   Neurological: Positive for tremors.   Psychiatric/Behavioral: Positive for confusion. The patient is nervous/anxious.    All other systems reviewed and are negative.    Allergies:  No Known Allergies    Medications:  Aspirin 325 MG  Lorazepam  Magic mouthwash  Milk of magnesia  Meloxicam  Naloxone  Miralax  Senna  Buprenorphine   Bupropion  Divalproex  Ferrous gluconate  Olanzapine  Pregabalin  Tizanidine   Trazodone     Past Medical History:     Alcohol abuse  Depression  Low testosterone  Psychosis  Bipolar affective disorder  Alcohol withdrawal  Anxiety and depression  Chronic pain disorder  Drug seeking behavior  Suicidal ideation  Tobacco dependence  Chemical dependency  Altered mental status  Closed fracture of right ankle  Closed nondisplaced fracture of second metatarsal bone of right foot   Pancreatitis  DVT  ADHD  Wernicke's encephalopathy with cerebellar manifestation  IV drug use  Hepatitis  C  Seizure   Schizoaffective disorder, bipolar type  Anesthesia complication     Past Surgical History:    Flail chest, sternum fractured  Hip surgery  Total knee arthroplasty, left  Knee surgery x 6  ORIF ankle, right  Stephanie left shoulder surgery  Debridement of right shoulder  Fracture cervical spine  Heel surgery x 3     Family History:    Father- substance abuse  Mother- substance abuse  Sister- substance abuse    Social History:  Presents alone  Presents via private vehicle     Physical Exam     Patient Vitals for the past 24 hrs:   BP Temp Pulse Resp SpO2   09/23/22 2200 116/82 -- 82 10 94 %   09/23/22 1930 -- -- 106 21 --   09/23/22 1900 -- -- 94 23 92 %   09/23/22 1800 -- -- 92 22 93 %   09/23/22 1704 130/80 98.2  F (36.8  C) 113 20 99 %     Physical Exam  Constitutional:       General: He is in acute distress.      Appearance: He is not diaphoretic.   HENT:      Head: Atraumatic.      Mouth/Throat:      Mouth: Mucous membranes are moist.      Pharynx: Oropharynx is clear.   Eyes:      General: No scleral icterus.     Pupils: Pupils are equal, round, and reactive to light.   Cardiovascular:      Rate and Rhythm: Regular rhythm. Tachycardia present.      Heart sounds: Normal heart sounds.   Pulmonary:      Effort: No respiratory distress.      Breath sounds: Normal breath sounds.   Abdominal:      Palpations: Abdomen is soft.      Comments: Mild epigastric tenderness     Musculoskeletal:         General: No tenderness.      Right lower leg: No edema.      Left lower leg: No edema.   Skin:     General: Skin is warm.      Capillary Refill: Capillary refill takes less than 2 seconds.      Findings: No rash.   Neurological:      General: No focal deficit present.      Mental Status: He is oriented to person, place, and time.      Comments: Tremulous.  Speech fluent.   Psychiatric:      Comments: Appears anxious           Emergency Department Course   ECG  ECG taken at 1726, ECG read at 1734  Sinus  tachycardia  Possible left atrial enlargement  Left axis deviation  Possible inferior infarct, age undetermined   No change as compared to prior, dated 12/23/21.  Rate 104 bpm. IA interval 166 ms. QRS duration 86 ms. QT/QTc 338/444 ms. P-R-T axes 78 -40 61.     Laboratory:  Labs Ordered and Resulted from Time of ED Arrival to Time of ED Departure   COMPREHENSIVE METABOLIC PANEL - Abnormal       Result Value    Sodium 138      Potassium 4.3      Chloride 96 (*)     Carbon Dioxide (CO2) 15 (*)     Anion Gap 27 (*)     Urea Nitrogen 10.7      Creatinine 0.82      Calcium 10.1 (*)     Glucose 99      Alkaline Phosphatase 69       (*)      (*)     Protein Total 7.7      Albumin 4.6      Bilirubin Total 0.7      GFR Estimate >90     MAGNESIUM - Abnormal    Magnesium 1.6 (*)    ETHYL ALCOHOL LEVEL - Abnormal    Alcohol ethyl 0.26 (*)    ROUTINE UA WITH MICROSCOPIC REFLEX TO CULTURE - Abnormal    Color Urine Yellow      Appearance Urine Clear      Glucose Urine Negative      Bilirubin Urine Negative      Ketones Urine 20  (*)     Specific Gravity Urine 1.019      Blood Urine Negative      pH Urine 6.0      Protein Albumin Urine 30  (*)     Urobilinogen Urine Normal      Nitrite Urine Negative      Leukocyte Esterase Urine Negative      RBC Urine <1      WBC Urine 2      Squamous Epithelials Urine <1      Hyaline Casts Urine 6 (*)    LACTIC ACID WHOLE BLOOD - Abnormal    Lactic Acid 4.5 (*)    BLOOD GAS VENOUS - Abnormal    pH Venous 7.37      pCO2 Venous 38 (*)     pO2 Venous 72 (*)     Bicarbonate Venous 22      Base Excess/Deficit (+/-) -2.8      FIO2 0     KETONE BETA-HYDROXYBUTYRATE QUANTITATIVE, RAPID - Abnormal    Ketone (Beta-Hydroxybutyrate) Quantitative 1.9 (*)    BASIC METABOLIC PANEL - Abnormal    Sodium 137      Potassium 4.2      Chloride 98      Carbon Dioxide (CO2) 20 (*)     Anion Gap 19 (*)     Urea Nitrogen 11.6      Creatinine 0.81      Calcium 8.9      Glucose 241 (*)     GFR Estimate >90      CBC WITH PLATELETS AND DIFFERENTIAL    WBC Count 4.6      RBC Count 5.54      Hemoglobin 16.8      Hematocrit 49.1      MCV 89      MCH 30.3      MCHC 34.2      RDW 14.6      Platelet Count 190      % Neutrophils 62      % Lymphocytes 25      % Monocytes 11      % Eosinophils 0      % Basophils 2      % Immature Granulocytes 0      NRBCs per 100 WBC 0      Absolute Neutrophils 2.8      Absolute Lymphocytes 1.1      Absolute Monocytes 0.5      Absolute Eosinophils 0.0      Absolute Basophils 0.1      Absolute Immature Granulocytes 0.0      Absolute NRBCs 0.0     DRUG ABUSE SCREEN 1 URINE (ED)   COVID-19 VIRUS (CORONAVIRUS) BY PCR   ALCOHOL BREATH TEST POCT      Emergency Department Course:  Reviewed:  I reviewed nursing notes, vitals, past medical history and Care Everywhere    Assessments:  1653 I obtained history and examined the patient as noted above.   1908 I rechecked and updated the patient.   1949 I rechecked and updated the patient.   2017 I rechecked and updated the patient.     Interventions:  1708 Gabapentin, 400 mg, PO  1725 Banana Bag, 1000 mL, IV  1741 Diazepam, 5 mg, IV  1746 0.9% NS Bolus, 1000 mL, IV  1954 Diazepam, 5 mg, PO  2023 Diazepam, 5 mg, IV  2041 Atenolol, 25 mg, PO    Disposition:  The patient was admitted to the hospital under the care of Dr. Chacon.     Impression & Plan   Medical Decision Making:  This patient is a 57-year-old man who presents to the emergency department for evaluation of alcohol withdrawal.  He says he has a history of heavy alcohol use including at least 10 beers as well as hard liquor at home.  He had been trying to slowly taper down over the course of the last couple of weeks.  Ultimately he was brought in by EMS.  On arrival he is tremulous and vomiting.  He appears quite anxious.    Alcohol level is still elevated but given the degree of his withdrawal he was given IV Valium and IV fluids and a banana bag.  He did improve for a time but then became tremulous  and anxious again.  He was given oral Valium which did not help much.  He improved again briefly after IV Valium but has again become tremulous and is now diaphoretic.  I am concerned about the patient's ability to successfully detoxify at home.  We looked into a detox program but they are not available tonight.  The patient will be admitted to the hospitalist service.  He has required very close frequent repeat evaluations.    Patient appears to have a starvation ketosis.  This did improve after banana bag and he was able to eat some food initially on arrival.    Critical Care time was 45 minutes for this patient excluding procedures.      Diagnosis:    ICD-10-CM    1. Alcohol withdrawal syndrome without complication (H)  F10.230    2. Metabolic acidosis  E87.2    3. Hypomagnesemia  E83.42        Discharge Medications:  New Prescriptions    No medications on file     Scribe Disclosure:  I, Florinda Ariza, am serving as a scribe at 4:52 PM on 9/23/2022 to document services personally performed by Keith Sherman MD based on my observations and the provider's statements to me.     Keith Sherman MD  09/23/22 2530

## 2022-09-24 LAB
ALBUMIN SERPL BCG-MCNC: 3.7 G/DL (ref 3.5–5.2)
ALP SERPL-CCNC: 50 U/L (ref 40–129)
ALT SERPL W P-5'-P-CCNC: 93 U/L (ref 10–50)
ANION GAP SERPL CALCULATED.3IONS-SCNC: 8 MMOL/L (ref 7–15)
AST SERPL W P-5'-P-CCNC: 118 U/L (ref 10–50)
BILIRUB DIRECT SERPL-MCNC: 0.35 MG/DL (ref 0–0.3)
BILIRUB SERPL-MCNC: 1.2 MG/DL
BUN SERPL-MCNC: 12 MG/DL (ref 6–20)
CALCIUM SERPL-MCNC: 8.1 MG/DL (ref 8.6–10)
CHLORIDE SERPL-SCNC: 105 MMOL/L (ref 98–107)
CREAT SERPL-MCNC: 0.84 MG/DL (ref 0.67–1.17)
DEPRECATED HCO3 PLAS-SCNC: 26 MMOL/L (ref 22–29)
ERYTHROCYTE [DISTWIDTH] IN BLOOD BY AUTOMATED COUNT: 15.1 % (ref 10–15)
GFR SERPL CREATININE-BSD FRML MDRD: >90 ML/MIN/1.73M2
GLUCOSE BLDC GLUCOMTR-MCNC: 101 MG/DL (ref 70–99)
GLUCOSE BLDC GLUCOMTR-MCNC: 132 MG/DL (ref 70–99)
GLUCOSE BLDC GLUCOMTR-MCNC: 78 MG/DL (ref 70–99)
GLUCOSE BLDC GLUCOMTR-MCNC: 82 MG/DL (ref 70–99)
GLUCOSE SERPL-MCNC: 83 MG/DL (ref 70–99)
HCT VFR BLD AUTO: 39.7 % (ref 40–53)
HGB BLD-MCNC: 13 G/DL (ref 13.3–17.7)
LACTATE SERPL-SCNC: 0.7 MMOL/L (ref 0.7–2)
LACTATE SERPL-SCNC: 2.4 MMOL/L (ref 0.7–2)
MAGNESIUM SERPL-MCNC: 1.7 MG/DL (ref 1.7–2.3)
MCH RBC QN AUTO: 30.7 PG (ref 26.5–33)
MCHC RBC AUTO-ENTMCNC: 32.7 G/DL (ref 31.5–36.5)
MCV RBC AUTO: 94 FL (ref 78–100)
PHOSPHATE SERPL-MCNC: 3.3 MG/DL (ref 2.5–4.5)
PLATELET # BLD AUTO: 130 10E3/UL (ref 150–450)
POTASSIUM SERPL-SCNC: 4.3 MMOL/L (ref 3.4–5.3)
PROT SERPL-MCNC: 5.7 G/DL (ref 6.4–8.3)
RBC # BLD AUTO: 4.23 10E6/UL (ref 4.4–5.9)
SODIUM SERPL-SCNC: 139 MMOL/L (ref 136–145)
WBC # BLD AUTO: 3.1 10E3/UL (ref 4–11)

## 2022-09-24 PROCEDURE — 83605 ASSAY OF LACTIC ACID: CPT | Performed by: STUDENT IN AN ORGANIZED HEALTH CARE EDUCATION/TRAINING PROGRAM

## 2022-09-24 PROCEDURE — 82248 BILIRUBIN DIRECT: CPT | Performed by: STUDENT IN AN ORGANIZED HEALTH CARE EDUCATION/TRAINING PROGRAM

## 2022-09-24 PROCEDURE — 250N000011 HC RX IP 250 OP 636: Performed by: STUDENT IN AN ORGANIZED HEALTH CARE EDUCATION/TRAINING PROGRAM

## 2022-09-24 PROCEDURE — 99232 SBSQ HOSP IP/OBS MODERATE 35: CPT | Performed by: INTERNAL MEDICINE

## 2022-09-24 PROCEDURE — 83735 ASSAY OF MAGNESIUM: CPT | Performed by: STUDENT IN AN ORGANIZED HEALTH CARE EDUCATION/TRAINING PROGRAM

## 2022-09-24 PROCEDURE — 250N000013 HC RX MED GY IP 250 OP 250 PS 637: Performed by: STUDENT IN AN ORGANIZED HEALTH CARE EDUCATION/TRAINING PROGRAM

## 2022-09-24 PROCEDURE — 36415 COLL VENOUS BLD VENIPUNCTURE: CPT | Performed by: STUDENT IN AN ORGANIZED HEALTH CARE EDUCATION/TRAINING PROGRAM

## 2022-09-24 PROCEDURE — 120N000001 HC R&B MED SURG/OB

## 2022-09-24 PROCEDURE — 258N000003 HC RX IP 258 OP 636: Performed by: STUDENT IN AN ORGANIZED HEALTH CARE EDUCATION/TRAINING PROGRAM

## 2022-09-24 PROCEDURE — 84100 ASSAY OF PHOSPHORUS: CPT | Performed by: STUDENT IN AN ORGANIZED HEALTH CARE EDUCATION/TRAINING PROGRAM

## 2022-09-24 PROCEDURE — 250N000011 HC RX IP 250 OP 636: Performed by: INTERNAL MEDICINE

## 2022-09-24 PROCEDURE — 85014 HEMATOCRIT: CPT | Performed by: STUDENT IN AN ORGANIZED HEALTH CARE EDUCATION/TRAINING PROGRAM

## 2022-09-24 PROCEDURE — 82310 ASSAY OF CALCIUM: CPT | Performed by: STUDENT IN AN ORGANIZED HEALTH CARE EDUCATION/TRAINING PROGRAM

## 2022-09-24 RX ORDER — GABAPENTIN 100 MG/1
100 CAPSULE ORAL EVERY 8 HOURS
Status: DISCONTINUED | OUTPATIENT
Start: 2022-10-01 | End: 2022-09-27

## 2022-09-24 RX ORDER — GABAPENTIN 300 MG/1
900 CAPSULE ORAL EVERY 8 HOURS
Status: DISPENSED | OUTPATIENT
Start: 2022-09-24 | End: 2022-09-27

## 2022-09-24 RX ORDER — GABAPENTIN 300 MG/1
600 CAPSULE ORAL EVERY 8 HOURS
Status: DISCONTINUED | OUTPATIENT
Start: 2022-09-27 | End: 2022-09-27

## 2022-09-24 RX ORDER — MAGNESIUM SULFATE HEPTAHYDRATE 40 MG/ML
2 INJECTION, SOLUTION INTRAVENOUS ONCE
Status: COMPLETED | OUTPATIENT
Start: 2022-09-24 | End: 2022-09-24

## 2022-09-24 RX ORDER — GABAPENTIN 300 MG/1
300 CAPSULE ORAL EVERY 8 HOURS
Status: DISCONTINUED | OUTPATIENT
Start: 2022-09-29 | End: 2022-09-27

## 2022-09-24 RX ORDER — PREGABALIN 100 MG/1
100 CAPSULE ORAL
Status: DISCONTINUED | OUTPATIENT
Start: 2022-09-24 | End: 2022-09-28

## 2022-09-24 RX ORDER — GABAPENTIN 600 MG/1
1200 TABLET ORAL ONCE
Status: COMPLETED | OUTPATIENT
Start: 2022-09-24 | End: 2022-09-24

## 2022-09-24 RX ADMIN — DIAZEPAM 10 MG: 10 TABLET ORAL at 01:25

## 2022-09-24 RX ADMIN — THIAMINE HCL TAB 100 MG 100 MG: 100 TAB at 19:55

## 2022-09-24 RX ADMIN — FAMOTIDINE 20 MG: 10 INJECTION INTRAVENOUS at 21:26

## 2022-09-24 RX ADMIN — FAMOTIDINE 20 MG: 10 INJECTION INTRAVENOUS at 09:57

## 2022-09-24 RX ADMIN — BUPRENORPHINE HYDROCHLORIDE, NALOXONE HYDROCHLORIDE 1 FILM: 8; 2 FILM, SOLUBLE BUCCAL; SUBLINGUAL at 16:00

## 2022-09-24 RX ADMIN — FOLIC ACID 1 MG: 1 TABLET ORAL at 19:55

## 2022-09-24 RX ADMIN — MULTIPLE VITAMINS W/ MINERALS TAB 1 TABLET: TAB at 19:55

## 2022-09-24 RX ADMIN — GABAPENTIN 1200 MG: 600 TABLET, FILM COATED ORAL at 02:48

## 2022-09-24 RX ADMIN — HALOPERIDOL LACTATE 5 MG: 5 INJECTION, SOLUTION INTRAMUSCULAR at 01:26

## 2022-09-24 RX ADMIN — SODIUM CHLORIDE: 9 INJECTION, SOLUTION INTRAVENOUS at 13:37

## 2022-09-24 RX ADMIN — DIAZEPAM 10 MG: 10 TABLET ORAL at 15:34

## 2022-09-24 RX ADMIN — SODIUM CHLORIDE: 9 INJECTION, SOLUTION INTRAVENOUS at 02:46

## 2022-09-24 RX ADMIN — DIAZEPAM 10 MG: 10 TABLET ORAL at 23:06

## 2022-09-24 RX ADMIN — OLANZAPINE 10 MG: 10 TABLET, FILM COATED ORAL at 21:26

## 2022-09-24 RX ADMIN — DIAZEPAM 10 MG: 10 TABLET ORAL at 17:25

## 2022-09-24 RX ADMIN — SODIUM CHLORIDE 1000 ML: 9 INJECTION, SOLUTION INTRAVENOUS at 01:24

## 2022-09-24 RX ADMIN — ASPIRIN 325 MG ORAL TABLET 325 MG: 325 PILL ORAL at 19:56

## 2022-09-24 RX ADMIN — MAGNESIUM SULFATE HEPTAHYDRATE 2 G: 40 INJECTION, SOLUTION INTRAVENOUS at 09:57

## 2022-09-24 RX ADMIN — GABAPENTIN 900 MG: 300 CAPSULE ORAL at 15:35

## 2022-09-24 RX ADMIN — DIAZEPAM 10 MG: 10 TABLET ORAL at 20:01

## 2022-09-24 RX ADMIN — BUPROPION HYDROCHLORIDE 300 MG: 150 TABLET, FILM COATED, EXTENDED RELEASE ORAL at 15:35

## 2022-09-24 RX ADMIN — BUPROPION HYDROCHLORIDE 150 MG: 150 TABLET, FILM COATED, EXTENDED RELEASE ORAL at 15:35

## 2022-09-24 RX ADMIN — DIVALPROEX SODIUM 1000 MG: 500 TABLET, FILM COATED, EXTENDED RELEASE ORAL at 21:25

## 2022-09-24 RX ADMIN — SODIUM CHLORIDE: 9 INJECTION, SOLUTION INTRAVENOUS at 21:42

## 2022-09-24 RX ADMIN — DIAZEPAM 10 MG: 5 INJECTION INTRAMUSCULAR; INTRAVENOUS at 00:27

## 2022-09-24 ASSESSMENT — ACTIVITIES OF DAILY LIVING (ADL)
ADLS_ACUITY_SCORE: 35
ADLS_ACUITY_SCORE: 39
ADLS_ACUITY_SCORE: 35
ADLS_ACUITY_SCORE: 37
ADLS_ACUITY_SCORE: 35
ADLS_ACUITY_SCORE: 39
ADLS_ACUITY_SCORE: 35
ADLS_ACUITY_SCORE: 35

## 2022-09-24 NOTE — UTILIZATION REVIEW
Admission Status; Secondary Review Determination       Under the authority of the Utilization Management Committee, the utilization review process indicated a secondary review on the above patient. The review outcome is based on review of the medical records, discussions with staff, and applying clinical experience noted on the date of the review.     (x) Inpatient Status Appropriate - This patient's medical care is consistent with medical management for inpatient care and reasonable inpatient medical practice.     RATIONALE FOR DETERMINATION   57 year old male with a history of alcohol abuse and withdrawal, seizure, and pancreatitis admitted on 9/23/2022 with alcohol withdrawal.  Patient requires inpatient admission versus short stay observation or outpatient treatment for the following reasons: At the time of admission more than 2 midnights of hospital care was anticipated patient was having high score on CIWA 19, requiring escalating doses of diazepam, total of 15 mg ordered, 25 mg IV divided into 3 doses, 5 mg of haloperidol, and 10 mg of olanzapine.  He was having severe tremor, significant agitation and anxiety per CIWA score, at the time of this review he is on a very large dose of gabapentin initially 1200 mg followed by 900 mg 3 times a day for alcohol withdrawal and anticipated to require ongoing care beyond the second night.  The expected length of stay at the time of admission was more than 2 nights because of the severity of illness, intensity of service provided, and risk for adverse outcome. Inpatient admission is appropriate.         This document was produced using voice recognition software       The information on this document is developed by the utilization review team in order for the business office to ensure compliance. This only denotes the appropriateness of proper admission status and does not reflect the quality of care rendered.   The definitions of Inpatient Status and Observation  Status used in making the determination above are those provided in the CMS Coverage Manual, Chapter 1 and Chapter 6, section 70.4.   Sincerely,   LANNY MCCOLLUM MD   System Medical Director   Utilization Management   [facility].

## 2022-09-24 NOTE — PHARMACY-ADMISSION MEDICATION HISTORY
Admission medication history interview status for this patient is complete. See [facility] admission navigator for allergy information, prior to admission medications and immunization status.     Medication history interview done, indicate source(s): Patient and Family  Medication history resources (including written lists, pill bottles, clinic record):None  Pharmacy: [facility]    Changes made to PTA medication list:  Added: Augmentin, folic acid, thiamine, testosterone  Changed:  Tylenol TID --> q6h prn, ibuprofen QID --> q6h prn  Reported as Not Taking: Magic mouthwash  Removed: lorazepam, milk of magnesia, meloxicam, Miralax, senna-docusate    Actions taken by pharmacist (provider contacted, etc):None     Additional medication history information: Pt states he typically takes meds at night except bupropion, but he did not take this today. Pt states he is due for next testosterone injection tomorrow. Pt states he was prescribed Augmentin, although does not recall what for - states he only took 1 dose of this med, was prescribed for 10 day course.    Medication reconciliation/reorder completed by provider prior to medication history?  N   (Y/N)       Prior to Admission medications    Medication Sig Last Dose Taking? Auth Provider Long Term End Date   acetaminophen (TYLENOL) 500 MG tablet Take 2 tablets (1,000 mg) by mouth 3 times daily  Patient taking differently: Take 1,000 mg by mouth every 6 hours as needed for pain prn at prn Yes Sarah Crouch PA     amoxicillin-clavulanate (AUGMENTIN) 875-125 MG tablet Take 1 tablet by mouth 2 times daily Past Week at Pt states he has maybe taken 1 dose Yes Unknown, Entered By History     aspirin (ASA) 325 MG tablet Take 1 tablet (325 mg) by mouth daily 9/22/2022 at pm Yes Rashida Can PA-C     buprenorphine HCl-naloxone HCl (SUBOXONE) 8-2 MG per film Place 1 Film under the tongue 2 times daily 9/23/2022 at x1 Yes Unknown, Entered By History     buPROPion  (WELLBUTRIN XL) 150 MG 24 hr tablet Take 150 mg by mouth every morning Takes with 300mg tablet for total dose = 450mg 9/22/2022 at am Yes Unknown, Entered By History No    buPROPion (WELLBUTRIN XL) 300 MG 24 hr tablet Take 300 mg by mouth every morning Takes with 150mg tablet for total dose = 450mg 9/22/2022 at am Yes Unknown, Entered By History No    divalproex sodium extended-release (DEPAKOTE ER) 500 MG 24 hr tablet Take 1,000 mg by mouth At Bedtime 9/22/2022 at hs Yes Reported, Patient     Ferrous Gluconate 324 (37.5 Fe) MG TABS Take 324 mg by mouth daily 9/22/2022 at pm Yes Unknown, Entered By History     folic acid (FOLVITE) 1 MG tablet Take 1 mg by mouth daily 9/22/2022 at pm Yes Unknown, Entered By History     ibuprofen (ADVIL/MOTRIN) 600 MG tablet Take 1 tablet (600 mg) by mouth 4 times daily  Patient taking differently: Take 600 mg by mouth every 6 hours as needed for mild pain prn at prn Yes Sarah Crouch PA     multivitamin w/minerals (THERA-VIT-M) tablet Take 1 tablet by mouth every evening 9/22/2022 at pm Yes Unknown, Entered By History No    naloxone (NARCAN) 4 MG/0.1ML nasal spray Spray 1 spray (4 mg) into one nostril alternating nostrils once as needed for opioid reversal every 2-3 minutes until assistance arrives prn at prn Yes Sarah Crouch PA Yes    OLANZapine (ZYPREXA) 10 MG tablet Take 10 mg by mouth At Bedtime 9/22/2022 at hs Yes Unknown, Entered By History No    pregabalin (LYRICA) 100 MG capsule Take 100 mg by mouth daily 9/22/2022 at pm Yes Unknown, Entered By History     testosterone cypionate (DEPOTESTOSTERONE) 200 MG/ML injection Inject 200 mg into the muscle every 14 days Past Month at Due for dose tomorrow 9/24 Yes Unknown, Entered By History No    thiamine (B-1) 100 MG tablet Take 100 mg by mouth daily 9/22/2022 at pm Yes Unknown, Entered By History     tiZANidine (ZANAFLEX) 4 MG tablet Take 4 mg by mouth At Bedtime 9/22/2022 at hs Yes Unknown, Entered By History      traZODone (DESYREL) 100 MG tablet Take 100 mg by mouth At Bedtime 9/22/2022 at hs Yes Unknown, Entered By History No    magic mouthwash suspension, diphenhydrAMINE, lidocaine, aluminum-magnesium & simethicone, (FIRST-MOUTHWASH BLM) compounding kit Swish and swallow 5-10 mLs in mouth every 6 hours as needed for mouth sores  Patient not taking: Reported on 9/23/2022 Not Taking at Unknown time  Whit Rosario PA-C

## 2022-09-24 NOTE — H&P
[facility]  History and Physical - Hospitalist Service  Date of Admission:  9/23/2022    Assessment & Plan    Mr. Andrea Pham is a 57 year old male with a history of alcohol abuse and withdrawal, seizure, and pancreatitis admitted on 9/23/2022 with alcohol withdrawal.  Pending clinical improvement.    Alcohol withdrawal, without seizure   History ETOH abuse   Anion gap metabolic acidosis, suspect related to above  Lactic acidosis, suspect related to above  Ketosis, suspect related to above  Elevated LFTs  Per EMS, the patients girlfriend called because the patient is shaking and having worse withdrawal symptoms than normal. Report that he is anxious, has a fever, nauseous, and vomited.  Last drink of alcohol was 1 hour prior to arrival.  He has been working on decreasing his alcohol intake over the last month or so, in the past at most he would have about 10 beers per day and reports a past use of hard liquor as well.  He denies history of seizures, although this history has been documented in his medical record. Denies use of marijuana, methamphetamines, and cocaine.  On exam the patient continues to exhibit tremors consistent with alcohol withdrawal.  Plan for admission for close monitoring and electrolyte replacements.  -CIWA monitoring with diazepam, gabapentin protocol   -Daily BMP, mag and Phos with replacements   -Folic acid, multivitamin and thiamine  -Cardiac monitoring  -As needed pain meds and antiemetics  -Addiction medicine consult in a.m.  -Repeat lactic acid in a.m.  -IV fluids. Additional bolus, then maintenance fluids at 100/hr     Hyperglycemia  -Low-dose sliding scale insulin while hospitalized  -Hypoglycemia protocol    MDD   Bipolar disorder   Insomnia -PTA bupropion 450 mg QAM, tizanidine 4 mg nightly, trazodone 100 mg nightly  Seizure - PTA Depakote   HTN - PRN hydralazine   History of drug abuse - denies current use , PTA suboxone      Diet: Combination Diet Regular  Diet Adult    DVT Prophylaxis: Pneumatic Compression Devices  Lino Catheter: Not present  Central Lines: None  Cardiac Monitoring: ACTIVE order. Indication: alcohol withdrawal  Code Status: Full Code    Clinically Significant Risk Factors Present on Admission            # Anion Gap Metabolic Acidosis: AG = 19 mmol/L (Ref range: 7 - 15 mmol/L) on admission, will monitor and treat as appropriate               Disposition Plan      Expected Discharge Date: 09/25/2022                The patient's care was discussed with the Bedside Nurse, Patient and Patient's Family.    Kendra Chacon DO  Hospitalist Service  [facility]  Securely message with the Vocera Web Console (learn more here)  Text page via [facility] Paging/Directory   ______________________________________________________________________    Chief Complaint   Shakiness     History is obtained from the patient and patient's friend    History of Present Illness   Mr. Andrea Pham is a 57 year old male with a history of alcohol abuse and withdrawal, seizure, and pancreatitis admitted on 9/23/2022 with alcohol withdrawal.     Per EMS, the patients girlfriend called because the patient is shaking and having worse withdrawal symptoms than normal. Report that he is anxious, has a fever, nauseous, and vomited.  Last drink of alcohol was 1 hour prior to arrival.  He has been working on decreasing his alcohol intake over the last month or so, in the past at most he would have about 10 beers per day and reports a past use of hard liquor as well.  He denies history of seizures, although this history has been documented in his medical record. Denies use of marijuana, methamphetamines, and cocaine.     He denies any headache, lightheadedness or dizziness.  He has no chest pain pressure or shortness of breath.  He has no abdominal pain, does report mild nausea but no vomiting.  He has myriad of orthopedic aches and pains related to his prior  surgeries.  He has no other concerns or complaints today.     Review of Systems    The 10 point Review of Systems is negative other than noted in the HPI or here.     Past Medical History    I have reviewed this patient's medical history and updated it with pertinent information if needed.   Past Medical History:   Diagnosis Date     Ataxia      Bipolar disorder (H)      Chemical dependency (H)      Chronic hip pain      Chronic pain syndrome      Cocaine abuse (H)      Depressive disorder      DTs (delirium tremens) (H)      DVT (deep venous thrombosis) (H) 5 y ago    left foot, treated with coumadin     Elevated LFTs      Flail chest     broken sterum, wiring      Hepatitis C virus infection, unspecified chronicity      Heroin abuse (H)      History of methamphetamine abuse (H)      History of total hip arthroplasty     right     Hypertension      Seizures (H)      Substance abuse (H)     alcohol, opiates     Wernicke's encephalopathy        Past Surgical History   I have reviewed this patient's surgical history and updated it with pertinent information if needed.  Past Surgical History:   Procedure Laterality Date     CHEST SURGERY  1987    flail chest, sterum fractured     HIP SURGERY       KNEE SURGERY       OPEN REDUCTION INTERNAL FIXATION ANKLE Right 12/24/2021    Procedure: Open reduction internal fixation right ankle syndesmotic injury;  Surgeon: Leroy Thomason MD;  Location:  OR     ORTHOPEDIC SURGERY      KIMBER right. [place] left shoulder surgery, debridement right shoulder, neck surgery- fracture cervical spine. 6 knee surgeries- bucket handle meniscal tear and debridement. 3 heel surgeries.      ORTHOPEDIC SURGERY       REMOVE HARDWARE ANKLE Right 7/13/2022    Procedure: removal of hardware of right ankle;  Surgeon: Leroy Thomason MD;  Location:  OR       Social History   I have reviewed this patient's social history and updated it with pertinent information if needed.  Social History     Tobacco Use      "Smoking status: Current Every Day Smoker     Packs/day: 0.50     Years: 10.00     Pack years: 5.00     Types: Vaping Device     Last attempt to quit: 4/19/2019     Years since quitting: 3.4     Smokeless tobacco: Never Used     Tobacco comment: Quit 2019   Vaping Use     Vaping Use: Every day   Substance Use Topics     Alcohol use: Not Currently     Alcohol/week: 24.0 standard drinks     Types: 24 Cans of beer per week     Comment: drinks 1.75L grain alcohol daily since 4/28/18, clean 11 months as of 7/7/2022     Drug use: Not Currently     Types: Methamphetamines, Amphetamines     Comment: \"using daily, 1 dose/day\"  Poly substance has not used for 6 months       Family History   I have reviewed this patient's family history and updated it with pertinent information if needed.  Family History   Problem Relation Age of Onset     Substance Abuse Mother      Substance Abuse Father      Substance Abuse Sister        Prior to Admission Medications   Prior to Admission Medications   Prescriptions Last Dose Informant Patient Reported? Taking?   Ferrous Gluconate 324 (37.5 Fe) MG TABS 9/22/2022 at pm  Yes Yes   Sig: Take 324 mg by mouth daily   OLANZapine (ZYPREXA) 10 MG tablet 9/22/2022 at hs  Yes Yes   Sig: Take 10 mg by mouth At Bedtime   acetaminophen (TYLENOL) 500 MG tablet prn at prn  No Yes   Sig: Take 2 tablets (1,000 mg) by mouth 3 times daily   Patient taking differently: Take 1,000 mg by mouth every 6 hours as needed for pain   amoxicillin-clavulanate (AUGMENTIN) 875-125 MG tablet Past Week at Pt states he has maybe taken 1 dose  Yes Yes   Sig: Take 1 tablet by mouth 2 times daily   aspirin (ASA) 325 MG tablet 9/22/2022 at pm  No Yes   Sig: Take 1 tablet (325 mg) by mouth daily   buPROPion (WELLBUTRIN XL) 150 MG 24 hr tablet 9/22/2022 at am  Yes Yes   Sig: Take 150 mg by mouth every morning Takes with 300mg tablet for total dose = 450mg   buPROPion (WELLBUTRIN XL) 300 MG 24 hr tablet 9/22/2022 at am  Yes Yes "   Sig: Take 300 mg by mouth every morning Takes with 150mg tablet for total dose = 450mg   buprenorphine HCl-naloxone HCl (SUBOXONE) 8-2 MG per film 9/23/2022 at x1  Yes Yes   Sig: Place 1 Film under the tongue 2 times daily   divalproex sodium extended-release (DEPAKOTE ER) 500 MG 24 hr tablet 9/22/2022 at hs  Yes Yes   Sig: Take 1,000 mg by mouth At Bedtime   folic acid (FOLVITE) 1 MG tablet 9/22/2022 at pm  Yes Yes   Sig: Take 1 mg by mouth daily   ibuprofen (ADVIL/MOTRIN) 600 MG tablet prn at prn  No Yes   Sig: Take 1 tablet (600 mg) by mouth 4 times daily   Patient taking differently: Take 600 mg by mouth every 6 hours as needed for mild pain   magic mouthwash suspension, diphenhydrAMINE, lidocaine, aluminum-magnesium & simethicone, (FIRST-MOUTHWASH BLM) compounding kit Not Taking at Unknown time  No No   Sig: Swish and swallow 5-10 mLs in mouth every 6 hours as needed for mouth sores   Patient not taking: Reported on 9/23/2022   multivitamin w/minerals (THERA-VIT-M) tablet 9/22/2022 at pm  Yes Yes   Sig: Take 1 tablet by mouth every evening   naloxone (NARCAN) 4 MG/0.1ML nasal spray prn at prn  No Yes   Sig: Spray 1 spray (4 mg) into one nostril alternating nostrils once as needed for opioid reversal every 2-3 minutes until assistance arrives   pregabalin (LYRICA) 100 MG capsule 9/22/2022 at pm  Yes Yes   Sig: Take 100 mg by mouth daily   testosterone cypionate (DEPOTESTOSTERONE) 200 MG/ML injection Past Month at Due for dose tomorrow 9/24  Yes Yes   Sig: Inject 200 mg into the muscle every 14 days   thiamine (B-1) 100 MG tablet 9/22/2022 at pm  Yes Yes   Sig: Take 100 mg by mouth daily   tiZANidine (ZANAFLEX) 4 MG tablet 9/22/2022 at hs  Yes Yes   Sig: Take 4 mg by mouth At Bedtime   traZODone (DESYREL) 100 MG tablet 9/22/2022 at hs  Yes Yes   Sig: Take 100 mg by mouth At Bedtime      Facility-Administered Medications: None     Allergies   No Known Allergies    Physical Exam   Vital Signs: Temp: 98.2  F (36.8   C)   BP: 116/82 Pulse: 82   Resp: 10 SpO2: 94 %      Constitutional: awake, alert, cooperative, no apparent distress, and appears stated age  HEENT: Normocephalic, atraumatic  Respiratory: Normal work of breathing, good air exchange, clear to auscultation bilaterally, no crackles or wheezing   Cardiovascular: Regular rate and rhythm, no murmurs appreciated  GI: Soft and nontender to palpation, nondistended, no rebound or guarding  Skin: normal skin color, texture, turgor  Musculoskeletal: No deformities, no edema present  Neurologic: Alert and oriented to person place and time, bilateral upper extremity tremor, restless and moving all extremities spontaneously, tongue fasciculations present, no focal deficits   Neuropsychiatric: General: normal, restless and fidgeting throughout entire exam and interview, normal eye contact     Data   Data reviewed today: I reviewed all medications, new labs and imaging results over the last 24 hours.     Recent Labs   Lab 09/23/22 2029 09/23/22 1729   WBC  --  4.6   HGB  --  16.8   MCV  --  89   PLT  --  190    138   POTASSIUM 4.2 4.3   CHLORIDE 98 96*   CO2 20* 15*   BUN 11.6 10.7   CR 0.81 0.82   ANIONGAP 19* 27*   JOHANA 8.9 10.1*   * 99   ALBUMIN  --  4.6   PROTTOTAL  --  7.7   BILITOTAL  --  0.7   ALKPHOS  --  69   ALT  --  129*   AST  --  174*     Most Recent 3 CBC's:Recent Labs   Lab Test 09/23/22 1729 12/24/21  0436 12/23/21  1308   WBC 4.6 5.1 6.6   HGB 16.8 11.8* 12.6*   MCV 89 90 90    208 214     Most Recent 3 BMP's:Recent Labs   Lab Test 09/23/22 2029 09/23/22 1729 12/24/21  0436    138 133   POTASSIUM 4.2 4.3 3.9   CHLORIDE 98 96* 99   CO2 20* 15* 26   BUN 11.6 10.7 20   CR 0.81 0.82 0.86   ANIONGAP 19* 27* 8   JOHANA 8.9 10.1* 8.3*   * 99 121*     Most Recent 2 LFT's:Recent Labs   Lab Test 09/23/22 1729 12/01/20  2314   * 28   * 20   ALKPHOS 69 96   BILITOTAL 0.7 0.7

## 2022-09-24 NOTE — ED NOTES
[facility]  ED Nurse Handoff Report    Andrea Pham is a 57 year old male   ED Chief complaint: Alcohol Problem  . ED Diagnosis:   Final diagnoses:   Alcohol withdrawal syndrome without complication (H)     Allergies: No Known Allergies    Code Status: Full Code  Activity level - Baseline/Home:  Independent. Activity Level - Current:   Stand by Assist. Lift room needed: No. Bariatric: No   Needed: No   Isolation: No. Infection: Not Applicable.     Vital Signs:   Vitals:    09/23/22 1704   BP: 130/80   Pulse: 113   Resp: 20   Temp: 98.2  F (36.8  C)   SpO2: 99%       Cardiac Rhythm:  ,      Pain level:    Patient confused: No. Patient Falls Risk: Yes.   Elimination Status: Has voided   Patient Report - Initial Complaint: Alcohol withdrawal. Focused Assessment: Andrea Pham is a 57 year old male with history of DVT, seizure, alcohol withdrawal, and pancreatitis who presents with alcohol withdrawal. Per EMS, the patients girlfriend called because the patient is shaking and having worse withdrawal symptoms than normal. Report that he is anxious, has a fever, nauseous, and has vomited. States that the patient was given Zofran en route. The patient states that he is confused and that his last drink of alcohol was about one hour ago. Notes that he has been drinking less alcohol everyday over the past month and his symptoms have been worsening. Adds that at his most, he would have 10 beers in one day and used hard liquor in the past. Reports that he had 5 beers today. Denies history of seizures. Denies use of marijuana, methamphetamines, and cocaine.    Tests Performed:   No orders to display   . Abnormal Results:   Labs Ordered and Resulted from Time of ED Arrival to Time of ED Departure   COMPREHENSIVE METABOLIC PANEL - Abnormal       Result Value    Sodium 138      Potassium 4.3      Chloride 96 (*)     Carbon Dioxide (CO2) 15 (*)     Anion Gap 27 (*)     Urea Nitrogen 10.7      Creatinine 0.82       Calcium 10.1 (*)     Glucose 99      Alkaline Phosphatase 69       (*)      (*)     Protein Total 7.7      Albumin 4.6      Bilirubin Total 0.7      GFR Estimate >90     MAGNESIUM - Abnormal    Magnesium 1.6 (*)    ETHYL ALCOHOL LEVEL - Abnormal    Alcohol ethyl 0.26 (*)    LACTIC ACID WHOLE BLOOD - Abnormal    Lactic Acid 4.5 (*)    BLOOD GAS VENOUS - Abnormal    pH Venous 7.37      pCO2 Venous 38 (*)     pO2 Venous 72 (*)     Bicarbonate Venous 22      Base Excess/Deficit (+/-) -2.8      FIO2 0     KETONE BETA-HYDROXYBUTYRATE QUANTITATIVE, RAPID - Abnormal    Ketone (Beta-Hydroxybutyrate) Quantitative 1.9 (*)    BASIC METABOLIC PANEL - Abnormal    Sodium 137      Potassium 4.2      Chloride 98      Carbon Dioxide (CO2) 20 (*)     Anion Gap 19 (*)     Urea Nitrogen 11.6      Creatinine 0.81      Calcium 8.9      Glucose 241 (*)     GFR Estimate >90     CBC WITH PLATELETS AND DIFFERENTIAL    WBC Count 4.6      RBC Count 5.54      Hemoglobin 16.8      Hematocrit 49.1      MCV 89      MCH 30.3      MCHC 34.2      RDW 14.6      Platelet Count 190      % Neutrophils 62      % Lymphocytes 25      % Monocytes 11      % Eosinophils 0      % Basophils 2      % Immature Granulocytes 0      NRBCs per 100 WBC 0      Absolute Neutrophils 2.8      Absolute Lymphocytes 1.1      Absolute Monocytes 0.5      Absolute Eosinophils 0.0      Absolute Basophils 0.1      Absolute Immature Granulocytes 0.0      Absolute NRBCs 0.0     ROUTINE UA WITH MICROSCOPIC REFLEX TO CULTURE   DRUG ABUSE SCREEN 1 URINE (ED)   COVID-19 VIRUS (CORONAVIRUS) BY PCR   ALCOHOL BREATH TEST POCT   .   Treatments provided: See MAR  Family Comments: Friend at bedside  OBS brochure/video discussed/provided to patient:  N/A  ED Medications:   Medications   0.9% sodium chloride BOLUS (0 mLs Intravenous Stopped 9/23/22 1955)   sodium chloride 0.9 % 1,000 mL with Infuvite Adult 10 mL, thiamine 100 mg, folic acid 1 mg infusion ( Intravenous New Bag  9/23/22 1725)   gabapentin (NEURONTIN) capsule 400 mg (400 mg Oral Given 9/23/22 1708)   diazepam (VALIUM) injection 5 mg (5 mg Intravenous Given 9/23/22 1741)   diazepam (VALIUM) tablet 5 mg (5 mg Oral Given 9/23/22 1954)   diazepam (VALIUM) injection 5 mg (5 mg Intravenous Given 9/23/22 2023)   atenolol (TENORMIN) tablet 25 mg (25 mg Oral Given 9/23/22 2041)     Drips infusing:  No  For the majority of the shift, the patient's behavior Green. Interventions performed were N/A.    Sepsis treatment initiated: No     Patient tested for COVID 19 prior to admission: YES - pending    ED Nurse Name/Phone Number: Gely Rain RN,   9:41 PM  RECEIVING UNIT ED HANDOFF REVIEW    Above ED Nurse Handoff Report was reviewed: Yes  Reviewed by: Smiley Vogel RN on September 24, 2022 at 12:44 AM

## 2022-09-24 NOTE — PROGRESS NOTES
[facility]    Medicine Progress Note - Hospitalist Service    Date of Admission:  9/23/2022    Assessment & Plan             Mr. Andrea Pham is a 57 year old male with a history of alcohol abuse and withdrawal, seizure, and pancreatitis admitted on 9/23/2022 with alcohol withdrawal.  Pending clinical improvement.    Alcohol withdrawal, without seizure   History ETOH abuse   Anion gap metabolic acidosis, suspect related to above  Lactic acidosis, suspect related to above  Ketosis, suspect related to above  Elevated LFTs  Per EMS, the patients girlfriend called because the patient is shaking and having worse withdrawal symptoms than normal.   -Report that he is anxious, has a fever, nauseous, and vomited.  Last drink of alcohol was 1 hour prior to arrival.  He has been working on decreasing his alcohol intake over the last month or so, in the past at most he would have about 10 beers per day and reports a past use of hard liquor as well.    -He denies history of seizures, although this history has been documented in his medical record. Denies use of marijuana, methamphetamines, and cocaine.    -CIWA monitoring with diazepam, gabapentin protocol   -Currently Andrea is not exhibiting agitation or being combative  -Daily BMP, mag and Phos with replacements   -Folic acid, multivitamin and thiamine  -Cardiac monitoring  -As needed pain meds and antiemetics  -Addiction medicine consult requested during admission process  -Repeat lactic acid resolution of earlier acidosis  -IV fluids. Additional bolus, then maintenance fluids at 100/hr     Hyperglycemia  -Low-dose sliding scale insulin while hospitalized  -Hypoglycemia protocol    MDD   Bipolar disorder   Insomnia -PTA bupropion 450 mg QAM, tizanidine 4 mg nightly, trazodone 100 mg nightly  Seizure - PTA Depakote   HTN - PRN hydralazine   History of drug abuse - denies current use , PTA suboxone        Diet: Combination Diet Regular Diet Adult    DVT  Prophylaxis: Pneumatic Compression Devices  Lino Catheter: Not present  Central Lines: None  Cardiac Monitoring: ACTIVE order. Indication: alcohol withdrawal  Code Status: Full Code      Disposition Plan      Expected Discharge Date: I am anticipating this gentleman will be needing inpatient care at least in the next 2 days        The patient's care was discussed with the Bedside Nurse.    Jung Perkins MD, MD  Hospitalist Service  [facility]  Securely message with the Vocera Web Console (learn more here)  Text page via [employer] Paging/Directory         Clinically Significant Risk Factors Present on Admission                          ______________________________________________________________________    Interval History   I assume service care today.  Seen and examined.  Chart reviewed.  Met and discussed patient's case with bedside nurse and no significant reported events overnight.  Currently he is sleeping, laying comfortably in bed.  No reported agitation or being combative after he was transferred here from the emergency room overnight.  No issues with nausea or vomiting.  Remained afebrile.  He is not requiring any oxygen support.      Data reviewed today: I reviewed all medications, new labs and imaging results over the last 24 hours. I personally reviewed .    Physical Exam   Vital Signs: Temp: 98.1  F (36.7  C) Temp src: Oral BP: 111/60 Pulse: 62   Resp: 16 SpO2: 93 % O2 Device: None (Room air) Oxygen Delivery: 1 LPM  Weight: 207 lbs 6.4 oz  HEENT; Atraumatic, normocephalic, pinkish conjuctiva, pupils bilateral reactive   Skin: warm and moist, no rashes  Lungs: equal chest expansion, clear to auscultation, no wheezes, no stridor, no crackles,   Heart: normal rate, normal rhythm, no rubs or gallops.   Abdomen: normal bowel sounds, no tenderness, no peritoneal signs, no guarding  Extremities: no deformities, no edema   Neuro; follow commands, alert and oriented x3, spontaneous  speech, coherent, moves all extremities spontaneously  Psych; no hallucination, euthymic mood, not agitated        Data   Recent Labs   Lab 09/24/22  0751 09/24/22  0255 09/23/22 2029 09/23/22  1729   WBC 3.1*  --   --  4.6   HGB 13.0*  --   --  16.8   MCV 94  --   --  89   *  --   --  190     --  137 138   POTASSIUM 4.3  --  4.2 4.3   CHLORIDE 105  --  98 96*   CO2 26  --  20* 15*   BUN 12.0  --  11.6 10.7   CR 0.84  --  0.81 0.82   ANIONGAP 8  --  19* 27*   JOHANA 8.1*  --  8.9 10.1*   GLC 83 82 241* 99   ALBUMIN 3.7  --   --  4.6   PROTTOTAL 5.7*  --   --  7.7   BILITOTAL 1.2  --   --  0.7   ALKPHOS 50  --   --  69   ALT 93*  --   --  129*   *  --   --  174*     No results found for this or any previous visit (from the past 24 hour(s)).  Medications     sodium chloride 100 mL/hr at 09/24/22 0246       aspirin  325 mg Oral QPM     buprenorphine HCl-naloxone HCl  1 Film Sublingual BID     buPROPion  150 mg Oral QAM     buPROPion  300 mg Oral QAM     divalproex sodium extended-release  1,000 mg Oral At Bedtime     famotidine  20 mg Intravenous Q12H     folic acid  1 mg Oral QPM     [START ON 10/1/2022] gabapentin  100 mg Oral Q8H     [START ON 9/29/2022] gabapentin  300 mg Oral Q8H     [START ON 9/27/2022] gabapentin  600 mg Oral Q8H     gabapentin  900 mg Oral Q8H     insulin aspart  1-3 Units Subcutaneous TID AC     insulin aspart  1-3 Units Subcutaneous At Bedtime     magnesium sulfate  2 g Intravenous Once     multivitamin w/minerals  1 tablet Oral QPM     OLANZapine  10 mg Oral At Bedtime     sodium chloride (PF)  3 mL Intracatheter Q8H     thiamine  100 mg Oral QPM     tiZANidine  4 mg Oral At Bedtime     traZODone  100 mg Oral At Bedtime

## 2022-09-24 NOTE — PLAN OF CARE
Pertinent assessments: VSS. Anxious on admission. AO, but lethargic. CIWA 8 & 6. Tele SR. On K+ and Mg protocol. BG 82. Regular diet. NS infusing @ 100 mL/hr. Ax1, unsteady gait. Weaned to room air. Denies nausea.     Major Shift Events: Admitted to floor. Lethargic and slept remainder of shift.     Treatment Plan: IVF, CIWA, tele, addiction med consult

## 2022-09-24 NOTE — PLAN OF CARE
Pertinent assessments: Lethargic, sleeping comfortably. RA, continuous pulse ox in place. CIWA 5, 2, 9, 8. 3. 10 Mg oral valium given x2. Tele SR. BG 83, 78, 101.  mL/hr.     Major Shift Events:  Pt woken up to void, unable to, MD notified, straight cath for 1350.     Treatment Plan: IVF, CIWA, tele, addiction med consult

## 2022-09-25 LAB
GLUCOSE BLDC GLUCOMTR-MCNC: 102 MG/DL (ref 70–99)
GLUCOSE BLDC GLUCOMTR-MCNC: 121 MG/DL (ref 70–99)
GLUCOSE BLDC GLUCOMTR-MCNC: 125 MG/DL (ref 70–99)
GLUCOSE BLDC GLUCOMTR-MCNC: 140 MG/DL (ref 70–99)
GLUCOSE BLDC GLUCOMTR-MCNC: 99 MG/DL (ref 70–99)
MAGNESIUM SERPL-MCNC: 1.7 MG/DL (ref 1.7–2.3)
POTASSIUM SERPL-SCNC: 4.1 MMOL/L (ref 3.4–5.3)

## 2022-09-25 PROCEDURE — 120N000001 HC R&B MED SURG/OB

## 2022-09-25 PROCEDURE — 250N000011 HC RX IP 250 OP 636: Performed by: INTERNAL MEDICINE

## 2022-09-25 PROCEDURE — 84132 ASSAY OF SERUM POTASSIUM: CPT | Performed by: INTERNAL MEDICINE

## 2022-09-25 PROCEDURE — 99232 SBSQ HOSP IP/OBS MODERATE 35: CPT | Performed by: INTERNAL MEDICINE

## 2022-09-25 PROCEDURE — 36415 COLL VENOUS BLD VENIPUNCTURE: CPT | Performed by: INTERNAL MEDICINE

## 2022-09-25 PROCEDURE — 250N000011 HC RX IP 250 OP 636: Performed by: STUDENT IN AN ORGANIZED HEALTH CARE EDUCATION/TRAINING PROGRAM

## 2022-09-25 PROCEDURE — 250N000013 HC RX MED GY IP 250 OP 250 PS 637: Performed by: STUDENT IN AN ORGANIZED HEALTH CARE EDUCATION/TRAINING PROGRAM

## 2022-09-25 PROCEDURE — 83735 ASSAY OF MAGNESIUM: CPT | Performed by: INTERNAL MEDICINE

## 2022-09-25 PROCEDURE — 250N000013 HC RX MED GY IP 250 OP 250 PS 637: Performed by: INTERNAL MEDICINE

## 2022-09-25 RX ORDER — HALOPERIDOL 5 MG/ML
5 INJECTION INTRAMUSCULAR ONCE
Status: DISCONTINUED | OUTPATIENT
Start: 2022-09-25 | End: 2022-09-27

## 2022-09-25 RX ORDER — MAGNESIUM OXIDE 400 MG/1
400 TABLET ORAL EVERY 4 HOURS
Status: DISPENSED | OUTPATIENT
Start: 2022-09-25 | End: 2022-09-25

## 2022-09-25 RX ORDER — HALOPERIDOL 5 MG/ML
5 INJECTION INTRAMUSCULAR ONCE
Status: DISCONTINUED | OUTPATIENT
Start: 2022-09-25 | End: 2022-09-25

## 2022-09-25 RX ORDER — HALOPERIDOL 5 MG/ML
2 INJECTION INTRAMUSCULAR ONCE
Status: COMPLETED | OUTPATIENT
Start: 2022-09-25 | End: 2022-09-25

## 2022-09-25 RX ADMIN — DIAZEPAM 10 MG: 5 INJECTION INTRAMUSCULAR; INTRAVENOUS at 03:35

## 2022-09-25 RX ADMIN — TIZANIDINE 4 MG: 4 TABLET ORAL at 21:38

## 2022-09-25 RX ADMIN — Medication 400 MG: at 09:39

## 2022-09-25 RX ADMIN — DIVALPROEX SODIUM 1000 MG: 500 TABLET, FILM COATED, EXTENDED RELEASE ORAL at 21:37

## 2022-09-25 RX ADMIN — DIAZEPAM 10 MG: 10 TABLET ORAL at 00:52

## 2022-09-25 RX ADMIN — DIAZEPAM 10 MG: 10 TABLET ORAL at 18:32

## 2022-09-25 RX ADMIN — GABAPENTIN 900 MG: 300 CAPSULE ORAL at 07:31

## 2022-09-25 RX ADMIN — DIAZEPAM 10 MG: 5 INJECTION INTRAMUSCULAR; INTRAVENOUS at 07:32

## 2022-09-25 RX ADMIN — DIAZEPAM 10 MG: 10 TABLET ORAL at 05:15

## 2022-09-25 RX ADMIN — BUPROPION HYDROCHLORIDE 150 MG: 150 TABLET, FILM COATED, EXTENDED RELEASE ORAL at 07:31

## 2022-09-25 RX ADMIN — DIAZEPAM 10 MG: 5 INJECTION INTRAMUSCULAR; INTRAVENOUS at 09:58

## 2022-09-25 RX ADMIN — BUPRENORPHINE HYDROCHLORIDE, NALOXONE HYDROCHLORIDE 1 FILM: 8; 2 FILM, SOLUBLE BUCCAL; SUBLINGUAL at 04:07

## 2022-09-25 RX ADMIN — BUPRENORPHINE HYDROCHLORIDE, NALOXONE HYDROCHLORIDE 1 FILM: 8; 2 FILM, SOLUBLE BUCCAL; SUBLINGUAL at 17:39

## 2022-09-25 RX ADMIN — OLANZAPINE 5 MG: 5 TABLET, ORALLY DISINTEGRATING ORAL at 03:11

## 2022-09-25 RX ADMIN — DIAZEPAM 10 MG: 10 TABLET ORAL at 09:39

## 2022-09-25 RX ADMIN — BUPROPION HYDROCHLORIDE 300 MG: 150 TABLET, FILM COATED, EXTENDED RELEASE ORAL at 07:31

## 2022-09-25 RX ADMIN — DIAZEPAM 10 MG: 5 INJECTION INTRAMUSCULAR; INTRAVENOUS at 06:06

## 2022-09-25 RX ADMIN — THIAMINE HCL TAB 100 MG 100 MG: 100 TAB at 19:57

## 2022-09-25 RX ADMIN — MULTIPLE VITAMINS W/ MINERALS TAB 1 TABLET: TAB at 19:57

## 2022-09-25 RX ADMIN — FAMOTIDINE 20 MG: 10 INJECTION INTRAVENOUS at 19:57

## 2022-09-25 RX ADMIN — HALOPERIDOL LACTATE 2 MG: 5 INJECTION, SOLUTION INTRAMUSCULAR at 05:35

## 2022-09-25 RX ADMIN — DIAZEPAM 10 MG: 10 TABLET ORAL at 20:04

## 2022-09-25 RX ADMIN — DIAZEPAM 10 MG: 5 INJECTION INTRAMUSCULAR; INTRAVENOUS at 02:14

## 2022-09-25 RX ADMIN — OLANZAPINE 10 MG: 10 TABLET, FILM COATED ORAL at 21:38

## 2022-09-25 RX ADMIN — FOLIC ACID 1 MG: 1 TABLET ORAL at 19:57

## 2022-09-25 RX ADMIN — ACETAMINOPHEN 650 MG: 325 TABLET, FILM COATED ORAL at 02:13

## 2022-09-25 RX ADMIN — ASPIRIN 325 MG ORAL TABLET 325 MG: 325 PILL ORAL at 19:57

## 2022-09-25 RX ADMIN — GABAPENTIN 900 MG: 300 CAPSULE ORAL at 00:06

## 2022-09-25 RX ADMIN — TRAZODONE HYDROCHLORIDE 100 MG: 100 TABLET ORAL at 21:38

## 2022-09-25 RX ADMIN — FAMOTIDINE 20 MG: 10 INJECTION INTRAVENOUS at 07:31

## 2022-09-25 RX ADMIN — DIAZEPAM 10 MG: 10 TABLET ORAL at 21:51

## 2022-09-25 ASSESSMENT — ACTIVITIES OF DAILY LIVING (ADL)
ADLS_ACUITY_SCORE: 35

## 2022-09-25 NOTE — PROGRESS NOTES
[facility]    Medicine Progress Note - Hospitalist Service    Date of Admission:  9/23/2022    Assessment & Plan           Mr. Andrea Pham is a 57 year old male with a history of alcohol abuse and withdrawal, seizure, and pancreatitis admitted on 9/23/2022 with alcohol withdrawal.  Pending clinical improvement.    Alcohol withdrawal, without seizure   History ETOH abuse   Anion gap metabolic acidosis, suspect related to above  Lactic acidosis, suspect related to above  Ketosis, suspect related to above  Elevated LFTs  -Per EMS, the patients girlfriend called because the patient is shaking and having worse withdrawal symptoms than normal.   -Report that he is anxious, has a fever, nauseous, and vomited.  Last drink of alcohol was 1 hour prior to arrival.  He has been working on decreasing his alcohol intake over the last month or so, in the past at most he would have about 10 beers per day and reports a past use of hard liquor as well.    -He denies history of seizures, although this history has been documented in his medical record. Denies use of marijuana, methamphetamines, and cocaine.    -He is shaky and feeling anxious  -CIWA monitoring with diazepam, gabapentin protocol   -Daily BMP, mag and Phos with replacements   -Folic acid, multivitamin and thiamine  -Cardiac monitoring  -As needed pain meds and antiemetics  -Lactic acid improved.  Will discontinue IV fluid today    Hyperglycemia  -Low-dose sliding scale insulin while hospitalized  -Hypoglycemia protocol    MDD   Bipolar disorder   Insomnia -PTA bupropion 450 mg QAM, tizanidine 4 mg nightly, trazodone 100 mg nightly  Seizure - PTA Depakote   HTN - PRN hydralazine   History of drug abuse - denies current use , PTA suboxone        Diet: Combination Diet Regular Diet Adult    DVT Prophylaxis: Pneumatic Compression Devices  Lino Catheter: Not present  Central Lines: None  Cardiac Monitoring: ACTIVE order. Indication: alcohol  withdrawal  Code Status: Full Code      Disposition Plan      Expected Discharge Date: Likely discharge in 2 to 3 days once withdrawal symptoms improve     The patient's care was discussed with the Bedside Nurse.    Lesley Bey MD, MD  Hospitalist Service  [facility]  Securely message with the Vocera Web Console (learn more here)  Text page via [facility] Paging/Directory         Clinically Significant Risk Factors Present on Admission     ______________________________________________________________________    Interval History   Patient seen and examined. He stated that he is feeling better. He has no nausea or vomiting.  He is shaking and feeling anxious.  Denies cough or fever.      Data reviewed today: I reviewed all medications, new labs and imaging results over the last 24 hours. I personally reviewed .    Physical Exam   Vital Signs: Temp: 98.5  F (36.9  C) Temp src: Oral BP: 117/70 Pulse: 86   Resp: 16 SpO2: 90 % O2 Device: None (Room air)    Weight: 207 lbs 6.4 oz  HEENT; Atraumatic, normocephalic, pinkish conjuctiva, pupils bilateral reactive   Skin: warm and moist, no rashes  Lungs: equal chest expansion, clear to auscultation, no wheezes, no stridor, no crackles,   Heart: normal rate, normal rhythm, no rubs or gallops.   Abdomen: normal bowel sounds, no tenderness, no peritoneal signs, no guarding  Extremities: no deformities, no edema   Neuro; follow commands, alert and oriented x3, spontaneous speech, coherent, moves all extremities spontaneously  Psych; no hallucination, euthymic mood, not agitated        Data   Recent Labs   Lab 09/25/22  0859 09/25/22  0812 09/25/22  0721 09/25/22  0108 09/24/22  1213 09/24/22  0751 09/24/22  0255 09/23/22  2029 09/23/22  1729   WBC  --   --   --   --   --  3.1*  --   --  4.6   HGB  --   --   --   --   --  13.0*  --   --  16.8   MCV  --   --   --   --   --  94  --   --  89   PLT  --   --   --   --   --  130*  --   --  190   NA  --   --    --   --   --  139  --  137 138   POTASSIUM  --  4.1  --   --   --  4.3  --  4.2 4.3   CHLORIDE  --   --   --   --   --  105  --  98 96*   CO2  --   --   --   --   --  26  --  20* 15*   BUN  --   --   --   --   --  12.0  --  11.6 10.7   CR  --   --   --   --   --  0.84  --  0.81 0.82   ANIONGAP  --   --   --   --   --  8  --  19* 27*   JOHANA  --   --   --   --   --  8.1*  --  8.9 10.1*   GLC 99  --  121* 125*   < > 83   < > 241* 99   ALBUMIN  --   --   --   --   --  3.7  --   --  4.6   PROTTOTAL  --   --   --   --   --  5.7*  --   --  7.7   BILITOTAL  --   --   --   --   --  1.2  --   --  0.7   ALKPHOS  --   --   --   --   --  50  --   --  69   ALT  --   --   --   --   --  93*  --   --  129*   AST  --   --   --   --   --  118*  --   --  174*    < > = values in this interval not displayed.     No results found for this or any previous visit (from the past 24 hour(s)).  Medications     sodium chloride 100 mL/hr at 09/24/22 2142       aspirin  325 mg Oral QPM     buprenorphine HCl-naloxone HCl  1 Film Sublingual BID     buPROPion  150 mg Oral QAM     buPROPion  300 mg Oral QAM     divalproex sodium extended-release  1,000 mg Oral At Bedtime     famotidine  20 mg Intravenous Q12H     folic acid  1 mg Oral QPM     [START ON 10/1/2022] gabapentin  100 mg Oral Q8H     [START ON 9/29/2022] gabapentin  300 mg Oral Q8H     [START ON 9/27/2022] gabapentin  600 mg Oral Q8H     gabapentin  900 mg Oral Q8H     haloperidol lactate  5 mg Intramuscular Once     insulin aspart  1-3 Units Subcutaneous TID AC     insulin aspart  1-3 Units Subcutaneous At Bedtime     magnesium oxide  400 mg Oral Q4H     multivitamin w/minerals  1 tablet Oral QPM     OLANZapine  10 mg Oral At Bedtime     sodium chloride (PF)  3 mL Intracatheter Q8H     thiamine  100 mg Oral QPM     tiZANidine  4 mg Oral At Bedtime     traZODone  100 mg Oral At Bedtime

## 2022-09-25 NOTE — PLAN OF CARE
Goal Outcome Evaluation:      To Do:  End of Shift Summary  For vital signs and complete assessments, please see documentation flowsheets.     Pertinent assessments: Lethargic but arouse to voice. CIWA (confusion, visual hallucination, anxiety and agitation) 8,4,11,15,4,17,7,16,19. Valium 70 mg total IV/PO and Haldol given. Pt pulled IV and removed tele. Pt had BM with smear of blood.   Major Shift Events  and straight cath for 700 ml. Pagecanelo SOLIMAN pt trying to leave his room, haldol 2 mg one time given with no effect. Code 21 was called pt attempting to leave the hospital and visual hallucination confusion continues, haldol IM ordered.  Treatment Plan: ETOH protocol, addiction med consult  and symptom management.  Bedside Nurse: Cat Tristan RN

## 2022-09-25 NOTE — PLAN OF CARE
Pertinent assessments: Lethargic but arouse to voice. CIWA 18,7,12,16, 6, 6, 12 Valium 40mg. Refusing tele.    Major Shift Events Unable to void after multiple attempts, straight cathed for 1000mL. Later, able to void >500, .     Treatment Plan: ETOH protocol, addiction med consult and symptom management.

## 2022-09-26 LAB
ANION GAP SERPL CALCULATED.3IONS-SCNC: 7 MMOL/L (ref 7–15)
ATRIAL RATE - MUSE: 104 BPM
BUN SERPL-MCNC: 12.6 MG/DL (ref 6–20)
CALCIUM SERPL-MCNC: 9 MG/DL (ref 8.6–10)
CHLORIDE SERPL-SCNC: 105 MMOL/L (ref 98–107)
CREAT SERPL-MCNC: 0.79 MG/DL (ref 0.67–1.17)
DEPRECATED HCO3 PLAS-SCNC: 29 MMOL/L (ref 22–29)
DIASTOLIC BLOOD PRESSURE - MUSE: NORMAL MMHG
ERYTHROCYTE [DISTWIDTH] IN BLOOD BY AUTOMATED COUNT: 15 % (ref 10–15)
GFR SERPL CREATININE-BSD FRML MDRD: >90 ML/MIN/1.73M2
GLUCOSE BLDC GLUCOMTR-MCNC: 103 MG/DL (ref 70–99)
GLUCOSE BLDC GLUCOMTR-MCNC: 120 MG/DL (ref 70–99)
GLUCOSE BLDC GLUCOMTR-MCNC: 123 MG/DL (ref 70–99)
GLUCOSE BLDC GLUCOMTR-MCNC: 129 MG/DL (ref 70–99)
GLUCOSE BLDC GLUCOMTR-MCNC: 138 MG/DL (ref 70–99)
GLUCOSE BLDC GLUCOMTR-MCNC: 94 MG/DL (ref 70–99)
GLUCOSE SERPL-MCNC: 96 MG/DL (ref 70–99)
HCT VFR BLD AUTO: 36.6 % (ref 40–53)
HGB BLD-MCNC: 12 G/DL (ref 13.3–17.7)
INTERPRETATION ECG - MUSE: NORMAL
MAGNESIUM SERPL-MCNC: 1.5 MG/DL (ref 1.7–2.3)
MAGNESIUM SERPL-MCNC: 2 MG/DL (ref 1.7–2.3)
MCH RBC QN AUTO: 31.3 PG (ref 26.5–33)
MCHC RBC AUTO-ENTMCNC: 32.8 G/DL (ref 31.5–36.5)
MCV RBC AUTO: 95 FL (ref 78–100)
P AXIS - MUSE: 78 DEGREES
PLATELET # BLD AUTO: 99 10E3/UL (ref 150–450)
POTASSIUM SERPL-SCNC: 3.8 MMOL/L (ref 3.4–5.3)
PR INTERVAL - MUSE: 166 MS
QRS DURATION - MUSE: 86 MS
QT - MUSE: 338 MS
QTC - MUSE: 444 MS
R AXIS - MUSE: -40 DEGREES
RBC # BLD AUTO: 3.84 10E6/UL (ref 4.4–5.9)
SODIUM SERPL-SCNC: 141 MMOL/L (ref 136–145)
SYSTOLIC BLOOD PRESSURE - MUSE: NORMAL MMHG
T AXIS - MUSE: 61 DEGREES
VENTRICULAR RATE- MUSE: 104 BPM
WBC # BLD AUTO: 4 10E3/UL (ref 4–11)

## 2022-09-26 PROCEDURE — 99233 SBSQ HOSP IP/OBS HIGH 50: CPT | Mod: 25 | Performed by: NURSE PRACTITIONER

## 2022-09-26 PROCEDURE — 36415 COLL VENOUS BLD VENIPUNCTURE: CPT | Performed by: INTERNAL MEDICINE

## 2022-09-26 PROCEDURE — 258N000003 HC RX IP 258 OP 636: Performed by: STUDENT IN AN ORGANIZED HEALTH CARE EDUCATION/TRAINING PROGRAM

## 2022-09-26 PROCEDURE — 83735 ASSAY OF MAGNESIUM: CPT | Performed by: INTERNAL MEDICINE

## 2022-09-26 PROCEDURE — 120N000001 HC R&B MED SURG/OB

## 2022-09-26 PROCEDURE — 85027 COMPLETE CBC AUTOMATED: CPT | Performed by: INTERNAL MEDICINE

## 2022-09-26 PROCEDURE — 99232 SBSQ HOSP IP/OBS MODERATE 35: CPT | Performed by: INTERNAL MEDICINE

## 2022-09-26 PROCEDURE — 250N000011 HC RX IP 250 OP 636: Performed by: INTERNAL MEDICINE

## 2022-09-26 PROCEDURE — 250N000013 HC RX MED GY IP 250 OP 250 PS 637: Performed by: STUDENT IN AN ORGANIZED HEALTH CARE EDUCATION/TRAINING PROGRAM

## 2022-09-26 PROCEDURE — 250N000011 HC RX IP 250 OP 636: Performed by: STUDENT IN AN ORGANIZED HEALTH CARE EDUCATION/TRAINING PROGRAM

## 2022-09-26 PROCEDURE — 82310 ASSAY OF CALCIUM: CPT | Performed by: INTERNAL MEDICINE

## 2022-09-26 RX ORDER — MAGNESIUM SULFATE HEPTAHYDRATE 40 MG/ML
4 INJECTION, SOLUTION INTRAVENOUS ONCE
Status: COMPLETED | OUTPATIENT
Start: 2022-09-26 | End: 2022-09-26

## 2022-09-26 RX ADMIN — FOLIC ACID 1 MG: 1 TABLET ORAL at 20:16

## 2022-09-26 RX ADMIN — GABAPENTIN 900 MG: 300 CAPSULE ORAL at 14:54

## 2022-09-26 RX ADMIN — OLANZAPINE 10 MG: 10 TABLET, FILM COATED ORAL at 23:08

## 2022-09-26 RX ADMIN — TRAZODONE HYDROCHLORIDE 100 MG: 100 TABLET ORAL at 23:06

## 2022-09-26 RX ADMIN — FAMOTIDINE 20 MG: 10 INJECTION INTRAVENOUS at 20:16

## 2022-09-26 RX ADMIN — DIAZEPAM 10 MG: 10 TABLET ORAL at 04:02

## 2022-09-26 RX ADMIN — GABAPENTIN 900 MG: 300 CAPSULE ORAL at 23:08

## 2022-09-26 RX ADMIN — ASPIRIN 325 MG ORAL TABLET 325 MG: 325 PILL ORAL at 20:16

## 2022-09-26 RX ADMIN — DIAZEPAM 10 MG: 10 TABLET ORAL at 06:17

## 2022-09-26 RX ADMIN — HALOPERIDOL LACTATE 5 MG: 5 INJECTION, SOLUTION INTRAMUSCULAR at 04:16

## 2022-09-26 RX ADMIN — DIAZEPAM 10 MG: 10 TABLET ORAL at 04:48

## 2022-09-26 RX ADMIN — ACETAMINOPHEN 650 MG: 325 TABLET, FILM COATED ORAL at 06:17

## 2022-09-26 RX ADMIN — GABAPENTIN 900 MG: 300 CAPSULE ORAL at 02:04

## 2022-09-26 RX ADMIN — MAGNESIUM SULFATE HEPTAHYDRATE 4 G: 4 INJECTION, SOLUTION INTRAVENOUS at 09:46

## 2022-09-26 RX ADMIN — DIAZEPAM 10 MG: 10 TABLET ORAL at 18:53

## 2022-09-26 RX ADMIN — THIAMINE HCL TAB 100 MG 100 MG: 100 TAB at 20:16

## 2022-09-26 RX ADMIN — FAMOTIDINE 20 MG: 10 INJECTION INTRAVENOUS at 08:53

## 2022-09-26 RX ADMIN — DIVALPROEX SODIUM 1000 MG: 500 TABLET, FILM COATED, EXTENDED RELEASE ORAL at 23:06

## 2022-09-26 RX ADMIN — DIAZEPAM 10 MG: 10 TABLET ORAL at 02:04

## 2022-09-26 RX ADMIN — DIAZEPAM 10 MG: 10 TABLET ORAL at 20:25

## 2022-09-26 RX ADMIN — SODIUM CHLORIDE: 9 INJECTION, SOLUTION INTRAVENOUS at 17:16

## 2022-09-26 RX ADMIN — BUPRENORPHINE HYDROCHLORIDE, NALOXONE HYDROCHLORIDE 1 FILM: 8; 2 FILM, SOLUBLE BUCCAL; SUBLINGUAL at 05:33

## 2022-09-26 RX ADMIN — BUPRENORPHINE HYDROCHLORIDE, NALOXONE HYDROCHLORIDE 1 FILM: 8; 2 FILM, SOLUBLE BUCCAL; SUBLINGUAL at 17:16

## 2022-09-26 RX ADMIN — DIAZEPAM 10 MG: 10 TABLET ORAL at 05:33

## 2022-09-26 RX ADMIN — DIAZEPAM 10 MG: 10 TABLET ORAL at 14:55

## 2022-09-26 RX ADMIN — TIZANIDINE 4 MG: 4 TABLET ORAL at 23:16

## 2022-09-26 RX ADMIN — MULTIPLE VITAMINS W/ MINERALS TAB 1 TABLET: TAB at 20:16

## 2022-09-26 ASSESSMENT — ACTIVITIES OF DAILY LIVING (ADL)
ADLS_ACUITY_SCORE: 35
ADLS_ACUITY_SCORE: 39
ADLS_ACUITY_SCORE: 35
ADLS_ACUITY_SCORE: 42
ADLS_ACUITY_SCORE: 36
ADLS_ACUITY_SCORE: 35
ADLS_ACUITY_SCORE: 35
ADLS_ACUITY_SCORE: 42

## 2022-09-26 NOTE — PROGRESS NOTES
End of Shift Summary  For vital signs and complete assessments, please see documentation flowsheets.     Pertinent assessments: Pt alert to self only, confused, forgetful, paranoid. On RA, c/o left hip pain. BG checks 102, 123. On tele monitoring. Bladder scanned for 125    Major Shift Events: pt extremely paranoid throughout the shift, believes people are taking his things, total of 70 mg oral Valium given per CIWA protocol, IV Haldol x 1, pt removed PIV     Treatment Plan: Suboxone, CIWA protocol, addiction med consult and symptom management.

## 2022-09-26 NOTE — CONSULTS
Addiction Medicine Consultation    Andrea Pham, 1965, [id number]    Primary:  No Ref-Primary, Physician, None           Assessment and Plan:     Active Problems:    * No active hospital problems. *  Attempted to see Andrea multiple times but was unable to connect. Rapid response has been called currently. Chart reviewed.     Andrea Pham is a 57 year old old male with a past medical history significant for alcohol dependence, alcohol withdrawal, seizures, pancreatitis, bipolar, MDD, methamphetamine use disorder in remission, and opiate use disorder in sustained remission on suboxone who presented to [facility] ED for acute alcohol withdrawal. Consultation requested for Alcohol withdrawal.    Andrea was brought in by EMS after girlfriend called because patient was experiencing tremors  and worsening withdrawal symptoms including nausea and vomiting, fever, confusion.  On presentation labs notable for lactic acid 4.5, transaminitis and hypomagnesemia.  Andrea was placed on CIWA diazepam and has received a total of Valium 270 mg, and Haldol 12mg. CIWA scores 6-12 for tremors, agitation, sweats, and disorientation.  He is experiencing delirium tremens.      Recommendations:  1. Continue CIWA diazepam. Has received a total of 270 mg of valium thus far, will need a valium taper to stop valium to avoid benzodiazepine withdrawal/seizure this should start once acute withdrawal has resolved.  2. If he continues to experience delirum tremens needing multiple doses of valium, consider transfer to ICU for precedex.   3. Will consider adding scheduled valium and continue as needed doses, if he does not require ICU transfer.   4. Recommend CD assessment to be completed for inpatient CD treatment once stable.    5. Recommend continued monitoring of hepatic function which are down trending. Plan to start naltrexone once acute withdrawal resolved.   6. Consider continuing gabapentin 600 mg TID at discharge for post acute withdrawal  symptoms.   7. Addiction medicine will continue to follow.     For questions and concerns, please page [phone]. Addiction Medicine team is currently available Monday through Friday.    Chief Complaint: Alcohol withdrawal     HPI:    Andrea Pham is a 57 year old old male with a past medical history significant for alcohol dependence, alcohol withdrawal, seizures, pancreatitis, bipolar, MDD, methamphetamine use disorder in remission, and opiate use disorder in sustained remission on suboxone who presented to [facility] ED for acute alcohol withdrawal. Consultation requested for Alcohol withdrawal.    Andrea was brought in by EMS after girlfriend called because patient was experiencing tremors  and worsening withdrawal symptoms including nausea and vomiting, fever, confusion.  On presentation labs notable for lactic acid 4.5, transaminitis and hypomagnesemia.  Andrea was placed on CIWA diazepam and has received a total of Valium 270 mg, and Haldol 12mg.     Substance of choice: Alcohol          Medical History  Past Medical History:   Diagnosis Date     Ataxia      Bipolar disorder (H)      Chemical dependency (H)      Chronic hip pain      Chronic pain syndrome      Cocaine abuse (H)      Depressive disorder      DTs (delirium tremens) (H)      DVT (deep venous thrombosis) (H) 5 y ago    left foot, treated with coumadin     Elevated LFTs      Flail chest     broken sterum, wiring      Hepatitis C virus infection, unspecified chronicity      Heroin abuse (H)      History of methamphetamine abuse (H)      History of total hip arthroplasty     right     Hypertension      Seizures (H)      Substance abuse (H)     alcohol, opiates     Wernicke's encephalopathy        Surgical History  Past Surgical History:   Procedure Laterality Date     CHEST SURGERY  1987    flail chest, sterum fractured     HIP SURGERY       KNEE SURGERY       OPEN REDUCTION INTERNAL FIXATION ANKLE Right 12/24/2021    Procedure: Open reduction internal  fixation right ankle syndesmotic injury;  Surgeon: Leroy Thomason MD;  Location:  OR     ORTHOPEDIC SURGERY      KIMBER right. Stephanie left shoulder surgery, debridement right shoulder, neck surgery- fracture cervical spine. 6 knee surgeries- bucket handle meniscal tear and debridement. 3 heel surgeries.      ORTHOPEDIC SURGERY       REMOVE HARDWARE ANKLE Right 7/13/2022    Procedure: removal of hardware of right ankle;  Surgeon: Leroy Thomason MD;  Location:  OR       Social History  Reviewed    Social History     Tobacco Use     Smoking status: Current Every Day Smoker     Packs/day: 0.50     Years: 10.00     Pack years: 5.00     Types: Vaping Device     Last attempt to quit: 4/19/2019     Years since quitting: 3.4     Smokeless tobacco: Never Used     Tobacco comment: Quit 2019   Substance Use Topics     Alcohol use: Not Currently     Alcohol/week: 24.0 standard drinks     Types: 24 Cans of beer per week     Comment: drinks 1.75L grain alcohol daily since 4/28/18, clean 11 months as of 7/7/2022       Family History  Reviewed    family history includes Substance Abuse in his father, mother, and sister.    Prior to Admission Medications   Medications Prior to Admission   Medication Sig Dispense Refill Last Dose     acetaminophen (TYLENOL) 500 MG tablet Take 2 tablets (1,000 mg) by mouth 3 times daily (Patient taking differently: Take 1,000 mg by mouth every 6 hours as needed for pain)   prn at prn     amoxicillin-clavulanate (AUGMENTIN) 875-125 MG tablet Take 1 tablet by mouth 2 times daily   Past Week at Pt states he has maybe taken 1 dose     aspirin (ASA) 325 MG tablet Take 1 tablet (325 mg) by mouth daily 30 tablet 0 9/22/2022 at pm     buprenorphine HCl-naloxone HCl (SUBOXONE) 8-2 MG per film Place 1 Film under the tongue 2 times daily   9/23/2022 at x1     buPROPion (WELLBUTRIN XL) 150 MG 24 hr tablet Take 150 mg by mouth every morning Takes with 300mg tablet for total dose = 450mg   9/22/2022 at am     buPROPion  (WELLBUTRIN XL) 300 MG 24 hr tablet Take 300 mg by mouth every morning Takes with 150mg tablet for total dose = 450mg   9/22/2022 at am     divalproex sodium extended-release (DEPAKOTE ER) 500 MG 24 hr tablet Take 1,000 mg by mouth At Bedtime   9/22/2022 at hs     Ferrous Gluconate 324 (37.5 Fe) MG TABS Take 324 mg by mouth daily   9/22/2022 at pm     folic acid (FOLVITE) 1 MG tablet Take 1 mg by mouth daily   9/22/2022 at pm     ibuprofen (ADVIL/MOTRIN) 600 MG tablet Take 1 tablet (600 mg) by mouth 4 times daily (Patient taking differently: Take 600 mg by mouth every 6 hours as needed for mild pain) 45 tablet 0 prn at prn     multivitamin w/minerals (THERA-VIT-M) tablet Take 1 tablet by mouth every evening   9/22/2022 at pm     naloxone (NARCAN) 4 MG/0.1ML nasal spray Spray 1 spray (4 mg) into one nostril alternating nostrils once as needed for opioid reversal every 2-3 minutes until assistance arrives 2 each 0 prn at prn     OLANZapine (ZYPREXA) 10 MG tablet Take 10 mg by mouth At Bedtime   9/22/2022 at hs     pregabalin (LYRICA) 100 MG capsule Take 100 mg by mouth daily   9/22/2022 at pm     testosterone cypionate (DEPOTESTOSTERONE) 200 MG/ML injection Inject 200 mg into the muscle every 14 days   Past Month at Due for dose tomorrow 9/24     thiamine (B-1) 100 MG tablet Take 100 mg by mouth daily   9/22/2022 at pm     tiZANidine (ZANAFLEX) 4 MG tablet Take 4 mg by mouth At Bedtime   9/22/2022 at hs     traZODone (DESYREL) 100 MG tablet Take 100 mg by mouth At Bedtime   9/22/2022 at hs     magic mouthwash suspension, diphenhydrAMINE, lidocaine, aluminum-magnesium & simethicone, (FIRST-MOUTHWASH BLM) compounding kit Swish and swallow 5-10 mLs in mouth every 6 hours as needed for mouth sores (Patient not taking: Reported on 9/23/2022) 80 mL 0 Not Taking at Unknown time       Allergies  No Known Allergies     Review of Systems:    A 10 point review of systems was completed and negative apart from that which is  mentioned in the HPI.      Physical Exam:    /63 (BP Location: Right arm)   Pulse 60   Temp 98.2  F (36.8  C) (Oral)   Resp 16   Wt 94.1 kg (207 lb 6.4 oz)   SpO2 94%   BMI 28.93 kg/m        Results:  Lab Results personally reviewed.    ALT 93  Bili 0.7  Plt 99  Mg 1.7    ETOH 0.26  Urine drug negative  Lactic acid 0.7   personally reviewed and shows:  08/29/2022  2   08/29/2022  Buprenorphine-Nalox 8-2mg Film    60.00  30 Al Mas   [id number]   [facility] ([id number])   0/1  16.00 [place]   08/29/2022  2   08/29/2022  Pregabalin 100 MG Capsule    30.00  30 Al Mas   [id number]   [facility] ([id number])   0/1  0.67 LME  [place]   08/01/2022  2   08/01/2022  Pregabalin 100 MG Capsule    30.00  30 So The   [id number]   [facility] ([id number])   0/0  0.67 LME  [place]   07/24/2022  2   06/22/2022  Buprenorphine-Nalox 8-2mg Film    60.00  30 Al Mas   [id number]   [facility] ([id number])   1/1  16.00 mg  [place]   06/27/2022  2   06/27/2022  Pregabalin 100 MG Capsule    30.00  30 Al Mas   [id number]   [facility] ([id number])   0/0  0.67 LME  [place]   06/22/2022  2   06/22/2022  Buprenorphine-Nalox 8-2mg Film    60.00  30 Al Mas   [id number]   [facility] ([id number])   0/1  16.00 mg            Ignacia Dubon, DNP, MSN, AGNP-C  Addiction Medicine

## 2022-09-26 NOTE — PROGRESS NOTES
[facility]    Medicine Progress Note - Hospitalist Service    Date of Admission:  9/23/2022    Assessment & Plan           Mr. Andrea Pham is a 57 year old male with a history of alcohol abuse and withdrawal, seizure, and pancreatitis admitted on 9/23/2022 with alcohol withdrawal.  Pending clinical improvement.    Alcohol withdrawal, without seizure   History ETOH abuse   Anion gap metabolic acidosis, suspect related to above  Lactic acidosis, suspect related to above  Ketosis, suspect related to above  Elevated LFTs  -Per EMS, the patients girlfriend called because the patient is shaking and having worse withdrawal symptoms than normal.   -Report that he is anxious, has a fever, nauseous, and vomited. Last drink of alcohol was 1 hour prior to arrival. He has been working on decreasing his alcohol intake over the last month or so, in the past at most he would have about 10 beers per day and reports a past use of hard liquor as well.    -He denies history of seizures, although this history has been documented in his medical record. Denies use of marijuana, methamphetamines, and cocaine.    -CIWA monitoring with diazepam, gabapentin protocol   -Daily BMP, mag and Phos with replacements   -Folic acid, multivitamin and thiamine  -As needed pain meds and antiemetics  -Lactic acid improved.   -Will continue IV fluid for now until fully awake and able to take adequate intake.     Hyperglycemia  -Low-dose sliding scale insulin while hospitalized  -Hypoglycemia protocol    MDD   Bipolar disorder   Insomnia -PTA bupropion 450 mg QAM, tizanidine 4 mg nightly, trazodone 100 mg nightly  Seizure - PTA Depakote   HTN - PRN hydralazine   History of drug abuse - denies current use , PTA suboxone     Diet: Combination Diet Regular Diet Adult    DVT Prophylaxis: Pneumatic Compression Devices  Lino Catheter: Not present  Central Lines: None  Cardiac Monitoring: ACTIVE order. Indication: alcohol  withdrawal  Code Status: Full Code      Disposition Plan      Expected Discharge Date: Likely discharge in 2 to 3 days once withdrawal symptoms improve     The patient's care was discussed with the Bedside Nurse.    Lesley Bey MD, MD  Hospitalist Service  [facility]  Securely message with the Vocera Web Console (learn more here)  Text page via [facility] Paging/Directory         Clinically Significant Risk Factors Present on Admission     ______________________________________________________________________    Interval History   Patient seen and examined. He is sleepy but arousable. No new complaints. Has no nausea or vomiting. Has no fever    Data reviewed today: I reviewed all medications, new labs and imaging results over the last 24 hours. I personally reviewed .    Physical Exam   Vital Signs: Temp: 99.1  F (37.3  C) Temp src: Axillary BP: 109/57 Pulse: 78   Resp: 20 SpO2: 91 % O2 Device: Nasal cannula Oxygen Delivery: 2 LPM  Weight: 207 lbs 6.4 oz  HEENT; Atraumatic, normocephalic, pinkish conjuctiva, pupils bilateral reactive   Skin: warm and moist, no rashes  Lungs: equal chest expansion, clear to auscultation, no wheezes, no stridor, no crackles,   Heart: normal rate, normal rhythm, no rubs or gallops.   Abdomen: normal bowel sounds, no tenderness, no peritoneal signs, no guarding  Extremities: no deformities, no edema   Neuro; follow commands, alert and oriented x3, spontaneous speech, coherent, moves all extremities spontaneously  Psych; no hallucination, euthymic mood, not agitated        Data   Recent Labs   Lab 09/26/22  0845 09/26/22  0741 09/26/22  0204 09/25/22  0859 09/25/22  0812 09/24/22  1213 09/24/22  0751 09/24/22  0255 09/23/22  2029 09/23/22  1729   WBC  --  4.0  --   --   --   --  3.1*  --   --  4.6   HGB  --  12.0*  --   --   --   --  13.0*  --   --  16.8   MCV  --  95  --   --   --   --  94  --   --  89   PLT  --  99*  --   --   --   --  130*  --   --  190    NA  --  141  --   --   --   --  139  --  137 138   POTASSIUM  --  3.8  --   --  4.1  --  4.3  --  4.2 4.3   CHLORIDE  --  105  --   --   --   --  105  --  98 96*   CO2  --  29  --   --   --   --  26  --  20* 15*   BUN  --  12.6  --   --   --   --  12.0  --  11.6 10.7   CR  --  0.79  --   --   --   --  0.84  --  0.81 0.82   ANIONGAP  --  7  --   --   --   --  8  --  19* 27*   JOHANA  --  9.0  --   --   --   --  8.1*  --  8.9 10.1*   * 96 123*   < >  --    < > 83   < > 241* 99   ALBUMIN  --   --   --   --   --   --  3.7  --   --  4.6   PROTTOTAL  --   --   --   --   --   --  5.7*  --   --  7.7   BILITOTAL  --   --   --   --   --   --  1.2  --   --  0.7   ALKPHOS  --   --   --   --   --   --  50  --   --  69   ALT  --   --   --   --   --   --  93*  --   --  129*   AST  --   --   --   --   --   --  118*  --   --  174*    < > = values in this interval not displayed.     No results found for this or any previous visit (from the past 24 hour(s)).  Medications     sodium chloride 100 mL/hr at 09/24/22 2142       aspirin  325 mg Oral QPM     buprenorphine HCl-naloxone HCl  1 Film Sublingual BID     buPROPion  150 mg Oral QAM     buPROPion  300 mg Oral QAM     divalproex sodium extended-release  1,000 mg Oral At Bedtime     famotidine  20 mg Intravenous Q12H     folic acid  1 mg Oral QPM     [START ON 10/1/2022] gabapentin  100 mg Oral Q8H     [START ON 9/29/2022] gabapentin  300 mg Oral Q8H     [START ON 9/27/2022] gabapentin  600 mg Oral Q8H     gabapentin  900 mg Oral Q8H     haloperidol lactate  5 mg Intramuscular Once     insulin aspart  1-3 Units Subcutaneous TID AC     insulin aspart  1-3 Units Subcutaneous At Bedtime     magnesium sulfate  4 g Intravenous Once     multivitamin w/minerals  1 tablet Oral QPM     OLANZapine  10 mg Oral At Bedtime     sodium chloride (PF)  3 mL Intracatheter Q8H     thiamine  100 mg Oral QPM     tiZANidine  4 mg Oral At Bedtime     traZODone  100 mg Oral At Bedtime

## 2022-09-26 NOTE — PLAN OF CARE
Goal Outcome Evaluation:      End of Shift Summary  For vital signs and complete assessments, please see documentation flowsheets.     Pertinent assessments: Pt alert and oriented to self and place at times. Sleepy most of the shift, but easily arousable. Confused. VSS, required 2L O2 at times with sleep. Denies pain. , 138, and 94. New IV started, magnesium replaced, IVF infusing. Bedside attendant in place. CIWA scores 4, 4, 12, 6, and 10. Tolerating diet. Transfers with Ax2. Straight cathed for 800ml. Tele reads SR per tele tech.     Major Shift Events: New IV place, CIWA protocol, received 20 mg of valium. Straight cathed x1.    Treatment Plan: Suboxone, CIWA protocol, addiction med consult and symptom management.

## 2022-09-27 ENCOUNTER — APPOINTMENT (OUTPATIENT)
Dept: CT IMAGING | Facility: CLINIC | Age: 57
DRG: 896 | End: 2022-09-27
Attending: INTERNAL MEDICINE
Payer: MEDICARE

## 2022-09-27 ENCOUNTER — APPOINTMENT (OUTPATIENT)
Dept: GENERAL RADIOLOGY | Facility: CLINIC | Age: 57
DRG: 896 | End: 2022-09-27
Attending: INTERNAL MEDICINE
Payer: MEDICARE

## 2022-09-27 LAB
ALBUMIN UR-MCNC: NEGATIVE MG/DL
ANION GAP SERPL CALCULATED.3IONS-SCNC: 10 MMOL/L (ref 7–15)
APPEARANCE UR: CLEAR
BILIRUB UR QL STRIP: NEGATIVE
BUN SERPL-MCNC: 8.8 MG/DL (ref 6–20)
CALCIUM SERPL-MCNC: 8.7 MG/DL (ref 8.6–10)
CHLORIDE SERPL-SCNC: 105 MMOL/L (ref 98–107)
COLOR UR AUTO: YELLOW
CREAT SERPL-MCNC: 0.85 MG/DL (ref 0.67–1.17)
DEPRECATED HCO3 PLAS-SCNC: 26 MMOL/L (ref 22–29)
ERYTHROCYTE [DISTWIDTH] IN BLOOD BY AUTOMATED COUNT: 14.9 % (ref 10–15)
GFR SERPL CREATININE-BSD FRML MDRD: >90 ML/MIN/1.73M2
GLUCOSE BLDC GLUCOMTR-MCNC: 100 MG/DL (ref 70–99)
GLUCOSE BLDC GLUCOMTR-MCNC: 111 MG/DL (ref 70–99)
GLUCOSE BLDC GLUCOMTR-MCNC: 119 MG/DL (ref 70–99)
GLUCOSE BLDC GLUCOMTR-MCNC: 82 MG/DL (ref 70–99)
GLUCOSE BLDC GLUCOMTR-MCNC: 86 MG/DL (ref 70–99)
GLUCOSE BLDC GLUCOMTR-MCNC: 89 MG/DL (ref 70–99)
GLUCOSE BLDC GLUCOMTR-MCNC: 89 MG/DL (ref 70–99)
GLUCOSE SERPL-MCNC: 106 MG/DL (ref 70–99)
GLUCOSE UR STRIP-MCNC: NEGATIVE MG/DL
HCT VFR BLD AUTO: 40.4 % (ref 40–53)
HGB BLD-MCNC: 13 G/DL (ref 13.3–17.7)
HGB UR QL STRIP: NEGATIVE
KETONES UR STRIP-MCNC: NEGATIVE MG/DL
LACTATE SERPL-SCNC: 0.7 MMOL/L (ref 0.7–2)
LEUKOCYTE ESTERASE UR QL STRIP: NEGATIVE
MAGNESIUM SERPL-MCNC: 1.6 MG/DL (ref 1.7–2.3)
MAGNESIUM SERPL-MCNC: 1.7 MG/DL (ref 1.7–2.3)
MCH RBC QN AUTO: 30.9 PG (ref 26.5–33)
MCHC RBC AUTO-ENTMCNC: 32.2 G/DL (ref 31.5–36.5)
MCV RBC AUTO: 96 FL (ref 78–100)
NITRATE UR QL: NEGATIVE
PH UR STRIP: 7 [PH] (ref 5–7)
PHOSPHATE SERPL-MCNC: 3.7 MG/DL (ref 2.5–4.5)
PLATELET # BLD AUTO: 111 10E3/UL (ref 150–450)
POTASSIUM SERPL-SCNC: 4.3 MMOL/L (ref 3.4–5.3)
RBC # BLD AUTO: 4.21 10E6/UL (ref 4.4–5.9)
SODIUM SERPL-SCNC: 141 MMOL/L (ref 136–145)
SP GR UR STRIP: 1.01 (ref 1–1.03)
UROBILINOGEN UR STRIP-MCNC: 3 MG/DL
VALPROATE SERPL-MCNC: 50.9 UG/ML
WBC # BLD AUTO: 5.5 10E3/UL (ref 4–11)

## 2022-09-27 PROCEDURE — 258N000003 HC RX IP 258 OP 636: Performed by: INTERNAL MEDICINE

## 2022-09-27 PROCEDURE — 250N000009 HC RX 250: Performed by: STUDENT IN AN ORGANIZED HEALTH CARE EDUCATION/TRAINING PROGRAM

## 2022-09-27 PROCEDURE — 83735 ASSAY OF MAGNESIUM: CPT | Performed by: INTERNAL MEDICINE

## 2022-09-27 PROCEDURE — 84100 ASSAY OF PHOSPHORUS: CPT | Performed by: INTERNAL MEDICINE

## 2022-09-27 PROCEDURE — 250N000013 HC RX MED GY IP 250 OP 250 PS 637: Performed by: STUDENT IN AN ORGANIZED HEALTH CARE EDUCATION/TRAINING PROGRAM

## 2022-09-27 PROCEDURE — 999N000157 HC STATISTIC RCP TIME EA 10 MIN

## 2022-09-27 PROCEDURE — 85027 COMPLETE CBC AUTOMATED: CPT | Performed by: INTERNAL MEDICINE

## 2022-09-27 PROCEDURE — 81003 URINALYSIS AUTO W/O SCOPE: CPT | Performed by: INTERNAL MEDICINE

## 2022-09-27 PROCEDURE — 250N000009 HC RX 250: Performed by: INTERNAL MEDICINE

## 2022-09-27 PROCEDURE — 250N000011 HC RX IP 250 OP 636: Performed by: INTERNAL MEDICINE

## 2022-09-27 PROCEDURE — 80048 BASIC METABOLIC PNL TOTAL CA: CPT | Performed by: INTERNAL MEDICINE

## 2022-09-27 PROCEDURE — 999N000127 HC STATISTIC PERIPHERAL IV START W US GUIDANCE

## 2022-09-27 PROCEDURE — 80164 ASSAY DIPROPYLACETIC ACD TOT: CPT | Performed by: INTERNAL MEDICINE

## 2022-09-27 PROCEDURE — 36415 COLL VENOUS BLD VENIPUNCTURE: CPT | Performed by: INTERNAL MEDICINE

## 2022-09-27 PROCEDURE — G1010 CDSM STANSON: HCPCS

## 2022-09-27 PROCEDURE — 99233 SBSQ HOSP IP/OBS HIGH 50: CPT | Mod: GC | Performed by: INTERNAL MEDICINE

## 2022-09-27 PROCEDURE — 83605 ASSAY OF LACTIC ACID: CPT | Performed by: INTERNAL MEDICINE

## 2022-09-27 PROCEDURE — 71045 X-RAY EXAM CHEST 1 VIEW: CPT

## 2022-09-27 PROCEDURE — 87040 BLOOD CULTURE FOR BACTERIA: CPT | Performed by: INTERNAL MEDICINE

## 2022-09-27 PROCEDURE — 200N000001 HC R&B ICU

## 2022-09-27 PROCEDURE — 258N000003 HC RX IP 258 OP 636: Performed by: STUDENT IN AN ORGANIZED HEALTH CARE EDUCATION/TRAINING PROGRAM

## 2022-09-27 RX ORDER — DIAZEPAM 10 MG
10 TABLET ORAL EVERY 30 MIN PRN
Status: DISCONTINUED | OUTPATIENT
Start: 2022-09-27 | End: 2022-10-02

## 2022-09-27 RX ORDER — AMPICILLIN AND SULBACTAM 2; 1 G/1; G/1
3 INJECTION, POWDER, FOR SOLUTION INTRAMUSCULAR; INTRAVENOUS EVERY 6 HOURS
Status: DISCONTINUED | OUTPATIENT
Start: 2022-09-27 | End: 2022-09-28

## 2022-09-27 RX ORDER — ENOXAPARIN SODIUM 100 MG/ML
40 INJECTION SUBCUTANEOUS EVERY 24 HOURS
Status: CANCELLED | OUTPATIENT
Start: 2022-09-27

## 2022-09-27 RX ORDER — MULTIPLE VITAMINS W/ MINERALS TAB 9MG-400MCG
1 TAB ORAL DAILY
Status: DISCONTINUED | OUTPATIENT
Start: 2022-09-27 | End: 2022-10-01

## 2022-09-27 RX ORDER — DIAZEPAM 10 MG/2ML
5-10 INJECTION, SOLUTION INTRAMUSCULAR; INTRAVENOUS EVERY 30 MIN PRN
Status: DISCONTINUED | OUTPATIENT
Start: 2022-09-27 | End: 2022-10-02

## 2022-09-27 RX ORDER — MAGNESIUM SULFATE HEPTAHYDRATE 40 MG/ML
2 INJECTION, SOLUTION INTRAVENOUS ONCE
Status: COMPLETED | OUTPATIENT
Start: 2022-09-27 | End: 2022-09-27

## 2022-09-27 RX ORDER — LIDOCAINE 40 MG/G
CREAM TOPICAL
Status: ACTIVE | OUTPATIENT
Start: 2022-09-27 | End: 2022-09-30

## 2022-09-27 RX ORDER — FLUMAZENIL 0.1 MG/ML
0.2 INJECTION, SOLUTION INTRAVENOUS
Status: DISCONTINUED | OUTPATIENT
Start: 2022-09-27 | End: 2022-10-08 | Stop reason: HOSPADM

## 2022-09-27 RX ORDER — FOLIC ACID 5 MG/ML
1 INJECTION, SOLUTION INTRAMUSCULAR; INTRAVENOUS; SUBCUTANEOUS DAILY
Status: COMPLETED | OUTPATIENT
Start: 2022-09-28 | End: 2022-09-29

## 2022-09-27 RX ORDER — DIAZEPAM 10 MG/2ML
5 INJECTION, SOLUTION INTRAMUSCULAR; INTRAVENOUS 2 TIMES DAILY
Status: DISCONTINUED | OUTPATIENT
Start: 2022-09-27 | End: 2022-09-27

## 2022-09-27 RX ORDER — MAGNESIUM OXIDE 400 MG/1
400 TABLET ORAL EVERY 4 HOURS
Status: DISCONTINUED | OUTPATIENT
Start: 2022-09-27 | End: 2022-09-27

## 2022-09-27 RX ORDER — SODIUM CHLORIDE 9 MG/ML
INJECTION, SOLUTION INTRAVENOUS CONTINUOUS
Status: DISCONTINUED | OUTPATIENT
Start: 2022-09-27 | End: 2022-09-27

## 2022-09-27 RX ORDER — METOPROLOL TARTRATE 1 MG/ML
5 INJECTION, SOLUTION INTRAVENOUS EVERY 6 HOURS PRN
Status: DISCONTINUED | OUTPATIENT
Start: 2022-09-27 | End: 2022-10-08 | Stop reason: HOSPADM

## 2022-09-27 RX ORDER — DIAZEPAM 10 MG/2ML
10 INJECTION, SOLUTION INTRAMUSCULAR; INTRAVENOUS 3 TIMES DAILY
Status: DISCONTINUED | OUTPATIENT
Start: 2022-09-27 | End: 2022-09-29

## 2022-09-27 RX ORDER — FOLIC ACID 5 MG/ML
1 INJECTION, SOLUTION INTRAMUSCULAR; INTRAVENOUS; SUBCUTANEOUS ONCE
Status: COMPLETED | OUTPATIENT
Start: 2022-09-27 | End: 2022-09-27

## 2022-09-27 RX ORDER — ONDANSETRON 4 MG/1
4 TABLET, ORALLY DISINTEGRATING ORAL EVERY 6 HOURS PRN
Status: DISCONTINUED | OUTPATIENT
Start: 2022-09-27 | End: 2022-09-27

## 2022-09-27 RX ORDER — DEXMEDETOMIDINE HYDROCHLORIDE 4 UG/ML
.1-1.2 INJECTION, SOLUTION INTRAVENOUS CONTINUOUS
Status: DISCONTINUED | OUTPATIENT
Start: 2022-09-27 | End: 2022-10-01

## 2022-09-27 RX ORDER — FAMOTIDINE 20 MG/1
20 TABLET, FILM COATED ORAL 2 TIMES DAILY
Status: DISCONTINUED | OUTPATIENT
Start: 2022-09-27 | End: 2022-10-01

## 2022-09-27 RX ORDER — ONDANSETRON 2 MG/ML
4 INJECTION INTRAMUSCULAR; INTRAVENOUS EVERY 6 HOURS PRN
Status: DISCONTINUED | OUTPATIENT
Start: 2022-09-27 | End: 2022-09-27

## 2022-09-27 RX ORDER — FOLIC ACID 1 MG/1
1 TABLET ORAL DAILY
Status: DISCONTINUED | OUTPATIENT
Start: 2022-09-30 | End: 2022-10-01

## 2022-09-27 RX ORDER — LIDOCAINE 40 MG/G
CREAM TOPICAL
Status: DISCONTINUED | OUTPATIENT
Start: 2022-09-27 | End: 2022-10-08 | Stop reason: HOSPADM

## 2022-09-27 RX ADMIN — FOLIC ACID 1 MG: 5 INJECTION, SOLUTION INTRAMUSCULAR; INTRAVENOUS; SUBCUTANEOUS at 12:53

## 2022-09-27 RX ADMIN — SODIUM CHLORIDE 1000 ML: 9 INJECTION, SOLUTION INTRAVENOUS at 11:00

## 2022-09-27 RX ADMIN — VALPROATE SODIUM 500 MG: 100 INJECTION, SOLUTION INTRAVENOUS at 22:07

## 2022-09-27 RX ADMIN — SODIUM CHLORIDE: 9 INJECTION, SOLUTION INTRAVENOUS at 11:32

## 2022-09-27 RX ADMIN — AMPICILLIN SODIUM AND SULBACTAM SODIUM 3 G: 2; 1 INJECTION, POWDER, FOR SOLUTION INTRAMUSCULAR; INTRAVENOUS at 18:34

## 2022-09-27 RX ADMIN — DIAZEPAM 10 MG: 5 INJECTION INTRAMUSCULAR; INTRAVENOUS at 23:21

## 2022-09-27 RX ADMIN — DIAZEPAM 10 MG: 5 INJECTION INTRAMUSCULAR; INTRAVENOUS at 21:24

## 2022-09-27 RX ADMIN — BUPROPION HYDROCHLORIDE 300 MG: 150 TABLET, FILM COATED, EXTENDED RELEASE ORAL at 08:35

## 2022-09-27 RX ADMIN — DEXMEDETOMIDINE HYDROCHLORIDE 0.3 MCG/KG/HR: 400 INJECTION INTRAVENOUS at 12:20

## 2022-09-27 RX ADMIN — AMPICILLIN SODIUM AND SULBACTAM SODIUM 3 G: 2; 1 INJECTION, POWDER, FOR SOLUTION INTRAMUSCULAR; INTRAVENOUS at 12:48

## 2022-09-27 RX ADMIN — BUPROPION HYDROCHLORIDE 150 MG: 150 TABLET, FILM COATED, EXTENDED RELEASE ORAL at 08:35

## 2022-09-27 RX ADMIN — DIAZEPAM 5 MG: 5 INJECTION INTRAMUSCULAR; INTRAVENOUS at 20:52

## 2022-09-27 RX ADMIN — GABAPENTIN 900 MG: 300 CAPSULE ORAL at 06:12

## 2022-09-27 RX ADMIN — GABAPENTIN 600 MG: 300 CAPSULE ORAL at 08:36

## 2022-09-27 RX ADMIN — THIAMINE HYDROCHLORIDE 200 MG: 100 INJECTION, SOLUTION INTRAMUSCULAR; INTRAVENOUS at 21:11

## 2022-09-27 RX ADMIN — SODIUM CHLORIDE 1000 ML: 9 INJECTION, SOLUTION INTRAVENOUS at 11:20

## 2022-09-27 RX ADMIN — DIAZEPAM 10 MG: 10 TABLET ORAL at 05:49

## 2022-09-27 RX ADMIN — MAGNESIUM SULFATE HEPTAHYDRATE 2 G: 40 INJECTION, SOLUTION INTRAVENOUS at 17:44

## 2022-09-27 RX ADMIN — DEXMEDETOMIDINE HYDROCHLORIDE 0.2 MCG/KG/HR: 400 INJECTION INTRAVENOUS at 11:28

## 2022-09-27 RX ADMIN — SODIUM CHLORIDE: 9 INJECTION, SOLUTION INTRAVENOUS at 02:51

## 2022-09-27 RX ADMIN — THIAMINE HYDROCHLORIDE 200 MG: 100 INJECTION, SOLUTION INTRAMUSCULAR; INTRAVENOUS at 16:32

## 2022-09-27 RX ADMIN — BUPRENORPHINE HYDROCHLORIDE, NALOXONE HYDROCHLORIDE 1 FILM: 8; 2 FILM, SOLUBLE BUCCAL; SUBLINGUAL at 05:38

## 2022-09-27 RX ADMIN — SODIUM CHLORIDE: 9 INJECTION, SOLUTION INTRAVENOUS at 20:24

## 2022-09-27 RX ADMIN — AMPICILLIN SODIUM AND SULBACTAM SODIUM 3 G: 2; 1 INJECTION, POWDER, FOR SOLUTION INTRAMUSCULAR; INTRAVENOUS at 23:24

## 2022-09-27 RX ADMIN — VALPROATE SODIUM 500 MG: 100 INJECTION, SOLUTION INTRAVENOUS at 14:29

## 2022-09-27 ASSESSMENT — ACTIVITIES OF DAILY LIVING (ADL)
ADLS_ACUITY_SCORE: 42
ADLS_ACUITY_SCORE: 48
ADLS_ACUITY_SCORE: 48
ADLS_ACUITY_SCORE: 42
ADLS_ACUITY_SCORE: 42
ADLS_ACUITY_SCORE: 48
ADLS_ACUITY_SCORE: 42
ADLS_ACUITY_SCORE: 48

## 2022-09-27 NOTE — PROGRESS NOTES
Respiratory Therapy    Responded to RRT, patient on oxymask 10L and was increased to 15L and nasal trumpet placed in R nare due to shallow RR and low O2. O2 able to be weaned to 7L, trumpet left in place.      Stacey Dixon, RRT-NPS  9/27/2022

## 2022-09-27 NOTE — PROGRESS NOTES
[facility]    Medicine Progress Note - Hospitalist Service    Date of Admission:  9/23/2022    Assessment & Plan           Mr. Andrea Pham is a 57 year old male with a history of alcohol abuse and withdrawal, seizure, and pancreatitis admitted on 9/23/2022 with alcohol withdrawal. Patient is on CIWA protocol.  Patient has been agitated and pulling out IV lines. This morning he became less responsive. Rapid response was called. IV lines reinserted and he was given IV fluid boluses    Alcohol withdrawal, without seizure   History ETOH abuse   Anion gap metabolic acidosis, suspect related to above  Lactic acidosis, suspect related to above  Ketosis, suspect related to above  Elevated LFTs  -Per EMS, the patients girlfriend called because the patient is shaking and having worse withdrawal symptoms than normal.   -Report that he is anxious, nauseous, and vomited. Last drink of alcohol was 1 hour prior to arrival. He has been working on decreasing his alcohol intake over the last month or so, in the past at most he would have about 10 beers per day and reports a past use of hard liquor as well.    -He denies history of seizures, although this history has been documented in his medical record. Denies use of marijuana, methamphetamines, and cocaine.    -He has been put on CIWA protocol with diazepam, gabapentin protocol. He has been agitated overnight. -Now started on Precedex drip and transferred to ICU.   -He was also placed on Folic acid, multivitamin and thiamine  -As needed pain meds and antiemetics    Acute toxic/metabolic encephalopathy secondary to above  -rule out seizures. R/o infection  -Patient has been agitated and requiring significant dose of valium. He has been pulling out IV lines. This morning RRT was called today(9/27) because he became less responsive. No clear evidence of tonic clonic seizures.   -Ordered precedex drip   -BP running low. Given 2 Liters NS and continued on NS at  100 ml/hr.  -CT head without contrast, CXR, UA/UC  ordered.   -Will check valproic acid level.   -Transferred to ICU for further management    Low grade fever  -Possibly releted to withdrawal symptoms. Will rule out infection.   -No cough or shortness of breath. BP low.   -Received 2 L NS.   -WBC normal. Will do CXR to rule out aspiration  -Also ordered UA/UC, blood culture.   -Will check lactic acid level     Seizure - On PTA Depakote. Will check Depakote level today. Will switch to IV valproic acid if unable to take po.     Hyperglycemia, improved  - today  -Low-dose sliding scale insulin while hospitalized  -Hypoglycemia protocol    MDD   Bipolar disorder   Insomnia -PTA bupropion 450 mg QAM, tizanidine 4 mg nightly, trazodone 100 mg nightly    HTN - PRN hydralazine   History of drug abuse - denies current use , PTA suboxone     Diet: NPO for Medical/Clinical Reasons Except for: No Exceptions    DVT Prophylaxis: Pneumatic Compression Devices  Lino Catheter: Not present  Central Lines: None  Cardiac Monitoring: ACTIVE order. Indication: alcohol withdrawal  Code Status: Full Code      Disposition Plan      Expected Discharge Date: Patient transferred to ICU. Will likely need more than 2 nights of hospital course.      The patient's care was discussed with the Bedside Nurse.    Lesley Bey MD, MD  Hospitalist Service  [facility]    Clinically Significant Risk Factors Present on Admission   ________________________________________________________________    Interval History   Patient was noted to be sedated. RRT was called because patient was less responsive. NO clear evidence of seizures noted. He has no cough but running low grade fever.     Data reviewed today: I reviewed all medications, new labs and imaging results over the last 24 hours. I personally reviewed .    Physical Exam   Vital Signs: Temp: (!) 100.7  F (38.2  C) Temp src: Axillary BP: (!) 89/55 Pulse: 84    Resp: 18 SpO2: 90 % O2 Device: Nasal cannula Oxygen Delivery: 2 LPM  Weight: 205 lbs 0 oz  HEENT; Atraumatic, normocephalic, pinkish conjuctiva, pupils bilateral reactive   Skin: warm and moist, no rashes  Lungs: equal chest expansion, clear to auscultation, no wheezes, no stridor, no crackles,   Heart: normal rate, normal rhythm, no rubs or gallops.   Abdomen: normal bowel sounds, no tenderness, no peritoneal signs, no guarding  Extremities: no deformities, no edema   Neuro; follow commands, alert and oriented x3, spontaneous speech, coherent, moves all extremities spontaneously  Psych; no hallucination, euthymic mood, not agitated        Data   Recent Labs   Lab 09/27/22  1023 09/27/22  0840 09/27/22  0609 09/26/22  0845 09/26/22  0741 09/25/22  0859 09/25/22  0812 09/24/22  1213 09/24/22  0751 09/23/22 2029 09/23/22  1729   WBC  --   --  5.5  --  4.0  --   --   --  3.1*  --  4.6   HGB  --   --  13.0*  --  12.0*  --   --   --  13.0*  --  16.8   MCV  --   --  96  --  95  --   --   --  94  --  89   PLT  --   --  111*  --  99*  --   --   --  130*  --  190   NA  --   --  141  --  141  --   --   --  139   < > 138   POTASSIUM  --   --  4.3  --  3.8  --  4.1  --  4.3   < > 4.3   CHLORIDE  --   --  105  --  105  --   --   --  105   < > 96*   CO2  --   --  26  --  29  --   --   --  26   < > 15*   BUN  --   --  8.8  --  12.6  --   --   --  12.0   < > 10.7   CR  --   --  0.85  --  0.79  --   --   --  0.84   < > 0.82   ANIONGAP  --   --  10  --  7  --   --   --  8   < > 27*   JOHANA  --   --  8.7  --  9.0  --   --   --  8.1*   < > 10.1*   * 119* 106*   < > 96   < >  --    < > 83   < > 99   ALBUMIN  --   --   --   --   --   --   --   --  3.7  --  4.6   PROTTOTAL  --   --   --   --   --   --   --   --  5.7*  --  7.7   BILITOTAL  --   --   --   --   --   --   --   --  1.2  --  0.7   ALKPHOS  --   --   --   --   --   --   --   --  50  --  69   ALT  --   --   --   --   --   --   --   --  93*  --  129*   AST  --   --   --   --    --   --   --   --  118*  --  174*    < > = values in this interval not displayed.     No results found for this or any previous visit (from the past 24 hour(s)).  Medications     dexmedetomidine       sodium chloride 100 mL/hr at 09/27/22 0251       sodium chloride 0.9%  1,000 mL Intravenous Once     sodium chloride 0.9%  1,000 mL Intravenous Once     aspirin  325 mg Oral QPM     buprenorphine HCl-naloxone HCl  1 Film Sublingual BID     buPROPion  150 mg Oral QAM     buPROPion  300 mg Oral QAM     divalproex sodium extended-release  1,000 mg Oral At Bedtime     famotidine  20 mg Oral BID     folic acid  1 mg Oral QPM     [START ON 10/1/2022] gabapentin  100 mg Oral Q8H     [START ON 9/29/2022] gabapentin  300 mg Oral Q8H     gabapentin  600 mg Oral Q8H     gabapentin  900 mg Oral Q8H     haloperidol lactate  5 mg Intramuscular Once     insulin aspart  1-3 Units Subcutaneous TID AC     insulin aspart  1-3 Units Subcutaneous At Bedtime     magnesium oxide  400 mg Oral Q4H     multivitamin w/minerals  1 tablet Oral QPM     OLANZapine  10 mg Oral At Bedtime     sodium chloride (PF)  3 mL Intracatheter Q8H     sodium chloride (PF)  3 mL Intracatheter Q8H     thiamine  100 mg Oral QPM     tiZANidine  4 mg Oral At Bedtime     traZODone  100 mg Oral At Bedtime

## 2022-09-27 NOTE — CODE/RAPID RESPONSE
Rapid Response Note:    Rapid response was called overhead at approximately 10:15 AM as Andrea was noted to become unresponsive.  He was admitted for severe alcohol withdrawal requiring a significant amount of benzodiazepines over the past 24 hours.  He was in bed, and in addition to being unresponsive was noted to develop some hypotension to the 80s systolic.  He was also hypoxic requiring approximately 8 L/min by facemask.  On exam he did not have any total body rigidity but he did have suspected convulsive movements of his jaw/face compatible with seizure in the setting of alcohol withdrawal.  A nasal airway was placed.  Consideration was given to intubation.  Prior to administration of any medications this episode terminated and Andrea became more responsive though clearly postictal and somewhat confused.    After discussion with the intensivist and the primary hospitalist we determined that we would transfer down to the ICU for Precedex drip and close monitoring.  He was also noted to have fever so chest x-ray was obtained.  There is a question of basilar infiltrate versus atelectasis in the setting of hypoxia, suspected aspiration and fever he was started on Unasyn.    At this point the plan is to transition multiple oral medications to IV including valproic acid, start Precedex drip, continue scheduled Valium in addition to Valium per CIWA protocol and monitor him closely in the ICU.    I did follow-up with him after transfer to the ICU and he was alert and oriented x2, still clearly somewhat encephalopathic but able to make his needs known.  We will keep him n.p.o. for now aside from possibly small sips with meds per nursing discretion.    Gatito Segura MD     Evaluation and management time exclusive of procedures was 30 minutes critical care time including: urgent examination and evaluation of the patient, discussion of the patient's condition with other physicians and members of the care team,  reviewing data and chart related to the patient, discussion of patient's condition with the family and time utilizing the EMR for documentation of this patient's care.

## 2022-09-27 NOTE — PLAN OF CARE
Pertinent assessments: Pt alert and oriented to self and place. Confused. VSS, on 2L NC. Denies pain. Bedside attendant in place. CIWA scores 11, 12. valium X2 given. Tolerating diet. Transfers with Ax2. Straight cathed for 950ml. Tele reads SR per tele tech.     Major Shift Events:  Straight cathed x1.    Treatment Plan: Suboxone, CIWA protocol, addiction med consult and symptom management.    Bedside Nurse: Ciara Zamora RN

## 2022-09-27 NOTE — PLAN OF CARE
Pt A&O to self this morning, transferred to the [facility] with Ax2 with walker. MD notified again after rounds at 1010 regarding patient becoming more difficult to arouse, and pt needs to be reassessed. Flying srivastava RN notified at 1011 for IV placement after writer attempted x2. Valium held this shift, last dose received at 0549. RRT called at 1020 due to rapid change in patient mentation/arousablility, and hypotension. Two NS boluses given via pressure bag after IV access obtained. Another IV placed per VAT. Nasal trumpet placed by RT, on 11L O2 via oxy mask, then decreased to 7L O2. Pt going to get head CT and transfer to ICU with precedex gtt. BUE 2 point soft restraints placed on patient. MD notified and updated family member. Report given to ICU. Pt stabilized and transferred with flying squad RN.

## 2022-09-27 NOTE — CONSULTS
Care Management Follow Up    Length of Stay (days): 4    Expected Discharge Date: 09/29/2022     Concerns to be Addressed:  ETOH use  Patient plan of care discussed at interdisciplinary rounds: Yes    Anticipated Discharge Disposition: Home     Additional Information:  Sw is aware of the consult ordered on 5/25 for a high URR and CD resources.  The pt scored a 11 and 12 on CIWA last night and continues to be confused.  Sw will place a new consult when the pt is appropriate to be seen for CD resources.      ED Gonzalez, [facility]  Inpatient Care Coordination  [facility]  [phone]

## 2022-09-27 NOTE — PROVIDER NOTIFICATION
MD notified d/t pt becoming more difficult to arouse, for pt to be reassessed.     1011- Flying atif notified d/t pt needing IV access.

## 2022-09-27 NOTE — PLAN OF CARE
ICU End of Shift Summary.  For vital signs and complete assessments, please see documentation flowsheets.      Pertinent assessments:   Neuro: RASS -1. On dex.   Cardiac: SB. BP stable.   Resp: LS dim, course. Oxymask 5-6 LPM.   GI: WDL  : polanco in place. WDL.   Skin: WDL  Lines: PIV x 2.   Drips: NS, Dex.     Major Shift Events:   Mag replaced. Recheck in am.     Plan (Upcoming Events): TBD. Monitor for seizure activity. Continues on IV antibiotics. Continue supportive cares. Wean from precedex and supplemental oxygen as able.   Discharge/Transfer Needs: TBD     Bedside Shift Report Completed : yes  Bedside Safety Check Completed: yes

## 2022-09-28 LAB
ANION GAP SERPL CALCULATED.3IONS-SCNC: 9 MMOL/L (ref 7–15)
BUN SERPL-MCNC: 10.1 MG/DL (ref 6–20)
CALCIUM SERPL-MCNC: 8.6 MG/DL (ref 8.6–10)
CHLORIDE SERPL-SCNC: 105 MMOL/L (ref 98–107)
CREAT SERPL-MCNC: 0.69 MG/DL (ref 0.67–1.17)
DEPRECATED HCO3 PLAS-SCNC: 28 MMOL/L (ref 22–29)
ERYTHROCYTE [DISTWIDTH] IN BLOOD BY AUTOMATED COUNT: 13.8 % (ref 10–15)
GFR SERPL CREATININE-BSD FRML MDRD: >90 ML/MIN/1.73M2
GLUCOSE BLDC GLUCOMTR-MCNC: 104 MG/DL (ref 70–99)
GLUCOSE BLDC GLUCOMTR-MCNC: 105 MG/DL (ref 70–99)
GLUCOSE BLDC GLUCOMTR-MCNC: 108 MG/DL (ref 70–99)
GLUCOSE BLDC GLUCOMTR-MCNC: 97 MG/DL (ref 70–99)
GLUCOSE BLDC GLUCOMTR-MCNC: 97 MG/DL (ref 70–99)
GLUCOSE BLDC GLUCOMTR-MCNC: 99 MG/DL (ref 70–99)
GLUCOSE SERPL-MCNC: 100 MG/DL (ref 70–99)
HCT VFR BLD AUTO: 42 % (ref 40–53)
HGB BLD-MCNC: 13.5 G/DL (ref 13.3–17.7)
MAGNESIUM SERPL-MCNC: 1.8 MG/DL (ref 1.7–2.3)
MCH RBC QN AUTO: 31 PG (ref 26.5–33)
MCHC RBC AUTO-ENTMCNC: 32.1 G/DL (ref 31.5–36.5)
MCV RBC AUTO: 96 FL (ref 78–100)
PLATELET # BLD AUTO: 108 10E3/UL (ref 150–450)
POTASSIUM SERPL-SCNC: 4 MMOL/L (ref 3.4–5.3)
RBC # BLD AUTO: 4.36 10E6/UL (ref 4.4–5.9)
SODIUM SERPL-SCNC: 142 MMOL/L (ref 136–145)
WBC # BLD AUTO: 4.9 10E3/UL (ref 4–11)

## 2022-09-28 PROCEDURE — 250N000009 HC RX 250: Performed by: INTERNAL MEDICINE

## 2022-09-28 PROCEDURE — 250N000011 HC RX IP 250 OP 636: Performed by: INTERNAL MEDICINE

## 2022-09-28 PROCEDURE — 250N000011 HC RX IP 250 OP 636: Performed by: STUDENT IN AN ORGANIZED HEALTH CARE EDUCATION/TRAINING PROGRAM

## 2022-09-28 PROCEDURE — 250N000013 HC RX MED GY IP 250 OP 250 PS 637: Performed by: STUDENT IN AN ORGANIZED HEALTH CARE EDUCATION/TRAINING PROGRAM

## 2022-09-28 PROCEDURE — 200N000001 HC R&B ICU

## 2022-09-28 PROCEDURE — 85027 COMPLETE CBC AUTOMATED: CPT | Performed by: INTERNAL MEDICINE

## 2022-09-28 PROCEDURE — 83735 ASSAY OF MAGNESIUM: CPT | Performed by: INTERNAL MEDICINE

## 2022-09-28 PROCEDURE — 36415 COLL VENOUS BLD VENIPUNCTURE: CPT | Performed by: INTERNAL MEDICINE

## 2022-09-28 PROCEDURE — C9113 INJ PANTOPRAZOLE SODIUM, VIA: HCPCS | Performed by: INTERNAL MEDICINE

## 2022-09-28 PROCEDURE — 258N000003 HC RX IP 258 OP 636: Performed by: STUDENT IN AN ORGANIZED HEALTH CARE EDUCATION/TRAINING PROGRAM

## 2022-09-28 PROCEDURE — 80048 BASIC METABOLIC PNL TOTAL CA: CPT | Performed by: INTERNAL MEDICINE

## 2022-09-28 PROCEDURE — 258N000003 HC RX IP 258 OP 636: Performed by: INTERNAL MEDICINE

## 2022-09-28 PROCEDURE — 99233 SBSQ HOSP IP/OBS HIGH 50: CPT | Performed by: INTERNAL MEDICINE

## 2022-09-28 RX ORDER — MULTIPLE VITAMINS W/ MINERALS TAB 9MG-400MCG
1 TAB ORAL DAILY
Status: CANCELLED | OUTPATIENT
Start: 2022-09-29

## 2022-09-28 RX ORDER — ENOXAPARIN SODIUM 100 MG/ML
40 INJECTION SUBCUTANEOUS EVERY 24 HOURS
Status: DISCONTINUED | OUTPATIENT
Start: 2022-09-28 | End: 2022-10-08 | Stop reason: HOSPADM

## 2022-09-28 RX ORDER — FOLIC ACID 1 MG/1
1 TABLET ORAL DAILY
Status: CANCELLED | OUTPATIENT
Start: 2022-09-30

## 2022-09-28 RX ORDER — MAGNESIUM SULFATE HEPTAHYDRATE 40 MG/ML
2 INJECTION, SOLUTION INTRAVENOUS ONCE
Status: COMPLETED | OUTPATIENT
Start: 2022-09-28 | End: 2022-09-28

## 2022-09-28 RX ADMIN — MAGNESIUM SULFATE HEPTAHYDRATE 2 G: 40 INJECTION, SOLUTION INTRAVENOUS at 06:42

## 2022-09-28 RX ADMIN — DIAZEPAM 10 MG: 5 INJECTION INTRAMUSCULAR; INTRAVENOUS at 16:21

## 2022-09-28 RX ADMIN — DEXMEDETOMIDINE HYDROCHLORIDE 0.6 MCG/KG/HR: 400 INJECTION INTRAVENOUS at 01:20

## 2022-09-28 RX ADMIN — DIAZEPAM 10 MG: 5 INJECTION INTRAMUSCULAR; INTRAVENOUS at 22:34

## 2022-09-28 RX ADMIN — DEXMEDETOMIDINE HYDROCHLORIDE 0.7 MCG/KG/HR: 400 INJECTION INTRAVENOUS at 13:41

## 2022-09-28 RX ADMIN — DEXMEDETOMIDINE HYDROCHLORIDE 0.6 MCG/KG/HR: 400 INJECTION INTRAVENOUS at 19:48

## 2022-09-28 RX ADMIN — FOLIC ACID 1 MG: 5 INJECTION, SOLUTION INTRAMUSCULAR; INTRAVENOUS; SUBCUTANEOUS at 08:26

## 2022-09-28 RX ADMIN — THIAMINE HYDROCHLORIDE 200 MG: 100 INJECTION, SOLUTION INTRAMUSCULAR; INTRAVENOUS at 21:12

## 2022-09-28 RX ADMIN — VALPROATE SODIUM 500 MG: 100 INJECTION, SOLUTION INTRAVENOUS at 08:57

## 2022-09-28 RX ADMIN — DIAZEPAM 5 MG: 5 INJECTION INTRAMUSCULAR; INTRAVENOUS at 20:13

## 2022-09-28 RX ADMIN — PANTOPRAZOLE SODIUM 40 MG: 40 INJECTION, POWDER, FOR SOLUTION INTRAVENOUS at 08:31

## 2022-09-28 RX ADMIN — THIAMINE HYDROCHLORIDE 200 MG: 100 INJECTION, SOLUTION INTRAMUSCULAR; INTRAVENOUS at 08:26

## 2022-09-28 RX ADMIN — SODIUM CHLORIDE: 9 INJECTION, SOLUTION INTRAVENOUS at 05:42

## 2022-09-28 RX ADMIN — DIAZEPAM 10 MG: 5 INJECTION INTRAMUSCULAR; INTRAVENOUS at 04:16

## 2022-09-28 RX ADMIN — DIAZEPAM 10 MG: 5 INJECTION INTRAMUSCULAR; INTRAVENOUS at 08:26

## 2022-09-28 RX ADMIN — DIAZEPAM 10 MG: 5 INJECTION INTRAMUSCULAR; INTRAVENOUS at 23:15

## 2022-09-28 RX ADMIN — DEXMEDETOMIDINE HYDROCHLORIDE 0.8 MCG/KG/HR: 400 INJECTION INTRAVENOUS at 08:26

## 2022-09-28 RX ADMIN — AMPICILLIN SODIUM AND SULBACTAM SODIUM 3 G: 2; 1 INJECTION, POWDER, FOR SOLUTION INTRAMUSCULAR; INTRAVENOUS at 05:37

## 2022-09-28 RX ADMIN — ENOXAPARIN SODIUM 40 MG: 40 INJECTION SUBCUTANEOUS at 21:07

## 2022-09-28 RX ADMIN — BUPRENORPHINE HYDROCHLORIDE, NALOXONE HYDROCHLORIDE 1 FILM: 8; 2 FILM, SOLUBLE BUCCAL; SUBLINGUAL at 05:38

## 2022-09-28 RX ADMIN — DIAZEPAM 10 MG: 5 INJECTION INTRAMUSCULAR; INTRAVENOUS at 21:07

## 2022-09-28 RX ADMIN — THIAMINE HYDROCHLORIDE 200 MG: 100 INJECTION, SOLUTION INTRAMUSCULAR; INTRAVENOUS at 16:22

## 2022-09-28 RX ADMIN — DIAZEPAM 10 MG: 5 INJECTION INTRAMUSCULAR; INTRAVENOUS at 05:52

## 2022-09-28 RX ADMIN — VALPROATE SODIUM 500 MG: 100 INJECTION, SOLUTION INTRAVENOUS at 22:09

## 2022-09-28 ASSESSMENT — ACTIVITIES OF DAILY LIVING (ADL)
ADLS_ACUITY_SCORE: 48

## 2022-09-28 NOTE — PROGRESS NOTES
[facility]  Hospitalist Progress Note  Renee Evelin Shelli 09/28/22    Reason for Stay (Diagnosis): alcohol withdrawal with seizure         Assessment and Plan:      Summary of Stay: Andrea Pham is a 57 year old male with past medical history including alcohol use disorder, acute alcohol withdrawal with seizures, and pancreatitis. He was admitted on 9/23/2022 for alcohol withdrawal.  He was treated with oral gabapentin and as needed Valium.  Hospital course was complicated by RRT called on 9/27 due to patient decreased responsiveness and hypotension. No classic tonic-clonic movements observed, although seizure activity clinically suspected. Patient was able to protect airway and responsiveness improved, so held off on intubation. CT was ordered and patient transferred to ICU.   He was started on dexmedetomidine for sedation. Scheduled valproate was substituted for gabapentin for acute alcohol withdrawal and seizure  Prophylaxis. Scheduled and as needed diazepam were ordered for withdrawal.     Problem List/Assessment and Plan:      Neuro/Psych  # Alcohol withdrawal with suspected seizures  # History of alcohol use disorder  History of withdrawal seizures documented in medical record, patient does not endorse. Pta was on valproate. He had been agitated and pulling out IV lines. Seizure activity suspected when RRT called on 9/27. Responsiveness improved without intervention and patient able to protect airway. CT on 9/27 without evidence of acute intracranial process. Transferred to ICU and started on dexmedetomidine for sedation, scheduled valproate for seizure prophylaxis, and scheduled diazepam for withdrawal. Valproate level on 9/27 50.9, lower end of therapeutic range.  - Continue dexmedetomidine infusion for sedation.  Wean as tolerated.  - Continue IV valproate 500mg IV BID for alcohol withdrawal and seizure prophylaxis.  We discontinued gabapentin. Seizure precautions ordered.  -Continue  scheduled IV diazepam 10mg TID, hold if patient sedated per nursing discretion. Plan to decrease dose when patient has decreasing CIWA scores  - Continue CIWA with additional symptom-triggered diazepam  - Continue folic acid, multivitamin, thiamine  - Ondansetron or prochlorperazine prn for nausea  - Olanzapine or haloperidol prn for hallucinations  - SW consult later this admission for CD resources  # Major depressive disorder  # Bipolar disorder  # Insomnia  Pta was on buproprion 450mg qam, tizanidine 4mg nightly, trazodone 100mg nightly  - Pta tizanidine and trazodone held. Can consider restarting once patient able to tolerate oral medications  - Hold bupropion, as this lowers seizure threshold  # History of multisubstance use  No recent use per patient report and labs at admission.  - Continue pta buprenorphine-naloxone films BID     Cardiovascular  # Hypertension  Pta hydralazine held at admission. Has been mostly normotensive.  - IV metoprolol and hydralazine available prn for high blood pressures  - Telemetry in setting of withdrawal and multiple electrolyte abnormalities     Pulmonary  # Possible aspiration pneumonia  At risk for aspiration pneumonia, developed fever of 100.7 morning of 9/27. CXR reveals mild basilar opacities. Started on antibiotics, as below.  - Continue supplemental O2 as needed     Renal  # Anion gap metabolic acidosis  # Lactic acidosis  # Ketosis  Secondary to alcohol withdrawal. Lactic acid improved.  - Continue NS IVF  - Daily BMP  - Magnesium and potassium high replacement protocols     GI  # Elevated LFTs  Last ALT 93,  on 9/24 and trending down. Elevation likely secondary to alcohol use.  - Monitor  # Nutrition  Currently sedated and at risk for seizures and aspiration and risk for pulling feeding tube if placed. Npo for now and will reevaluate daily if patient continues requiring sedation.  - Npo for now  - GI prophylaxis: IV pantoprazole 40mg daily     Endocrine  #  Hyperglycemia  - Continue sliding scale insulin and hypoglycemia protocol, per orders     ID  # Fever, possible aspiration pneumonia  Fever of 100.7 morning of 9/27. CXR reveals mild basilar opacities, likely atelectasis. At risk for aspiration. Ampicillin-sulbactam started 9/27. Fever was transient and has remained afebrile through remainder of 9/27 through morning of 9/28. WBC normal and stable and blood culture no growth after 12 hours. Discontinued ampicillin-sulbactam on 9/28.  - Stop IV ampicillin-sulbactam     DVT Prophylaxis: enoxaparin 40mg daily  GI Prophylaxis: IV pantoprazole daily  Lines/Tubes/Drains: Peripheral IV x2, Lino catheter  Code Status: Full Code  Discharge Dispo/Date: To home with family support vs CD treatment facility in 3-4 days pending improvement of withdrawal symptoms        Interval History (Subjective):      Per staff documentation, patient requested that information not be shared with his sister, friend, mother, or children. At 0415 on 9/28, code 21 called for patient yelling and trying to hit/kick staff; 10mg diazepam administered and dexmedetomidine increased. Attempted to pull Lino.    Patient seen at bedside today. Sleeping when arrived and aroused when exam elicited pain using profanity and asking to not be touched and that providers leave the room.                  Physical Exam:      Last Vital Signs:  BP (!) 150/85   Pulse (!) 49   Temp 98.3  F (36.8  C) (Temporal)   Resp 16   Wt 91.8 kg (202 lb 6.1 oz)   SpO2 94%   BMI 28.23 kg/m      CIWA range last 24 hours: 5-22 (greatest contributing components were agitation, sweats, tremor)    Intake/Output Summary (Last 24 hours) at 9/28/2022 0722  Last data filed at 9/28/2022 0640  Gross per 24 hour   Intake 6042.29 ml   Output 5390 ml   Net 652.29 ml     General: Sleeping, alert and agitated when pain elicited during exam, disoriented.  HEENT: NC/AT. MM moist.  Cardiac: RRR, S1, S2. No murmurs appreciated. No lower extremity  edema.  Pulmonary: Normal chest rise, normal work of breathing. Snoring when sleeping. Lungs CTA BL  Abdomen: Soft, non-tender, non-distended.  Bowel Sounds Present.  No guarding.  Extremities: Warm, well perfused. Left wrist pain with passive extension and point tenderness at dorsal wrist. Left wrist joint exam limited due to patient agitation.  Skin: No rashes or lesions noted on exposed skin.  Warm and Dry.  Neuro: Right eyelid closed when patient awake, face otherwise symmetric, unchanged from yesterday's exam. Speech slurred.         Medications:      All current medications were reviewed with changes reflected in problem list.         Data:      All new lab and imaging data was reviewed.   Labs:  Recent Labs   Lab 09/28/22  0537 09/28/22  0359 09/27/22  2337 09/27/22  0840 09/27/22  0609 09/26/22  0845 09/26/22  0741     --   --   --  141  --  141   POTASSIUM 4.0  --   --   --  4.3  --  3.8   CHLORIDE 105  --   --   --  105  --  105   CO2 28  --   --   --  26  --  29   ANIONGAP 9  --   --   --  10  --  7   * 104* 89   < > 106*   < > 96   BUN 10.1  --   --   --  8.8  --  12.6   CR 0.69  --   --   --  0.85  --  0.79   GFRESTIMATED >90  --   --   --  >90  --  >90   JOHANA 8.6  --   --   --  8.7  --  9.0    < > = values in this interval not displayed.      Latest Reference Range & Units 09/27/22 11:40 09/27/22 11:50   Magnesium 1.7 - 2.3 mg/dL 1.6 (L)    Phosphorus 2.5 - 4.5 mg/dL 3.7    Lactic Acid 0.7 - 2.0 mmol/L  0.7     Recent Labs   Lab 09/28/22  0537 09/27/22  0609 09/26/22  0741   WBC 4.9 5.5 4.0   HGB 13.5 13.0* 12.0*   HCT 42.0 40.4 36.6*   MCV 96 96 95   * 111* 99*     Recent Labs   Lab 09/27/22  1150 09/23/22  2119   COLOR Yellow Yellow   APPEARANCE Clear Clear   URINEGLC Negative Negative   URINEBILI Negative Negative   URINEKETONE Negative 20 *   SG 1.014 1.019   UBLD Negative Negative   URINEPH 7.0 6.0   PROTEIN Negative 30 *   NITRITE Negative Negative   LEUKEST Negative Negative    RBCU  --  <1   WBCU  --  2   Blood culture (09/27/22): no growth after 12 hours    Imaging:   Recent Results (from the past 24 hour(s))   CT Head w/o Contrast    Narrative    CT SCAN OF THE HEAD WITHOUT CONTRAST   9/27/2022 11:00 AM     HISTORY: Altered mental status.    TECHNIQUE:  Axial images of the head and coronal reformations without  IV contrast material. Radiation dose for this scan was reduced using  automated exposure control, adjustment of the mA and/or kV according  to patient size, or iterative reconstruction technique.    COMPARISON: CT head dated 12/1/2020.    FINDINGS: There is no evidence of intracranial hemorrhage, mass, acute  infarct or anomaly. The ventricles are normal in size and  configuration. Minimal age commensurate generalized brain parenchymal  volume loss. Minimal nonspecific patchy hypoattenuation in the  periventricular cerebral white matter, presumably due to mild chronic  small vessel ischemic changes. Small hypodense focus in the posterior  inferior aspect of the right lentiform nucleus may represent a dilated  perivascular space, less likely a chronic lacunar infarct (series 3  image 14). No definite CT findings of acute infarct identified. No  acute intracranial hemorrhage, extra-axial fluid collection, or mass  effect/herniation.    There is minimal mucosal thickening noted in the bilateral ethmoid  sinuses. The mastoid and middle ear cavities appear clear where  visualized. The bony calvarium and bones of the skull base appear  intact.       Impression    IMPRESSION:  1. No CT findings of acute intracranial process.  2. Mild presumed age-related changes, as described.    ARLEN BUTLER MD         SYSTEM ID:  [id number]   XR Chest 1 View    Narrative    CHEST 1 VIEW   9/27/2022 11:11 AM     HISTORY: Low-grade fever, rule out aspiration.    COMPARISON: Chest x-ray on 11/20/2019.      Impression    IMPRESSION: Single AP view of the chest was obtained. Stable  cardiomediastinal  silhouette. Mild basilar pulmonary opacities, likely  atelectasis. No significant pleural effusion or pneumothorax.    LYLA ODONNELL MD         SYSTEM ID:  [id number]       Renee Marques, MS4    Physician Attestation   I, Rm Marinelli MD, was present with the medical/DEEJAY student who participated in the service and in the documentation of the note.  I have verified the history and personally performed the physical exam and medical decision making.  I agree with the assessment and plan of care as documented in the note.      Items personally reviewed: vitals, labs and physical exam and agree with the interpretation documented in the note.    /86   Pulse 55   Temp 97.8  F (36.6  C) (Temporal)   Resp 20   Wt 91.8 kg (202 lb 6.1 oz)   SpO2 94%   BMI 28.23 kg/m    GENERAL:  Comfortable appearing.  Sleepy  PSYCH: No acute distress.  EYES: PERRLA, Normal conjunctiva.  HEART:  Regular rate and rhythm. No JVD. Pulses normal. No edema.  LUNGS:  Clear to auscultation, normal Respiratory effort.  ABDOMEN:  Soft, no hepatosplenomegaly, normal bowel sounds.  EXTREMETIES: No clubbing, cyanosis or ischemia  SKIN:  Dry to touch, No rash.

## 2022-09-28 NOTE — PLAN OF CARE
Goal Outcome Evaluation:    Plan of Care Reviewed With: patient     Overall Patient Progress: declining  ICU End of Shift Summary.  For vital signs and complete assessments, please see documentation flowsheets.     Neuro: CIWAs ranging from 5 to 22. Disoriented to time.  Cardiac: tele SB, bp stable  Resp: weaned from 6L to room air while resting. Lungs dim.  GI: npo, feeling hungry  : polanco in place, pt removed statlock x2/tugged polanco. Urine clear yellow.  Skin: see flowsheets  Lines: pivx2  Drips:precedex, NS    Major Shift Events:     Code 21 0415- aggressive, attempting to hit/kick staff.    Replaced mag per protocol this am    Plan (Upcoming Events): continue CIWA/valium,dex.   Discharge/Transfer Needs: off dex/valium

## 2022-09-28 NOTE — PROGRESS NOTES
"2130: Pt sister, Chela, called for update. Not in patient's contact list so asked pt if he gives permission to give sister update, pt replied \"No, all you can tell her is that I'm fine\" x3. Discussed with Chela that Pt would only like her to know that he \"is fine\", this family member very upset at this RN about not sharing patient information. Apologized, re-asked pt if he would like this writer to give any information to sister, and pt replied No. He also would not like any information given to his friend, mother, or children.  "

## 2022-09-28 NOTE — PROGRESS NOTES
0415: Code 21 called as pt yelling and trying to hit/kick staff. MD, security and ANS to bedside. Pt given 10mg valium and increased dex.

## 2022-09-28 NOTE — PLAN OF CARE
Goal Outcome Evaluation:    ICU End of Shift Summary.  For vital signs and complete assessments, please see documentation flowsheets.     Pertinent assessments: Neurologically drowsy with bouts of agitation.Dexmedetomidine gtt continued, titrated down some. CIWA score 5-6. Rhythm sinus. BP on hypertensive side, prn meds available. Lino with adequate uop. Seizure precautions continued. Lytes wnl.   Major Shift Events: As outlined above  Plan (Upcoming Events): Wean dex as able.   Discharge/Transfer Needs: TBD    Bedside Shift Report Completed : Y  Bedside Safety Check Completed: Y

## 2022-09-29 ENCOUNTER — APPOINTMENT (OUTPATIENT)
Dept: GENERAL RADIOLOGY | Facility: CLINIC | Age: 57
DRG: 896 | End: 2022-09-29
Attending: INTERNAL MEDICINE
Payer: MEDICARE

## 2022-09-29 LAB
GLUCOSE BLDC GLUCOMTR-MCNC: 109 MG/DL (ref 70–99)
GLUCOSE BLDC GLUCOMTR-MCNC: 110 MG/DL (ref 70–99)
GLUCOSE BLDC GLUCOMTR-MCNC: 112 MG/DL (ref 70–99)
GLUCOSE BLDC GLUCOMTR-MCNC: 118 MG/DL (ref 70–99)
GLUCOSE BLDC GLUCOMTR-MCNC: 122 MG/DL (ref 70–99)
GLUCOSE BLDC GLUCOMTR-MCNC: 99 MG/DL (ref 70–99)
HOLD SPECIMEN: NORMAL
MAGNESIUM SERPL-MCNC: 1.8 MG/DL (ref 1.7–2.3)
POTASSIUM SERPL-SCNC: 4.5 MMOL/L (ref 3.4–5.3)
URATE SERPL-MCNC: 4.7 MG/DL (ref 3.4–7)

## 2022-09-29 PROCEDURE — C9113 INJ PANTOPRAZOLE SODIUM, VIA: HCPCS | Performed by: INTERNAL MEDICINE

## 2022-09-29 PROCEDURE — 250N000011 HC RX IP 250 OP 636: Performed by: INTERNAL MEDICINE

## 2022-09-29 PROCEDURE — 36415 COLL VENOUS BLD VENIPUNCTURE: CPT | Performed by: STUDENT IN AN ORGANIZED HEALTH CARE EDUCATION/TRAINING PROGRAM

## 2022-09-29 PROCEDURE — 83735 ASSAY OF MAGNESIUM: CPT | Performed by: STUDENT IN AN ORGANIZED HEALTH CARE EDUCATION/TRAINING PROGRAM

## 2022-09-29 PROCEDURE — 258N000003 HC RX IP 258 OP 636: Performed by: STUDENT IN AN ORGANIZED HEALTH CARE EDUCATION/TRAINING PROGRAM

## 2022-09-29 PROCEDURE — 73110 X-RAY EXAM OF WRIST: CPT | Mod: LT

## 2022-09-29 PROCEDURE — 250N000013 HC RX MED GY IP 250 OP 250 PS 637: Performed by: STUDENT IN AN ORGANIZED HEALTH CARE EDUCATION/TRAINING PROGRAM

## 2022-09-29 PROCEDURE — 84132 ASSAY OF SERUM POTASSIUM: CPT | Performed by: STUDENT IN AN ORGANIZED HEALTH CARE EDUCATION/TRAINING PROGRAM

## 2022-09-29 PROCEDURE — 200N000001 HC R&B ICU

## 2022-09-29 PROCEDURE — 99233 SBSQ HOSP IP/OBS HIGH 50: CPT | Performed by: INTERNAL MEDICINE

## 2022-09-29 PROCEDURE — 250N000011 HC RX IP 250 OP 636: Performed by: STUDENT IN AN ORGANIZED HEALTH CARE EDUCATION/TRAINING PROGRAM

## 2022-09-29 PROCEDURE — 250N000009 HC RX 250: Performed by: INTERNAL MEDICINE

## 2022-09-29 PROCEDURE — 84550 ASSAY OF BLOOD/URIC ACID: CPT | Performed by: INTERNAL MEDICINE

## 2022-09-29 PROCEDURE — 258N000003 HC RX IP 258 OP 636: Performed by: INTERNAL MEDICINE

## 2022-09-29 RX ORDER — NALOXONE HYDROCHLORIDE 0.4 MG/ML
0.2 INJECTION, SOLUTION INTRAMUSCULAR; INTRAVENOUS; SUBCUTANEOUS
Status: DISCONTINUED | OUTPATIENT
Start: 2022-09-29 | End: 2022-10-08 | Stop reason: HOSPADM

## 2022-09-29 RX ORDER — DIAZEPAM 10 MG/2ML
5 INJECTION, SOLUTION INTRAMUSCULAR; INTRAVENOUS 3 TIMES DAILY
Status: DISCONTINUED | OUTPATIENT
Start: 2022-09-29 | End: 2022-09-30

## 2022-09-29 RX ORDER — HYDRALAZINE HYDROCHLORIDE 20 MG/ML
10 INJECTION INTRAMUSCULAR; INTRAVENOUS EVERY 6 HOURS PRN
Status: DISCONTINUED | OUTPATIENT
Start: 2022-09-29 | End: 2022-10-08 | Stop reason: HOSPADM

## 2022-09-29 RX ORDER — NALOXONE HYDROCHLORIDE 0.4 MG/ML
0.4 INJECTION, SOLUTION INTRAMUSCULAR; INTRAVENOUS; SUBCUTANEOUS
Status: DISCONTINUED | OUTPATIENT
Start: 2022-09-29 | End: 2022-10-08 | Stop reason: HOSPADM

## 2022-09-29 RX ORDER — DEXTROSE, SODIUM CHLORIDE, SODIUM LACTATE, POTASSIUM CHLORIDE, AND CALCIUM CHLORIDE 5; .6; .31; .03; .02 G/100ML; G/100ML; G/100ML; G/100ML; G/100ML
1000 INJECTION, SOLUTION INTRAVENOUS CONTINUOUS
Status: DISCONTINUED | OUTPATIENT
Start: 2022-09-29 | End: 2022-10-01

## 2022-09-29 RX ORDER — METOPROLOL TARTRATE 1 MG/ML
5 INJECTION, SOLUTION INTRAVENOUS EVERY 6 HOURS PRN
Status: CANCELLED | OUTPATIENT
Start: 2022-09-29

## 2022-09-29 RX ORDER — OXYCODONE HYDROCHLORIDE 5 MG/1
5 TABLET ORAL ONCE
Status: DISCONTINUED | OUTPATIENT
Start: 2022-09-29 | End: 2022-09-29

## 2022-09-29 RX ORDER — MAGNESIUM SULFATE HEPTAHYDRATE 40 MG/ML
2 INJECTION, SOLUTION INTRAVENOUS ONCE
Status: DISCONTINUED | OUTPATIENT
Start: 2022-09-29 | End: 2022-09-30

## 2022-09-29 RX ORDER — MAGNESIUM SULFATE HEPTAHYDRATE 40 MG/ML
2 INJECTION, SOLUTION INTRAVENOUS ONCE
Status: COMPLETED | OUTPATIENT
Start: 2022-09-29 | End: 2022-09-29

## 2022-09-29 RX ADMIN — DEXMEDETOMIDINE HYDROCHLORIDE 1.2 MCG/KG/HR: 400 INJECTION INTRAVENOUS at 14:16

## 2022-09-29 RX ADMIN — DEXMEDETOMIDINE HYDROCHLORIDE 0.8 MCG/KG/HR: 400 INJECTION INTRAVENOUS at 05:51

## 2022-09-29 RX ADMIN — ENOXAPARIN SODIUM 40 MG: 40 INJECTION SUBCUTANEOUS at 21:55

## 2022-09-29 RX ADMIN — DEXMEDETOMIDINE HYDROCHLORIDE 0.9 MCG/KG/HR: 400 INJECTION INTRAVENOUS at 00:49

## 2022-09-29 RX ADMIN — BUPRENORPHINE HYDROCHLORIDE, NALOXONE HYDROCHLORIDE 1 FILM: 8; 2 FILM, SOLUBLE BUCCAL; SUBLINGUAL at 18:45

## 2022-09-29 RX ADMIN — DIAZEPAM 10 MG: 5 INJECTION INTRAMUSCULAR; INTRAVENOUS at 09:59

## 2022-09-29 RX ADMIN — DEXMEDETOMIDINE HYDROCHLORIDE 0.9 MCG/KG/HR: 400 INJECTION INTRAVENOUS at 18:21

## 2022-09-29 RX ADMIN — DEXMEDETOMIDINE HYDROCHLORIDE 0.8 MCG/KG/HR: 400 INJECTION INTRAVENOUS at 22:41

## 2022-09-29 RX ADMIN — MAGNESIUM SULFATE HEPTAHYDRATE 2 G: 40 INJECTION, SOLUTION INTRAVENOUS at 08:46

## 2022-09-29 RX ADMIN — VALPROATE SODIUM 500 MG: 100 INJECTION, SOLUTION INTRAVENOUS at 21:54

## 2022-09-29 RX ADMIN — SODIUM CHLORIDE: 9 INJECTION, SOLUTION INTRAVENOUS at 00:48

## 2022-09-29 RX ADMIN — FOLIC ACID 1 MG: 5 INJECTION, SOLUTION INTRAMUSCULAR; INTRAVENOUS; SUBCUTANEOUS at 09:34

## 2022-09-29 RX ADMIN — BUPRENORPHINE HYDROCHLORIDE, NALOXONE HYDROCHLORIDE 1 FILM: 8; 2 FILM, SOLUBLE BUCCAL; SUBLINGUAL at 05:30

## 2022-09-29 RX ADMIN — DIAZEPAM 10 MG: 5 INJECTION INTRAMUSCULAR; INTRAVENOUS at 05:35

## 2022-09-29 RX ADMIN — DEXMEDETOMIDINE HYDROCHLORIDE 1.2 MCG/KG/HR: 400 INJECTION INTRAVENOUS at 10:47

## 2022-09-29 RX ADMIN — THIAMINE HYDROCHLORIDE 200 MG: 100 INJECTION, SOLUTION INTRAMUSCULAR; INTRAVENOUS at 10:06

## 2022-09-29 RX ADMIN — PANTOPRAZOLE SODIUM 40 MG: 40 INJECTION, POWDER, FOR SOLUTION INTRAVENOUS at 08:47

## 2022-09-29 RX ADMIN — DIAZEPAM 10 MG: 5 INJECTION INTRAMUSCULAR; INTRAVENOUS at 08:47

## 2022-09-29 RX ADMIN — VALPROATE SODIUM 500 MG: 100 INJECTION, SOLUTION INTRAVENOUS at 10:56

## 2022-09-29 RX ADMIN — DIAZEPAM 5 MG: 5 INJECTION, SOLUTION INTRAMUSCULAR; INTRAVENOUS at 21:55

## 2022-09-29 ASSESSMENT — ACTIVITIES OF DAILY LIVING (ADL)
ADLS_ACUITY_SCORE: 48
ADLS_ACUITY_SCORE: 48
ADLS_ACUITY_SCORE: 43
ADLS_ACUITY_SCORE: 43
ADLS_ACUITY_SCORE: 48

## 2022-09-29 NOTE — PLAN OF CARE
Goal Outcome Evaluation:    Plan of Care Reviewed With: patient     Overall Patient Progress: no change  ICU End of Shift Summary.  For vital signs and complete assessments, please see documentation flowsheets.     Neuro: CIWAs 4-17. Will be drowsy, awaken and be very agitated, swearing/yelling/hitting/kicking at staff. Alert to self, occasionally situation/place.  Cardiac: tele SR, bp stable  Resp: lungs clear, 2L oxymask while asleep  GI: npo  : polanco in place, adequate joycelyn/yellow output  Skin: preventative mepilex placed to sacrum as pt has been rolling up and down in the bed, thrashing back and forth when angry. Scattered bruises and scabs.  Lines: piv  Drips: precedex, NS    Plan (Upcoming Events): continue dex/valium  Discharge/Transfer Needs: tbd

## 2022-09-29 NOTE — PROGRESS NOTES
[facility]  Hospitalist Progress Note  Renee Evelin Shelli 09/29/22    Reason for Stay (Diagnosis): alcohol withdrawal         Assessment and Plan:      Summary of Stay: Andrea Pham is a 57 year old male with past medical history including alcohol use disorder, acute alcohol withdrawal with seizures, and pancreatitis. He was admitted on 9/23/2022 for alcohol withdrawal.  He was treated with oral gabapentin and as needed Valium.  Hospital course was complicated by RRT called on 9/27 due to patient decreased responsiveness and hypotension. No classic tonic-clonic movements observed, although seizure activity clinically suspected. Patient was able to protect airway and responsiveness improved, so held off on intubation. CT was ordered and patient transferred to ICU.   He was started on dexmedetomidine for sedation. Scheduled valproate was substituted for gabapentin for acute alcohol withdrawal and seizure prophylaxis. Scheduled and as needed diazepam were ordered for withdrawal.     Problem List/Assessment and Plan:      Neuro/Psych  # Alcohol withdrawal with suspected seizures  # History of alcohol use disorder  History of withdrawal seizures documented in medical record, patient does not endorse. Pta was on valproate. He had been agitated and pulling out IV lines. Seizure activity suspected when RRT called on 9/27. Responsiveness improved without intervention and patient able to protect airway. CT on 9/27 without evidence of acute intracranial process. Transferred to ICU and started on dexmedetomidine for sedation, scheduled valproate for seizure prophylaxis, and scheduled diazepam for withdrawal. Valproate level on 9/27 50.9, lower end of therapeutic range. CIWA scores have large range, higher scores associated with episodes of agitation and combativeness. Plan to decrease total benzodiazepine use gradually, first by decreasing scheduled dosing.  - Continue dexmedetomidine infusion for sedation.  Wean  as tolerated  - Continue IV valproate 500mg IV BID for alcohol withdrawal and seizure prophylaxis. Seizure precautions  - Decrease scheduled IV diazepam to from 10mg TID to 5mg TID, hold if patient sedated per nursing discretion  - Continue CIWA with additional symptom-triggered diazepam  - Continue folic acid, multivitamin, thiamine  - Ondansetron or prochlorperazine prn for nausea  - Olanzapine or haloperidol prn for hallucinations  - SW consult later this admission for CD resources  # Major depressive disorder  # Bipolar disorder  # Insomnia  Pta was on buproprion 450mg qam, tizanidine 4mg nightly, trazodone 100mg nightly  - Pta tizanidine and trazodone held. Can consider restarting once patient able to tolerate oral medications  - Hold bupropion, as this lowers seizure threshold  # History of multisubstance use  No recent use per patient report and labs at admission.  - Continue pta buprenorphine-naloxone films BID     Cardiovascular  # Hypertension  Pta hydralazine held at admission. Has been mostly normotensive.  - IV metoprolol and hydralazine available prn for high blood pressures  - Telemetry in setting of withdrawal and multiple electrolyte abnormalities     Pulmonary  # Possible aspiration pneumonia  At risk for aspiration pneumonia, developed fever of 100.7 morning of 9/27. CXR reveals mild basilar opacities. Started on antibiotics, as below, discontinued 9/28.  - Continue supplemental O2 as needed     Renal  # Anion gap metabolic acidosis  # Lactic acidosis  # Ketosis  Secondary to alcohol withdrawal. Lactic acid improved.  - Continue NS IVF  - Daily BMP  - Magnesium and potassium high replacement protocols  - Magnesium 2g bolus on 9/29 with goal level 2.3     GI  # Elevated LFTs  Last ALT 93,  on 9/24 and trending down. Elevation likely secondary to alcohol use.  - Monitor  # Nutrition  Currently sedated and at risk for seizures and aspiration and risk for pulling feeding tube if placed. LBM  9/24. Npo for now and will reevaluate daily if patient continues requiring sedation. Started D5 LR on 9/29.  - Npo for now  - Start D5 LR 50mL/hr. Nutrition following, appreciate recommendations. Will consider TPN if patient continues to be agitated/combative and aspiration risk  - GI prophylaxis: IV pantoprazole 40mg daily     Endocrine  # Hyperglycemia  - Continue sliding scale insulin and hypoglycemia protocol, per orders     ID  # Fever, possible aspiration pneumonia  Fever of 100.7 morning of 9/27. CXR reveals mild basilar opacities, likely atelectasis. At risk for aspiration. Ampicillin-sulbactam started 9/27. Fever was transient and has remained afebrile through remainder of 9/27 through morning of 9/28. WBC normal and stable and blood culture no growth after 12 hours. Discontinued ampicillin-sulbactam on 9/28.  - Stop IV ampicillin-sulbactam    MSK  # Left wrist pain  Unable to obtain further history including history of fall or other trauma to area. Left wrist XR on 9/29 revealed no acute fracture, some chronic findings.  - Uric acid       DVT Prophylaxis: enoxaparin 40mg daily  GI Prophylaxis: IV pantoprazole daily  Lines/Tubes/Drains: Peripheral IV x2, Lino catheter  Code Status: Full Code  Discharge Dispo/Date: To home with family support vs CD treatment facility in pending weaning sedation and improvement of agitation, withdrawal symptoms        Interval History (Subjective):      No acute events overnight. Per nursing notes, continued agitation when awaken and rolling up and down in bed. Applied preventative mepilex to sacrum. Attempted to decrease Precedex, although had to increase again due to agitation. Patient seen at bedside today, sleeping.                  Physical Exam:      Last Vital Signs:  /82   Pulse 74   Temp 98.5  F (36.9  C) (Temporal)   Resp 24   Wt 90.9 kg (200 lb 6.4 oz)   SpO2 96%   BMI 27.95 kg/m      CIWA score range last 24 hours: 4-17    Intake/Output Summary (Last  24 hours) at 9/29/2022 0659  Last data filed at 9/29/2022 0600  Gross per 24 hour   Intake 2923.25 ml   Output 3490 ml   Net -566.75 ml     General: Sleeping, NAD  HEENT: NC/AT, face symmetric  Cardiac: RRR, S1, S2.  No murmurs appreciated. No lower extremity edema  Pulmonary: Normal chest rise, normal work of breathing on room air.  Lungs CTA BL  Abdomen: Soft, non-distended.  Bowel Sounds Present  Extremities: Left hand and wrist appears more swollen than left, without increased redness. Index finger on left stiffness with passive extension at MCP and PIP joints compared to right. All extremities warm and well perfused. No other gross visualized deformities  Skin: No rashes or lesions noted on exposed skin.  Warm and dry         Medications:      All current medications were reviewed with changes reflected in problem list.         Data:      All new lab and imaging data was reviewed.   Labs:   Latest Reference Range & Units 09/29/22 05:30   Potassium 3.4 - 5.3 mmol/L 4.5   Magnesium 1.7 - 2.3 mg/dL 1.8     Imaging:   Left Wrist XR 09/29/2022 -  IMPRESSION: Widening of the scapholunate interval appears to be chronic and is likely related to chronic scapholunate ligament tear.  No cortical bone fragment along the periphery of the distal scaphoid also appears chronic. Mild to moderate arthritic changes throughout the wrist. There is no definite evidence of an acute fracture.      Renee Marques, MS4    Physician Attestation   I, Rm Marinelli MD, was present with the medical/DEEJAY student who participated in the service and in the documentation of the note.  I have verified the history and personally performed the physical exam and medical decision making.  I agree with the assessment and plan of care as documented in the note.      Items personally reviewed: vitals, labs and exam.    /72   Pulse 67   Temp 98.3  F (36.8  C) (Axillary)   Resp 23   Wt 90.9 kg (200 lb 6.4 oz)   SpO2 97%   BMI 27.95 kg/m     GENERAL:  Comfortable. Sleeping  PSYCH:  No acute distress.  EYES: PERRLA, Normal conjunctiva.  HEART:  Regular rate and rhythm. No JVD. Pulses normal. No edema.  LUNGS:  Clear to auscultation, normal Respiratory effort.  ABDOMEN:  Soft, no hepatosplenomegaly, normal bowel sounds.  EXTREMETIES: No clubbing, cyanosis or ischemia. Left wrist with some swelling.   SKIN:  Dry to touch, No rash.

## 2022-09-29 NOTE — PROGRESS NOTES
"[facility]      ICU PROGRESS NOTE      Andrea Pham   1965  [id number]      Indication for Critical Care Billing:   precedex (hemodynamic and sedative agent) management         I have reviewed changes in critical data from approximately the last 24 hours, including medications, laboratory results, vital signs, radiograph results, consultant recommendations, and other diagnostic studies.          An estimated total critical care time involving or potentially including but not limited to chart review, examination of the patient, formulation of plans, discussion with colleagues, rounding, documentation, contacting and answering any questions posed by family or the patient, but not including procedures was approximately: 15 minutes.        I have discussed my plan with the remainder of the team during rounds.         Last 24h notable events/findings and pt complaints:  shay ROE decreasing overall but still timepoints where he is significantly agitated        Physical Exam/Vitals:   Vital signs:  Temp: 98.5  F (36.9  C) Temp src: Temporal BP: 126/81 Pulse: 64   Resp: 23 SpO2: 97 % O2 Device: Oxymask Oxygen Delivery: 2 LPM   Weight: 90.9 kg (200 lb 6.4 oz)  Estimated body mass index is 27.95 kg/m  as calculated from the following:    Height as of 7/13/22: 1.803 m (5' 11\").    Weight as of this encounter: 90.9 kg (200 lb 6.4 oz).    SpO2: 97 %   O2 Device: Oxymask     General: no apparent distress, appears stated age   Neuro: alert and interactive, aox2 (person and place, but not consistently to time and events),   moves all extremities, pupils reactive b/l, mildly sedated at times, sedated, appropriately interactive   when stimulated but examination limited due to sedation  Head: atraumatic  Neck: no bulging masses  Cardiac: regular rate and rhythm   Pulm: clear lung sounds b/l   Chest: symmetrical chest rise, non labored breathing  Abd: soft, nd, no masses, " no hernia, no diffuse peritonitis,   Non tender to palpation,    :  catheter in place  Extremities:  atraumatic x4  Skin:  no jaundice, warm   Psych: calm when resting but rapidly agitated at times         Weight:  Vitals:    09/24/22 0242 09/24/22 0604 09/27/22 0604 09/28/22 0400   Weight: 93.8 kg (206 lb 12.8 oz) 94.1 kg (207 lb 6.4 oz) 93 kg (205 lb) 91.8 kg (202 lb 6.1 oz)    09/29/22 0349   Weight: 90.9 kg (200 lb 6.4 oz)          IN/OUT:    Intake/Output Summary (Last 24 hours) at 9/29/2022 0902  Last data filed at 9/29/2022 0800  Gross per 24 hour   Intake 3486.13 ml   Output 3190 ml   Net 296.13 ml           ROS:   Unable to obtain ROS due to AMS.         Overall plan:  - continue w/ current doses of precedex as he still has episodes of significant agitation which could reflect withdrawal symptoms       Assessment/Plan:  57 year old M, with hx of pancreatitis, seizures/etoh withdrawal seizures, who presents to the ICU after RRT and having significant episodes of agitation prompting hemodynamic agents such as precedex for further stabilization.     Neuro:  Pain  - MMPR as able   AMS/Delirium  - Frequent reorientation   - Attempt to maintain Day/Night cycle   - If Hyperactivity develops, anti dopamine agents prn   - Maintain safety of patient, staff, and surroundings as able  - Avoid restraints as able, optimize medically as able prior to restraining   EtOH Withdrawal  - CIWA/Ativan  - Dexmedetomidine: still requiring high doses, recommend maintaining current levels until agitation consistently resolves/improves in order to limit potential seizure activity   - Phenobarb 260 loading dose with 130 bid thereafter for severe withdrawal  - Neurology c/s for seizures     Respiratory:  Acute Respiratory Insufficiency  - Supplemental oxygen to keep saturation between 90%-95%  - VBG or ABG prn to adjust supplemental oxygen needs prn   Atelectasis  - IS     Cardiac:  MISAEL      Renal/FEN:  MISAEL    GI:  Protein  Deficiency/Malnutrition - mild  - monitor  -   Lab Results   Component Value Date    PROTTOTAL 5.7 09/24/2022    PROTTOTAL 7.3 12/01/2020     - Nutrition/Dietician consultation when more alert  - Encourage PO intake, or initiate TF as able, and utilize supplementations prn and as able    Heme:  MCV elevated  -B vitamins   Platelet Dysfunction  - Due to NSAID/ASA use  - Monitor for symptom development and/or progression   - Transfuse prn   Thrombocytopenia  - monitor/transfuse prn, likely due to etoh abuse,   - slight increase in Plt Count from prior, suggesting gradual improvements     ID:  MISAEL    Endo:  Hyperglycemia   -ssi/insulin prn   -A1c:   Hemoglobin A1C   Date Value Ref Range Status   09/23/2022 5.0 <5.7 % Final     Comment:     Normal <5.7%   Prediabetes 5.7-6.4%    Diabetes 6.5% or higher     Note: Adopted from ADA consensus guidelines.         Skin:  MISAEL    Musculoskeletal:  MISAEL     Psychiatric:   Agitation  - see above for etoh w/d     Therapy:  Deconditioning    -pt/ot eval      Other/Prophylaxis:  -Mechanical and Chemical dvt ppx: indicated, ordered    Code Status:  Full Code     Disposition:  - ICU     Micro: reviewed for the last 24h of completion of this note  EKG/ECHO: reviewed the interpretations for the last 24h of completion of this note  Imaging: reviewed the impressions for the last 24h of completion of this note  Labs: reviewed for the last 24h of completion of this note  In/Out: reviewed for the last 24h of completion of this note          Allergies:  No Known Allergies  PMHx:   Past Medical History:   Diagnosis Date     Ataxia      Bipolar disorder (H)      Chemical dependency (H)      Chronic hip pain      Chronic pain syndrome      Cocaine abuse (H)      Depressive disorder      DTs (delirium tremens) (H)      DVT (deep venous thrombosis) (H) 5 y ago    left foot, treated with coumadin     Elevated LFTs      Flail chest     broken sterum, wiring      Hepatitis C virus infection,  unspecified chronicity      Heroin abuse (H)      History of methamphetamine abuse (H)      History of total hip arthroplasty     right     Hypertension      Seizures (H)      Substance abuse (H)     alcohol, opiates     Wernicke's encephalopathy      PSHx:   Past Surgical History:   Procedure Laterality Date     CHEST SURGERY  1987    flail chest, sterum fractured     HIP SURGERY       KNEE SURGERY       OPEN REDUCTION INTERNAL FIXATION ANKLE Right 12/24/2021    Procedure: Open reduction internal fixation right ankle syndesmotic injury;  Surgeon: Leroy Thomason MD;  Location:  OR     ORTHOPEDIC SURGERY      KIMBER right. Stephanie left shoulder surgery, debridement right shoulder, neck surgery- fracture cervical spine. 6 knee surgeries- bucket handle meniscal tear and debridement. 3 heel surgeries.      ORTHOPEDIC SURGERY       REMOVE HARDWARE ANKLE Right 7/13/2022    Procedure: removal of hardware of right ankle;  Surgeon: Leroy Thomason MD;  Location: [facility] Hx:   Social Determinants of Health     Tobacco Use: High Risk     Smoking Tobacco Use: Current Every Day Smoker     Smokeless Tobacco Use: Never Used   Alcohol Use: Not on file   Financial Resource Strain: Not on file   Food Insecurity: Not on file   Transportation Needs: Not on file   Physical Activity: Not on file   Stress: Not on file   Social Connections: Not on file   Intimate Partner Violence: Not on file   Depression: Not at risk     PHQ-2 Score: 1   Housing Stability: Not on file     Family Hx: I have reviewed this patient's family history and updated it with pertinent information if needed.  Family History   Problem Relation Age of Onset     Substance Abuse Mother      Substance Abuse Father      Substance Abuse Sister

## 2022-09-29 NOTE — PROGRESS NOTES
Pt agitated after CIWA score taken; 10 mg given instead of 5 mg as ordered in CIWA protocol; MD made aware during AM rounds and ok with additional administration.

## 2022-09-29 NOTE — PLAN OF CARE
Goal Outcome Evaluation:    Plan of Care Reviewed With: patient     ICU End of Shift Summary.  For vital signs and complete assessments, please see documentation flowsheets.      Pertinent assessments: on precedex- increased this am but able to titrate down this evening.  Pt only needing 10mg IV valium this shift.  CIWA max 7. Somnolent.  Temp max of 101.6 axillary.  Cold packs applied.  Suctioned thick yellow sputum.  Encouraged pt to try and cough.   Able to give Suboxone  this evening.   Started D5LR for nutrition at this time.  Continue to encourage PO intake as more alert.   Major Shift Events: none  Plan (Upcoming Events): continue to wean precedex as able.  Valium as needed.  Cont IV depakote for seizure prevention.   Discharge/Transfer Needs:  cont ICU cares at this time.      Bedside Shift Report Completed : YES  Bedside Safety Check Completed: YES

## 2022-09-30 ENCOUNTER — APPOINTMENT (OUTPATIENT)
Dept: GENERAL RADIOLOGY | Facility: CLINIC | Age: 57
DRG: 896 | End: 2022-09-30
Attending: INTERNAL MEDICINE
Payer: MEDICARE

## 2022-09-30 LAB
ALBUMIN UR-MCNC: 10 MG/DL
ANION GAP SERPL CALCULATED.3IONS-SCNC: 11 MMOL/L (ref 7–15)
APPEARANCE UR: CLEAR
BILIRUB UR QL STRIP: NEGATIVE
BUN SERPL-MCNC: 13.1 MG/DL (ref 6–20)
CALCIUM SERPL-MCNC: 8.8 MG/DL (ref 8.6–10)
CHLORIDE SERPL-SCNC: 102 MMOL/L (ref 98–107)
COLOR UR AUTO: YELLOW
CREAT SERPL-MCNC: 0.71 MG/DL (ref 0.67–1.17)
DEPRECATED HCO3 PLAS-SCNC: 25 MMOL/L (ref 22–29)
ERYTHROCYTE [DISTWIDTH] IN BLOOD BY AUTOMATED COUNT: 13 % (ref 10–15)
GFR SERPL CREATININE-BSD FRML MDRD: >90 ML/MIN/1.73M2
GLUCOSE BLDC GLUCOMTR-MCNC: 102 MG/DL (ref 70–99)
GLUCOSE BLDC GLUCOMTR-MCNC: 108 MG/DL (ref 70–99)
GLUCOSE BLDC GLUCOMTR-MCNC: 127 MG/DL (ref 70–99)
GLUCOSE BLDC GLUCOMTR-MCNC: 141 MG/DL (ref 70–99)
GLUCOSE BLDC GLUCOMTR-MCNC: 97 MG/DL (ref 70–99)
GLUCOSE SERPL-MCNC: 116 MG/DL (ref 70–99)
GLUCOSE UR STRIP-MCNC: NEGATIVE MG/DL
HCT VFR BLD AUTO: 40.9 % (ref 40–53)
HGB BLD-MCNC: 13.4 G/DL (ref 13.3–17.7)
HGB UR QL STRIP: ABNORMAL
KETONES UR STRIP-MCNC: 10 MG/DL
LACTATE SERPL-SCNC: 0.8 MMOL/L (ref 0.7–2)
LEUKOCYTE ESTERASE UR QL STRIP: NEGATIVE
MAGNESIUM SERPL-MCNC: 1.7 MG/DL (ref 1.7–2.3)
MCH RBC QN AUTO: 31.5 PG (ref 26.5–33)
MCHC RBC AUTO-ENTMCNC: 32.8 G/DL (ref 31.5–36.5)
MCV RBC AUTO: 96 FL (ref 78–100)
NITRATE UR QL: NEGATIVE
PH UR STRIP: 6.5 [PH] (ref 5–7)
PHOSPHATE SERPL-MCNC: 3.2 MG/DL (ref 2.5–4.5)
PLATELET # BLD AUTO: 165 10E3/UL (ref 150–450)
POTASSIUM SERPL-SCNC: 4.2 MMOL/L (ref 3.4–5.3)
POTASSIUM SERPL-SCNC: 4.3 MMOL/L (ref 3.4–5.3)
RBC # BLD AUTO: 4.26 10E6/UL (ref 4.4–5.9)
RBC URINE: 2 /HPF
SODIUM SERPL-SCNC: 138 MMOL/L (ref 136–145)
SP GR UR STRIP: 1.03 (ref 1–1.03)
UROBILINOGEN UR STRIP-MCNC: 4 MG/DL
WBC # BLD AUTO: 6.5 10E3/UL (ref 4–11)
WBC URINE: 3 /HPF

## 2022-09-30 PROCEDURE — 36415 COLL VENOUS BLD VENIPUNCTURE: CPT | Performed by: INTERNAL MEDICINE

## 2022-09-30 PROCEDURE — 99233 SBSQ HOSP IP/OBS HIGH 50: CPT | Performed by: INTERNAL MEDICINE

## 2022-09-30 PROCEDURE — 250N000011 HC RX IP 250 OP 636: Performed by: INTERNAL MEDICINE

## 2022-09-30 PROCEDURE — 250N000013 HC RX MED GY IP 250 OP 250 PS 637: Performed by: STUDENT IN AN ORGANIZED HEALTH CARE EDUCATION/TRAINING PROGRAM

## 2022-09-30 PROCEDURE — 85027 COMPLETE CBC AUTOMATED: CPT | Performed by: INTERNAL MEDICINE

## 2022-09-30 PROCEDURE — 250N000009 HC RX 250: Performed by: INTERNAL MEDICINE

## 2022-09-30 PROCEDURE — 83605 ASSAY OF LACTIC ACID: CPT | Performed by: INTERNAL MEDICINE

## 2022-09-30 PROCEDURE — 80048 BASIC METABOLIC PNL TOTAL CA: CPT | Performed by: INTERNAL MEDICINE

## 2022-09-30 PROCEDURE — 87149 DNA/RNA DIRECT PROBE: CPT | Performed by: INTERNAL MEDICINE

## 2022-09-30 PROCEDURE — 84100 ASSAY OF PHOSPHORUS: CPT | Performed by: INTERNAL MEDICINE

## 2022-09-30 PROCEDURE — 250N000013 HC RX MED GY IP 250 OP 250 PS 637: Performed by: INTERNAL MEDICINE

## 2022-09-30 PROCEDURE — 81001 URINALYSIS AUTO W/SCOPE: CPT | Performed by: INTERNAL MEDICINE

## 2022-09-30 PROCEDURE — 71045 X-RAY EXAM CHEST 1 VIEW: CPT

## 2022-09-30 PROCEDURE — 999N000065 XR ABDOMEN PORT 1 VIEW

## 2022-09-30 PROCEDURE — 84132 ASSAY OF SERUM POTASSIUM: CPT | Performed by: INTERNAL MEDICINE

## 2022-09-30 PROCEDURE — 250N000012 HC RX MED GY IP 250 OP 636 PS 637: Performed by: INTERNAL MEDICINE

## 2022-09-30 PROCEDURE — C9113 INJ PANTOPRAZOLE SODIUM, VIA: HCPCS | Performed by: INTERNAL MEDICINE

## 2022-09-30 PROCEDURE — 83735 ASSAY OF MAGNESIUM: CPT | Performed by: INTERNAL MEDICINE

## 2022-09-30 PROCEDURE — 200N000001 HC R&B ICU

## 2022-09-30 PROCEDURE — 87077 CULTURE AEROBIC IDENTIFY: CPT | Performed by: INTERNAL MEDICINE

## 2022-09-30 PROCEDURE — 258N000003 HC RX IP 258 OP 636: Performed by: INTERNAL MEDICINE

## 2022-09-30 RX ORDER — DIAZEPAM 10 MG/2ML
5 INJECTION, SOLUTION INTRAMUSCULAR; INTRAVENOUS EVERY 6 HOURS PRN
Status: DISCONTINUED | OUTPATIENT
Start: 2022-09-30 | End: 2022-10-01

## 2022-09-30 RX ORDER — MAGNESIUM SULFATE HEPTAHYDRATE 40 MG/ML
2 INJECTION, SOLUTION INTRAVENOUS ONCE
Status: COMPLETED | OUTPATIENT
Start: 2022-09-30 | End: 2022-09-30

## 2022-09-30 RX ORDER — DEXTROSE MONOHYDRATE 100 MG/ML
INJECTION, SOLUTION INTRAVENOUS CONTINUOUS PRN
Status: DISCONTINUED | OUTPATIENT
Start: 2022-09-30 | End: 2022-10-08 | Stop reason: HOSPADM

## 2022-09-30 RX ORDER — DIAZEPAM 10 MG/2ML
5 INJECTION, SOLUTION INTRAMUSCULAR; INTRAVENOUS EVERY 6 HOURS PRN
Status: DISCONTINUED | OUTPATIENT
Start: 2022-09-30 | End: 2022-09-30

## 2022-09-30 RX ORDER — GUAIFENESIN 600 MG/1
15 TABLET, EXTENDED RELEASE ORAL DAILY
Status: DISCONTINUED | OUTPATIENT
Start: 2022-09-30 | End: 2022-10-06

## 2022-09-30 RX ADMIN — DEXMEDETOMIDINE HYDROCHLORIDE 0.8 MCG/KG/HR: 400 INJECTION INTRAVENOUS at 04:02

## 2022-09-30 RX ADMIN — TAZOBACTAM SODIUM AND PIPERACILLIN SODIUM 3.38 G: 375; 3 INJECTION, SOLUTION INTRAVENOUS at 15:59

## 2022-09-30 RX ADMIN — ENOXAPARIN SODIUM 40 MG: 40 INJECTION SUBCUTANEOUS at 21:33

## 2022-09-30 RX ADMIN — DEXMEDETOMIDINE HYDROCHLORIDE 0.6 MCG/KG/HR: 400 INJECTION INTRAVENOUS at 14:40

## 2022-09-30 RX ADMIN — MAGNESIUM SULFATE HEPTAHYDRATE 2 G: 40 INJECTION, SOLUTION INTRAVENOUS at 08:48

## 2022-09-30 RX ADMIN — INSULIN ASPART 1 UNITS: 100 INJECTION, SOLUTION INTRAVENOUS; SUBCUTANEOUS at 20:47

## 2022-09-30 RX ADMIN — SODIUM CHLORIDE: 9 INJECTION, SOLUTION INTRAVENOUS at 12:31

## 2022-09-30 RX ADMIN — BUPRENORPHINE HYDROCHLORIDE, NALOXONE HYDROCHLORIDE 1 FILM: 8; 2 FILM, SOLUBLE BUCCAL; SUBLINGUAL at 04:48

## 2022-09-30 RX ADMIN — DIAZEPAM 5 MG: 5 INJECTION INTRAMUSCULAR; INTRAVENOUS at 04:11

## 2022-09-30 RX ADMIN — VALPROATE SODIUM 500 MG: 100 INJECTION, SOLUTION INTRAVENOUS at 22:32

## 2022-09-30 RX ADMIN — BUPRENORPHINE HYDROCHLORIDE, NALOXONE HYDROCHLORIDE 1 FILM: 8; 2 FILM, SOLUBLE BUCCAL; SUBLINGUAL at 17:55

## 2022-09-30 RX ADMIN — DEXMEDETOMIDINE HYDROCHLORIDE 0.7 MCG/KG/HR: 400 INJECTION INTRAVENOUS at 08:46

## 2022-09-30 RX ADMIN — PANTOPRAZOLE SODIUM 40 MG: 40 INJECTION, POWDER, FOR SOLUTION INTRAVENOUS at 08:49

## 2022-09-30 RX ADMIN — TAZOBACTAM SODIUM AND PIPERACILLIN SODIUM 3.38 G: 375; 3 INJECTION, SOLUTION INTRAVENOUS at 20:48

## 2022-09-30 RX ADMIN — DEXMEDETOMIDINE HYDROCHLORIDE 0.6 MCG/KG/HR: 400 INJECTION INTRAVENOUS at 21:32

## 2022-09-30 RX ADMIN — DIAZEPAM 10 MG: 5 INJECTION INTRAMUSCULAR; INTRAVENOUS at 04:47

## 2022-09-30 RX ADMIN — Medication 15 ML: at 17:55

## 2022-09-30 RX ADMIN — VALPROATE SODIUM 500 MG: 100 INJECTION, SOLUTION INTRAVENOUS at 09:09

## 2022-09-30 ASSESSMENT — ACTIVITIES OF DAILY LIVING (ADL)
ADLS_ACUITY_SCORE: 43
ADLS_ACUITY_SCORE: 48
ADLS_ACUITY_SCORE: 43
ADLS_ACUITY_SCORE: 43
ADLS_ACUITY_SCORE: 48
ADLS_ACUITY_SCORE: 47
ADLS_ACUITY_SCORE: 43
ADLS_ACUITY_SCORE: 43
ADLS_ACUITY_SCORE: 48
ADLS_ACUITY_SCORE: 43
ADLS_ACUITY_SCORE: 48
ADLS_ACUITY_SCORE: 48

## 2022-09-30 NOTE — PHARMACY
Pharmacy Tube Feeding Consult    Medication reviewed for administration by feeding tube and for potential food/drug interactions.    Recommendation (clarified with RN - no feeding tube placed or available for this patient yet)   - Acetaminophen prn - added oral solution to be available to give via FT.  - Bupropion XL is currently on hold -  Do not crush, divide, or chew; swallow whole.  - ASA is currently on hold - change to chewable tabs once off hold.    Pharmacy will continue to follow as new medications are ordered.

## 2022-09-30 NOTE — PLAN OF CARE
ICU End of Shift Summary.  For vital signs and complete assessments, please see documentation flowsheets.      Pertinent assessments: PT sedated with precedex and scheduled Valium.  CIWA 3-4.  VSS, Tele SR. Low grade temp max 99.5 temporal.  LS diminished, Oxymask, occasional congested cough.  No BM.  Lino with good urine output.  Preventative mepilex on coccyx.  Major Shift Events: none  Plan (Upcoming Events): continue to monitor for s/s of withdrawal and medicated as needed, address nutritional status in AM  Discharge/Transfer Needs: TBD     Bedside Shift Report Completed : Y  Bedside Safety Check Completed: Y

## 2022-09-30 NOTE — PLAN OF CARE
ICU End of Shift Summary.  For vital signs and complete assessments, please see documentation flowsheets.      Pertinent assessments: RASS -1. CIWA 3-12. Tele SR. BP wnl. Lungs clear/diminished. Oxymask 2L. Lino in place with adequate urine output. No BM.  Major Shift Events: none  Plan (Upcoming Events): TBD  Discharge/Transfer Needs: TBD     Bedside Shift Report Completed : Y  Bedside Safety Check Completed: Y

## 2022-09-30 NOTE — PROGRESS NOTES
[facility]  Hospitalist Progress Note  Renee Mathisyschristy Marques 09/30/22    Reason for Stay (Diagnosis): alcohol withdrawal         Assessment and Plan:      Summary of Stay: Andrea Pham is a 57 year old male with past medical history including alcohol use disorder, acute alcohol withdrawal with seizures, and pancreatitis. He was admitted on 9/23/2022 for alcohol withdrawal.  He was treated with oral gabapentin and as needed Valium.  Hospital course was complicated by RRT called on 9/27 due to patient decreased responsiveness and hypotension. No classic tonic-clonic movements observed, although seizure activity clinically suspected. Patient was able to protect airway and responsiveness improved, so held off on intubation. CT was ordered and patient transferred to ICU.   He was started on dexmedetomidine for sedation. Scheduled valproate was substituted for gabapentin for acute alcohol withdrawal and seizure prophylaxis. Scheduled and as needed diazepam were ordered for withdrawal.     Problem List/Assessment and Plan:      Neuro/Psych  # Alcohol withdrawal with suspected seizures  # History of alcohol use disorder  History of withdrawal seizures documented in medical record, patient does not endorse. Pta was on valproate. He had been agitated and pulling out IV lines. Seizure activity suspected when RRT called on 9/27. Responsiveness improved without intervention and patient able to protect airway. CT on 9/27 without evidence of acute intracranial process. Transferred to ICU and started on dexmedetomidine for sedation, scheduled valproate for seizure prophylaxis, and scheduled diazepam for withdrawal. Valproate level on 9/27 50.9, lower end of therapeutic range. CIWA scores have large range, higher scores associated with episodes of agitation and combativeness. Plan to decrease total benzodiazepine use gradually, first by decreasing scheduled dosing.  - Continue dexmedetomidine infusion for sedation and  wean as tolerated  - Continue IV valproate 500mg IV BID for alcohol withdrawal and seizure prophylaxis. Seizure precautions  - Discontinue scheduled diazepam  - Continue CIWA with symptom-triggered diazepam  - Continue folic acid, multivitamin, thiamine  - Ondansetron or prochlorperazine prn for nausea  - Olanzapine or haloperidol prn for hallucinations  - SW consult later this admission for CD resources  # Major depressive disorder  # Bipolar disorder  # Insomnia  Pta was on buproprion 450mg qam, tizanidine 4mg nightly, trazodone 100mg nightly  - Pta tizanidine and trazodone held. Can consider restarting once patient able to tolerate oral medications  - Hold bupropion, as this lowers seizure threshold  # History of multisubstance use  No recent use per patient report and labs at admission.  - Continue pta buprenorphine-naloxone films BID     Cardiovascular  # Hypertension  Pta hydralazine held at admission. Has been mostly normotensive.  - IV metoprolol and hydralazine available prn for high blood pressures  - Telemetry in setting of withdrawal and multiple electrolyte abnormalities     Pulmonary  # Possible pneumonia  At risk for aspiration pneumonia, developed fever of 100.7 morning of 9/27. CXR reveals mild basilar opacities. Started on antibiotics, as below, discontinued 9/28. On 9/30, coarse breath sounds and hypoxia with fever; infectious workup (CXR showed no clear infiltrate, but at risk of pneumonia or other nosocomial infection; UA and BCx are pending) and empiric antibiotics (Zosyn) started.   - RT consult for suctioning  - Continue supplemental O2 as needed     Renal  # Anion gap metabolic acidosis  # Lactic acidosis  # Ketosis  Secondary to alcohol withdrawal. Lactic acid improved.   - Continue NS IVF  - Daily BMP  - Magnesium and potassium high replacement protocols  Lino catheter in place since transfer to ICU.  - Remove Lino today     GI  # Elevated LFTs  Last ALT 93,  on 9/24 and trending  down. Elevation likely secondary to alcohol use.  # Nutrition  Currently sedated and at risk for seizures and aspiration and risk for pulling feeding tube if placed. LBM 9/24. After transfer to ICU, kept npo. Due to ongoing sedation needs, started D5 LR on 9/29 and plan for NG tube placement on 9/30.  - NG tube placement today  - Nutrition consulted; appreciate recommendations for tube feeds  - Discontinue D5 LR when NG tube placed and can initiate tube feeding  - GI prophylaxis: IV pantoprazole 40mg daily     Endocrine  # Hyperglycemia  - Continue sliding scale insulin and hypoglycemia protocol, per orders     ID  # Fever, possible pneumonia  Fever of 100.7 morning of 9/27. CXR revealed mild basilar opacities, likely atelectasis. At risk for aspiration. Ampicillin-sulbactam started 9/27. Fever was transient and has remained afebrile through remainder of 9/27 through morning of 9/28. WBC normal and stable and blood culture no growth after 12 hours. Discontinued ampicillin-sulbactam on 9/28. Transient fever evening of 101.6 evening of 9/29, again >100 on 9/30, sweating, coarse breath sounds, hypoxia, and decreased urine output. Plan to repeat CXR, obtain CBC, lactic acid, UA, blood culture to identify source of possible infection. Started empiric piperacillin-tazobactam 9/30.  - Chest XR  - CBC, lactic acid, UA, blood culture  - Start IV piperacillin-tazobactam     MSK  # Left wrist pain  Unable to obtain further history including history of fall or other trauma to area. Left wrist XR on 9/29 revealed no acute fracture, some chronic findings. Uric acid 4.7.  - Recommend hand consult as outpatient for further workup and management        DVT Prophylaxis: enoxaparin 40mg daily  GI Prophylaxis: IV pantoprazole daily  Lines/Tubes/Drains: Peripheral IV x2  Code Status: Full Code  Discharge Dispo/Date: To home with family support vs CD treatment facility pending weaning sedation and improvement of agitation, withdrawal  symptoms        Interval History (Subjective):      No acute events overnight. CIWA ranging 3-12, 12 when agitated. Patient seen at bedside today, sleepy and moaning with each exhalation. Nursing staff reported less agitated with cares and held scheduled diazepam this morning, as patient was not sleepy and not agitated. Noted small amount of blood dripping from nose and dry nares.                  Physical Exam:      Last Vital Signs:  /63   Pulse 78   Temp 98.7  F (37.1  C) (Axillary)   Resp 26   Wt 90.9 kg (200 lb 6.4 oz)   SpO2 95%   BMI 27.95 kg/m      CIWA range last 24 hours: 3-12    Intake/Output Summary (Last 24 hours) at 9/30/2022 0702  Last data filed at 9/30/2022 0623  Gross per 24 hour   Intake 2161.01 ml   Output 2900 ml   Net -738.99 ml     General: Sleepy, moaning with exhalation in bed, NAD  HEENT: NC/AT. MM moist  Cardiac: RRR, S1, S2.  No murmurs appreciated  Pulmonary: Normal chest rise, normal work of breathing on 2L NC.  Lungs with rhonchi at bases bilaterally  Abdomen: Soft, non-distended.  Bowel Sounds Present  Extremities: Left wrist less swollen compared to yesterday. Does not react to light palpation of wrist. Warm and well perfused  Skin: No rashes or lesions noted on exposed skin.  Warm and Dry  Neuro: Intermittently responds with one-syllable slurred answers, not comprehensible         Medications:      All current medications were reviewed with changes reflected in problem list.         Data:      All new lab and imaging data was reviewed.     Labs:   Latest Reference Range & Units 09/30/22 05:32   Sodium 136 - 145 mmol/L 138   Potassium 3.4 - 5.3 mmol/L  3.4 - 5.3 mmol/L 4.3  4.2   Chloride 98 - 107 mmol/L 102   Carbon Dioxide (CO2) 22 - 29 mmol/L 25   Urea Nitrogen 6.0 - 20.0 mg/dL 13.1   Creatinine 0.67 - 1.17 mg/dL 0.71   GFR Estimate >60 mL/min/1.73m2 >90   Calcium 8.6 - 10.0 mg/dL 8.8   Anion Gap 7 - 15 mmol/L 11   Magnesium 1.7 - 2.3 mg/dL 1.7   Phosphorus 2.5 - 4.5  mg/dL 3.2   Glucose 70 - 99 mg/dL 116 (H)      Latest Reference Range & Units 09/29/22 05:30   Uric Acid 3.4 - 7.0 mg/dL 4.7     Imaging:   No new imaging last 24 hours.      Renee Marques, MS4    Physician Attestation   I, Rm Marinelli MD, was present with the medical/DEEJAY student who participated in the service and in the documentation of the note.  I have verified the history and personally performed the physical exam and medical decision making.  I agree with the assessment and plan of care as documented in the note.      Items personally reviewed: vitals, labs and exam.    /69   Pulse 70   Temp 98.9  F (37.2  C) (Temporal)   Resp 22   Wt 90.9 kg (200 lb 6.4 oz)   SpO2 91%   BMI 27.95 kg/m    GENERAL:  Comfortable with intermittent agitation at times.   PSYCH:  No acute distress.  EYES: PERRLA, Normal conjunctiva.  HEART:  Regular rate and rhythm. No JVD. Pulses normal. No edema.  LUNGS:  Clear to auscultation, normal Respiratory effort.  ABDOMEN:  Soft, no hepatosplenomegaly, normal bowel sounds.  EXTREMETIES: No clubbing, cyanosis or ischemia  SKIN:  Dry to touch, No rash.

## 2022-09-30 NOTE — CONSULTS
CLINICAL NUTRITION SERVICES - ASSESSMENT NOTE     Nutrition Prescription    Malnutrition Status:    % Intake: Unable to assess  % Weight Loss: Weight loss does not meet criteria  Subcutaneous Fat Loss: Unable to assess--pt moaning in discomfort, diaphoretic, has been very agitated  Muscle Loss: Unable to assess  Fluid Accumulation/Edema: None noted  Malnutrition Diagnosis: Unable to determine due to incomplete physical exam    Recommendations already ordered by Registered Dietitian (RD):  Enteral Nutrition - Initiate per NG tube (pending placement)- Jevity 1.5 Shahbaz @ goal of  55ml/hr  (1320ml/day)  will provide: 1980 kcals, 84 g PRO, 1003 ml free H20, 284 g CHO, and 27 g fiber daily.  Flushes of 90 ml q 4 hrs= 540 ml + 1003 ml from fluid = 1543 ml  Convert mvit to Certavite due to hx of EtoH  Suspect patient may be at risk for refeeding syndrome based on history of EtOH/polysubstance use and current withdrawal- will order labs accordingly and advance        REASON FOR ASSESSMENT  Andrea Pham is a/an 57 year old male assessed by the dietitian for provider order- registered dietitian to order tube feeding per medical nutrition therapy guidelines    Pt admitted d/t alcohol withdrawal. PMH significant for alcohol use disorder, acute alcohol withdrawal with seizures, multi-substance abuse, MDD, bipolar d/o, htn, and pancreatitis    NUTRITION HISTORY  - Unable to obtain nutrition history d/t patient's condition  - Allergies: NKFA    CURRENT NUTRITION ORDERS  Diet: NPO x 3 days  Was consuming % on 9/25 and 9/26 prior to transfer to ICU    LABS- reviewed    MEDICATIONS    [Held by provider] aspirin  325 mg Oral QPM     buprenorphine HCl-naloxone HCl  1 Film Sublingual BID     [Held by provider] buPROPion  150 mg Oral QAM     [Held by provider] buPROPion  300 mg Oral QAM     diazepam  5 mg Intravenous TID     enoxaparin ANTICOAGULANT  40 mg Subcutaneous Q24H     [Held by provider] famotidine  20 mg Oral BID     [Held  "by provider] folic acid  1 mg Oral Daily     insulin aspart  1-3 Units Subcutaneous Q4H     magnesium sulfate  2 g Intravenous Once     [Held by provider] multivitamin w/minerals  1 tablet Oral Daily     [Held by provider] OLANZapine  10 mg Oral At Bedtime     pantoprazole  40 mg Intravenous Daily with breakfast     sodium chloride (PF)  3 mL Intracatheter Q8H     [Held by provider] thiamine  100 mg Oral TID     [Held by provider] tiZANidine  4 mg Oral At Bedtime     [Held by provider] traZODone  100 mg Oral At Bedtime     valproate (DEPACON) in NS intermittent infusion 50 mL  500 mg Intravenous Daily     valproate  500 mg Intravenous At Bedtime        dexmedetomidine 0.8 mcg/kg/hr (09/30/22 0402)     dextrose 5% in lactated ringers 50 mL/hr at 09/29/22 2337        ANTHROPOMETRICS  Height: 180.3 cm (5' 11\")  Most Recent Weight: 90.9 kg (200 lb 6.4 oz)    Vitals:    09/24/22 0242 09/24/22 0604 09/27/22 0604 09/28/22 0400   Weight: 93.8 kg (206 lb 12.8 oz) 94.1 kg (207 lb 6.4 oz) 93 kg (205 lb) 91.8 kg (202 lb 6.1 oz)    09/29/22 0349   Weight: 90.9 kg (200 lb 6.4 oz)     IBW: 78.2 kg  Body mass index is 27.95 kg/m .  BMI: Overweight BMI 25-29.9  Weight History:   Wt Readings from Last 10 Encounters:   09/29/22 90.9 kg (200 lb 6.4 oz)   07/13/22 92.9 kg (204 lb 12.8 oz)   06/14/22 97 kg (213 lb 13.5 oz)   02/08/22 91.4 kg (201 lb 8 oz)   12/24/21 95.7 kg (211 lb)   11/30/20 70.3 kg (155 lb)   07/18/20 70.3 kg (155 lb)   07/08/20 86.2 kg (190 lb)   11/23/19 90.9 kg (200 lb 6.4 oz)   04/23/19 88.5 kg (195 lb)     6.1 kg (6.2%) weight loss over 3 months    Dosing Weight: 90.9 kg    ASSESSED NUTRITION NEEDS  Estimated Energy Needs: 3919-0810 kcals/day (20 - 25 kcals/kg)  Justification: Overweight  Estimated Protein Needs: 73-91 grams protein/day (0.8 - 1 grams of pro/kg)  Justification: Maintenance  Estimated Fluid Needs: Per provider pending fluid status    PHYSICAL FINDINGS  See malnutrition section " above.    MALNUTRITION  As noted above    NUTRITION DIAGNOSIS  Inadequate oral intake related to severe AMS, increased respiratory needs as evidenced by need for NPO status and nutrition support to meet needs.      INTERVENTIONS  Implementation  Nutrition Education: Not appropriate at this time due to patient condition   Enteral Nutrition - Initiate per NG tube (pending placement)- Jevity 1.5 Shahbaz @ goal of  55ml/hr  (1320ml/day)  will provide: 1980 kcals, 84 g PRO, 1003 ml free H20, 284 g CHO, and 27 g fiber daily.  Flushes of 90 ml q 4 hrs= 540 ml + 1003 ml from fluid = 1543 ml  Convert mvit to Certavite due to hx of EtoH  Suspect patient may be at risk for refeeding syndrome based on history of EtOH/polysubstance use and current withdrawal- will order labs accordingly and advance gradually    Goals  Tube feeding to meet % estimated needs     Monitoring/Evaluation  Progress toward goals will be monitored and evaluated per protocol.  Jackie Herring, YAZ, LD, [facility]  Pager - 3rd floor/ICU: [phone]  Pager - All other floors: [phone]  Pager - Weekend/holiday: [phone]  Office: [phone]

## 2022-10-01 LAB
ANION GAP SERPL CALCULATED.3IONS-SCNC: 9 MMOL/L (ref 7–15)
BUN SERPL-MCNC: 22 MG/DL (ref 6–20)
CALCIUM SERPL-MCNC: 8.9 MG/DL (ref 8.6–10)
CHLORIDE SERPL-SCNC: 107 MMOL/L (ref 98–107)
CREAT SERPL-MCNC: 1.01 MG/DL (ref 0.67–1.17)
DEPRECATED HCO3 PLAS-SCNC: 25 MMOL/L (ref 22–29)
ENTEROCOCCUS FAECALIS: NOT DETECTED
ENTEROCOCCUS FAECIUM: NOT DETECTED
GFR SERPL CREATININE-BSD FRML MDRD: 86 ML/MIN/1.73M2
GLUCOSE BLDC GLUCOMTR-MCNC: 105 MG/DL (ref 70–99)
GLUCOSE BLDC GLUCOMTR-MCNC: 112 MG/DL (ref 70–99)
GLUCOSE BLDC GLUCOMTR-MCNC: 120 MG/DL (ref 70–99)
GLUCOSE BLDC GLUCOMTR-MCNC: 126 MG/DL (ref 70–99)
GLUCOSE BLDC GLUCOMTR-MCNC: 128 MG/DL (ref 70–99)
GLUCOSE BLDC GLUCOMTR-MCNC: 83 MG/DL (ref 70–99)
GLUCOSE SERPL-MCNC: 132 MG/DL (ref 70–99)
LISTERIA SPECIES (DETECTED/NOT DETECTED): NOT DETECTED
MAGNESIUM SERPL-MCNC: 2.2 MG/DL (ref 1.7–2.3)
PHOSPHATE SERPL-MCNC: 3.8 MG/DL (ref 2.5–4.5)
PLATELET # BLD AUTO: 177 10E3/UL (ref 150–450)
POTASSIUM SERPL-SCNC: 4.2 MMOL/L (ref 3.4–5.3)
SODIUM SERPL-SCNC: 141 MMOL/L (ref 136–145)
STAPHYLOCOCCUS AUREUS: NOT DETECTED
STAPHYLOCOCCUS EPIDERMIDIS: DETECTED
STAPHYLOCOCCUS LUGDUNENSIS: NOT DETECTED
STREPTOCOCCUS AGALACTIAE: NOT DETECTED
STREPTOCOCCUS ANGINOSUS GROUP: NOT DETECTED
STREPTOCOCCUS PNEUMONIAE: NOT DETECTED
STREPTOCOCCUS PYOGENES: NOT DETECTED
STREPTOCOCCUS SPECIES: NOT DETECTED

## 2022-10-01 PROCEDURE — 99233 SBSQ HOSP IP/OBS HIGH 50: CPT | Performed by: INTERNAL MEDICINE

## 2022-10-01 PROCEDURE — 94640 AIRWAY INHALATION TREATMENT: CPT | Mod: 76

## 2022-10-01 PROCEDURE — 94640 AIRWAY INHALATION TREATMENT: CPT

## 2022-10-01 PROCEDURE — 999N000157 HC STATISTIC RCP TIME EA 10 MIN

## 2022-10-01 PROCEDURE — 84100 ASSAY OF PHOSPHORUS: CPT | Performed by: INTERNAL MEDICINE

## 2022-10-01 PROCEDURE — 36415 COLL VENOUS BLD VENIPUNCTURE: CPT | Performed by: STUDENT IN AN ORGANIZED HEALTH CARE EDUCATION/TRAINING PROGRAM

## 2022-10-01 PROCEDURE — 85049 AUTOMATED PLATELET COUNT: CPT | Performed by: STUDENT IN AN ORGANIZED HEALTH CARE EDUCATION/TRAINING PROGRAM

## 2022-10-01 PROCEDURE — 258N000003 HC RX IP 258 OP 636: Performed by: STUDENT IN AN ORGANIZED HEALTH CARE EDUCATION/TRAINING PROGRAM

## 2022-10-01 PROCEDURE — 272N000078 HC NUTRITION PRODUCT INTERMEDIATE LITER

## 2022-10-01 PROCEDURE — 250N000011 HC RX IP 250 OP 636: Performed by: INTERNAL MEDICINE

## 2022-10-01 PROCEDURE — 250N000009 HC RX 250: Performed by: STUDENT IN AN ORGANIZED HEALTH CARE EDUCATION/TRAINING PROGRAM

## 2022-10-01 PROCEDURE — 83735 ASSAY OF MAGNESIUM: CPT | Performed by: INTERNAL MEDICINE

## 2022-10-01 PROCEDURE — C9113 INJ PANTOPRAZOLE SODIUM, VIA: HCPCS | Performed by: INTERNAL MEDICINE

## 2022-10-01 PROCEDURE — 258N000003 HC RX IP 258 OP 636: Performed by: INTERNAL MEDICINE

## 2022-10-01 PROCEDURE — 250N000009 HC RX 250: Performed by: INTERNAL MEDICINE

## 2022-10-01 PROCEDURE — 250N000011 HC RX IP 250 OP 636: Performed by: STUDENT IN AN ORGANIZED HEALTH CARE EDUCATION/TRAINING PROGRAM

## 2022-10-01 PROCEDURE — 80048 BASIC METABOLIC PNL TOTAL CA: CPT | Performed by: INTERNAL MEDICINE

## 2022-10-01 PROCEDURE — 250N000013 HC RX MED GY IP 250 OP 250 PS 637: Performed by: INTERNAL MEDICINE

## 2022-10-01 PROCEDURE — 200N000001 HC R&B ICU

## 2022-10-01 PROCEDURE — 250N000013 HC RX MED GY IP 250 OP 250 PS 637: Performed by: STUDENT IN AN ORGANIZED HEALTH CARE EDUCATION/TRAINING PROGRAM

## 2022-10-01 RX ORDER — DOXAZOSIN 1 MG/1
1 TABLET ORAL EVERY 12 HOURS SCHEDULED
Status: DISCONTINUED | OUTPATIENT
Start: 2022-10-01 | End: 2022-10-06

## 2022-10-01 RX ORDER — DIAZEPAM 10 MG/2ML
2 INJECTION, SOLUTION INTRAMUSCULAR; INTRAVENOUS EVERY 6 HOURS PRN
Status: DISCONTINUED | OUTPATIENT
Start: 2022-10-01 | End: 2022-10-03

## 2022-10-01 RX ORDER — OLANZAPINE 10 MG/1
10 TABLET ORAL AT BEDTIME
Status: DISCONTINUED | OUTPATIENT
Start: 2022-10-01 | End: 2022-10-04

## 2022-10-01 RX ORDER — ASPIRIN 81 MG/1
325 TABLET, CHEWABLE ORAL EVERY EVENING
Status: DISCONTINUED | OUTPATIENT
Start: 2022-10-01 | End: 2022-10-08 | Stop reason: HOSPADM

## 2022-10-01 RX ORDER — FOLIC ACID 1 MG/1
1 TABLET ORAL DAILY
Status: DISCONTINUED | OUTPATIENT
Start: 2022-10-02 | End: 2022-10-08 | Stop reason: HOSPADM

## 2022-10-01 RX ORDER — KETOROLAC TROMETHAMINE 30 MG/ML
30 INJECTION, SOLUTION INTRAMUSCULAR; INTRAVENOUS EVERY 6 HOURS PRN
Status: DISPENSED | OUTPATIENT
Start: 2022-10-01 | End: 2022-10-03

## 2022-10-01 RX ORDER — SODIUM CHLORIDE FOR INHALATION 3 %
3 VIAL, NEBULIZER (ML) INHALATION EVERY 6 HOURS
Status: DISCONTINUED | OUTPATIENT
Start: 2022-10-01 | End: 2022-10-03

## 2022-10-01 RX ORDER — TRAZODONE HYDROCHLORIDE 100 MG/1
100 TABLET ORAL AT BEDTIME
Status: DISCONTINUED | OUTPATIENT
Start: 2022-10-01 | End: 2022-10-04

## 2022-10-01 RX ORDER — VANCOMYCIN HYDROCHLORIDE 1 G/200ML
1000 INJECTION, SOLUTION INTRAVENOUS EVERY 12 HOURS
Status: DISCONTINUED | OUTPATIENT
Start: 2022-10-02 | End: 2022-10-03

## 2022-10-01 RX ORDER — CEFAZOLIN SODIUM 1 G/50ML
2000 SOLUTION INTRAVENOUS ONCE
Status: COMPLETED | OUTPATIENT
Start: 2022-10-01 | End: 2022-10-01

## 2022-10-01 RX ORDER — SODIUM CHLORIDE 9 MG/ML
INJECTION, SOLUTION INTRAVENOUS CONTINUOUS
Status: DISCONTINUED | OUTPATIENT
Start: 2022-10-01 | End: 2022-10-01

## 2022-10-01 RX ADMIN — ACETAMINOPHEN 650 MG: 325 SOLUTION ORAL at 02:01

## 2022-10-01 RX ADMIN — BUPRENORPHINE HYDROCHLORIDE, NALOXONE HYDROCHLORIDE 1 FILM: 8; 2 FILM, SOLUBLE BUCCAL; SUBLINGUAL at 05:11

## 2022-10-01 RX ADMIN — THIAMINE HCL TAB 100 MG 100 MG: 100 TAB at 09:01

## 2022-10-01 RX ADMIN — SENNOSIDES AND DOCUSATE SODIUM 1 TABLET: 50; 8.6 TABLET ORAL at 08:26

## 2022-10-01 RX ADMIN — SODIUM CHLORIDE SOLN NEBU 3% 3 ML: 3 NEBU SOLN at 20:13

## 2022-10-01 RX ADMIN — DIAZEPAM 5 MG: 5 INJECTION, SOLUTION INTRAMUSCULAR; INTRAVENOUS at 01:57

## 2022-10-01 RX ADMIN — Medication 15 ML: at 08:25

## 2022-10-01 RX ADMIN — SENNOSIDES AND DOCUSATE SODIUM 2 TABLET: 50; 8.6 TABLET ORAL at 22:16

## 2022-10-01 RX ADMIN — VALPROATE SODIUM 500 MG: 100 INJECTION, SOLUTION INTRAVENOUS at 22:08

## 2022-10-01 RX ADMIN — KETOROLAC TROMETHAMINE 30 MG: 30 INJECTION, SOLUTION INTRAMUSCULAR at 19:41

## 2022-10-01 RX ADMIN — ACETAMINOPHEN 650 MG: 325 SOLUTION ORAL at 22:16

## 2022-10-01 RX ADMIN — VALPROATE SODIUM 500 MG: 100 INJECTION, SOLUTION INTRAVENOUS at 08:26

## 2022-10-01 RX ADMIN — DEXMEDETOMIDINE HYDROCHLORIDE 0.5 MCG/KG/HR: 400 INJECTION INTRAVENOUS at 05:11

## 2022-10-01 RX ADMIN — DIAZEPAM 5 MG: 5 INJECTION INTRAMUSCULAR; INTRAVENOUS at 06:03

## 2022-10-01 RX ADMIN — SODIUM CHLORIDE SOLN NEBU 3% 3 ML: 3 NEBU SOLN at 13:22

## 2022-10-01 RX ADMIN — TAZOBACTAM SODIUM AND PIPERACILLIN SODIUM 4.5 G: 500; 4 INJECTION, SOLUTION INTRAVENOUS at 21:25

## 2022-10-01 RX ADMIN — OLANZAPINE 10 MG: 10 TABLET, FILM COATED ORAL at 21:26

## 2022-10-01 RX ADMIN — TAZOBACTAM SODIUM AND PIPERACILLIN SODIUM 3.38 G: 375; 3 INJECTION, SOLUTION INTRAVENOUS at 14:45

## 2022-10-01 RX ADMIN — TRAZODONE HYDROCHLORIDE 100 MG: 100 TABLET ORAL at 21:26

## 2022-10-01 RX ADMIN — TAZOBACTAM SODIUM AND PIPERACILLIN SODIUM 3.38 G: 375; 3 INJECTION, SOLUTION INTRAVENOUS at 03:10

## 2022-10-01 RX ADMIN — VANCOMYCIN HYDROCHLORIDE 2000 MG: 10 INJECTION, POWDER, LYOPHILIZED, FOR SOLUTION INTRAVENOUS at 21:06

## 2022-10-01 RX ADMIN — THIAMINE HCL TAB 100 MG 100 MG: 100 TAB at 17:27

## 2022-10-01 RX ADMIN — THIAMINE HCL TAB 100 MG 100 MG: 100 TAB at 21:26

## 2022-10-01 RX ADMIN — ENOXAPARIN SODIUM 40 MG: 40 INJECTION SUBCUTANEOUS at 21:26

## 2022-10-01 RX ADMIN — BUPRENORPHINE HYDROCHLORIDE, NALOXONE HYDROCHLORIDE 1 FILM: 8; 2 FILM, SOLUBLE BUCCAL; SUBLINGUAL at 17:27

## 2022-10-01 RX ADMIN — TAZOBACTAM SODIUM AND PIPERACILLIN SODIUM 3.38 G: 375; 3 INJECTION, SOLUTION INTRAVENOUS at 08:30

## 2022-10-01 RX ADMIN — KETOROLAC TROMETHAMINE 30 MG: 30 INJECTION, SOLUTION INTRAMUSCULAR at 03:10

## 2022-10-01 RX ADMIN — ASPIRIN 81 MG CHEWABLE TABLET 324 MG: 81 TABLET CHEWABLE at 19:58

## 2022-10-01 RX ADMIN — PANTOPRAZOLE SODIUM 40 MG: 40 INJECTION, POWDER, FOR SOLUTION INTRAVENOUS at 08:26

## 2022-10-01 ASSESSMENT — ACTIVITIES OF DAILY LIVING (ADL)
ADLS_ACUITY_SCORE: 47
ADLS_ACUITY_SCORE: 48
ADLS_ACUITY_SCORE: 46
ADLS_ACUITY_SCORE: 46
ADLS_ACUITY_SCORE: 48
ADLS_ACUITY_SCORE: 46
ADLS_ACUITY_SCORE: 42
ADLS_ACUITY_SCORE: 47
ADLS_ACUITY_SCORE: 47
ADLS_ACUITY_SCORE: 42
ADLS_ACUITY_SCORE: 47
ADLS_ACUITY_SCORE: 43

## 2022-10-01 NOTE — PLAN OF CARE
Goal Outcome Evaluation:    Plan of Care Reviewed with: Patient     ICU End of Shift Summary.  For vital signs and complete assessments, please see documentation flowsheets.      Pertinent assessments: Disorientated x4. CPOT 1. Afebrile. CIWA with PRN valium. RASS 0 - -1 achieved. SB. BPs softs and MD aware. Congested, weak cough with encouragement; suction provided throughout shift of thick, creamy sputum. Oxymask 2L. Lino in place with tea colored urine. TF being tolerated. BS hypoactive; BM meds given.     Major Shift Events: Lino placed due to retention                                   -Precedex turned off and patient tolerating.      Plan (Upcoming Events):  PRN valium if needed. Continue to increase tube feed per orders     Discharge/Transfer Needs:  TBD     Bedside Shift Report Completed :  Yes  Bedside Safety Check Completed: Yes

## 2022-10-01 NOTE — PROGRESS NOTES
[facility]    Medicine Progress Note - Hospitalist Service    Date of Admission:  9/23/2022    Assessment & Plan            Summary:  Andrea Pham is a 57 year old male with history ofalcohol use disorder, acute alcohol withdrawal with seizures, and pancreatitis. He was admitted on 9/23/2022 for alcohol withdrawal.  He was treated with oral gabapentin and as needed Valium.  Hospital course was complicated by RRT called on 9/27 due to patient decreased responsiveness and hypotension. No classic tonic-clonic movements observed, although seizure activity clinically suspected. Patient was able to protect airway and responsiveness improved, so held off on intubation. CT was ordered and patient transferred to ICU.   He was started on dexmedetomidine for sedation. Scheduled valproate was substituted for gabapentin for acute alcohol withdrawal and seizure prophylaxis. Scheduled and as needed diazepam were ordered for withdrawal. Andrea stabilized. Scheduled diazepam was weaned. Feeding tube was ultimately placed and tube feeds started. Precedex was weaned on 10/1/22.     Problem List/Assessment and Plan:      Alcohol withdrawal with suspected seizures  History of alcohol use disorder, severe  Alcohol dependence  History of withdrawal seizures documented in medical record, patient does not endorse. Pta was on valproate. He was admitted with alcohol withdrawal and had been agitated and pulling out IV lines. Seizure activity was suspected when RRT called on 9/27. Responsiveness improved without intervention and patient able to protect airway. CT on 9/27 without evidence of acute intracranial process. Transferred to ICU and started on dexmedetomidine for sedation, scheduled valproate for seizure prophylaxis, and scheduled diazepam for withdrawal.   -Tapered of scheduled diazepam  -Weaned off of Precedex 10/1/22  -Continue prn diazepam by CIWA  - Continue folic acid, multivitamin, thiamine  - Continue  ondansetron or prochlorperazine prn for nausea  - Continue prn olanzapine or haloperidol prn for hallucinations  - SW consult later this admission for CD resources    Major depressive disorder  Bipolar disorder  Insomnia  Pta was on buproprion 450mg qam, tizanidine 4mg nightly, and trazodone 100mg nightly  - Pta tizanidine and trazodone held. Can consider restarting once patient able to tolerate oral medications  - Hold bupropion, as this lowers seizure threshold    History of polysubstance use  No recent use per patient report and labs at admission.  - Continue pta buprenorphine-naloxone films BID     Hypertension  Pta hydralazine held at admission. Has been mostly normotensive.  - IV metoprolol and hydralazine available prn for high blood pressures  - Telemetry in setting of withdrawal and multiple electrolyte abnormalities     Possible pneumonia  At risk for aspiration pneumonia. Andrea developed fever of 100.7 morning of 9/27. CXR revealed mild basilar opacities. Started on antibiotics, as below, discontinued 9/28. On 9/30, coarse breath sounds and hypoxia with fever; infectious workup (CXR showed no clear infiltrate, but at risk of pneumonia or other nosocomial infection; UA and BCx are pending) and empiric antibiotics (Zosyn) started. Seems better today. If improvement continues and blood cultures are no growth tomorrow consider stopping Zosyn.      Anion gap metabolic acidosis  Lactic acidosis Ketosis  Secondary to alcohol withdrawal. Resolved     Elevated LFTs  Last ALT 93,  on 9/24 and trending down. Elevation likely secondary to alcohol use.    Nutrition  -Nasal FT placed 9/30. Tube feeds started and titrating rate to goal  -Seems on dry side so I increased free water from 60 to 120 ml every 4 hours.  -Continue GI prophylaxis with IV pantoprazole 40mg daily  -Continue sliding scale insulin and hypoglycemia protocol as needed      Left wrist pain  Unable to obtain further history including history of  fall or other trauma to area. Left wrist XR on 9/29 revealed no acute fracture, some chronic findings. Uric acid 4.7.  - Recommend hand consult as outpatient for further workup and management    Urine retention  -Failed Lino removal. Lino replaced. Trial of Cardura       Diet: NPO for Medical/Clinical Reasons Except for: No Exceptions, Meds, Ice Chips  Adult Formula Drip Feeding: Continuous Jevity 1.5; Nasogastric tube; Goal Rate: 55; mL/hr; Medication - Feeding Tube Flush Frequency: At least 15-30 mL water before and after medication administration and with tube clogging; Amount to Send (Nutrit...    DVT Prophylaxis: Enoxaparin (Lovenox) SQ  Lino Catheter: PRESENT, indication: Retention, Retention  Central Lines: None  Cardiac Monitoring: ACTIVE order. Indication: alcohol withdrawal  Code Status: Full Code      Disposition Plan     Expected Discharge Date: 10/03/2022                The patient's care was discussed with the Bedside Nurse and Patient.    Rm Marinelli MD  Hospitalist Service  [facility]  Securely message with the Vocera Web Console (learn more here)  Text page via [employer] Paging/Directory         Clinically Significant Risk Factors Present on Admission                      ______________________________________________________________________    Interval History   Was somnolent this am-  Improved with weaning/stopping precedex drip    Data reviewed today: I reviewed all medications, new labs and imaging results over the last 24 hours. I personally reviewed no images or EKG's today.    Physical Exam   Vital Signs: Temp: 97.2  F (36.2  C) Temp src: Bladder BP: 108/63 Pulse: 80   Resp: 16 SpO2: 98 % O2 Device: Oxymask Oxygen Delivery: 2 LPM  Weight: 200 lbs 6.37 oz  GENERAL:  Comfortable.  PSYCH:  No acute distress.  EYES: PERRLA, Normal conjunctiva.  HEART:  Regular rate and rhythm. No JVD. Pulses normal. No edema.  LUNGS:  Clear to auscultation, normal Respiratory  effort.  ABDOMEN:  Soft, no hepatosplenomegaly, normal bowel sounds.  EXTREMETIES: No clubbing, cyanosis or ischemia  SKIN:  Dry to touch, No rash.      Data   Recent Labs   Lab 10/01/22  1143 10/01/22  0738 10/01/22  0600 09/30/22  1610 09/30/22  1514 09/30/22  0818 09/30/22  0532 09/29/22  0805 09/29/22  0530 09/28/22  0803 09/28/22  0537 09/27/22  0840 09/27/22  0609   WBC  --   --   --   --  6.5  --   --   --   --   --  4.9  --  5.5   HGB  --   --   --   --  13.4  --   --   --   --   --  13.5  --  13.0*   MCV  --   --   --   --  96  --   --   --   --   --  96  --  96   PLT  --   --  177  --  165  --   --   --   --   --  108*  --  111*   NA  --   --  141  --   --   --  138  --   --   --  142  --  141   POTASSIUM  --   --  4.2  --   --   --  4.3  4.2  --  4.5  --  4.0  --  4.3   CHLORIDE  --   --  107  --   --   --  102  --   --   --  105  --  105   CO2  --   --  25  --   --   --  25  --   --   --  28  --  26   BUN  --   --  22.0*  --   --   --  13.1  --   --   --  10.1  --  8.8   CR  --   --  1.01  --   --   --  0.71  --   --   --  0.69  --  0.85   ANIONGAP  --   --  9  --   --   --  11  --   --   --  9  --  10   JOHANA  --   --  8.9  --   --   --  8.8  --   --   --  8.6  --  8.7   * 126* 132*   < >  --    < > 116*   < >  --    < > 100*   < > 106*    < > = values in this interval not displayed.     Recent Results (from the past 24 hour(s))   XR Abdomen Port 1 View    Narrative    EXAM: XR ABDOMEN PORT 1 VIEW  LOCATION: [facility]  DATE/TIME: 9/30/2022 4:46 PM    INDICATION: Keofeed placement.  COMPARISON: None.      Impression    IMPRESSION: Feeding tube tip likely in the second portion of the duodenum.

## 2022-10-01 NOTE — PROGRESS NOTES
Plan of Care Reviewed with: Patient    ICU End of Shift Summary.  For vital signs and complete assessments, please see documentation flowsheets.      Pertinent assessments: CIWA 6 throughout shift; no PRN valium given.  Precedex gtt down to 0.6 and RASS -1, -2 throughout shift.  Congested, weak cough with encouragement; suction provided throughout shift of thick, creamy sputum.  Temp of 100.5 relieved with ice packs.  O2 sats 89-95% and improve with suction/cough encouragement.    Major Shift Events: Polanco removed and external catheter placed.  NG tube placed and tube feed started at 15 ml/hr with 60 ml free water flush Q4.  UA and chest x-ray completed; Zosyn IV started.    Plan (Upcoming Events):  Continue to wean precedex as able; PRN valium if needed. Continue to increase tube feed per orders and monitor for urine output post polanco removal    Discharge/Transfer Needs:  N/A     Bedside Shift Report Completed :  Yes  Bedside Safety Check Completed: Yes

## 2022-10-01 NOTE — PLAN OF CARE
ICU End of Shift Summary.  For vital signs and complete assessments, please see documentation flowsheets.      Pertinent assessments: Pt sedated on Precedex, RASS -2 - +2; goal 0- -1. Incomprehensible speech, moans. Intermittently follows commands. CIWA 5-8. PRN. Valium given x2, once for CIWA once for agitation. Tele SB/SR. BP soft. Tmax 99.7. On 2L oxymask, able to wean off when pt was awake. Large amounts of thick yellow/creamy secretions suctioned orally/subglottically. Pt has extremely weak cough with encouragement, unable to manage secretions. Unable to void, bladder scan >300, straight cathed x2 this shift. BS hypoactive, LBM 9/24. Keofeed in place, TF rate advanced to 25 at 0230.       Major Shift Events:    - 0200 pt was moaning in pain pt unable to communicate where pain was and how much pain he was in, tylenol given without relief/nonverbal improvements on reassessment, telehub called at 0245 d/t no other medications for pain, ketorolac ordered, given with relief   - Straight cathed x2      Plan (Upcoming Events): wean dex, manage pain, advance TF rate, bowel regimen?  Discharge/Transfer Needs: tbd     Bedside Shift Report Completed: yes  Bedside Safety Check Completed: yes

## 2022-10-01 NOTE — PROGRESS NOTES
"[facility]      ICU PROGRESS NOTE       Andrea Pham   1965  [id number]      Indication for Critical Care Billing:   precedex (hemodynamic and sedative agent) management         I have reviewed changes in critical data from approximately the last 24 hours, including medications, laboratory results, vital signs, radiograph results, consultant recommendations, and other diagnostic studies.          An estimated total critical care time involving or potentially including but not limited to chart review, examination of the patient, formulation of plans, discussion with colleagues, rounding, documentation, contacting and answering any questions posed by family or the patient, but not including procedures was approximately: 15 minutes.        I have discussed my plan with the remainder of the team during rounds.         Last 24h notable events/findings and pt complaints:  Ongoing agitation  Incredibly somnolent this morning > improved significantly upon discontinuing precedex       Physical Exam/Vitals:   Vital signs:  Temp: (!) 96.4  F (35.8  C) Temp src: Bladder BP: 92/51 Pulse: 51   Resp: 15 SpO2: 96 % O2 Device: Oxymask Oxygen Delivery: 2 LPM   Weight: 90.9 kg (200 lb 6.4 oz)  Estimated body mass index is 27.95 kg/m  as calculated from the following:    Height as of 7/13/22: 1.803 m (5' 11\").    Weight as of this encounter: 90.9 kg (200 lb 6.4 oz).    SpO2: 96 %   O2 Device: Oxymask     General: no apparent distress, appears stated age   Neuro: alert and interactive, aox2 (person and place, but not consistently to time and events),   moves all extremities, pupils reactive b/l, mildly sedated at times, appropriately interactive   when stimulated but examination limited due to somnolence   Head: atraumatic  Neck: no bulging masses  Cardiac: regular rate and rhythm   Pulm: coarse lung sounds b/l   Chest: symmetrical chest rise, non labored breathing  Abd: " soft, nd, no masses, no hernia, no diffuse peritonitis,   Non tender to palpation,    :  catheter in place  Extremities:  atraumatic x4  Skin:  no jaundice, warm   Psych: calm when resting but rapidly agitated at times         Weight:  Vitals:    09/24/22 0242 09/24/22 0604 09/27/22 0604 09/28/22 0400   Weight: 93.8 kg (206 lb 12.8 oz) 94.1 kg (207 lb 6.4 oz) 93 kg (205 lb) 91.8 kg (202 lb 6.1 oz)    09/29/22 0349 09/30/22 0600 10/01/22 0530   Weight: 90.9 kg (200 lb 6.4 oz) 90.9 kg (200 lb 6.4 oz) 90.9 kg (200 lb 6.4 oz)          IN/OUT:    Intake/Output Summary (Last 24 hours) at 9/29/2022 0902  Last data filed at 9/29/2022 0800  Gross per 24 hour   Intake 3486.13 ml   Output 3190 ml   Net 296.13 ml           ROS:   Unable to obtain ROS due to AMS.         Overall plan:  - discontinue precedex   - cpt to help clear secretions and help assist with coughing etc      Assessment/Plan:  57 year old M, with hx of pancreatitis, seizures/etoh withdrawal seizures, who presents to the ICU after RRT and having significant episodes of agitation prompting hemodynamic agents such as precedex for further stabilization.     Neuro:  Pain  - MMPR as able   AMS/Delirium  - Frequent reorientation   - Attempt to maintain Day/Night cycle   - If Hyperactivity develops, anti dopamine agents prn   - Maintain safety of patient, staff, and surroundings as able  - Avoid restraints as able, optimize medically as able prior to restraining   EtOH Withdrawal  - CIWA/Ativan  - Dexmedetomidine: discontinue given his significant somnolence, likely signs of improving and moving away from withdrawal issues   - titrate down valium as able   - Phenobarb 260 loading dose with 130 bid thereafter for severe withdrawal  - Neurology c/s for seizures     Respiratory:  Acute Respiratory Insufficiency  - Supplemental oxygen to keep saturation between 90%-95%  - VBG or ABG prn to adjust supplemental oxygen needs prn   Atelectasis  - IS   PNA  - CXR suggests  infiltrate, requiring supp o2, now with cough > zosyn for coverage 9/30   - cpt scheduled     Cardiac:  MISAEL      Renal/FEN:  MISAEL    GI:  Protein Deficiency/Malnutrition - mild  - monitor  -   Lab Results   Component Value Date    PROTTOTAL 5.7 09/24/2022    PROTTOTAL 7.3 12/01/2020     - Nutrition/Dietician consultation when more alert  - Initiate TF as able, and utilize supplementations prn and as able    Heme:  MCV elevated  -B vitamins   Platelet Dysfunction  - Due to NSAID/ASA use  - Monitor for symptom development and/or progression   - Transfuse prn     ID:  PNA  - see above     Endo:  Hyperglycemia   -ssi/insulin prn   -A1c:   Hemoglobin A1C   Date Value Ref Range Status   09/23/2022 5.0 <5.7 % Final     Comment:     Normal <5.7%   Prediabetes 5.7-6.4%    Diabetes 6.5% or higher     Note: Adopted from ADA consensus guidelines.       Skin:  MISAEL    Musculoskeletal:  MISAEL     Psychiatric:   Agitation  - see above for etoh w/d     Therapy:  Deconditioning    -pt/ot eval      Other/Prophylaxis:  -Mechanical and Chemical dvt ppx: indicated, ordered    Code Status:  Full Code     Disposition:  - ICU     Micro: reviewed for the last 24h of completion of this note  EKG/ECHO: reviewed the interpretations for the last 24h of completion of this note  Imaging: reviewed the impressions for the last 24h of completion of this note  Labs: reviewed for the last 24h of completion of this note  In/Out: reviewed for the last 24h of completion of this note          Allergies:  No Known Allergies  PMHx:   Past Medical History:   Diagnosis Date     Ataxia      Bipolar disorder (H)      Chemical dependency (H)      Chronic hip pain      Chronic pain syndrome      Cocaine abuse (H)      Depressive disorder      DTs (delirium tremens) (H)      DVT (deep venous thrombosis) (H) 5 y ago    left foot, treated with coumadin     Elevated LFTs      Flail chest     broken sterum, wiring      Hepatitis C virus infection, unspecified chronicity       Heroin abuse (H)      History of methamphetamine abuse (H)      History of total hip arthroplasty     right     Hypertension      Seizures (H)      Substance abuse (H)     alcohol, opiates     Wernicke's encephalopathy      PSHx:   Past Surgical History:   Procedure Laterality Date     CHEST SURGERY  1987    flail chest, sterum fractured     HIP SURGERY       KNEE SURGERY       OPEN REDUCTION INTERNAL FIXATION ANKLE Right 12/24/2021    Procedure: Open reduction internal fixation right ankle syndesmotic injury;  Surgeon: Leroy Thomason MD;  Location:  OR     ORTHOPEDIC SURGERY      KIMBER right. Stephanie left shoulder surgery, debridement right shoulder, neck surgery- fracture cervical spine. 6 knee surgeries- bucket handle meniscal tear and debridement. 3 heel surgeries.      ORTHOPEDIC SURGERY       REMOVE HARDWARE ANKLE Right 7/13/2022    Procedure: removal of hardware of right ankle;  Surgeon: Leroy Thomason MD;  Location:  OR     [facility] Hx:   Social Determinants of Health     Tobacco Use: High Risk     Smoking Tobacco Use: Current Every Day Smoker     Smokeless Tobacco Use: Never Used   Alcohol Use: Not on file   Financial Resource Strain: Not on file   Food Insecurity: Not on file   Transportation Needs: Not on file   Physical Activity: Not on file   Stress: Not on file   Social Connections: Not on file   Intimate Partner Violence: Not on file   Depression: Not at risk     PHQ-2 Score: 1   Housing Stability: Not on file     Family Hx:   I have reviewed this patient's family history and updated it with pertinent information if needed.  Family History   Problem Relation Age of Onset     Substance Abuse Mother      Substance Abuse Father      Substance Abuse Sister

## 2022-10-01 NOTE — PROGRESS NOTES
"[facility]      ICU PROGRESS NOTE - delayed note       Andrea Pham   1965  [id number]      Indication for Critical Care Billing:   precedex (hemodynamic and sedative agent) management         I have reviewed changes in critical data from approximately the last 24 hours, including medications, laboratory results, vital signs, radiograph results, consultant recommendations, and other diagnostic studies.          An estimated total critical care time involving or potentially including but not limited to chart review, examination of the patient, formulation of plans, discussion with colleagues, rounding, documentation, contacting and answering any questions posed by family or the patient, but not including procedures was approximately: 15 minutes.        I have discussed my plan with the remainder of the team during rounds.         Last 24h notable events/findings and pt complaints:  naeon  Agitation persists, prompting further mangement/monitoring while titrating precedex   Cough present, zosyn started       Physical Exam/Vitals:   Vital signs:  Temp: (!) 96.4  F (35.8  C) Temp src: Bladder BP: 92/51 Pulse: 51   Resp: 15 SpO2: 96 % O2 Device: Oxymask Oxygen Delivery: 2 LPM   Weight: 90.9 kg (200 lb 6.4 oz)  Estimated body mass index is 27.95 kg/m  as calculated from the following:    Height as of 7/13/22: 1.803 m (5' 11\").    Weight as of this encounter: 90.9 kg (200 lb 6.4 oz).    SpO2: 96 %   O2 Device: Oxymask     General: no apparent distress, appears stated age   Neuro: alert and interactive, aox2 (person and place, but not consistently to time and events),   moves all extremities, pupils reactive b/l, mildly sedated at times, appropriately interactive   when stimulated but examination limited due to sedation  Head: atraumatic  Neck: no bulging masses  Cardiac: regular rate and rhythm   Pulm: clear lung sounds b/l   Chest: symmetrical chest rise, non " labored breathing  Abd: soft, nd, no masses, no hernia, no diffuse peritonitis,   Non tender to palpation,    :  catheter in place  Extremities:  atraumatic x4  Skin:  no jaundice, warm   Psych: calm when resting but rapidly agitated at times         Weight:  Vitals:    09/24/22 0242 09/24/22 0604 09/27/22 0604 09/28/22 0400   Weight: 93.8 kg (206 lb 12.8 oz) 94.1 kg (207 lb 6.4 oz) 93 kg (205 lb) 91.8 kg (202 lb 6.1 oz)    09/29/22 0349 09/30/22 0600 10/01/22 0530   Weight: 90.9 kg (200 lb 6.4 oz) 90.9 kg (200 lb 6.4 oz) 90.9 kg (200 lb 6.4 oz)          IN/OUT:    Intake/Output Summary (Last 24 hours) at 9/29/2022 0902  Last data filed at 9/29/2022 0800  Gross per 24 hour   Intake 3486.13 ml   Output 3190 ml   Net 296.13 ml           ROS:   Unable to obtain ROS due to AMS.         Overall plan:  - discontinue polanco  - place ngt/njt  - start abx given risk for pna       Assessment/Plan:  57 year old M, with hx of pancreatitis, seizures/etoh withdrawal seizures, who presents to the ICU after RRT and having significant episodes of agitation prompting hemodynamic agents such as precedex for further stabilization.     Neuro:  Pain  - MMPR as able   AMS/Delirium  - Frequent reorientation   - Attempt to maintain Day/Night cycle   - If Hyperactivity develops, anti dopamine agents prn   - Maintain safety of patient, staff, and surroundings as able  - Avoid restraints as able, optimize medically as able prior to restraining   EtOH Withdrawal  - CIWA/Ativan  - Dexmedetomidine: recommend titration down as able   - Phenobarb 260 loading dose with 130 bid thereafter for severe withdrawal  - Neurology c/s for seizures     Respiratory:  Acute Respiratory Insufficiency  - Supplemental oxygen to keep saturation between 90%-95%  - VBG or ABG prn to adjust supplemental oxygen needs prn   Atelectasis  - IS   PNA  - CXR suggests infiltrate, requiring supp o2, now with cough > zosyn for coverage 9/30      Cardiac:  MISAEL      Renal/FEN:  MISAEL    GI:  Protein Deficiency/Malnutrition - mild  - monitor  -   Lab Results   Component Value Date    PROTTOTAL 5.7 09/24/2022    PROTTOTAL 7.3 12/01/2020     - Nutrition/Dietician consultation when more alert  - Initiate TF as able, and utilize supplementations prn and as able    Heme:  MCV elevated  -B vitamins   Platelet Dysfunction  - Due to NSAID/ASA use  - Monitor for symptom development and/or progression   - Transfuse prn     ID:  PNA  - see above     Endo:  Hyperglycemia   -ssi/insulin prn   -A1c:   Hemoglobin A1C   Date Value Ref Range Status   09/23/2022 5.0 <5.7 % Final     Comment:     Normal <5.7%   Prediabetes 5.7-6.4%    Diabetes 6.5% or higher     Note: Adopted from ADA consensus guidelines.       Skin:  MISAEL    Musculoskeletal:  MISAEL     Psychiatric:   Agitation  - see above for etoh w/d     Therapy:  Deconditioning    -pt/ot eval      Other/Prophylaxis:  -Mechanical and Chemical dvt ppx: indicated, ordered    Code Status:  Full Code     Disposition:  - ICU     Micro: reviewed for the last 24h of completion of this note  EKG/ECHO: reviewed the interpretations for the last 24h of completion of this note  Imaging: reviewed the impressions for the last 24h of completion of this note  Labs: reviewed for the last 24h of completion of this note  In/Out: reviewed for the last 24h of completion of this note          Allergies:  No Known Allergies  PMHx:   Past Medical History:   Diagnosis Date     Ataxia      Bipolar disorder (H)      Chemical dependency (H)      Chronic hip pain      Chronic pain syndrome      Cocaine abuse (H)      Depressive disorder      DTs (delirium tremens) (H)      DVT (deep venous thrombosis) (H) 5 y ago    left foot, treated with coumadin     Elevated LFTs      Flail chest     broken sterum, wiring      Hepatitis C virus infection, unspecified chronicity      Heroin abuse (H)      History of methamphetamine abuse (H)      History of total hip  arthroplasty     right     Hypertension      Seizures (H)      Substance abuse (H)     alcohol, opiates     Wernicke's encephalopathy      PSHx:   Past Surgical History:   Procedure Laterality Date     CHEST SURGERY  1987    flail chest, sterum fractured     HIP SURGERY       KNEE SURGERY       OPEN REDUCTION INTERNAL FIXATION ANKLE Right 12/24/2021    Procedure: Open reduction internal fixation right ankle syndesmotic injury;  Surgeon: Leroy Thomason MD;  Location:  OR     ORTHOPEDIC SURGERY      KIMBER right. Stephanie left shoulder surgery, debridement right shoulder, neck surgery- fracture cervical spine. 6 knee surgeries- bucket handle meniscal tear and debridement. 3 heel surgeries.      ORTHOPEDIC SURGERY       REMOVE HARDWARE ANKLE Right 7/13/2022    Procedure: removal of hardware of right ankle;  Surgeon: Leroy Thomason MD;  Location:  OR     [facility] Hx:   Social Determinants of Health     Tobacco Use: High Risk     Smoking Tobacco Use: Current Every Day Smoker     Smokeless Tobacco Use: Never Used   Alcohol Use: Not on file   Financial Resource Strain: Not on file   Food Insecurity: Not on file   Transportation Needs: Not on file   Physical Activity: Not on file   Stress: Not on file   Social Connections: Not on file   Intimate Partner Violence: Not on file   Depression: Not at risk     PHQ-2 Score: 1   Housing Stability: Not on file     Family Hx:   I have reviewed this patient's family history and updated it with pertinent information if needed.  Family History   Problem Relation Age of Onset     Substance Abuse Mother      Substance Abuse Father      Substance Abuse Sister

## 2022-10-02 ENCOUNTER — APPOINTMENT (OUTPATIENT)
Dept: OCCUPATIONAL THERAPY | Facility: CLINIC | Age: 57
DRG: 896 | End: 2022-10-02
Attending: STUDENT IN AN ORGANIZED HEALTH CARE EDUCATION/TRAINING PROGRAM
Payer: MEDICARE

## 2022-10-02 ENCOUNTER — APPOINTMENT (OUTPATIENT)
Dept: PHYSICAL THERAPY | Facility: CLINIC | Age: 57
DRG: 896 | End: 2022-10-02
Attending: STUDENT IN AN ORGANIZED HEALTH CARE EDUCATION/TRAINING PROGRAM
Payer: MEDICARE

## 2022-10-02 LAB
ANION GAP SERPL CALCULATED.3IONS-SCNC: 9 MMOL/L (ref 7–15)
BACTERIA BLD CULT: NO GROWTH
BACTERIA BLD CULT: NO GROWTH
BUN SERPL-MCNC: 26.7 MG/DL (ref 6–20)
CALCIUM SERPL-MCNC: 8.6 MG/DL (ref 8.6–10)
CHLORIDE SERPL-SCNC: 107 MMOL/L (ref 98–107)
CREAT SERPL-MCNC: 0.94 MG/DL (ref 0.67–1.17)
DEPRECATED HCO3 PLAS-SCNC: 27 MMOL/L (ref 22–29)
GFR SERPL CREATININE-BSD FRML MDRD: >90 ML/MIN/1.73M2
GLUCOSE BLDC GLUCOMTR-MCNC: 104 MG/DL (ref 70–99)
GLUCOSE BLDC GLUCOMTR-MCNC: 105 MG/DL (ref 70–99)
GLUCOSE BLDC GLUCOMTR-MCNC: 98 MG/DL (ref 70–99)
GLUCOSE SERPL-MCNC: 109 MG/DL (ref 70–99)
MAGNESIUM SERPL-MCNC: 2 MG/DL (ref 1.7–2.3)
PHOSPHATE SERPL-MCNC: 3.2 MG/DL (ref 2.5–4.5)
POTASSIUM SERPL-SCNC: 3.7 MMOL/L (ref 3.4–5.3)
SODIUM SERPL-SCNC: 143 MMOL/L (ref 136–145)

## 2022-10-02 PROCEDURE — 250N000011 HC RX IP 250 OP 636: Performed by: SURGERY

## 2022-10-02 PROCEDURE — 250N000011 HC RX IP 250 OP 636: Performed by: STUDENT IN AN ORGANIZED HEALTH CARE EDUCATION/TRAINING PROGRAM

## 2022-10-02 PROCEDURE — 250N000011 HC RX IP 250 OP 636: Performed by: INTERNAL MEDICINE

## 2022-10-02 PROCEDURE — 97530 THERAPEUTIC ACTIVITIES: CPT | Mod: GO

## 2022-10-02 PROCEDURE — 97162 PT EVAL MOD COMPLEX 30 MIN: CPT | Mod: GP | Performed by: PHYSICAL THERAPIST

## 2022-10-02 PROCEDURE — 82310 ASSAY OF CALCIUM: CPT | Performed by: INTERNAL MEDICINE

## 2022-10-02 PROCEDURE — 250N000009 HC RX 250: Performed by: INTERNAL MEDICINE

## 2022-10-02 PROCEDURE — 84100 ASSAY OF PHOSPHORUS: CPT | Performed by: INTERNAL MEDICINE

## 2022-10-02 PROCEDURE — 83735 ASSAY OF MAGNESIUM: CPT | Performed by: INTERNAL MEDICINE

## 2022-10-02 PROCEDURE — 250N000013 HC RX MED GY IP 250 OP 250 PS 637: Performed by: STUDENT IN AN ORGANIZED HEALTH CARE EDUCATION/TRAINING PROGRAM

## 2022-10-02 PROCEDURE — 999N000157 HC STATISTIC RCP TIME EA 10 MIN

## 2022-10-02 PROCEDURE — 250N000009 HC RX 250: Performed by: STUDENT IN AN ORGANIZED HEALTH CARE EDUCATION/TRAINING PROGRAM

## 2022-10-02 PROCEDURE — 250N000013 HC RX MED GY IP 250 OP 250 PS 637: Performed by: ANESTHESIOLOGY

## 2022-10-02 PROCEDURE — 97530 THERAPEUTIC ACTIVITIES: CPT | Mod: GP | Performed by: PHYSICAL THERAPIST

## 2022-10-02 PROCEDURE — 94640 AIRWAY INHALATION TREATMENT: CPT | Mod: 76

## 2022-10-02 PROCEDURE — 97166 OT EVAL MOD COMPLEX 45 MIN: CPT | Mod: GO

## 2022-10-02 PROCEDURE — 258N000003 HC RX IP 258 OP 636: Performed by: INTERNAL MEDICINE

## 2022-10-02 PROCEDURE — 99233 SBSQ HOSP IP/OBS HIGH 50: CPT | Performed by: INTERNAL MEDICINE

## 2022-10-02 PROCEDURE — 250N000013 HC RX MED GY IP 250 OP 250 PS 637: Performed by: INTERNAL MEDICINE

## 2022-10-02 PROCEDURE — 36415 COLL VENOUS BLD VENIPUNCTURE: CPT | Performed by: INTERNAL MEDICINE

## 2022-10-02 PROCEDURE — C9113 INJ PANTOPRAZOLE SODIUM, VIA: HCPCS | Performed by: INTERNAL MEDICINE

## 2022-10-02 PROCEDURE — 200N000001 HC R&B ICU

## 2022-10-02 PROCEDURE — 94640 AIRWAY INHALATION TREATMENT: CPT

## 2022-10-02 PROCEDURE — 272N000078 HC NUTRITION PRODUCT INTERMEDIATE LITER

## 2022-10-02 RX ORDER — DIAZEPAM 5 MG
5-10 TABLET ORAL EVERY 30 MIN PRN
Status: DISCONTINUED | OUTPATIENT
Start: 2022-10-02 | End: 2022-10-03

## 2022-10-02 RX ORDER — MAGNESIUM OXIDE 400 MG/1
400 TABLET ORAL EVERY 4 HOURS
Status: COMPLETED | OUTPATIENT
Start: 2022-10-02 | End: 2022-10-02

## 2022-10-02 RX ORDER — MAGNESIUM OXIDE 400 MG/1
400 TABLET ORAL EVERY 4 HOURS
Status: DISCONTINUED | OUTPATIENT
Start: 2022-10-02 | End: 2022-10-02

## 2022-10-02 RX ORDER — POTASSIUM CHLORIDE 20MEQ/15ML
20 LIQUID (ML) ORAL ONCE
Status: DISCONTINUED | OUTPATIENT
Start: 2022-10-02 | End: 2022-10-02

## 2022-10-02 RX ORDER — POTASSIUM CHLORIDE 20MEQ/15ML
20 LIQUID (ML) ORAL ONCE
Status: COMPLETED | OUTPATIENT
Start: 2022-10-02 | End: 2022-10-02

## 2022-10-02 RX ORDER — DIAZEPAM 10 MG/2ML
5-10 INJECTION, SOLUTION INTRAMUSCULAR; INTRAVENOUS EVERY 30 MIN PRN
Status: DISCONTINUED | OUTPATIENT
Start: 2022-10-02 | End: 2022-10-03

## 2022-10-02 RX ADMIN — DOXAZOSIN 1 MG: 1 TABLET ORAL at 19:43

## 2022-10-02 RX ADMIN — POTASSIUM CHLORIDE 20 MEQ: 20 SOLUTION ORAL at 10:29

## 2022-10-02 RX ADMIN — TRAZODONE HYDROCHLORIDE 100 MG: 100 TABLET ORAL at 21:12

## 2022-10-02 RX ADMIN — SODIUM CHLORIDE SOLN NEBU 3% 3 ML: 3 NEBU SOLN at 03:23

## 2022-10-02 RX ADMIN — ASPIRIN 81 MG CHEWABLE TABLET 324 MG: 81 TABLET CHEWABLE at 19:43

## 2022-10-02 RX ADMIN — VANCOMYCIN HYDROCHLORIDE 1000 MG: 1 INJECTION, SOLUTION INTRAVENOUS at 10:27

## 2022-10-02 RX ADMIN — SODIUM CHLORIDE SOLN NEBU 3% 3 ML: 3 NEBU SOLN at 08:01

## 2022-10-02 RX ADMIN — DIAZEPAM 10 MG: 5 INJECTION INTRAMUSCULAR; INTRAVENOUS at 06:40

## 2022-10-02 RX ADMIN — VANCOMYCIN HYDROCHLORIDE 1000 MG: 1 INJECTION, SOLUTION INTRAVENOUS at 21:12

## 2022-10-02 RX ADMIN — OLANZAPINE 10 MG: 10 TABLET, FILM COATED ORAL at 21:12

## 2022-10-02 RX ADMIN — MAGNESIUM HYDROXIDE 30 ML: 400 SUSPENSION ORAL at 22:26

## 2022-10-02 RX ADMIN — DIAZEPAM 2 MG: 5 INJECTION, SOLUTION INTRAMUSCULAR; INTRAVENOUS at 05:09

## 2022-10-02 RX ADMIN — SODIUM CHLORIDE SOLN NEBU 3% 3 ML: 3 NEBU SOLN at 19:36

## 2022-10-02 RX ADMIN — TAZOBACTAM SODIUM AND PIPERACILLIN SODIUM 4.5 G: 500; 4 INJECTION, SOLUTION INTRAVENOUS at 20:26

## 2022-10-02 RX ADMIN — SENNOSIDES AND DOCUSATE SODIUM 2 TABLET: 50; 8.6 TABLET ORAL at 19:44

## 2022-10-02 RX ADMIN — ACETAMINOPHEN 650 MG: 325 SOLUTION ORAL at 04:15

## 2022-10-02 RX ADMIN — DIAZEPAM 2 MG: 5 INJECTION, SOLUTION INTRAMUSCULAR; INTRAVENOUS at 23:04

## 2022-10-02 RX ADMIN — THIAMINE HCL TAB 100 MG 100 MG: 100 TAB at 21:12

## 2022-10-02 RX ADMIN — MAGNESIUM OXIDE TAB 400 MG (241.3 MG ELEMENTAL MG) 400 MG: 400 (241.3 MG) TAB at 07:48

## 2022-10-02 RX ADMIN — BUPRENORPHINE HYDROCHLORIDE, NALOXONE HYDROCHLORIDE 1 FILM: 8; 2 FILM, SOLUBLE BUCCAL; SUBLINGUAL at 16:32

## 2022-10-02 RX ADMIN — TAZOBACTAM SODIUM AND PIPERACILLIN SODIUM 4.5 G: 500; 4 INJECTION, SOLUTION INTRAVENOUS at 14:50

## 2022-10-02 RX ADMIN — POTASSIUM CHLORIDE 20 MEQ: 20 SOLUTION ORAL at 07:47

## 2022-10-02 RX ADMIN — DOXAZOSIN 1 MG: 1 TABLET ORAL at 07:48

## 2022-10-02 RX ADMIN — TAZOBACTAM SODIUM AND PIPERACILLIN SODIUM 4.5 G: 500; 4 INJECTION, SOLUTION INTRAVENOUS at 09:37

## 2022-10-02 RX ADMIN — Medication 15 ML: at 09:34

## 2022-10-02 RX ADMIN — MAGNESIUM OXIDE TAB 400 MG (241.3 MG ELEMENTAL MG) 400 MG: 400 (241.3 MG) TAB at 11:43

## 2022-10-02 RX ADMIN — FOLIC ACID 1 MG: 1 TABLET ORAL at 09:34

## 2022-10-02 RX ADMIN — ACETAMINOPHEN 650 MG: 650 SUPPOSITORY RECTAL at 22:10

## 2022-10-02 RX ADMIN — VALPROATE SODIUM 500 MG: 100 INJECTION, SOLUTION INTRAVENOUS at 10:20

## 2022-10-02 RX ADMIN — THIAMINE HCL TAB 100 MG 100 MG: 100 TAB at 09:35

## 2022-10-02 RX ADMIN — BUPRENORPHINE HYDROCHLORIDE, NALOXONE HYDROCHLORIDE 1 FILM: 8; 2 FILM, SOLUBLE BUCCAL; SUBLINGUAL at 04:33

## 2022-10-02 RX ADMIN — THIAMINE HCL TAB 100 MG 100 MG: 100 TAB at 15:15

## 2022-10-02 RX ADMIN — TAZOBACTAM SODIUM AND PIPERACILLIN SODIUM 4.5 G: 500; 4 INJECTION, SOLUTION INTRAVENOUS at 02:28

## 2022-10-02 RX ADMIN — KETOROLAC TROMETHAMINE 30 MG: 30 INJECTION, SOLUTION INTRAMUSCULAR at 19:45

## 2022-10-02 RX ADMIN — DIAZEPAM 10 MG: 5 INJECTION, SOLUTION INTRAMUSCULAR; INTRAVENOUS at 23:14

## 2022-10-02 RX ADMIN — VALPROATE SODIUM 500 MG: 100 INJECTION, SOLUTION INTRAVENOUS at 21:12

## 2022-10-02 RX ADMIN — Medication 1 SUPPOSITORY: at 23:42

## 2022-10-02 RX ADMIN — PANTOPRAZOLE SODIUM 40 MG: 40 INJECTION, POWDER, FOR SOLUTION INTRAVENOUS at 09:34

## 2022-10-02 RX ADMIN — KETOROLAC TROMETHAMINE 30 MG: 30 INJECTION, SOLUTION INTRAMUSCULAR at 01:41

## 2022-10-02 RX ADMIN — KETOROLAC TROMETHAMINE 30 MG: 30 INJECTION, SOLUTION INTRAMUSCULAR at 11:44

## 2022-10-02 RX ADMIN — DIAZEPAM 10 MG: 5 INJECTION INTRAMUSCULAR; INTRAVENOUS at 07:48

## 2022-10-02 RX ADMIN — MAGNESIUM OXIDE TAB 400 MG (241.3 MG ELEMENTAL MG) 400 MG: 400 (241.3 MG) TAB at 10:29

## 2022-10-02 RX ADMIN — ENOXAPARIN SODIUM 40 MG: 40 INJECTION SUBCUTANEOUS at 21:12

## 2022-10-02 ASSESSMENT — ACTIVITIES OF DAILY LIVING (ADL)
ADLS_ACUITY_SCORE: 48
ADLS_ACUITY_SCORE: 46
ADLS_ACUITY_SCORE: 44
ADLS_ACUITY_SCORE: 48
ADLS_ACUITY_SCORE: 44
ADLS_ACUITY_SCORE: 48
ADLS_ACUITY_SCORE: 46
ADLS_ACUITY_SCORE: 46
ADLS_ACUITY_SCORE: 44
ADLS_ACUITY_SCORE: 44
ADLS_ACUITY_SCORE: 48
PREVIOUS_RESPONSIBILITIES: MEAL PREP;HOUSEKEEPING;LAUNDRY;SHOPPING;MEDICATION MANAGEMENT;FINANCES
ADLS_ACUITY_SCORE: 46

## 2022-10-02 NOTE — PROVIDER NOTIFICATION
DATE:  10/1/2022   TIME OF RECEIPT FROM LAB: 1913  LAB TEST:  Blood Cultures  LAB VALUE:  Positive for methicillin resistant staphylococcus epidermidis   RESULTS GIVEN WITH READ-BACK TO (PROVIDER):  [employer]  TIME LAB VALUE REPORTED TO PROVIDER:   1916

## 2022-10-02 NOTE — PROGRESS NOTES
10/02/22 0942   Quick Adds   Type of Visit Initial PT Evaluation   Living Environment   People in Home alone   Current Living Arrangements apartment   Home Accessibility no concerns   Transportation Anticipated car, drives self;family or friend will provide   Living Environment Comments Lives alone in apartment, reports elevator access, walk in shower   Self-Care   Usual Activity Tolerance good   Current Activity Tolerance poor   Equipment Currently Used at Home none   Fall history within last six months   (unknown)   Activity/Exercise/Self-Care Comment Reports IND in all ADLs, IADLs, and mobility tasks with no AD at baseline; pt not currently working, of note, pt is poor historian and info will need to be verified   General Information   Onset of Illness/Injury or Date of Surgery 09/23/22   Referring Physician Jc Castano MD   Patient/Family Therapy Goals Statement (PT) none stated 2/2 cognition   Pertinent History of Current Problem (include personal factors and/or comorbidities that impact the POC) 57 year old male with history ofalcohol use disorder, acute alcohol withdrawal with seizures, and pancreatitis. He was admitted on 9/23/2022 for alcohol withdrawal.  He was treated with oral gabapentin and as needed Valium.  Hospital course was complicated by RRT called on 9/27 due to patient decreased responsiveness and hypotension. No classic tonic-clonic movements observed, although seizure activity clinically suspected. Patient was able to protect airway and responsiveness improved, so held off on intubation   Existing Precautions/Restrictions fall   General Observations sitting in recliner, leaning heavily forward, confused   Cognition   Affect/Mental Status (Cognition) confused;flat/blunted affect;low arousal/lethargic   Orientation Status (Cognition) oriented to;person   Follows Commands (Cognition) follows one-step commands;50-74% accuracy;delayed response/completion;increased processing time  needed;initiation impaired;physical/tactile prompts required;repetition of directions required   Safety Deficit (Cognition) ability to follow commands;at risk behavior observed;awareness of need for assistance;insight into deficits/self-awareness;judgment;problem-solving   Pain Assessment   Patient Currently in Pain   (reports chronic pain but does not rate 0-10)   Posture    Posture Forward head position;Protracted shoulders;Kyphosis   Range of Motion (ROM)   ROM Comment BLE ROM is WFL   Strength (Manual Muscle Testing)   Strength (Manual Muscle Testing) Deficits observed during functional mobility   Strength Comments pt not following commands well enough to accurately assess MMT   Bed Mobility   Comment, (Bed Mobility) Not assessed in chair at beginning and end of session   Transfers   Transfers sit-stand transfer   Sit-Stand Transfer   Sit-Stand [place] (Transfers) maximum assist (25% patient effort);2 person assist;verbal cues   Assistive Device (Sit-Stand Transfers) walker, front-wheeled   Comment, (Sit-Stand Transfer) difficulty initiating stand; poor glute/quad strength to achieve upright positioning   Gait/Stairs (Locomotion)   West[place] Level (Gait) unable to assess   Balance   Balance Comments Poor standing tolerance   Clinical Impression   Criteria for Skilled Therapeutic Intervention Yes, treatment indicated   PT Diagnosis (PT) decreased functional mobility   Influenced by the following impairments weakness, cognition, impaired balance, decreased activity tolerance, chronic pain (per pt)   Functional limitations due to impairments bed mobility, transfers, ambulation   Clinical Presentation (PT Evaluation Complexity) Evolving/Changing   Clinical Presentation Rationale still scoring on CIWA scale, social support, complex medical conditions   Clinical Decision Making (Complexity) moderate complexity   Planned Therapy Interventions (PT) balance training;bed mobility training;gait training;home  exercise program;neuromuscular re-education;patient/family education;ROM (range of motion);strengthening;transfer training;progressive activity/exercise;home program guidelines;risk factor education   Anticipated Equipment Needs at Discharge (PT)   (TBD next level of care)   Risk & Benefits of therapy have been explained evaluation/treatment results reviewed;care plan/treatment goals reviewed;risks/benefits reviewed;current/potential barriers reviewed;participants voiced agreement with care plan;participants included;patient   PT Discharge Planning   PT Discharge Recommendation (DC Rec) Transitional Care Facility   PT Rationale for DC Rec Pt is currently significantly below baseline for mobility.  Pt needing max Ax2 with 2WW to stand from chair this date and unable to ambulate d/t weakness.  If pt refuses/unable to discharge to TCU, pt would need Ax2 for all transfers, w/c for mobility, hospital bed, commode, and assist with all IADLs.  D/t current cognition and weakness, would recommend stretcher transport at this time.   PT Brief overview of current status Supriya Tripp   Plan of Care Review   Plan of Care Reviewed With patient   Total Evaluation Time   Total Evaluation Time (Minutes) 15   Physical Therapy Goals   PT Frequency 5x/week   PT Predicted Duration/Target Date for Goal Attainment 10/09/22   PT Goals Bed Mobility;Transfers;Gait   PT: Bed Mobility Independent;Supine to/from sit   PT: Transfers Modified independent;Sit to/from stand;Bed to/from chair;Assistive device   PT: Gait Modified independent;Assistive device;100 feet

## 2022-10-02 NOTE — PROGRESS NOTES
[facility]    Medicine Progress Note - Hospitalist Service    Date of Admission:  9/23/2022    Assessment & Plan          Andrea Pham is a 57 year old male with history ofalcohol use disorder, acute alcohol withdrawal with seizures, and pancreatitis. He was admitted on 9/23/2022 for alcohol withdrawal.  He was treated with oral gabapentin and as needed Valium.  Hospital course was complicated by RRT called on 9/27 due to patient decreased responsiveness and hypotension. No classic tonic-clonic movements observed, although seizure activity clinically suspected. Patient was able to protect airway and responsiveness improved, so held off on intubation. CT was ordered and patient transferred to ICU.   He was started on dexmedetomidine for sedation. Scheduled valproate was substituted for gabapentin for acute alcohol withdrawal and seizure prophylaxis. Scheduled and as needed diazepam were ordered for withdrawal. Andrea stabilized. Scheduled diazepam was weaned. Feeding tube was ultimately placed and tube feeds started. Precedex was weaned on 10/1/22.     Problem List/Assessment and Plan:      Alcohol withdrawal with suspected seizures  History of alcohol use disorder, severe  Alcohol dependence  History of alcohol and other drug dependence. He tells me he has been in treatment 17 times. He has history of withdrawal seizures.  Pta was on valproate. He was admitted with alcohol withdrawal and had been agitated and pulling out IV lines. Seizure activity was suspected when RRT called on 9/27. Responsiveness improved without intervention and patient able to protect airway. CT on 9/27 without evidence of acute intracranial process. Transferred to ICU and he was started on dexmedetomidine for sedation, scheduled valproate for seizure prophylaxis, and scheduled and  prn diazepam for withdrawal.   He improves.  He ultimately weaned off of scheduled diazepam and then dexmedetomidine.  -Tapered of scheduled  diazepam  -Weaned off of Precedex 10/1/22  -Continue prn diazepam by CIWA  -Continue folic acid, multivitamin, thiamine  -Continue ondansetron or prochlorperazine prn for nausea  -Continue prn olanzapine or haloperidol prn for hallucinations  -Much more alert and interactive today.  Seems to finally be turning the corner.  -Speech therapy consult for swallow evaluation tomorrow, likely.  -Can likely discontinue withdrawal protocol soon.  -SW consult later this admission for CD resources     Major depressive disorder  Bipolar disorder  Insomnia  Pta was on buproprion 450mg qam, tizanidine 4mg nightly, and trazodone 100mg nightly  - Pta tizanidine and trazodone held. Can consider restarting once patient able to tolerate oral medications  - Bupropion has been held, as this lowers seizure threshold.  This can likely be restarted once diet resumed.     History of polysubstance use  No recent use per patient report and labs at admission.  - Continue pta buprenorphine-naloxone films BID     Hypertension  Pta hydralazine held at admission. Has been mostly normotensive.  - IV metoprolol and hydralazine available prn for high blood pressures  - Telemetry in setting of withdrawal and multiple electrolyte abnormalities     Possible pneumonia  Positive blood culture, likely contaminant  - Andrea is at risk for aspiration pneumonia. Andrea developed fever of 100.7 morning of 9/27. CXR revealed mild basilar opacities. Started antibiotics which were discontinued on 9/28. On 9/30 Andrea developed coarse breath sounds and hypoxia with fever.  Zosyn was started, empirically.  CXR that showed no clear infiltrate.  Urinalysis was unremarkable.  1 of 2 blood cultures is now growing methicillin-resistant Staph epidermidis.  This is likely a contaminant but will follow cultures for another day.  Continue Zosyn for now.  Andrea is clinically improving.  If second blood culture remains no growth consider discontinuing Zosyn tomorrow.     Anion gap  metabolic acidosis  Lactic acidosis Ketosis  -Secondary to alcohol withdrawal. Resolved     Elevated LFTs  -Last ALT 93,  on 9/24 and trending down. Elevation likely secondary to alcohol use.     Nutrition  -Nasal FT placed 9/30. Tube feeds started and titrating rate to goal  -Still eems on dry side so I increased free water from 120 to 240 ml every 4 hours.  -Continue GI prophylaxis with IV pantoprazole 40mg daily  -Continue sliding scale insulin and hypoglycemia protocol as needed      Left wrist pain  Unable to obtain further history including history of fall or other trauma to area. Left wrist XR on 9/29 revealed no acute fracture, some chronic findings. Uric acid 4.7.  - Recommend hand consult as outpatient for further workup and management     Urine retention  -Failed Lino removal. Lino replaced. Trial of Cardura.    Physical deconditioning  -PT and OT consults tomorrow.       Diet: NPO for Medical/Clinical Reasons Except for: No Exceptions, Meds, Ice Chips  Adult Formula Drip Feeding: Continuous Jevity 1.5; Nasogastric tube; Goal Rate: 55; mL/hr; Medication - Feeding Tube Flush Frequency: At least 15-30 mL water before and after medication administration and with tube clogging; Amount to Send (Nutrit...    DVT Prophylaxis: Enoxaparin (Lovenox) SQ  Lino Catheter: PRESENT, indication: Retention, Retention  Central Lines: None  Cardiac Monitoring: ACTIVE order. Indication: alcohol withdrawal  Code Status: Full Code      Disposition Plan      Expected Discharge Date: 10/05/2022                The patient's care was discussed with the Bedside Nurse and Patient.    Rm Marinelli MD  Hospitalist Service  [facility]  Securely message with the Vocera Web Console (learn more here)  Text page via [employer] Paging/Directory         Clinically Significant Risk Factors Present on Admission                       ______________________________________________________________________    Interval History   No new problems.  Doing better today.  Much more alert and awake.  Does not complain of pain much to me.    Data reviewed today: I reviewed all medications, new labs and imaging results over the last 24 hours. I personally reviewed no images or EKG's today.    Physical Exam   Vital Signs: Temp: 98.5  F (36.9  C) Temp src: Axillary BP: 126/87 Pulse: 56   Resp: 11 SpO2: 94 % O2 Device: None (Room air) Oxygen Delivery: 2 LPM  Weight: 200 lbs 6.37 oz  GENERAL:  Comfortable. Cooperative.  PSYCH: pleasant, oriented, No acute distress.  EYES: PERRLA, Normal conjunctiva.  HEART:  Regular rate and rhythm. No JVD. Pulses normal. No edema.  LUNGS:  Clear to auscultation, normal Respiratory effort.  ABDOMEN:  Soft, no hepatosplenomegaly, normal bowel sounds.  EXTREMETIES: No clubbing, cyanosis or ischemia  SKIN:  Dry to touch, No rash.      Data   Recent Labs   Lab 10/02/22  1227 10/02/22  0547 10/02/22  0349 10/01/22  0738 10/01/22  0600 09/30/22  1610 09/30/22  1514 09/30/22  0818 09/30/22  0532 09/28/22  0803 09/28/22  0537 09/27/22  0840 09/27/22  0609   WBC  --   --   --   --   --   --  6.5  --   --   --  4.9  --  5.5   HGB  --   --   --   --   --   --  13.4  --   --   --  13.5  --  13.0*   MCV  --   --   --   --   --   --  96  --   --   --  96  --  96   PLT  --   --   --   --  177  --  165  --   --   --  108*  --  111*   NA  --  143  --   --  141  --   --   --  138  --  142  --  141   POTASSIUM  --  3.7  --   --  4.2  --   --   --  4.3  4.2   < > 4.0  --  4.3   CHLORIDE  --  107  --   --  107  --   --   --  102  --  105  --  105   CO2  --  27  --   --  25  --   --   --  25  --  28  --  26   BUN  --  26.7*  --   --  22.0*  --   --   --  13.1  --  10.1  --  8.8   CR  --  0.94  --   --  1.01  --   --   --  0.71  --  0.69  --  0.85   ANIONGAP  --  9  --   --  9  --   --   --  11  --  9  --  10   JOHANA  --  8.6  --   --  8.9  --    --   --  8.8  --  8.6  --  8.7   GLC 98 109* 105*   < > 132*   < >  --    < > 116*   < > 100*   < > 106*    < > = values in this interval not displayed.

## 2022-10-02 NOTE — PLAN OF CARE
ICU End of Shift Summary.  For vital signs and complete assessments, please see documentation flowsheets.      Pertinent assessments: Pt alert and confused, A&O to self only. Cooperative. Slept well at times. Speech garbled, incoherent at times. Follows commands. Opens eyes spontaneously/to voice. CIWA, scoring 4. Afebrile. Tele SR. VSS. On RA. Has 8-10/10 achy pain. Tylenol given x2 and ketorolac given x2 with slight/minimal relief.LS dim/clear. Pt having strong coughs with and without encouragement. Didn't require suctioning this shift. Lino in place with joycelyn UOP. BS+. LBM 9/24, senna given, now passing flatus. Keofeed in place with TF running at goal with FWF.    Major Shift Events:    - Blood cultures came back positive for gram positive cocci in clusters and methicillin resistant staph epidermis. [employer] notified. Vanco added and zosyn dose increased. Loading dose of vanco given.   -Pt more awake this AM and up in chair via lift/sling   -CIWA of 19 at 0637, 10 of IV valium given    Plan (Upcoming Events): manage pain, bowel regimen, PT/OT, K and mag to be replaced  Discharge/Transfer Needs: tbd     Bedside Shift Report Completed: yes  Bedside Safety Check Completed: yes

## 2022-10-02 NOTE — PROGRESS NOTES
"   10/02/22 0933   Quick Adds   Type of Visit Initial Occupational Therapy Evaluation   Living Environment   People in Home alone   Current Living Arrangements apartment   Home Accessibility no concerns   Transportation Anticipated car, drives self;family or friend will provide   Living Environment Comments Pt lives alone in apartment, reports elevator access, walk in shower.   Self-Care   Usual Activity Tolerance good   Current Activity Tolerance poor   Equipment Currently Used at Home none   Activity/Exercise/Self-Care Comment Pt reports indep in all ADLs, IADLs and mobility tasks with no AD at baseline. Pt is not currently working.   Instrumental Activities of Daily Living (IADL)   Previous Responsibilities meal prep;housekeeping;laundry;shopping;medication management;finances   General Information   Onset of Illness/Injury or Date of Surgery 09/27/22   Referring Physician Jc Castano MD   Patient/Family Therapy Goal Statement (OT) Pt's goal is to get stronger   Additional Occupational Profile Info/Pertinent History of Current Problem Per chart: Pt is a 57 year old male admitted with weakness and alcohol withdrawal.   Existing Precautions/Restrictions fall   Cognitive Status Examination   Orientation Status person   Cognitive Status Comments Pt oriented to self only. Stated \"a home\" when asked place orientation. No recall of \"hospital\" when MD asked minutes later.   Pain Assessment   Patient Currently in Pain Yes, see Vital Sign flowsheet  (back pain)   Range of Motion Comprehensive   Comment, General Range of Motion Reports baseline limited R shoulder AROM from previous surgery, AROM limited by weakness   Strength Comprehensive (MMT)   Comment, General Manual Muscle Testing (MMT) Assessment Generalized weakness   Bed Mobility   Bed Mobility supine-sit;sit-supine   Supine-Sit [place] (Bed Mobility) unable to assess   Sit-Supine [place] (Bed Mobility) unable to assess   Transfers   Transfers bed-chair " transfer;sit-stand transfer;toilet transfer;shower transfer   Transfer Skill: Bed to Chair/Chair to Bed   Bed-Chair [place] (Transfers) dependent (less than 25% patient effort)   Sit-Stand Transfer   Sit-Stand [place] (Transfers) dependent (less than 25% patient effort);2 person assist   Shower Transfer   [place] Level (Shower Transfer) not tested   Toilet Transfer   [place] Level (Toilet Transfer) dependent (less than 25% patient effort)   Activities of Daily Living   BADL Assessment/Intervention upper body dressing;lower body dressing;grooming;toileting   Upper Body Dressing Assessment/Training   [place] Level (Upper Body Dressing) maximum assist (25% patient effort)   Lower Body Dressing Assessment/Training   [place] Level (Lower Body Dressing) dependent (less than 25% patient effort)   Grooming Assessment/Training   [place] Level (Grooming) moderate assist (50% patient effort)   Toileting   [place] Level (Toileting) dependent (less than 25% patient effort)   Clinical Impression   Criteria for Skilled Therapeutic Interventions Met (OT) Yes, treatment indicated   OT Diagnosis Impaired ADLs, IADLs and mobility tasks   OT Problem List-Impairments impacting ADL problems related to;activity tolerance impaired;balance;cognition;strength;pain   ADL comments/analysis Pt significantly below stated baseline level of functioning   Assessment of Occupational Performance 5 or more Performance Deficits   Identified Performance Deficits Bathing, dressing, grooming, toileting, homemaking, transfers   Planned Therapy Interventions (OT) ADL retraining;IADL retraining;strengthening;transfer training;progressive activity/exercise   Clinical Decision Making Complexity (OT) moderate complexity   Risk & Benefits of therapy have been explained evaluation/treatment results reviewed;care plan/treatment goals reviewed;risks/benefits reviewed;current/potential barriers reviewed;participants voiced  agreement with care plan;participants included;patient   OT Discharge Planning   OT Discharge Recommendation (DC Rec) Transitional Care Facility   OT Rationale for DC Rec Pt is significantly below baseline level of functioning, limited by weakness. Recommend ongoing skilled OT while IP and in TCU setting to improve strength, functional activity tolerance, balance and safety needed for daily tasks. Currently requiring Supriya Steady and/of lift for safe transfers. Pt reports he has support in home environment. Will continue to monitor progress and medical stability.   OT Brief overview of current status Ax2   Total Evaluation Time (Minutes)   Total Evaluation Time (Minutes) 10   OT Goals   Therapy Frequency (OT) 5 times/wk   OT Predicted Duration/Target Date for Goal Attainment 10/09/22   OT Goals Hygiene/Grooming;Lower Body Dressing;Toilet Transfer/Toileting;OT Goal 1;Cognition   OT: Hygiene/Grooming supervision/stand-by assist;using adaptive equipment;while standing   OT: Lower Body Dressing Supervision/stand-by assist;using adaptive equipment   OT: Toilet Transfer/Toileting Supervision/stand-by assist;toilet transfer;cleaning and garment management;using adaptive equipment   OT: Cognitive Patient/caregiver will verbalize understanding of cognitive assessment results/recommendations as needed for safe discharge planning   OT: Goal 1 Pt will perform walk in shower transfer with CGA in prep for safe transfer in discharge environment.

## 2022-10-02 NOTE — PLAN OF CARE
ICU End of Shift Summary.  For vital signs and complete assessments, please see documentation flowsheets.      Pertinent assessments: Oriented to self. Pleasantly confused.  Afebrile. BPs and UO better today after increase of free water . On RA, no cough, lungs CTA. Lino in place with tea colored urine. TF at goal and tolerated. Pt has been OOB chair, receivingToradol for pain. Weak bilateral UEs. Plan of care reviewed with pt; he wants to go home.      Discharge/Transfer Needs:  TBD  Bedside Safety Check Completed: Yes

## 2022-10-02 NOTE — PHARMACY-VANCOMYCIN DOSING SERVICE
Pharmacy Vancomycin Initial Note  Date of Service 2022  Patient's  1965  57 year old, male    Indication: Bacteremia    Current estimated CrCl = Estimated Creatinine Clearance: 91.9 mL/min (based on SCr of 1.01 mg/dL).    Creatinine for last 3 days  2022:  5:32 AM Creatinine 0.71 mg/dL  10/1/2022:  6:00 AM Creatinine 1.01 mg/dL    Recent Vancomycin Level(s) for last 3 days  No results found for requested labs within last 72 hours.      Vancomycin IV Administrations (past 72 hours)                   vancomycin (VANCOCIN) 2,000 mg in sodium chloride 0.9 % 500 mL intermittent infusion (mg) 2,000 mg New Bag 10/01/22 210                Nephrotoxins and other renal medications (From now, onward)    Start     Dose/Rate Route Frequency Ordered Stop    10/02/22 1000  vancomycin (VANCOCIN) 1000 mg in dextrose 5% 200 mL PREMIX         1,000 mg  200 mL/hr over 1 Hours Intravenous EVERY 12 HOURS 10/01/22 2124      10/01/22 2100  piperacillin-tazobactam (ZOSYN) intermittent infusion 4.5 g        Note to Pharmacy: Dose adjusted for tx of HAP and Bacteremia per protocol, by pharmacy.    4.5 g  200 mL/hr over 30 Minutes Intravenous EVERY 6 HOURS 10/01/22 2005      10/01/22 2030  vancomycin (VANCOCIN) 2,000 mg in sodium chloride 0.9 % 500 mL intermittent infusion         2,000 mg  over 2 Hours Intravenous ONCE 10/01/22 2003      10/01/22 0258  ketorolac (TORADOL) injection 30 mg         30 mg Intravenous EVERY 6 HOURS PRN 10/01/22 0258 10/03/22 0257          Contrast Orders - past 72 hours (72h ago, onward)    None          [employer] Prediction of Planned Initial Vancomycin Regimen  Loading dose: 2000mg  Regimen: 1000 mg IV every 12 hours.  Exposure target: AUC24 (range)400-600 mg/L.hr   AUC24,ss: 495 mg/L.hr  Probability of AUC24 > 400: 72 %  Ctrough,ss: 16.0 mg/L  Probability of Ctrough,ss > 20: 31 %  Probability of nephrotoxicity (Lodise CLARENCE ): 11 %       Plan:  1. Vancomycin 1000 mg IV q12h.   Vancomycin loading dose 2000mg,  2. Vancomycin monitoring method: AUC  3. Vancomycin therapeutic monitoring goal: 400-600 mg*h/L  4. Pharmacy will check vancomycin levels as appropriate in 1-3 Days.    5. Serum creatinine levels will be ordered daily for the first week of therapy and at least twice weekly for subsequent weeks.      Parish Pena RPH

## 2022-10-03 ENCOUNTER — APPOINTMENT (OUTPATIENT)
Dept: GENERAL RADIOLOGY | Facility: CLINIC | Age: 57
DRG: 896 | End: 2022-10-03
Attending: ANESTHESIOLOGY
Payer: MEDICARE

## 2022-10-03 LAB
ANION GAP SERPL CALCULATED.3IONS-SCNC: 9 MMOL/L (ref 7–15)
BUN SERPL-MCNC: 17.3 MG/DL (ref 6–20)
CALCIUM SERPL-MCNC: 8.6 MG/DL (ref 8.6–10)
CHLORIDE SERPL-SCNC: 104 MMOL/L (ref 98–107)
CREAT SERPL-MCNC: 0.83 MG/DL (ref 0.67–1.17)
DEPRECATED HCO3 PLAS-SCNC: 30 MMOL/L (ref 22–29)
GFR SERPL CREATININE-BSD FRML MDRD: >90 ML/MIN/1.73M2
GLUCOSE BLDC GLUCOMTR-MCNC: 104 MG/DL (ref 70–99)
GLUCOSE BLDC GLUCOMTR-MCNC: 111 MG/DL (ref 70–99)
GLUCOSE BLDC GLUCOMTR-MCNC: 119 MG/DL (ref 70–99)
GLUCOSE BLDC GLUCOMTR-MCNC: 131 MG/DL (ref 70–99)
GLUCOSE BLDC GLUCOMTR-MCNC: 92 MG/DL (ref 70–99)
GLUCOSE SERPL-MCNC: 114 MG/DL (ref 70–99)
LIPASE SERPL-CCNC: 18 U/L (ref 13–60)
MAGNESIUM SERPL-MCNC: 2 MG/DL (ref 1.7–2.3)
PHOSPHATE SERPL-MCNC: 3.3 MG/DL (ref 2.5–4.5)
POTASSIUM SERPL-SCNC: 3.6 MMOL/L (ref 3.4–5.3)
SODIUM SERPL-SCNC: 143 MMOL/L (ref 136–145)

## 2022-10-03 PROCEDURE — 200N000001 HC R&B ICU

## 2022-10-03 PROCEDURE — 99222 1ST HOSP IP/OBS MODERATE 55: CPT | Performed by: INTERNAL MEDICINE

## 2022-10-03 PROCEDURE — 87077 CULTURE AEROBIC IDENTIFY: CPT | Performed by: INTERNAL MEDICINE

## 2022-10-03 PROCEDURE — 250N000011 HC RX IP 250 OP 636: Performed by: INTERNAL MEDICINE

## 2022-10-03 PROCEDURE — 94640 AIRWAY INHALATION TREATMENT: CPT

## 2022-10-03 PROCEDURE — 250N000011 HC RX IP 250 OP 636: Performed by: STUDENT IN AN ORGANIZED HEALTH CARE EDUCATION/TRAINING PROGRAM

## 2022-10-03 PROCEDURE — 99233 SBSQ HOSP IP/OBS HIGH 50: CPT | Performed by: INTERNAL MEDICINE

## 2022-10-03 PROCEDURE — 999N000127 HC STATISTIC PERIPHERAL IV START W US GUIDANCE

## 2022-10-03 PROCEDURE — 272N000078 HC NUTRITION PRODUCT INTERMEDIATE LITER

## 2022-10-03 PROCEDURE — 250N000009 HC RX 250: Performed by: INTERNAL MEDICINE

## 2022-10-03 PROCEDURE — 82310 ASSAY OF CALCIUM: CPT | Performed by: INTERNAL MEDICINE

## 2022-10-03 PROCEDURE — 250N000009 HC RX 250: Performed by: STUDENT IN AN ORGANIZED HEALTH CARE EDUCATION/TRAINING PROGRAM

## 2022-10-03 PROCEDURE — C9113 INJ PANTOPRAZOLE SODIUM, VIA: HCPCS | Performed by: INTERNAL MEDICINE

## 2022-10-03 PROCEDURE — 83735 ASSAY OF MAGNESIUM: CPT | Performed by: INTERNAL MEDICINE

## 2022-10-03 PROCEDURE — 93010 ELECTROCARDIOGRAM REPORT: CPT | Performed by: INTERNAL MEDICINE

## 2022-10-03 PROCEDURE — 258N000003 HC RX IP 258 OP 636: Performed by: INTERNAL MEDICINE

## 2022-10-03 PROCEDURE — 250N000013 HC RX MED GY IP 250 OP 250 PS 637: Performed by: STUDENT IN AN ORGANIZED HEALTH CARE EDUCATION/TRAINING PROGRAM

## 2022-10-03 PROCEDURE — 84100 ASSAY OF PHOSPHORUS: CPT | Performed by: INTERNAL MEDICINE

## 2022-10-03 PROCEDURE — 36415 COLL VENOUS BLD VENIPUNCTURE: CPT | Performed by: INTERNAL MEDICINE

## 2022-10-03 PROCEDURE — 74018 RADEX ABDOMEN 1 VIEW: CPT

## 2022-10-03 PROCEDURE — 999N000157 HC STATISTIC RCP TIME EA 10 MIN

## 2022-10-03 PROCEDURE — 250N000013 HC RX MED GY IP 250 OP 250 PS 637: Performed by: INTERNAL MEDICINE

## 2022-10-03 PROCEDURE — 83690 ASSAY OF LIPASE: CPT | Performed by: INTERNAL MEDICINE

## 2022-10-03 RX ORDER — DEXTROSE MONOHYDRATE, SODIUM CHLORIDE, AND POTASSIUM CHLORIDE 50; 1.49; 4.5 G/1000ML; G/1000ML; G/1000ML
INJECTION, SOLUTION INTRAVENOUS CONTINUOUS
Status: DISCONTINUED | OUTPATIENT
Start: 2022-10-03 | End: 2022-10-08 | Stop reason: HOSPADM

## 2022-10-03 RX ORDER — DIAZEPAM 10 MG/2ML
5 INJECTION, SOLUTION INTRAMUSCULAR; INTRAVENOUS EVERY 30 MIN PRN
Status: DISCONTINUED | OUTPATIENT
Start: 2022-10-03 | End: 2022-10-04

## 2022-10-03 RX ORDER — HALOPERIDOL 5 MG/ML
2.5-5 INJECTION INTRAMUSCULAR EVERY 6 HOURS PRN
Status: DISCONTINUED | OUTPATIENT
Start: 2022-10-03 | End: 2022-10-04

## 2022-10-03 RX ORDER — POTASSIUM CHLORIDE 7.45 MG/ML
10 INJECTION INTRAVENOUS
Status: COMPLETED | OUTPATIENT
Start: 2022-10-03 | End: 2022-10-03

## 2022-10-03 RX ORDER — OLANZAPINE 5 MG/1
5-10 TABLET, ORALLY DISINTEGRATING ORAL EVERY 6 HOURS PRN
Status: DISCONTINUED | OUTPATIENT
Start: 2022-10-03 | End: 2022-10-04

## 2022-10-03 RX ORDER — POTASSIUM CHLORIDE 20MEQ/15ML
20 LIQUID (ML) ORAL ONCE
Status: DISCONTINUED | OUTPATIENT
Start: 2022-10-03 | End: 2022-10-03 | Stop reason: ALTCHOICE

## 2022-10-03 RX ORDER — VANCOMYCIN HYDROCHLORIDE 1 G/200ML
1000 INJECTION, SOLUTION INTRAVENOUS EVERY 12 HOURS
Status: DISCONTINUED | OUTPATIENT
Start: 2022-10-03 | End: 2022-10-03

## 2022-10-03 RX ORDER — MAGNESIUM SULFATE HEPTAHYDRATE 40 MG/ML
2 INJECTION, SOLUTION INTRAVENOUS ONCE
Status: COMPLETED | OUTPATIENT
Start: 2022-10-03 | End: 2022-10-03

## 2022-10-03 RX ORDER — DIAZEPAM 5 MG
5 TABLET ORAL EVERY 30 MIN PRN
Status: DISCONTINUED | OUTPATIENT
Start: 2022-10-03 | End: 2022-10-04

## 2022-10-03 RX ADMIN — ASPIRIN 81 MG CHEWABLE TABLET 325 MG: 81 TABLET CHEWABLE at 20:28

## 2022-10-03 RX ADMIN — OLANZAPINE 10 MG: 5 TABLET, ORALLY DISINTEGRATING ORAL at 01:28

## 2022-10-03 RX ADMIN — VANCOMYCIN HYDROCHLORIDE 1000 MG: 1 INJECTION, SOLUTION INTRAVENOUS at 11:44

## 2022-10-03 RX ADMIN — BUPRENORPHINE HYDROCHLORIDE, NALOXONE HYDROCHLORIDE 1 FILM: 8; 2 FILM, SOLUBLE BUCCAL; SUBLINGUAL at 05:33

## 2022-10-03 RX ADMIN — VALPROATE SODIUM 500 MG: 100 INJECTION, SOLUTION INTRAVENOUS at 10:24

## 2022-10-03 RX ADMIN — DIAZEPAM 10 MG: 5 INJECTION, SOLUTION INTRAMUSCULAR; INTRAVENOUS at 01:35

## 2022-10-03 RX ADMIN — ONDANSETRON 4 MG: 2 INJECTION INTRAMUSCULAR; INTRAVENOUS at 04:43

## 2022-10-03 RX ADMIN — DIAZEPAM 10 MG: 5 INJECTION, SOLUTION INTRAMUSCULAR; INTRAVENOUS at 02:06

## 2022-10-03 RX ADMIN — SODIUM CHLORIDE SOLN NEBU 3% 3 ML: 3 NEBU SOLN at 07:58

## 2022-10-03 RX ADMIN — TAZOBACTAM SODIUM AND PIPERACILLIN SODIUM 4.5 G: 500; 4 INJECTION, SOLUTION INTRAVENOUS at 02:48

## 2022-10-03 RX ADMIN — DOXAZOSIN 1 MG: 1 TABLET ORAL at 20:27

## 2022-10-03 RX ADMIN — THIAMINE HCL TAB 100 MG 100 MG: 100 TAB at 08:52

## 2022-10-03 RX ADMIN — THIAMINE HCL TAB 100 MG 100 MG: 100 TAB at 16:43

## 2022-10-03 RX ADMIN — DIAZEPAM 10 MG: 5 INJECTION, SOLUTION INTRAMUSCULAR; INTRAVENOUS at 04:15

## 2022-10-03 RX ADMIN — POTASSIUM CHLORIDE 10 MEQ: 7.46 INJECTION, SOLUTION INTRAVENOUS at 08:56

## 2022-10-03 RX ADMIN — OLANZAPINE 10 MG: 10 TABLET, FILM COATED ORAL at 22:14

## 2022-10-03 RX ADMIN — Medication 15 ML: at 08:52

## 2022-10-03 RX ADMIN — PANTOPRAZOLE SODIUM 40 MG: 40 INJECTION, POWDER, FOR SOLUTION INTRAVENOUS at 08:52

## 2022-10-03 RX ADMIN — THIAMINE HCL TAB 100 MG 100 MG: 100 TAB at 22:14

## 2022-10-03 RX ADMIN — DOXAZOSIN 1 MG: 1 TABLET ORAL at 08:52

## 2022-10-03 RX ADMIN — POTASSIUM CHLORIDE, DEXTROSE MONOHYDRATE AND SODIUM CHLORIDE: 150; 5; 450 INJECTION, SOLUTION INTRAVENOUS at 16:44

## 2022-10-03 RX ADMIN — PROCHLORPERAZINE EDISYLATE 10 MG: 5 INJECTION INTRAMUSCULAR; INTRAVENOUS at 07:13

## 2022-10-03 RX ADMIN — VALPROATE SODIUM 500 MG: 100 INJECTION, SOLUTION INTRAVENOUS at 22:30

## 2022-10-03 RX ADMIN — MAGNESIUM SULFATE HEPTAHYDRATE 2 G: 40 INJECTION, SOLUTION INTRAVENOUS at 08:51

## 2022-10-03 RX ADMIN — KETOROLAC TROMETHAMINE 30 MG: 30 INJECTION, SOLUTION INTRAMUSCULAR at 02:04

## 2022-10-03 RX ADMIN — POTASSIUM CHLORIDE 10 MEQ: 7.46 INJECTION, SOLUTION INTRAVENOUS at 10:03

## 2022-10-03 RX ADMIN — ENOXAPARIN SODIUM 40 MG: 40 INJECTION SUBCUTANEOUS at 22:14

## 2022-10-03 RX ADMIN — FOLIC ACID 1 MG: 1 TABLET ORAL at 08:52

## 2022-10-03 RX ADMIN — DIAZEPAM 10 MG: 5 INJECTION, SOLUTION INTRAMUSCULAR; INTRAVENOUS at 00:16

## 2022-10-03 RX ADMIN — TRAZODONE HYDROCHLORIDE 100 MG: 100 TABLET ORAL at 22:14

## 2022-10-03 RX ADMIN — SODIUM PHOSPHATE, DIBASIC AND SODIUM PHOSPHATE, MONOBASIC 1 ENEMA: 7; 19 ENEMA RECTAL at 10:00

## 2022-10-03 RX ADMIN — DIAZEPAM 5 MG: 5 INJECTION, SOLUTION INTRAMUSCULAR; INTRAVENOUS at 01:04

## 2022-10-03 ASSESSMENT — ACTIVITIES OF DAILY LIVING (ADL)
ADLS_ACUITY_SCORE: 47
ADLS_ACUITY_SCORE: 41
ADLS_ACUITY_SCORE: 48
ADLS_ACUITY_SCORE: 47

## 2022-10-03 NOTE — PROGRESS NOTES
[facility]    Medicine Progress Note - Hospitalist Service    Date of Admission:  9/23/2022    Assessment & Plan            Andrea Pham is a 57 year old male with history ofalcohol use disorder, acute alcohol withdrawal with seizures, and pancreatitis. He was admitted on 9/23/2022 for alcohol withdrawal.  He was treated with oral gabapentin and as needed Valium.  Hospital course was complicated by RRT called on 9/27 due to patient decreased responsiveness and hypotension. No classic tonic-clonic movements observed, although seizure activity clinically suspected. Patient was able to protect airway and responsiveness improved, so held off on intubation. CT was ordered and patient transferred to ICU.   He was started on dexmedetomidine for sedation. Scheduled valproate was substituted for gabapentin for acute alcohol withdrawal and seizure prophylaxis. Scheduled and as needed diazepam were ordered for withdrawal. Andrea stabilized. Scheduled diazepam was weaned. Feeding tube was ultimately placed and tube feeds started. Precedex was weaned on 10/1/22.     Problem List/Assessment and Plan:      Alcohol withdrawal with suspected seizures  History of alcohol use disorder, severe  Alcohol dependence  History of alcohol and other drug dependence. He tells me he has been in treatment 17 times. He has history of withdrawal seizures.  Pta was on valproate. He was admitted with alcohol withdrawal and had been agitated and pulling out IV lines. Seizure activity was suspected when RRT called on 9/27. Responsiveness improved without intervention and patient able to protect airway. CT on 9/27 without evidence of acute intracranial process. Transferred to ICU and he was started on dexmedetomidine for sedation, scheduled valproate for seizure prophylaxis, and scheduled and  prn diazepam for withdrawal.   He improves.  He ultimately weaned off of scheduled diazepam and then dexmedetomidine.  -Tapered of  scheduled diazepam  -Weaned off of Precedex 10/1/22  -Continue prn diazepam by CIWA but decrease Valium dose to 5 mg  -Continue folic acid, multivitamin, thiamine  -Continue ondansetron or prochlorperazine prn for nausea  -Continue prn olanzapine or haloperidol prn for hallucinations  -Was more alert yesterday.  Got agitated overnight and got Valium which has made him somnolent today.  Decrease Valium dose.  -Speech therapy consult for swallow evaluation tomorrow, likely.  -SW consult later this admission for CD resources    Constipation  -Had some vomiting.  -Abdominal x-ray showed no obstruction.  -Trial of enema today  -Tube feeds on hold, likely resume tomorrow if able to have bowel movement     Major depressive disorder  Bipolar disorder  Insomnia  Pta was on buproprion 450mg qam, tizanidine 4mg nightly, and trazodone 100mg nightly  - Pta tizanidine and trazodone held. Can consider restarting once patient able to tolerate oral medications  - Bupropion has been held, as this lowers seizure threshold.  This can likely be restarted once diet resumed.     History of polysubstance use  No recent use per patient report and labs at admission.  - Continue pta buprenorphine-naloxone films BID     Hypertension  Pta hydralazine held at admission. Has been mostly normotensive.  - IV metoprolol and hydralazine available prn for high blood pressures  - Telemetry in setting of withdrawal and multiple electrolyte abnormalities     Possible pneumonia  Positive blood culture, likely contaminant  - Andrea is at risk for aspiration pneumonia. Andrea developed fever of 100.7 morning of 9/27. CXR revealed mild basilar opacities. Started antibiotics which were discontinued on 9/28. On 9/30 Andrea developed coarse breath sounds and hypoxia with fever.  Zosyn was started, empirically.  CXR that showed no clear infiltrate.  Urinalysis was unremarkable.    -Appreciate ID consult.  Agreed likely contaminant.  Vancomycin stopped.     Anion gap  metabolic acidosis  Lactic acidosis Ketosis  -Secondary to alcohol withdrawal. Resolved     Elevated LFTs  -Last ALT 93,  on 9/24 and trending down. Elevation likely secondary to alcohol use.     Nutrition  -Nasal FT placed 9/30. Tube feeds started but stopped this morning due to nausea.  -D5 half-normal with potassium 100 mL/h while tube feeds off.  -Continue GI prophylaxis with IV pantoprazole 40mg daily  -Continue sliding scale insulin and hypoglycemia protocol as needed      Left wrist pain  Unable to obtain further history including history of fall or other trauma to area. Left wrist XR on 9/29 revealed no acute fracture, some chronic findings. Uric acid 4.7.  - Recommend hand consult as outpatient for further workup and management     Urine retention  -Failed Lino removal. Lino replaced. Trial of Cardura.    Physical deconditioning  -PT and OT consults as appropriate         Diet: NPO for Medical/Clinical Reasons Except for: No Exceptions, Meds, Ice Chips  Adult Formula Drip Feeding: Continuous Jevity 1.5; Nasogastric tube; Goal Rate: 55; mL/hr; Medication - Feeding Tube Flush Frequency: At least 15-30 mL water before and after medication administration and with tube clogging; Amount to Send (Nutrit...    DVT Prophylaxis: Enoxaparin (Lovenox) SQ  Lino Catheter: PRESENT, indication: Retention, Retention  Central Lines: None  Cardiac Monitoring: ACTIVE order. Indication: alcohol withdrawal  Code Status: Full Code      Disposition Plan      Expected Discharge Date: 10/06/2022                The patient's care was discussed with the Bedside Nurse and Patient.    Rm Marinelli MD  Hospitalist Service  [facility]  Securely message with the Vocera Web Console (learn more here)  Text page via [employer] Paging/Directory         Clinically Significant Risk Factors Present on Admission                      ______________________________________________________________________    Interval  History   More somnolent today after receiving Valium this morning.  Constipated.  No results so far after fleets enema.    Data reviewed today: I reviewed all medications, new labs and imaging results over the last 24 hours. I personally reviewed no images or EKG's today.    Physical Exam   Vital Signs: Temp: 98.1  F (36.7  C) Temp src: Temporal BP: 114/79 Pulse: 111   Resp: 20 SpO2: 95 % O2 Device: Nasal cannula Oxygen Delivery: 4 LPM  Weight: 199 lbs 1.21 oz  GENERAL: Somnolent.  PSYCH: No acute distress.  EYES: PERRLA, Normal conjunctiva.  HEART:  Regular rate and rhythm. No JVD. Pulses normal. No edema.  LUNGS:  Clear to auscultation, normal Respiratory effort.  ABDOMEN:  Soft, no hepatosplenomegaly, diminished bowel sounds.  EXTREMETIES: No clubbing, cyanosis or ischemia  SKIN:  Dry to touch, No rash.      Data   Recent Labs   Lab 10/03/22  1227 10/03/22  0826 10/03/22  0556 10/02/22  1227 10/02/22  0547 10/01/22  0738 10/01/22  0600 09/30/22  1610 09/30/22  1514 09/28/22  0803 09/28/22  0537 09/27/22  0840 09/27/22  0609   WBC  --   --   --   --   --   --   --   --  6.5  --  4.9  --  5.5   HGB  --   --   --   --   --   --   --   --  13.4  --  13.5  --  13.0*   MCV  --   --   --   --   --   --   --   --  96  --  96  --  96   PLT  --   --   --   --   --   --  177  --  165  --  108*  --  111*   NA  --   --  143  --  143  --  141  --   --    < > 142  --  141   POTASSIUM  --   --  3.6  --  3.7  --  4.2  --   --    < > 4.0  --  4.3   CHLORIDE  --   --  104  --  107  --  107  --   --    < > 105  --  105   CO2  --   --  30*  --  27  --  25  --   --    < > 28  --  26   BUN  --   --  17.3  --  26.7*  --  22.0*  --   --    < > 10.1  --  8.8   CR  --   --  0.83  --  0.94  --  1.01  --   --    < > 0.69  --  0.85   ANIONGAP  --   --  9  --  9  --  9  --   --    < > 9  --  10   JOHANA  --   --  8.6  --  8.6  --  8.9  --   --    < > 8.6  --  8.7   * 92 114*   < > 109*   < > 132*   < >  --    < > 100*   < > 106*   LIPASE   --   --  18  --   --   --   --   --   --   --   --   --   --     < > = values in this interval not displayed.     Recent Results (from the past 24 hour(s))   XR Abdomen Port 1 View    Narrative    EXAM: XR ABDOMEN PORT 1 VIEW  LOCATION: [facility]  DATE/TIME: 10/3/2022 6:07 AM    INDICATION: constipation  COMPARISON: None.      Impression    IMPRESSION: The bowel gas pattern is nonobstructive. The previously seen Dobbhoff tube is been retracted the tip is now overlying the expected position of the pylorus/proximal first portion of the duodenum. Stool is seen in the rectum.

## 2022-10-03 NOTE — PLAN OF CARE
Goal Outcome Evaluation:    ICU End of Shift Summary.  For vital signs and complete assessments, please see documentation flowsheets.      Pertinent assessments:   Neuro- Confused, agitated, restless, visual hallucinations  Resp- diminished on room air  Card- SB with occassional PVC  GI- No bowel movement since 9/24, passing gas  - polanco in place, clear yellow urine  Gtt- TKO   Lines- L wrist IV  TF- currently clamped d/t emesis.      Major Shift Events: Patient agitated, restless, and confused, requiring CIWA doses of valium. Patient reports needing to have BM-- MOM and supp given, 0 results. Passing gas. Patient reporting generalized discomfort. Slept a few hours overnight. Large emesis at 0430, tele hub notified.  Plan (Upcoming Events): abd XR , monitor mentation, bowel regimen  Discharge/Transfer Needs: TBD     Bedside Shift Report Completed : Y  Bedside Safety Check Completed: Y

## 2022-10-03 NOTE — PROVIDER NOTIFICATION
2330- Patient restless, hallucinating, confused, and agitated. CIWA score elevated, Valium given IV. Patient bradycardic with frequent PVCs. Tele hub notified-- EKG ordered and completed.     0450- Patient had large emesis, liquid. Tele hub notified- Tube feed stopped. Zofran IV PRN given.

## 2022-10-03 NOTE — PROGRESS NOTES
Patient seen on RA during neb treatment. BS clear/diminished with good nonproductive cough. No respiratory distresses noted. Will continue to monitor.

## 2022-10-03 NOTE — PLAN OF CARE
ICU End of Shift Summary.  For vital signs and complete assessments, please see documentation flowsheets.      Pertinent assessments: Took care of pt from [id number]. Pt alert and confused. Oriented to self and occasionally place. Speech garbled, incoherent at times. Follows commands except when agitated. Opens eyes spontaneously. CIWA 4 and 22, see below. Afebrile. Tele SR/SB, started having occasinal PVCs around 2200 and beyond. Has pain, tylenol and ketorolac given. LS dim/clear. Pt having strong coughs with and without encouragement. Lino in place with dark joycelyn UOP. BS+. LMB 9/24, senna given, see below. Keofeed in place with TF running at goal with FWF.    Major Shift Events:    -Around 2200 pt became anxious and was stating he needed to have a BM. Pt was put on bedpan without success. Pt was still restless and anxious and was lifted to bsc to attempt to have a BM. Pt was visibly in distress. Telehub called for additional bowel meds. Glycerin and milk of mag were ordered. Pharmacy was called to have meds verified. At this time the pharmacists were held up and it would be awhile. PRN tylenol suppository was given at this time d/t pt also complaining about pain. Milk of mag was given via keofeed once verified by pharmacy.    -Around 2300 pt reporting that he needed to have a BM, pt was lifted over to bsc. At this time pt became extremely agitated, anxious, and undirectable. Pt was also very confused and couldn't be reoriented. PRN 2 mg of valium was given. Pt was also reassessed for CIWA and scored 22. Valium was then given per CIWA protocol    Plan (Upcoming Events):  manage pain, bowel regimen, PT/OT, speech eval  Discharge/Transfer Needs: tbd     Bedside Shift Report Completed : yes  Bedside Safety Check Completed: yes

## 2022-10-03 NOTE — PLAN OF CARE
Goal Outcome Evaluation:    ICU End of Shift Summary.  For vital signs and complete assessments, please see documentation flowsheets.     Pertinent assessments: Neurologically drowsy with bouts of agitation otherwise, CIWA score 1-2. Rhythm sinus and BP wnl. Needing supplemental oxygen post emesis. TF on hold until return of bowel function, enema administered. Lino with adequate uop. Seizure precautions continued. Lytes repletion complete.   Major Shift Events: As above  Plan (Upcoming Events):   Discharge/Transfer Needs: TBD    Bedside Shift Report Completed :  Y  Bedside Safety Check Completed: Y

## 2022-10-03 NOTE — CONSULTS
ID consult dictated IMP 1 57-year-old male with multiple alcohol-related complications and issues, some fever and signs of sepsis and possible aspiration but no clear infection  2 1 of 2 blood cultures with 2 gram-positive contaminant like organisms, no reason these are true infection likely contaminant    REC 1 DC Didi okay to ignore the blood culture, agree no clear-cut pneumonia so Zosyn has been discontinued as well.

## 2022-10-04 ENCOUNTER — APPOINTMENT (OUTPATIENT)
Dept: SPEECH THERAPY | Facility: CLINIC | Age: 57
DRG: 896 | End: 2022-10-04
Attending: INTERNAL MEDICINE
Payer: MEDICARE

## 2022-10-04 LAB
ANION GAP SERPL CALCULATED.3IONS-SCNC: 8 MMOL/L (ref 7–15)
BUN SERPL-MCNC: 7.9 MG/DL (ref 6–20)
CALCIUM SERPL-MCNC: 8.8 MG/DL (ref 8.6–10)
CHLORIDE SERPL-SCNC: 105 MMOL/L (ref 98–107)
CREAT SERPL-MCNC: 0.63 MG/DL (ref 0.67–1.17)
DEPRECATED HCO3 PLAS-SCNC: 28 MMOL/L (ref 22–29)
GFR SERPL CREATININE-BSD FRML MDRD: >90 ML/MIN/1.73M2
GLUCOSE BLDC GLUCOMTR-MCNC: 107 MG/DL (ref 70–99)
GLUCOSE BLDC GLUCOMTR-MCNC: 107 MG/DL (ref 70–99)
GLUCOSE BLDC GLUCOMTR-MCNC: 120 MG/DL (ref 70–99)
GLUCOSE BLDC GLUCOMTR-MCNC: 128 MG/DL (ref 70–99)
GLUCOSE BLDC GLUCOMTR-MCNC: 142 MG/DL (ref 70–99)
GLUCOSE SERPL-MCNC: 121 MG/DL (ref 70–99)
MAGNESIUM SERPL-MCNC: 1.8 MG/DL (ref 1.7–2.3)
POTASSIUM SERPL-SCNC: 4.1 MMOL/L (ref 3.4–5.3)
SODIUM SERPL-SCNC: 141 MMOL/L (ref 136–145)

## 2022-10-04 PROCEDURE — 99233 SBSQ HOSP IP/OBS HIGH 50: CPT | Performed by: INTERNAL MEDICINE

## 2022-10-04 PROCEDURE — 200N000001 HC R&B ICU

## 2022-10-04 PROCEDURE — 99232 SBSQ HOSP IP/OBS MODERATE 35: CPT | Performed by: INTERNAL MEDICINE

## 2022-10-04 PROCEDURE — 258N000003 HC RX IP 258 OP 636: Performed by: INTERNAL MEDICINE

## 2022-10-04 PROCEDURE — C9113 INJ PANTOPRAZOLE SODIUM, VIA: HCPCS | Performed by: INTERNAL MEDICINE

## 2022-10-04 PROCEDURE — 250N000009 HC RX 250: Performed by: INTERNAL MEDICINE

## 2022-10-04 PROCEDURE — 250N000011 HC RX IP 250 OP 636: Performed by: STUDENT IN AN ORGANIZED HEALTH CARE EDUCATION/TRAINING PROGRAM

## 2022-10-04 PROCEDURE — 99233 SBSQ HOSP IP/OBS HIGH 50: CPT | Mod: 25 | Performed by: NURSE PRACTITIONER

## 2022-10-04 PROCEDURE — 92610 EVALUATE SWALLOWING FUNCTION: CPT | Mod: GN

## 2022-10-04 PROCEDURE — 250N000013 HC RX MED GY IP 250 OP 250 PS 637: Performed by: STUDENT IN AN ORGANIZED HEALTH CARE EDUCATION/TRAINING PROGRAM

## 2022-10-04 PROCEDURE — 250N000011 HC RX IP 250 OP 636: Performed by: INTERNAL MEDICINE

## 2022-10-04 PROCEDURE — 83735 ASSAY OF MAGNESIUM: CPT | Performed by: STUDENT IN AN ORGANIZED HEALTH CARE EDUCATION/TRAINING PROGRAM

## 2022-10-04 PROCEDURE — 250N000013 HC RX MED GY IP 250 OP 250 PS 637: Performed by: INTERNAL MEDICINE

## 2022-10-04 PROCEDURE — 36415 COLL VENOUS BLD VENIPUNCTURE: CPT | Performed by: INTERNAL MEDICINE

## 2022-10-04 PROCEDURE — 80048 BASIC METABOLIC PNL TOTAL CA: CPT | Performed by: INTERNAL MEDICINE

## 2022-10-04 RX ORDER — HALOPERIDOL 5 MG/ML
2 INJECTION INTRAMUSCULAR EVERY 6 HOURS PRN
Status: DISCONTINUED | OUTPATIENT
Start: 2022-10-04 | End: 2022-10-08 | Stop reason: HOSPADM

## 2022-10-04 RX ORDER — AMOXICILLIN 250 MG
1 CAPSULE ORAL 2 TIMES DAILY
Status: DISCONTINUED | OUTPATIENT
Start: 2022-10-04 | End: 2022-10-08 | Stop reason: HOSPADM

## 2022-10-04 RX ORDER — OLANZAPINE 10 MG/1
10 TABLET ORAL EVERY EVENING
Status: DISCONTINUED | OUTPATIENT
Start: 2022-10-04 | End: 2022-10-06

## 2022-10-04 RX ORDER — MAGNESIUM SULFATE HEPTAHYDRATE 40 MG/ML
2 INJECTION, SOLUTION INTRAVENOUS ONCE
Status: COMPLETED | OUTPATIENT
Start: 2022-10-04 | End: 2022-10-04

## 2022-10-04 RX ORDER — AMOXICILLIN 250 MG
2 CAPSULE ORAL 2 TIMES DAILY
Status: DISCONTINUED | OUTPATIENT
Start: 2022-10-04 | End: 2022-10-08 | Stop reason: HOSPADM

## 2022-10-04 RX ORDER — TRAZODONE HYDROCHLORIDE 100 MG/1
100 TABLET ORAL EVERY EVENING
Status: DISCONTINUED | OUTPATIENT
Start: 2022-10-04 | End: 2022-10-06

## 2022-10-04 RX ORDER — LORAZEPAM 2 MG/ML
.5-1 INJECTION INTRAMUSCULAR EVERY 4 HOURS PRN
Status: DISCONTINUED | OUTPATIENT
Start: 2022-10-04 | End: 2022-10-08 | Stop reason: HOSPADM

## 2022-10-04 RX ADMIN — ASPIRIN 81 MG CHEWABLE TABLET 325 MG: 81 TABLET CHEWABLE at 21:12

## 2022-10-04 RX ADMIN — ACETAMINOPHEN 650 MG: 325 SOLUTION ORAL at 21:23

## 2022-10-04 RX ADMIN — VALPROATE SODIUM 500 MG: 100 INJECTION, SOLUTION INTRAVENOUS at 08:42

## 2022-10-04 RX ADMIN — POTASSIUM CHLORIDE, DEXTROSE MONOHYDRATE AND SODIUM CHLORIDE: 150; 5; 450 INJECTION, SOLUTION INTRAVENOUS at 01:50

## 2022-10-04 RX ADMIN — Medication 15 ML: at 08:34

## 2022-10-04 RX ADMIN — OLANZAPINE 10 MG: 10 TABLET, FILM COATED ORAL at 21:13

## 2022-10-04 RX ADMIN — PANTOPRAZOLE SODIUM 40 MG: 40 INJECTION, POWDER, FOR SOLUTION INTRAVENOUS at 08:36

## 2022-10-04 RX ADMIN — POTASSIUM CHLORIDE, DEXTROSE MONOHYDRATE AND SODIUM CHLORIDE: 150; 5; 450 INJECTION, SOLUTION INTRAVENOUS at 13:08

## 2022-10-04 RX ADMIN — ENOXAPARIN SODIUM 40 MG: 40 INJECTION SUBCUTANEOUS at 21:12

## 2022-10-04 RX ADMIN — MAGNESIUM HYDROXIDE 30 ML: 400 SUSPENSION ORAL at 08:35

## 2022-10-04 RX ADMIN — BUPRENORPHINE HYDROCHLORIDE, NALOXONE HYDROCHLORIDE 1 FILM: 8; 2 FILM, SOLUBLE BUCCAL; SUBLINGUAL at 05:43

## 2022-10-04 RX ADMIN — BUPRENORPHINE HYDROCHLORIDE, NALOXONE HYDROCHLORIDE 1 FILM: 8; 2 FILM, SOLUBLE BUCCAL; SUBLINGUAL at 18:32

## 2022-10-04 RX ADMIN — DOXAZOSIN 1 MG: 1 TABLET ORAL at 21:13

## 2022-10-04 RX ADMIN — FOLIC ACID 1 MG: 1 TABLET ORAL at 08:34

## 2022-10-04 RX ADMIN — VALPROATE SODIUM 500 MG: 100 INJECTION, SOLUTION INTRAVENOUS at 21:13

## 2022-10-04 RX ADMIN — DOXAZOSIN 1 MG: 1 TABLET ORAL at 08:34

## 2022-10-04 RX ADMIN — SENNOSIDES AND DOCUSATE SODIUM 2 TABLET: 50; 8.6 TABLET ORAL at 09:51

## 2022-10-04 RX ADMIN — MAGNESIUM SULFATE HEPTAHYDRATE 2 G: 40 INJECTION, SOLUTION INTRAVENOUS at 06:41

## 2022-10-04 RX ADMIN — SENNOSIDES AND DOCUSATE SODIUM 2 TABLET: 50; 8.6 TABLET ORAL at 21:13

## 2022-10-04 ASSESSMENT — ACTIVITIES OF DAILY LIVING (ADL)
ADLS_ACUITY_SCORE: 41
ADLS_ACUITY_SCORE: 41
ADLS_ACUITY_SCORE: 43
ADLS_ACUITY_SCORE: 41
ADLS_ACUITY_SCORE: 43
ADLS_ACUITY_SCORE: 41
ADLS_ACUITY_SCORE: 43
ADLS_ACUITY_SCORE: 43
ADLS_ACUITY_SCORE: 41
ADLS_ACUITY_SCORE: 43

## 2022-10-04 NOTE — PROGRESS NOTES
10/04/22 1037   General Information   Onset of Illness/Injury or Date of Surgery 09/23/22   Referring Physician Rm Marinelli MD   Patient/Family Therapy Goal Statement (SLP) To go to the bathroom   Pertinent History of Current Problem The pt is a 57 year old male with history ofalcohol use disorder, acute alcohol withdrawal with seizures, and pancreatitis. He was admitted on 9/23/2022 for alcohol withdrawal.  He was treated with oral gabapentin and as needed Valium.  Hospital course was complicated by RRT called on 9/27 due to patient decreased responsiveness and hypotension. No classic tonic-clonic movements observed, although seizure activity clinically suspected. Patient was able to protect airway and responsiveness improved, so held off on intubation. CT was ordered and patient transferred to ICU.   He was started on dexmedetomidine for sedation. Scheduled valproate was substituted for gabapentin for acute alcohol withdrawal and seizure prophylaxis. Scheduled and as needed diazepam were ordered for withdrawal. Andrea stabilized. Scheduled diazepam was weaned. Feeding tube was ultimately placed and tube feeds started. Precedex was weaned on 10/1/22. Clinical swallow evaluation completed per MD order.   General Observations The pt is very confused, fatigued, and somewhat agitated.   Past History of Dysphagia No hx of dysphagia per chart review. Unable to obtain case hx from pt.   Pain Assessment   Patient Currently in Pain No   Type of Evaluation   Type of Evaluation Swallow Evaluation   Oral Motor   Oral Musculature generally intact   Structural Abnormalities none present   Mucosal Quality dry   Dentition (Oral Motor)   Dentition (Oral Motor) natural dentition   Facial Symmetry (Oral Motor)   Facial Symmetry (Oral Motor) WNL   Lip Function (Oral Motor)   Lip Range of Motion (Oral Motor) protrusion impairment   Comment, Lip Function (Oral Motor) Generalized weakness   Protrusion, Lip Range of Motion  bilateral;moderate impairment   Tongue Function (Oral Motor)   Tongue ROM (Oral Motor) unable/difficult to assess   Jaw Function (Oral Motor)   Jaw Function (Oral Motor) WNL   Cough/Swallow/Gag Reflex (Oral Motor)   Volitional Throat Clear/Cough (Oral Motor) WNL   Volitional Swallow (Oral Motor) mildly delayed   Vocal Quality/Secretion Management (Oral Motor)   Vocal Quality (Oral Motor) WFL   Secretion Management (Oral Motor) WNL   General Swallowing Observations   Respiratory Support (General Swallowing Observations) nasal cannula  (1 lpm)   Current Diet/Method of Nutritional Intake (General Swallowing Observations, NIS) NPO   Swallowing Evaluation Clinical swallow evaluation   Clinical Swallow Evaluation   Feeding Assistance dependent   Clinical Swallow Evaluation Textures Trialed thin liquids   Clinical Swallow Eval: Thin Liquid Texture Trial   Mode of Presentation, Thin Liquids spoon;fed by clinician   Volume of Liquid or Food Presented ice chip x2, water x2   Oral Phase of Swallow premature pharyngeal entry   Pharyngeal Phase of Swallow intact   Diagnostic Statement No overt s/s of aspiration, however prolonged bolus prep and suspect premature spillage to pharynx.   Esophageal Phase of Swallow   Patient reports or presents with symptoms of esophageal dysphagia No   Swallowing Recommendations   Diet Consistency Recommendations NPO;ice chips only   Supervision Level for Intake 1:1 supervision needed   Medication Administration Recommendations, Swallowing (SLP) Via NG   Instrumental Assessment Recommendations instrumental evaluation not recommended at this time   General Therapy Interventions   Planned Therapy Interventions Dysphagia Treatment   Dysphagia treatment Instruction of safe swallow strategies;Modified diet education   Clinical Impression   Criteria for Skilled Therapeutic Interventions Met (SLP Eval) Yes, treatment indicated   SLP Diagnosis Mod-severe oropharyngeal dysphagia   Risks & Benefits of  therapy have been explained evaluation/treatment results reviewed;care plan/treatment goals reviewed;risks/benefits reviewed;current/potential barriers reviewed;participants voiced agreement with care plan;participants included;patient   Clinical Impression Comments Clinical swallow evaluation completed per MD order. The pt presents with mod-severe oropharyngeal dysphagia suspect secondary to lethargy and AMS. Oral mech limited d/t poor command following, however significant for reduced lingual and labial coordination. The pt trialed ice chips x2 and thin liquids by spoon x2. Impaired bolus acceptance and prolonged transport, however no oral resiudals, anterior loss, or s/s of aspiration. He had stable O2 sats and denied difficulty with swallowing. The pt became significantly fatigued with PO trials and fell asleep despite verbal/tactile cues. He is not appropriate for further diet advancement given AMS. Recommend NPO with oral cares and ice chips only. SLP will follow for PO advancement and safe swallow strategies.   SLP Discharge Planning   SLP Discharge Recommendation Transitional Care Facility;home with home care speech therapy   SLP Rationale for DC Rec Swallow function is below baseline   SLP Brief overview of current status  Recommend NPO with oral cares and ice chips only. SLP will follow for PO advancement and safe swallow strategies.    Total Evaluation Time   Total Evaluation Time (Minutes) 20   SLP Goals   Therapy Frequency (SLP Eval) 5 times/wk   SLP Predicted Duration/Target Date for Goal Attainment 10/18/22   SLP Goals Swallow   SLP: Safely tolerate diet without signs/symptoms of aspiration Regular diet;Thin liquids;With use of swallow precautions;Independently

## 2022-10-04 NOTE — PLAN OF CARE
Goal Outcome Evaluation:    Plan of Care Reviewed With: patient     Overall Patient Progress: no change  ICU End of Shift Summary.  For vital signs and complete assessments, please see documentation flowsheets.     Neuro: lethargic, confused. Moans/groans, wakes up agitated to cuss at staff, then falls back asleep. CIWAs 1, 5  Cardiac: SR-ST, bp stable  Resp: lungs clear/dim, weaned from 4L to 1L NC. Infrequent nonproductive cough.  GI: very faint hypo BS. Keofeed clamped. Smears of BM. Passing gas.  : polanco in place, good urine output  Skin: see flowsheets  Lines: pivx3  Drips: D5 1/2 NS + K @ 100    Plan (Upcoming Events): continue bowel regimen, CIWA  Discharge/Transfer Needs: improved mentation     Mag 1.8, replacing per protocol

## 2022-10-04 NOTE — PROGRESS NOTES
[facility]  Infectious Disease Progress Note          Assessment and Plan:   IMPRESSION:  1.  A 57-year-old male, acute multiple alcohol-related issues, including encephalopathy, seizures, abnormal liver function tests and withdrawal.  2.  Episode of respiratory insufficiency, possible aspiration, but no clear infiltrates or sign of infectious pneumonia.  3.  Positive blood culture, 1 out of 2 cultures, with 2 different coagulase-negative Staph organisms, almost certainly contaminant.  No reason for a true bacteremia with this.  4.  Abnormal LFTs related to alcohol use.     RECOMMENDATIONS:   1.  Discontinue vancomycin.  Blood culture should be contaminant.  2.  Agree with discontinuation of Zosyn, probably not infectious aspiration, even if aspiration event occurred, watch off antibiotics for now.  Seems stable no further + Bc  Will sign off  Call if issues        Interval History:   no new complaints not interactive; no cp, sob, n/v/d, or abd pain.              Medications:       aspirin  325 mg Oral or Feeding Tube QPM     buprenorphine HCl-naloxone HCl  1 Film Sublingual BID     [Held by provider] buPROPion  150 mg Oral QAM     [Held by provider] buPROPion  300 mg Oral QAM     doxazosin  1 mg Oral or Feeding Tube Q12H [facility] (08/20)     enoxaparin ANTICOAGULANT  40 mg Subcutaneous Q24H     folic acid  1 mg Oral or Feeding Tube Daily     multivitamins w/minerals  15 mL Per Feeding Tube Daily     OLANZapine  10 mg Oral or Feeding Tube QPM     pantoprazole  40 mg Intravenous Daily with breakfast     senna-docusate  2 tablet Oral BID    Or     senna-docusate  1 tablet Oral BID     sodium chloride (PF)  3 mL Intracatheter Q8H     traZODone  100 mg Oral or Feeding Tube QPM     valproate (DEPACON) in NS intermittent infusion 50 mL  500 mg Intravenous Daily     valproate  500 mg Intravenous At Bedtime                  Physical Exam:   Blood pressure 110/78, pulse 108, temperature 98.7  F (37.1  C),  temperature source Temporal, resp. rate 13, weight 87.6 kg (193 lb 2 oz), SpO2 95 %.  Wt Readings from Last 2 Encounters:   10/04/22 87.6 kg (193 lb 2 oz)   07/13/22 92.9 kg (204 lb 12.8 oz)     Vital Signs with Ranges  Temp:  [98.1  F (36.7  C)-99.6  F (37.6  C)] 98.7  F (37.1  C)  Pulse:  [] 108  Resp:  [0-28] 13  BP: (102-139)/(67-97) 110/78  SpO2:  [83 %-98 %] 95 %    Constitutional: lethargic, no apparent distress   Lungs: Clear to auscultation bilaterally, no crackles or wheezing   Cardiovascular: Regular rate and rhythm, normal S1 and S2, and no murmur noted   Abdomen: Normal bowel sounds, soft, non-distended, non-tender   Skin: No rashes, no cyanosis, no edema   Other:           Data:   All microbiology laboratory data reviewed.  Recent Labs   Lab Test 10/01/22  0600 09/30/22  1514 09/28/22  0537 09/27/22  0609   WBC  --  6.5 4.9 5.5   HGB  --  13.4 13.5 13.0*   HCT  --  40.9 42.0 40.4   MCV  --  96 96 96    165 108* 111*     Recent Labs   Lab Test 10/04/22  0544 10/03/22  0556 10/02/22  0547   CR 0.63* 0.83 0.94     No lab results found.  Recent Labs   Lab Test 11/20/19 1959 11/20/19 1931 11/20/19  0217 11/20/19  0147   CULT No growth No growth No growth No growth

## 2022-10-04 NOTE — PROGRESS NOTES
Staff attempting x2 to rouse patient enough to get into chair or use the commode; pt will not open eyes or be alert enough to transfer to a sitting position; the writer will continue to attempt throughout shift.

## 2022-10-04 NOTE — PROGRESS NOTES
[facility]    Medicine Progress Note - Hospitalist Service    Date of Admission:  9/23/2022    Assessment & Plan              Andrea Pham is a 57 year old male with history ofalcohol use disorder, acute alcohol withdrawal with seizures, and pancreatitis. He was admitted on 9/23/2022 for alcohol withdrawal.  He was treated with oral gabapentin and as needed Valium.  Hospital course was complicated by RRT called on 9/27 due to patient decreased responsiveness and hypotension. No classic tonic-clonic movements observed, although seizure activity clinically suspected. Patient was able to protect airway and responsiveness improved, so held off on intubation. CT was ordered and patient transferred to ICU.   He was started on dexmedetomidine for sedation. Scheduled valproate was substituted for gabapentin for acute alcohol withdrawal and seizure prophylaxis. Scheduled and as needed diazepam were ordered for withdrawal. Andrea stabilized. Scheduled diazepam was weaned. Feeding tube was ultimately placed and tube feeds started. Precedex was weaned on 10/1/22.     Problem List/Assessment and Plan:      Alcohol withdrawal with suspected seizures  History of alcohol use disorder, severe  Alcohol dependence  History of alcohol and other drug dependence. He tells me he has been in treatment 17 times. He has history of withdrawal seizures.  Pta was on valproate. He was admitted with alcohol withdrawal and had been agitated and pulling out IV lines. Seizure activity was suspected when RRT called on 9/27. Responsiveness improved without intervention and patient able to protect airway. CT on 9/27 without evidence of acute intracranial process. Transferred to ICU and he was started on dexmedetomidine for sedation, scheduled valproate for seizure prophylaxis, and scheduled and  prn diazepam for withdrawal.   He improves.  He ultimately weaned off of scheduled diazepam and then dexmedetomidine.   -Tapered of  scheduled diazepam  -Weaned off of Precedex 10/1/22  -Discontinue CIWA   -PRN Ativan and haldol for agitatin  -Continue folic acid, multivitamin, thiamine  -Continue ondansetron or prochlorperazine prn for nausea  -Improved today  -SW consult later this admission for CD resources    Constipation  -Had some vomiting.  -Abdominal x-ray showed no obstruction.  -Some stool after trial of enema and MOM  -Resume tube feeds     Major depressive disorder  Bipolar disorder  Insomnia  Pta was on buproprion 450mg qam, tizanidine 4mg nightly, and trazodone 100mg nightly  - Pta tizanidine and trazodone held. Can consider restarting once patient able to tolerate oral medications  - Bupropion has been held, as this lowers seizure threshold.  This can likely be restarted once diet resumed.     History of polysubstance use  No recent use per patient report and labs at admission.  - Continue pta buprenorphine-naloxone films BID     Hypertension  Pta hydralazine held at admission. Has been mostly normotensive.  - IV metoprolol and hydralazine available prn for high blood pressures  - Telemetry in setting of withdrawal and multiple electrolyte abnormalities     Possible pneumonia  Positive blood culture, likely contaminant  - Andrea is at risk for aspiration pneumonia. Andrea developed fever of 100.7 morning of 9/27. CXR revealed mild basilar opacities. Started antibiotics which were discontinued on 9/28. On 9/30 Andrea developed coarse breath sounds and hypoxia with fever.  Zosyn was started, empirically.  CXR that showed no clear infiltrate.  Urinalysis was unremarkable.  Blood culture grew contaminants.   -Appreciate ID consult.  Agreed likely contaminants.  Vancomycin stopped. Off Zosyn.     Anion gap metabolic acidosis  Lactic acidosis Ketosis  -Secondary to alcohol withdrawal. Resolved     Elevated LFTs  -Last ALT 93,  on 9/24 and trending down. Elevation likely secondary to alcohol use.     Nutrition  -Nasal FT placed 9/30.  Tube feeds started but stopped 10/3 due to nausea.  -Resume TFs  -Decrease IVF and stop once TFs tolerated  -Continue GI prophylaxis with IV pantoprazole 40mg daily  -Continue sliding scale insulin and hypoglycemia protocol as needed      Left wrist pain  Unable to obtain further history including history of fall or other trauma to area. Left wrist XR on 9/29 revealed no acute fracture, some chronic findings. Uric acid 4.7.  - Recommend hand consult as outpatient for further workup and management     Urine retention  -Failed Lino removal. Lino replaced. Trial of Cardura.    Physical deconditioning  -PT and OT consults as appropriate           Diet: Adult Formula Drip Feeding: Continuous Jevity 1.5; Nasogastric tube; Goal Rate: 55; mL/hr; Medication - Feeding Tube Flush Frequency: At least 15-30 mL water before and after medication administration and with tube clogging; Amount to Send (Nutrit...  NPO for Medical/Clinical Reasons Except for: Meds, Ice Chips, NPO but receiving PN    DVT Prophylaxis: Enoxaparin (Lovenox) SQ  Lino Catheter: PRESENT, indication: Retention, Retention  Central Lines: None  Cardiac Monitoring: ACTIVE order. Indication: alcohol withdrawal  Code Status: Full Code      Disposition Plan     Expected Discharge Date: 10/06/2022                The patient's care was discussed with the Bedside Nurse.    Rm Marinelli MD  Hospitalist Service  [facility]  Securely message with the Vocera Web Console (learn more here)  Text page via [facility] Paging/Directory         Clinically Significant Risk Factors Present on Admission                      ______________________________________________________________________    Interval History   No new problems. More alert and awake. Still some confusion. No nausea or abdominal pain.     Data reviewed today: I reviewed all medications, new labs and imaging results over the last 24 hours. I personally reviewed no images or EKG's  today.    Physical Exam   Vital Signs: Temp: 98.8  F (37.1  C) Temp src: Temporal BP: 117/82 Pulse: 83   Resp: 13 SpO2: 97 % O2 Device: Nasal cannula Oxygen Delivery: 1 LPM  Weight: 193 lbs 1.97 oz  GENERAL:  Comfortable. Cooperative.  PSYCH: pleasant, oriented, No acute distress.  EYES: PERRLA, Normal conjunctiva.  HEART:  Regular rate and rhythm. No JVD. Pulses normal. No edema.  LUNGS:  Clear to auscultation, normal Respiratory effort.  ABDOMEN:  Soft, no hepatosplenomegaly, normal bowel sounds.  EXTREMETIES: No clubbing, cyanosis or ischemia  SKIN:  Dry to touch, No rash.      Data   Recent Labs   Lab 10/04/22  1545 10/04/22  1212 10/04/22  0756 10/04/22  0544 10/03/22  0826 10/03/22  0556 10/02/22  1227 10/02/22  0547 10/01/22  0738 10/01/22  0600 09/30/22  1610 09/30/22  1514 09/28/22  0803 09/28/22  0537   WBC  --   --   --   --   --   --   --   --   --   --   --  6.5  --  4.9   HGB  --   --   --   --   --   --   --   --   --   --   --  13.4  --  13.5   MCV  --   --   --   --   --   --   --   --   --   --   --  96  --  96   PLT  --   --   --   --   --   --   --   --   --  177  --  165  --  108*   NA  --   --   --  141  --  143  --  143  --  141  --   --    < > 142   POTASSIUM  --   --   --  4.1  --  3.6  --  3.7  --  4.2  --   --    < > 4.0   CHLORIDE  --   --   --  105  --  104  --  107  --  107  --   --    < > 105   CO2  --   --   --  28  --  30*  --  27  --  25  --   --    < > 28   BUN  --   --   --  7.9  --  17.3  --  26.7*  --  22.0*  --   --    < > 10.1   CR  --   --   --  0.63*  --  0.83  --  0.94  --  1.01  --   --    < > 0.69   ANIONGAP  --   --   --  8  --  9  --  9  --  9  --   --    < > 9   JOHANA  --   --   --  8.8  --  8.6  --  8.6  --  8.9  --   --    < > 8.6   * 107* 128* 121*   < > 114*   < > 109*   < > 132*   < >  --    < > 100*   LIPASE  --   --   --   --   --  18  --   --   --   --   --   --   --   --     < > = values in this interval not displayed.     No results found for this or any  previous visit (from the past 24 hour(s)).

## 2022-10-04 NOTE — CONSULTS
Consult Date: 10/04/2022    INFECTIOUS DISEASE CONSULTATION    LOCATION:  Room 364, ICU, [facility].    REFERRING PHYSICIAN:  Dr. Marinelli.    IMPRESSION:  1.  A 57-year-old male, acute multiple alcohol-related issues, including encephalopathy, seizures, abnormal liver function tests and withdrawal.  2.  Episode of respiratory insufficiency, possible aspiration, but no clear infiltrates or sign of infectious pneumonia.  3.  Positive blood culture, 1 out of 2 cultures, with 2 different coagulase-negative Staph organisms, almost certainly contaminant.  No reason for a true bacteremia with this.  4.  Abnormal LFTs related to alcohol use.    RECOMMENDATIONS:   1.  Discontinue vancomycin.  Blood culture should be contaminant.  2.  Agree with discontinuation of Zosyn, probably not infectious aspiration, even if aspiration event occurred, watch off antibiotics for now.    HISTORY OF PRESENT ILLNESS:  This 57-year-old male is seen in consultation.  He currently is quite obtunded and unresponsive.  No significant complaints, but not able to respond.  The patient came in with alcohol-related issues, including pancreatitis, episode of seizure, alcohol withdrawal, developed an episode of low-grade fever, airway issues, and was brought into the ICU, did not require intubation, was started on Zosyn, but no clear infectious pneumonia present.  At that time, blood cultures were obtained, 1 of the 2 blood cultures with both bottles is growing 2 different gram-positive organisms, turned out to be coagulase-negative Staph organisms, almost certainly contaminants.  His respiratory status is now improved.  Does not have clear infectious pneumonia.    PAST MEDICAL HISTORY:  Alcohol-related issues historically, presumed seizure disorder, probably related, significant depression and bipolar disorder.    SOCIAL AND FAMILY HISTORY:  Alcohol issue as noted.  Otherwise, unable to obtain.    REVIEW OF SYSTEMS:  Unable to obtain,  currently is quite obtunded.    PHYSICAL EXAMINATION:    GENERAL:  The patient is not particularly responsive, unable to arouse him.  VITAL SIGNS:  Currently normal, including not that tachycardic anymore.  HEENT:  No thrush or oropharyngeal lesion.  Pupils reactive.  NECK:  Supple and nontender, no meningismus.  HEART:  Mildly tachycardic in the 90s.  LUNGS:  Clear.  ABDOMEN:  Soft, nontender.  EXTREMITIES:  No major rash or skin lesions.    LABORATORY DATA:  Blood cultures as noted, 2 bottles of 1 of 2 cultures with a coagulase-negative Staph organism, now a second coagulase-negative Staph, both likely contaminants.    Thank you very much for the consultation.  I will follow the patient with you.    Claudio Holguin MD        D: 10/04/2022   T: 10/04/2022   MT: [email or web address]    Name:     ARLEN MARX  MRN:      [id number]        Account:      [id number]   :      1965           Consult Date: 10/04/2022     Document: [id number]

## 2022-10-04 NOTE — PROGRESS NOTES
Addiction Medicine Progress Note      Assessment/Plan    Active Problems:    Alcohol use disorder, severe, dependence (H)    Andrea Pham is a 57 year old old male with a past medical history significant for alcohol dependence, alcohol withdrawal, seizures, pancreatitis, bipolar, MDD, methamphetamine use disorder in remission, and opiate use disorder in sustained remission on suboxone who presented to [facility] ED for acute alcohol withdrawal treated with gabapentin and prn valium.      Unable to see patient due to sedation and confusion. Chart reviewed. Andrea was transferred to ICU on 9/27/22 following a rapid response when Andrea became unresponsive. No tonic clonic activity was observed, but seizure suspected. He was started on precedex drip which was weaned 10/1/22.  Gabapentin was substituted with valproic acid for withdrawal and seizure prophylaxis.  Diazepam was scheduled weaned.    Andrea remains in ICU, is confused and lethargic.    Recommendations:  1. Continue CIWA diazepam.   2. Recommend CD assessment to be completed for inpatient CD treatment once stable.    3. If possible, recommend transfer directly to inpatient CD treatment.   4. Continue suboxone 8 mg BID to avoid opioid withdrawal.   5. Schedule appointment to follow up in addiction medicine clinic before discharge. Message can be sent to scheduling team when closer to discharge.   6. Addiction medicine will continue to follow.     For questions and concerns, please page [phone]. Addiction Medicine team is currently available Monday through Friday.    Subjective  RN reports that Andrea is unable to participate in virtual visit. He remains confused and is sedated. He was unable to participate in swallow study this morning. He continues to experience periods of agitation requiring prn valium. Has received   45 mg valium in last 24 hours.     ROS:    Confused, sedated    Objective    Vital signs in last 24 hours  Temp:  [98.1  F (36.7  C)-99.6  F (37.6   C)] 99.6  F (37.6  C)  Pulse:  [] 80  Resp:  [10-26] 10  BP: (102-139)/(67-97) 127/85  SpO2:  [93 %-97 %] 94 %      Pertinent Labs   Lab Results: I have personally reviewed the labs    Ignacia Dubon DNP, AGNP-C  Addiction Medicine

## 2022-10-05 ENCOUNTER — APPOINTMENT (OUTPATIENT)
Dept: SPEECH THERAPY | Facility: CLINIC | Age: 57
DRG: 896 | End: 2022-10-05
Payer: MEDICARE

## 2022-10-05 LAB
ANION GAP SERPL CALCULATED.3IONS-SCNC: 8 MMOL/L (ref 7–15)
BACTERIA BLD CULT: NO GROWTH
BUN SERPL-MCNC: 9.9 MG/DL (ref 6–20)
CALCIUM SERPL-MCNC: 8.8 MG/DL (ref 8.6–10)
CHLORIDE SERPL-SCNC: 108 MMOL/L (ref 98–107)
CREAT SERPL-MCNC: 0.66 MG/DL (ref 0.67–1.17)
DEPRECATED HCO3 PLAS-SCNC: 26 MMOL/L (ref 22–29)
GFR SERPL CREATININE-BSD FRML MDRD: >90 ML/MIN/1.73M2
GLUCOSE BLDC GLUCOMTR-MCNC: 107 MG/DL (ref 70–99)
GLUCOSE BLDC GLUCOMTR-MCNC: 111 MG/DL (ref 70–99)
GLUCOSE BLDC GLUCOMTR-MCNC: 115 MG/DL (ref 70–99)
GLUCOSE BLDC GLUCOMTR-MCNC: 116 MG/DL (ref 70–99)
GLUCOSE BLDC GLUCOMTR-MCNC: 121 MG/DL (ref 70–99)
GLUCOSE BLDC GLUCOMTR-MCNC: 137 MG/DL (ref 70–99)
GLUCOSE SERPL-MCNC: 117 MG/DL (ref 70–99)
HOLD SPECIMEN: NORMAL
MAGNESIUM SERPL-MCNC: 1.8 MG/DL (ref 1.7–2.3)
PLATELET # BLD AUTO: 354 10E3/UL (ref 150–450)
POTASSIUM SERPL-SCNC: 4.2 MMOL/L (ref 3.4–5.3)
SODIUM SERPL-SCNC: 142 MMOL/L (ref 136–145)

## 2022-10-05 PROCEDURE — 200N000001 HC R&B ICU

## 2022-10-05 PROCEDURE — 250N000013 HC RX MED GY IP 250 OP 250 PS 637: Performed by: INTERNAL MEDICINE

## 2022-10-05 PROCEDURE — 250N000009 HC RX 250: Performed by: INTERNAL MEDICINE

## 2022-10-05 PROCEDURE — 80048 BASIC METABOLIC PNL TOTAL CA: CPT | Performed by: INTERNAL MEDICINE

## 2022-10-05 PROCEDURE — 250N000013 HC RX MED GY IP 250 OP 250 PS 637: Performed by: STUDENT IN AN ORGANIZED HEALTH CARE EDUCATION/TRAINING PROGRAM

## 2022-10-05 PROCEDURE — 250N000011 HC RX IP 250 OP 636: Performed by: INTERNAL MEDICINE

## 2022-10-05 PROCEDURE — 99233 SBSQ HOSP IP/OBS HIGH 50: CPT | Performed by: STUDENT IN AN ORGANIZED HEALTH CARE EDUCATION/TRAINING PROGRAM

## 2022-10-05 PROCEDURE — 250N000011 HC RX IP 250 OP 636: Performed by: STUDENT IN AN ORGANIZED HEALTH CARE EDUCATION/TRAINING PROGRAM

## 2022-10-05 PROCEDURE — 92526 ORAL FUNCTION THERAPY: CPT | Mod: GN

## 2022-10-05 PROCEDURE — 36415 COLL VENOUS BLD VENIPUNCTURE: CPT | Performed by: INTERNAL MEDICINE

## 2022-10-05 PROCEDURE — 258N000003 HC RX IP 258 OP 636: Performed by: INTERNAL MEDICINE

## 2022-10-05 PROCEDURE — 258N000003 HC RX IP 258 OP 636: Performed by: STUDENT IN AN ORGANIZED HEALTH CARE EDUCATION/TRAINING PROGRAM

## 2022-10-05 PROCEDURE — 83735 ASSAY OF MAGNESIUM: CPT | Performed by: INTERNAL MEDICINE

## 2022-10-05 PROCEDURE — 85049 AUTOMATED PLATELET COUNT: CPT | Performed by: STUDENT IN AN ORGANIZED HEALTH CARE EDUCATION/TRAINING PROGRAM

## 2022-10-05 PROCEDURE — C9113 INJ PANTOPRAZOLE SODIUM, VIA: HCPCS | Performed by: INTERNAL MEDICINE

## 2022-10-05 PROCEDURE — 272N000078 HC NUTRITION PRODUCT INTERMEDIATE LITER

## 2022-10-05 RX ORDER — MAGNESIUM OXIDE 400 MG/1
400 TABLET ORAL EVERY 4 HOURS
Status: COMPLETED | OUTPATIENT
Start: 2022-10-05 | End: 2022-10-05

## 2022-10-05 RX ADMIN — ACETAMINOPHEN 650 MG: 325 SOLUTION ORAL at 14:32

## 2022-10-05 RX ADMIN — Medication 15 ML: at 08:37

## 2022-10-05 RX ADMIN — DOXAZOSIN 1 MG: 1 TABLET ORAL at 08:37

## 2022-10-05 RX ADMIN — VALPROATE SODIUM 500 MG: 100 INJECTION, SOLUTION INTRAVENOUS at 08:38

## 2022-10-05 RX ADMIN — SENNOSIDES AND DOCUSATE SODIUM 1 TABLET: 50; 8.6 TABLET ORAL at 08:37

## 2022-10-05 RX ADMIN — OLANZAPINE 10 MG: 10 TABLET, FILM COATED ORAL at 20:29

## 2022-10-05 RX ADMIN — ENOXAPARIN SODIUM 40 MG: 40 INJECTION SUBCUTANEOUS at 22:11

## 2022-10-05 RX ADMIN — TRAZODONE HYDROCHLORIDE 100 MG: 100 TABLET ORAL at 20:29

## 2022-10-05 RX ADMIN — MAGNESIUM OXIDE TAB 400 MG (241.3 MG ELEMENTAL MG) 400 MG: 400 (241.3 MG) TAB at 08:37

## 2022-10-05 RX ADMIN — DOXAZOSIN 1 MG: 1 TABLET ORAL at 20:31

## 2022-10-05 RX ADMIN — BUPRENORPHINE HYDROCHLORIDE, NALOXONE HYDROCHLORIDE 1 FILM: 8; 2 FILM, SOLUBLE BUCCAL; SUBLINGUAL at 16:31

## 2022-10-05 RX ADMIN — THIAMINE HYDROCHLORIDE 500 MG: 100 INJECTION, SOLUTION INTRAMUSCULAR; INTRAVENOUS at 12:30

## 2022-10-05 RX ADMIN — FOLIC ACID 1 MG: 1 TABLET ORAL at 08:37

## 2022-10-05 RX ADMIN — ASPIRIN 81 MG CHEWABLE TABLET 324 MG: 81 TABLET CHEWABLE at 20:28

## 2022-10-05 RX ADMIN — THIAMINE HYDROCHLORIDE 500 MG: 100 INJECTION, SOLUTION INTRAMUSCULAR; INTRAVENOUS at 16:17

## 2022-10-05 RX ADMIN — MAGNESIUM OXIDE TAB 400 MG (241.3 MG ELEMENTAL MG) 400 MG: 400 (241.3 MG) TAB at 11:40

## 2022-10-05 RX ADMIN — THIAMINE HYDROCHLORIDE 500 MG: 100 INJECTION, SOLUTION INTRAMUSCULAR; INTRAVENOUS at 22:09

## 2022-10-05 RX ADMIN — PANTOPRAZOLE SODIUM 40 MG: 40 INJECTION, POWDER, FOR SOLUTION INTRAVENOUS at 08:38

## 2022-10-05 RX ADMIN — BUPRENORPHINE HYDROCHLORIDE, NALOXONE HYDROCHLORIDE 1 FILM: 8; 2 FILM, SOLUBLE BUCCAL; SUBLINGUAL at 05:23

## 2022-10-05 RX ADMIN — VALPROIC ACID 500 MG: 250 SOLUTION ORAL at 20:31

## 2022-10-05 ASSESSMENT — ACTIVITIES OF DAILY LIVING (ADL)
ADLS_ACUITY_SCORE: 47
ADLS_ACUITY_SCORE: 43
ADLS_ACUITY_SCORE: 49
ADLS_ACUITY_SCORE: 49
ADLS_ACUITY_SCORE: 43
ADLS_ACUITY_SCORE: 43
ADLS_ACUITY_SCORE: 49
ADLS_ACUITY_SCORE: 49
ADLS_ACUITY_SCORE: 47
ADLS_ACUITY_SCORE: 47
ADLS_ACUITY_SCORE: 43
ADLS_ACUITY_SCORE: 43

## 2022-10-05 NOTE — PLAN OF CARE
"Orientation: Alert to self at beginning of shift- now mostly A/O x4  VS: /68   Pulse 77   Temp 98.2  F (36.8  C) (Axillary)   Resp 12   Wt 85.9 kg (189 lb 6 oz)   SpO2 94%   BMI 26.41 kg/m    Tele: SR/ST  LS: Clear  GI: TF running at goal. Speech saw and couldn't advance diet due to pt lethargy  :Lino removed at end of shift due to pt crying in pain from penis. MD covering ok'd removal. External cath in place. Pt aware may need straight cath if he is unable to void  Skin: rectum slightly raw from frequent loose stools and wiping  Activity: Lift, able to rotate self in bed for clean-ups  Pain: generalized- tylenol given with no improvement- offered icy/hot and other pain management. Pt declined stating \"If I can't have Percocet I don't want anything!\"  Lines: PIV's  Plan: Med/surg overflow  Kendra Diaz RN on 10/5/2022 at 6:52 PM      "

## 2022-10-05 NOTE — PROGRESS NOTES
Orientation: Alert to slef  VS:/87   Pulse 88   Temp 98.8  F (37.1  C) (Temporal)   Resp 12   Wt 87.6 kg (193 lb 2 oz)   SpO2 96%   BMI 26.94 kg/m    Tele: SR/ST  LS: Dim- oral secretions gurgling in back of throat but can clear airway. Speech saw- keep NPO ex Ice   GI: given Milk of Mag and Senna without significant results- smearing and gas  : Lino in place with ok UOP  Skin: bruised, scabs  Activity: lift to chair/commod  Pain: c/o back pain- will not elaborate. Moans with repositions  Lines: PIV's  Plan: Made Overflow  Kendra Diaz RN on 10/4/2022 at 7:48 PM

## 2022-10-05 NOTE — PLAN OF CARE
Shift Events: No major shift events    Neuro: Alert to self only. Pts speech is rambling and incoherent at times  CV: SR/ST  Pulm: 1L NC sating well. LS clear.  GI: No BM tube feed running at 50mL/hr  : polanco with good urine output  Lines/gtts: 3 PIV SL WDL  Skin: no major skin concerns    Plan: Continue to monitor

## 2022-10-05 NOTE — PROGRESS NOTES
[facility]    Medicine Progress Note - Hospitalist Service    Date of Admission:  9/23/2022    Assessment & Plan          57-year-old male with history of alcohol use disorder presented on 9/23 with alcohol withdrawal.  Had a rapid response called on 9/27 for decreased responsiveness and hypotension and was transferred to ICU.  He needed Precedex drip (stopped on 10/1) but did not need intubation.  Since then he has been wearing confused and weak and is not able to swallow, needing tube feeding.    Plan    1. Alcohol use disorder    Finished with withdrawal symptoms but continues to be encephalopathic.    We will try high-dose thiamine protocol for possible Wernicke's.    Continue tube feeding till he is able to swallow safely.    2. Severe constipation    Passed large bowel movement yesterday after enema.    3. Bipolar disorder/major depressive disorder.    Prior to admission on tizanidine and trazodone, which have been held due to encephalopathy.    Was on bupropion at home which was also held as it can lower seizure threshold.  Will restart once he wakes up.    Continue Depakote.    4. Polysubstance abuse.    Prior to admission on buprenorphine-naloxone film twice daily, continued.    5. Essential hypertension.    Prior to admission on hydralazine, currently on hold as he has low normal blood pressure.    6. Suspected aspiration pneumonitis.    Had fever of 100.7 on morning of 9/7 and a chest x-ray showed bibasilar opacities and subsequently developed coarse breath sounds and hypoxia.    Was empirically started on Zosyn which has been discontinued.  Blood cultures on 9/30 grew Staph epidermidis and Staph hominis, likely contaminant.    7. Left wrist pain.    X-ray on 9/29 did not show any acute fractures.  Unable to obtain further history.    8. Urinary retention.    Failed Lino removal and Lino had to be replaced.  Started on Cardura.  Attempt voiding trial once more awake.       Diet:  Adult Formula Drip Feeding: Continuous Jevity 1.5; Nasogastric tube; Goal Rate: 55; mL/hr; Medication - Feeding Tube Flush Frequency: At least 15-30 mL water before and after medication administration and with tube clogging; Amount to Send (Nutrit...  NPO for Medical/Clinical Reasons Except for: Meds, Ice Chips, NPO but receiving PN    DVT Prophylaxis: Enoxaparin (Lovenox) SQ  Lino Catheter: PRESENT, indication: Retention, Retention  Central Lines: None  Cardiac Monitoring: ACTIVE order. Indication: alcohol withdrawal  Code Status: Full Code      Disposition Plan     Expected Discharge Date: 10/06/2022                The patient's care was discussed with the Patient and RN.    Scott Parker MD  Hospitalist Service  [facility]  Securely message with the Vocera Web Console (learn more here)  Text page via [employer] Paging/Directory         Clinically Significant Risk Factors Present on Admission                      ______________________________________________________________________    Interval History   Patient was confused, able to follow simple commands but oriented x0.    Data reviewed today: I reviewed all medications, new labs and imaging results over the last 24 hours.     Physical Exam   Vital Signs: Temp: 98.8  F (37.1  C) Temp src: Axillary BP: 102/65 Pulse: 93   Resp: (!) 7 SpO2: (!) 89 % O2 Device: Nasal cannula Oxygen Delivery: 2 LPM  Weight: 189 lbs 6 oz  General Appearance: Confused chronically ill looking male who appears older than stated age.  Eyes: No icterus  HEENT: Moist mucosa.  Has a nasal feeding tube.  Respiratory: Clear to auscultation  Cardiovascular: S1 and S2 is normal  GI: Soft and nontender  Lymph/Hematologic: Not examined  Genitourinary: Not examined  Skin: No rash  Musculoskeletal: No edema  Neurologic: Nonfocal  Psychiatric: Normal mood      Data   Recent Labs   Lab 10/05/22  0807 10/05/22  0617 10/05/22  0400 10/04/22  0756 10/04/22  0544 10/03/22  0894  10/03/22  0556 10/01/22  0738 10/01/22  0600 09/30/22  1610 09/30/22  1514   WBC  --   --   --   --   --   --   --   --   --   --  6.5   HGB  --   --   --   --   --   --   --   --   --   --  13.4   MCV  --   --   --   --   --   --   --   --   --   --  96   PLT  --  354  --   --   --   --   --   --  177  --  165   NA  --  142  --   --  141  --  143   < > 141  --   --    POTASSIUM  --  4.2  --   --  4.1  --  3.6   < > 4.2  --   --    CHLORIDE  --  108*  --   --  105  --  104   < > 107  --   --    CO2  --  26  --   --  28  --  30*   < > 25  --   --    BUN  --  9.9  --   --  7.9  --  17.3   < > 22.0*  --   --    CR  --  0.66*  --   --  0.63*  --  0.83   < > 1.01  --   --    ANIONGAP  --  8  --   --  8  --  9   < > 9  --   --    JOHANA  --  8.8  --   --  8.8  --  8.6   < > 8.9  --   --    * 117* 115*   < > 121*   < > 114*   < > 132*   < >  --    LIPASE  --   --   --   --   --   --  18  --   --   --   --     < > = values in this interval not displayed.     No results found for this or any previous visit (from the past 24 hour(s)).  Medications     dextrose       dextrose 5% and 0.45% NaCl + KCl 20 mEq/L Stopped (10/05/22 1923)       aspirin  325 mg Oral or Feeding Tube QPM     buprenorphine HCl-naloxone HCl  1 Film Sublingual BID     [Held by provider] buPROPion  150 mg Oral QAM     [Held by provider] buPROPion  300 mg Oral QAM     doxazosin  1 mg Oral or Feeding Tube Q12H [facility] (08/20)     enoxaparin ANTICOAGULANT  40 mg Subcutaneous Q24H     folic acid  1 mg Oral or Feeding Tube Daily     magnesium oxide  400 mg Oral Q4H     multivitamins w/minerals  15 mL Per Feeding Tube Daily     OLANZapine  10 mg Oral or Feeding Tube QPM     pantoprazole  40 mg Intravenous Daily with breakfast     senna-docusate  2 tablet Oral BID    Or     senna-docusate  1 tablet Oral BID     sodium chloride (PF)  3 mL Intracatheter Q8H     thiamine  500 mg Intravenous TID    Followed by     [START ON 10/7/2022] thiamine  250 mg Intravenous  Daily    Followed by     [START ON 10/12/2022] thiamine  100 mg Oral or Feeding Tube Daily     traZODone  100 mg Oral or Feeding Tube QPM     valproic acid  500 mg Oral Q12H

## 2022-10-06 ENCOUNTER — APPOINTMENT (OUTPATIENT)
Dept: OCCUPATIONAL THERAPY | Facility: CLINIC | Age: 57
DRG: 896 | End: 2022-10-06
Payer: MEDICARE

## 2022-10-06 ENCOUNTER — APPOINTMENT (OUTPATIENT)
Dept: PHYSICAL THERAPY | Facility: CLINIC | Age: 57
DRG: 896 | End: 2022-10-06
Payer: MEDICARE

## 2022-10-06 ENCOUNTER — APPOINTMENT (OUTPATIENT)
Dept: SPEECH THERAPY | Facility: CLINIC | Age: 57
DRG: 896 | End: 2022-10-06
Payer: MEDICARE

## 2022-10-06 LAB
ANION GAP SERPL CALCULATED.3IONS-SCNC: 8 MMOL/L (ref 7–15)
ATRIAL RATE - MUSE: 44 BPM
BACTERIA BLD CULT: ABNORMAL
BUN SERPL-MCNC: 12.9 MG/DL (ref 6–20)
CALCIUM SERPL-MCNC: 8.9 MG/DL (ref 8.6–10)
CHLORIDE SERPL-SCNC: 105 MMOL/L (ref 98–107)
CREAT SERPL-MCNC: 0.73 MG/DL (ref 0.67–1.17)
DEPRECATED HCO3 PLAS-SCNC: 29 MMOL/L (ref 22–29)
DIASTOLIC BLOOD PRESSURE - MUSE: NORMAL MMHG
GFR SERPL CREATININE-BSD FRML MDRD: >90 ML/MIN/1.73M2
GLUCOSE BLDC GLUCOMTR-MCNC: 110 MG/DL (ref 70–99)
GLUCOSE BLDC GLUCOMTR-MCNC: 119 MG/DL (ref 70–99)
GLUCOSE BLDC GLUCOMTR-MCNC: 136 MG/DL (ref 70–99)
GLUCOSE BLDC GLUCOMTR-MCNC: 141 MG/DL (ref 70–99)
GLUCOSE BLDC GLUCOMTR-MCNC: 91 MG/DL (ref 70–99)
GLUCOSE BLDC GLUCOMTR-MCNC: 98 MG/DL (ref 70–99)
GLUCOSE SERPL-MCNC: 99 MG/DL (ref 70–99)
INTERPRETATION ECG - MUSE: NORMAL
MAGNESIUM SERPL-MCNC: 1.8 MG/DL (ref 1.7–2.3)
P AXIS - MUSE: 44 DEGREES
POTASSIUM SERPL-SCNC: 4.2 MMOL/L (ref 3.4–5.3)
PR INTERVAL - MUSE: 170 MS
QRS DURATION - MUSE: 94 MS
QT - MUSE: 478 MS
QTC - MUSE: 408 MS
R AXIS - MUSE: 9 DEGREES
SODIUM SERPL-SCNC: 142 MMOL/L (ref 136–145)
SYSTOLIC BLOOD PRESSURE - MUSE: NORMAL MMHG
T AXIS - MUSE: 59 DEGREES
VENTRICULAR RATE- MUSE: 44 BPM

## 2022-10-06 PROCEDURE — 258N000003 HC RX IP 258 OP 636: Performed by: STUDENT IN AN ORGANIZED HEALTH CARE EDUCATION/TRAINING PROGRAM

## 2022-10-06 PROCEDURE — 80048 BASIC METABOLIC PNL TOTAL CA: CPT | Performed by: INTERNAL MEDICINE

## 2022-10-06 PROCEDURE — 250N000013 HC RX MED GY IP 250 OP 250 PS 637: Performed by: STUDENT IN AN ORGANIZED HEALTH CARE EDUCATION/TRAINING PROGRAM

## 2022-10-06 PROCEDURE — 36415 COLL VENOUS BLD VENIPUNCTURE: CPT | Performed by: INTERNAL MEDICINE

## 2022-10-06 PROCEDURE — 99233 SBSQ HOSP IP/OBS HIGH 50: CPT | Performed by: STUDENT IN AN ORGANIZED HEALTH CARE EDUCATION/TRAINING PROGRAM

## 2022-10-06 PROCEDURE — 83735 ASSAY OF MAGNESIUM: CPT | Performed by: STUDENT IN AN ORGANIZED HEALTH CARE EDUCATION/TRAINING PROGRAM

## 2022-10-06 PROCEDURE — 250N000013 HC RX MED GY IP 250 OP 250 PS 637: Performed by: INTERNAL MEDICINE

## 2022-10-06 PROCEDURE — 92526 ORAL FUNCTION THERAPY: CPT | Mod: GN

## 2022-10-06 PROCEDURE — 97530 THERAPEUTIC ACTIVITIES: CPT | Mod: GP | Performed by: PHYSICAL THERAPIST

## 2022-10-06 PROCEDURE — 97535 SELF CARE MNGMENT TRAINING: CPT | Mod: GO | Performed by: OCCUPATIONAL THERAPIST

## 2022-10-06 PROCEDURE — 250N000011 HC RX IP 250 OP 636: Performed by: INTERNAL MEDICINE

## 2022-10-06 PROCEDURE — 97530 THERAPEUTIC ACTIVITIES: CPT | Mod: GO | Performed by: OCCUPATIONAL THERAPIST

## 2022-10-06 PROCEDURE — 200N000001 HC R&B ICU

## 2022-10-06 PROCEDURE — C9113 INJ PANTOPRAZOLE SODIUM, VIA: HCPCS | Performed by: INTERNAL MEDICINE

## 2022-10-06 PROCEDURE — 272N000078 HC NUTRITION PRODUCT INTERMEDIATE LITER

## 2022-10-06 PROCEDURE — 97116 GAIT TRAINING THERAPY: CPT | Mod: GP | Performed by: PHYSICAL THERAPIST

## 2022-10-06 PROCEDURE — 250N000011 HC RX IP 250 OP 636: Performed by: STUDENT IN AN ORGANIZED HEALTH CARE EDUCATION/TRAINING PROGRAM

## 2022-10-06 RX ORDER — OLANZAPINE 2.5 MG/1
5 TABLET, FILM COATED ORAL EVERY EVENING
Status: DISCONTINUED | OUTPATIENT
Start: 2022-10-06 | End: 2022-10-08 | Stop reason: HOSPADM

## 2022-10-06 RX ORDER — PANTOPRAZOLE SODIUM 40 MG/1
40 TABLET, DELAYED RELEASE ORAL
Status: DISCONTINUED | OUTPATIENT
Start: 2022-10-07 | End: 2022-10-08 | Stop reason: HOSPADM

## 2022-10-06 RX ORDER — MULTIPLE VITAMINS W/ MINERALS TAB 9MG-400MCG
1 TAB ORAL DAILY
Status: DISCONTINUED | OUTPATIENT
Start: 2022-10-07 | End: 2022-10-08 | Stop reason: HOSPADM

## 2022-10-06 RX ORDER — TAMSULOSIN HYDROCHLORIDE 0.4 MG/1
0.4 CAPSULE ORAL DAILY
Status: DISCONTINUED | OUTPATIENT
Start: 2022-10-06 | End: 2022-10-08 | Stop reason: HOSPADM

## 2022-10-06 RX ORDER — TRAZODONE HYDROCHLORIDE 50 MG/1
50 TABLET, FILM COATED ORAL EVERY EVENING
Status: DISCONTINUED | OUTPATIENT
Start: 2022-10-06 | End: 2022-10-08 | Stop reason: HOSPADM

## 2022-10-06 RX ORDER — OLANZAPINE 5 MG/1
5 TABLET, ORALLY DISINTEGRATING ORAL EVERY 6 HOURS PRN
Status: DISCONTINUED | OUTPATIENT
Start: 2022-10-06 | End: 2022-10-08 | Stop reason: HOSPADM

## 2022-10-06 RX ORDER — ACETAMINOPHEN 325 MG/1
650 TABLET ORAL EVERY 6 HOURS PRN
Status: DISCONTINUED | OUTPATIENT
Start: 2022-10-06 | End: 2022-10-08 | Stop reason: HOSPADM

## 2022-10-06 RX ORDER — MAGNESIUM SULFATE HEPTAHYDRATE 40 MG/ML
2 INJECTION, SOLUTION INTRAVENOUS ONCE
Status: COMPLETED | OUTPATIENT
Start: 2022-10-06 | End: 2022-10-06

## 2022-10-06 RX ADMIN — Medication 15 ML: at 08:21

## 2022-10-06 RX ADMIN — BUPRENORPHINE HYDROCHLORIDE, NALOXONE HYDROCHLORIDE 1 FILM: 8; 2 FILM, SOLUBLE BUCCAL; SUBLINGUAL at 17:02

## 2022-10-06 RX ADMIN — PANTOPRAZOLE SODIUM 40 MG: 40 INJECTION, POWDER, FOR SOLUTION INTRAVENOUS at 08:21

## 2022-10-06 RX ADMIN — OLANZAPINE 5 MG: 5 TABLET, ORALLY DISINTEGRATING ORAL at 14:34

## 2022-10-06 RX ADMIN — THIAMINE HYDROCHLORIDE 500 MG: 100 INJECTION, SOLUTION INTRAMUSCULAR; INTRAVENOUS at 17:05

## 2022-10-06 RX ADMIN — ACETAMINOPHEN 650 MG: 325 TABLET, FILM COATED ORAL at 20:39

## 2022-10-06 RX ADMIN — ACETAMINOPHEN 650 MG: 325 SOLUTION ORAL at 14:33

## 2022-10-06 RX ADMIN — MAGNESIUM SULFATE HEPTAHYDRATE 2 G: 40 INJECTION, SOLUTION INTRAVENOUS at 07:00

## 2022-10-06 RX ADMIN — BUPRENORPHINE HYDROCHLORIDE, NALOXONE HYDROCHLORIDE 1 FILM: 8; 2 FILM, SOLUBLE BUCCAL; SUBLINGUAL at 04:58

## 2022-10-06 RX ADMIN — THIAMINE HYDROCHLORIDE 500 MG: 100 INJECTION, SOLUTION INTRAMUSCULAR; INTRAVENOUS at 22:08

## 2022-10-06 RX ADMIN — ENOXAPARIN SODIUM 40 MG: 40 INJECTION SUBCUTANEOUS at 22:08

## 2022-10-06 RX ADMIN — VALPROIC ACID 500 MG: 250 SOLUTION ORAL at 20:37

## 2022-10-06 RX ADMIN — SENNOSIDES AND DOCUSATE SODIUM 1 TABLET: 50; 8.6 TABLET ORAL at 20:39

## 2022-10-06 RX ADMIN — TRAZODONE HYDROCHLORIDE 50 MG: 50 TABLET ORAL at 20:38

## 2022-10-06 RX ADMIN — VALPROIC ACID 500 MG: 250 SOLUTION ORAL at 08:22

## 2022-10-06 RX ADMIN — THIAMINE HYDROCHLORIDE 500 MG: 100 INJECTION, SOLUTION INTRAMUSCULAR; INTRAVENOUS at 08:21

## 2022-10-06 RX ADMIN — ASPIRIN 81 MG CHEWABLE TABLET 324 MG: 81 TABLET CHEWABLE at 20:35

## 2022-10-06 RX ADMIN — FOLIC ACID 1 MG: 1 TABLET ORAL at 08:22

## 2022-10-06 RX ADMIN — OLANZAPINE 5 MG: 2.5 TABLET, FILM COATED ORAL at 20:38

## 2022-10-06 RX ADMIN — ACETAMINOPHEN 650 MG: 325 SOLUTION ORAL at 06:33

## 2022-10-06 RX ADMIN — TAMSULOSIN HYDROCHLORIDE 0.4 MG: 0.4 CAPSULE ORAL at 17:02

## 2022-10-06 ASSESSMENT — ACTIVITIES OF DAILY LIVING (ADL)
ADLS_ACUITY_SCORE: 49
ADLS_ACUITY_SCORE: 44
ADLS_ACUITY_SCORE: 53
ADLS_ACUITY_SCORE: 46
ADLS_ACUITY_SCORE: 40
ADLS_ACUITY_SCORE: 46
ADLS_ACUITY_SCORE: 50
ADLS_ACUITY_SCORE: 49
ADLS_ACUITY_SCORE: 49
ADLS_ACUITY_SCORE: 46
ADLS_ACUITY_SCORE: 38
ADLS_ACUITY_SCORE: 49

## 2022-10-06 NOTE — PLAN OF CARE
"Goal Outcome Evaluation:    Plan of Care Reviewed With: patient     Overall Patient Progress: improving    Outcome Evaluation: visited with pt this afternoon, his diet was advanced to regular this morning and he tolerated lunch well. pt reported his appetite is \"ok.\" he would like to try ensure enlive shake with meals BID    Keyonna Ricketts, RD, LD    "

## 2022-10-06 NOTE — PROGRESS NOTES
[facility]    Medicine Progress Note - Hospitalist Service    Date of Admission:  9/23/2022    Assessment & Plan          57-year-old male with history of alcohol use disorder presented on 9/23 with alcohol withdrawal.  Had a rapid response called on 9/27 for decreased responsiveness and hypotension and was transferred to ICU.  He needed Precedex drip (stopped on 10/1) but did not need intubation.  Since then he has been wearing confused and weak and is not able to swallow, needing tube feeding.    Plan    1. Alcohol use disorder    Finished with withdrawal symptoms but continues to be encephalopathic.    Started on high-dose thiamine protocol on 12/5 for possible Wernicke's.    Continue tube feeding till he is able to swallow safely.  Unable to pass swallowing evaluation due to lethargy.    Patient is on trazodone 100 mg and olanzapine 10 mg at night, will decrease trazodone to 50 mg at night and olanzapine to 5 mg.  We will make available as needed olanzapine for agitation    He was on valproate at home, presumably due to alcohol withdrawal seizures and was resumed.    2. Severe constipation    Passed large bowel movement 10/12 after enema.    3. Bipolar disorder/major depressive disorder.    Prior to admission on tizanidine, which have been held due to encephalopathy.    Was on bupropion at home which was also held as it can lower seizure threshold.  Will restart once he wakes up.    Continue Depakote.    4. Polysubstance abuse.    Prior to admission on buprenorphine-naloxone film twice daily, continued.    5. Essential hypertension.    Prior to admission on hydralazine, currently on hold as he has low normal blood pressure.    6. Suspected aspiration pneumonitis.    Had fever of 100.7 on morning of 9/7 and a chest x-ray showed bibasilar opacities and subsequently developed coarse breath sounds and hypoxia.    Was empirically started on Zosyn which has been discontinued.  Blood cultures on  9/30 grew Staph epidermidis and Staph hominis, likely contaminant.    7. Left wrist pain.    X-ray on 9/29 did not show any acute fractures.  Unable to obtain further history.    8. Urinary retention.    Failed Lino removal and Lino had to be replaced and was started on Cardura.  Plan was to attempt voiding trial once more awake, but overnight on 10/5 he started crying from the pain in the penis and Lino's was removed and external catheter was placed and patient has been able to urinate       Diet: Adult Formula Drip Feeding: Continuous Jevity 1.5; Nasogastric tube; Goal Rate: 55; mL/hr; Medication - Feeding Tube Flush Frequency: At least 15-30 mL water before and after medication administration and with tube clogging; Amount to Send (Nutrit...  NPO for Medical/Clinical Reasons Except for: Meds, Ice Chips, NPO but receiving PN    DVT Prophylaxis: Enoxaparin (Lovenox) SQ  Lino Catheter: Not present  Central Lines: None  Cardiac Monitoring: ACTIVE order. Indication: alcohol withdrawal  Code Status: Full Code      Disposition Plan     Expected Discharge Date: 10/06/2022                The patient's care was discussed with the Patient and RN.    Scott Parker MD  Hospitalist Service  [facility]  Securely message with the [employer] Web Console (learn more here)  Text page via [employer] Paging/Directory         Clinically Significant Risk Factors Present on Admission                      ______________________________________________________________________    Interval History   More awake and talkative today.  Thought he was in [place] but was able to tell me the year and the name of the president and inquired when he can eat.  Continues to be very weak and needs assistance to get from bed into the chair.    Data reviewed today: I reviewed all medications, new labs and imaging results over the last 24 hours.     Physical Exam   Vital Signs: Temp: 97.9  F (36.6  C) Temp src: Temporal BP: 116/78  Pulse: 64   Resp: 8 SpO2: 94 % O2 Device: None (Room air) Oxygen Delivery: 2 LPM  Weight: 187 lbs 6.26 oz  General Appearance: Still confused but more awake compared to yesterday  Eyes: No icterus  HEENT: Moist mucosa.  Has a nasal feeding tube.  Respiratory: Clear to auscultation  Cardiovascular: S1 and S2 is normal  GI: Soft and nontender  Lymph/Hematologic: Not examined  Genitourinary: Not examined  Skin: No rash  Musculoskeletal: No edema  Neurologic: Nonfocal  Psychiatric: Normal mood      Data   Recent Labs   Lab 10/06/22  0544 10/06/22  0434 10/05/22  2359 10/05/22  0807 10/05/22  0617 10/04/22  0756 10/04/22  0544 10/03/22  0826 10/03/22  0556 10/01/22  0738 10/01/22  0600 09/30/22  1610 09/30/22  1514   WBC  --   --   --   --   --   --   --   --   --   --   --   --  6.5   HGB  --   --   --   --   --   --   --   --   --   --   --   --  13.4   MCV  --   --   --   --   --   --   --   --   --   --   --   --  96   PLT  --   --   --   --  354  --   --   --   --   --  177  --  165     --   --   --  142  --  141  --  143   < > 141  --   --    POTASSIUM 4.2  --   --   --  4.2  --  4.1  --  3.6   < > 4.2  --   --    CHLORIDE 105  --   --   --  108*  --  105  --  104   < > 107  --   --    CO2 29  --   --   --  26  --  28  --  30*   < > 25  --   --    BUN 12.9  --   --   --  9.9  --  7.9  --  17.3   < > 22.0*  --   --    CR 0.73  --   --   --  0.66*  --  0.63*  --  0.83   < > 1.01  --   --    ANIONGAP 8  --   --   --  8  --  8  --  9   < > 9  --   --    JOHANA 8.9  --   --   --  8.8  --  8.8  --  8.6   < > 8.9  --   --    GLC 99 110* 136*   < > 117*   < > 121*   < > 114*   < > 132*   < >  --    LIPASE  --   --   --   --   --   --   --   --  18  --   --   --   --     < > = values in this interval not displayed.     No results found for this or any previous visit (from the past 24 hour(s)).  Medications     dextrose       dextrose 5% and 0.45% NaCl + KCl 20 mEq/L Stopped (10/05/22 3660)       aspirin  325 mg Oral or  Feeding Tube QPM     buprenorphine HCl-naloxone HCl  1 Film Sublingual BID     [Held by provider] buPROPion  150 mg Oral QAM     [Held by provider] buPROPion  300 mg Oral QAM     doxazosin  1 mg Oral or Feeding Tube Q12H [facility] (08/20)     enoxaparin ANTICOAGULANT  40 mg Subcutaneous Q24H     folic acid  1 mg Oral or Feeding Tube Daily     magnesium sulfate  2 g Intravenous Once     multivitamins w/minerals  15 mL Per Feeding Tube Daily     OLANZapine  10 mg Oral or Feeding Tube QPM     pantoprazole  40 mg Intravenous Daily with breakfast     senna-docusate  2 tablet Oral BID    Or     senna-docusate  1 tablet Oral BID     sodium chloride (PF)  3 mL Intracatheter Q8H     thiamine  500 mg Intravenous TID    Followed by     [START ON 10/7/2022] thiamine  250 mg Intravenous Daily    Followed by     [START ON 10/12/2022] thiamine  100 mg Oral or Feeding Tube Daily     traZODone  100 mg Oral or Feeding Tube QPM     valproic acid  500 mg Oral or Feeding Tube BID

## 2022-10-06 NOTE — PROGRESS NOTES
"CLINICAL NUTRITION SERVICES - REASSESSMENT NOTE      Recommendations Ordered by Registered Dietitian (RD):   - ordered ensure enlive chocolate shake with lunch and dinner   Malnutrition:   % Weight Loss:  > 2% in 1 week (severe malnutrition) - 9% loss in >1 week, may in part be fluid-related  % Intake:  Decreased intake does not meet criteria for malnutrition - previously receiving TF  Subcutaneous Fat Loss:  Orbital region mild depletion  Muscle Loss:  Temporal region mild depletion, Clavicle bone region mild depletion and Scapular bone region mild depletion  Fluid Retention:  Trace left hand, wrist edema    Malnutrition Diagnosis: Moderate malnutrition  In Context of:  Acute illness or injury  Chronic illness or disease       EVALUATION OF PROGRESS TOWARD GOALS   Diet: regular     Nutrition Support: Enteral --> discontinued 10/6 per MD  Type of Feeding Tube: NG  Enteral Frequency:  Continuous  Enteral Regimen: Jevity 1.5 Shahbaz @ goal of 55ml/hr (1320ml/day)  Total Enteral Provisions: 1980 kcals, 84 g PRO, 1003 ml free H20, 284 g CHO, and 27 g fiber daily.  Free Water Flush: 120 mL q 4 hours per MD  + certavite    - 10/5: TF running @ goal  - 10/2: TF stopped d/t large emesis, constipation  - 10/2: TF @ goal  - 9/30: NG placed and TF started    Intake/Tolerance:   - per discussion with pt, pt reported his appetite is \"ok.\" he tolerated lunch well. He would like ensure enlive chocolate shake. Encouraged PO intake. Pt did seem somewhat confused during visit and was asking for medications- instructed pt that RD is not able to provide medications and he should ask his RN next time they check on him.   - diet advanced today, no recent intakes documented per nursing flowsheet yet  - per health touch, pt ordered lunch today      ASSESSED NUTRITION NEEDS:  Estimated Energy Needs: 2600-6931 kcals/day (20 - 25 kcals/kg)  Justification: Overweight  Estimated Protein Needs: 73-91 grams protein/day (0.8 - 1 grams of " pro/kg)  Justification: Maintenance  Estimated Fluid Needs: Per provider pending fluid status      NEW FINDINGS:   General: mentation is improving    Weight: wt trending down this admit, -8.8 kg (9.4%): unclear if fluid-related    Labs: -141    Medications: folic acid, multivitamins w minerals liquid, protonix, senna-docusate, thiamine    GI:  - last BM x2 today  - 10/4: SLP eval = Recommend NPO with oral cares and ice chips only    Previous Goals:   Tube feeding to meet % estimated needs  Evaluation: Met    Previous Nutrition Diagnosis:   Inadequate oral intake related to severe AMS, increased respiratory needs as evidenced by need for NPO status and nutrition support to meet needs.    Evaluation: Improving      MALNUTRITION  % Weight Loss:  > 2% in 1 week (severe malnutrition) - 9% loss in >1 week, may in part be fluid-related  % Intake:  Decreased intake does not meet criteria for malnutrition - previously receiving TF  Subcutaneous Fat Loss:  Orbital region mild depletion  Muscle Loss:  Temporal region mild depletion, Clavicle bone region mild depletion and Scapular bone region mild depletion  Fluid Retention:  Trace left hand, wrist edema    Malnutrition Diagnosis: Moderate malnutrition  In Context of:  Acute illness or injury  Chronic illness or disease    CURRENT NUTRITION DIAGNOSIS  Inadequate oral intake related to decreased appetite, previous need for EN to meet needs as evidenced by 9% wt loss in past week, TF discontinued today and diet advanced, need for ONS    INTERVENTIONS  Recommendations / Nutrition Prescription  - ordered ensure enlive chocolate shake with lunch and dinner      Implementation  Medical Food Supplement  Collaboration and Referral of Nutrition care - rounds    Goals  Patient to consume % of nutritionally adequate meals TID with supplements    MONITORING AND EVALUATION:  Progress towards goals will be monitored and evaluated per protocol and Practice  Guidelines      Keyonna Ricketts RD, LD

## 2022-10-06 NOTE — PLAN OF CARE
ICU End of Shift Summary.  For vital signs and complete assessments, please see documentation flowsheets.      Pertinent assessments: Pt alert to self. Confused. Slept well. VSS ex soft bps. Afebrile. Reports pain but declined tylenol. Tele SR/SB. LS clear, on 2L NC while sleeping. Keofeed in place with TF running at goal with FWF. BS+. PM senna held d/t frequent loose stools on previous shift. 2 loose BMs this shift.Lino removed previous shift, straight cathed x1. Around 6am when pt was DTV pt was able to void while on the bedside commode. Up to chair this AM.   Major Shift Events:    -Pt voided   - Mag in process of being replaced    Plan (Upcoming Events): tbd  Discharge/Transfer Needs: tbd     Bedside Shift Report Completed: yes  Bedside Safety Check Completed: yes

## 2022-10-06 NOTE — PLAN OF CARE
Shift Events: Diet advanced to regular and TF discontinued/removed. Ambulated hallway with therapy.     Neuro: Remains forgetful and mood is labile at times. Required Zyprexa PRN x1 for agitation   CV: SB 50-60s. Blood pressure soft. Cardura held this a.m.  Pulm: LS clear, on RA  GI: Continent of one small loose stool. Tolerated regular diet intake at lunch.   : Voided x2 (urinal/toilet)  Lines/gtts: PIV x2    Plan: Continue to encourage activity and muscle strengthening.    Addendum:  End of shift pt had large episode of urine incontinence, restless and required redirection. Pt forgetful and disoriented to where he was. Pt now resting and calm.

## 2022-10-07 ENCOUNTER — APPOINTMENT (OUTPATIENT)
Dept: OCCUPATIONAL THERAPY | Facility: CLINIC | Age: 57
DRG: 896 | End: 2022-10-07
Attending: STUDENT IN AN ORGANIZED HEALTH CARE EDUCATION/TRAINING PROGRAM
Payer: MEDICARE

## 2022-10-07 ENCOUNTER — APPOINTMENT (OUTPATIENT)
Dept: PHYSICAL THERAPY | Facility: CLINIC | Age: 57
DRG: 896 | End: 2022-10-07
Payer: MEDICARE

## 2022-10-07 ENCOUNTER — APPOINTMENT (OUTPATIENT)
Dept: SPEECH THERAPY | Facility: CLINIC | Age: 57
DRG: 896 | End: 2022-10-07
Payer: MEDICARE

## 2022-10-07 LAB
ANION GAP SERPL CALCULATED.3IONS-SCNC: 9 MMOL/L (ref 7–15)
BASOPHILS # BLD MANUAL: 0.1 10E3/UL (ref 0–0.2)
BASOPHILS NFR BLD MANUAL: 2 %
BUN SERPL-MCNC: 14 MG/DL (ref 6–20)
CALCIUM SERPL-MCNC: 9.3 MG/DL (ref 8.6–10)
CHLORIDE SERPL-SCNC: 105 MMOL/L (ref 98–107)
CREAT SERPL-MCNC: 0.7 MG/DL (ref 0.67–1.17)
DEPRECATED HCO3 PLAS-SCNC: 28 MMOL/L (ref 22–29)
EOSINOPHIL # BLD MANUAL: 0.6 10E3/UL (ref 0–0.7)
EOSINOPHIL NFR BLD MANUAL: 10 %
ERYTHROCYTE [DISTWIDTH] IN BLOOD BY AUTOMATED COUNT: 12.4 % (ref 10–15)
GFR SERPL CREATININE-BSD FRML MDRD: >90 ML/MIN/1.73M2
GLUCOSE SERPL-MCNC: 115 MG/DL (ref 70–99)
HCT VFR BLD AUTO: 44.3 % (ref 40–53)
HGB BLD-MCNC: 14.4 G/DL (ref 13.3–17.7)
LYMPHOCYTES # BLD MANUAL: 1.4 10E3/UL (ref 0.8–5.3)
LYMPHOCYTES NFR BLD MANUAL: 25 %
MAGNESIUM SERPL-MCNC: 1.8 MG/DL (ref 1.7–2.3)
MCH RBC QN AUTO: 31.4 PG (ref 26.5–33)
MCHC RBC AUTO-ENTMCNC: 32.5 G/DL (ref 31.5–36.5)
MCV RBC AUTO: 97 FL (ref 78–100)
MONOCYTES # BLD MANUAL: 0.7 10E3/UL (ref 0–1.3)
MONOCYTES NFR BLD MANUAL: 13 %
NEUTROPHILS # BLD MANUAL: 2.8 10E3/UL (ref 1.6–8.3)
NEUTROPHILS NFR BLD MANUAL: 50 %
PLAT MORPH BLD: NORMAL
PLATELET # BLD AUTO: 404 10E3/UL (ref 150–450)
POTASSIUM SERPL-SCNC: 4.8 MMOL/L (ref 3.4–5.3)
RBC # BLD AUTO: 4.58 10E6/UL (ref 4.4–5.9)
RBC MORPH BLD: NORMAL
SODIUM SERPL-SCNC: 142 MMOL/L (ref 136–145)
WBC # BLD AUTO: 5.5 10E3/UL (ref 4–11)

## 2022-10-07 PROCEDURE — 200N000001 HC R&B ICU

## 2022-10-07 PROCEDURE — 250N000011 HC RX IP 250 OP 636: Performed by: STUDENT IN AN ORGANIZED HEALTH CARE EDUCATION/TRAINING PROGRAM

## 2022-10-07 PROCEDURE — 97530 THERAPEUTIC ACTIVITIES: CPT | Mod: GP | Performed by: PHYSICAL THERAPIST

## 2022-10-07 PROCEDURE — 97535 SELF CARE MNGMENT TRAINING: CPT | Mod: GO

## 2022-10-07 PROCEDURE — 82310 ASSAY OF CALCIUM: CPT | Performed by: INTERNAL MEDICINE

## 2022-10-07 PROCEDURE — 250N000013 HC RX MED GY IP 250 OP 250 PS 637: Performed by: INTERNAL MEDICINE

## 2022-10-07 PROCEDURE — 99233 SBSQ HOSP IP/OBS HIGH 50: CPT | Performed by: STUDENT IN AN ORGANIZED HEALTH CARE EDUCATION/TRAINING PROGRAM

## 2022-10-07 PROCEDURE — 36415 COLL VENOUS BLD VENIPUNCTURE: CPT | Performed by: INTERNAL MEDICINE

## 2022-10-07 PROCEDURE — 85027 COMPLETE CBC AUTOMATED: CPT | Performed by: STUDENT IN AN ORGANIZED HEALTH CARE EDUCATION/TRAINING PROGRAM

## 2022-10-07 PROCEDURE — 258N000003 HC RX IP 258 OP 636: Performed by: STUDENT IN AN ORGANIZED HEALTH CARE EDUCATION/TRAINING PROGRAM

## 2022-10-07 PROCEDURE — 36415 COLL VENOUS BLD VENIPUNCTURE: CPT | Performed by: STUDENT IN AN ORGANIZED HEALTH CARE EDUCATION/TRAINING PROGRAM

## 2022-10-07 PROCEDURE — 83735 ASSAY OF MAGNESIUM: CPT | Performed by: STUDENT IN AN ORGANIZED HEALTH CARE EDUCATION/TRAINING PROGRAM

## 2022-10-07 PROCEDURE — 85007 BL SMEAR W/DIFF WBC COUNT: CPT | Performed by: STUDENT IN AN ORGANIZED HEALTH CARE EDUCATION/TRAINING PROGRAM

## 2022-10-07 PROCEDURE — 82374 ASSAY BLOOD CARBON DIOXIDE: CPT | Performed by: INTERNAL MEDICINE

## 2022-10-07 PROCEDURE — 92526 ORAL FUNCTION THERAPY: CPT | Mod: GN

## 2022-10-07 PROCEDURE — 250N000011 HC RX IP 250 OP 636: Performed by: INTERNAL MEDICINE

## 2022-10-07 PROCEDURE — 250N000013 HC RX MED GY IP 250 OP 250 PS 637: Performed by: STUDENT IN AN ORGANIZED HEALTH CARE EDUCATION/TRAINING PROGRAM

## 2022-10-07 PROCEDURE — 97116 GAIT TRAINING THERAPY: CPT | Mod: GP | Performed by: PHYSICAL THERAPIST

## 2022-10-07 PROCEDURE — 999N000216 HC STATISTIC ADULT CD FACE TO FACE-NO CHRG

## 2022-10-07 PROCEDURE — 87040 BLOOD CULTURE FOR BACTERIA: CPT | Performed by: STUDENT IN AN ORGANIZED HEALTH CARE EDUCATION/TRAINING PROGRAM

## 2022-10-07 RX ORDER — BUPROPION HYDROCHLORIDE 150 MG/1
300 TABLET ORAL EVERY MORNING
Status: DISCONTINUED | OUTPATIENT
Start: 2022-10-07 | End: 2022-10-08 | Stop reason: HOSPADM

## 2022-10-07 RX ORDER — MAGNESIUM SULFATE HEPTAHYDRATE 40 MG/ML
2 INJECTION, SOLUTION INTRAVENOUS ONCE
Status: COMPLETED | OUTPATIENT
Start: 2022-10-07 | End: 2022-10-07

## 2022-10-07 RX ORDER — DIVALPROEX SODIUM 500 MG/1
1000 TABLET, EXTENDED RELEASE ORAL AT BEDTIME
Status: DISCONTINUED | OUTPATIENT
Start: 2022-10-07 | End: 2022-10-08 | Stop reason: HOSPADM

## 2022-10-07 RX ADMIN — PANTOPRAZOLE SODIUM 40 MG: 40 TABLET, DELAYED RELEASE ORAL at 08:22

## 2022-10-07 RX ADMIN — TRAZODONE HYDROCHLORIDE 50 MG: 50 TABLET ORAL at 22:21

## 2022-10-07 RX ADMIN — THIAMINE HYDROCHLORIDE 250 MG: 100 INJECTION, SOLUTION INTRAMUSCULAR; INTRAVENOUS at 09:31

## 2022-10-07 RX ADMIN — BUPRENORPHINE HYDROCHLORIDE, NALOXONE HYDROCHLORIDE 1 FILM: 8; 2 FILM, SOLUBLE BUCCAL; SUBLINGUAL at 17:27

## 2022-10-07 RX ADMIN — ACETAMINOPHEN 650 MG: 325 TABLET, FILM COATED ORAL at 08:21

## 2022-10-07 RX ADMIN — ASPIRIN 81 MG CHEWABLE TABLET 325 MG: 81 TABLET CHEWABLE at 22:20

## 2022-10-07 RX ADMIN — DIVALPROEX SODIUM 1000 MG: 500 TABLET, FILM COATED, EXTENDED RELEASE ORAL at 22:20

## 2022-10-07 RX ADMIN — MULTIPLE VITAMINS W/ MINERALS TAB 1 TABLET: TAB at 08:20

## 2022-10-07 RX ADMIN — ACETAMINOPHEN 650 MG: 325 TABLET, FILM COATED ORAL at 23:14

## 2022-10-07 RX ADMIN — VALPROIC ACID 500 MG: 250 SOLUTION ORAL at 08:25

## 2022-10-07 RX ADMIN — OLANZAPINE 5 MG: 5 TABLET, ORALLY DISINTEGRATING ORAL at 03:34

## 2022-10-07 RX ADMIN — ENOXAPARIN SODIUM 40 MG: 40 INJECTION SUBCUTANEOUS at 22:20

## 2022-10-07 RX ADMIN — ONDANSETRON 4 MG: 4 TABLET, ORALLY DISINTEGRATING ORAL at 05:46

## 2022-10-07 RX ADMIN — MAGNESIUM SULFATE HEPTAHYDRATE 2 G: 40 INJECTION, SOLUTION INTRAVENOUS at 08:19

## 2022-10-07 RX ADMIN — TAMSULOSIN HYDROCHLORIDE 0.4 MG: 0.4 CAPSULE ORAL at 08:21

## 2022-10-07 RX ADMIN — FOLIC ACID 1 MG: 1 TABLET ORAL at 08:21

## 2022-10-07 RX ADMIN — OLANZAPINE 5 MG: 2.5 TABLET, FILM COATED ORAL at 22:21

## 2022-10-07 RX ADMIN — BUPRENORPHINE HYDROCHLORIDE, NALOXONE HYDROCHLORIDE 1 FILM: 8; 2 FILM, SOLUBLE BUCCAL; SUBLINGUAL at 05:52

## 2022-10-07 RX ADMIN — BUPROPION HYDROCHLORIDE 300 MG: 150 TABLET, FILM COATED, EXTENDED RELEASE ORAL at 13:26

## 2022-10-07 ASSESSMENT — ACTIVITIES OF DAILY LIVING (ADL)
ADLS_ACUITY_SCORE: 38
ADLS_ACUITY_SCORE: 45
ADLS_ACUITY_SCORE: 49
ADLS_ACUITY_SCORE: 49
ADLS_ACUITY_SCORE: 45
ADLS_ACUITY_SCORE: 49
ADLS_ACUITY_SCORE: 44
ADLS_ACUITY_SCORE: 38
ADLS_ACUITY_SCORE: 40

## 2022-10-07 NOTE — PROGRESS NOTES
Cross cover.  Lab called with report of gram-positive cocci in cluster 1/2 cultures.  Recently had cultures of staph epi intermedius and staph hominis.   Patient is afebrile, seems to be clinically stable.  I will not order antibiotic tonight, I will defer the decision to the rounder in the morning.

## 2022-10-07 NOTE — PROGRESS NOTES
[facility]    Medicine Progress Note - Hospitalist Service    Date of Admission:  9/23/2022    Assessment & Plan          57-year-old male with history of alcohol use disorder presented on 9/23 with alcohol withdrawal.  Had a rapid response called on 9/27 for decreased responsiveness and hypotension and was transferred to ICU.  He needed Precedex drip (stopped on 10/1) but did not need intubation.  Since then he has been wearing confused and weak and is not able to swallow, needing tube feeding.    Plan    1. Alcohol use disorder    Finished with withdrawal symptoms but continued to be encephalopathic for several days.  He was started on high-dose thiamine protocol on 12/5 for possible Wernicke's and his mental status has improved.    Needed tube feeding initially as he was not able to swallow safely, now cleared to eat regular diet.    Patient was on trazodone 100 mg and olanzapine 10 mg at night, will decrease trazodone to 50 mg at night and olanzapine to 5 mg.  He has as needed olanzapine for agitation    He was on valproate at home, presumably due to alcohol withdrawal seizures and was resumed.    Patient refused to talk to chemical dependency counselor.    2. Severe constipation    Passed large bowel movement 10/12 after enema.    3. Bipolar disorder/major depressive disorder.    Prior to admission on tizanidine, which have been held due to encephalopathy.    Was on bupropion at home which was also held as it can lower seizure threshold.  Will restart Wellbutrin at 300 mg daily (was at 450 mg daily at home).    Continue Depakote.    4. Polysubstance abuse.    Prior to admission on buprenorphine-naloxone film twice daily, continued.    5. Essential hypertension.    Prior to admission on hydralazine, currently on hold as he has low normal blood pressure.    6. Suspected aspiration pneumonitis.    Had fever of 100.7 on morning of 9/7 and a chest x-ray showed bibasilar opacities and  subsequently developed coarse breath sounds and hypoxia.    Was empirically started on Zosyn which has been discontinued.  Blood cultures on 9/30 grew Staph epidermidis and Staph hominis in 2 out of 2 cultures, likely contaminant.  We will repeat blood cultures today but that would not be a detriment to discharge.  Patient can be called back if repeat cultures are also positive.    7. Left wrist pain.    X-ray on 9/29 did not show any acute fractures.  Unable to obtain further history.    8. Urinary retention.    Failed Lino removal and Lino had to be replaced and was started on Cardura.  Plan was to attempt voiding trial once more awake, but overnight on 10/5 he started crying from the pain in the penis and Lino's was removed and external catheter was placed and patient has been able to urinate       Diet: Regular Diet Adult  Snacks/Supplements Adult: Ensure Enlive; With Meals    DVT Prophylaxis: Enoxaparin (Lovenox) SQ  Lino Catheter: Not present  Central Lines: None  Cardiac Monitoring: ACTIVE order. Indication: alcohol withdrawal  Code Status: Full Code      Disposition Plan     Expected Discharge Date: 10/08/2022                We will get PT and if he passes, he can be discharged home.  We will also order cognitive evaluation by OT.     The patient's care was discussed with the Patient and RN.    Scott Parker MD  Hospitalist Service  [facility]  Securely message with the Vocera Web Console (learn more here)  Text page via [employer] Paging/Directory       Clinically Significant Risk Factors Present on Admission                      ______________________________________________________________________    Interval History   More awake and talkative today.  Thought he was in [email or web address] but was able to tell me the year and the name of the president and inquired when he can eat.  Continues to be very weak and needs assistance to get from bed into the chair.    Data reviewed today: I  reviewed all medications, new labs and imaging results over the last 24 hours.     Physical Exam   Vital Signs: Temp: 97  F (36.1  C) Temp src: Temporal BP: 120/83 Pulse: 78   Resp: 12 SpO2: 97 % O2 Device: None (Room air)    Weight: 187 lbs 6.26 oz  General Appearance: Still confused but more awake compared to yesterday  Eyes: No icterus  HEENT: Moist mucosa.  Has a nasal feeding tube.  Respiratory: Clear to auscultation  Cardiovascular: S1 and S2 is normal  GI: Soft and nontender  Lymph/Hematologic: Not examined  Genitourinary: Not examined  Skin: No rash  Musculoskeletal: No edema  Neurologic: Nonfocal  Psychiatric: Normal mood      Data   Recent Labs   Lab 10/07/22  0538 10/06/22  2030 10/06/22  1604 10/06/22  0748 10/06/22  0544 10/05/22  0807 10/05/22  0617 10/03/22  0826 10/03/22  0556 10/01/22  0738 10/01/22  0600 09/30/22  1610 09/30/22  1514   WBC 5.5  --   --   --   --   --   --   --   --   --   --   --  6.5   HGB 14.4  --   --   --   --   --   --   --   --   --   --   --  13.4   MCV 97  --   --   --   --   --   --   --   --   --   --   --  96     --   --   --   --   --  354  --   --   --  177  --  165     --   --   --  142  --  142   < > 143   < > 141  --   --    POTASSIUM 4.8  --   --   --  4.2  --  4.2   < > 3.6   < > 4.2  --   --    CHLORIDE 105  --   --   --  105  --  108*   < > 104   < > 107  --   --    CO2 28  --   --   --  29  --  26   < > 30*   < > 25  --   --    BUN 14.0  --   --   --  12.9  --  9.9   < > 17.3   < > 22.0*  --   --    CR 0.70  --   --   --  0.73  --  0.66*   < > 0.83   < > 1.01  --   --    ANIONGAP 9  --   --   --  8  --  8   < > 9   < > 9  --   --    JOHANA 9.3  --   --   --  8.9  --  8.8   < > 8.6   < > 8.9  --   --    * 98 91   < > 99   < > 117*   < > 114*   < > 132*   < >  --    LIPASE  --   --   --   --   --   --   --   --  18  --   --   --   --     < > = values in this interval not displayed.     No results found for this or any previous visit (from the  past 24 hour(s)).  Medications     dextrose       dextrose 5% and 0.45% NaCl + KCl 20 mEq/L Stopped (10/05/22 0443)       aspirin  325 mg Oral or Feeding Tube QPM     buprenorphine HCl-naloxone HCl  1 Film Sublingual BID     [Held by provider] buPROPion  150 mg Oral QAM     [Held by provider] buPROPion  300 mg Oral QAM     enoxaparin ANTICOAGULANT  40 mg Subcutaneous Q24H     folic acid  1 mg Oral or Feeding Tube Daily     multivitamin w/minerals  1 tablet Oral Daily     OLANZapine  5 mg Oral or Feeding Tube QPM     pantoprazole  40 mg Oral QAM AC     senna-docusate  2 tablet Oral BID    Or     senna-docusate  1 tablet Oral BID     sodium chloride (PF)  3 mL Intracatheter Q8H     tamsulosin  0.4 mg Oral Daily     thiamine  250 mg Intravenous Daily    Followed by     [START ON 10/12/2022] thiamine  100 mg Oral or Feeding Tube Daily     traZODone  50 mg Oral or Feeding Tube QPM     valproic acid  500 mg Oral or Feeding Tube BID

## 2022-10-07 NOTE — PROVIDER NOTIFICATION
Lab called with results of positive blood cultures from 10/3 - gram positive cocci in clusters. Cross cover paged. No new orders with the plan to defer to day team as pt is afebrile.

## 2022-10-07 NOTE — CONSULTS
"10/7/2022      Spoke with pt via telephone to offer CD services. Pt reports he has been to CD treatment many times. Pt reports he would not be interested in attending inpatient CD treatment. Pt reports he would like to receive CD resources so he can attend CD treatment virtually.Email has been sent to unit  with CD resources for pt.  Pt reports HU attending sober support meetings in the past. Pt is able to call Mental Health and Addiction Services Line: [phone] and make an appt through [facility] if he would like an evaluation and referrals to outpatient CD treatment after he is discharged.    Home  [employer]  https://www.PublicVine    Supportive Services   [employer]  https://www.AisleBuyer.org    Alcoholics Anonymous  http://www.aa.org  Community Drug & Alcohol Support Resources   Alcoholics Anonymous   24/7 Phone Line: 766.164.4161   https://Taste Guru.org/   https://aaReaching Our Outdoor Friends (ROOF[place].org/   For additional list of online meetings: http://aa-intergroup.org      Narcotics Anonymous of [place]   24 Hour Helpline: 1-513.503.5442   https://www.Sinimanes.org/   For online meetings, click \"Find a Meeting\" on homepage     [place] [facility]   [address] [place] [address] [address]   [place] [address]   Phone: [phone]  http://Opiatalk    GAYLE Shrestha  Phone: [phone]  Email: [email or web address]  "

## 2022-10-07 NOTE — PLAN OF CARE
ICU End of Shift Summary.  For vital signs and complete assessments, please see documentation flowsheets.      Pertinent assessments: Pt A&O to self and place. Liable and argumentative, difficult to redirect at times. Restless and agitated throughout the night, did not sleep. PRN Zyprexa given for agitation. Afebrile. VSS. On RA. Tele SB/SR. Primofit replaced multiple times d/t pt removal despite education. Voided all over bed x1 and floor x1. BS+. Reg diet, good appetite. Nausea this AM after eating snacks, PRN zofran given x1. Up in chair this AM, A2 w/ GB + walker.  Major Shift Events:    - Blood cultures from 10/3 positive for gram positive cocci in clusters, see previous note    Plan (Upcoming Events): tbd  Discharge/Transfer Needs: tbd     Bedside Shift Report Completed: yes  Bedside Safety Check Completed: yes

## 2022-10-08 ENCOUNTER — APPOINTMENT (OUTPATIENT)
Dept: PHYSICAL THERAPY | Facility: CLINIC | Age: 57
DRG: 896 | End: 2022-10-08
Payer: MEDICARE

## 2022-10-08 ENCOUNTER — APPOINTMENT (OUTPATIENT)
Dept: OCCUPATIONAL THERAPY | Facility: CLINIC | Age: 57
DRG: 896 | End: 2022-10-08
Payer: MEDICARE

## 2022-10-08 VITALS
SYSTOLIC BLOOD PRESSURE: 115 MMHG | DIASTOLIC BLOOD PRESSURE: 82 MMHG | BODY MASS INDEX: 26.29 KG/M2 | WEIGHT: 188.49 LBS | HEART RATE: 101 BPM | TEMPERATURE: 98.2 F | OXYGEN SATURATION: 98 % | RESPIRATION RATE: 16 BRPM

## 2022-10-08 LAB
ANION GAP SERPL CALCULATED.3IONS-SCNC: 10 MMOL/L (ref 7–15)
BUN SERPL-MCNC: 14.1 MG/DL (ref 6–20)
CALCIUM SERPL-MCNC: 9.1 MG/DL (ref 8.6–10)
CHLORIDE SERPL-SCNC: 101 MMOL/L (ref 98–107)
CREAT SERPL-MCNC: 0.71 MG/DL (ref 0.67–1.17)
DEPRECATED HCO3 PLAS-SCNC: 27 MMOL/L (ref 22–29)
GFR SERPL CREATININE-BSD FRML MDRD: >90 ML/MIN/1.73M2
GLUCOSE SERPL-MCNC: 94 MG/DL (ref 70–99)
MAGNESIUM SERPL-MCNC: 1.6 MG/DL (ref 1.7–2.3)
PLATELET # BLD AUTO: 425 10E3/UL (ref 150–450)
POTASSIUM SERPL-SCNC: 4.3 MMOL/L (ref 3.4–5.3)
SODIUM SERPL-SCNC: 138 MMOL/L (ref 136–145)

## 2022-10-08 PROCEDURE — 250N000013 HC RX MED GY IP 250 OP 250 PS 637: Performed by: STUDENT IN AN ORGANIZED HEALTH CARE EDUCATION/TRAINING PROGRAM

## 2022-10-08 PROCEDURE — 97530 THERAPEUTIC ACTIVITIES: CPT | Mod: GP | Performed by: PHYSICAL THERAPIST

## 2022-10-08 PROCEDURE — 83735 ASSAY OF MAGNESIUM: CPT | Performed by: STUDENT IN AN ORGANIZED HEALTH CARE EDUCATION/TRAINING PROGRAM

## 2022-10-08 PROCEDURE — 250N000013 HC RX MED GY IP 250 OP 250 PS 637: Performed by: INTERNAL MEDICINE

## 2022-10-08 PROCEDURE — 250N000011 HC RX IP 250 OP 636: Performed by: STUDENT IN AN ORGANIZED HEALTH CARE EDUCATION/TRAINING PROGRAM

## 2022-10-08 PROCEDURE — 85049 AUTOMATED PLATELET COUNT: CPT | Performed by: STUDENT IN AN ORGANIZED HEALTH CARE EDUCATION/TRAINING PROGRAM

## 2022-10-08 PROCEDURE — 36415 COLL VENOUS BLD VENIPUNCTURE: CPT | Performed by: STUDENT IN AN ORGANIZED HEALTH CARE EDUCATION/TRAINING PROGRAM

## 2022-10-08 PROCEDURE — 99239 HOSP IP/OBS DSCHRG MGMT >30: CPT | Performed by: STUDENT IN AN ORGANIZED HEALTH CARE EDUCATION/TRAINING PROGRAM

## 2022-10-08 PROCEDURE — 82310 ASSAY OF CALCIUM: CPT | Performed by: INTERNAL MEDICINE

## 2022-10-08 PROCEDURE — 97116 GAIT TRAINING THERAPY: CPT | Mod: GP | Performed by: PHYSICAL THERAPIST

## 2022-10-08 PROCEDURE — 97535 SELF CARE MNGMENT TRAINING: CPT | Mod: GO

## 2022-10-08 PROCEDURE — 258N000003 HC RX IP 258 OP 636: Performed by: STUDENT IN AN ORGANIZED HEALTH CARE EDUCATION/TRAINING PROGRAM

## 2022-10-08 RX ORDER — LANOLIN ALCOHOL/MO/W.PET/CERES
100 CREAM (GRAM) TOPICAL DAILY
Qty: 30 TABLET | Refills: 0 | Status: SHIPPED | OUTPATIENT
Start: 2022-10-08 | End: 2022-12-12

## 2022-10-08 RX ORDER — MAGNESIUM OXIDE 400 MG/1
400 TABLET ORAL EVERY 4 HOURS
Status: COMPLETED | OUTPATIENT
Start: 2022-10-08 | End: 2022-10-08

## 2022-10-08 RX ORDER — TAMSULOSIN HYDROCHLORIDE 0.4 MG/1
0.4 CAPSULE ORAL DAILY
Qty: 30 CAPSULE | Refills: 0 | Status: SHIPPED | OUTPATIENT
Start: 2022-10-08 | End: 2022-12-12

## 2022-10-08 RX ORDER — TRAZODONE HYDROCHLORIDE 50 MG/1
50 TABLET, FILM COATED ORAL EVERY EVENING
Qty: 30 TABLET | Refills: 0 | Status: SHIPPED | OUTPATIENT
Start: 2022-10-08 | End: 2022-12-12

## 2022-10-08 RX ORDER — OLANZAPINE 5 MG/1
5 TABLET ORAL EVERY EVENING
Qty: 30 TABLET | Refills: 0 | Status: SHIPPED | OUTPATIENT
Start: 2022-10-08 | End: 2022-12-12

## 2022-10-08 RX ADMIN — BUPROPION HYDROCHLORIDE 300 MG: 150 TABLET, FILM COATED, EXTENDED RELEASE ORAL at 07:57

## 2022-10-08 RX ADMIN — FOLIC ACID 1 MG: 1 TABLET ORAL at 07:59

## 2022-10-08 RX ADMIN — THIAMINE HYDROCHLORIDE 250 MG: 100 INJECTION, SOLUTION INTRAMUSCULAR; INTRAVENOUS at 08:00

## 2022-10-08 RX ADMIN — BUPRENORPHINE HYDROCHLORIDE, NALOXONE HYDROCHLORIDE 1 FILM: 8; 2 FILM, SOLUBLE BUCCAL; SUBLINGUAL at 05:00

## 2022-10-08 RX ADMIN — PANTOPRAZOLE SODIUM 40 MG: 40 TABLET, DELAYED RELEASE ORAL at 07:59

## 2022-10-08 RX ADMIN — ACETAMINOPHEN 650 MG: 325 TABLET, FILM COATED ORAL at 07:59

## 2022-10-08 RX ADMIN — SENNOSIDES AND DOCUSATE SODIUM 1 TABLET: 50; 8.6 TABLET ORAL at 07:58

## 2022-10-08 RX ADMIN — MAGNESIUM OXIDE TAB 400 MG (241.3 MG ELEMENTAL MG) 400 MG: 400 (241.3 MG) TAB at 06:54

## 2022-10-08 RX ADMIN — TAMSULOSIN HYDROCHLORIDE 0.4 MG: 0.4 CAPSULE ORAL at 07:58

## 2022-10-08 RX ADMIN — MAGNESIUM OXIDE TAB 400 MG (241.3 MG ELEMENTAL MG) 400 MG: 400 (241.3 MG) TAB at 11:02

## 2022-10-08 RX ADMIN — MULTIPLE VITAMINS W/ MINERALS TAB 1 TABLET: TAB at 07:58

## 2022-10-08 ASSESSMENT — ACTIVITIES OF DAILY LIVING (ADL)
ADLS_ACUITY_SCORE: 37
ADLS_ACUITY_SCORE: 45
ADLS_ACUITY_SCORE: 45
ADLS_ACUITY_SCORE: 37
ADLS_ACUITY_SCORE: 37
ADLS_ACUITY_SCORE: 45
ADLS_ACUITY_SCORE: 37
ADLS_ACUITY_SCORE: 45

## 2022-10-08 NOTE — DISCHARGE SUMMARY
[facility]  Hospitalist Discharge Summary      Date of Admission:  9/23/2022  Date of Discharge:  10/8/2022  Discharging Provider: Scott Parker MD  Discharge Service: Hospitalist Service    Discharge Diagnoses       Alcohol use disorder.    Alcohol withdrawal.    Bipolar disorder/major depressive disorder    Polysubstance abuse    Essential hypertension    Mild aspiration pneumonitis    Urinary retention    Follow-ups Needed After Discharge   Follow-up Appointments     Follow-up and recommended labs and tests       Follow up with primary care provider, Physician No Ref-Primary, within 7   days for hospital follow- up.  The following labs/tests are recommended:   CBC and BMP.             Unresulted Labs Ordered in the Past 30 Days of this Admission     Date and Time Order Name Status Description    10/7/2022  7:26 AM Blood Culture Hand, Right Preliminary     10/7/2022  7:26 AM Blood Culture Hand, Left Preliminary     10/3/2022 10:53 AM Blood Culture Hand, Left Preliminary       These results will be followed up by Scott Parker    Discharge Disposition   Discharged to home  Condition at discharge: Stable    Hospital Course   57-year-old male with history of alcohol use disorder presented on 9/23 with alcohol withdrawal.  Had a rapid response called on 9/27 for decreased responsiveness and hypotension and was transferred to ICU.  He needed Precedex drip (stopped on 10/1) but did not need intubation.  Since then he has been wearing confused and weak and is not able to swallow, needing tube feeding.     Plan     1. Alcohol use disorder    Finished with withdrawal symptoms but continued to be encephalopathic for several days.  He was started on high-dose thiamine protocol on 12/5 for possible Wernicke's and his mental status has improved.  He will still need home PT and OT for some safety evaluation and advised not to drive till he is cleared.  We will also get home RN for medication  management.    Needed tube feeding initially as he was not able to swallow safely, now eating regular diet.    Resume home dose of trazodone and olanzapine upon discharge.    He was on valproate at home, presumably due to alcohol withdrawal seizures and was resumed.    Patient refused to talk to chemical dependency counselor.     2. Severe constipation    Passed large bowel movement 10/12 after enema.     3. Bipolar disorder/major depressive disorder.    Prior to admission on tizanidine, which have been held due to encephalopathy.    Was on bupropion at home which was also held as it can lower seizure threshold.    Increase Wellbutrin to home dose of 450 mg daily.    Continue Depakote.     4. Polysubstance abuse.    Prior to admission on buprenorphine-naloxone film twice daily, continued.     5. Essential hypertension.    Prior to admission on hydralazine, currently on hold as he has low normal blood pressure.     6. Suspected aspiration pneumonitis.    Had fever of 100.7 on morning of 9/7 and a chest x-ray showed bibasilar opacities and subsequently developed coarse breath sounds and hypoxia.    Was empirically started on Zosyn which has been discontinued.  Blood cultures on 9/30 grew Staph epidermidis and Staph hominis in 2 out of 2 cultures, likely contaminant.  We will repeat blood cultures today but that would not be a detriment to discharge.  Patient can be called back if repeat cultures are also positive.     7. Left wrist pain.    X-ray on 9/29 did not show any acute fractures.    Pain is now resolved.     8. Urinary retention.    Failed Lino removal and Lino had to be replaced and was started on Cardura.  Plan was to attempt voiding trial once more awake, but overnight on 10/5 he started crying from the pain in the penis and Lino's was removed and external catheter was placed and patient has been able to urinate.  Will discharge home with Flomax.       Consultations This Hospital Stay   ADDICTION  MEDICINE INPATIENT CONSULT  CARE MANAGEMENT / SOCIAL WORK IP CONSULT  SOCIAL WORK IP CONSULT  VASCULAR ACCESS ADULT IP CONSULT  NUTRITION SERVICES ADULT IP CONSULT  PHARMACY IP CONSULT  RESPIRATORY CARE IP CONSULT  OCCUPATIONAL THERAPY ADULT IP CONSULT  PHYSICAL THERAPY ADULT IP CONSULT  PHARMACY TO DOSE VANCO  PHYSICAL THERAPY ADULT IP CONSULT  SPEECH LANGUAGE PATH ADULT IP CONSULT  PHARMACY TO DOSE VANCO  INFECTIOUS DISEASES IP CONSULT  CHEMICAL DEPENDENCY IP CONSULT  OCCUPATIONAL THERAPY ADULT IP CONSULT    Code Status   Full Code    Time Spent on this Encounter   I, Scott Parker MD, personally saw the patient today and spent greater than 30 minutes discharging this patient.       Scott Parker MD  [facility]  [address] [place] [address]  Phone: [phone]  Fax: [phone]  ______________________________________________________________________    Physical Exam   Vital Signs: Temp: 98.2  F (36.8  C) Temp src: Oral BP: 102/68 Pulse: 103   Resp: 16 SpO2: 100 %      Weight: 188 lbs 7.89 oz  General Appearance: Alert awake and oriented x3.  Eyes: No icterus  HEENT: Moist mucosa  Respiratory: Clear to auscultation  Cardiovascular: S1 and S2 is normal  GI: Soft and nontender  Lymph/Hematologic: Not examined  Genitourinary: Not examined  Skin: No rash  Musculoskeletal: No edema  Neurologic: Nonfocal  Psychiatric: Normal mood         Primary Care Physician   Physician No Ref-Primary    Discharge Orders      Occupational Therapy Referral      Physical Therapy Referral      Home Care Referral      Activity    Your activity upon discharge: no driving till seen outpatient by Occupational Therapy.     Reason for your hospital stay    Alcohol withdrawal     Follow-up and recommended labs and tests     Follow up with primary care provider, Physician No Ref-Primary, within 7 days for hospital follow- up.  The following labs/tests are recommended: CBC and BMP.     Diet    Follow this diet  upon discharge: Orders Placed This Encounter      Snacks/Supplements Adult: Ensure Enlive; With Meals      Regular Diet Adult       Significant Results and Procedures   Most Recent 3 CBC's:Recent Labs   Lab Test 10/08/22  0545 10/07/22  0538 10/05/22  0617 10/01/22  0600 09/30/22  1514 09/28/22  0537   WBC  --  5.5  --   --  6.5 4.9   HGB  --  14.4  --   --  13.4 13.5   MCV  --  97  --   --  96 96    404 354   < > 165 108*    < > = values in this interval not displayed.     Most Recent 3 BMP's:Recent Labs   Lab Test 10/08/22  0545 10/07/22  0538 10/06/22  2030 10/06/22  0748 10/06/22  0544    142  --   --  142   POTASSIUM 4.3 4.8  --   --  4.2   CHLORIDE 101 105  --   --  105   CO2 27 28  --   --  29   BUN 14.1 14.0  --   --  12.9   CR 0.71 0.70  --   --  0.73   ANIONGAP 10 9  --   --  8   JOHANA 9.1 9.3  --   --  8.9   GLC 94 115* 98   < > 99    < > = values in this interval not displayed.     Most Recent 2 LFT's:Recent Labs   Lab Test 09/24/22  0751 09/23/22  1729   * 174*   ALT 93* 129*   ALKPHOS 50 69   BILITOTAL 1.2 0.7   ,   Results for orders placed or performed during the hospital encounter of 09/23/22   CT Head w/o Contrast    Narrative    CT SCAN OF THE HEAD WITHOUT CONTRAST   9/27/2022 11:00 AM     HISTORY: Altered mental status.    TECHNIQUE:  Axial images of the head and coronal reformations without  IV contrast material. Radiation dose for this scan was reduced using  automated exposure control, adjustment of the mA and/or kV according  to patient size, or iterative reconstruction technique.    COMPARISON: CT head dated 12/1/2020.    FINDINGS: There is no evidence of intracranial hemorrhage, mass, acute  infarct or anomaly. The ventricles are normal in size and  configuration. Minimal age commensurate generalized brain parenchymal  volume loss. Minimal nonspecific patchy hypoattenuation in the  periventricular cerebral white matter, presumably due to mild chronic  small vessel ischemic  changes. Small hypodense focus in the posterior  inferior aspect of the right lentiform nucleus may represent a dilated  perivascular space, less likely a chronic lacunar infarct (series 3  image 14). No definite CT findings of acute infarct identified. No  acute intracranial hemorrhage, extra-axial fluid collection, or mass  effect/herniation.    There is minimal mucosal thickening noted in the bilateral ethmoid  sinuses. The mastoid and middle ear cavities appear clear where  visualized. The bony calvarium and bones of the skull base appear  intact.       Impression    IMPRESSION:  1. No CT findings of acute intracranial process.  2. Mild presumed age-related changes, as described.    ARLEN BUTLER MD         SYSTEM ID:  [id number]   XR Chest 1 View    Narrative    CHEST 1 VIEW   9/27/2022 11:11 AM     HISTORY: Low-grade fever, rule out aspiration.    COMPARISON: Chest x-ray on 11/20/2019.      Impression    IMPRESSION: Single AP view of the chest was obtained. Stable  cardiomediastinal silhouette. Mild basilar pulmonary opacities, likely  atelectasis. No significant pleural effusion or pneumothorax.    LYLA ODONNELL MD         SYSTEM ID:  [id number]   XR Wrist Port Left G/E 3 Views    Narrative    XR WRIST PORT LEFT G/E 3 VIEWS 9/29/2022 10:47 AM     HISTORY: left wrist pain, eval for bony abnormality    COMPARISON: None.       Impression    IMPRESSION: Widening of the scapholunate interval appears to be  chronic and is likely related to chronic scapholunate ligament tear.  No cortical bone fragment along the periphery of the distal scaphoid  also appears chronic. Mild to moderate arthritic changes throughout  the wrist. There is no definite evidence of an acute fracture.    DAWSON SANCHEZ MD         SYSTEM ID:  [id number]   XR Chest Port 1 View    Narrative    CHEST PORTABLE ONE VIEW   9/30/2022 3:26 PM     HISTORY: New hypoxia and coarse breath sounds.    COMPARISON: Chest x-ray on 9/27/2022.       Impression    IMPRESSION: AP view of the chest was obtained, in which the patient is  mildly rotated. Mild enlargement of the cardiac silhouette. No  suspicious focal pulmonary opacities. No significant pleural effusion  or pneumothorax. Gas distended colonic loops in the left upper  quadrant.    LYLA ODONNELL MD         SYSTEM ID:  [id number]   XR Abdomen Port 1 View    Narrative    EXAM: XR ABDOMEN PORT 1 VIEW  LOCATION: [facility]  DATE/TIME: 9/30/2022 4:46 PM    INDICATION: Keofeed placement.  COMPARISON: None.      Impression    IMPRESSION: Feeding tube tip likely in the second portion of the duodenum.   XR Abdomen Port 1 View    Narrative    EXAM: XR ABDOMEN PORT 1 VIEW  LOCATION: [facility]  DATE/TIME: 10/3/2022 6:07 AM    INDICATION: constipation  COMPARISON: None.      Impression    IMPRESSION: The bowel gas pattern is nonobstructive. The previously seen Dobbhoff tube is been retracted the tip is now overlying the expected position of the pylorus/proximal first portion of the duodenum. Stool is seen in the rectum.       Discharge Medications   Current Discharge Medication List      START taking these medications    Details   tamsulosin (FLOMAX) 0.4 MG capsule Take 1 capsule (0.4 mg) by mouth daily  Qty: 30 capsule, Refills: 0    Associated Diagnoses: Urinary retention         CONTINUE these medications which have CHANGED    Details   OLANZapine (ZYPREXA) 5 MG tablet 1 tablet (5 mg) by Oral or Feeding Tube route every evening  Qty: 30 tablet, Refills: 0    Associated Diagnoses: Alcohol withdrawal syndrome without complication (H)      thiamine (B-1) 100 MG tablet Take 1 tablet (100 mg) by mouth daily  Qty: 30 tablet, Refills: 0    Associated Diagnoses: Alcohol withdrawal syndrome without complication (H)      traZODone (DESYREL) 50 MG tablet 1 tablet (50 mg) by Oral or Feeding Tube route every evening  Qty: 30 tablet, Refills: 0    Associated Diagnoses:  Alcohol withdrawal syndrome without complication (H)         CONTINUE these medications which have NOT CHANGED    Details   acetaminophen (TYLENOL) 500 MG tablet Take 2 tablets (1,000 mg) by mouth 3 times daily    Associated Diagnoses: Closed nondisplaced fracture of second metatarsal bone of right foot, initial encounter      aspirin (ASA) 325 MG tablet Take 1 tablet (325 mg) by mouth daily  Qty: 30 tablet, Refills: 0    Associated Diagnoses: Closed nondisplaced fracture of second metatarsal bone of right foot, initial encounter      buprenorphine HCl-naloxone HCl (SUBOXONE) 8-2 MG per film Place 1 Film under the tongue 2 times daily      !! buPROPion (WELLBUTRIN XL) 150 MG 24 hr tablet Take 150 mg by mouth every morning Takes with 300mg tablet for total dose = 450mg      !! buPROPion (WELLBUTRIN XL) 300 MG 24 hr tablet Take 300 mg by mouth every morning Takes with 150mg tablet for total dose = 450mg      divalproex sodium extended-release (DEPAKOTE ER) 500 MG 24 hr tablet Take 1,000 mg by mouth At Bedtime      folic acid (FOLVITE) 1 MG tablet Take 1 mg by mouth daily      multivitamin w/minerals (THERA-VIT-M) tablet Take 1 tablet by mouth every evening      naloxone (NARCAN) 4 MG/0.1ML nasal spray Spray 1 spray (4 mg) into one nostril alternating nostrils once as needed for opioid reversal every 2-3 minutes until assistance arrives  Qty: 2 each, Refills: 0    Associated Diagnoses: Closed nondisplaced fracture of second metatarsal bone of right foot, initial encounter      pregabalin (LYRICA) 100 MG capsule Take 100 mg by mouth daily      testosterone cypionate (DEPOTESTOSTERONE) 200 MG/ML injection Inject 200 mg into the muscle every 14 days       !! - Potential duplicate medications found. Please discuss with provider.      STOP taking these medications       amoxicillin-clavulanate (AUGMENTIN) 875-125 MG tablet Comments:   Reason for Stopping:         Ferrous Gluconate 324 (37.5 Fe) MG TABS Comments:   Reason  for Stopping:         ibuprofen (ADVIL/MOTRIN) 600 MG tablet Comments:   Reason for Stopping:         magic mouthwash suspension, diphenhydrAMINE, lidocaine, aluminum-magnesium & simethicone, (FIRST-MOUTHWASH BLM) compounding kit Comments:   Reason for Stopping:         tiZANidine (ZANAFLEX) 4 MG tablet Comments:   Reason for Stopping:             Allergies   No Known Allergies

## 2022-10-08 NOTE — PROGRESS NOTES
"Provided Andrea with discharge education and medications. Andrea confirmed feeling all of his questions were answered. VSS and denies pain and A&O. Showered independently prior to to leaving. IV removed. Confirmed he had all of his belongings with him at discharge. Driven home by friend \"Brenda\".  "

## 2022-10-08 NOTE — PLAN OF CARE
Shift Events: No significant events. Seen by OT for cognitive eval and spoke with Chem Dep via telephone.     Neuro: A&O x4, forgetful, mood labile. Up with 1+W to BSC/Chair. Very unsteady.   CV: SR, Normotensive  Pulm: Clear LS on RA  GI: Tolerating regular diet. Had one BM.  : Voiding without difficulty. External catheter/urinal/incontinent.  Lines/gtts: PIV x1    Plan: TCU pending PT/OT eval

## 2022-10-08 NOTE — PLAN OF CARE
ICU End of Shift Summary.  For vital signs and complete assessments, please see documentation flowsheets.      Pertinent assessments:   Neuro: Alert, confused/forgetful @ times - following commands. Mood labile - towards end of shift more cooperative/calm.   Cardiac: SR, SB while asleep. VSS. Afebrile.   Resp: LS clear, on RA.   GI: BM x2 small, soft. Tolerating Reg diet.   : Incontinent @ times, voiding spontaneously.   Skin: Scattered bruising, scabs.   Lines: R PIV      Major Shift Events:   Increased mobility/strength - patient more steady with walker towards end of shift.   Up to the commode Ax1 GB, walker.   Per patient - daughter okay to give information too, patient would like daughter to be a part of care plan decisions. Patient okay with bedside RN placing daughter as contact in chart.   Slept well during shift.     Plan (Upcoming Events): Continue plan of care.  Discharge/Transfer Needs: TBD     Bedside Shift Report Completed : Y  Bedside Safety Check Completed: Y     '     Detail Level: Detailed

## 2022-10-08 NOTE — DISCHARGE INSTRUCTIONS
Flomax Oral Capsule 0.4 mg  Uses  This medicine is used for the following purposes:  kidney stones  prostate enlargement  Instructions  Swallow the medicine without crushing or chewing it.  Take the medicine 30 minutes after the same meal each day.  It is very important that you take the medicine at about the same time every day. It will work best if you do this.  Keep the medicine at room temperature. Avoid heat and direct light.  It is important that you keep taking each dose of this medicine on time even if you are feeling well.  If you forget to take a dose on time, take it as soon as you remember. If it is almost time for the next dose, do not take the missed dose. Return to your normal dosing schedule. Do not take 2 doses of this medicine at one time.  Please tell your doctor and pharmacist about all the medicines you take. Include both prescription and over-the-counter medicines. Also tell them about any vitamins, herbal medicines, or anything else you take for your health.  Cautions  This medicine is not approved for use by women.  Tell your doctor and pharmacist if you ever had an allergic reaction to a medicine. Symptoms of an allergic reaction can include trouble breathing, skin rash, itching, swelling, or severe dizziness.  Do not use the medication any more than instructed.  This medicine may cause dizziness or fainting, especially after exercising or in hot weather. Be very careful when standing or sitting up quickly.  Your ability to stay alert or to react quickly may be impaired by this medicine. Do not drive or operate machinery until you know how this medicine will affect you.  Please check with your doctor before drinking alcohol while on this medicine.  Ask your pharmacist if this medicine can interact with any of your other medicines. Be sure to tell them about all the medicines you take.  Please tell all your doctors and dentists that you are on this medicine before they provide care.  Do not  start or stop any other medicines without first speaking to your doctor or pharmacist.  If you have painful erection or an erection for more than 4 hours, seek medical care right away.  Do not share this medicine with anyone who has not been prescribed this medicine.  Side Effects  The following is a list of some common side effects from this medicine. Please speak with your doctor about what you should do if you experience these or other side effects.  dizziness  drowsiness or sedation  Call your doctor or get medical help right away if you notice any of these more serious side effects:  low blood pressure  impotence  problems with sexual functions or desire  A few people may have an allergic reactions to this medicine. Symptoms can include difficulty breathing, skin rash, itching, swelling, or severe dizziness. If you notice any of these symptoms, seek medical help quickly.  Extra  Please speak with your doctor, nurse, or pharmacist if you have any questions about this medicine.  [email or web address]  IMPORTANT NOTE: This document tells you briefly how to take your medicine, but it does not tell you all there is to know about it.Your doctor or pharmacist may give you other documents about your medicine. Please talk to them if you have any questions.Always follow their advice. There is a more complete description of this medicine available in English.Scan this code on your smartphone or tablet or use the web address below. You can also ask your pharmacist for a printout. If you have any questions, please ask your pharmacist.     2021 [employer].

## 2022-10-09 NOTE — PLAN OF CARE
Physical Therapy Discharge Summary    Reason for therapy discharge:    Discharged to home with home therapy.    Progress towards therapy goal(s). See goals on Care Plan in [facility] electronic health record for goal details.  Goals partially met.  Barriers to achieving goals:   discharge from facility.    Therapy recommendation(s):    Continued therapy is recommended.  Rationale/Recommendations:  Recommend discharge to home with initial intermittent supervision (check-ins every 2 - 4 hrs. Due to physical deficits), requiring use of FWW for mobility skills, transitioning to modified independence with no AD. Recommend assistance for IADLs and continued therapy services via [facility] PT to promote increased independence with mobility skills.

## 2022-10-09 NOTE — PLAN OF CARE
"Occupational Therapy Discharge Summary    Reason for therapy discharge:    Discharged to home with home therapy.    Progress towards therapy goal(s). See goals on Care Plan in [facility] electronic health record for goal details.  Goals partially met.  Barriers to achieving goals:   discharge from facility.    Therapy recommendation(s):    Continued therapy is recommended.  Rationale/Recommendations:  per treating OT \"Pt has demo'd increased in cognition today, complete home safety questions with 80% accuracy, nursing admits to huge improvement today. recommend discharge to home with daily support  from family for medication and HHOT for further cognition workup to determine fitness to drive. If family is unable to provide recommended level of support recommend more supportive living environment\".      "

## 2022-10-10 ENCOUNTER — PATIENT OUTREACH (OUTPATIENT)
Dept: CARE COORDINATION | Facility: CLINIC | Age: 57
End: 2022-10-10

## 2022-10-10 NOTE — PROGRESS NOTES
Clinic Care Coordination Contact  [facility]: Post-Discharge Note  SITUATION                                                      Admission:    Admission Date: 09/23/22   Reason for Admission: Alcohol withdrawal  Discharge:   Discharge Date: 10/08/22  Discharge Diagnosis: Alcohol withdrawal    BACKGROUND                                                      Per hospital discharge summary and inpatient provider notes:    Mr. Andrea Pham is a 57 year old male with a history of alcohol abuse and withdrawal, seizure, and pancreatitis admitted on 9/23/2022 with alcohol withdrawal.      Per EMS, the patients girlfriend called because the patient is shaking and having worse withdrawal symptoms than normal. Report that he is anxious, has a fever, nauseous, and vomited.  Last drink of alcohol was 1 hour prior to arrival.  He has been working on decreasing his alcohol intake over the last month or so, in the past at most he would have about 10 beers per day and reports a past use of hard liquor as well.  He denies history of seizures, although this history has been documented in his medical record. Denies use of marijuana, methamphetamines, and cocaine.      He denies any headache, lightheadedness or dizziness.  He has no chest pain pressure or shortness of breath.  He has no abdominal pain, does report mild nausea but no vomiting.  He has myriad of orthopedic aches and pains related to his prior surgeries.  He has no other concerns or complaints today.     ASSESSMENT           Discharge Assessment  How are you doing now that you are home?: I am doing good. I am trying to stay relaxed. I am trying to watch a movie a day and I am reading a lot. I am having some weakness. Pt would like to apologize to the charge nurse that was there when he went to the hospital.  How are your symptoms? (Red Flag symptoms escalate to triage hotline per guidelines): Improved  Do you feel your condition is stable enough to be safe at home  until your provider visit?: Yes  Does the patient have their discharge instructions? : Yes  Does the patient have questions regarding their discharge instructions? : No  Were you started on any new medications or were there changes to any of your previous medications? : Yes  Does the patient have all of their medications?: Yes  Do you have questions regarding any of your medications? : No  Discharge follow-up appointment scheduled within 14 calendar days? : No (Pt plans on scheduling a follow up with his PCP.)  Is patient agreeable to assistance with scheduling? : No                  PLAN                                                      Outpatient Plan: Follow up with primary care provider, Physician No Ref-Primary, within 7 days for hospital follow- up. The following labs/  tests are recommended: CBC and BMP.    No future appointments.      For any urgent concerns, please contact our 24 hour nurse triage line: [phone])       GADIEL Vazquez  [phone]  Connected Care Resource [facility]

## 2022-10-11 LAB
BACTERIA BLD CULT: ABNORMAL

## 2022-10-12 LAB
BACTERIA BLD CULT: NO GROWTH
BACTERIA BLD CULT: NO GROWTH

## 2022-10-22 ENCOUNTER — HEALTH MAINTENANCE LETTER (OUTPATIENT)
Age: 57
End: 2022-10-22

## 2022-11-23 ENCOUNTER — HOSPITAL ENCOUNTER (INPATIENT)
Facility: CLINIC | Age: 57
LOS: 19 days | Discharge: HOME | DRG: 896 | End: 2022-12-12
Attending: EMERGENCY MEDICINE | Admitting: INTERNAL MEDICINE
Payer: MEDICARE

## 2022-11-23 DIAGNOSIS — S92.324A CLOSED NONDISPLACED FRACTURE OF SECOND METATARSAL BONE OF RIGHT FOOT, INITIAL ENCOUNTER: Primary | ICD-10-CM

## 2022-11-23 DIAGNOSIS — F32.A ANXIETY AND DEPRESSION: ICD-10-CM

## 2022-11-23 DIAGNOSIS — F10.930 ALCOHOL WITHDRAWAL SYNDROME WITHOUT COMPLICATION (H): ICD-10-CM

## 2022-11-23 DIAGNOSIS — F10.931 DELIRIUM TREMENS (H): ICD-10-CM

## 2022-11-23 DIAGNOSIS — F31.2 BIPOLAR AFFECTIVE DISORDER, CURRENT EPISODE MANIC WITH PSYCHOTIC SYMPTOMS (H): ICD-10-CM

## 2022-11-23 DIAGNOSIS — R33.9 URINARY RETENTION: ICD-10-CM

## 2022-11-23 DIAGNOSIS — F10.931 ALCOHOL WITHDRAWAL SYNDROME, WITH DELIRIUM (H): ICD-10-CM

## 2022-11-23 DIAGNOSIS — F41.9 ANXIETY AND DEPRESSION: ICD-10-CM

## 2022-11-23 DIAGNOSIS — R79.89 LOW TESTOSTERONE: ICD-10-CM

## 2022-11-23 DIAGNOSIS — G89.4 CHRONIC PAIN DISORDER: ICD-10-CM

## 2022-11-23 LAB
ALBUMIN SERPL BCG-MCNC: 4.7 G/DL (ref 3.5–5.2)
ALP SERPL-CCNC: 73 U/L (ref 40–129)
ALT SERPL W P-5'-P-CCNC: 85 U/L (ref 10–50)
ANION GAP SERPL CALCULATED.3IONS-SCNC: 31 MMOL/L (ref 7–15)
AST SERPL W P-5'-P-CCNC: 106 U/L (ref 10–50)
BASE EXCESS BLDV CALC-SCNC: -1.8 MMOL/L (ref -7.7–1.9)
BASOPHILS # BLD AUTO: 0 10E3/UL (ref 0–0.2)
BASOPHILS NFR BLD AUTO: 0 %
BILIRUB SERPL-MCNC: 1.8 MG/DL
BUN SERPL-MCNC: 17 MG/DL (ref 6–20)
CALCIUM SERPL-MCNC: 9 MG/DL (ref 8.6–10)
CHLORIDE SERPL-SCNC: 93 MMOL/L (ref 98–107)
CREAT SERPL-MCNC: 0.81 MG/DL (ref 0.67–1.17)
DEPRECATED HCO3 PLAS-SCNC: 16 MMOL/L (ref 22–29)
EOSINOPHIL # BLD AUTO: 0 10E3/UL (ref 0–0.7)
EOSINOPHIL NFR BLD AUTO: 0 %
ERYTHROCYTE [DISTWIDTH] IN BLOOD BY AUTOMATED COUNT: 13.5 % (ref 10–15)
ETHANOL SERPL-MCNC: 0.23 G/DL
GFR SERPL CREATININE-BSD FRML MDRD: >90 ML/MIN/1.73M2
GLUCOSE SERPL-MCNC: 124 MG/DL (ref 70–99)
HCO3 BLDV-SCNC: 18 MMOL/L (ref 21–28)
HCT VFR BLD AUTO: 48.6 % (ref 40–53)
HGB BLD-MCNC: 14.6 G/DL (ref 13.3–17.7)
HGB BLD-MCNC: 16.8 G/DL (ref 13.3–17.7)
IMM GRANULOCYTES # BLD: 0 10E3/UL
IMM GRANULOCYTES NFR BLD: 0 %
INR PPP: 1.03 (ref 0.85–1.15)
KETONES BLD-SCNC: 2.8 MMOL/L (ref 0–0.6)
LACTATE SERPL-SCNC: 1.8 MMOL/L (ref 0.7–2)
LACTATE SERPL-SCNC: 2.9 MMOL/L (ref 0.7–2)
LACTATE SERPL-SCNC: 7.4 MMOL/L (ref 0.7–2)
LIPASE SERPL-CCNC: 83 U/L (ref 13–60)
LYMPHOCYTES # BLD AUTO: 0.8 10E3/UL (ref 0.8–5.3)
LYMPHOCYTES NFR BLD AUTO: 13 %
MAGNESIUM SERPL-MCNC: 1.8 MG/DL (ref 1.7–2.3)
MAGNESIUM SERPL-MCNC: 1.8 MG/DL (ref 1.7–2.3)
MCH RBC QN AUTO: 30.8 PG (ref 26.5–33)
MCHC RBC AUTO-ENTMCNC: 34.6 G/DL (ref 31.5–36.5)
MCV RBC AUTO: 89 FL (ref 78–100)
MONOCYTES # BLD AUTO: 0.8 10E3/UL (ref 0–1.3)
MONOCYTES NFR BLD AUTO: 13 %
NEUTROPHILS # BLD AUTO: 4.6 10E3/UL (ref 1.6–8.3)
NEUTROPHILS NFR BLD AUTO: 74 %
NRBC # BLD AUTO: 0 10E3/UL
NRBC BLD AUTO-RTO: 0 /100
O2/TOTAL GAS SETTING VFR VENT: 0 %
OXYHGB MFR BLDV: 91 % (ref 70–75)
PCO2 BLDV: 20 MM HG (ref 40–50)
PH BLDV: 7.56 [PH] (ref 7.32–7.43)
PHOSPHATE SERPL-MCNC: 1.7 MG/DL (ref 2.5–4.5)
PHOSPHATE SERPL-MCNC: 3.6 MG/DL (ref 2.5–4.5)
PHOSPHATE SERPL-MCNC: 4.1 MG/DL (ref 2.5–4.5)
PLATELET # BLD AUTO: 151 10E3/UL (ref 150–450)
PO2 BLDV: 68 MM HG (ref 25–47)
POTASSIUM SERPL-SCNC: 3.6 MMOL/L (ref 3.4–5.3)
PROT SERPL-MCNC: 6.8 G/DL (ref 6.4–8.3)
RBC # BLD AUTO: 5.46 10E6/UL (ref 4.4–5.9)
SARS-COV-2 RNA RESP QL NAA+PROBE: NEGATIVE
SODIUM SERPL-SCNC: 140 MMOL/L (ref 136–145)
TROPONIN T SERPL HS-MCNC: 26 NG/L
TROPONIN T SERPL HS-MCNC: 28 NG/L
TROPONIN T SERPL HS-MCNC: 33 NG/L
WBC # BLD AUTO: 6.3 10E3/UL (ref 4–11)

## 2022-11-23 PROCEDURE — 96361 HYDRATE IV INFUSION ADD-ON: CPT

## 2022-11-23 PROCEDURE — 85610 PROTHROMBIN TIME: CPT | Performed by: EMERGENCY MEDICINE

## 2022-11-23 PROCEDURE — HZ2ZZZZ DETOXIFICATION SERVICES FOR SUBSTANCE ABUSE TREATMENT: ICD-10-PCS | Performed by: EMERGENCY MEDICINE

## 2022-11-23 PROCEDURE — 250N000011 HC RX IP 250 OP 636: Performed by: INTERNAL MEDICINE

## 2022-11-23 PROCEDURE — C9113 INJ PANTOPRAZOLE SODIUM, VIA: HCPCS | Performed by: EMERGENCY MEDICINE

## 2022-11-23 PROCEDURE — 250N000009 HC RX 250: Performed by: EMERGENCY MEDICINE

## 2022-11-23 PROCEDURE — 96375 TX/PRO/DX INJ NEW DRUG ADDON: CPT

## 2022-11-23 PROCEDURE — 250N000011 HC RX IP 250 OP 636: Performed by: EMERGENCY MEDICINE

## 2022-11-23 PROCEDURE — 36415 COLL VENOUS BLD VENIPUNCTURE: CPT | Performed by: INTERNAL MEDICINE

## 2022-11-23 PROCEDURE — 84100 ASSAY OF PHOSPHORUS: CPT | Performed by: EMERGENCY MEDICINE

## 2022-11-23 PROCEDURE — 93005 ELECTROCARDIOGRAM TRACING: CPT

## 2022-11-23 PROCEDURE — 999N000127 HC STATISTIC PERIPHERAL IV START W US GUIDANCE

## 2022-11-23 PROCEDURE — 82805 BLOOD GASES W/O2 SATURATION: CPT | Performed by: EMERGENCY MEDICINE

## 2022-11-23 PROCEDURE — 258N000003 HC RX IP 258 OP 636: Performed by: INTERNAL MEDICINE

## 2022-11-23 PROCEDURE — 80053 COMPREHEN METABOLIC PANEL: CPT | Performed by: EMERGENCY MEDICINE

## 2022-11-23 PROCEDURE — 258N000003 HC RX IP 258 OP 636: Performed by: EMERGENCY MEDICINE

## 2022-11-23 PROCEDURE — 84100 ASSAY OF PHOSPHORUS: CPT | Performed by: INTERNAL MEDICINE

## 2022-11-23 PROCEDURE — 85018 HEMOGLOBIN: CPT | Performed by: INTERNAL MEDICINE

## 2022-11-23 PROCEDURE — U0003 INFECTIOUS AGENT DETECTION BY NUCLEIC ACID (DNA OR RNA); SEVERE ACUTE RESPIRATORY SYNDROME CORONAVIRUS 2 (SARS-COV-2) (CORONAVIRUS DISEASE [COVID-19]), AMPLIFIED PROBE TECHNIQUE, MAKING USE OF HIGH THROUGHPUT TECHNOLOGIES AS DESCRIBED BY CMS-2020-01-R: HCPCS | Performed by: EMERGENCY MEDICINE

## 2022-11-23 PROCEDURE — 82010 KETONE BODYS QUAN: CPT | Performed by: EMERGENCY MEDICINE

## 2022-11-23 PROCEDURE — 96374 THER/PROPH/DIAG INJ IV PUSH: CPT

## 2022-11-23 PROCEDURE — 84484 ASSAY OF TROPONIN QUANT: CPT | Performed by: EMERGENCY MEDICINE

## 2022-11-23 PROCEDURE — 36415 COLL VENOUS BLD VENIPUNCTURE: CPT | Performed by: EMERGENCY MEDICINE

## 2022-11-23 PROCEDURE — 120N000013 HC R&B IMCU

## 2022-11-23 PROCEDURE — 83690 ASSAY OF LIPASE: CPT | Performed by: EMERGENCY MEDICINE

## 2022-11-23 PROCEDURE — 82077 ASSAY SPEC XCP UR&BREATH IA: CPT | Performed by: EMERGENCY MEDICINE

## 2022-11-23 PROCEDURE — C9803 HOPD COVID-19 SPEC COLLECT: HCPCS

## 2022-11-23 PROCEDURE — 99285 EMERGENCY DEPT VISIT HI MDM: CPT | Mod: 25

## 2022-11-23 PROCEDURE — 84484 ASSAY OF TROPONIN QUANT: CPT | Performed by: INTERNAL MEDICINE

## 2022-11-23 PROCEDURE — 85025 COMPLETE CBC W/AUTO DIFF WBC: CPT | Performed by: EMERGENCY MEDICINE

## 2022-11-23 PROCEDURE — 250N000013 HC RX MED GY IP 250 OP 250 PS 637: Performed by: INTERNAL MEDICINE

## 2022-11-23 PROCEDURE — 83735 ASSAY OF MAGNESIUM: CPT | Performed by: EMERGENCY MEDICINE

## 2022-11-23 PROCEDURE — 99223 1ST HOSP IP/OBS HIGH 75: CPT | Mod: AI | Performed by: INTERNAL MEDICINE

## 2022-11-23 PROCEDURE — 96376 TX/PRO/DX INJ SAME DRUG ADON: CPT

## 2022-11-23 PROCEDURE — 83735 ASSAY OF MAGNESIUM: CPT | Performed by: INTERNAL MEDICINE

## 2022-11-23 PROCEDURE — 83605 ASSAY OF LACTIC ACID: CPT | Performed by: INTERNAL MEDICINE

## 2022-11-23 RX ORDER — DIAZEPAM 10 MG/2ML
5 INJECTION, SOLUTION INTRAMUSCULAR; INTRAVENOUS ONCE
Status: COMPLETED | OUTPATIENT
Start: 2022-11-23 | End: 2022-11-23

## 2022-11-23 RX ORDER — MULTIPLE VITAMINS W/ MINERALS TAB 9MG-400MCG
1 TAB ORAL DAILY
Status: DISCONTINUED | OUTPATIENT
Start: 2022-11-24 | End: 2022-12-12 | Stop reason: HOSPADM

## 2022-11-23 RX ORDER — DIPHENHYDRAMINE HYDROCHLORIDE 50 MG/ML
25 INJECTION INTRAMUSCULAR; INTRAVENOUS ONCE
Status: COMPLETED | OUTPATIENT
Start: 2022-11-23 | End: 2022-11-23

## 2022-11-23 RX ORDER — CLONIDINE HYDROCHLORIDE 0.1 MG/1
0.1 TABLET ORAL EVERY 8 HOURS
Status: DISCONTINUED | OUTPATIENT
Start: 2022-11-23 | End: 2022-12-01

## 2022-11-23 RX ORDER — LORAZEPAM 2 MG/ML
1-2 INJECTION INTRAMUSCULAR EVERY 30 MIN PRN
Status: DISCONTINUED | OUTPATIENT
Start: 2022-11-23 | End: 2022-11-26

## 2022-11-23 RX ORDER — GABAPENTIN 100 MG/1
100 CAPSULE ORAL EVERY 8 HOURS
Status: DISPENSED | OUTPATIENT
Start: 2022-11-30 | End: 2022-12-03

## 2022-11-23 RX ORDER — HALOPERIDOL 5 MG/ML
2.5-5 INJECTION INTRAMUSCULAR EVERY 4 HOURS PRN
Status: DISCONTINUED | OUTPATIENT
Start: 2022-11-23 | End: 2022-11-23

## 2022-11-23 RX ORDER — DIAZEPAM 10 MG/2ML
5-10 INJECTION, SOLUTION INTRAMUSCULAR; INTRAVENOUS EVERY 30 MIN PRN
Status: DISCONTINUED | OUTPATIENT
Start: 2022-11-23 | End: 2022-11-27

## 2022-11-23 RX ORDER — LIDOCAINE 40 MG/G
CREAM TOPICAL
Status: DISCONTINUED | OUTPATIENT
Start: 2022-11-23 | End: 2022-11-23

## 2022-11-23 RX ORDER — OLANZAPINE 2.5 MG/1
5 TABLET, FILM COATED ORAL EVERY EVENING
Status: DISCONTINUED | OUTPATIENT
Start: 2022-11-23 | End: 2022-12-01

## 2022-11-23 RX ORDER — BUPROPION HYDROCHLORIDE 150 MG/1
450 TABLET ORAL EVERY MORNING
Status: DISCONTINUED | OUTPATIENT
Start: 2022-11-24 | End: 2022-12-12 | Stop reason: HOSPADM

## 2022-11-23 RX ORDER — DIAZEPAM 10 MG/2ML
10 INJECTION, SOLUTION INTRAMUSCULAR; INTRAVENOUS ONCE
Status: COMPLETED | OUTPATIENT
Start: 2022-11-23 | End: 2022-11-23

## 2022-11-23 RX ORDER — DIAZEPAM 10 MG
10 TABLET ORAL EVERY 30 MIN PRN
Status: DISCONTINUED | OUTPATIENT
Start: 2022-11-23 | End: 2022-11-27

## 2022-11-23 RX ORDER — THIAMINE HYDROCHLORIDE 100 MG/ML
100 INJECTION, SOLUTION INTRAMUSCULAR; INTRAVENOUS DAILY
Status: DISCONTINUED | OUTPATIENT
Start: 2022-11-23 | End: 2022-11-23

## 2022-11-23 RX ORDER — GABAPENTIN 600 MG/1
1200 TABLET ORAL ONCE
Status: COMPLETED | OUTPATIENT
Start: 2022-11-23 | End: 2022-11-23

## 2022-11-23 RX ORDER — GABAPENTIN 300 MG/1
300 CAPSULE ORAL EVERY 8 HOURS
Status: COMPLETED | OUTPATIENT
Start: 2022-11-28 | End: 2022-11-30

## 2022-11-23 RX ORDER — FLUMAZENIL 0.1 MG/ML
0.2 INJECTION, SOLUTION INTRAVENOUS
Status: DISCONTINUED | OUTPATIENT
Start: 2022-11-23 | End: 2022-12-12 | Stop reason: HOSPADM

## 2022-11-23 RX ORDER — DIVALPROEX SODIUM 500 MG/1
1000 TABLET, EXTENDED RELEASE ORAL AT BEDTIME
Status: DISCONTINUED | OUTPATIENT
Start: 2022-11-23 | End: 2022-11-28

## 2022-11-23 RX ORDER — ONDANSETRON 2 MG/ML
4 INJECTION INTRAMUSCULAR; INTRAVENOUS ONCE
Status: COMPLETED | OUTPATIENT
Start: 2022-11-23 | End: 2022-11-23

## 2022-11-23 RX ORDER — FOLIC ACID 5 MG/ML
1 INJECTION, SOLUTION INTRAMUSCULAR; INTRAVENOUS; SUBCUTANEOUS ONCE
Status: COMPLETED | OUTPATIENT
Start: 2022-11-23 | End: 2022-11-23

## 2022-11-23 RX ORDER — LIDOCAINE 40 MG/G
CREAM TOPICAL
Status: DISCONTINUED | OUTPATIENT
Start: 2022-11-23 | End: 2022-12-08

## 2022-11-23 RX ORDER — NITROGLYCERIN 0.4 MG/1
0.4 TABLET SUBLINGUAL EVERY 5 MIN PRN
Status: DISCONTINUED | OUTPATIENT
Start: 2022-11-23 | End: 2022-12-12 | Stop reason: HOSPADM

## 2022-11-23 RX ORDER — OCTREOTIDE ACETATE 50 UG/ML
50 INJECTION, SOLUTION INTRAVENOUS; SUBCUTANEOUS ONCE
Status: COMPLETED | OUTPATIENT
Start: 2022-11-23 | End: 2022-11-23

## 2022-11-23 RX ORDER — GABAPENTIN 300 MG/1
600 CAPSULE ORAL EVERY 8 HOURS
Status: DISPENSED | OUTPATIENT
Start: 2022-11-26 | End: 2022-11-28

## 2022-11-23 RX ORDER — GABAPENTIN 300 MG/1
900 CAPSULE ORAL EVERY 8 HOURS
Status: DISPENSED | OUTPATIENT
Start: 2022-11-23 | End: 2022-11-27

## 2022-11-23 RX ORDER — BUPRENORPHINE AND NALOXONE 8; 2 MG/1; MG/1
1 FILM, SOLUBLE BUCCAL; SUBLINGUAL 2 TIMES DAILY
Status: DISCONTINUED | OUTPATIENT
Start: 2022-11-23 | End: 2022-12-12 | Stop reason: HOSPADM

## 2022-11-23 RX ORDER — TAMSULOSIN HYDROCHLORIDE 0.4 MG/1
0.4 CAPSULE ORAL DAILY
Status: DISCONTINUED | OUTPATIENT
Start: 2022-11-23 | End: 2022-12-12 | Stop reason: HOSPADM

## 2022-11-23 RX ORDER — FOLIC ACID 1 MG/1
1 TABLET ORAL DAILY
Status: DISCONTINUED | OUTPATIENT
Start: 2022-11-24 | End: 2022-12-08

## 2022-11-23 RX ORDER — LORAZEPAM 0.5 MG/1
1-2 TABLET ORAL EVERY 30 MIN PRN
Status: DISCONTINUED | OUTPATIENT
Start: 2022-11-23 | End: 2022-11-26

## 2022-11-23 RX ORDER — BUPROPION HYDROCHLORIDE 150 MG/1
150 TABLET ORAL EVERY MORNING
Status: DISCONTINUED | OUTPATIENT
Start: 2022-11-24 | End: 2022-11-23

## 2022-11-23 RX ORDER — OLANZAPINE 5 MG/1
5-10 TABLET, ORALLY DISINTEGRATING ORAL EVERY 6 HOURS PRN
Status: DISCONTINUED | OUTPATIENT
Start: 2022-11-23 | End: 2022-12-04

## 2022-11-23 RX ORDER — TRAZODONE HYDROCHLORIDE 50 MG/1
50 TABLET, FILM COATED ORAL EVERY EVENING
Status: DISCONTINUED | OUTPATIENT
Start: 2022-11-23 | End: 2022-12-12 | Stop reason: HOSPADM

## 2022-11-23 RX ORDER — HALOPERIDOL 5 MG/ML
2.5-5 INJECTION INTRAMUSCULAR EVERY 6 HOURS PRN
Status: DISCONTINUED | OUTPATIENT
Start: 2022-11-23 | End: 2022-12-04

## 2022-11-23 RX ADMIN — OCTREOTIDE ACETATE 50 MCG: 50 INJECTION, SOLUTION INTRAVENOUS; SUBCUTANEOUS at 10:05

## 2022-11-23 RX ADMIN — DIAZEPAM 10 MG: 10 INJECTION, SOLUTION INTRAMUSCULAR; INTRAVENOUS at 14:33

## 2022-11-23 RX ADMIN — DIVALPROEX SODIUM 1000 MG: 500 TABLET, FILM COATED, EXTENDED RELEASE ORAL at 21:31

## 2022-11-23 RX ADMIN — DIAZEPAM 5 MG: 5 INJECTION INTRAMUSCULAR; INTRAVENOUS at 09:36

## 2022-11-23 RX ADMIN — PROCHLORPERAZINE EDISYLATE 10 MG: 5 INJECTION INTRAMUSCULAR; INTRAVENOUS at 10:09

## 2022-11-23 RX ADMIN — TAMSULOSIN HYDROCHLORIDE 0.4 MG: 0.4 CAPSULE ORAL at 21:31

## 2022-11-23 RX ADMIN — SODIUM PHOSPHATE, MONOBASIC, MONOHYDRATE 9 MMOL: 276; 142 INJECTION, SOLUTION INTRAVENOUS at 13:57

## 2022-11-23 RX ADMIN — DEXTROSE AND SODIUM CHLORIDE: 5; 900 INJECTION, SOLUTION INTRAVENOUS at 15:28

## 2022-11-23 RX ADMIN — DIAZEPAM 10 MG: 5 INJECTION INTRAMUSCULAR; INTRAVENOUS at 11:22

## 2022-11-23 RX ADMIN — DEXTROSE AND SODIUM CHLORIDE: 5; 900 INJECTION, SOLUTION INTRAVENOUS at 21:39

## 2022-11-23 RX ADMIN — OCTREOTIDE ACETATE 50 MCG/HR: 200 INJECTION, SOLUTION INTRAVENOUS; SUBCUTANEOUS at 11:28

## 2022-11-23 RX ADMIN — SODIUM CHLORIDE, POTASSIUM CHLORIDE, SODIUM LACTATE AND CALCIUM CHLORIDE 1000 ML: 600; 310; 30; 20 INJECTION, SOLUTION INTRAVENOUS at 09:42

## 2022-11-23 RX ADMIN — PANTOPRAZOLE SODIUM 80 MG: 40 INJECTION, POWDER, FOR SOLUTION INTRAVENOUS at 09:41

## 2022-11-23 RX ADMIN — GABAPENTIN 900 MG: 300 CAPSULE ORAL at 21:31

## 2022-11-23 RX ADMIN — DIPHENHYDRAMINE HYDROCHLORIDE 25 MG: 50 INJECTION, SOLUTION INTRAMUSCULAR; INTRAVENOUS at 10:09

## 2022-11-23 RX ADMIN — THIAMINE HYDROCHLORIDE 100 MG: 100 INJECTION, SOLUTION INTRAMUSCULAR; INTRAVENOUS at 14:31

## 2022-11-23 RX ADMIN — LORAZEPAM 2 MG: 2 INJECTION INTRAMUSCULAR; INTRAVENOUS at 21:31

## 2022-11-23 RX ADMIN — CLONIDINE HYDROCHLORIDE 0.1 MG: 0.1 TABLET ORAL at 17:00

## 2022-11-23 RX ADMIN — SODIUM CHLORIDE 8 MG/HR: 9 INJECTION, SOLUTION INTRAVENOUS at 11:28

## 2022-11-23 RX ADMIN — FOLIC ACID 1 MG: 5 INJECTION, SOLUTION INTRAMUSCULAR; INTRAVENOUS; SUBCUTANEOUS at 13:58

## 2022-11-23 RX ADMIN — ONDANSETRON 4 MG: 2 INJECTION INTRAMUSCULAR; INTRAVENOUS at 11:26

## 2022-11-23 RX ADMIN — HALOPERIDOL LACTATE 5 MG: 5 INJECTION, SOLUTION INTRAMUSCULAR at 21:31

## 2022-11-23 RX ADMIN — OLANZAPINE 5 MG: 2.5 TABLET, FILM COATED ORAL at 21:30

## 2022-11-23 RX ADMIN — TRAZODONE HYDROCHLORIDE 50 MG: 50 TABLET ORAL at 21:31

## 2022-11-23 RX ADMIN — LORAZEPAM 2 MG: 2 INJECTION INTRAMUSCULAR; INTRAVENOUS at 17:00

## 2022-11-23 RX ADMIN — GABAPENTIN 1200 MG: 600 TABLET, FILM COATED ORAL at 16:03

## 2022-11-23 RX ADMIN — SODIUM PHOSPHATE, MONOBASIC, MONOHYDRATE 9 MMOL: 276; 142 INJECTION, SOLUTION INTRAVENOUS at 16:04

## 2022-11-23 ASSESSMENT — ACTIVITIES OF DAILY LIVING (ADL)
WALKING_OR_CLIMBING_STAIRS_DIFFICULTY: NO
ADLS_ACUITY_SCORE: 37
ADLS_ACUITY_SCORE: 37
ADLS_ACUITY_SCORE: 24
TOILETING_ISSUES: NO
DIFFICULTY_EATING/SWALLOWING: NO
DOING_ERRANDS_INDEPENDENTLY_DIFFICULTY: NO
ADLS_ACUITY_SCORE: 37
FALL_HISTORY_WITHIN_LAST_SIX_MONTHS: NO
WEAR_GLASSES_OR_BLIND: NO
ADLS_ACUITY_SCORE: 37
CHANGE_IN_FUNCTIONAL_STATUS_SINCE_ONSET_OF_CURRENT_ILLNESS/INJURY: NO
ADLS_ACUITY_SCORE: 37
DRESSING/BATHING_DIFFICULTY: NO
CONCENTRATING,_REMEMBERING_OR_MAKING_DECISIONS_DIFFICULTY: NO

## 2022-11-23 ASSESSMENT — ENCOUNTER SYMPTOMS
SORE THROAT: 1
ABDOMINAL PAIN: 1
BLOOD IN STOOL: 1
VOMITING: 1
BACK PAIN: 0
SHORTNESS OF BREATH: 0
TREMORS: 1
NAUSEA: 1

## 2022-11-23 NOTE — PHARMACY-ADMISSION MEDICATION HISTORY
Admission medication history interview status for this patient is complete. See [facility] admission navigator for allergy information, prior to admission medications and immunization status.     Medication history interview done, indicate source(s): Patient  Medication history resources (including written lists, pill bottles, clinic record): Rx refill hx  Pharmacy:  [facility] [place]    Changes made to PTA medication list:  Added:  None  Changed:  None  Reported as Not Taking:  None  Removed:  None    Actions taken by pharmacist (provider contacted, etc): None     Additional medication history information:None    Medication reconciliation/reorder completed by provider prior to medication history?  No    Prior to Admission medications    Medication Sig Last Dose Taking? Auth Provider Long Term End Date   acetaminophen (TYLENOL) 500 MG tablet Take 2 tablets (1,000 mg) by mouth 3 times daily  Patient taking differently: Take 1,000 mg by mouth every 6 hours as needed for pain  at prn Yes Sarah Crouch PA     aspirin (ASA) 325 MG tablet Take 1 tablet (325 mg) by mouth daily 3-4 days ago Yes Rashida Can PA-C     buprenorphine HCl-naloxone HCl (SUBOXONE) 8-2 MG per film Place 1 Film under the tongue 2 times daily 3-4 days ago Yes Unknown, Entered By History     buPROPion (WELLBUTRIN XL) 150 MG 24 hr tablet Take 150 mg by mouth every morning Takes with 300mg tablet for total dose = 450mg 3-4 days ago Yes Unknown, Entered By History No    buPROPion (WELLBUTRIN XL) 300 MG 24 hr tablet Take 300 mg by mouth every morning Takes with 150mg tablet for total dose = 450mg 3-4 days ago Yes Unknown, Entered By History No    divalproex sodium extended-release (DEPAKOTE ER) 500 MG 24 hr tablet Take 1,000 mg by mouth At Bedtime 3-4 days ago Yes Reported, Patient     folic acid (FOLVITE) 1 MG tablet Take 1 mg by mouth daily 3-4 days ago Yes Unknown, Entered By History     multivitamin w/minerals (THERA-VIT-M) tablet Take 1  tablet by mouth every evening 3-4 days ago Yes Unknown, Entered By History No    naloxone (NARCAN) 4 MG/0.1ML nasal spray Spray 1 spray (4 mg) into one nostril alternating nostrils once as needed for opioid reversal every 2-3 minutes until assistance arrives  at prn Yes Sarah Crouch PA Yes    OLANZapine (ZYPREXA) 5 MG tablet 1 tablet (5 mg) by Oral or Feeding Tube route every evening 3-4 days ago Yes Scott Parker MD Yes    pregabalin (LYRICA) 100 MG capsule Take 100 mg by mouth daily 3-4 days ago Yes Unknown, Entered By History     tamsulosin (FLOMAX) 0.4 MG capsule Take 1 capsule (0.4 mg) by mouth daily 3-4 days ago Yes Scott Parker MD     testosterone cypionate (DEPOTESTOSTERONE) 200 MG/ML injection Inject 200 mg into the muscle every 14 days 11/7/2022 Yes Unknown, Entered By History No    thiamine (B-1) 100 MG tablet Take 1 tablet (100 mg) by mouth daily 3-4 days ago Yes Scott Parker MD     traZODone (DESYREL) 50 MG tablet 1 tablet (50 mg) by Oral or Feeding Tube route every evening 3-4 days ago Yes Scott Parker MD Yes

## 2022-11-23 NOTE — ED PROVIDER NOTES
History   Chief Complaint:  Alcohol Withdrawl       HPI     Andrea Pham is a 57 year old male with history of polysubstance abuse, alcohol abuse and hypertension who presents via EMS with hematemesis and black stool. He has history of alcohol abuse and states he has been drinking vodka, about a liter every 1.5 days, last drink was last night. Yesterday, he had his first episode of emesis that was black and had 3 other episodes that were black every time. He has also had 5 episodes of black stool yesterday. He does not take aspirin or blood thinners. He also notes of nausea, tremors, throat pain and diffuse upper abdominal pain that does not radiate. Denies current shortness of breath. No history of alcohol withdrawal seizure. No history of abdominal surgery. No history of pancreatitis or GI bleed. He has not had an endoscopy or colonoscopy. He reports that he typically takes Suboxone but has not been taking this. He lives in an apartment.     Review of Systems   HENT: Positive for sore throat.    Respiratory: Negative for shortness of breath.    Gastrointestinal: Positive for abdominal pain, blood in stool, nausea and vomiting.   Musculoskeletal: Negative for back pain.   Neurological: Positive for tremors.   All other systems reviewed and are negative.      Allergies:  The patient has no known allergies.     Medications:  Suboxone  Wellbutrin  Depakote  Folvite  Narcan  Zyprexa  Lyrica  Flomax  Depotestosterone  Thiamine  Desyrel    Past Medical History:     Alcohol abuse  Depression  Psychosis  Bipolar affective disorder  Alcohol withdrawal  Anxiety  Suicidal ideation  Polysubstance abuse  Tobacco dependency  DVT  Hypertension  Wernicke's encephalopathy  Seizures  Flail chest  Chronic pain syndrome    Past Surgical History:    Chest surgery  Hip surgery  Knee surgery  ORIF, ankle right  Remove hardware ankle    Family History:    Mother - substance abuse  Father - substance abuse  Sister - substance  abuse    Social History:  The patient presents to the ED via EMS alone.  He lives in an apartment.   Alcohol use: drinks alcohol, most recent 1 liter vodka in 1.5 days  PCP: No Ref-Primary, Physician     Physical Exam     Patient Vitals for the past 24 hrs:   BP Temp Temp src Pulse Resp SpO2 Weight   11/23/22 1209 -- -- -- -- -- -- 85.3 kg (188 lb)   11/23/22 1200 (!) 123/105 -- -- 101 -- 96 % --   11/23/22 1145 127/86 -- -- 101 18 96 % --   11/23/22 1130 126/71 -- -- 106 11 95 % --   11/23/22 1119 119/71 -- -- 96 -- 100 % --   11/23/22 1104 (!) 154/57 -- -- 104 18 100 % --   11/23/22 1049 110/73 -- -- 85 20 96 % --   11/23/22 1004 (!) 122/94 -- -- -- -- 100 % --   11/23/22 0942 (!) 126/91 -- -- -- -- 95 % --   11/23/22 0912 (!) 127/96 -- -- -- -- 98 % --   11/23/22 0900 (!) 128/90 97.8  F (36.6  C) Oral 87 20 100 % --       Physical Exam      Eyes:    Conjunctiva normal  Neck:    Supple, no meningismus.     CV:     Regular rate and rhythm.      No murmurs, rubs or gallops.       No unilateral leg swelling.       2+ radial pulses bilateral.       No lower extremity edema.  PULM:    Clear to auscultation bilateral.       No respiratory distress.      Good air exchange.     No rales or wheezing.     No stridor.  ABD:    Soft, non-distended.       Mild upper abdominal tenderness     No pulsatile masses.       No rebound, guarding or rigidity.  MSK:     No gross deformity to all four extremities.   LYMPH:   No cervical lymphadenopathy.  NEURO:   Alert & O x 3     Speech is clear     Resting tremor to upper extremities bilateral.      Good muscle tone, no atrophy.  Skin:    Warm, dry and intact.    Psych:    Anxious        Emergency Department Course   ECG  ECG taken at 1018, ECG read at 1021  Sinus rhythm with frequent premature ventricular complexes. Left axis deviation. Abnormal ECG.   Rate 94 bpm. MI interval 166 ms. QRS duration 86 ms. QT/QTc 362/452 ms. P-R-T axes 32 -39 51.     Laboratory:  Labs Ordered and  Resulted from Time of ED Arrival to Time of ED Departure   COMPREHENSIVE METABOLIC PANEL - Abnormal       Result Value    Sodium 140      Potassium 3.6      Chloride 93 (*)     Carbon Dioxide (CO2) 16 (*)     Anion Gap 31 (*)     Urea Nitrogen 17.0      Creatinine 0.81      Calcium 9.0      Glucose 124 (*)     Alkaline Phosphatase 73       (*)     ALT 85 (*)     Protein Total 6.8      Albumin 4.7      Bilirubin Total 1.8 (*)     GFR Estimate >90     TROPONIN T, HIGH SENSITIVITY - Abnormal    Troponin T, High Sensitivity 33 (*)    BLOOD GAS VENOUS WITH OXYHEMOGLOBIN - Abnormal    pH Venous 7.56 (*)     pCO2 Venous 20 (*)     pO2 Venous 68 (*)     Bicarbonate Venous 18 (*)     FIO2 0      Oxyhemoglobin Venous 91 (*)     Base Excess/Deficit (+/-) -1.8      Lactic Acid 7.4 (*)    KETONE BETA-HYDROXYBUTYRATE QUANTITATIVE, RAPID - Abnormal    Ketone (Beta-Hydroxybutyrate) Quantitative 2.8 (*)    LIPASE - Abnormal    Lipase 83 (*)    ETHYL ALCOHOL LEVEL - Abnormal    Alcohol ethyl 0.23 (*)    PHOSPHORUS - Abnormal    Phosphorus 1.7 (*)    INR - Normal    INR 1.03     COVID-19 VIRUS (CORONAVIRUS) BY PCR - Normal    SARS CoV2 PCR Negative     MAGNESIUM - Normal    Magnesium 1.8     CBC WITH PLATELETS AND DIFFERENTIAL    WBC Count 6.3      RBC Count 5.46      Hemoglobin 16.8      Hematocrit 48.6      MCV 89      MCH 30.8      MCHC 34.6      RDW 13.5      Platelet Count 151      % Neutrophils 74      % Lymphocytes 13      % Monocytes 13      % Eosinophils 0      % Basophils 0      % Immature Granulocytes 0      NRBCs per 100 WBC 0      Absolute Neutrophils 4.6      Absolute Lymphocytes 0.8      Absolute Monocytes 0.8      Absolute Eosinophils 0.0      Absolute Basophils 0.0      Absolute Immature Granulocytes 0.0      Absolute NRBCs 0.0          Emergency Department Course:           Reviewed:  I reviewed nursing notes, vitals, past medical history and Care Everywhere    Assessments:  0856 I obtained history and  "examined the patient as noted above.   1102 I rechecked the patient and explained findings.  1142 I rechecked the patient.     Consults:  1219 I spoke to Dr. Machado, hospitalist, who accepts the patient.    Interventions:  0936 Valium 5 mg IV  0941 Protonix 80 mg IV  0942 LR bolus 1L IV  1005 Sandostatin 50 mcg IV  1009 Compazine 10 mg IV  1009 Benadryl 25 mg IV  1122 Valium 10 mg IV  1126 Zofran 4 mg IV  1128 Sandostatin 1,250 mcg in D5W 250 mL IV  1128 Protonix 80 mg in sodium chloride 0.9% 100 mL infusion IV    Disposition:  The patient was admitted to the hospital under the care of Dr. Machado.     Impression & Plan     Medical Decision Makin-year-old male presents to the ED with concerns of alcohol withdrawal and reported hematemesis/melena.  In regards to his upper GI bleed, patient had no recurrent vomiting or bowel movement in ED to suggest ongoing bleeding.  He is mildly tachycardic but never hypotensive.  Due to the history of alcohol abuse, patient was given Protonix bolus/infusion and octreotide bolus/infusion in event of esophageal varices.  Hemoglobin is reassuring and there is no inappropriate elevation of BUN in relation to creatinine to suggest severe upper GI bleed.  Patient will require further serial hemoglobins for suspected stable upper GI bleed.    Unfortunately patient had evidence of alcohol withdrawal with tachycardia and resting tremor.  He later developed delirium in the ED in which he was hallucinating stating that \"ants were all over him.\"  He was given Valium with improvement of symptoms.  Thiamine and folate also provided.  [place] [facility] [place] treatment of his alcohol withdrawal and delirium tremens.      Diagnosis:    ICD-10-CM    1. Alcohol withdrawal syndrome, with delirium (H)  F10.931       2. Delirium tremens (H)  F10.931             Scribe Disclosure:  I, Jodi Barker, am serving as a scribe at 8:56 AM on 2022 to document services " personally performed by Adarsh Rodriguez MD based on my observations and the provider's statements to me.              Adarsh Rodriguez MD  11/23/22 9329

## 2022-11-23 NOTE — ED TRIAGE NOTES
Pt arrives from home via EMS with history of ETOH, presents with alcohol withdrawal, black emesis and stool X1 day. Pt reports drinking X1 month, has drank 1.5L of vodka over the last day and a half. Last drink was last night. Pt reports he hasn't taken his suboxone in 3 days. Pt appears anxious. A&OX4.       Triage Assessment     Row Name 11/23/22 0903       Triage Assessment (Adult)    Airway WDL WDL       Respiratory WDL    Respiratory WDL WDL       Skin Circulation/Temperature WDL    Skin Circulation/Temperature WDL WDL       Cardiac WDL    Cardiac WDL WDL       Peripheral/Neurovascular WDL    Peripheral Neurovascular WDL WDL       Cognitive/Neuro/Behavioral WDL    Cognitive/Neuro/Behavioral WDL X;mood/behavior    Orientation oriented x 4    Mood/Behavior anxious

## 2022-11-23 NOTE — H&P
[facility]    Hospitalist Admission Note    Name: Andrea Pham    MRN: [id number]  YOB: 1965    Age: 57 year old  Date of admission: 11/23/2022  Primary care provider: No Ref-Primary, Physician  Admitting physician : Brian Machado M.D. ,M.B.A.       Brief summary of admission assessment/MDM:    Andrea Pham is a 57 year old  male with a significant past medical history of Alcohol use disorder, on Suboxone for narcotic addiction, who presents from home with alcohol withdraw concern, black emesis and stool.    On presentation to the ER, patient arrived hypertensive at mildly tachycardic.  Labs showed hemoglobin of 16.8, abnormal LFTs, lactate was 7.4, quantitative ketones of 2.8.    Patient was given IV fluid, Valium IV, octreotide and Protonix infusion started in the emergency department.  [facility] admission was requested for further management.      Assessment and Plan       #1.  Concern for GI bleed: Gives history of tarry stool and black emesis.  Rule out upper GI bleed given history of alcohol use disorder.  -- Unclear if he has chronic liver disease.  -- Hemoglobin is elevated indicating hemoconcentration.  No active bleeding noted in the ER.  -- Admit to [facility] bed, continue octreotide and Protonix for now until seen by GI team.  We will consult GI team for further assessment and recommendation.      #2.  Alcohol use disorder with acute withdrawal syndrome  -- He admits drinking heavily.  1.5 L of vodka over the last 2 and half.  Last drink was last night.  -- Patient is tremulous, anxious, tachycardic with nausea and vomiting.  Concern for impending severe withdrawal versus delirium tremens.  Blood alcohol level 0.23  -- We will admit him to [facility] bed, continue to monitor closely on alcohol withdrawal treatment protocol including as needed lorazepam and gabapentin taper.  -- Patient was previously in alcohol rehab facility about a year ago and is not  interested in going back again.  We will get chemical dependency provider to see him for further recommendations likely on Friday.        #3.  Substance use disorder: History of narcotic abuse on Suboxone.  Patient did not take his Suboxone over the last 3 days.  -- Complaining of abdominal pain.  Discussed resumption of Suboxone once verified by pharmacy team.  -- Pharmacy team consulted to assist with medication reconciliation and resume his Suboxone soon as possible  -- Continue to monitor    #4.  Elevated lactic acid: Serum lactate 7.4 on admission.  Likely from alcohol toxicity.  No indication of sepsis or septic process.  -- Continue IV fluid hydration, monitor lactic acid    5.  Elevated ketone at 2.8: Likely starvation ketosis, monitor for now.  Dextrose containing fluid given in the ER.    6.  Abnormal LFTs: AST elevated at 106, ALT 5.  Normal alk phos.  Bilirubin 1.8.  Suspect this is related to alcohol toxicity.  Underlying chronic liver disease cannot be excluded at this time  -- Monitor LFTs  -- We will get ultrasound of the abdomen for evaluation of alcohol-related cirrhosis    7.  Electrolyte imbalance: Hypophosphatemia at 1.7 on admission.    -- We will place on magnesium, potassium and phosphorus replacement protocols    8.  Elevated high-sensitivity troponin T: Troponin T elevated at 33.  No complaint of chest pain.  EKG reviewed and there is no indication of acute cardiac ischemia.  --Monitor on telemetry, trend troponin.    Chronic  comorbid medical conditions:   --Bipolar disorder/major depressive disorder: Was on tizanidine, Wellbutrin and Depakote at home.-May resume after medication reconciliation is completed  --Polysubstance abuse.  --Essential hypertension-on hydralazine PTA.                                                                   # Admission Status: Will admit patient to hospitalist service as inpatient as patient likely need over two mid night stay  in the hospital.    #  Diet:Keep NPO for now    # Activity:Advance activity as tolerated    # Education/Counseling :Discussed treatment plan with the patient    # Consults:Inpatient consult with gastroenterology    # VTE prophylactic measures:prophylaxis against venous thromboembolism with PCD       # Code Status:Full Code    # Disposition:anticipate discharge to home and Anticipate discharge in 3 days        Disclaimer: This note consists of symbols derived from keyboarding, dictation and/or voice recognition software. As a result, there may be errors in the script that have gone undetected. Please consider this when interpreting information found in this chart.             Chief Complaint:     Nausea and vomiting of black emesis and black tarry stool.  Concern for alcohol withdrawal symptoms.         History of Present Illness:      This patient is a 57 year old  male with a significant past medical history of alcohol disorder, bipolar disorder, polysubstance use disorder who presents with the following condition requiring a hospital admission:    Acute alcohol withdrawal syndrome with concern for GI bleed as well    Andrea is 57-year-old retired  who has been battling with polysubstance use disorder as well as alcohol use disorder presenting with symptoms of nausea and vomiting since last night.  Patient drinks heavily and his last drink was last night.  He finished 1.5 L of vodka in the last 1 day and half.  He lives at home and previously treated in alcohol rehab facility.  He has been anxious and did not take his Suboxone over the last 3 days now.  He started to throw up black emesis and few episodes of black tarry stool since yesterday.  He denies any fever or chills.  He has no chest pain but has mild abdominal pain.  He has been tremulous since last night.  He denies any shortness of breath.  He admits to daily drinking of alcohol and having chronic liver problem but he was not able to tell me if he had previous GI  work-up.         Past Medical History:     Past Medical History:   Diagnosis Date     Ataxia      Bipolar disorder (H)      Chemical dependency (H)      Chronic hip pain      Chronic pain syndrome      Cocaine abuse (H)      Depressive disorder      DTs (delirium tremens) (H)      DVT (deep venous thrombosis) (H) 5 y ago    left foot, treated with coumadin     Elevated LFTs      Flail chest     broken sterum, wiring      Hepatitis C virus infection, unspecified chronicity      Heroin abuse (H)      History of methamphetamine abuse (H)      History of total hip arthroplasty     right     Hypertension      Seizures (H)      Substance abuse (H)     alcohol, opiates     Wernicke's encephalopathy             Past Surgical History:     Past Surgical History:   Procedure Laterality Date     CHEST SURGERY  1987    flail chest, sterum fractured     HIP SURGERY       KNEE SURGERY       OPEN REDUCTION INTERNAL FIXATION ANKLE Right 12/24/2021    Procedure: Open reduction internal fixation right ankle syndesmotic injury;  Surgeon: Leroy Thomason MD;  Location:  OR     ORTHOPEDIC SURGERY      KIMBER right. [place] left shoulder surgery, debridement right shoulder, neck surgery- fracture cervical spine. 6 knee surgeries- bucket handle meniscal tear and debridement. 3 heel surgeries.      ORTHOPEDIC SURGERY       REMOVE HARDWARE ANKLE Right 7/13/2022    Procedure: removal of hardware of right ankle;  Surgeon: Leroy Thomason MD;  Location:  OR             Social History:     Social History     Tobacco Use     Smoking status: Every Day     Packs/day: 0.50     Years: 10.00     Pack years: 5.00     Types: Vaping Device, Cigarettes     Last attempt to quit: 4/19/2019     Years since quitting: 3.6     Smokeless tobacco: Never     Tobacco comments:     Quit 2019   Substance Use Topics     Alcohol use: Not Currently     Alcohol/week: 24.0 standard drinks     Types: 24 Cans of beer per week     Comment: drinks 1.75L grain alcohol daily since  4/28/18, clean 11 months as of 7/7/2022             Family History:     Family History   Problem Relation Age of Onset     Substance Abuse Mother      Substance Abuse Father      Substance Abuse Sister             Allergies:   No Known Allergies          Medications:        Prior to Admission medications    Medication Sig Last Dose Taking? Auth Provider Long Term End Date   acetaminophen (TYLENOL) 500 MG tablet Take 2 tablets (1,000 mg) by mouth 3 times daily  Patient taking differently: Take 1,000 mg by mouth every 6 hours as needed for pain   Sarah Crouch PA     aspirin (ASA) 325 MG tablet Take 1 tablet (325 mg) by mouth daily   Rashida Can PA-C     buprenorphine HCl-naloxone HCl (SUBOXONE) 8-2 MG per film Place 1 Film under the tongue 2 times daily   Unknown, Entered By History     buPROPion (WELLBUTRIN XL) 150 MG 24 hr tablet Take 150 mg by mouth every morning Takes with 300mg tablet for total dose = 450mg   Unknown, Entered By History No    buPROPion (WELLBUTRIN XL) 300 MG 24 hr tablet Take 300 mg by mouth every morning Takes with 150mg tablet for total dose = 450mg   Unknown, Entered By History No    divalproex sodium extended-release (DEPAKOTE ER) 500 MG 24 hr tablet Take 1,000 mg by mouth At Bedtime   Reported, Patient     folic acid (FOLVITE) 1 MG tablet Take 1 mg by mouth daily   Unknown, Entered By History     multivitamin w/minerals (THERA-VIT-M) tablet Take 1 tablet by mouth every evening   Unknown, Entered By History No    naloxone (NARCAN) 4 MG/0.1ML nasal spray Spray 1 spray (4 mg) into one nostril alternating nostrils once as needed for opioid reversal every 2-3 minutes until assistance arrives   Sarah Crouch PA Yes    OLANZapine (ZYPREXA) 5 MG tablet 1 tablet (5 mg) by Oral or Feeding Tube route every evening   Scott Parker MD Yes    pregabalin (LYRICA) 100 MG capsule Take 100 mg by mouth daily   Unknown, Entered By History     tamsulosin (FLOMAX) 0.4 MG capsule Take 1  capsule (0.4 mg) by mouth daily   Scott Parker MD     testosterone cypionate (DEPOTESTOSTERONE) 200 MG/ML injection Inject 200 mg into the muscle every 14 days   Unknown, Entered By History No    thiamine (B-1) 100 MG tablet Take 1 tablet (100 mg) by mouth daily   Scott Parker MD     traZODone (DESYREL) 50 MG tablet 1 tablet (50 mg) by Oral or Feeding Tube route every evening   Scott Parker MD Yes           Review of Systems:     A Comprehensive greater than 10 system review of systems was carried out.  Pertinent positives and negatives are noted above in HPI.  Otherwise negative for contributory information.           Physical Exam:     Vital signs were reviewed    Temp:  [97.8  F (36.6  C)] 97.8  F (36.6  C)  Pulse:  [] 101  Resp:  [11-20] 18  BP: (110-154)/() 123/105  SpO2:  [95 %-100 %] 96 %        GEN: He is awake and alert.  He is oriented x3.  He is tremulous and anxious  NECK:Supple ,no mass or thyromegaly     HEENT:  Normocephalic/atraumatic, no scleral icterus, no nasal discharge, mouth moist.    CV:  Regular rate and rhythm, no murmur or JVD.  S1 + S2 noted, no S3 or S4.    LUNGS:  Clear to auscultation bilaterally without rales/rhonchi/wheezing/retractions.  Symmetric chest rise on inhalation noted.    ABD:  Active bowel sounds, soft, non-tender/non-distended.  No rebound/guarding/rigidity.    EXT:  No edema.  No cyanosis.  No joint synovitis noted.Lower extremity pulses are normal bilaterally and     LGS: No cervical or axillary lymphadenopathy     SKIN:  Dry to touch, warm ,no exanthems noted in the visualized areas.    Neurologic:Grossly intact,non focal . No acute focal neurologic deficit     Psychaitric exam: Anxious and tremulous.             Data:       All laboratory and imaging data in the past 24 hours reviewed     Results for orders placed or performed during the hospital encounter of 11/23/22   Comprehensive metabolic panel     Status: Abnormal   Result Value Ref Range     Sodium 140 136 - 145 mmol/L    Potassium 3.6 3.4 - 5.3 mmol/L    Chloride 93 (L) 98 - 107 mmol/L    Carbon Dioxide (CO2) 16 (L) 22 - 29 mmol/L    Anion Gap 31 (H) 7 - 15 mmol/L    Urea Nitrogen 17.0 6.0 - 20.0 mg/dL    Creatinine 0.81 0.67 - 1.17 mg/dL    Calcium 9.0 8.6 - 10.0 mg/dL    Glucose 124 (H) 70 - 99 mg/dL    Alkaline Phosphatase 73 40 - 129 U/L     (H) 10 - 50 U/L    ALT 85 (H) 10 - 50 U/L    Protein Total 6.8 6.4 - 8.3 g/dL    Albumin 4.7 3.5 - 5.2 g/dL    Bilirubin Total 1.8 (H) <=1.2 mg/dL    GFR Estimate >90 >60 mL/min/1.73m2   Troponin T, High Sensitivity     Status: Abnormal   Result Value Ref Range    Troponin T, High Sensitivity 33 (H) <=22 ng/L   INR     Status: Normal   Result Value Ref Range    INR 1.03 0.85 - 1.15   Blood gas venous and oxyhgb     Status: Abnormal   Result Value Ref Range    pH Venous 7.56 (H) 7.32 - 7.43    pCO2 Venous 20 (L) 40 - 50 mm Hg    pO2 Venous 68 (H) 25 - 47 mm Hg    Bicarbonate Venous 18 (L) 21 - 28 mmol/L    FIO2 0     Oxyhemoglobin Venous 91 (H) 70 - 75 %    Base Excess/Deficit (+/-) -1.8 -7.7 - 1.9 mmol/L    Lactic Acid 7.4 (HH) 0.7 - 2.0 mmol/L   Ketone Beta-Hydroxybutyrate Quantitative     Status: Abnormal   Result Value Ref Range    Ketone (Beta-Hydroxybutyrate) Quantitative 2.8 (HH) 0.0 - 0.6 mmol/L   Asymptomatic COVID-19 Virus (Coronavirus) by PCR Nasopharyngeal     Status: Normal    Specimen: Nasopharyngeal; Swab   Result Value Ref Range    SARS CoV2 PCR Negative Negative    Narrative    Testing was performed using the Xpert Xpress SARS-CoV-2 Assay on the Cepheid Gene-Xpert Instrument Systems. Additional information about this Emergency Use Authorization (EUA) assay can be found via the Lab Guide. This test should be ordered for the detection of SARS-CoV-2 in individuals who meet SARS-CoV-2 clinical and/or epidemiological criteria as well as from individuals without symptoms or other reasons to suspect COVID-19. Test performance for asymptomatic  patients has only been established in anterior nasal swab specimens. This test is for in vitro diagnostic use under the FDA EUA for laboratories certified under CLIA to perform high complexity testing. This test has not been FDA cleared or approved. A negative result does not rule out the presence of PCR inhibitors in the specimen or target RNA concentration below the limit of detection for the assay. The possibility of a false negative should be considered if the patient's recent exposure or clinical presentation suggests COVID-19. This test was validated by the [facility]. This laboratory is certified under the Clinical Laboratory Improvement Amendments (CLIA) as qualified to perform high complexity laboratory testing.     CBC with platelets and differential     Status: None   Result Value Ref Range    WBC Count 6.3 4.0 - 11.0 10e3/uL    RBC Count 5.46 4.40 - 5.90 10e6/uL    Hemoglobin 16.8 13.3 - 17.7 g/dL    Hematocrit 48.6 40.0 - 53.0 %    MCV 89 78 - 100 fL    MCH 30.8 26.5 - 33.0 pg    MCHC 34.6 31.5 - 36.5 g/dL    RDW 13.5 10.0 - 15.0 %    Platelet Count 151 150 - 450 10e3/uL    % Neutrophils 74 %    % Lymphocytes 13 %    % Monocytes 13 %    % Eosinophils 0 %    % Basophils 0 %    % Immature Granulocytes 0 %    NRBCs per 100 WBC 0 <1 /100    Absolute Neutrophils 4.6 1.6 - 8.3 10e3/uL    Absolute Lymphocytes 0.8 0.8 - 5.3 10e3/uL    Absolute Monocytes 0.8 0.0 - 1.3 10e3/uL    Absolute Eosinophils 0.0 0.0 - 0.7 10e3/uL    Absolute Basophils 0.0 0.0 - 0.2 10e3/uL    Absolute Immature Granulocytes 0.0 <=0.4 10e3/uL    Absolute NRBCs 0.0 10e3/uL   Lipase     Status: Abnormal   Result Value Ref Range    Lipase 83 (H) 13 - 60 U/L   Alcohol level blood     Status: Abnormal   Result Value Ref Range    Alcohol ethyl 0.23 (H) <=0.01 g/dL   Magnesium     Status: Normal   Result Value Ref Range    Magnesium 1.8 1.7 - 2.3 mg/dL   Phosphorus     Status: Abnormal   Result Value Ref Range     Phosphorus 1.7 (L) 2.5 - 4.5 mg/dL   EKG 12-lead, tracing only     Status: None (Preliminary result)   Result Value Ref Range    Systolic Blood Pressure  mmHg    Diastolic Blood Pressure  mmHg    Ventricular Rate 94 BPM    Atrial Rate 94 BPM    OK Interval 166 ms    QRS Duration 86 ms     ms    QTc 452 ms    P Axis 32 degrees    R AXIS -39 degrees    T Axis 51 degrees    Interpretation ECG       Sinus rhythm with frequent Premature ventricular complexes  Left axis deviation  Abnormal ECG  When compared with ECG of 03-OCT-2022 00:05,  Premature ventricular complexes are now Present  Vent. rate has increased BY  50 BPM  QRS axis Shifted left     CBC with platelets differential     Status: None    Narrative    The following orders were created for panel order CBC with platelets differential.  Procedure                               Abnormality         Status                     ---------                               -----------         ------                     CBC with platelets and d...[[id number]]                      Final result                 Please view results for these tests on the individual orders.          EKG results: Normal sinus rhythm, no ischemic EKG pattern        All imaging studies reviewed by me.     =    Patient`s old medical records reviewed and case discussed with the ED physician.    ED course-Reviewed  and care plan discussed with Dr. Adarsh Rodriguez

## 2022-11-23 NOTE — ED NOTES
[facility]  ED Nurse Handoff Report    Andrea Pham is a 57 year old male   ED Chief complaint: Alcohol Withdrawl  . ED Diagnosis:   Final diagnoses:   Alcohol withdrawal syndrome, with delirium (H)   Delirium tremens (H)     Allergies: No Known Allergies    Code Status: Full Code  Activity level - Baseline/Home:  Independent. Activity Level - Current:   Assist X 1. Lift room needed: No. Bariatric: No   Needed: No   Isolation: No. Infection: Not Applicable.     Vital Signs:   Vitals:    11/23/22 1209 11/23/22 1215 11/23/22 1245 11/23/22 1300   BP:  (!) 139/100 (!) 140/90 (!) 148/113   Pulse:  98 90 92   Resp:    20   Temp:       TempSrc:       SpO2:   97% 97%   Weight: 85.3 kg (188 lb)          Cardiac Rhythm:  ,      Pain level:    Patient confused: Yes. Intermittently. Patient Falls Risk: Yes.   Elimination Status: Has voided   Patient Report - Initial Complaint: Andrea Pham is a 57 year old male with history of polysubstance abuse, alcohol abuse and hypertension who presents via EMS with hematemesis and black stool. He has history of alcohol abuse and states he has been drinking vodka, about a liter every 1.5 days, last drink was last night. Yesterday, he had his first episode of emesis that was black and had 3 other episodes that were black every time. He has also had 5 episodes of black stool yesterday. He does not take aspirin or blood thinners. He also notes of nausea, tremors, throat pain and diffuse upper abdominal pain that does not radiate. Denies current shortness of breath. No history of alcohol withdrawal seizure. No history of abdominal surgery. No history of pancreatitis or GI bleed. He has not had an endoscopy or colonoscopy. He reports that he typically takes Suboxone but has not been taking this. He lives in an apartment.    Focused Assessment: Intermittently confused. History of ETOH withdrawal. Denies seizure history.   Tests Performed: Labs, EKG. Abnormal Results:    Abnormal Labs Resulted from Time of ED Arrival to Time of ED Departure   COMPREHENSIVE METABOLIC PANEL - Abnormal       Result Value    Sodium 140      Potassium 3.6      Chloride 93 (*)     Carbon Dioxide (CO2) 16 (*)     Anion Gap 31 (*)     Urea Nitrogen 17.0      Creatinine 0.81      Calcium 9.0      Glucose 124 (*)     Alkaline Phosphatase 73       (*)     ALT 85 (*)     Protein Total 6.8      Albumin 4.7      Bilirubin Total 1.8 (*)     GFR Estimate >90     TROPONIN T, HIGH SENSITIVITY - Abnormal    Troponin T, High Sensitivity 33 (*)    BLOOD GAS VENOUS WITH OXYHEMOGLOBIN - Abnormal    pH Venous 7.56 (*)     pCO2 Venous 20 (*)     pO2 Venous 68 (*)     Bicarbonate Venous 18 (*)     FIO2 0      Oxyhemoglobin Venous 91 (*)     Base Excess/Deficit (+/-) -1.8      Lactic Acid 7.4 (*)    KETONE BETA-HYDROXYBUTYRATE QUANTITATIVE, RAPID - Abnormal    Ketone (Beta-Hydroxybutyrate) Quantitative 2.8 (*)    LIPASE - Abnormal    Lipase 83 (*)    ETHYL ALCOHOL LEVEL - Abnormal    Alcohol ethyl 0.23 (*)    PHOSPHORUS - Abnormal    Phosphorus 1.7 (*)       No orders to display       Treatments provided: See MAR. CIWA Protocol  Family Comments: N/A  OBS brochure/video discussed/provided to patient:  N/A  ED Medications:   Medications   pantoprazole (PROTONIX) 80 mg in sodium chloride 0.9 % 100 mL infusion (8 mg/hr Intravenous New Bag 11/23/22 1128)   octreotide (sandoSTATIN) 1,250 mcg in D5W 250 mL (50 mcg/hr Intravenous New Bag 11/23/22 1128)   thiamine (B-1) injection 100 mg (has no administration in time range)   folic acid injection 1 mg (has no administration in time range)   dextrose 5% and 0.9% NaCl infusion (has no administration in time range)   haloperidol lactate (HALDOL) injection 2.5-5 mg (has no administration in time range)   diazepam (VALIUM) tablet 10 mg (has no administration in time range)     Or   diazepam (VALIUM) injection 5-10 mg (has no administration in time range)   sodium phosphate 9 mmol  in 250 mL D5W intermittent infusion (has no administration in time range)   lactated ringers BOLUS 1,000 mL (0 mLs Intravenous Stopped 11/23/22 1213)   pantoprazole (PROTONIX) IV push injection 80 mg (80 mg Intravenous Given 11/23/22 0941)   octreotide (sandoSTATIN) injection 50 mcg (50 mcg Intravenous Given 11/23/22 1005)   diazepam (VALIUM) injection 5 mg (5 mg Intravenous Given 11/23/22 0936)   prochlorperazine (COMPAZINE) injection 10 mg (10 mg Intravenous Given 11/23/22 1009)   diphenhydrAMINE (BENADRYL) injection 25 mg (25 mg Intravenous Given 11/23/22 1009)   diazepam (VALIUM) injection 10 mg (10 mg Intravenous Given 11/23/22 1122)   ondansetron (ZOFRAN) injection 4 mg (4 mg Intravenous Given 11/23/22 1126)     Drips infusing:  Yes. Protonix, Octreotide  For the majority of the shift, the patient's behavior Yellow. Interventions performed were: CIWA protocol. Reassurance.     Sepsis treatment initiated: No     Patient tested for COVID 19 prior to admission: YES    ED Nurse Name/Phone Number: Jada Fay RN,   1:31 PM    RECEIVING UNIT ED HANDOFF REVIEW    Above ED Nurse Handoff Report was reviewed: Yes  Reviewed by: Adrian Vo RN on November 23, 2022 at 7:22 PM

## 2022-11-24 LAB
ALBUMIN SERPL BCG-MCNC: 3.5 G/DL (ref 3.5–5.2)
ALP SERPL-CCNC: 57 U/L (ref 40–129)
ALT SERPL W P-5'-P-CCNC: 66 U/L (ref 10–50)
ANION GAP SERPL CALCULATED.3IONS-SCNC: 10 MMOL/L (ref 7–15)
ANION GAP SERPL CALCULATED.3IONS-SCNC: 9 MMOL/L (ref 7–15)
AST SERPL W P-5'-P-CCNC: 65 U/L (ref 10–50)
BASE EXCESS BLDV CALC-SCNC: 4.3 MMOL/L (ref -7.7–1.9)
BASOPHILS # BLD AUTO: 0 10E3/UL (ref 0–0.2)
BASOPHILS NFR BLD AUTO: 1 %
BILIRUB SERPL-MCNC: 2 MG/DL
BUN SERPL-MCNC: 14.1 MG/DL (ref 6–20)
BUN SERPL-MCNC: 14.8 MG/DL (ref 6–20)
CALCIUM SERPL-MCNC: 7.4 MG/DL (ref 8.6–10)
CALCIUM SERPL-MCNC: 8.3 MG/DL (ref 8.6–10)
CHLORIDE SERPL-SCNC: 101 MMOL/L (ref 98–107)
CHLORIDE SERPL-SCNC: 106 MMOL/L (ref 98–107)
CREAT SERPL-MCNC: 0.79 MG/DL (ref 0.67–1.17)
CREAT SERPL-MCNC: 0.82 MG/DL (ref 0.67–1.17)
DEPRECATED HCO3 PLAS-SCNC: 25 MMOL/L (ref 22–29)
DEPRECATED HCO3 PLAS-SCNC: 27 MMOL/L (ref 22–29)
EOSINOPHIL # BLD AUTO: 0 10E3/UL (ref 0–0.7)
EOSINOPHIL NFR BLD AUTO: 1 %
ERYTHROCYTE [DISTWIDTH] IN BLOOD BY AUTOMATED COUNT: 14 % (ref 10–15)
GFR SERPL CREATININE-BSD FRML MDRD: >90 ML/MIN/1.73M2
GFR SERPL CREATININE-BSD FRML MDRD: >90 ML/MIN/1.73M2
GLUCOSE BLDC GLUCOMTR-MCNC: 159 MG/DL (ref 70–99)
GLUCOSE BLDC GLUCOMTR-MCNC: 171 MG/DL (ref 70–99)
GLUCOSE BLDC GLUCOMTR-MCNC: 172 MG/DL (ref 70–99)
GLUCOSE BLDC GLUCOMTR-MCNC: 184 MG/DL (ref 70–99)
GLUCOSE SERPL-MCNC: 162 MG/DL (ref 70–99)
GLUCOSE SERPL-MCNC: 181 MG/DL (ref 70–99)
HCO3 BLDV-SCNC: 30 MMOL/L (ref 21–28)
HCT VFR BLD AUTO: 36.9 % (ref 40–53)
HGB BLD-MCNC: 12.2 G/DL (ref 13.3–17.7)
HGB BLD-MCNC: 12.6 G/DL (ref 13.3–17.7)
HGB BLD-MCNC: 14.2 G/DL (ref 13.3–17.7)
HGB BLD-MCNC: 14.5 G/DL (ref 13.3–17.7)
IMM GRANULOCYTES # BLD: 0 10E3/UL
IMM GRANULOCYTES NFR BLD: 1 %
LACTATE SERPL-SCNC: 1 MMOL/L (ref 0.7–2)
LYMPHOCYTES # BLD AUTO: 0.4 10E3/UL (ref 0.8–5.3)
LYMPHOCYTES NFR BLD AUTO: 10 %
MAGNESIUM SERPL-MCNC: 1.8 MG/DL (ref 1.7–2.3)
MCH RBC QN AUTO: 31.3 PG (ref 26.5–33)
MCHC RBC AUTO-ENTMCNC: 34.1 G/DL (ref 31.5–36.5)
MCV RBC AUTO: 92 FL (ref 78–100)
MONOCYTES # BLD AUTO: 0.3 10E3/UL (ref 0–1.3)
MONOCYTES NFR BLD AUTO: 8 %
NEUTROPHILS # BLD AUTO: 3.3 10E3/UL (ref 1.6–8.3)
NEUTROPHILS NFR BLD AUTO: 79 %
NRBC # BLD AUTO: 0 10E3/UL
NRBC BLD AUTO-RTO: 0 /100
O2/TOTAL GAS SETTING VFR VENT: 0 %
PCO2 BLDV: 46 MM HG (ref 40–50)
PH BLDV: 7.42 [PH] (ref 7.32–7.43)
PHOSPHATE SERPL-MCNC: 2.4 MG/DL (ref 2.5–4.5)
PLAT MORPH BLD: NORMAL
PLATELET # BLD AUTO: 83 10E3/UL (ref 150–450)
PO2 BLDV: 73 MM HG (ref 25–47)
POTASSIUM SERPL-SCNC: 3.5 MMOL/L (ref 3.4–5.3)
POTASSIUM SERPL-SCNC: 3.6 MMOL/L (ref 3.4–5.3)
PROT SERPL-MCNC: 5.4 G/DL (ref 6.4–8.3)
RBC # BLD AUTO: 4.02 10E6/UL (ref 4.4–5.9)
RBC MORPH BLD: NORMAL
SODIUM SERPL-SCNC: 138 MMOL/L (ref 136–145)
SODIUM SERPL-SCNC: 140 MMOL/L (ref 136–145)
WBC # BLD AUTO: 4.2 10E3/UL (ref 4–11)

## 2022-11-24 PROCEDURE — 85018 HEMOGLOBIN: CPT | Performed by: STUDENT IN AN ORGANIZED HEALTH CARE EDUCATION/TRAINING PROGRAM

## 2022-11-24 PROCEDURE — 99233 SBSQ HOSP IP/OBS HIGH 50: CPT | Performed by: STUDENT IN AN ORGANIZED HEALTH CARE EDUCATION/TRAINING PROGRAM

## 2022-11-24 PROCEDURE — 36415 COLL VENOUS BLD VENIPUNCTURE: CPT | Performed by: STUDENT IN AN ORGANIZED HEALTH CARE EDUCATION/TRAINING PROGRAM

## 2022-11-24 PROCEDURE — 258N000003 HC RX IP 258 OP 636: Performed by: HOSPITALIST

## 2022-11-24 PROCEDURE — 83605 ASSAY OF LACTIC ACID: CPT | Performed by: STUDENT IN AN ORGANIZED HEALTH CARE EDUCATION/TRAINING PROGRAM

## 2022-11-24 PROCEDURE — 258N000003 HC RX IP 258 OP 636: Performed by: INTERNAL MEDICINE

## 2022-11-24 PROCEDURE — 83735 ASSAY OF MAGNESIUM: CPT | Performed by: STUDENT IN AN ORGANIZED HEALTH CARE EDUCATION/TRAINING PROGRAM

## 2022-11-24 PROCEDURE — 120N000013 HC R&B IMCU

## 2022-11-24 PROCEDURE — 36415 COLL VENOUS BLD VENIPUNCTURE: CPT | Performed by: INTERNAL MEDICINE

## 2022-11-24 PROCEDURE — 250N000011 HC RX IP 250 OP 636: Performed by: INTERNAL MEDICINE

## 2022-11-24 PROCEDURE — 250N000013 HC RX MED GY IP 250 OP 250 PS 637: Performed by: INTERNAL MEDICINE

## 2022-11-24 PROCEDURE — 82803 BLOOD GASES ANY COMBINATION: CPT | Performed by: STUDENT IN AN ORGANIZED HEALTH CARE EDUCATION/TRAINING PROGRAM

## 2022-11-24 PROCEDURE — 80053 COMPREHEN METABOLIC PANEL: CPT | Performed by: STUDENT IN AN ORGANIZED HEALTH CARE EDUCATION/TRAINING PROGRAM

## 2022-11-24 PROCEDURE — 84100 ASSAY OF PHOSPHORUS: CPT | Performed by: STUDENT IN AN ORGANIZED HEALTH CARE EDUCATION/TRAINING PROGRAM

## 2022-11-24 PROCEDURE — C9113 INJ PANTOPRAZOLE SODIUM, VIA: HCPCS | Performed by: INTERNAL MEDICINE

## 2022-11-24 PROCEDURE — 250N000013 HC RX MED GY IP 250 OP 250 PS 637: Performed by: STUDENT IN AN ORGANIZED HEALTH CARE EDUCATION/TRAINING PROGRAM

## 2022-11-24 PROCEDURE — 85018 HEMOGLOBIN: CPT | Performed by: INTERNAL MEDICINE

## 2022-11-24 PROCEDURE — 250N000011 HC RX IP 250 OP 636

## 2022-11-24 PROCEDURE — 250N000011 HC RX IP 250 OP 636: Mod: JA | Performed by: INTERNAL MEDICINE

## 2022-11-24 RX ORDER — NALOXONE HYDROCHLORIDE 0.4 MG/ML
INJECTION, SOLUTION INTRAMUSCULAR; INTRAVENOUS; SUBCUTANEOUS
Status: COMPLETED
Start: 2022-11-24 | End: 2022-11-24

## 2022-11-24 RX ORDER — NALOXONE HYDROCHLORIDE 0.4 MG/ML
0.4 INJECTION, SOLUTION INTRAMUSCULAR; INTRAVENOUS; SUBCUTANEOUS ONCE
Status: COMPLETED | OUTPATIENT
Start: 2022-11-24 | End: 2022-11-24

## 2022-11-24 RX ADMIN — LORAZEPAM 2 MG: 0.5 TABLET ORAL at 03:20

## 2022-11-24 RX ADMIN — GABAPENTIN 900 MG: 300 CAPSULE ORAL at 13:17

## 2022-11-24 RX ADMIN — SODIUM CHLORIDE 8 MG/HR: 9 INJECTION, SOLUTION INTRAVENOUS at 00:10

## 2022-11-24 RX ADMIN — SODIUM CHLORIDE 8 MG/HR: 9 INJECTION, SOLUTION INTRAVENOUS at 10:34

## 2022-11-24 RX ADMIN — SODIUM CHLORIDE 500 ML: 9 INJECTION, SOLUTION INTRAVENOUS at 20:00

## 2022-11-24 RX ADMIN — LORAZEPAM 2 MG: 2 INJECTION INTRAMUSCULAR; INTRAVENOUS at 05:55

## 2022-11-24 RX ADMIN — OCTREOTIDE ACETATE 50 MCG/HR: 200 INJECTION, SOLUTION INTRAVENOUS; SUBCUTANEOUS at 12:44

## 2022-11-24 RX ADMIN — BUPRENORPHINE AND NALOXONE 1 FILM: 8; 2 FILM, SOLUBLE BUCCAL; SUBLINGUAL at 12:03

## 2022-11-24 RX ADMIN — THIAMINE HCL TAB 100 MG 100 MG: 100 TAB at 12:00

## 2022-11-24 RX ADMIN — CLONIDINE HYDROCHLORIDE 0.1 MG: 0.1 TABLET ORAL at 12:00

## 2022-11-24 RX ADMIN — SODIUM CHLORIDE 8 MG/HR: 9 INJECTION, SOLUTION INTRAVENOUS at 21:57

## 2022-11-24 RX ADMIN — POTASSIUM & SODIUM PHOSPHATES POWDER PACK 280-160-250 MG 1 PACKET: 280-160-250 PACK at 16:22

## 2022-11-24 RX ADMIN — CLONIDINE HYDROCHLORIDE 0.1 MG: 0.1 TABLET ORAL at 16:22

## 2022-11-24 RX ADMIN — MULTIPLE VITAMINS W/ MINERALS TAB 1 TABLET: TAB at 12:00

## 2022-11-24 RX ADMIN — DIVALPROEX SODIUM 1000 MG: 500 TABLET, FILM COATED, EXTENDED RELEASE ORAL at 22:05

## 2022-11-24 RX ADMIN — LORAZEPAM 1 MG: 0.5 TABLET ORAL at 13:17

## 2022-11-24 RX ADMIN — DEXTROSE AND SODIUM CHLORIDE: 5; 900 INJECTION, SOLUTION INTRAVENOUS at 06:30

## 2022-11-24 RX ADMIN — NALOXONE HYDROCHLORIDE 0.4 MG: 0.4 INJECTION, SOLUTION INTRAMUSCULAR; INTRAVENOUS; SUBCUTANEOUS at 15:07

## 2022-11-24 RX ADMIN — CLONIDINE HYDROCHLORIDE 0.1 MG: 0.1 TABLET ORAL at 02:29

## 2022-11-24 RX ADMIN — FOLIC ACID 1 MG: 1 TABLET ORAL at 12:00

## 2022-11-24 RX ADMIN — POTASSIUM & SODIUM PHOSPHATES POWDER PACK 280-160-250 MG 1 PACKET: 280-160-250 PACK at 22:05

## 2022-11-24 RX ADMIN — LORAZEPAM 2 MG: 2 INJECTION INTRAMUSCULAR; INTRAVENOUS at 02:32

## 2022-11-24 RX ADMIN — LORAZEPAM 2 MG: 2 INJECTION INTRAMUSCULAR; INTRAVENOUS at 04:24

## 2022-11-24 RX ADMIN — BUPROPION HYDROCHLORIDE 450 MG: 150 TABLET, EXTENDED RELEASE ORAL at 12:00

## 2022-11-24 RX ADMIN — GABAPENTIN 900 MG: 300 CAPSULE ORAL at 04:24

## 2022-11-24 RX ADMIN — TAMSULOSIN HYDROCHLORIDE 0.4 MG: 0.4 CAPSULE ORAL at 12:00

## 2022-11-24 ASSESSMENT — ACTIVITIES OF DAILY LIVING (ADL)
ADLS_ACUITY_SCORE: 24

## 2022-11-24 NOTE — PLAN OF CARE
Goal Outcome Evaluation:      Plan of Care Reviewed With: patient    Overall Patient Progress: no changeOverall Patient Progress: no change  Patients orientation is inconsistent. Alcohol withdrawal. RRT today for hypotension. Narcan given for reversal of saboxone.  Saboxone on hold as is clonidine. Ativan given though out the night and 2mg oral at 1317, see CIWA scores. Straight cath for 1000 in bladder. Octreotide, Protonix and D5 all infusing. Continue POC.    2L NaCl 0.9 given per verbal order. Straight cath for 1000 scanned in bladder. Verbal order for straight cath. 1350 out.

## 2022-11-24 NOTE — PROGRESS NOTES
[facility]    Medicine Progress Note - Hospitalist Service    Date of Admission:  11/23/2022    Assessment & Plan     Andrea Pham is a 57 year old  male with a significant past medical history of Alcohol use disorder, on Suboxone for narcotic addiction, who presents from home with alcohol withdraw concern, black emesis and stool.  He drinks heavily and last drink was the night before admission.     On presentation to the ER, patient arrived hypertensive, tachycardic, tremulous and anxious with nausea and vomiting.  Labs showed hemoglobin of 16.8, abnormal LFTs, lactate was 7.4, quantitative ketones of 2.8.  Blood alcohol level was 0.23.     Patient was given IV fluid, Valium IV, octreotide and Protonix infusion started in the emergency department and was admitted to [facility] [place].        1. Black tarry stools.    Suspected GI bleed.  Hemoconcentrated on presentation with hemoglobin 16.8--> 14.6--> 14.5.  This does not appear to be an hemodynamically significant bleed.    Currently on octreotide and Protonix drip, GI consult appreciated.    Clear liquid diet today, may get endoscopy tomorrow.    2. Alcohol use disorder with withdrawal symptoms.    On CIWA protocol with gabapentin taper and as needed lorazepam, last dose this morning.  He also needed doses of Haldol and Zyprexa but otherwise hemodynamically stable.    3. Substance abuse disorder.    Prior to admission on Suboxone, resumed.    4. Depression/anxiety.    Resumed Wellbutrin, Depakote, trazodone and Zyprexa.    5. Lactic acidosis.    Serum lactate 7.4 on presentation, secondary to alcohol use.  Improved to 1.8 with hydration.    6. Alcoholic liver disease.    AST//85 with total bilirubin of 1.8.  Ultrasound liver pending.    7. Alcoholic ketosis.    Now resolved.    8. History of hepatitis C.  Is not clear if he ever had treatment.  Reassess once mental status is improved.    9. BPH.    On Flomax.         Diet: NPO for  Medical/Clinical Reasons Except for: Ice Chips, Meds    DVT Prophylaxis: Pneumatic Compression Devices  Lino Catheter: Not present  Central Lines: None  Cardiac Monitoring: ACTIVE order. Indication: Electrolyte Imbalance (24 hours)- Magnesium <1.3 mg/ml; Potassium < =2.8 or > 5.5 mg/ml  Code Status: Full Code      Disposition Plan     Expected Discharge Date: 11/27/2022      Destination: home          The patient's care was discussed with the Patient.    Scott Parker MD  Hospitalist Service  [facility]  Securely message with the Vocera Web Console (learn more here)  Text page via [employer] Paging/Directory         Clinically Significant Risk Factors Present on Admission             # Anion Gap Metabolic Acidosis: Highest Anion Gap = 31 mmol/L (Ref range: 7-15) in last 2 days, will monitor and treat as appropriate                  ______________________________________________________________________    Interval History   No further bleeding overnight.  Somnolent but easily arousable and oriented x2-3.  Complains of mild epigastric pain.    Data reviewed today: I reviewed all medications, new labs and imaging results over the last 24 hours.     Physical Exam   Vital Signs: Temp: 98.6  F (37  C) Temp src: Axillary BP: 131/86 Pulse: 92   Resp: 24 SpO2: 94 % O2 Device: Nasal cannula Oxygen Delivery: 2 LPM  Weight: 191 lbs 9.6 oz  General Appearance: Somnolent but easily arousable, oriented x2-3..  Eyes: No icterus  HEENT: Moist mucosa  Respiratory: Clear to auscultation  Cardiovascular: S1 and S2 is normal  GI: Soft and nontender  Lymph/Hematologic: Not examined  Genitourinary: Not examined  Skin: No rash  Musculoskeletal: No edema  Neurologic: Nonfocal  Psychiatric: Normal mood      Data   Recent Labs   Lab 11/24/22  1032 11/24/22  0800 11/24/22  0623 11/23/22  2201 11/23/22  0933   WBC  --   --   --   --  6.3   HGB 14.2  --  14.5 14.6 16.8   MCV  --   --   --   --  89   PLT  --   --   --   --  151    INR  --   --   --   --  1.03     --   --   --  140   POTASSIUM 3.6  --   --   --  3.6   CHLORIDE 101  --   --   --  93*   CO2 27  --   --   --  16*   BUN 14.8  --   --   --  17.0   CR 0.79  --   --   --  0.81   ANIONGAP 10  --   --   --  31*   JOHANA 8.3*  --   --   --  9.0   * 172*  --   --  124*   ALBUMIN 3.5  --   --   --  4.7   PROTTOTAL 5.4*  --   --   --  6.8   BILITOTAL 2.0*  --   --   --  1.8*   ALKPHOS 57  --   --   --  73   ALT 66*  --   --   --  85*   AST 65*  --   --   --  106*   LIPASE  --   --   --   --  83*     No results found for this or any previous visit (from the past 24 hour(s)).  Medications     dextrose 5% and 0.9% NaCl 125 mL/hr at 11/24/22 0724     octreotide (sandoSTATIN) infusion ADULT 50 mcg/hr (11/24/22 0724)     pantoprazole (PROTONIX) infusion ADULT/PEDS GREATER than or EQUAL to 45 kg 8 mg/hr (11/24/22 1034)       buprenorphine HCl-naloxone HCl  1 Film Sublingual BID     buPROPion  450 mg Oral QAM     cloNIDine  0.1 mg Oral Q8H     divalproex sodium extended-release  1,000 mg Oral At Bedtime     folic acid  1 mg Oral Daily     [START ON 11/30/2022] gabapentin  100 mg Oral Q8H     [START ON 11/28/2022] gabapentin  300 mg Oral Q8H     [START ON 11/26/2022] gabapentin  600 mg Oral Q8H     gabapentin  900 mg Oral Q8H     multivitamin w/minerals  1 tablet Oral Daily     OLANZapine  5 mg Oral or Feeding Tube QPM     sodium chloride (PF)  3 mL Intracatheter Q8H     tamsulosin  0.4 mg Oral Daily     thiamine  100 mg Oral Daily     traZODone  50 mg Oral or Feeding Tube QPM

## 2022-11-24 NOTE — CONSULTS
GASTROENTEROLOGY CONSULTATION      Andrea Pham  [id number] [address] [facility]   [place] [address]  57 year old male     Admission Date/Time: 11/23/2022  Primary Care Provider: No Ref-Primary, Physician     We were asked to see the patient in consultation by Dr. Parker for evaluation of GI bleed.    ASSESSMENT:    1.  Reported black hematemesis and melena- hemoglobin 16-14, no recurrence of GI bleeding.  He has a history of Sherri-Gonzales tear, given his alcohol use and intoxication, this is a possibility, particularly given no further bleeding.  He currently is in significant withdrawal, unable to perform EGD safely.  2.  Alcoholic hepatitis-MELD is 9, discriminant function is low.  Just supportive cares at this point.  No need for steroids.  3.  History hepatitis C- we have seen him in consult a few years ago for hepatitis C, its not clear if he ever had treatment for this.  This can be assessed when his mental status is improved.  4.  Alcohol withdrawal- management per primary service.    RECOMMENDATIONS:  1.  Okay to continue octreotide, though variceal bleed seems unlikely given tempo of bleed and normal hemoglobin.  2.  Continue pantoprazole IV.  3.  If patient wakes up and can tolerate p.o. intake, then could pursue clear liquid diet today.  4.  We will make n.p.o. at midnight, in case endoscopy can be done tomorrow, however, given the degree of his alcohol withdrawal, a procedure tomorrow is quite uncertain.    Osmel Collins MD   [facility]  [phone]      ________________________________________________________________________        HPI:  Andrea Pham is a 57 year old male who has a history of alcohol disorder, bipolar disorder, polysubstance use disorder, hepatitis C diagnosed in the past, uncertain if treatment.  History of GI bleed secondary to Sherri-Gonzales tear in 2014.  Currently, unable to evaluate the patient as he is quite sleepy from the Ativan from his significant alcohol withdrawal.   Per the history and physical, he is a retired .  He drinks heavily and finished 1.5 L of vodka over the last 1-1/2 days.  He has been treated in alcohol rehab.  He is prescribed Suboxone, but did not take it for the last 3 days.  There is reported black emesis and a few episodes of black tarry stool yesterday, no further episodes of GI bleeding.  No fevers or weight loss.     ROS: Unable to complete due to mental status     PAST MEDICAL HISTORY:  Past Medical History:   Diagnosis Date     Ataxia      Bipolar disorder (H)      Chemical dependency (H)      Chronic hip pain      Chronic pain syndrome      Cocaine abuse (H)      Depressive disorder      DTs (delirium tremens) (H)      DVT (deep venous thrombosis) (H) 5 y ago    left foot, treated with coumadin     Elevated LFTs      Flail chest     broken sterum, wiring      Hepatitis C virus infection, unspecified chronicity      Heroin abuse (H)      History of methamphetamine abuse (H)      History of total hip arthroplasty     right     Hypertension      Seizures (H)      Substance abuse (H)     alcohol, opiates     Wernicke's encephalopathy      PAST SURGICAL HISTORY:  Past Surgical History:   Procedure Laterality Date     CHEST SURGERY  1987    flail chest, sterum fractured     HIP SURGERY       KNEE SURGERY       OPEN REDUCTION INTERNAL FIXATION ANKLE Right 12/24/2021    Procedure: Open reduction internal fixation right ankle syndesmotic injury;  Surgeon: Leroy Thomason MD;  Location:  OR     ORTHOPEDIC SURGERY      KIMBER right. [place] left shoulder surgery, debridement right shoulder, neck surgery- fracture cervical spine. 6 knee surgeries- bucket handle meniscal tear and debridement. 3 heel surgeries.      ORTHOPEDIC SURGERY       REMOVE HARDWARE ANKLE Right 7/13/2022    Procedure: removal of hardware of right ankle;  Surgeon: Leroy Thomason MD;  Location:  OR     SOCIAL HISTORY:  Social History     Tobacco Use     Smoking status: Every Day     Packs/day:  "0.50     Years: 10.00     Pack years: 5.00     Types: Vaping Device, Cigarettes     Last attempt to quit: 4/19/2019     Years since quitting: 3.6     Smokeless tobacco: Never     Tobacco comments:     Quit 2019   Vaping Use     Vaping Use: Every day   Substance Use Topics     Alcohol use: Not Currently     Alcohol/week: 24.0 standard drinks     Types: 24 Cans of beer per week     Comment: drinks 1.75L grain alcohol daily since 4/28/18, clean 11 months as of 7/7/2022     Drug use: Not Currently     Types: Methamphetamines, Amphetamines     Comment: \"using daily, 1 dose/day\"  Poly substance has not used for 6 months     FAMILY HISTORY:  Family History   Problem Relation Age of Onset     Substance Abuse Mother      Substance Abuse Father      Substance Abuse Sister      ALLERGIES: No Known Allergies  MEDICATIONS:   Prior to Admission medications    Medication Sig Start Date End Date Taking? Authorizing Provider   acetaminophen (TYLENOL) 500 MG tablet Take 2 tablets (1,000 mg) by mouth 3 times daily  Patient taking differently: Take 1,000 mg by mouth every 6 hours as needed for pain 12/25/21  Yes Sarah Crouch PA   aspirin (ASA) 325 MG tablet Take 1 tablet (325 mg) by mouth daily 12/25/21  Yes Rashida Can PA-C   buprenorphine HCl-naloxone HCl (SUBOXONE) 8-2 MG per film Place 1 Film under the tongue 2 times daily   Yes Unknown, Entered By History   buPROPion (WELLBUTRIN XL) 150 MG 24 hr tablet Take 150 mg by mouth every morning Takes with 300mg tablet for total dose = 450mg   Yes Unknown, Entered By History   buPROPion (WELLBUTRIN XL) 300 MG 24 hr tablet Take 300 mg by mouth every morning Takes with 150mg tablet for total dose = 450mg   Yes Unknown, Entered By History   divalproex sodium extended-release (DEPAKOTE ER) 500 MG 24 hr tablet Take 1,000 mg by mouth At Bedtime 11/5/20  Yes Reported, Patient   folic acid (FOLVITE) 1 MG tablet Take 1 mg by mouth daily   Yes Unknown, Entered By History "   multivitamin w/minerals (THERA-VIT-M) tablet Take 1 tablet by mouth every evening   Yes Unknown, Entered By History   naloxone (NARCAN) 4 MG/0.1ML nasal spray Spray 1 spray (4 mg) into one nostril alternating nostrils once as needed for opioid reversal every 2-3 minutes until assistance arrives 12/25/21  Yes Sarah Crouch PA   OLANZapine (ZYPREXA) 5 MG tablet 1 tablet (5 mg) by Oral or Feeding Tube route every evening 10/8/22  Yes Scott Parker MD   pregabalin (LYRICA) 100 MG capsule Take 100 mg by mouth daily   Yes Unknown, Entered By History   tamsulosin (FLOMAX) 0.4 MG capsule Take 1 capsule (0.4 mg) by mouth daily 10/8/22  Yes Scott Parker MD   testosterone cypionate (DEPOTESTOSTERONE) 200 MG/ML injection Inject 200 mg into the muscle every 14 days   Yes Unknown, Entered By History   thiamine (B-1) 100 MG tablet Take 1 tablet (100 mg) by mouth daily 10/8/22  Yes Scott Parker MD   traZODone (DESYREL) 50 MG tablet 1 tablet (50 mg) by Oral or Feeding Tube route every evening 10/8/22  Yes Scott Parker MD     PHYSICAL EXAM:   /85 (BP Location: Right arm)   Pulse 101   Temp 99.5  F (37.5  C) (Axillary)   Resp 22   Wt 86.9 kg (191 lb 9.6 oz)   SpO2 94%   BMI 26.72 kg/m     GEN: Asleep, does awake to verbal stimuli, but confused.  ALEX: AT, unable to evaluate eyes or mouth well  LYMPH: No LAD noted.  HRT: RRR, no ED  LUNGS: CTA  ABD: +BS, non-tender, non-distended, no rebound or guarding.  SKIN: No rash, jaundice   NEURO: Sleeping likely from ativan for withdrawal       ADDITIONAL DATA:   I reviewed the patient's new clinical lab test results.   Recent Labs   Lab Test 11/24/22  0623 11/23/22  2201 11/23/22  0933 10/08/22  0545 10/07/22  0538 10/01/22  0600 09/30/22  1514 12/24/21  0436 12/23/21  1308 12/20/18  1003 12/19/18  0600   WBC  --   --  6.3  --  5.5  --  6.5   < > 6.6   < > 3.3*   HGB 14.5 14.6 16.8  --  14.4  --  13.4   < > 12.6*   < > 16.0   MCV  --   --  89  --  97  --  96   < > 90    < > 91   PLT  --   --  151 425 404   < > 165   < > 214   < > 134*   INR  --   --  1.03  --   --   --   --   --  1.02  --  0.95    < > = values in this interval not displayed.     Recent Labs   Lab Test 11/24/22  0800 11/23/22  0933 10/08/22  0545 10/07/22  0538   NA  --  140 138 142   POTASSIUM  --  3.6 4.3 4.8   CHLORIDE  --  93* 101 105   CO2  --  16* 27 28   BUN  --  17.0 14.1 14.0   CR  --  0.81 0.71 0.70   ANIONGAP  --  31* 10 9   JOHANA  --  9.0 9.1 9.3   * 124* 94 115*     Recent Labs   Lab Test 11/23/22  0933 10/03/22  0556 09/30/22  1745 09/27/22  1150 09/24/22  0751 09/23/22  2119 09/23/22  1729 12/01/20  2314 11/30/20  2028   ALBUMIN 4.7  --   --   --  3.7  --  4.6   < > 3.8   BILITOTAL 1.8*  --   --   --  1.2  --  0.7   < > 0.9   ALT 85*  --   --   --  93*  --  129*   < > 20   *  --   --   --  118*  --  174*   < > 24   PROTEIN  --   --  10* Negative  --  30*  --   --   --    LIPASE 83* 18  --   --   --   --   --   --  296    < > = values in this interval not displayed.        I reviewed the patient's new imaging results - none.

## 2022-11-24 NOTE — PLAN OF CARE
VSS. A/Ox4 but orientation waxes and wanes. CIWA scores 20, 18, 15, 8, 16, 12. Gave total of 8mg ativan, MD notified and plan to continue with withdraw orders. Seizure precautions initiated. Tele SR. K, mg, phos protocols. NPO ex ice chips. Pt using urinal. Up A1 GBW but did not get pt up this shift.

## 2022-11-24 NOTE — PLAN OF CARE
Orientation:  A&O X4 Hallucinations present   VS: WDL  Tele:  SR  Activity: +1 GB Walker   Resp:   3LPM NC  GI:  WDL  : WDL    Lines: PIV X 3 R arm. Infusing. See MAR  Other:  CIWA. Ativan and haldol given.   Plan:   TBD

## 2022-11-24 NOTE — PROVIDER NOTIFICATION
"Provider Notifed 0600: \"Pt just received 4th dose of ativan (total 8 mg) within 4 hours. Please advise. \"  "

## 2022-11-24 NOTE — PROGRESS NOTES
RRT called for hypotension.     Assessed patient at bedside.  The patient is fairly somnolent.  Per nursing report and on chart review this appears consistent with what he has been throughout the day.  Only present change is hypotension.  Blood pressure was 80/50.  The patient denies any lightheadedness, chest pain, shortness of breath.  He is alert and oriented x3.    It appears that there is some concern for GI bleed as the patient presented with complaint of melena and hematemesis.  There have been no significant episodes of same today.    The patient was initiated on normal saline drip with pressure bag administered.  His blood pressure has improved to 90s over 50s.    Stat BMP, CBC, lactic acid, VBG was ordered.  Hemoglobin has dropped from 14.2 to 12.6.  Otherwise VBG, BMP, lactic acid are not concerning.    No obvious evidence for overt bleeding, infection, sepsis.  Suspect that his hypotension is related to poor p.o. intake, contribution from clonidine, contribution from benzodiazepines/narcotics contribution from possible GI bleed.    Plan:  -If patient remains hypotensive following 1 L normal saline, will opt to administer a second  -Continue pantoprazole, octreotide  -Trend hemoglobin every 6 hours  -We will hold clonidine  -GI has previously evaluated the patient and are planning for possible scope tomorrow 11/25    Uday Ceballos DO    ADDENDUM:  Patient retaining >1000cc urine. Straight cath ordered.

## 2022-11-25 LAB
ANION GAP SERPL CALCULATED.3IONS-SCNC: 8 MMOL/L (ref 7–15)
ATRIAL RATE - MUSE: 94 BPM
BASOPHILS # BLD AUTO: 0 10E3/UL (ref 0–0.2)
BASOPHILS NFR BLD AUTO: 1 %
BUN SERPL-MCNC: 7.8 MG/DL (ref 6–20)
CALCIUM SERPL-MCNC: 7.9 MG/DL (ref 8.6–10)
CHLORIDE SERPL-SCNC: 105 MMOL/L (ref 98–107)
CREAT SERPL-MCNC: 0.69 MG/DL (ref 0.67–1.17)
DEPRECATED HCO3 PLAS-SCNC: 27 MMOL/L (ref 22–29)
DIASTOLIC BLOOD PRESSURE - MUSE: NORMAL MMHG
EOSINOPHIL # BLD AUTO: 0.1 10E3/UL (ref 0–0.7)
EOSINOPHIL NFR BLD AUTO: 4 %
ERYTHROCYTE [DISTWIDTH] IN BLOOD BY AUTOMATED COUNT: 14 % (ref 10–15)
GFR SERPL CREATININE-BSD FRML MDRD: >90 ML/MIN/1.73M2
GLUCOSE SERPL-MCNC: 108 MG/DL (ref 70–99)
HCT VFR BLD AUTO: 37.3 % (ref 40–53)
HGB BLD-MCNC: 11.5 G/DL (ref 13.3–17.7)
HGB BLD-MCNC: 12 G/DL (ref 13.3–17.7)
HGB BLD-MCNC: 12.5 G/DL (ref 13.3–17.7)
HGB BLD-MCNC: 12.5 G/DL (ref 13.3–17.7)
IMM GRANULOCYTES # BLD: 0 10E3/UL
IMM GRANULOCYTES NFR BLD: 1 %
INTERPRETATION ECG - MUSE: NORMAL
LYMPHOCYTES # BLD AUTO: 0.6 10E3/UL (ref 0.8–5.3)
LYMPHOCYTES NFR BLD AUTO: 20 %
MAGNESIUM SERPL-MCNC: 1.5 MG/DL (ref 1.7–2.3)
MAGNESIUM SERPL-MCNC: 2.3 MG/DL (ref 1.7–2.3)
MCH RBC QN AUTO: 31.6 PG (ref 26.5–33)
MCHC RBC AUTO-ENTMCNC: 33.5 G/DL (ref 31.5–36.5)
MCV RBC AUTO: 94 FL (ref 78–100)
MONOCYTES # BLD AUTO: 0.2 10E3/UL (ref 0–1.3)
MONOCYTES NFR BLD AUTO: 5 %
NEUTROPHILS # BLD AUTO: 2.2 10E3/UL (ref 1.6–8.3)
NEUTROPHILS NFR BLD AUTO: 69 %
NRBC # BLD AUTO: 0 10E3/UL
NRBC BLD AUTO-RTO: 0 /100
P AXIS - MUSE: 32 DEGREES
PHOSPHATE SERPL-MCNC: 3.1 MG/DL (ref 2.5–4.5)
PLATELET # BLD AUTO: 69 10E3/UL (ref 150–450)
POTASSIUM SERPL-SCNC: 3.7 MMOL/L (ref 3.4–5.3)
PR INTERVAL - MUSE: 166 MS
QRS DURATION - MUSE: 86 MS
QT - MUSE: 362 MS
QTC - MUSE: 452 MS
R AXIS - MUSE: -39 DEGREES
RBC # BLD AUTO: 3.96 10E6/UL (ref 4.4–5.9)
SODIUM SERPL-SCNC: 140 MMOL/L (ref 136–145)
SYSTOLIC BLOOD PRESSURE - MUSE: NORMAL MMHG
T AXIS - MUSE: 51 DEGREES
VENTRICULAR RATE- MUSE: 94 BPM
WBC # BLD AUTO: 3.2 10E3/UL (ref 4–11)

## 2022-11-25 PROCEDURE — C9113 INJ PANTOPRAZOLE SODIUM, VIA: HCPCS | Performed by: INTERNAL MEDICINE

## 2022-11-25 PROCEDURE — 83735 ASSAY OF MAGNESIUM: CPT | Performed by: STUDENT IN AN ORGANIZED HEALTH CARE EDUCATION/TRAINING PROGRAM

## 2022-11-25 PROCEDURE — 120N000013 HC R&B IMCU

## 2022-11-25 PROCEDURE — 250N000011 HC RX IP 250 OP 636: Performed by: INTERNAL MEDICINE

## 2022-11-25 PROCEDURE — 80048 BASIC METABOLIC PNL TOTAL CA: CPT | Performed by: STUDENT IN AN ORGANIZED HEALTH CARE EDUCATION/TRAINING PROGRAM

## 2022-11-25 PROCEDURE — 36415 COLL VENOUS BLD VENIPUNCTURE: CPT | Performed by: STUDENT IN AN ORGANIZED HEALTH CARE EDUCATION/TRAINING PROGRAM

## 2022-11-25 PROCEDURE — 258N000003 HC RX IP 258 OP 636: Performed by: STUDENT IN AN ORGANIZED HEALTH CARE EDUCATION/TRAINING PROGRAM

## 2022-11-25 PROCEDURE — 99233 SBSQ HOSP IP/OBS HIGH 50: CPT | Performed by: STUDENT IN AN ORGANIZED HEALTH CARE EDUCATION/TRAINING PROGRAM

## 2022-11-25 PROCEDURE — 85018 HEMOGLOBIN: CPT | Performed by: STUDENT IN AN ORGANIZED HEALTH CARE EDUCATION/TRAINING PROGRAM

## 2022-11-25 PROCEDURE — 250N000013 HC RX MED GY IP 250 OP 250 PS 637: Performed by: STUDENT IN AN ORGANIZED HEALTH CARE EDUCATION/TRAINING PROGRAM

## 2022-11-25 PROCEDURE — 84100 ASSAY OF PHOSPHORUS: CPT | Performed by: STUDENT IN AN ORGANIZED HEALTH CARE EDUCATION/TRAINING PROGRAM

## 2022-11-25 PROCEDURE — 999N000216 HC STATISTIC ADULT CD FACE TO FACE-NO CHRG

## 2022-11-25 PROCEDURE — 250N000013 HC RX MED GY IP 250 OP 250 PS 637: Performed by: INTERNAL MEDICINE

## 2022-11-25 PROCEDURE — 258N000003 HC RX IP 258 OP 636: Performed by: INTERNAL MEDICINE

## 2022-11-25 PROCEDURE — 250N000011 HC RX IP 250 OP 636: Performed by: STUDENT IN AN ORGANIZED HEALTH CARE EDUCATION/TRAINING PROGRAM

## 2022-11-25 RX ORDER — MAGNESIUM SULFATE HEPTAHYDRATE 40 MG/ML
4 INJECTION, SOLUTION INTRAVENOUS ONCE
Status: COMPLETED | OUTPATIENT
Start: 2022-11-25 | End: 2022-11-25

## 2022-11-25 RX ADMIN — DIVALPROEX SODIUM 1000 MG: 500 TABLET, FILM COATED, EXTENDED RELEASE ORAL at 21:22

## 2022-11-25 RX ADMIN — SODIUM CHLORIDE, POTASSIUM CHLORIDE, SODIUM LACTATE AND CALCIUM CHLORIDE 1000 ML: 600; 310; 30; 20 INJECTION, SOLUTION INTRAVENOUS at 12:16

## 2022-11-25 RX ADMIN — MULTIPLE VITAMINS W/ MINERALS TAB 1 TABLET: TAB at 07:46

## 2022-11-25 RX ADMIN — BUPRENORPHINE AND NALOXONE 1 FILM: 8; 2 FILM, SOLUBLE BUCCAL; SUBLINGUAL at 06:54

## 2022-11-25 RX ADMIN — LORAZEPAM 2 MG: 2 INJECTION INTRAMUSCULAR; INTRAVENOUS at 06:04

## 2022-11-25 RX ADMIN — DEXTROSE AND SODIUM CHLORIDE: 5; 900 INJECTION, SOLUTION INTRAVENOUS at 17:45

## 2022-11-25 RX ADMIN — LORAZEPAM 1 MG: 0.5 TABLET ORAL at 02:18

## 2022-11-25 RX ADMIN — GABAPENTIN 900 MG: 300 CAPSULE ORAL at 21:22

## 2022-11-25 RX ADMIN — LORAZEPAM 2 MG: 2 INJECTION INTRAMUSCULAR; INTRAVENOUS at 10:13

## 2022-11-25 RX ADMIN — OLANZAPINE 5 MG: 5 TABLET, ORALLY DISINTEGRATING ORAL at 06:04

## 2022-11-25 RX ADMIN — FOLIC ACID 1 MG: 1 TABLET ORAL at 07:46

## 2022-11-25 RX ADMIN — POTASSIUM & SODIUM PHOSPHATES POWDER PACK 280-160-250 MG 1 PACKET: 280-160-250 PACK at 02:18

## 2022-11-25 RX ADMIN — LORAZEPAM 2 MG: 2 INJECTION INTRAMUSCULAR; INTRAVENOUS at 23:35

## 2022-11-25 RX ADMIN — MAGNESIUM SULFATE HEPTAHYDRATE 4 G: 40 INJECTION, SOLUTION INTRAVENOUS at 13:29

## 2022-11-25 RX ADMIN — LORAZEPAM 2 MG: 2 INJECTION INTRAMUSCULAR; INTRAVENOUS at 09:45

## 2022-11-25 RX ADMIN — LORAZEPAM 1 MG: 0.5 TABLET ORAL at 18:03

## 2022-11-25 RX ADMIN — LORAZEPAM 2 MG: 2 INJECTION INTRAMUSCULAR; INTRAVENOUS at 07:46

## 2022-11-25 RX ADMIN — THIAMINE HCL TAB 100 MG 100 MG: 100 TAB at 07:46

## 2022-11-25 RX ADMIN — OLANZAPINE 5 MG: 5 TABLET, ORALLY DISINTEGRATING ORAL at 05:29

## 2022-11-25 RX ADMIN — LORAZEPAM 1 MG: 0.5 TABLET ORAL at 00:57

## 2022-11-25 RX ADMIN — HALOPERIDOL LACTATE 5 MG: 5 INJECTION, SOLUTION INTRAMUSCULAR at 08:02

## 2022-11-25 RX ADMIN — SODIUM CHLORIDE 8 MG/HR: 9 INJECTION, SOLUTION INTRAVENOUS at 19:58

## 2022-11-25 RX ADMIN — TAMSULOSIN HYDROCHLORIDE 0.4 MG: 0.4 CAPSULE ORAL at 07:46

## 2022-11-25 RX ADMIN — BUPROPION HYDROCHLORIDE 450 MG: 150 TABLET, EXTENDED RELEASE ORAL at 07:46

## 2022-11-25 RX ADMIN — SODIUM CHLORIDE 8 MG/HR: 9 INJECTION, SOLUTION INTRAVENOUS at 08:25

## 2022-11-25 RX ADMIN — LORAZEPAM 2 MG: 2 INJECTION INTRAMUSCULAR; INTRAVENOUS at 04:45

## 2022-11-25 RX ADMIN — TRAZODONE HYDROCHLORIDE 50 MG: 50 TABLET ORAL at 21:23

## 2022-11-25 RX ADMIN — LORAZEPAM 2 MG: 0.5 TABLET ORAL at 04:13

## 2022-11-25 RX ADMIN — DEXTROSE AND SODIUM CHLORIDE: 5; 900 INJECTION, SOLUTION INTRAVENOUS at 09:16

## 2022-11-25 RX ADMIN — LORAZEPAM 2 MG: 2 INJECTION INTRAMUSCULAR; INTRAVENOUS at 09:16

## 2022-11-25 RX ADMIN — LORAZEPAM 2 MG: 0.5 TABLET ORAL at 13:20

## 2022-11-25 RX ADMIN — DEXTROSE AND SODIUM CHLORIDE: 5; 900 INJECTION, SOLUTION INTRAVENOUS at 01:01

## 2022-11-25 RX ADMIN — LORAZEPAM 2 MG: 2 INJECTION INTRAMUSCULAR; INTRAVENOUS at 21:59

## 2022-11-25 RX ADMIN — OLANZAPINE 5 MG: 5 TABLET, ORALLY DISINTEGRATING ORAL at 14:54

## 2022-11-25 RX ADMIN — OLANZAPINE 5 MG: 2.5 TABLET, FILM COATED ORAL at 21:23

## 2022-11-25 RX ADMIN — LORAZEPAM 2 MG: 0.5 TABLET ORAL at 05:29

## 2022-11-25 ASSESSMENT — ACTIVITIES OF DAILY LIVING (ADL)
ADLS_ACUITY_SCORE: 30
ADLS_ACUITY_SCORE: 24
ADLS_ACUITY_SCORE: 30
ADLS_ACUITY_SCORE: 24
ADLS_ACUITY_SCORE: 30
ADLS_ACUITY_SCORE: 24

## 2022-11-25 NOTE — PLAN OF CARE
VSS ex: BP became soft towards 1135, LR bolus ordered. Pt was given a total of 8mg IV Ativan per CIWA protocol. Pt was combative, belligerent, hallucinating and not following commands. Pt CIWA improved at 1133 recheck. Lino was placed dt urinary retention. ICU orders in place, waiting for bed to become available.

## 2022-11-25 NOTE — PROGRESS NOTES
Cross Cover    Called for Urinary retention and straight cathed for 850 ml and urine was red.   Admit for GI bleed, hgb 16.8--->12.0 but rate of drop declining  INR 1.03, platelets 151->83    Here with suspected GIB in the setting of heaving etoh use    Check PVR bid and if retention > 500 leave polanco in

## 2022-11-25 NOTE — PROGRESS NOTES
GASTROENTEROLOGY PROGRESS NOTE    SUBJECTIVE:  The patient was sleeping but he did wake up when I shook his shoulder.  His speech was a bit slurred and difficult to understand what he was trying to tell me.  He denies black stools.  He denies nausea and vomiting.  He is wondering when he can go home.  He had significant shaking of his upper extremities.    OBJECTIVE:    /73 (BP Location: Right arm)   Pulse 75   Temp 97.8  F (36.6  C) (Axillary)   Resp 20   Wt 86.9 kg (191 lb 9.6 oz)   SpO2 95%   BMI 26.72 kg/m    Temp (24hrs), Av.6  F (36.4  C), Min:96.2  F (35.7  C), Max:99.5  F (37.5  C)    Patient Vitals for the past 72 hrs:   Weight   22 0612 86.9 kg (191 lb 9.6 oz)   22 1209 85.3 kg (188 lb)       Intake/Output Summary (Last 24 hours) at 2022 0912  Last data filed at 2022 0430  Gross per 24 hour   Intake 1500 ml   Output 2200 ml   Net -700 ml         PHYSICAL EXAM    Constitutional: NAD, comfortable  Cardiovascular: RRR, normal S1, S2   Respiratory: CTAB  Abdomen: soft, non-tender, nondistended      Additional Comments:  ROS, FH, SH: See initial GI consult for details.    I have reviewed the patient's new clinical lab results:    Recent Labs   Lab Test 22  0830 22  0027 22  1829 22  1545 22  2201 22  0933 21  0436 21  1308 18  1003 18  0600   WBC 3.2*  --   --  4.2  --  6.3   < > 6.6   < > 3.3*   HGB 12.5* 12.0* 12.2* 12.6*   < > 16.8   < > 12.6*   < > 16.0   MCV 94  --   --  92  --  89   < > 90   < > 91   PLT 69*  --   --  83*  --  151   < > 214   < > 134*   INR  --   --   --   --   --  1.03  --  1.02  --  0.95    < > = values in this interval not displayed.     Recent Labs   Lab Test 22  0830 22  1545 22  1032    140 138   POTASSIUM 3.7 3.5 3.6   CHLORIDE 105 106 101   CO2 27 25 27   BUN 7.8 14.1 14.8   CR 0.69 0.82 0.79   ANIONGAP 8 9 10   JOHANA 7.9* 7.4* 8.3*     Recent Labs   Lab Test  11/24/22  1032 11/23/22  0933 10/03/22  0556 09/30/22  1745 09/27/22  1150 09/24/22  0751 09/23/22  2119 12/01/20  2314 11/30/20 2028   ALBUMIN 3.5 4.7  --   --   --  3.7  --    < > 3.8   BILITOTAL 2.0* 1.8*  --   --   --  1.2  --    < > 0.9   ALT 66* 85*  --   --   --  93*  --    < > 20   AST 65* 106*  --   --   --  118*  --    < > 24   ALKPHOS 57 73  --   --   --  50  --    < > 95   PROTEIN  --   --   --  10* Negative  --  30*  --   --    LIPASE  --  83* 18  --   --   --   --   --  296    < > = values in this interval not displayed.         A/P  Patient is admitted with alcoholic hepatitis (DF low not requiring treatment), alcohol withdrawal, reports of hematemesis and melena.  He has a history of a Sherri-Gonzales tear in 2014.  His hemoglobin has remained stable in the 12 range.  No further evidence of GI bleeding.  We can stop the octreotide.  Continue pantoprazole.  Can start clear liquid diet and advance as tolerated.  No need for an upper endoscopy at this time.  It can be arranged as an outpatient and my office will help arrange for this.  Signing off but please call with questions or concerns.    Binu Jones MD  [facility]

## 2022-11-25 NOTE — PROGRESS NOTES
[facility]    Medicine Progress Note - Hospitalist Service    Date of Admission:  11/23/2022    Assessment & Plan     Andrea Pham is a 57 year old  male with a significant past medical history of Alcohol use disorder, on Suboxone for narcotic addiction, who presents from home with alcohol withdraw concern, black emesis and stool.  He drinks heavily and last drink was the night before admission.     On presentation to the ER, patient arrived hypertensive, tachycardic, tremulous and anxious with nausea and vomiting.  Labs showed hemoglobin of 16.8, abnormal LFTs, lactate was 7.4, quantitative ketones of 2.8.  Blood alcohol level was 0.23.     Patient was given IV fluid, Valium IV, octreotide and Protonix infusion started in the emergency department and was admitted to [facility].        1. Black tarry stools.    Suspected GI bleed.  Hemoconcentrated on presentation with hemoglobin 16.8--> 14.6--> 14.5--> 12.5.  This does not appear to be an hemodynamically significant bleed.    Currently on octreotide and Protonix drip, GI consult appreciated.    Clear liquid diet today, holding endoscopy due to ongoing withdrawal symptoms.    2. Alcohol use disorder with withdrawal symptoms.    On CIWA protocol with gabapentin taper and as needed lorazepam, patient has needed multiple doses of Haldol and Ativan.    Plan to transfer to ICU once bed available for Precedex drip due to continued agitation.    3. Substance abuse disorder.    Prior to admission on Suboxone, will hold for now due to low blood pressure.    4. Depression/anxiety.    Resumed Wellbutrin, Depakote, trazodone and Zyprexa.    5. Lactic acidosis.    Serum lactate 7.4 on presentation, secondary to alcohol use.  Improved to 1.8 with hydration.    Patient continues to be hypotensive, will give another IV fluid bolus of 1 L.     6. Alcoholic liver disease.    AST//85 with total bilirubin of 1.8.    Recheck LFTs.    7. Alcoholic  ketosis.    Now resolved.    8. History of hepatitis C.  Is not clear if he ever had treatment.  Reassess once mental status is improved.    9. Urinary retention in setting of BPH.    Patient has needed straight caths x2 and PVR is more than 500, will insert Lino.      Continue on Flomax.       Diet: Advance Diet as Tolerated: Clear Liquid Diet    DVT Prophylaxis: Pneumatic Compression Devices  Lino Catheter: PRESENT, indication:    Central Lines: None  Cardiac Monitoring: ACTIVE order. Indication: Electrolyte Imbalance (24 hours)- Magnesium <1.3 mg/ml; Potassium < =2.8 or > 5.5 mg/ml  Code Status: Full Code      Disposition Plan      Expected Discharge Date: 11/27/2022,  3:00 PM    Destination: home          The patient's care was discussed with the Patient.    Scott Parker MD  Hospitalist Service    Securely message with the Vocera Web Console (learn more here)  Text page via [employer] Paging/Directory         Clinically Significant Risk Factors          # Hypocalcemia: Lowest Ca = 7.4 mg/dL (Ref range: 8.5 - 10.1 mg/dL) in last 2 days, will monitor and replace as appropriate   # Hypomagnesemia: Lowest Mg = 1.5 mg/dL (Ref range: 1.7-2.3) in last 2 days, will replace as needed     # Thrombocytopenia: Lowest platelets = 69 (Ref range: 150-450) in last 2 days, will monitor for bleeding                 ______________________________________________________________________    Interval History   No further bleeding overnight.  Easily agitated.  Continues to have visible hallucinations.  Tremulous.    Data reviewed today: I reviewed all medications, new labs and imaging results over the last 24 hours.     Physical Exam   Vital Signs: Temp: 97.6  F (36.4  C) Temp src: Axillary BP: (!) 85/53 Pulse: 72   Resp: 14 SpO2: 93 % O2 Device: Nasal cannula Oxygen Delivery: 1 LPM  Weight: 191 lbs 9.6 oz  General Appearance: Having visual hallucinations.  Able to answer simple questions and follow  simple commands.  Tremulous.  Eyes: No icterus  HEENT: Moist mucosa  Respiratory: Clear to auscultation  Cardiovascular: S1 and S2 is normal  GI: Soft and nontender  Lymph/Hematologic: Not examined  Genitourinary: Not examined  Skin: No rash  Musculoskeletal: No edema  Neurologic: Tremulous  Psychiatric: Having hallucinations.      Data   Recent Labs   Lab 11/25/22  0830 11/25/22  0027 11/24/22  1959 11/24/22  1829 11/24/22  1600 11/24/22  1545 11/24/22  1201 11/24/22  1032 11/23/22  2201 11/23/22  0933   WBC 3.2*  --   --   --   --  4.2  --   --   --  6.3   HGB 12.5* 12.0*  --  12.2*  --  12.6*  --  14.2   < > 16.8   MCV 94  --   --   --   --  92  --   --   --  89   PLT 69*  --   --   --   --  83*  --   --   --  151   INR  --   --   --   --   --   --   --   --   --  1.03     --   --   --   --  140  --  138  --  140   POTASSIUM 3.7  --   --   --   --  3.5  --  3.6  --  3.6   CHLORIDE 105  --   --   --   --  106  --  101  --  93*   CO2 27  --   --   --   --  25  --  27  --  16*   BUN 7.8  --   --   --   --  14.1  --  14.8  --  17.0   CR 0.69  --   --   --   --  0.82  --  0.79  --  0.81   ANIONGAP 8  --   --   --   --  9  --  10  --  31*   JOHANA 7.9*  --   --   --   --  7.4*  --  8.3*  --  9.0   *  --  171*  --  159* 162*   < > 181*   < > 124*   ALBUMIN  --   --   --   --   --   --   --  3.5  --  4.7   PROTTOTAL  --   --   --   --   --   --   --  5.4*  --  6.8   BILITOTAL  --   --   --   --   --   --   --  2.0*  --  1.8*   ALKPHOS  --   --   --   --   --   --   --  57  --  73   ALT  --   --   --   --   --   --   --  66*  --  85*   AST  --   --   --   --   --   --   --  65*  --  106*   LIPASE  --   --   --   --   --   --   --   --   --  83*    < > = values in this interval not displayed.     No results found for this or any previous visit (from the past 24 hour(s)).  Medications     dextrose 5% and 0.9% NaCl 125 mL/hr at 11/25/22 0916     pantoprazole (PROTONIX) infusion ADULT/PEDS GREATER than or EQUAL to  45 kg 8 mg/hr (11/25/22 0825)       [Held by provider] buprenorphine HCl-naloxone HCl  1 Film Sublingual BID     buPROPion  450 mg Oral QAM     [Held by provider] cloNIDine  0.1 mg Oral Q8H     divalproex sodium extended-release  1,000 mg Oral At Bedtime     folic acid  1 mg Oral Daily     [START ON 11/30/2022] gabapentin  100 mg Oral Q8H     [START ON 11/28/2022] gabapentin  300 mg Oral Q8H     [START ON 11/26/2022] gabapentin  600 mg Oral Q8H     gabapentin  900 mg Oral Q8H     lactated ringers  1,000 mL Intravenous Once     magnesium sulfate  4 g Intravenous Once     multivitamin w/minerals  1 tablet Oral Daily     OLANZapine  5 mg Oral or Feeding Tube QPM     sodium chloride (PF)  3 mL Intracatheter Q8H     tamsulosin  0.4 mg Oral Daily     thiamine  100 mg Oral Daily     traZODone  50 mg Oral or Feeding Tube QPM

## 2022-11-25 NOTE — CONSULTS
Care Management Note    Discharge Date: 11/27/2022       Discharge Disposition:  home    Discharge Services:  None, monitor CD recs    Discharge DME: none     Discharge Transportation: car, drives self, family or friend will provide      Additional Information:  Pt admitted with ETOH withdrawal and confusion, noted to have unplanned readmission risk of 34%. Patient is known to CM services and has been assessed before. Per chart review, patient is from home alone. He is currently scoring on the CIWA scale and receiving ativan. Patient may need higher level of care in ICU if this progressess. Care Coordination team is following and will assess for discharge needs when medically appropriate. CD has been consulted, will follow the recommendations.                     Sara Becerra RN BSN CM  Inpatient Care Coordination  [facility]  [phone]

## 2022-11-25 NOTE — PLAN OF CARE
Pt had soft Bps at beginning of shift. MD paged, 500 mL bolus ordered. BP then improved. All other vitals stable. Pt became increasingly agitated toward end of shift. Total of 12 mg ativan given throughout shift. PRN zyprexa given and MD verbally told to give additional dose of haldol PRN for hallucinations. See flowsheets for CIWA scores and MAR for med record. MD came to see pt at bedside at approx 0745, plan is for pt to be send to ICU for closer monitoring. Pt was straight cathed for 850 this shift. Dr Sandoval's note states to place polanco if bladder scan is greater than 500 mL. Hgb trend q6h. Tele SR.

## 2022-11-25 NOTE — CONSULTS
11/25/2022    Attempted to meet with pt for his CD consult. Pt did not answer bed side phone. Called nursing station desk and spoke with RN about pt's ability to participate in a CD consult. RN reports pt is unlikely to be able to communicate today. RN reports pt's CIWA is increasing and pt needs an ICU bed.   CD consult can be re-ordered when pt is A&Ox4 and willing to participate in a CD evaluation.     Mikayla Joe [facility]  Phone: [phone]  Email: [email or web address]

## 2022-11-26 LAB
ANION GAP SERPL CALCULATED.3IONS-SCNC: 7 MMOL/L (ref 7–15)
ANION GAP SERPL CALCULATED.3IONS-SCNC: 9 MMOL/L (ref 7–15)
BASOPHILS # BLD AUTO: 0 10E3/UL (ref 0–0.2)
BASOPHILS NFR BLD AUTO: 1 %
BUN SERPL-MCNC: 3.6 MG/DL (ref 6–20)
BUN SERPL-MCNC: 4.1 MG/DL (ref 6–20)
CALCIUM SERPL-MCNC: 7.7 MG/DL (ref 8.6–10)
CALCIUM SERPL-MCNC: 8.1 MG/DL (ref 8.6–10)
CHLORIDE SERPL-SCNC: 108 MMOL/L (ref 98–107)
CHLORIDE SERPL-SCNC: 109 MMOL/L (ref 98–107)
CREAT SERPL-MCNC: 0.8 MG/DL (ref 0.67–1.17)
CREAT SERPL-MCNC: 0.8 MG/DL (ref 0.67–1.17)
DEPRECATED HCO3 PLAS-SCNC: 26 MMOL/L (ref 22–29)
DEPRECATED HCO3 PLAS-SCNC: 28 MMOL/L (ref 22–29)
EOSINOPHIL # BLD AUTO: 0.2 10E3/UL (ref 0–0.7)
EOSINOPHIL NFR BLD AUTO: 6 %
ERYTHROCYTE [DISTWIDTH] IN BLOOD BY AUTOMATED COUNT: 14 % (ref 10–15)
GFR SERPL CREATININE-BSD FRML MDRD: >90 ML/MIN/1.73M2
GFR SERPL CREATININE-BSD FRML MDRD: >90 ML/MIN/1.73M2
GLUCOSE BLDC GLUCOMTR-MCNC: 103 MG/DL (ref 70–99)
GLUCOSE BLDC GLUCOMTR-MCNC: 113 MG/DL (ref 70–99)
GLUCOSE BLDC GLUCOMTR-MCNC: 120 MG/DL (ref 70–99)
GLUCOSE SERPL-MCNC: 115 MG/DL (ref 70–99)
GLUCOSE SERPL-MCNC: 96 MG/DL (ref 70–99)
HCT VFR BLD AUTO: 36.2 % (ref 40–53)
HGB BLD-MCNC: 11.6 G/DL (ref 13.3–17.7)
HGB BLD-MCNC: 12.5 G/DL (ref 13.3–17.7)
IMM GRANULOCYTES # BLD: 0 10E3/UL
IMM GRANULOCYTES NFR BLD: 0 %
LYMPHOCYTES # BLD AUTO: 0.6 10E3/UL (ref 0.8–5.3)
LYMPHOCYTES NFR BLD AUTO: 19 %
MAGNESIUM SERPL-MCNC: 1.8 MG/DL (ref 1.7–2.3)
MAGNESIUM SERPL-MCNC: 1.9 MG/DL (ref 1.7–2.3)
MCH RBC QN AUTO: 30.9 PG (ref 26.5–33)
MCHC RBC AUTO-ENTMCNC: 32 G/DL (ref 31.5–36.5)
MCV RBC AUTO: 96 FL (ref 78–100)
MONOCYTES # BLD AUTO: 0.3 10E3/UL (ref 0–1.3)
MONOCYTES NFR BLD AUTO: 9 %
NEUTROPHILS # BLD AUTO: 2 10E3/UL (ref 1.6–8.3)
NEUTROPHILS NFR BLD AUTO: 65 %
NRBC # BLD AUTO: 0 10E3/UL
NRBC BLD AUTO-RTO: 0 /100
PHOSPHATE SERPL-MCNC: 3.3 MG/DL (ref 2.5–4.5)
PHOSPHATE SERPL-MCNC: 4.8 MG/DL (ref 2.5–4.5)
PLATELET # BLD AUTO: 60 10E3/UL (ref 150–450)
POTASSIUM SERPL-SCNC: 3.4 MMOL/L (ref 3.4–5.3)
POTASSIUM SERPL-SCNC: 3.8 MMOL/L (ref 3.4–5.3)
POTASSIUM SERPL-SCNC: 3.8 MMOL/L (ref 3.4–5.3)
RBC # BLD AUTO: 3.76 10E6/UL (ref 4.4–5.9)
SODIUM SERPL-SCNC: 143 MMOL/L (ref 136–145)
SODIUM SERPL-SCNC: 144 MMOL/L (ref 136–145)
WBC # BLD AUTO: 3 10E3/UL (ref 4–11)

## 2022-11-26 PROCEDURE — 36415 COLL VENOUS BLD VENIPUNCTURE: CPT | Performed by: INTERNAL MEDICINE

## 2022-11-26 PROCEDURE — 250N000009 HC RX 250: Performed by: INTERNAL MEDICINE

## 2022-11-26 PROCEDURE — 250N000011 HC RX IP 250 OP 636: Performed by: INTERNAL MEDICINE

## 2022-11-26 PROCEDURE — 85018 HEMOGLOBIN: CPT | Performed by: STUDENT IN AN ORGANIZED HEALTH CARE EDUCATION/TRAINING PROGRAM

## 2022-11-26 PROCEDURE — C9113 INJ PANTOPRAZOLE SODIUM, VIA: HCPCS | Performed by: INTERNAL MEDICINE

## 2022-11-26 PROCEDURE — 80048 BASIC METABOLIC PNL TOTAL CA: CPT | Performed by: STUDENT IN AN ORGANIZED HEALTH CARE EDUCATION/TRAINING PROGRAM

## 2022-11-26 PROCEDURE — 250N000013 HC RX MED GY IP 250 OP 250 PS 637: Performed by: INTERNAL MEDICINE

## 2022-11-26 PROCEDURE — 36415 COLL VENOUS BLD VENIPUNCTURE: CPT | Performed by: STUDENT IN AN ORGANIZED HEALTH CARE EDUCATION/TRAINING PROGRAM

## 2022-11-26 PROCEDURE — 80048 BASIC METABOLIC PNL TOTAL CA: CPT | Performed by: INTERNAL MEDICINE

## 2022-11-26 PROCEDURE — 258N000001 HC RX 258: Performed by: INTERNAL MEDICINE

## 2022-11-26 PROCEDURE — 84100 ASSAY OF PHOSPHORUS: CPT | Performed by: INTERNAL MEDICINE

## 2022-11-26 PROCEDURE — 85025 COMPLETE CBC W/AUTO DIFF WBC: CPT | Performed by: STUDENT IN AN ORGANIZED HEALTH CARE EDUCATION/TRAINING PROGRAM

## 2022-11-26 PROCEDURE — 250N000011 HC RX IP 250 OP 636: Performed by: EMERGENCY MEDICINE

## 2022-11-26 PROCEDURE — 99233 SBSQ HOSP IP/OBS HIGH 50: CPT | Performed by: INTERNAL MEDICINE

## 2022-11-26 PROCEDURE — 84100 ASSAY OF PHOSPHORUS: CPT | Performed by: STUDENT IN AN ORGANIZED HEALTH CARE EDUCATION/TRAINING PROGRAM

## 2022-11-26 PROCEDURE — 83735 ASSAY OF MAGNESIUM: CPT | Performed by: INTERNAL MEDICINE

## 2022-11-26 PROCEDURE — 200N000001 HC R&B ICU

## 2022-11-26 PROCEDURE — 258N000003 HC RX IP 258 OP 636: Performed by: INTERNAL MEDICINE

## 2022-11-26 RX ORDER — LORAZEPAM 0.5 MG/1
1-2 TABLET ORAL EVERY 30 MIN PRN
Status: DISCONTINUED | OUTPATIENT
Start: 2022-11-26 | End: 2022-11-29

## 2022-11-26 RX ORDER — NICOTINE POLACRILEX 4 MG
15-30 LOZENGE BUCCAL
Status: DISCONTINUED | OUTPATIENT
Start: 2022-11-26 | End: 2022-12-12 | Stop reason: HOSPADM

## 2022-11-26 RX ORDER — DEXTROSE MONOHYDRATE, SODIUM CHLORIDE, AND POTASSIUM CHLORIDE 50; 1.49; 9 G/1000ML; G/1000ML; G/1000ML
INJECTION, SOLUTION INTRAVENOUS CONTINUOUS
Status: DISCONTINUED | OUTPATIENT
Start: 2022-11-26 | End: 2022-12-01

## 2022-11-26 RX ORDER — DEXTROSE MONOHYDRATE 25 G/50ML
25-50 INJECTION, SOLUTION INTRAVENOUS
Status: DISCONTINUED | OUTPATIENT
Start: 2022-11-26 | End: 2022-12-12 | Stop reason: HOSPADM

## 2022-11-26 RX ORDER — DEXMEDETOMIDINE HYDROCHLORIDE 4 UG/ML
.1-1.2 INJECTION, SOLUTION INTRAVENOUS CONTINUOUS
Status: DISCONTINUED | OUTPATIENT
Start: 2022-11-26 | End: 2022-12-03

## 2022-11-26 RX ORDER — POTASSIUM CHLORIDE 1.5 G/1.58G
40 POWDER, FOR SOLUTION ORAL ONCE
Status: COMPLETED | OUTPATIENT
Start: 2022-11-26 | End: 2022-11-26

## 2022-11-26 RX ORDER — POTASSIUM CHLORIDE 7.45 MG/ML
10 INJECTION INTRAVENOUS
Status: DISCONTINUED | OUTPATIENT
Start: 2022-11-26 | End: 2022-11-26

## 2022-11-26 RX ORDER — LORAZEPAM 2 MG/ML
2-4 INJECTION INTRAMUSCULAR EVERY 30 MIN PRN
Status: DISCONTINUED | OUTPATIENT
Start: 2022-11-26 | End: 2022-11-29

## 2022-11-26 RX ADMIN — POTASSIUM CHLORIDE 40 MEQ: 1.5 POWDER, FOR SOLUTION ORAL at 10:36

## 2022-11-26 RX ADMIN — DIAZEPAM 10 MG: 10 INJECTION, SOLUTION INTRAMUSCULAR; INTRAVENOUS at 09:25

## 2022-11-26 RX ADMIN — LORAZEPAM 2 MG: 2 INJECTION INTRAMUSCULAR; INTRAVENOUS at 00:13

## 2022-11-26 RX ADMIN — MULTIPLE VITAMINS W/ MINERALS TAB 1 TABLET: TAB at 09:13

## 2022-11-26 RX ADMIN — DEXTROSE AND SODIUM CHLORIDE: 5; 900 INJECTION, SOLUTION INTRAVENOUS at 02:21

## 2022-11-26 RX ADMIN — LORAZEPAM 4 MG: 2 INJECTION INTRAMUSCULAR; INTRAVENOUS at 02:39

## 2022-11-26 RX ADMIN — PANTOPRAZOLE SODIUM 40 MG: 40 INJECTION, POWDER, FOR SOLUTION INTRAVENOUS at 22:36

## 2022-11-26 RX ADMIN — LORAZEPAM 4 MG: 2 INJECTION INTRAMUSCULAR; INTRAVENOUS at 03:14

## 2022-11-26 RX ADMIN — TRAZODONE HYDROCHLORIDE 50 MG: 50 TABLET ORAL at 22:25

## 2022-11-26 RX ADMIN — OLANZAPINE 5 MG: 2.5 TABLET, FILM COATED ORAL at 22:23

## 2022-11-26 RX ADMIN — LORAZEPAM 2 MG: 2 INJECTION INTRAMUSCULAR; INTRAVENOUS at 05:15

## 2022-11-26 RX ADMIN — TAMSULOSIN HYDROCHLORIDE 0.4 MG: 0.4 CAPSULE ORAL at 09:13

## 2022-11-26 RX ADMIN — DIVALPROEX SODIUM 1000 MG: 500 TABLET, FILM COATED, EXTENDED RELEASE ORAL at 22:34

## 2022-11-26 RX ADMIN — LORAZEPAM 2 MG: 2 INJECTION INTRAMUSCULAR; INTRAVENOUS at 05:02

## 2022-11-26 RX ADMIN — LORAZEPAM 2 MG: 2 INJECTION INTRAMUSCULAR; INTRAVENOUS at 01:51

## 2022-11-26 RX ADMIN — PANTOPRAZOLE SODIUM 40 MG: 40 INJECTION, POWDER, FOR SOLUTION INTRAVENOUS at 09:28

## 2022-11-26 RX ADMIN — LORAZEPAM 2 MG: 2 INJECTION INTRAMUSCULAR; INTRAVENOUS at 02:17

## 2022-11-26 RX ADMIN — DEXMEDETOMIDINE HYDROCHLORIDE 0.2 MCG/KG/HR: 400 INJECTION INTRAVENOUS at 10:29

## 2022-11-26 RX ADMIN — DIAZEPAM 10 MG: 10 INJECTION, SOLUTION INTRAMUSCULAR; INTRAVENOUS at 08:57

## 2022-11-26 RX ADMIN — LORAZEPAM 2 MG: 0.5 TABLET ORAL at 10:55

## 2022-11-26 RX ADMIN — BUPROPION HYDROCHLORIDE 450 MG: 150 TABLET, EXTENDED RELEASE ORAL at 09:12

## 2022-11-26 RX ADMIN — THIAMINE HCL TAB 100 MG 100 MG: 100 TAB at 09:13

## 2022-11-26 RX ADMIN — HALOPERIDOL LACTATE 5 MG: 5 INJECTION, SOLUTION INTRAMUSCULAR at 01:16

## 2022-11-26 RX ADMIN — LORAZEPAM 4 MG: 2 INJECTION INTRAMUSCULAR; INTRAVENOUS at 03:42

## 2022-11-26 RX ADMIN — LORAZEPAM 4 MG: 2 INJECTION INTRAMUSCULAR; INTRAVENOUS at 06:21

## 2022-11-26 RX ADMIN — POTASSIUM CHLORIDE, DEXTROSE MONOHYDRATE AND SODIUM CHLORIDE: 150; 5; 900 INJECTION, SOLUTION INTRAVENOUS at 18:57

## 2022-11-26 RX ADMIN — SODIUM CHLORIDE 8 MG/HR: 9 INJECTION, SOLUTION INTRAVENOUS at 06:10

## 2022-11-26 RX ADMIN — FOLIC ACID 1 MG: 1 TABLET ORAL at 09:13

## 2022-11-26 RX ADMIN — POTASSIUM CHLORIDE, DEXTROSE MONOHYDRATE AND SODIUM CHLORIDE: 150; 5; 900 INJECTION, SOLUTION INTRAVENOUS at 10:38

## 2022-11-26 RX ADMIN — DEXMEDETOMIDINE HYDROCHLORIDE 0.4 MCG/KG/HR: 400 INJECTION INTRAVENOUS at 17:01

## 2022-11-26 RX ADMIN — DIAZEPAM 10 MG: 10 INJECTION, SOLUTION INTRAMUSCULAR; INTRAVENOUS at 08:04

## 2022-11-26 RX ADMIN — GABAPENTIN 600 MG: 300 CAPSULE ORAL at 22:28

## 2022-11-26 RX ADMIN — LORAZEPAM 2 MG: 2 INJECTION INTRAMUSCULAR; INTRAVENOUS at 01:02

## 2022-11-26 RX ADMIN — GABAPENTIN 900 MG: 300 CAPSULE ORAL at 09:08

## 2022-11-26 RX ADMIN — HALOPERIDOL LACTATE 5 MG: 5 INJECTION, SOLUTION INTRAMUSCULAR at 08:18

## 2022-11-26 ASSESSMENT — ACTIVITIES OF DAILY LIVING (ADL)
ADLS_ACUITY_SCORE: 30
ADLS_ACUITY_SCORE: 32
ADLS_ACUITY_SCORE: 30
ADLS_ACUITY_SCORE: 32
ADLS_ACUITY_SCORE: 30

## 2022-11-26 NOTE — PROGRESS NOTES
[facility]  Hospitalist Progress Note  Admit 11/23/2022  8:55 AM    Name: Andrea Pham    MRN: [id number]  Provider:  Paul Adhikari MD, On license of [facility]    Date of Service: 11/26/2022     Reason for Stay (Diagnosis): Severe alcohol withdrawal with DTs         Summary of hospital stay & Assessment/Plan:   Summary of Stay: Andrea Pham is a 57 year old male who was admitted on 11/23/2022  57 year old  male with a significant past medical history of Alcohol use disorder, on Suboxone for narcotic addiction, who presents from home with alcohol withdraw concern, black emesis and stool.  He drinks heavily and last drink was the night before admission.  Per his friend he was excluded from a family gathering which made him very upset and went on a binge drinking.   On presentation to the ER, patient arrived hypertensive, tachycardic, tremulous and anxious with nausea and vomiting.  Labs showed hemoglobin of 16.8, abnormal LFTs, lactate was 7.4, quantitative ketones of 2.8.  Blood alcohol level was 0.23.   Patient was given IV fluid, Valium IV, octreotide and Protonix infusion started in the emergency department and was [place] [facility]          1. Black tarry stools.    Suspected upper GI bleed.  Hemoconcentrated on presentation with hemoglobin 16.8--> 14.6--> 14.5--> 12.5-->11.6.  This does not appear to be an hemodynamically significant bleed.    Initially on octreotide to stop by gastroenterology, Protonix drip can be changed to IV, GI consult appreciated.  No endoscopy indicated for now per GI    We will monitor hemoglobin     2. Alcohol use disorder with severe withdrawal symptoms and delirium tremens.    On CIWA protocol with gabapentin taper and as needed lorazepam, patient has needed multiple doses of Haldol and Ativan.  Has had 32 milligrams of IV Ativan since yesterday still very agitated,    Plan to transfer to ICU once bed available for Precedex drip due to continued agitation.     3. Substance  abuse disorder.    Prior to admission on Suboxone, will hold for now due to low blood pressure.     4. Depression/anxiety.    Resumed Wellbutrin, Depakote, trazodone and Zyprexa.     5. Lactic acidosis.    Serum lactate 7.4 on presentation, secondary to alcohol use.  Improved to 1.8 with hydration.    Blood pressure did improve with IV fluid     6. Alcoholic liver disease.    AST//85 with total bilirubin of 1.8.    Recheck LFTs.     7. Alcoholic ketosis.    Now resolved.     8. History of hepatitis C.  Is not clear if he ever had treatment.  Reassess once mental status is improved.     9. Urinary retention in setting of BPH.    Patient has needed straight caths x2 and PVR is more than 500, will insert Lino.      Continue on Flomax.    DVT Prophylaxis: Pneumatic Compression Devices  Code Status:  Full Code    Disposition Plan     Expected discharge in >3  days to prior living arrangement once withdrawal is over.     Entered: Paul Adhikari MD 11/26/2022, 9:19 AM                 Interval History:          Patient awake, confused, aggressive at times with nurses despite very high dose of IV Ativan requiring one-on-one sitter    Today's plan detailed above discussed with nursing               Physical Exam:   Physical Exam   Temp: 98.4  F (36.9  C) Temp src: Axillary BP: 118/74 Pulse: 70   Resp: 16 SpO2: 95 % O2 Device: Nasal cannula Oxygen Delivery: 2 LPM  Vitals:    11/23/22 1209 11/24/22 0612   Weight: 85.3 kg (188 lb) 86.9 kg (191 lb 9.6 oz)     I/O last 3 completed shifts:  In: 7983.5 [P.O.:400; I.V.:7583.5]  Out: 4500 [Urine:4500]          GENERAL: Agitated and confused.   PSYCH: Unable  EYES: PERRLA, Normal conjunctiva.  HEART:  Normal S1, S2 with no edema.  LUNGS:  Clear to auscultation, normal Respiratory effort.  ABDOMEN:  Soft, no hepatosplenomegaly, normal bowel sounds.  SKIN:  Dry to touch, No rash.  Neuro: Non focal unable to do a comprehensive exam    Medications     dexmedetomidine        dextrose 5% and 0.9% NaCl 125 mL/hr at 11/26/22 0820       [Held by provider] buprenorphine HCl-naloxone HCl  1 Film Sublingual BID     buPROPion  450 mg Oral QAM     [Held by provider] cloNIDine  0.1 mg Oral Q8H     divalproex sodium extended-release  1,000 mg Oral At Bedtime     folic acid  1 mg Oral Daily     [START ON 11/30/2022] gabapentin  100 mg Oral Q8H     [START ON 11/28/2022] gabapentin  300 mg Oral Q8H     gabapentin  600 mg Oral Q8H     gabapentin  900 mg Oral Q8H     multivitamin w/minerals  1 tablet Oral Daily     OLANZapine  5 mg Oral or Feeding Tube QPM     pantoprazole  40 mg Intravenous BID 09 12     potassium chloride  10 mEq Intravenous Q1H     sodium chloride (PF)  3 mL Intracatheter Q8H     tamsulosin  0.4 mg Oral Daily     thiamine  100 mg Oral Daily     traZODone  50 mg Oral or Feeding Tube QPM     Data     -Data reviewed today:  I personally reviewed  all new labs and imaging results over the last 24 hours.    Recent Labs   Lab 11/26/22  0610 11/25/22  1806 11/25/22  1211 11/25/22  0830 11/24/22  1829 11/24/22  1545   WBC 3.0*  --   --  3.2*  --  4.2   HGB 11.6* 12.5* 11.5* 12.5*   < > 12.6*   HCT 36.2*  --   --  37.3*  --  36.9*   MCV 96  --   --  94  --  92   PLT 60*  --   --  69*  --  83*    < > = values in this interval not displayed.     Recent Labs   Lab 11/26/22  0610 11/25/22  0830 11/24/22  1959 11/24/22  1600 11/24/22  1545    140  --   --  140   POTASSIUM 3.4 3.7  --   --  3.5   CHLORIDE 108* 105  --   --  106   CO2 26 27  --   --  25   ANIONGAP 9 8  --   --  9   GLC 96 108* 171*   < > 162*   BUN 4.1* 7.8  --   --  14.1   CR 0.80 0.69  --   --  0.82   GFRESTIMATED >90 >90  --   --  >90   JOHANA 7.7* 7.9*  --   --  7.4*    < > = values in this interval not displayed.       No results found for this or any previous visit (from the past 24 hour(s)).    This document was produced using voice recognition software

## 2022-11-26 NOTE — PROVIDER NOTIFICATION
305 JORDANA- has received 12mg of ativan and 5mg of halidol. With little relief of ETOH withdrawal symtoms. Patient aggressive and unsafe trying to get out of bed. Scores for CIWA increasing Could the patient benefit from ICU transfer and drip?

## 2022-11-26 NOTE — PLAN OF CARE
Goal Outcome Evaluation:    This writer in room with pt evaluating CIWA score. Sitter at bedside. Pt's phone rang and sitter answered phone. Pt's sister was on the phone. Pt's sister requesting information. Sitter explained we can't give her information, she is not a .  Pt able to mumble that he will talk.  Pt mumble couple of words and pt drifted to sleep. Sitter picked up phone and explained pt fell asleep, then pt's sister stated people shouldn't be laughing in his room, this isn't funny. Sitter explained no one was laughing in his room and doesn't understand why she is saying that. Pt's SISTER continue to become more hostile on the phone and over talking the sitter.This writer took over the phone and tried to explain there was absolutely no one laughing in his room. Explained her claims are untrue and its not ok to be making these things up when we are trying to provide care for their loved one. Then she stated no one has called her for updates and can't get any information. She stated she had to call around to all the hospitals to find him. This writer explained to pt's sister I have taken care of him in the past and understand this is difficult situation. Unfortunately we need to honor his wishes and are unable to provide information. Pt's sister continue to over talk this writer and was hostile. This writer explained again we are unable to provide any information as you are aware from prior hospital stays he doesn't want us to release information to you. Pt's sister then escalated more and stated its Brenda his drug dealer alcoholic girlfriend that is the  and its her fault this keeps happening.  This writer apologized for their situation and understand their frustrations. She yelled and stated he has 95 year old mother and they should be able to know what's happening with him. This writer apologized again and then his sister stated well can you at least do me a favor when he dies  can you at least call me and then she hung up. MD aware and charge nurse aware of this situation. Pt's contacts are his Daughter and Brenda.

## 2022-11-26 NOTE — PROVIDER NOTIFICATION
"MD notified: \"Pt here with ETOH. Actively withdrawing. Pt receieved 32 mg ativan over course of 12 hours last night. He also has valium orders for CIWA protocol. What are your thoughts about me switching from ativan to valium?\"   "

## 2022-11-26 NOTE — PROGRESS NOTES
I was called with concern about alcohol withdrawal.  Patient has been agitated and has received 12 mg of Ativan since 10 PM.  Question was asked if patient should transfer to ICU for Precedex drip.  12 mg 4 hours as on average 3 mg/h which is not that much Ativan.  I recommended dosing Ativan more frequently.  I increased IV dosing range from 1-2 to 2-4.  If not improving will consider other interventions.

## 2022-11-26 NOTE — PLAN OF CARE
"  Pt is stable, but with low WDL MAPs, BPs.  MDs aware, unable to secure an ICU bed.  BP 93/55 (BP Location: Right arm)   Pulse 66   Temp 97.6  F (36.4  C) (Axillary)   Resp 16   Wt 86.9 kg (191 lb 9.6 oz)   SpO2 94%   BMI 26.72 kg/m     BP and MAPs have been somewhat improving throughout this shift. Pt is confused, but able to state correct, name, state that he is in the hospital, \"[facility]\" .  Reorients with gentle reminders, able to tolerate clears when awake.  Lorazepam given per CIWA scales 24, 6, 12, 4  Prn zyprexa for hallucination      Goal Outcome Evaluation: ongoing, not met                        "

## 2022-11-26 NOTE — PLAN OF CARE
Pt here with ETOH. Actively withdrawing at this time. Transitioned from IV ativan to IV valium as ativan was not effective in managing withdrawal. Pt received 30 mg IV valium and 5 mg IV haldol before transferring to ICU for closer monitoring. Disoriented to time but can intermittently state why he is being hospitalized. On GP taper. Replace lytes per protocol. Protonix infusion stopped. D5NS infusing at 125 ml/hr. Weaned to RA prior to transfer. Tele showing SR. Lino patent and draining. Sitter at bedside. Seizure precautions.     10:30 - Pt transferred to ICU. Sent with personal belongings. Report called to receiving RN.

## 2022-11-26 NOTE — PLAN OF CARE
Goal Outcome Evaluation:      VS stable. 96% on 2L 02. Confused. Tele is sinus rhythm. Haldol given for hallucinations. Ativan given per CIWA protocol. Up and down most of the night.

## 2022-11-27 LAB
ALBUMIN SERPL BCG-MCNC: 3.4 G/DL (ref 3.5–5.2)
ALP SERPL-CCNC: 61 U/L (ref 40–129)
ALT SERPL W P-5'-P-CCNC: 56 U/L (ref 10–50)
ANION GAP SERPL CALCULATED.3IONS-SCNC: 9 MMOL/L (ref 7–15)
AST SERPL W P-5'-P-CCNC: 44 U/L (ref 10–50)
BASE EXCESS BLDV CALC-SCNC: 5 MMOL/L (ref -7.7–1.9)
BASOPHILS # BLD AUTO: 0 10E3/UL (ref 0–0.2)
BASOPHILS NFR BLD AUTO: 1 %
BILIRUB SERPL-MCNC: 0.8 MG/DL
BUN SERPL-MCNC: 3.8 MG/DL (ref 6–20)
CALCIUM SERPL-MCNC: 8.6 MG/DL (ref 8.6–10)
CHLORIDE SERPL-SCNC: 106 MMOL/L (ref 98–107)
CREAT SERPL-MCNC: 0.75 MG/DL (ref 0.67–1.17)
DEPRECATED HCO3 PLAS-SCNC: 26 MMOL/L (ref 22–29)
EOSINOPHIL # BLD AUTO: 0.2 10E3/UL (ref 0–0.7)
EOSINOPHIL NFR BLD AUTO: 6 %
ERYTHROCYTE [DISTWIDTH] IN BLOOD BY AUTOMATED COUNT: 14 % (ref 10–15)
GFR SERPL CREATININE-BSD FRML MDRD: >90 ML/MIN/1.73M2
GLUCOSE BLDC GLUCOMTR-MCNC: 102 MG/DL (ref 70–99)
GLUCOSE BLDC GLUCOMTR-MCNC: 103 MG/DL (ref 70–99)
GLUCOSE BLDC GLUCOMTR-MCNC: 124 MG/DL (ref 70–99)
GLUCOSE BLDC GLUCOMTR-MCNC: 129 MG/DL (ref 70–99)
GLUCOSE BLDC GLUCOMTR-MCNC: 132 MG/DL (ref 70–99)
GLUCOSE BLDC GLUCOMTR-MCNC: 95 MG/DL (ref 70–99)
GLUCOSE SERPL-MCNC: 102 MG/DL (ref 70–99)
HCO3 BLDV-SCNC: 31 MMOL/L (ref 21–28)
HCT VFR BLD AUTO: 41.3 % (ref 40–53)
HGB BLD-MCNC: 12.8 G/DL (ref 13.3–17.7)
HGB BLD-MCNC: 13.1 G/DL (ref 13.3–17.7)
IMM GRANULOCYTES # BLD: 0 10E3/UL
IMM GRANULOCYTES NFR BLD: 1 %
LYMPHOCYTES # BLD AUTO: 0.7 10E3/UL (ref 0.8–5.3)
LYMPHOCYTES NFR BLD AUTO: 17 %
MAGNESIUM SERPL-MCNC: 1.9 MG/DL (ref 1.7–2.3)
MCH RBC QN AUTO: 30.6 PG (ref 26.5–33)
MCHC RBC AUTO-ENTMCNC: 31.7 G/DL (ref 31.5–36.5)
MCV RBC AUTO: 97 FL (ref 78–100)
MONOCYTES # BLD AUTO: 0.4 10E3/UL (ref 0–1.3)
MONOCYTES NFR BLD AUTO: 10 %
NEUTROPHILS # BLD AUTO: 2.6 10E3/UL (ref 1.6–8.3)
NEUTROPHILS NFR BLD AUTO: 65 %
NRBC # BLD AUTO: 0 10E3/UL
NRBC BLD AUTO-RTO: 0 /100
O2/TOTAL GAS SETTING VFR VENT: 3 %
PCO2 BLDV: 48 MM HG (ref 40–50)
PH BLDV: 7.41 [PH] (ref 7.32–7.43)
PHOSPHATE SERPL-MCNC: 4.1 MG/DL (ref 2.5–4.5)
PLATELET # BLD AUTO: 73 10E3/UL (ref 150–450)
PO2 BLDV: 59 MM HG (ref 25–47)
POTASSIUM SERPL-SCNC: 4.2 MMOL/L (ref 3.4–5.3)
PROT SERPL-MCNC: 5.5 G/DL (ref 6.4–8.3)
RBC # BLD AUTO: 4.28 10E6/UL (ref 4.4–5.9)
SODIUM SERPL-SCNC: 141 MMOL/L (ref 136–145)
WBC # BLD AUTO: 3.9 10E3/UL (ref 4–11)

## 2022-11-27 PROCEDURE — 80053 COMPREHEN METABOLIC PANEL: CPT | Performed by: INTERNAL MEDICINE

## 2022-11-27 PROCEDURE — 36415 COLL VENOUS BLD VENIPUNCTURE: CPT | Performed by: STUDENT IN AN ORGANIZED HEALTH CARE EDUCATION/TRAINING PROGRAM

## 2022-11-27 PROCEDURE — C9113 INJ PANTOPRAZOLE SODIUM, VIA: HCPCS | Performed by: INTERNAL MEDICINE

## 2022-11-27 PROCEDURE — 82803 BLOOD GASES ANY COMBINATION: CPT | Performed by: STUDENT IN AN ORGANIZED HEALTH CARE EDUCATION/TRAINING PROGRAM

## 2022-11-27 PROCEDURE — 258N000003 HC RX IP 258 OP 636: Performed by: STUDENT IN AN ORGANIZED HEALTH CARE EDUCATION/TRAINING PROGRAM

## 2022-11-27 PROCEDURE — 250N000011 HC RX IP 250 OP 636: Performed by: EMERGENCY MEDICINE

## 2022-11-27 PROCEDURE — 85004 AUTOMATED DIFF WBC COUNT: CPT | Performed by: STUDENT IN AN ORGANIZED HEALTH CARE EDUCATION/TRAINING PROGRAM

## 2022-11-27 PROCEDURE — 258N000001 HC RX 258: Performed by: INTERNAL MEDICINE

## 2022-11-27 PROCEDURE — 250N000011 HC RX IP 250 OP 636: Performed by: INTERNAL MEDICINE

## 2022-11-27 PROCEDURE — 200N000001 HC R&B ICU

## 2022-11-27 PROCEDURE — 85018 HEMOGLOBIN: CPT | Performed by: STUDENT IN AN ORGANIZED HEALTH CARE EDUCATION/TRAINING PROGRAM

## 2022-11-27 PROCEDURE — 99232 SBSQ HOSP IP/OBS MODERATE 35: CPT | Performed by: STUDENT IN AN ORGANIZED HEALTH CARE EDUCATION/TRAINING PROGRAM

## 2022-11-27 PROCEDURE — 83735 ASSAY OF MAGNESIUM: CPT | Performed by: INTERNAL MEDICINE

## 2022-11-27 PROCEDURE — 250N000013 HC RX MED GY IP 250 OP 250 PS 637: Performed by: INTERNAL MEDICINE

## 2022-11-27 PROCEDURE — 250N000011 HC RX IP 250 OP 636: Performed by: STUDENT IN AN ORGANIZED HEALTH CARE EDUCATION/TRAINING PROGRAM

## 2022-11-27 PROCEDURE — 250N000009 HC RX 250: Performed by: INTERNAL MEDICINE

## 2022-11-27 PROCEDURE — 84100 ASSAY OF PHOSPHORUS: CPT | Performed by: INTERNAL MEDICINE

## 2022-11-27 RX ADMIN — GABAPENTIN 600 MG: 300 CAPSULE ORAL at 12:49

## 2022-11-27 RX ADMIN — THIAMINE HYDROCHLORIDE 500 MG: 100 INJECTION, SOLUTION INTRAMUSCULAR; INTRAVENOUS at 21:25

## 2022-11-27 RX ADMIN — LORAZEPAM 2 MG: 0.5 TABLET ORAL at 16:02

## 2022-11-27 RX ADMIN — MULTIPLE VITAMINS W/ MINERALS TAB 1 TABLET: TAB at 12:49

## 2022-11-27 RX ADMIN — FOLIC ACID 1 MG: 1 TABLET ORAL at 12:49

## 2022-11-27 RX ADMIN — DIAZEPAM 5 MG: 10 INJECTION, SOLUTION INTRAMUSCULAR; INTRAVENOUS at 11:28

## 2022-11-27 RX ADMIN — POTASSIUM CHLORIDE, DEXTROSE MONOHYDRATE AND SODIUM CHLORIDE: 150; 5; 900 INJECTION, SOLUTION INTRAVENOUS at 12:55

## 2022-11-27 RX ADMIN — HALOPERIDOL LACTATE 2.5 MG: 5 INJECTION, SOLUTION INTRAMUSCULAR at 13:57

## 2022-11-27 RX ADMIN — PANTOPRAZOLE SODIUM 40 MG: 40 INJECTION, POWDER, FOR SOLUTION INTRAVENOUS at 09:51

## 2022-11-27 RX ADMIN — POTASSIUM CHLORIDE, DEXTROSE MONOHYDRATE AND SODIUM CHLORIDE: 150; 5; 900 INJECTION, SOLUTION INTRAVENOUS at 03:36

## 2022-11-27 RX ADMIN — TAMSULOSIN HYDROCHLORIDE 0.4 MG: 0.4 CAPSULE ORAL at 12:49

## 2022-11-27 RX ADMIN — GABAPENTIN 600 MG: 300 CAPSULE ORAL at 04:29

## 2022-11-27 RX ADMIN — THIAMINE HYDROCHLORIDE 500 MG: 100 INJECTION, SOLUTION INTRAMUSCULAR; INTRAVENOUS at 09:47

## 2022-11-27 RX ADMIN — BUPROPION HYDROCHLORIDE 450 MG: 150 TABLET, EXTENDED RELEASE ORAL at 12:49

## 2022-11-27 RX ADMIN — DIVALPROEX SODIUM 1000 MG: 500 TABLET, FILM COATED, EXTENDED RELEASE ORAL at 21:25

## 2022-11-27 RX ADMIN — LORAZEPAM 2 MG: 2 INJECTION INTRAMUSCULAR; INTRAVENOUS at 06:31

## 2022-11-27 RX ADMIN — TRAZODONE HYDROCHLORIDE 50 MG: 50 TABLET ORAL at 21:25

## 2022-11-27 RX ADMIN — LORAZEPAM 4 MG: 2 INJECTION INTRAMUSCULAR; INTRAVENOUS at 22:05

## 2022-11-27 RX ADMIN — LORAZEPAM 2 MG: 2 INJECTION INTRAMUSCULAR; INTRAVENOUS at 21:25

## 2022-11-27 RX ADMIN — LORAZEPAM 2 MG: 0.5 TABLET ORAL at 15:16

## 2022-11-27 RX ADMIN — LORAZEPAM 2 MG: 0.5 TABLET ORAL at 14:39

## 2022-11-27 RX ADMIN — GABAPENTIN 600 MG: 300 CAPSULE ORAL at 20:22

## 2022-11-27 RX ADMIN — LORAZEPAM 2 MG: 2 INJECTION INTRAMUSCULAR; INTRAVENOUS at 12:50

## 2022-11-27 RX ADMIN — DEXMEDETOMIDINE HYDROCHLORIDE 0.2 MCG/KG/HR: 400 INJECTION INTRAVENOUS at 22:48

## 2022-11-27 RX ADMIN — LORAZEPAM 2 MG: 2 INJECTION INTRAMUSCULAR; INTRAVENOUS at 13:57

## 2022-11-27 RX ADMIN — THIAMINE HYDROCHLORIDE 500 MG: 100 INJECTION, SOLUTION INTRAMUSCULAR; INTRAVENOUS at 17:02

## 2022-11-27 RX ADMIN — LORAZEPAM 2 MG: 2 INJECTION INTRAMUSCULAR; INTRAVENOUS at 18:43

## 2022-11-27 RX ADMIN — PANTOPRAZOLE SODIUM 40 MG: 40 INJECTION, POWDER, FOR SOLUTION INTRAVENOUS at 20:21

## 2022-11-27 RX ADMIN — POTASSIUM CHLORIDE, DEXTROSE MONOHYDRATE AND SODIUM CHLORIDE: 150; 5; 900 INJECTION, SOLUTION INTRAVENOUS at 21:05

## 2022-11-27 RX ADMIN — LORAZEPAM 2 MG: 2 INJECTION INTRAMUSCULAR; INTRAVENOUS at 17:02

## 2022-11-27 RX ADMIN — LORAZEPAM 2 MG: 2 INJECTION INTRAMUSCULAR; INTRAVENOUS at 05:14

## 2022-11-27 RX ADMIN — OLANZAPINE 5 MG: 2.5 TABLET, FILM COATED ORAL at 20:22

## 2022-11-27 RX ADMIN — LORAZEPAM 1 MG: 0.5 TABLET ORAL at 20:29

## 2022-11-27 ASSESSMENT — ACTIVITIES OF DAILY LIVING (ADL)
ADLS_ACUITY_SCORE: 32
ADLS_ACUITY_SCORE: 36
ADLS_ACUITY_SCORE: 32
ADLS_ACUITY_SCORE: 36
ADLS_ACUITY_SCORE: 32

## 2022-11-27 NOTE — PLAN OF CARE
Goal Outcome Evaluation:  ICU End of Shift Summary.  For vital signs and complete assessments, please see documentation flowsheets.      Pertinent assessments: IWA-Bfnruptc-Tqtc SR- sedated on precdex- lungs clear- polanco draining adequate amounts of urine. Not eating this shift.  Major Shift Events:Tx to ICU-   Precedex gtt started- Ativan given per CIWA scale- potassium replaced, recheck wnl.  Plan (Upcoming Events): continue with plan of care  Discharge/Transfer Needs: TBD     Bedside Shift Report Completed :   Bedside Safety Check Completed:

## 2022-11-27 NOTE — PROGRESS NOTES
[facility]  Hospitalist Progress Note  Uday Ceballos, DO 11/27/22       Reason for Stay (Diagnosis): Black stool, severe alcohol withdrawal         Assessment and Plan:      Summary of Stay: Andrea Pham is a 57 year old male who was admitted on 11/23/2022  57 year old  male with a significant past medical history of Alcohol use disorder, on Suboxone for narcotic addiction, who presents from home with alcohol withdraw concern, black emesis and stool.  He drinks heavily and last drink was the night before admission.  Per his friend he was excluded from a family gathering which made him very upset and went on a binge drinking.   On presentation to the ER, patient arrived hypertensive, tachycardic, tremulous and anxious with nausea and vomiting.  Labs showed hemoglobin of 16.8, abnormal LFTs, lactate was 7.4, quantitative ketones of 2.8.  Blood alcohol level was 0.23.   Patient was given IV fluid, Valium IV, octreotide and Protonix infusion started in the emergency department and was admitted to [facility] [place].      His withdrawal worsened and he was transferred to the ICU given significant need for IV benzos for initiation of a precedex drip.       Problem List/Assessment and Plan:   Black tarry stools:   Suspected upper GI bleed.  Hemoconcentrated on presentation with hemoglobin 16.8--> 14.6--> 14.5--> 12.5-->11.6.  This does not appear to be an hemodynamically significant bleed.  Initially on octreotide but this has since been stopped by gastroenterology.  Protonix drip can be changed to IV.   -GI consult appreciated   -No endoscopy indicated for now per GI  -Daily Hgb  -IV PPI until tolerating PO intake     Alcohol use disorder with severe withdrawal symptoms and delirium tremens:  On CIWA protocol with gabapentin taper and as needed lorazepam, patient has needed multiple doses of Haldol and Ativan.  Has had 32 milligrams of IV Ativan within 24 hours and so was transferred to ICU on 11/26 for  initiation of precedex drip.    -Continue precedex drip, wean as able  -CIWA protocol with ativan  -High dose IV thiamine given ongoing AMS  -Continue folate     Substance abuse disorder:  Is on SuboxonePTA.  Will hold for now due to low blood pressure.     Depression/anxiety:  Resumed Wellbutrin, Depakote, trazodone and Zyprexa    Lactic acidosis:  Serum lactate 7.4 on presentation, secondary to alcohol use.  Improved to 1.8 with hydration.  Blood pressure did improve with IV fluid.     Alcoholic liver disease:  AST//85 with total bilirubin of 1.8.  Bilirubin, AST now normal.      Alcoholic ketosis, resolved     History of hepatitis C:    Is not clear if he ever had treatment.  Reassess once mental status is improved.     Urinary retention in setting of BPH:    Patient has needed straight caths x2 and PVR is more than 500, will insert Lino.  Continue on Flomax. Should have trial of void prior to discharge.       DVT Prophylaxis: Pneumatic Compression Devices  Code Status: Full Code  Discharge Dispo/Date: Anticipate at least 3 more days in the hospital pending improvement in withdrawal.        Interval History (Subjective):      Patient quite somnolent this morning.  He did wake up to sternal rub.  Following this, he did answer my questions appropriately.  Denies any pain or shortness of breath.  No other acute events overnight.                  Physical Exam:      Last Vital Signs:  BP (!) 125/91   Pulse 62   Temp 98  F (36.7  C) (Temporal)   Resp 15   Wt 85.1 kg (187 lb 9.8 oz)   SpO2 99%   BMI 26.17 kg/m        Intake/Output Summary (Last 24 hours) at 11/27/2022 1113  Last data filed at 11/27/2022 1000  Gross per 24 hour   Intake 3652.29 ml   Output 7300 ml   Net -3647.71 ml       General: Somnolent, did wake up to noxious stimuli and was able to answer basic questions.  Did not assess orientation.   HEENT: NC/AT, eyes anicteric, external occular movements intact, face symmetric.   Cardiac: mild  bradycardia, S1, S2.  No murmurs appreciated.  Pulmonary: Normal work of breathing.  Lungs CTAB.  Abdomen: soft, non-tender, non-distended.  Bowel sounds present.  No guarding.  Extremities: no deformities.  Warm, well perfused.  Skin: no rashes or lesions noted.  Warm and dry.  Neuro: No gross deficits noted.  Speech clear.    Psych: Somnolent.         Medications:      All current medications were reviewed with changes reflected in problem list.         Data:      All new lab and imaging data was reviewed.   Labs:       Lab Results   Component Value Date     11/27/2022     11/26/2022     11/26/2022     12/01/2020     11/30/2020     07/18/2020    Lab Results   Component Value Date    CHLORIDE 106 11/27/2022    CHLORIDE 109 11/26/2022    CHLORIDE 108 11/26/2022    CHLORIDE 99 12/24/2021    CHLORIDE 101 12/23/2021    CHLORIDE 107 12/01/2020    CHLORIDE 104 11/30/2020    CHLORIDE 105 08/02/2020    CHLORIDE 107 07/18/2020    Lab Results   Component Value Date    BUN 3.8 11/27/2022    BUN 3.6 11/26/2022    BUN 4.1 11/26/2022    BUN 20 12/24/2021    BUN 14 12/23/2021    BUN 16 12/01/2020    BUN 31 11/30/2020    BUN 18 08/02/2020    BUN 21 07/18/2020      Lab Results   Component Value Date    POTASSIUM 4.2 11/27/2022    POTASSIUM 3.8 11/26/2022    POTASSIUM 3.8 11/26/2022    POTASSIUM 3.9 12/24/2021    POTASSIUM 4.4 12/23/2021    POTASSIUM 3.8 12/01/2020    POTASSIUM 4.0 11/30/2020    POTASSIUM 4.1 08/02/2020    POTASSIUM 4.1 07/18/2020    Lab Results   Component Value Date    CO2 26 11/27/2022    CO2 28 11/26/2022    CO2 26 11/26/2022    CO2 26 12/24/2021    CO2 23 12/23/2021    CO2 24 12/01/2020    CO2 23 11/30/2020    CO2 20 08/02/2020    CO2 21 07/18/2020    Lab Results   Component Value Date    CR 0.75 11/27/2022    CR 0.80 11/26/2022    CR 0.80 11/26/2022    CR 0.82 12/01/2020    CR 1.33 11/30/2020    CR 1.21 07/18/2020        Recent Labs   Lab 11/27/22  0530   WBC 3.9*   HGB  13.1*   HCT 41.3   MCV 97   PLT 73*      Imaging:   No results found for this or any previous visit (from the past 48 hour(s)).      Uday Ceballos,

## 2022-11-27 NOTE — PROGRESS NOTES
ICU End of Shift Summary.  For vital signs and complete assessments, please see documentation flowsheets.      Pertinent assessments:   CV: SR/SB  Neuro: pt. With a RASS score of -2 most of shift and remained disoriented x 4, around 0400 patient became more agitated, trying to get out of bed, and  unable to reorient. Dex titrated up and currently @ 0.5 and CIWA completed with one dose of IV Valium and one dose of IV ativan given  Pulm: was on 4 L NC at start of shift now on 3 L NC  Renal: Lino in place with good UO  GI: no BM this shift    Major Shift Events: Agitation, patient trying getting out of bed   Discharge/Transfer Needs: Pending clinical status     Bedside Shift Report Completed : Yes  Bedside Safety Check Completed: Yes

## 2022-11-28 LAB
ALBUMIN SERPL BCG-MCNC: 3.7 G/DL (ref 3.5–5.2)
ALP SERPL-CCNC: 66 U/L (ref 40–129)
ALT SERPL W P-5'-P-CCNC: 49 U/L (ref 10–50)
AMMONIA PLAS-SCNC: 73 UMOL/L (ref 16–60)
ANION GAP SERPL CALCULATED.3IONS-SCNC: 9 MMOL/L (ref 7–15)
AST SERPL W P-5'-P-CCNC: 29 U/L (ref 10–50)
BILIRUB DIRECT SERPL-MCNC: 0.22 MG/DL (ref 0–0.3)
BILIRUB SERPL-MCNC: 0.6 MG/DL
BUN SERPL-MCNC: 4.6 MG/DL (ref 6–20)
CALCIUM SERPL-MCNC: 8.7 MG/DL (ref 8.6–10)
CHLORIDE SERPL-SCNC: 107 MMOL/L (ref 98–107)
CREAT SERPL-MCNC: 0.76 MG/DL (ref 0.67–1.17)
DEPRECATED HCO3 PLAS-SCNC: 25 MMOL/L (ref 22–29)
ERYTHROCYTE [DISTWIDTH] IN BLOOD BY AUTOMATED COUNT: 13.9 % (ref 10–15)
GFR SERPL CREATININE-BSD FRML MDRD: >90 ML/MIN/1.73M2
GLUCOSE BLDC GLUCOMTR-MCNC: 110 MG/DL (ref 70–99)
GLUCOSE BLDC GLUCOMTR-MCNC: 113 MG/DL (ref 70–99)
GLUCOSE BLDC GLUCOMTR-MCNC: 114 MG/DL (ref 70–99)
GLUCOSE BLDC GLUCOMTR-MCNC: 116 MG/DL (ref 70–99)
GLUCOSE BLDC GLUCOMTR-MCNC: 125 MG/DL (ref 70–99)
GLUCOSE BLDC GLUCOMTR-MCNC: 144 MG/DL (ref 70–99)
GLUCOSE SERPL-MCNC: 123 MG/DL (ref 70–99)
HCT VFR BLD AUTO: 41.4 % (ref 40–53)
HGB BLD-MCNC: 13.5 G/DL (ref 13.3–17.7)
MAGNESIUM SERPL-MCNC: 2 MG/DL (ref 1.7–2.3)
MCH RBC QN AUTO: 31.3 PG (ref 26.5–33)
MCHC RBC AUTO-ENTMCNC: 32.6 G/DL (ref 31.5–36.5)
MCV RBC AUTO: 96 FL (ref 78–100)
PHOSPHATE SERPL-MCNC: 4 MG/DL (ref 2.5–4.5)
PLATELET # BLD AUTO: 96 10E3/UL (ref 150–450)
POTASSIUM SERPL-SCNC: 4.2 MMOL/L (ref 3.4–5.3)
PROT SERPL-MCNC: 5.8 G/DL (ref 6.4–8.3)
RBC # BLD AUTO: 4.31 10E6/UL (ref 4.4–5.9)
SODIUM SERPL-SCNC: 141 MMOL/L (ref 136–145)
TSH SERPL DL<=0.005 MIU/L-ACNC: 1.31 UIU/ML (ref 0.3–4.2)
WBC # BLD AUTO: 3.3 10E3/UL (ref 4–11)

## 2022-11-28 PROCEDURE — C9113 INJ PANTOPRAZOLE SODIUM, VIA: HCPCS | Performed by: INTERNAL MEDICINE

## 2022-11-28 PROCEDURE — 258N000001 HC RX 258: Performed by: INTERNAL MEDICINE

## 2022-11-28 PROCEDURE — 250N000013 HC RX MED GY IP 250 OP 250 PS 637: Performed by: INTERNAL MEDICINE

## 2022-11-28 PROCEDURE — 83735 ASSAY OF MAGNESIUM: CPT | Performed by: INTERNAL MEDICINE

## 2022-11-28 PROCEDURE — 250N000011 HC RX IP 250 OP 636: Performed by: INTERNAL MEDICINE

## 2022-11-28 PROCEDURE — 36415 COLL VENOUS BLD VENIPUNCTURE: CPT | Performed by: STUDENT IN AN ORGANIZED HEALTH CARE EDUCATION/TRAINING PROGRAM

## 2022-11-28 PROCEDURE — 258N000003 HC RX IP 258 OP 636: Performed by: STUDENT IN AN ORGANIZED HEALTH CARE EDUCATION/TRAINING PROGRAM

## 2022-11-28 PROCEDURE — 250N000009 HC RX 250: Performed by: INTERNAL MEDICINE

## 2022-11-28 PROCEDURE — 84100 ASSAY OF PHOSPHORUS: CPT | Performed by: INTERNAL MEDICINE

## 2022-11-28 PROCEDURE — 250N000013 HC RX MED GY IP 250 OP 250 PS 637: Performed by: STUDENT IN AN ORGANIZED HEALTH CARE EDUCATION/TRAINING PROGRAM

## 2022-11-28 PROCEDURE — 82248 BILIRUBIN DIRECT: CPT | Performed by: STUDENT IN AN ORGANIZED HEALTH CARE EDUCATION/TRAINING PROGRAM

## 2022-11-28 PROCEDURE — 99232 SBSQ HOSP IP/OBS MODERATE 35: CPT | Performed by: STUDENT IN AN ORGANIZED HEALTH CARE EDUCATION/TRAINING PROGRAM

## 2022-11-28 PROCEDURE — 82140 ASSAY OF AMMONIA: CPT | Performed by: STUDENT IN AN ORGANIZED HEALTH CARE EDUCATION/TRAINING PROGRAM

## 2022-11-28 PROCEDURE — 85027 COMPLETE CBC AUTOMATED: CPT | Performed by: STUDENT IN AN ORGANIZED HEALTH CARE EDUCATION/TRAINING PROGRAM

## 2022-11-28 PROCEDURE — 84443 ASSAY THYROID STIM HORMONE: CPT | Performed by: STUDENT IN AN ORGANIZED HEALTH CARE EDUCATION/TRAINING PROGRAM

## 2022-11-28 PROCEDURE — 80053 COMPREHEN METABOLIC PANEL: CPT | Performed by: STUDENT IN AN ORGANIZED HEALTH CARE EDUCATION/TRAINING PROGRAM

## 2022-11-28 PROCEDURE — 200N000001 HC R&B ICU

## 2022-11-28 PROCEDURE — 85018 HEMOGLOBIN: CPT | Performed by: STUDENT IN AN ORGANIZED HEALTH CARE EDUCATION/TRAINING PROGRAM

## 2022-11-28 PROCEDURE — 250N000011 HC RX IP 250 OP 636: Performed by: STUDENT IN AN ORGANIZED HEALTH CARE EDUCATION/TRAINING PROGRAM

## 2022-11-28 RX ORDER — AMOXICILLIN 250 MG
1 CAPSULE ORAL 2 TIMES DAILY
Status: DISCONTINUED | OUTPATIENT
Start: 2022-11-28 | End: 2022-12-01

## 2022-11-28 RX ORDER — LACTULOSE 10 G/15ML
20 SOLUTION ORAL 2 TIMES DAILY
Status: DISCONTINUED | OUTPATIENT
Start: 2022-11-28 | End: 2022-12-11

## 2022-11-28 RX ORDER — POLYETHYLENE GLYCOL 3350 17 G/17G
17 POWDER, FOR SOLUTION ORAL DAILY
Status: DISCONTINUED | OUTPATIENT
Start: 2022-11-28 | End: 2022-12-12 | Stop reason: HOSPADM

## 2022-11-28 RX ADMIN — LORAZEPAM 1 MG: 0.5 TABLET ORAL at 09:15

## 2022-11-28 RX ADMIN — Medication 5 MG: at 20:50

## 2022-11-28 RX ADMIN — LORAZEPAM 2 MG: 0.5 TABLET ORAL at 22:32

## 2022-11-28 RX ADMIN — THIAMINE HYDROCHLORIDE 500 MG: 100 INJECTION, SOLUTION INTRAMUSCULAR; INTRAVENOUS at 08:47

## 2022-11-28 RX ADMIN — LORAZEPAM 1 MG: 2 INJECTION INTRAMUSCULAR; INTRAVENOUS at 04:22

## 2022-11-28 RX ADMIN — DEXMEDETOMIDINE HYDROCHLORIDE 0.3 MCG/KG/HR: 400 INJECTION INTRAVENOUS at 15:44

## 2022-11-28 RX ADMIN — VALPROIC ACID 500 MG: 250 SOLUTION ORAL at 22:32

## 2022-11-28 RX ADMIN — LACTULOSE 20 G: 20 SOLUTION ORAL at 20:51

## 2022-11-28 RX ADMIN — GABAPENTIN 300 MG: 300 CAPSULE ORAL at 20:50

## 2022-11-28 RX ADMIN — TRAZODONE HYDROCHLORIDE 50 MG: 50 TABLET ORAL at 20:50

## 2022-11-28 RX ADMIN — POLYETHYLENE GLYCOL 3350 17 G: 17 POWDER, FOR SOLUTION ORAL at 12:34

## 2022-11-28 RX ADMIN — PANTOPRAZOLE SODIUM 40 MG: 40 INJECTION, POWDER, FOR SOLUTION INTRAVENOUS at 08:46

## 2022-11-28 RX ADMIN — FOLIC ACID 1 MG: 1 TABLET ORAL at 08:46

## 2022-11-28 RX ADMIN — LORAZEPAM 2 MG: 2 INJECTION INTRAMUSCULAR; INTRAVENOUS at 15:19

## 2022-11-28 RX ADMIN — THIAMINE HYDROCHLORIDE 500 MG: 100 INJECTION, SOLUTION INTRAMUSCULAR; INTRAVENOUS at 21:43

## 2022-11-28 RX ADMIN — LORAZEPAM 2 MG: 0.5 TABLET ORAL at 21:04

## 2022-11-28 RX ADMIN — POTASSIUM CHLORIDE, DEXTROSE MONOHYDRATE AND SODIUM CHLORIDE: 150; 5; 900 INJECTION, SOLUTION INTRAVENOUS at 15:17

## 2022-11-28 RX ADMIN — OLANZAPINE 5 MG: 2.5 TABLET, FILM COATED ORAL at 20:51

## 2022-11-28 RX ADMIN — MULTIPLE VITAMINS W/ MINERALS TAB 1 TABLET: TAB at 08:46

## 2022-11-28 RX ADMIN — LORAZEPAM 4 MG: 2 INJECTION INTRAMUSCULAR; INTRAVENOUS at 10:24

## 2022-11-28 RX ADMIN — TAMSULOSIN HYDROCHLORIDE 0.4 MG: 0.4 CAPSULE ORAL at 08:46

## 2022-11-28 RX ADMIN — LORAZEPAM 1 MG: 0.5 TABLET ORAL at 21:43

## 2022-11-28 RX ADMIN — THIAMINE HYDROCHLORIDE 500 MG: 100 INJECTION, SOLUTION INTRAMUSCULAR; INTRAVENOUS at 15:46

## 2022-11-28 RX ADMIN — PANTOPRAZOLE SODIUM 40 MG: 40 INJECTION, POWDER, FOR SOLUTION INTRAVENOUS at 20:51

## 2022-11-28 RX ADMIN — BUPROPION HYDROCHLORIDE 450 MG: 150 TABLET, EXTENDED RELEASE ORAL at 08:46

## 2022-11-28 RX ADMIN — SENNOSIDES AND DOCUSATE SODIUM 1 TABLET: 50; 8.6 TABLET ORAL at 20:50

## 2022-11-28 RX ADMIN — POTASSIUM CHLORIDE, DEXTROSE MONOHYDRATE AND SODIUM CHLORIDE: 150; 5; 900 INJECTION, SOLUTION INTRAVENOUS at 06:17

## 2022-11-28 ASSESSMENT — ACTIVITIES OF DAILY LIVING (ADL)
ADLS_ACUITY_SCORE: 32
ADLS_ACUITY_SCORE: 33
ADLS_ACUITY_SCORE: 32

## 2022-11-28 NOTE — PROGRESS NOTES
[facility]  Hospitalist Progress Note  Uday Ceballos, DO 11/28/22       Reason for Stay (Diagnosis): Black stool, severe alcohol withdrawal         Assessment and Plan:      Summary of Stay: Andrea Pham is a 57 year old male who was admitted on 11/23/2022  57 year old  male with a significant past medical history of Alcohol use disorder, on Suboxone for narcotic addiction, who presents from home with alcohol withdraw concern, black emesis and stool.  He drinks heavily and last drink was the night before admission.  Per his friend he was excluded from a family gathering which made him very upset and went on a binge drinking.   On presentation to the ER, patient arrived hypertensive, tachycardic, tremulous and anxious with nausea and vomiting.  Labs showed hemoglobin of 16.8, abnormal LFTs, lactate was 7.4, quantitative ketones of 2.8.  Blood alcohol level was 0.23.   Patient was given IV fluid, Valium IV, octreotide and Protonix infusion started in the emergency department and was admitted to [facility].      His withdrawal worsened and he was transferred to the ICU given significant need for IV benzos for initiation of a precedex drip.       Problem List/Assessment and Plan:   Black tarry stools:   Suspected upper GI bleed.  Hemoconcentrated on presentation with hemoglobin 16.8--> 14.6--> 14.5--> 12.5-->11.6.  This does not appear to be an hemodynamically significant bleed.  Initially on octreotide but this has since been stopped by gastroenterology.  Protonix drip can be changed to IV.   -GI consult appreciated   -No endoscopy indicated for now per GI  -Daily Hgb  -IV PPI until tolerating PO intake     Alcohol use disorder with severe withdrawal symptoms and delirium tremens:  On CIWA protocol with gabapentin taper and as needed lorazepam, patient has needed multiple doses of Haldol and Ativan.  Has had 32 milligrams of IV Ativan within 24 hours and so was transferred to ICU on 11/26 for  initiation of precedex drip.  He has occasionally been able to come off the the precedex drip, but usually has ended back up on it related to agitation during which he is not redirectable.   -Continue precedex drip, wean as able  -CIWA protocol with ativan  -High dose IV thiamine given ongoing AMS  -Continue folate     Substance abuse disorder:  Is on Suboxone PTA.  Will hold for now due to low blood pressure.     Depression/anxiety:  PTA Wellbutrin, Depakote, trazodone and Zyprexa    Lactic acidosis:  Serum lactate 7.4 on presentation, secondary to alcohol use.  Improved to 1.8 with hydration.  Blood pressure did improve with IV fluid.     Alcoholic liver disease:  AST//85 with total bilirubin of 1.8.  Bilirubin, AST now normal.      Alcoholic ketosis, resolved     History of hepatitis C:    Is not clear if he ever had treatment.  Reassess once mental status is improved.     Urinary retention in setting of BPH:    Patient has needed straight caths x2 and PVR is more than 500, will insert Lino.  Continue on Flomax. Should have trial of void prior to discharge.       DVT Prophylaxis: Pneumatic Compression Devices  Code Status: Full Code  Discharge Dispo/Date: Anticipate at least 3 more days in the hospital pending improvement in withdrawal.        Interval History (Subjective):      Episode agitation last night once precedex drip was discontinued.  He required ativan & re-initiation of precedex drip for this.  This morning he is fairly somnolent but did wake up and answer simple questions.                   Physical Exam:      Last Vital Signs:  /75   Pulse 80   Temp 98  F (36.7  C) (Axillary)   Resp 14   Wt 80.8 kg (178 lb 2.1 oz)   SpO2 98%   BMI 24.84 kg/m        Intake/Output Summary (Last 24 hours) at 11/27/2022 1113  Last data filed at 11/27/2022 1000  Gross per 24 hour   Intake 3652.29 ml   Output 7300 ml   Net -3647.71 ml       General: Somnolent, did wake up to noxious stimuli and was  able to answer basic questions.  Did not assess orientation.   HEENT: NC/AT, eyes anicteric, external occular movements intact, face symmetric.  PERRL.  Cardiac: mild bradycardia, S1, S2.  No murmurs appreciated.  Pulmonary: Normal work of breathing.  Lungs CTAB.  Abdomen: soft, non-tender, non-distended.  Bowel sounds present.  No guarding.  Extremities: no deformities.  Warm, well perfused.  Skin: no rashes or lesions noted.  Warm and dry.  Neuro: No gross deficits noted.  Speech clear.    Psych: Somnolent.         Medications:      All current medications were reviewed with changes reflected in problem list.         Data:      All new lab and imaging data was reviewed.   Labs:       Lab Results   Component Value Date     11/27/2022     11/26/2022     11/26/2022     12/01/2020     11/30/2020     07/18/2020    Lab Results   Component Value Date    CHLORIDE 106 11/27/2022    CHLORIDE 109 11/26/2022    CHLORIDE 108 11/26/2022    CHLORIDE 99 12/24/2021    CHLORIDE 101 12/23/2021    CHLORIDE 107 12/01/2020    CHLORIDE 104 11/30/2020    CHLORIDE 105 08/02/2020    CHLORIDE 107 07/18/2020    Lab Results   Component Value Date    BUN 3.8 11/27/2022    BUN 3.6 11/26/2022    BUN 4.1 11/26/2022    BUN 20 12/24/2021    BUN 14 12/23/2021    BUN 16 12/01/2020    BUN 31 11/30/2020    BUN 18 08/02/2020    BUN 21 07/18/2020      Lab Results   Component Value Date    POTASSIUM 4.2 11/27/2022    POTASSIUM 3.8 11/26/2022    POTASSIUM 3.8 11/26/2022    POTASSIUM 3.9 12/24/2021    POTASSIUM 4.4 12/23/2021    POTASSIUM 3.8 12/01/2020    POTASSIUM 4.0 11/30/2020    POTASSIUM 4.1 08/02/2020    POTASSIUM 4.1 07/18/2020    Lab Results   Component Value Date    CO2 26 11/27/2022    CO2 28 11/26/2022    CO2 26 11/26/2022    CO2 26 12/24/2021    CO2 23 12/23/2021    CO2 24 12/01/2020    CO2 23 11/30/2020    CO2 20 08/02/2020    CO2 21 07/18/2020    Lab Results   Component Value Date    CR 0.75 11/27/2022     CR 0.80 11/26/2022    CR 0.80 11/26/2022    CR 0.82 12/01/2020    CR 1.33 11/30/2020    CR 1.21 07/18/2020        Recent Labs   Lab 11/28/22  0549   WBC 3.3*   HGB 13.5  13.5   HCT 41.4   MCV 96   PLT 96*      Imaging:   No results found for this or any previous visit (from the past 48 hour(s)).      Uday Ceballos,

## 2022-11-28 NOTE — PLAN OF CARE
ICU End of Shift Summary.  For vital signs and complete assessments, please see documentation flowsheets.      Pertinent assessments: Pt drowsy at the start of the shift and cooperative with staff. Able to answer that he was in the hospital and knew the month and year but otherwise confused. Given Ativan per CIWA with scores up to 26. Tele SR. Lino with large amounts of urine out tonight.   Major Shift Events: Pt became very agitated at 2200 hollering out for Brenda to grab him his black book from his desk because he was very concerned about a very important appointment he may have missed. Attempted to reorient but patient just became more agitated and attempted to climb out of bed and was pulling at lines. Given 4 mg IV Ativan with patient able calming shortly after but again agitated at 2248. IV Precedex started with patient sedated to RASS goal on low dose.  Plan (Upcoming Events): Continue IV Precedex and PRN Ativan via CIWA scale. Wean as able.   Discharge/Transfer Needs: TBD. Continue ICU cares at this time.     Bedside Shift Report Completed   Bedside Safety Check Completed    Goal Outcome Evaluation:      Plan of Care Reviewed With: patient    Overall Patient Progress: no changeOverall Patient Progress: no change

## 2022-11-28 NOTE — PLAN OF CARE
Goal Outcome Evaluation:      Plan of Care Reviewed With: patient    Overall Patient Progress: no change Overall Patient Progress: no change    ICU End of Shift Summary.  For vital signs and complete assessments, please see documentation flowsheets.      Pertinent assessments: Drowsy and cooperative this morning, able to answer simple questions and swallow pills. Becoming more agitated as he woke up, CIWA  scale used and addressed. Ativan given per scale, see MAR for details. Precedex drip infusing to maintain RASS goal 0 to -1, using CIWA  and ativan to attempt to wean precedex. Less cooperative this afternoon, not redirectable and not able to follow simple commands, blowing into straw instead of sucking to get water. HR 50-70s with occasional PVCs. BP stable. LS clear, maintaining sats on RA. Lino remains in place, large amounts of UO this shift. Ammonia level elevated and lactulose ordered, unable to take this afternoon dose.     Major Shift Events: Ammonia level elevated, lactulose ordered.     Plan (Upcoming Events): Continue CIWA, wean precedex.     Discharge/Transfer Needs: SW, Chem dep, and PT when awake and cooperative

## 2022-11-28 NOTE — PLAN OF CARE
ICU End of Shift Summary.  For vital signs and complete assessments, please see documentation flowsheets.      Pertinent assessments: Confused this shift, clear at times.  Pt CIWA scores remain high, pt sedated on ativan due to drop in HR and decreased LOC.  Pt tele SB/SR.  Pt with good uop, denies pain for majority shift.  Major Shift Events: Stopped dex drip  Plan (Upcoming Events): Monitor withdrawal and telemetry  Discharge/Transfer Needs: Continue ICU for the time being     Bedside Shift Report Completed : N  Bedside Safety Check Completed: Y

## 2022-11-29 LAB
ANION GAP SERPL CALCULATED.3IONS-SCNC: 10 MMOL/L (ref 7–15)
BUN SERPL-MCNC: 5.9 MG/DL (ref 6–20)
CALCIUM SERPL-MCNC: 8.8 MG/DL (ref 8.6–10)
CHLORIDE SERPL-SCNC: 104 MMOL/L (ref 98–107)
CREAT SERPL-MCNC: 0.75 MG/DL (ref 0.67–1.17)
DEPRECATED HCO3 PLAS-SCNC: 25 MMOL/L (ref 22–29)
ERYTHROCYTE [DISTWIDTH] IN BLOOD BY AUTOMATED COUNT: 13.4 % (ref 10–15)
GFR SERPL CREATININE-BSD FRML MDRD: >90 ML/MIN/1.73M2
GLUCOSE BLDC GLUCOMTR-MCNC: 100 MG/DL (ref 70–99)
GLUCOSE BLDC GLUCOMTR-MCNC: 121 MG/DL (ref 70–99)
GLUCOSE BLDC GLUCOMTR-MCNC: 122 MG/DL (ref 70–99)
GLUCOSE BLDC GLUCOMTR-MCNC: 150 MG/DL (ref 70–99)
GLUCOSE SERPL-MCNC: 130 MG/DL (ref 70–99)
HCT VFR BLD AUTO: 42.3 % (ref 40–53)
HGB BLD-MCNC: 14 G/DL (ref 13.3–17.7)
HGB BLD-MCNC: 14.2 G/DL (ref 13.3–17.7)
HGB BLD-MCNC: 14.2 G/DL (ref 13.3–17.7)
MAGNESIUM SERPL-MCNC: 1.8 MG/DL (ref 1.7–2.3)
MCH RBC QN AUTO: 31.3 PG (ref 26.5–33)
MCHC RBC AUTO-ENTMCNC: 33.6 G/DL (ref 31.5–36.5)
MCV RBC AUTO: 93 FL (ref 78–100)
PHOSPHATE SERPL-MCNC: 3.3 MG/DL (ref 2.5–4.5)
PLATELET # BLD AUTO: 124 10E3/UL (ref 150–450)
POTASSIUM SERPL-SCNC: 4.4 MMOL/L (ref 3.4–5.3)
RBC # BLD AUTO: 4.53 10E6/UL (ref 4.4–5.9)
SODIUM SERPL-SCNC: 139 MMOL/L (ref 136–145)
WBC # BLD AUTO: 4.5 10E3/UL (ref 4–11)

## 2022-11-29 PROCEDURE — 250N000011 HC RX IP 250 OP 636: Performed by: INTERNAL MEDICINE

## 2022-11-29 PROCEDURE — 250N000011 HC RX IP 250 OP 636: Performed by: STUDENT IN AN ORGANIZED HEALTH CARE EDUCATION/TRAINING PROGRAM

## 2022-11-29 PROCEDURE — 80048 BASIC METABOLIC PNL TOTAL CA: CPT | Performed by: STUDENT IN AN ORGANIZED HEALTH CARE EDUCATION/TRAINING PROGRAM

## 2022-11-29 PROCEDURE — 250N000013 HC RX MED GY IP 250 OP 250 PS 637: Performed by: INTERNAL MEDICINE

## 2022-11-29 PROCEDURE — 258N000003 HC RX IP 258 OP 636: Performed by: STUDENT IN AN ORGANIZED HEALTH CARE EDUCATION/TRAINING PROGRAM

## 2022-11-29 PROCEDURE — 36415 COLL VENOUS BLD VENIPUNCTURE: CPT | Performed by: STUDENT IN AN ORGANIZED HEALTH CARE EDUCATION/TRAINING PROGRAM

## 2022-11-29 PROCEDURE — 99233 SBSQ HOSP IP/OBS HIGH 50: CPT | Performed by: STUDENT IN AN ORGANIZED HEALTH CARE EDUCATION/TRAINING PROGRAM

## 2022-11-29 PROCEDURE — 250N000013 HC RX MED GY IP 250 OP 250 PS 637: Performed by: STUDENT IN AN ORGANIZED HEALTH CARE EDUCATION/TRAINING PROGRAM

## 2022-11-29 PROCEDURE — 250N000009 HC RX 250: Performed by: INTERNAL MEDICINE

## 2022-11-29 PROCEDURE — 85027 COMPLETE CBC AUTOMATED: CPT | Performed by: STUDENT IN AN ORGANIZED HEALTH CARE EDUCATION/TRAINING PROGRAM

## 2022-11-29 PROCEDURE — 93010 ELECTROCARDIOGRAM REPORT: CPT | Performed by: INTERNAL MEDICINE

## 2022-11-29 PROCEDURE — 99207 PR NO BILLABLE SERVICE THIS VISIT: CPT | Performed by: STUDENT IN AN ORGANIZED HEALTH CARE EDUCATION/TRAINING PROGRAM

## 2022-11-29 PROCEDURE — 83735 ASSAY OF MAGNESIUM: CPT | Performed by: STUDENT IN AN ORGANIZED HEALTH CARE EDUCATION/TRAINING PROGRAM

## 2022-11-29 PROCEDURE — 84100 ASSAY OF PHOSPHORUS: CPT | Performed by: STUDENT IN AN ORGANIZED HEALTH CARE EDUCATION/TRAINING PROGRAM

## 2022-11-29 PROCEDURE — C9113 INJ PANTOPRAZOLE SODIUM, VIA: HCPCS | Performed by: INTERNAL MEDICINE

## 2022-11-29 PROCEDURE — 258N000001 HC RX 258: Performed by: INTERNAL MEDICINE

## 2022-11-29 PROCEDURE — 200N000001 HC R&B ICU

## 2022-11-29 PROCEDURE — 85018 HEMOGLOBIN: CPT | Performed by: STUDENT IN AN ORGANIZED HEALTH CARE EDUCATION/TRAINING PROGRAM

## 2022-11-29 RX ORDER — METOPROLOL TARTRATE 1 MG/ML
5 INJECTION, SOLUTION INTRAVENOUS EVERY 5 MIN PRN
Status: DISCONTINUED | OUTPATIENT
Start: 2022-11-29 | End: 2022-12-12 | Stop reason: HOSPADM

## 2022-11-29 RX ORDER — ACETAMINOPHEN 650 MG/1
650 SUPPOSITORY RECTAL EVERY 4 HOURS PRN
Status: DISCONTINUED | OUTPATIENT
Start: 2022-11-29 | End: 2022-12-12 | Stop reason: HOSPADM

## 2022-11-29 RX ORDER — LORAZEPAM 0.5 MG/1
1-2 TABLET ORAL EVERY 30 MIN PRN
Status: DISCONTINUED | OUTPATIENT
Start: 2022-11-29 | End: 2022-12-03

## 2022-11-29 RX ORDER — LORAZEPAM 2 MG/ML
2 INJECTION INTRAMUSCULAR EVERY 30 MIN PRN
Status: DISCONTINUED | OUTPATIENT
Start: 2022-11-29 | End: 2022-12-03

## 2022-11-29 RX ORDER — ACETAMINOPHEN 325 MG/1
650 TABLET ORAL EVERY 4 HOURS PRN
Status: DISCONTINUED | OUTPATIENT
Start: 2022-11-29 | End: 2022-12-12 | Stop reason: HOSPADM

## 2022-11-29 RX ADMIN — LACTULOSE 20 G: 20 SOLUTION ORAL at 09:20

## 2022-11-29 RX ADMIN — LORAZEPAM 2 MG: 2 INJECTION INTRAMUSCULAR; INTRAVENOUS at 20:25

## 2022-11-29 RX ADMIN — TRAZODONE HYDROCHLORIDE 50 MG: 50 TABLET ORAL at 21:00

## 2022-11-29 RX ADMIN — GABAPENTIN 300 MG: 300 CAPSULE ORAL at 15:28

## 2022-11-29 RX ADMIN — LORAZEPAM 2 MG: 0.5 TABLET ORAL at 04:53

## 2022-11-29 RX ADMIN — POTASSIUM CHLORIDE, DEXTROSE MONOHYDRATE AND SODIUM CHLORIDE: 150; 5; 900 INJECTION, SOLUTION INTRAVENOUS at 00:29

## 2022-11-29 RX ADMIN — LACTULOSE 20 G: 20 SOLUTION ORAL at 21:03

## 2022-11-29 RX ADMIN — LORAZEPAM 2 MG: 2 INJECTION INTRAMUSCULAR; INTRAVENOUS at 19:34

## 2022-11-29 RX ADMIN — GABAPENTIN 300 MG: 300 CAPSULE ORAL at 04:53

## 2022-11-29 RX ADMIN — PANTOPRAZOLE SODIUM 40 MG: 40 INJECTION, POWDER, FOR SOLUTION INTRAVENOUS at 09:21

## 2022-11-29 RX ADMIN — POTASSIUM CHLORIDE, DEXTROSE MONOHYDRATE AND SODIUM CHLORIDE: 150; 5; 900 INJECTION, SOLUTION INTRAVENOUS at 09:20

## 2022-11-29 RX ADMIN — FOLIC ACID 1 MG: 1 TABLET ORAL at 09:20

## 2022-11-29 RX ADMIN — SODIUM CHLORIDE, POTASSIUM CHLORIDE, SODIUM LACTATE AND CALCIUM CHLORIDE 500 ML: 600; 310; 30; 20 INJECTION, SOLUTION INTRAVENOUS at 03:07

## 2022-11-29 RX ADMIN — VALPROIC ACID 500 MG: 250 SOLUTION ORAL at 09:37

## 2022-11-29 RX ADMIN — GABAPENTIN 300 MG: 300 CAPSULE ORAL at 20:59

## 2022-11-29 RX ADMIN — MULTIPLE VITAMINS W/ MINERALS TAB 1 TABLET: TAB at 09:20

## 2022-11-29 RX ADMIN — SENNOSIDES AND DOCUSATE SODIUM 1 TABLET: 50; 8.6 TABLET ORAL at 09:20

## 2022-11-29 RX ADMIN — SENNOSIDES AND DOCUSATE SODIUM 1 TABLET: 50; 8.6 TABLET ORAL at 21:00

## 2022-11-29 RX ADMIN — LORAZEPAM 2 MG: 2 INJECTION INTRAMUSCULAR; INTRAVENOUS at 09:47

## 2022-11-29 RX ADMIN — POLYETHYLENE GLYCOL 3350 17 G: 17 POWDER, FOR SOLUTION ORAL at 09:20

## 2022-11-29 RX ADMIN — ACETAMINOPHEN 650 MG: 325 TABLET, FILM COATED ORAL at 18:28

## 2022-11-29 RX ADMIN — POTASSIUM CHLORIDE, DEXTROSE MONOHYDRATE AND SODIUM CHLORIDE: 150; 5; 900 INJECTION, SOLUTION INTRAVENOUS at 18:49

## 2022-11-29 RX ADMIN — VALPROIC ACID 500 MG: 250 SOLUTION ORAL at 21:08

## 2022-11-29 RX ADMIN — BUPROPION HYDROCHLORIDE 450 MG: 150 TABLET, EXTENDED RELEASE ORAL at 09:30

## 2022-11-29 RX ADMIN — DEXMEDETOMIDINE HYDROCHLORIDE 0.2 MCG/KG/HR: 400 INJECTION INTRAVENOUS at 20:29

## 2022-11-29 RX ADMIN — METOPROLOL TARTRATE 5 MG: 1 INJECTION, SOLUTION INTRAVENOUS at 20:41

## 2022-11-29 RX ADMIN — PANTOPRAZOLE SODIUM 40 MG: 40 INJECTION, POWDER, FOR SOLUTION INTRAVENOUS at 21:08

## 2022-11-29 RX ADMIN — LORAZEPAM 2 MG: 2 INJECTION INTRAMUSCULAR; INTRAVENOUS at 17:22

## 2022-11-29 RX ADMIN — THIAMINE HYDROCHLORIDE 250 MG: 100 INJECTION, SOLUTION INTRAMUSCULAR; INTRAVENOUS at 09:57

## 2022-11-29 RX ADMIN — OLANZAPINE 5 MG: 2.5 TABLET, FILM COATED ORAL at 21:00

## 2022-11-29 RX ADMIN — DEXMEDETOMIDINE HYDROCHLORIDE 0.3 MCG/KG/HR: 400 INJECTION INTRAVENOUS at 07:20

## 2022-11-29 RX ADMIN — SODIUM CHLORIDE 500 ML: 9 INJECTION, SOLUTION INTRAVENOUS at 19:40

## 2022-11-29 RX ADMIN — TAMSULOSIN HYDROCHLORIDE 0.4 MG: 0.4 CAPSULE ORAL at 09:30

## 2022-11-29 ASSESSMENT — ACTIVITIES OF DAILY LIVING (ADL)
ADLS_ACUITY_SCORE: 33

## 2022-11-29 NOTE — PROGRESS NOTES
[facility]  Hospitalist Progress Note  Uday Ceballos, DO 11/29/22       Reason for Stay (Diagnosis): Black stool, severe alcohol withdrawal         Assessment and Plan:      Summary of Stay: Andrea Pham is a 57 year old male who was admitted on 11/23/2022  57 year old  male with a significant past medical history of Alcohol use disorder, on Suboxone for narcotic addiction, who presents from home with alcohol withdraw concern, black emesis and stool.  He drinks heavily and last drink was the night before admission.  Per his friend he was excluded from a family gathering which made him very upset and went on a binge drinking.   On presentation to the ER, patient arrived hypertensive, tachycardic, tremulous and anxious with nausea and vomiting.  Labs showed hemoglobin of 16.8, abnormal LFTs, lactate was 7.4, quantitative ketones of 2.8.  Blood alcohol level was 0.23.   Patient was given IV fluid, Valium IV, octreotide and Protonix infusion started in the emergency department and was admitted to [facility] [place].      His withdrawal worsened and he was transferred to the ICU given significant need for IV benzos for initiation of a precedex drip.       Problem List/Assessment and Plan:   Black tarry stools:   Suspected upper GI bleed.  Hemoconcentrated on presentation with hemoglobin 16.8--> 14.6--> 14.5--> 12.5-->11.6.  This does not appear to be an hemodynamically significant bleed.  Initially on octreotide but this has since been stopped by gastroenterology.  Protonix drip can be changed to IV.   -GI consult appreciated   -No endoscopy indicated for now per GI  -Daily Hgb  -IV PPI until tolerating PO intake     Alcohol use disorder with severe withdrawal symptoms and delirium tremens:  On CIWA protocol with gabapentin taper and as needed lorazepam, patient has needed multiple doses of Haldol and Ativan.  Has had 32 milligrams of IV Ativan within 24 hours and so was transferred to ICU on 11/26 for  initiation of precedex drip.  He has occasionally been able to come off the the precedex drip, but usually has ended back up on it related to agitation during which he is not redirectable.   -Continue precedex drip, wean as able  -CIWA protocol with ativan  -High dose IV thiamine given ongoing AMS  -Continue folate    Possible hepatic encephalopathy  Acute toxic/metabolic encephalopathy:  This is likely multifactorial in the setting of severe alcohol withdrawal requiring significant amounts of sedation, Precedex drip.  Ammonia level is also mildly elevated in the 70s.  -I have initiated lactulose that should be titrated to 2 to 3 bowel movements per day    Poor p.o. intake  Possible malnutrition:  Patient has not eaten since admission which was 5 days ago.  May require placement of feeding tube and initiation of tube feeds in the next 1 to 2 days if he does not improve and start having better oral intake.  -Nutrition consultation.     Substance abuse disorder:  Is on Suboxone PTA.  Will hold for now due to low blood pressure.     Depression/anxiety:  PTA Wellbutrin, Depakote, trazodone and Zyprexa    Lactic acidosis:  Serum lactate 7.4 on presentation, secondary to alcohol use.  Improved to 1.8 with hydration.  Blood pressure did improve with IV fluid.     Alcoholic liver disease:  AST//85 with total bilirubin of 1.8.  Bilirubin, AST now normal.      Alcoholic ketosis, resolved     History of hepatitis C:    Is not clear if he ever had treatment.  Reassess once mental status is improved.     Urinary retention in setting of BPH:    Patient has needed straight caths x2 and PVR is more than 500, will insert Lino.  Continue on Flomax. Should have trial of void prior to discharge.       DVT Prophylaxis: Pneumatic Compression Devices  Code Status: Full Code  Discharge Dispo/Date: Anticipate at least 3 more days in the hospital pending improvement in withdrawal.        Interval History (Subjective):      Patient  remains on a Precedex drip overnight.  There were no obvious acute events overnight.  This morning he does wake up and answer some of my questions but is pretty somnolent.  He is following commands.                  Physical Exam:      Last Vital Signs:  BP (!) 142/86   Pulse 63   Temp 98.5  F (36.9  C) (Temporal)   Resp 19   Wt 84.5 kg (186 lb 4.6 oz)   SpO2 94%   BMI 25.98 kg/m        Intake/Output Summary (Last 24 hours) at 11/27/2022 1113  Last data filed at 11/27/2022 1000  Gross per 24 hour   Intake 3652.29 ml   Output 7300 ml   Net -3647.71 ml       General: Somnolent, does wake up to voice and answer my questions.  Is oriented x2.  HEENT: NC/AT, eyes anicteric, external occular movements intact, face symmetric.  PERRL.  Cardiac: RRR, S1, S2.  No murmurs appreciated.  Pulmonary: Normal work of breathing.  Lungs CTAB.  Abdomen: soft, non-tender, non-distended.  Bowel sounds present.  No guarding.  Extremities: no deformities.  Warm, well perfused.  Skin: no rashes or lesions noted.  Warm and dry.  Neuro: No gross deficits noted.  Speech clear.  Follows my commands.  Moving all extremities.  Psych: Somnolent.         Medications:      All current medications were reviewed with changes reflected in problem list.         Data:      All new lab and imaging data was reviewed.   Labs:       Lab Results   Component Value Date     11/27/2022     11/26/2022     11/26/2022     12/01/2020     11/30/2020     07/18/2020    Lab Results   Component Value Date    CHLORIDE 106 11/27/2022    CHLORIDE 109 11/26/2022    CHLORIDE 108 11/26/2022    CHLORIDE 99 12/24/2021    CHLORIDE 101 12/23/2021    CHLORIDE 107 12/01/2020    CHLORIDE 104 11/30/2020    CHLORIDE 105 08/02/2020    CHLORIDE 107 07/18/2020    Lab Results   Component Value Date    BUN 3.8 11/27/2022    BUN 3.6 11/26/2022    BUN 4.1 11/26/2022    BUN 20 12/24/2021    BUN 14 12/23/2021    BUN 16 12/01/2020    BUN 31 11/30/2020     BUN 18 08/02/2020    BUN 21 07/18/2020      Lab Results   Component Value Date    POTASSIUM 4.2 11/27/2022    POTASSIUM 3.8 11/26/2022    POTASSIUM 3.8 11/26/2022    POTASSIUM 3.9 12/24/2021    POTASSIUM 4.4 12/23/2021    POTASSIUM 3.8 12/01/2020    POTASSIUM 4.0 11/30/2020    POTASSIUM 4.1 08/02/2020    POTASSIUM 4.1 07/18/2020    Lab Results   Component Value Date    CO2 26 11/27/2022    CO2 28 11/26/2022    CO2 26 11/26/2022    CO2 26 12/24/2021    CO2 23 12/23/2021    CO2 24 12/01/2020    CO2 23 11/30/2020    CO2 20 08/02/2020    CO2 21 07/18/2020    Lab Results   Component Value Date    CR 0.75 11/27/2022    CR 0.80 11/26/2022    CR 0.80 11/26/2022    CR 0.82 12/01/2020    CR 1.33 11/30/2020    CR 1.21 07/18/2020        Recent Labs   Lab 11/29/22  0522   WBC 4.5   HGB 14.2  14.2   HCT 42.3   MCV 93   *      Imaging:   No results found for this or any previous visit (from the past 48 hour(s)).      Uday Ceballos DO

## 2022-11-29 NOTE — PLAN OF CARE
Goal Outcome Evaluation:    ICU End of Shift Summary.  For vital signs and complete assessments, please see documentation flowsheets.    Pertinent assessments: alert to self  Only. VSS. Poor appetite- ate bits. Takes pills crush with pudding.  Lino patent. Pain in abdomen- gave tylenol and scheduled bm meds  Major Shift Events:  Continues to require ativan. CIWA max was 11. Weaned off precedx at 1500. Drowsy, answering some questions but continues to mumble. NA - shaved and gave full bed bath with CHG. Updated daughter.   Plan (Upcoming Events): continue to monitor withdrawal  Symptoms and treat. Transfer to floor when stable.   Discharge/Transfer Needs: TBD    Bedside Shift Report Completed: yes  Bedside Safety Check Completed: yes      Paged MD for tylenol for pain at 1815.  Paged MD at 1900- pain in stomach and pt tachy sustaining at 130's-140. Can you  Come see pt at bedside. Bowel sounds are hyper active

## 2022-11-29 NOTE — PLAN OF CARE
ICU End of Shift Summary.  For vital signs and complete assessments, please see documentation flowsheets.      Pertinent assessments: Remains on Precedex. Receiving PO Ativan Q3-4H. CIWA max 18. Tele SB/SR. BPs stable, borderline hypertensive at times. RASS -1 majority of shift. Patient disoriented x3 but able to follow commands & safely swallow. Speech incoherent. Patient resting majority of shift. Low urine output - LR bolus given with improvement from polanco.   Major Shift Events: PRN Ativan, LR bolus  Plan (Upcoming Events): Trend labs & replace lytes as needed. Consider alternative withdrawal treatment methods as needed.     Discharge/Transfer Needs: TBD     Bedside Shift Report Completed: Y   Bedside Safety Check Completed: Y

## 2022-11-29 NOTE — PLAN OF CARE
Problem: Malnutrition  Goal: Improved Nutritional Intake  Outcome: Not Progressing  Intervention: Promote and Optimize Oral Intake  Recent Flowsheet Documentation  Taken 11/29/2022 1540 by Aaron Kimble RD  Nutrition Interventions: supplemental drinks provided     Goal Outcome Evaluation:      Plan of Care Reviewed With: other (see comments) (chart review only)    Overall Patient Progress: no changeOverall Patient Progress: no change    Outcome Evaluation: Pt eating 25% of meals per nursing records and meeting < 50% estimated energy and protein needs for 7 days. Ordered Ensure Enlive TID. Consider initiating nutrition support if pt unable to tolerate supplements. Please place consult for RD to order TF if indicated.

## 2022-11-29 NOTE — CONSULTS
"CLINICAL NUTRITION SERVICES - ASSESSMENT NOTE     Nutrition Prescription    RECOMMENDATIONS FOR MDs/PROVIDERS TO ORDER:  Pt is meeting about 5% estimated energy needs and 4% estimated protein needs. If pt unable to drink nutrition supplements, consider initiating nutrition support, please place consult for Dietitian to order TF if indicated.     Malnutrition Status:    Severe in the context of social/environmental circumstances    Recommendations already ordered by Registered Dietitian (RD):  Ensure Enlive TID w/ meals    Future/Additional Recommendations:  Adjust supplements pending PO intake/patient preference       REASON FOR ASSESSMENT  Andrea Pham is a/an 57 year old male assessed by the dietitian for Provider Order - Hasnt really eaten much since admission. COnsidering TF here soon but maybe start with calorie counts?    Patient presents with alcohol use disorder w/ severe withdrawal, substance abuse disorder, depression/anxiety, lactic acidosis, alcoholic liver disease, and history of hepatitis C.     NUTRITION HISTORY  Pt unable to answer any questions during visit.     CURRENT NUTRITION ORDERS  Diet: Mechanical/Dental Soft   Intake/Tolerance: Pt eating 25% of meals per nursing records and receiving on average 397kcals and 17g protein daily.     LABS  Labs reviewed: UN 5.9     MEDICATIONS  Medications reviewed: folvite, lactated ringers, chronulac, thera-vit-m, protonix, miralax, senokot-s/pericolace, thiamine     ANTHROPOMETRICS  Height: 180.3 cm (5' 11\")  Most Recent Weight: 84.5 kg (186 lb 4.6 oz)    IBW: 75.3 kg  BMI: Overweight BMI 25-29.9  Weight History:   Wt Readings from Last 30 Encounters:   11/29/22 84.5 kg (186 lb 4.6 oz)   10/08/22 85.5 kg (188 lb 7.9 oz)   09/02/2022 90.7 kg (200 lb)                     6.8% wt loss in 3 months   07/13/22 92.9 kg (204 lb 12.8 oz)   06/14/22 97 kg (213 lb 13.5 oz)       12.9% wt loss in 5.5 months   02/08/22 91.4 kg (201 lb 8 oz)   12/24/21 95.7 kg (211 lb) "   11/11/21 89.5 kg (197 lb 4.8 oz).   11/30/20 70.3 kg (155 lb)   07/18/20 70.3 kg (155 lb)   07/08/20 86.2 kg (190 lb)   11/23/19 90.9 kg (200 lb 6.4 oz)   04/23/19 88.5 kg (195 lb)   02/01/19 93.8 kg (206 lb 12.7 oz)   12/21/18 93.4 kg (205 lb 14.6 oz)   04/04/18 102.1 kg (225 lb)   01/10/15 95.3 kg (210 lb)   07/22/14 97.3 kg (214 lb 9.6 oz)   07/01/14 92.5 kg (204 lb)   12/14/12 94.3 kg (208 lb)   12/09/12 93 kg (205 lb)   12/07/12 95.7 kg (211 lb)   11/17/12 102.1 kg (225 lb)   10/25/12 90.7 kg (200 lb)   10/03/12 102.1 kg (225 lb)   09/18/12 89.5 kg (197 lb 5 oz)       Dosing Weight: 84.5 kg and 77.6kg for protein/fluid    ASSESSED NUTRITION NEEDS  Estimated Energy Needs: 2029 kcals/day ([place] Jeor)  Justification: Overweight  Estimated Protein Needs:  grams protein/day (1.2 - 1.5 grams of pro/kg)  Justification: Repletion  Estimated Fluid Needs: 2330 mL/day (30 mL/kg)   Justification: Maintenance    PHYSICAL FINDINGS  See malnutrition section below.  Dry skin, scattered bruising     MALNUTRITION:  % Weight Loss:  > 10% in 6 months (severe malnutrition)  % Intake:  </= 50% for >/= 5 days (severe malnutrition)  Subcutaneous Fat Loss:  Orbital region moderate depletion  Muscle Loss:  Buccal region severe, Temporal region severe depletion, Clavicle bone region moderate depletion, Acromion bone region moderate depletion, Patellar region severe depletion, Anterior thigh region severe depletion and Posterior calf region severe depletion  Fluid Retention:  None noted    Malnutrition Diagnosis: Severe malnutrition  In Context of:  Environmental or social circumstances    NUTRITION DIAGNOSIS  Malnutrition related to alcohol use disorder as evidenced by prolonged poor intake of < 50% estimated energy needs for 5 days, 13% unintentional weight loss in 6 months, moderate subcutaneous fat loss, and severe muscle loss      INTERVENTIONS  Implementation  Encourage PO intake as able   Collaboration with other  providers  Medical food supplement therapy  Multivitamin/mineral supplement therapy     Goals  Patient to consume % of nutritionally adequate meals three times per day, or the equivalent with supplements/snacks.  Total avg nutritional intake to meet a minimum of 2029 kcal/kg and 93 g PRO/kg daily (per dosing wt 84.5kg and 77.6 kg).     Monitoring/Evaluation  Progress toward goals will be monitored and evaluated per protocol. PO intake, supplement tolerance, labs, wt, FT placement

## 2022-11-30 LAB
HGB BLD-MCNC: 13.1 G/DL (ref 13.3–17.7)
HGB BLD-MCNC: 13.5 G/DL (ref 13.3–17.7)
LIPASE SERPL-CCNC: 63 U/L (ref 13–60)
MAGNESIUM SERPL-MCNC: 1.9 MG/DL (ref 1.7–2.3)
PHOSPHATE SERPL-MCNC: 2.9 MG/DL (ref 2.5–4.5)
POTASSIUM SERPL-SCNC: 3.8 MMOL/L (ref 3.4–5.3)
SARS-COV-2 RNA RESP QL NAA+PROBE: NEGATIVE

## 2022-11-30 PROCEDURE — 200N000001 HC R&B ICU

## 2022-11-30 PROCEDURE — 250N000009 HC RX 250: Performed by: INTERNAL MEDICINE

## 2022-11-30 PROCEDURE — 83690 ASSAY OF LIPASE: CPT | Performed by: STUDENT IN AN ORGANIZED HEALTH CARE EDUCATION/TRAINING PROGRAM

## 2022-11-30 PROCEDURE — 250N000011 HC RX IP 250 OP 636: Performed by: INTERNAL MEDICINE

## 2022-11-30 PROCEDURE — 258N000003 HC RX IP 258 OP 636: Performed by: STUDENT IN AN ORGANIZED HEALTH CARE EDUCATION/TRAINING PROGRAM

## 2022-11-30 PROCEDURE — 36415 COLL VENOUS BLD VENIPUNCTURE: CPT | Performed by: STUDENT IN AN ORGANIZED HEALTH CARE EDUCATION/TRAINING PROGRAM

## 2022-11-30 PROCEDURE — 250N000013 HC RX MED GY IP 250 OP 250 PS 637: Performed by: INTERNAL MEDICINE

## 2022-11-30 PROCEDURE — 258N000001 HC RX 258: Performed by: INTERNAL MEDICINE

## 2022-11-30 PROCEDURE — U0005 INFEC AGEN DETEC AMPLI PROBE: HCPCS | Performed by: INTERNAL MEDICINE

## 2022-11-30 PROCEDURE — 83735 ASSAY OF MAGNESIUM: CPT | Performed by: INTERNAL MEDICINE

## 2022-11-30 PROCEDURE — 84132 ASSAY OF SERUM POTASSIUM: CPT | Performed by: INTERNAL MEDICINE

## 2022-11-30 PROCEDURE — 250N000013 HC RX MED GY IP 250 OP 250 PS 637: Performed by: STUDENT IN AN ORGANIZED HEALTH CARE EDUCATION/TRAINING PROGRAM

## 2022-11-30 PROCEDURE — 99233 SBSQ HOSP IP/OBS HIGH 50: CPT | Performed by: INTERNAL MEDICINE

## 2022-11-30 PROCEDURE — 85018 HEMOGLOBIN: CPT | Performed by: STUDENT IN AN ORGANIZED HEALTH CARE EDUCATION/TRAINING PROGRAM

## 2022-11-30 PROCEDURE — C9113 INJ PANTOPRAZOLE SODIUM, VIA: HCPCS | Performed by: INTERNAL MEDICINE

## 2022-11-30 PROCEDURE — 84100 ASSAY OF PHOSPHORUS: CPT | Performed by: INTERNAL MEDICINE

## 2022-11-30 PROCEDURE — 250N000011 HC RX IP 250 OP 636: Performed by: STUDENT IN AN ORGANIZED HEALTH CARE EDUCATION/TRAINING PROGRAM

## 2022-11-30 RX ORDER — PANTOPRAZOLE SODIUM 40 MG/1
40 TABLET, DELAYED RELEASE ORAL
Status: DISCONTINUED | OUTPATIENT
Start: 2022-11-30 | End: 2022-12-12 | Stop reason: HOSPADM

## 2022-11-30 RX ADMIN — SENNOSIDES AND DOCUSATE SODIUM 1 TABLET: 50; 8.6 TABLET ORAL at 08:18

## 2022-11-30 RX ADMIN — GABAPENTIN 300 MG: 300 CAPSULE ORAL at 05:24

## 2022-11-30 RX ADMIN — TAMSULOSIN HYDROCHLORIDE 0.4 MG: 0.4 CAPSULE ORAL at 08:18

## 2022-11-30 RX ADMIN — LORAZEPAM 2 MG: 2 INJECTION INTRAMUSCULAR; INTRAVENOUS at 00:00

## 2022-11-30 RX ADMIN — DEXMEDETOMIDINE HYDROCHLORIDE 0.6 MCG/KG/HR: 400 INJECTION INTRAVENOUS at 17:04

## 2022-11-30 RX ADMIN — BUPROPION HYDROCHLORIDE 450 MG: 150 TABLET, EXTENDED RELEASE ORAL at 08:18

## 2022-11-30 RX ADMIN — GABAPENTIN 300 MG: 300 CAPSULE ORAL at 12:54

## 2022-11-30 RX ADMIN — FOLIC ACID 1 MG: 1 TABLET ORAL at 08:17

## 2022-11-30 RX ADMIN — HALOPERIDOL LACTATE 5 MG: 5 INJECTION, SOLUTION INTRAMUSCULAR at 09:35

## 2022-11-30 RX ADMIN — LORAZEPAM 1 MG: 0.5 TABLET ORAL at 08:18

## 2022-11-30 RX ADMIN — THIAMINE HYDROCHLORIDE 250 MG: 100 INJECTION, SOLUTION INTRAMUSCULAR; INTRAVENOUS at 08:47

## 2022-11-30 RX ADMIN — POTASSIUM CHLORIDE, DEXTROSE MONOHYDRATE AND SODIUM CHLORIDE: 150; 5; 900 INJECTION, SOLUTION INTRAVENOUS at 12:49

## 2022-11-30 RX ADMIN — MULTIPLE VITAMINS W/ MINERALS TAB 1 TABLET: TAB at 08:17

## 2022-11-30 RX ADMIN — DEXMEDETOMIDINE HYDROCHLORIDE 0.7 MCG/KG/HR: 400 INJECTION INTRAVENOUS at 09:29

## 2022-11-30 RX ADMIN — POTASSIUM CHLORIDE, DEXTROSE MONOHYDRATE AND SODIUM CHLORIDE: 150; 5; 900 INJECTION, SOLUTION INTRAVENOUS at 03:07

## 2022-11-30 RX ADMIN — PANTOPRAZOLE SODIUM 40 MG: 40 TABLET, DELAYED RELEASE ORAL at 16:14

## 2022-11-30 RX ADMIN — LACTULOSE 20 G: 20 SOLUTION ORAL at 08:17

## 2022-11-30 RX ADMIN — POTASSIUM CHLORIDE, DEXTROSE MONOHYDRATE AND SODIUM CHLORIDE: 150; 5; 900 INJECTION, SOLUTION INTRAVENOUS at 20:39

## 2022-11-30 RX ADMIN — LORAZEPAM 2 MG: 0.5 TABLET ORAL at 05:24

## 2022-11-30 RX ADMIN — VALPROIC ACID 500 MG: 250 SOLUTION ORAL at 08:53

## 2022-11-30 RX ADMIN — POLYETHYLENE GLYCOL 3350 17 G: 17 POWDER, FOR SOLUTION ORAL at 08:17

## 2022-11-30 RX ADMIN — PANTOPRAZOLE SODIUM 40 MG: 40 INJECTION, POWDER, FOR SOLUTION INTRAVENOUS at 08:17

## 2022-11-30 ASSESSMENT — ACTIVITIES OF DAILY LIVING (ADL)
ADLS_ACUITY_SCORE: 33
ADLS_ACUITY_SCORE: 33
ADLS_ACUITY_SCORE: 31
ADLS_ACUITY_SCORE: 33
ADLS_ACUITY_SCORE: 31
ADLS_ACUITY_SCORE: 33
ADLS_ACUITY_SCORE: 33
ADLS_ACUITY_SCORE: 31

## 2022-11-30 NOTE — PROVIDER NOTIFICATION
Page to hospitalist : Can you come eval pt at bedside. newly tachycardic in the 140's. still complaining of pain after tylenol.   Kendra Diaz RN on 11/29/2022 at 6:59 PM

## 2022-11-30 NOTE — PROGRESS NOTES
Patient nonsustained tachycardia between the 140s to 160s.  Patient was evaluated at bedside he remains diaphoretic and anxious in appearance.  He is reporting some diffuse abdominal pain as well.    Patient hemoglobin has been stable and it is unlikely that his tachycardia represents an early hypovolemia.  He is here with alcohol withdrawal and delirium tremens, suspect that his anxiety and diaphoresis in addition to tachycardia likely related to some component of withdrawal.  He also is on PTA Suboxone and has not been getting this because his pressures has been softer.  Plan to administer some additional dose of Ativan in addition to 500 cc bolus of fluids.    On recheck the patient's heart rates are improved.  Will continue to monitor.     Kendra Chacon,  19:20 11/29/22

## 2022-11-30 NOTE — PLAN OF CARE
Goal Outcome Evaluation:      Plan of Care Reviewed With: patient    Overall Patient Progress: no changeOverall Patient Progress: no change     ICU End of Shift Summary.  For vital signs and complete assessments, please see documentation flowsheets.     Neuro: Alert, oriented to self only. Restless, easily agitated, constantly moving around in bed and chair.  Cardiac: SR-ST, bp stable  Resp: lungs dim/clear, room air  GI: soft diet, a few bites of pudding   : polanco in place for retention  Skin: see flowsheets  Lines: pivx3  Drips: maintenance ivf    Major Shift Events:   Beginning of shift pt sustained HR 140s and above while resting in bed. hospitalist to bedside, 500ml bolus and 2mg ativan. Per ciwa, gave another 2mg. Received order from tele hub for prn metoprolol, given, effective.  Precedex restarted, turned off this AM.  Plan (Upcoming Events): tbd  Discharge/Transfer Needs: tbd

## 2022-11-30 NOTE — PROGRESS NOTES
[facility]  Hospitalist Progress Note  Alan Flores MD       Reason for Stay (Diagnosis): Black stool, severe alcohol withdrawal, DT         Assessment and Plan:      Summary of Stay: Andrea Pham is a 57 year old male with a significant past medical history of Alcohol use disorder, on Suboxone for narcotic addiction, who presents from home with alcohol withdraw concern, black emesis and stool and was admitted on 11/23/2022.   On presentation to the ER, patient arrived hypertensive, tachycardic, tremulous and anxious with nausea and vomiting.  Labs showed hemoglobin of 16.8, abnormal LFTs, lactate was 7.4, quantitative ketones of 2.8.  Blood alcohol level was 0.23. Patient was given IV fluid, Valium IV, octreotide and Protonix infusion started in the emergency department and was admitted to [facility]  It was reported he started drinking heavily and last drink was the night before admission.  Per his friend he was excluded from a family gathering which made him very upset and went on a binge drinking. .      His withdrawal worsened and he was transferred to the ICU given significant need for IV benzos for initiation of a precedex drip.    Patient remained on Precedex drip and still hallucinating and agitated when trying to wean him off the medication.      Problem List/Assessment and Plan:   Black tarry stools:   Suspected upper GI bleed.  Hemoconcentrated on presentation with hemoglobin 16.8--> 14.6--> 14.5--> 12.5-->11.6.  This does not appear to be an hemodynamically significant bleed.   - Initially on octreotide but this has since been stopped by gastroenterology.  Protonix drip changed to IV.   -GI consult appreciated  -No endoscopy indicated for now per GI  -Daily Hgb  -IV PPI until tolerating PO intake     Alcohol use disorder with severe withdrawal symptoms and delirium tremens:  -He was started on CIWA protocol with gabapentin taper and as needed lorazepam, patient has needed multiple  doses of Haldol and Ativan.  Has had 32 milligrams of IV Ativan within 24 hours and so was transferred to ICU on 11/26 for initiation of precedex drip.  He has occasionally been able to come off the the precedex drip, but usually has ended back up on it related to agitation during which he is not redirectable.   -Continue precedex drip, wean as able  -CIWA protocol with ativan  -High dose IV thiamine given ongoing AMS.  -Continue Folate.  -Continue ICU care    Hepatic Encephalopathy  Acute toxic/metabolic Encephalopathy:  This is likely multifactorial in the setting of severe alcohol withdrawal requiring significant amounts of sedation, -Continue Precedex drip.    -Slightly elevated NH3 leve and started on Lactulose that should be titrated to 2 to 3 bowel movements per day    Poor p.o. intake  Possible malnutrition:  -Encourage oral intake. He is intermittently agitated.  -Nutrition consultation.     Substance abuse disorder:  He is on Suboxone PTA.    -Will resume when BP is stable.     Depression/anxiety:  PTA Wellbutrin, Depakote, trazodone and Zyprexa    Lactic acidosis:  Serum lactate 7.4 on presentation, secondary to alcohol use.   - Improved to 1.8 with hydration.  Blood pressure did improve with IV fluid.     Alcoholic liver disease:  AST//85 with total bilirubin of 1.8.  Bilirubin, AST now normal.      Alcoholic ketosis, resolved     History of hepatitis C:    Is not clear if he ever had treatment.  Reassess once mental status is improved.     Urinary retention in setting of BPH:    Patient has needed straight caths x2 and PVR is more than 500.  -Continue polanco cath, needs close monitoring, risk of pulling it out due to DT.   -Continue on Flomax, will consider voiding trail prior to discharge.     Clinically Significant Risk Factors              # Hypoalbuminemia: Lowest albumin = 3.4 g/dL (Ref range: 3.5-5.2) at 11/27/2022  5:30 AM, will monitor as appropriate   # Thrombocytopenia: Lowest platelets  "= 124 (Ref range: 150-450) in last 2 days, will monitor for bleeding         # Severe Malnutrition: based on nutrition assessment        DVT Prophylaxis: Pneumatic Compression Devices  Code Status: Full Code  Discharge Dispo/Date: Continue ICU care, expect more than 3 days in hospital..        Interval History (Subjective):      Patient seen and examined, intermittently agitated, hallucinating and also paranoid, remains on a Precedex drip, needed to increase the rate this morning.This morning he was on the chair but intermittently agitated and hallucinating. He is following instructions.                   Physical Exam:      Last Vital Signs:  /78   Pulse 98   Temp 97.9  F (36.6  C) (Axillary)   Resp (!) 0   Ht 1.803 m (5' 11\")   Wt 78.9 kg (173 lb 15.1 oz)   SpO2 96%   BMI 24.26 kg/m      Intake/Output Summary (Last 24 hours) at 11/30/2022 0941  Last data filed at 11/30/2022 0700  Gross per 24 hour   Intake 3012.39 ml   Output 3025 ml   Net -12.61 ml     GENERAL: Somnolent, does wake up to voice and answer my questions.  Is oriented x2.  HEENT: NC/AT, eyes anicteric, external occular movements intact, face symmetric.  PERRL.  CVS: RRR, S1, S2.  No murmurs appreciated.  CHEST: Normal work of breathing.  Lungs CTAB.  ABD: soft, non-tender, non-distended.  Bowel sounds present.  No guarding.  Extremities: no deformities.  Warm, well perfused.  Skin: no rashes or lesions noted.  Warm and dry.  Neuro: No gross deficits noted.  Speech clear.  Follows my commands.  Moving all extremities.  Psych: Somnolent.         Medications:      All current medications were reviewed with changes reflected in problem list.  Current Facility-Administered Medications   Medication     acetaminophen (TYLENOL) tablet 650 mg    Or     acetaminophen (TYLENOL) Suppository 650 mg     [Held by provider] buprenorphine HCl-naloxone HCl (SUBOXONE) 8-2 MG per film 1 Film     buPROPion (WELLBUTRIN XL) 24 hr tablet 450 mg     [Held by " provider] cloNIDine (CATAPRES) tablet 0.1 mg     dexmedetomidine (PRECEDEX) 400 mcg in 0.9% sodium chloride 100 mL     dextrose 5% and 0.9% NaCl + KCl 20 mEq/L infusion     glucose gel 15-30 g    Or     dextrose 50 % injection 25-50 mL    Or     glucagon injection 1 mg     flumazenil (ROMAZICON) injection 0.2 mg     folic acid (FOLVITE) tablet 1 mg     gabapentin (NEURONTIN) capsule 100 mg     gabapentin (NEURONTIN) capsule 300 mg     OLANZapine zydis (zyPREXA) ODT tab 5-10 mg    Or     haloperidol lactate (HALDOL) injection 2.5-5 mg     lactulose (CHRONULAC) solution 20 g     lidocaine (LMX4) cream     lidocaine 1 % 0.1-1 mL     LORazepam (ATIVAN) tablet 1-2 mg    Or     LORazepam (ATIVAN) injection 2 mg     melatonin tablet 5 mg     metoprolol (LOPRESSOR) injection 5 mg     multivitamin w/minerals (THERA-VIT-M) tablet 1 tablet     nitroGLYcerin (NITROSTAT) sublingual tablet 0.4 mg     OLANZapine (zyPREXA) tablet 5 mg     pantoprazole (PROTONIX) IV push injection 40 mg     polyethylene glycol (MIRALAX) Packet 17 g     senna-docusate (SENOKOT-S/PERICOLACE) 8.6-50 MG per tablet 1 tablet     sodium chloride (PF) 0.9% PF flush 3 mL     sodium chloride (PF) 0.9% PF flush 3 mL     tamsulosin (FLOMAX) capsule 0.4 mg     thiamine (B-1) 250 mg in sodium chloride 0.9 % 50 mL intermittent infusion    Followed by     [START ON 12/4/2022] thiamine (B-1) tablet 100 mg     traZODone (DESYREL) tablet 50 mg     valproic acid (DEPAKENE) solution 500 mg          Data:      All new lab and imaging data was reviewed.   Labs:  Recent Labs   Lab 11/30/22  0530 11/29/22  1611 11/29/22  1146 11/29/22  0745 11/29/22  0522 11/28/22  1223 11/28/22  1119 11/28/22  0800 11/28/22  0549 11/27/22  0818 11/27/22  0530 11/24/22  1201 11/24/22  1032   NA  --   --   --   --  139  --   --   --  141  --  141   < > 138   POTASSIUM 3.8  --   --   --  4.4  --   --   --  4.2  --  4.2   < > 3.6   CHLORIDE  --   --   --   --  104  --   --   --  107  --  106    < > 101   CO2  --   --   --   --  25  --   --   --  25  --  26   < > 27   ANIONGAP  --   --   --   --  10  --   --   --  9  --  9   < > 10   GLC  --  122* 100* 121* 130*   < >  --    < > 123*   < > 102*   < > 181*   BUN  --   --   --   --  5.9*  --   --   --  4.6*  --  3.8*   < > 14.8   CR  --   --   --   --  0.75  --   --   --  0.76  --  0.75   < > 0.79   GFRESTIMATED  --   --   --   --  >90  --   --   --  >90  --  >90   < > >90   JOHANA  --   --   --   --  8.8  --   --   --  8.7  --  8.6   < > 8.3*   MAG 1.9  --   --   --  1.8  --   --   --  2.0  --  1.9   < > 1.8   PHOS 2.9  --   --   --  3.3  --   --   --  4.0  --  4.1   < > 2.4*   PROTTOTAL  --   --   --   --   --   --  5.8*  --   --   --  5.5*  --  5.4*   ALBUMIN  --   --   --   --   --   --  3.7  --   --   --  3.4*  --  3.5   BILITOTAL  --   --   --   --   --   --  0.6  --   --   --  0.8  --  2.0*   ALKPHOS  --   --   --   --   --   --  66  --   --   --  61  --  57   AST  --   --   --   --   --   --  29  --   --   --  44  --  65*   ALT  --   --   --   --   --   --  49  --   --   --  56*  --  66*    < > = values in this interval not displayed.     Recent Labs   Lab 11/29/22  1611 11/29/22  1146 11/29/22  0745 11/29/22  0522 11/29/22  0017   * 100* 121* 130* 150*       Recent Labs   Lab 11/30/22  0530 11/29/22  1812 11/29/22  0522   WBC  --   --  4.5   HGB 13.5   < > 14.2  14.2   HCT  --   --  42.3   MCV  --   --  93   PLT  --   --  124*    < > = values in this interval not displayed.      Imaging:   No results found for this or any previous visit (fom the past 48 hour(s)).

## 2022-12-01 LAB
ANION GAP SERPL CALCULATED.3IONS-SCNC: 10 MMOL/L (ref 7–15)
BUN SERPL-MCNC: 7.1 MG/DL (ref 6–20)
CALCIUM SERPL-MCNC: 8.1 MG/DL (ref 8.6–10)
CHLORIDE SERPL-SCNC: 114 MMOL/L (ref 98–107)
CREAT SERPL-MCNC: 0.63 MG/DL (ref 0.67–1.17)
DEPRECATED HCO3 PLAS-SCNC: 20 MMOL/L (ref 22–29)
GFR SERPL CREATININE-BSD FRML MDRD: >90 ML/MIN/1.73M2
GLUCOSE BLDC GLUCOMTR-MCNC: 119 MG/DL (ref 70–99)
GLUCOSE SERPL-MCNC: 493 MG/DL (ref 70–99)
HGB BLD-MCNC: 11.9 G/DL (ref 13.3–17.7)
HGB BLD-MCNC: 14.1 G/DL (ref 13.3–17.7)
MAGNESIUM SERPL-MCNC: 1.5 MG/DL (ref 1.7–2.3)
MAGNESIUM SERPL-MCNC: 1.7 MG/DL (ref 1.7–2.3)
PHOSPHATE SERPL-MCNC: 2.5 MG/DL (ref 2.5–4.5)
PHOSPHATE SERPL-MCNC: 2.7 MG/DL (ref 2.5–4.5)
POTASSIUM SERPL-SCNC: 5.8 MMOL/L (ref 3.4–5.3)
POTASSIUM SERPL-SCNC: 5.9 MMOL/L (ref 3.4–5.3)
POTASSIUM SERPL-SCNC: 6.8 MMOL/L (ref 3.4–5.3)
SODIUM SERPL-SCNC: 144 MMOL/L (ref 136–145)

## 2022-12-01 PROCEDURE — 250N000013 HC RX MED GY IP 250 OP 250 PS 637: Performed by: INTERNAL MEDICINE

## 2022-12-01 PROCEDURE — 258N000001 HC RX 258: Performed by: INTERNAL MEDICINE

## 2022-12-01 PROCEDURE — 82310 ASSAY OF CALCIUM: CPT | Performed by: INTERNAL MEDICINE

## 2022-12-01 PROCEDURE — 83735 ASSAY OF MAGNESIUM: CPT | Performed by: INTERNAL MEDICINE

## 2022-12-01 PROCEDURE — 258N000003 HC RX IP 258 OP 636: Performed by: INTERNAL MEDICINE

## 2022-12-01 PROCEDURE — 84100 ASSAY OF PHOSPHORUS: CPT | Performed by: INTERNAL MEDICINE

## 2022-12-01 PROCEDURE — 36415 COLL VENOUS BLD VENIPUNCTURE: CPT | Performed by: INTERNAL MEDICINE

## 2022-12-01 PROCEDURE — 250N000009 HC RX 250: Performed by: INTERNAL MEDICINE

## 2022-12-01 PROCEDURE — 99233 SBSQ HOSP IP/OBS HIGH 50: CPT | Performed by: INTERNAL MEDICINE

## 2022-12-01 PROCEDURE — 258N000003 HC RX IP 258 OP 636: Performed by: STUDENT IN AN ORGANIZED HEALTH CARE EDUCATION/TRAINING PROGRAM

## 2022-12-01 PROCEDURE — 84132 ASSAY OF SERUM POTASSIUM: CPT | Performed by: INTERNAL MEDICINE

## 2022-12-01 PROCEDURE — 250N000013 HC RX MED GY IP 250 OP 250 PS 637: Performed by: STUDENT IN AN ORGANIZED HEALTH CARE EDUCATION/TRAINING PROGRAM

## 2022-12-01 PROCEDURE — 250N000013 HC RX MED GY IP 250 OP 250 PS 637

## 2022-12-01 PROCEDURE — 200N000001 HC R&B ICU

## 2022-12-01 PROCEDURE — 250N000011 HC RX IP 250 OP 636: Performed by: STUDENT IN AN ORGANIZED HEALTH CARE EDUCATION/TRAINING PROGRAM

## 2022-12-01 PROCEDURE — 36415 COLL VENOUS BLD VENIPUNCTURE: CPT | Performed by: STUDENT IN AN ORGANIZED HEALTH CARE EDUCATION/TRAINING PROGRAM

## 2022-12-01 PROCEDURE — 85018 HEMOGLOBIN: CPT | Performed by: STUDENT IN AN ORGANIZED HEALTH CARE EDUCATION/TRAINING PROGRAM

## 2022-12-01 PROCEDURE — 250N000011 HC RX IP 250 OP 636: Performed by: INTERNAL MEDICINE

## 2022-12-01 RX ORDER — DIVALPROEX SODIUM 500 MG/1
1000 TABLET, EXTENDED RELEASE ORAL DAILY
Status: DISCONTINUED | OUTPATIENT
Start: 2022-12-02 | End: 2022-12-12

## 2022-12-01 RX ORDER — QUETIAPINE FUMARATE 25 MG/1
25 TABLET, FILM COATED ORAL AT BEDTIME
Status: DISCONTINUED | OUTPATIENT
Start: 2022-12-01 | End: 2022-12-03

## 2022-12-01 RX ORDER — HALOPERIDOL 5 MG/ML
5 INJECTION INTRAMUSCULAR ONCE
Status: DISCONTINUED | OUTPATIENT
Start: 2022-12-01 | End: 2022-12-01

## 2022-12-01 RX ORDER — AMOXICILLIN 250 MG
2 CAPSULE ORAL 2 TIMES DAILY
Status: DISCONTINUED | OUTPATIENT
Start: 2022-12-01 | End: 2022-12-12 | Stop reason: HOSPADM

## 2022-12-01 RX ORDER — OLANZAPINE 5 MG/1
10 TABLET, ORALLY DISINTEGRATING ORAL EVERY EVENING
Status: DISCONTINUED | OUTPATIENT
Start: 2022-12-01 | End: 2022-12-01

## 2022-12-01 RX ADMIN — LORAZEPAM 1 MG: 0.5 TABLET ORAL at 08:52

## 2022-12-01 RX ADMIN — GABAPENTIN 100 MG: 100 CAPSULE ORAL at 13:57

## 2022-12-01 RX ADMIN — ACETAMINOPHEN 650 MG: 325 TABLET, FILM COATED ORAL at 08:52

## 2022-12-01 RX ADMIN — DEXTROSE AND SODIUM CHLORIDE: 5; 900 INJECTION, SOLUTION INTRAVENOUS at 17:32

## 2022-12-01 RX ADMIN — GABAPENTIN 100 MG: 100 CAPSULE ORAL at 22:06

## 2022-12-01 RX ADMIN — HALOPERIDOL LACTATE 5 MG: 5 INJECTION, SOLUTION INTRAMUSCULAR at 14:21

## 2022-12-01 RX ADMIN — LACTULOSE 20 G: 20 SOLUTION ORAL at 08:52

## 2022-12-01 RX ADMIN — LORAZEPAM 1 MG: 0.5 TABLET ORAL at 23:35

## 2022-12-01 RX ADMIN — DICLOFENAC SODIUM 2 G: 10 GEL TOPICAL at 14:51

## 2022-12-01 RX ADMIN — DEXMEDETOMIDINE HYDROCHLORIDE 1.1 MCG/KG/HR: 400 INJECTION INTRAVENOUS at 14:54

## 2022-12-01 RX ADMIN — BUPRENORPHINE AND NALOXONE 1 FILM: 8; 2 FILM, SOLUBLE BUCCAL; SUBLINGUAL at 07:55

## 2022-12-01 RX ADMIN — SENNOSIDES AND DOCUSATE SODIUM 2 TABLET: 50; 8.6 TABLET ORAL at 16:15

## 2022-12-01 RX ADMIN — TAMSULOSIN HYDROCHLORIDE 0.4 MG: 0.4 CAPSULE ORAL at 07:57

## 2022-12-01 RX ADMIN — PANTOPRAZOLE SODIUM 40 MG: 40 TABLET, DELAYED RELEASE ORAL at 16:15

## 2022-12-01 RX ADMIN — LACTULOSE 20 G: 20 SOLUTION ORAL at 22:07

## 2022-12-01 RX ADMIN — VALPROIC ACID 500 MG: 250 SOLUTION ORAL at 08:59

## 2022-12-01 RX ADMIN — DEXMEDETOMIDINE HYDROCHLORIDE 0.9 MCG/KG/HR: 400 INJECTION INTRAVENOUS at 10:28

## 2022-12-01 RX ADMIN — TRAZODONE HYDROCHLORIDE 50 MG: 50 TABLET ORAL at 16:15

## 2022-12-01 RX ADMIN — MULTIPLE VITAMINS W/ MINERALS TAB 1 TABLET: TAB at 07:57

## 2022-12-01 RX ADMIN — DEXMEDETOMIDINE HYDROCHLORIDE 0.5 MCG/KG/HR: 400 INJECTION INTRAVENOUS at 01:32

## 2022-12-01 RX ADMIN — DICLOFENAC SODIUM 2 G: 10 GEL TOPICAL at 20:42

## 2022-12-01 RX ADMIN — LORAZEPAM 2 MG: 0.5 TABLET ORAL at 05:16

## 2022-12-01 RX ADMIN — FOLIC ACID 1 MG: 1 TABLET ORAL at 07:57

## 2022-12-01 RX ADMIN — POLYETHYLENE GLYCOL 3350 17 G: 17 POWDER, FOR SOLUTION ORAL at 08:05

## 2022-12-01 RX ADMIN — DEXMEDETOMIDINE HYDROCHLORIDE 1.1 MCG/KG/HR: 400 INJECTION INTRAVENOUS at 18:57

## 2022-12-01 RX ADMIN — THIAMINE HYDROCHLORIDE 250 MG: 100 INJECTION, SOLUTION INTRAMUSCULAR; INTRAVENOUS at 08:59

## 2022-12-01 RX ADMIN — PANTOPRAZOLE SODIUM 40 MG: 40 TABLET, DELAYED RELEASE ORAL at 07:57

## 2022-12-01 RX ADMIN — HALOPERIDOL LACTATE 2.5 MG: 5 INJECTION, SOLUTION INTRAMUSCULAR at 23:35

## 2022-12-01 RX ADMIN — OLANZAPINE 5 MG: 5 TABLET, ORALLY DISINTEGRATING ORAL at 11:18

## 2022-12-01 RX ADMIN — POTASSIUM CHLORIDE, DEXTROSE MONOHYDRATE AND SODIUM CHLORIDE: 150; 5; 900 INJECTION, SOLUTION INTRAVENOUS at 04:52

## 2022-12-01 RX ADMIN — QUETIAPINE FUMARATE 25 MG: 25 TABLET ORAL at 22:11

## 2022-12-01 RX ADMIN — SENNOSIDES AND DOCUSATE SODIUM 1 TABLET: 50; 8.6 TABLET ORAL at 08:05

## 2022-12-01 RX ADMIN — BUPROPION HYDROCHLORIDE 450 MG: 150 TABLET, EXTENDED RELEASE ORAL at 07:57

## 2022-12-01 RX ADMIN — DEXTROSE AND SODIUM CHLORIDE: 5; 900 INJECTION, SOLUTION INTRAVENOUS at 09:01

## 2022-12-01 RX ADMIN — LORAZEPAM 1 MG: 0.5 TABLET ORAL at 11:18

## 2022-12-01 RX ADMIN — GABAPENTIN 100 MG: 100 CAPSULE ORAL at 05:15

## 2022-12-01 RX ADMIN — DEXMEDETOMIDINE HYDROCHLORIDE 1 MCG/KG/HR: 400 INJECTION INTRAVENOUS at 23:40

## 2022-12-01 RX ADMIN — VALPROIC ACID 500 MG: 250 SOLUTION ORAL at 16:16

## 2022-12-01 ASSESSMENT — ACTIVITIES OF DAILY LIVING (ADL)
ADLS_ACUITY_SCORE: 33
ADLS_ACUITY_SCORE: 31
ADLS_ACUITY_SCORE: 33
ADLS_ACUITY_SCORE: 33
ADLS_ACUITY_SCORE: 31

## 2022-12-01 NOTE — PLAN OF CARE
ICU End of Shift Summary.  For vital signs and complete assessments, please see documentation flowsheets.     Pertinent assessments: tele SR, confused, oriented  to self only. C/o left wrist pain, x-ray ordered and patient refused x-ray.(Will attempt again in the morning). Lino in place. No BM today.  Major Shift Events: Precedex at 1.1 mcg/kg. Patient keeps trying to get out of bed. Patient states he thinks he at home. Zyprexa given which did not seem to help. Talked with MD a few hours and said to give 5 mg haldol. Haldol given, patient did calm down somewhat but still attempts to get out of bed.   Plan (Upcoming Events): wean from precedex safely  Discharge/Transfer Needs: tbd    Bedside Shift Report Completed : y  Bedside Safety Check Completed: y

## 2022-12-01 NOTE — PLAN OF CARE
ICU End of Shift Summary.  For vital signs and complete assessments, please see documentation flowsheets.     Pertinent assessments: A&Ox1 to self only. LS clear, tele SR/SB, No c/o pain.  Major Shift Events: Precedex stopped during night, patient became agitated around 0915, precedex turned back on. Haldol given for hallucinations, patient stated he had bugs on him. Patient resting now.   Plan (Upcoming Events): wean from dex, transfer to reg bed.  Discharge/Transfer Needs: tbd    Bedside Shift Report Completed : y  Bedside Safety Check Completed: y

## 2022-12-01 NOTE — PROGRESS NOTES
DATE:  12/1/2022   TIME OF RECEIPT FROM LAB:  0657  LAB TEST:  potassium  LAB VALUE:  6.8  RESULTS GIVEN WITH READ-BACK TO (PROVIDER):  Tele RN Sudha notified who stated she will tell the tele intensivist  TIME LAB VALUE REPORTED TO PROVIDER:   0658

## 2022-12-01 NOTE — PROGRESS NOTES
[facility]  Hospitalist Progress Note  Alan Flores MD       Reason for Stay (Diagnosis): Black stool, severe alcohol withdrawal, DT         Assessment and Plan:      Summary of Stay: Andrea Pham is a 57 year old male with a significant past medical history of Alcohol use disorder, on Suboxone for narcotic addiction, who presents from home with alcohol withdraw concern, black emesis and stool and was admitted on 11/23/2022.   On presentation to the ER, patient arrived hypertensive, tachycardic, tremulous and anxious with nausea and vomiting.  Labs showed hemoglobin of 16.8, abnormal LFTs, lactate was 7.4, quantitative ketones of 2.8.  Blood alcohol level was 0.23. Patient was given IV fluid, Valium IV, octreotide and Protonix infusion started in the emergency department and was admitted to [facility] [place]  It was reported he started drinking heavily and last drink was the night before admission.  Per his friend he was excluded from a family gathering which made him very upset and went on a binge drinking. .      His withdrawal worsened and he was transferred to the ICU given significant need for IV benzos for initiation of a precedex drip.    Patient remained on Precedex drip and still hallucinating and agitated when trying to wean him off the medication.      Problem List/Assessment and Plan:   Black tarry stools:   Suspected upper GI bleed.  Hemoconcentrated on presentation.  -This does not appear to be an hemodynamically significant bleed.   - Initially on octreotide but this has since been stopped by gastroenterology.    -Protonix drip changed to IV.   -GI consult appreciated  -No endoscopy indicated for now per GI  -Daily Hgb  -IV PPI until tolerating PO intake     Alcohol use disorder with severe withdrawal symptoms and delirium tremens:  -He was started on CIWA protocol with gabapentin taper and as needed lorazepam, patient has needed multiple doses of Haldol and Ativan.  Has had 32 milligrams  of IV Ativan within 24 hours and so was transferred to ICU on 11/26 for initiation of precedex drip.  He has occasionally been able to come off the the precedex drip, but usually has ended back up on it related to agitation during which he is not redirectable.   -Continue precedex drip, wean as able  -CIWA protocol with ativan  -High dose IV thiamine given ongoing AMS.  -Continue Folate.  -Continue ICU care    Hepatic Encephalopathy  Acute toxic/metabolic Encephalopathy:  This is likely multifactorial in the setting of severe alcohol withdrawal requiring significant amounts of sedation, -Continue Precedex drip.    -Slightly elevated NH3 leve and started on Lactulose that should be titrated to 2 to 3 bowel movements per day    Poor p.o. intake  Possible malnutrition:  -Encourage oral intake. He is intermittently agitated.  -Nutrition consultation.     Substance abuse disorder:  He is on Suboxone PTA.    -Will resume when BP is stable.     Depression/anxiety:  PTA Wellbutrin, Depakote, trazodone and Zyprexa    Lactic acidosis:  Serum lactate 7.4 on presentation, secondary to alcohol use.   - Improved to 1.8 with hydration.  Blood pressure did improve with IV fluid.     Alcoholic liver disease:  AST//85 with total bilirubin of 1.8.  Bilirubin, AST now normal.      Alcoholic ketosis, resolved     History of hepatitis C:    Is not clear if he ever had treatment.  Reassess once mental status is improved.     Urinary retention in setting of BPH:    Patient has needed straight caths x2 and PVR is more than 500.  -Continue polanco cath, needs close monitoring, risk of pulling it out due to DT.   -Continue on Flomax, will consider voiding trail prior to discharge.     Left wrist pain:   -No reported trauma, there is some swelling in the mid range of motion.  -Wrist x-ray was ordered, pending.  -Voltaren gel and cold compression.    Clinically Significant Risk Factors        # Hyperkalemia: Highest K = 6.8 mmol/L (Ref  "range: 3.4-5.3) in last 2 days, will monitor as appropriate     # Hypomagnesemia: Lowest Mg = 1.5 mg/dL (Ref range: 1.7-2.3) in last 2 days, will replace as needed   # Hypoalbuminemia: Lowest albumin = 3.4 g/dL (Ref range: 3.5-5.2) at 11/27/2022  5:30 AM, will monitor as appropriate           # Severe Malnutrition: based on nutrition assessment        DVT Prophylaxis: Pneumatic Compression Devices  Code Status: Full Code  Discharge Dispo/Date: Continue ICU care patient is on Precedex drip, expect more than 3 days in hospital..        Interval History (Subjective):      Patient seen and examined, still intermittently agitated, no significant hallucination today, currently On Precedex Drip Titrating It down.  Today complained of left wrist pain, no trauma                  Physical Exam:      Last Vital Signs:  /69 (BP Location: Left arm)   Pulse 67   Temp 98.3  F (36.8  C) (Axillary)   Resp 15   Ht 1.803 m (5' 11\")   Wt 78.9 kg (173 lb 15.1 oz)   SpO2 91%   BMI 24.26 kg/m      Intake/Output Summary (Last 24 hours) at 12/1/2022 1313  Last data filed at 12/1/2022 1200  Gross per 24 hour   Intake 2715.02 ml   Output 3875 ml   Net -1159.98 ml     GENERAL: Awake, responding to questions appropriately, intermittently anxious, repeating himself and feels that he was not heard..  HEENT: NC/AT, eyes anicteric, external occular movements intact, face symmetric.  PERRL.  CVS: RRR, S1, S2.  No murmurs appreciated.  CHEST: Normal work of breathing.  Lungs CTAB.  ABD: soft, non-tender, non-distended.  Bowel sounds present.  No guarding.  Extremities: no deformities.  Warm, well perfused.  Skin: no rashes or lesions noted.  Warm and dry.  Neuro: No gross deficits noted.  Speech clear.  Follows instructions, moves all his extremities.  Psych: Somnolent.         Medications:      All current medications were reviewed with changes reflected in problem list.  Current Facility-Administered Medications   Medication     " acetaminophen (TYLENOL) tablet 650 mg    Or     acetaminophen (TYLENOL) Suppository 650 mg     buprenorphine HCl-naloxone HCl (SUBOXONE) 8-2 MG per film 1 Film     buPROPion (WELLBUTRIN XL) 24 hr tablet 450 mg     dexmedetomidine (PRECEDEX) 400 mcg in 0.9% sodium chloride 100 mL     dextrose 5% and 0.9% NaCl infusion     glucose gel 15-30 g    Or     dextrose 50 % injection 25-50 mL    Or     glucagon injection 1 mg     diclofenac (VOLTAREN) 1 % topical gel 2 g     [START ON 12/2/2022] divalproex sodium extended-release (DEPAKOTE ER) 24 hr tablet 1,000 mg     flumazenil (ROMAZICON) injection 0.2 mg     folic acid (FOLVITE) tablet 1 mg     gabapentin (NEURONTIN) capsule 100 mg     OLANZapine zydis (zyPREXA) ODT tab 5-10 mg    Or     haloperidol lactate (HALDOL) injection 2.5-5 mg     lactulose (CHRONULAC) solution 20 g     lidocaine (LMX4) cream     lidocaine 1 % 0.1-1 mL     LORazepam (ATIVAN) tablet 1-2 mg    Or     LORazepam (ATIVAN) injection 2 mg     melatonin tablet 5 mg     metoprolol (LOPRESSOR) injection 5 mg     multivitamin w/minerals (THERA-VIT-M) tablet 1 tablet     nitroGLYcerin (NITROSTAT) sublingual tablet 0.4 mg     OLANZapine zydis (zyPREXA) ODT tab 10 mg     pantoprazole (PROTONIX) EC tablet 40 mg     polyethylene glycol (MIRALAX) Packet 17 g     senna-docusate (SENOKOT-S/PERICOLACE) 8.6-50 MG per tablet 2 tablet     sodium chloride (PF) 0.9% PF flush 3 mL     sodium chloride (PF) 0.9% PF flush 3 mL     tamsulosin (FLOMAX) capsule 0.4 mg     thiamine (B-1) 250 mg in sodium chloride 0.9 % 50 mL intermittent infusion    Followed by     [START ON 12/4/2022] thiamine (B-1) tablet 100 mg     traZODone (DESYREL) tablet 50 mg     valproic acid (DEPAKENE) solution 500 mg          Data:      All new lab and imaging data was reviewed.   Labs:  Recent Labs   Lab 12/01/22  0746 12/01/22  0517 11/30/22  0530 11/29/22  1611 11/29/22  1146 11/29/22  0745 11/29/22  0522 11/28/22  1223 11/28/22  1119 11/28/22  0800  11/28/22  0549 11/27/22  0818 11/27/22  0530     --   --   --   --   --  139  --   --   --  141  --  141   POTASSIUM 5.9*  5.8* 6.8* 3.8  --   --   --  4.4  --   --   --  4.2  --  4.2   CHLORIDE 114*  --   --   --   --   --  104  --   --   --  107  --  106   CO2 20*  --   --   --   --   --  25  --   --   --  25  --  26   ANIONGAP 10  --   --   --   --   --  10  --   --   --  9  --  9   *  --   --  122* 100*   < > 130*   < >  --    < > 123*   < > 102*   BUN 7.1  --   --   --   --   --  5.9*  --   --   --  4.6*  --  3.8*   CR 0.63*  --   --   --   --   --  0.75  --   --   --  0.76  --  0.75   GFRESTIMATED >90  --   --   --   --   --  >90  --   --   --  >90  --  >90   JOHANA 8.1*  --   --   --   --   --  8.8  --   --   --  8.7  --  8.6   MAG 1.7 1.5* 1.9  --   --   --  1.8  --   --   --  2.0  --  1.9   PHOS 2.7 2.5 2.9  --   --   --  3.3  --   --   --  4.0  --  4.1   PROTTOTAL  --   --   --   --   --   --   --   --  5.8*  --   --   --  5.5*   ALBUMIN  --   --   --   --   --   --   --   --  3.7  --   --   --  3.4*   BILITOTAL  --   --   --   --   --   --   --   --  0.6  --   --   --  0.8   ALKPHOS  --   --   --   --   --   --   --   --  66  --   --   --  61   AST  --   --   --   --   --   --   --   --  29  --   --   --  44   ALT  --   --   --   --   --   --   --   --  49  --   --   --  56*    < > = values in this interval not displayed.     Recent Labs   Lab 12/01/22  0746 11/29/22  1611 11/29/22  1146 11/29/22  0745 11/29/22 0522   * 122* 100* 121* 130*       Recent Labs   Lab 12/01/22  0517 11/29/22  1812 11/29/22 0522   WBC  --   --  4.5   HGB 11.9*   < > 14.2  14.2   HCT  --   --  42.3   MCV  --   --  93   PLT  --   --  124*    < > = values in this interval not displayed.      Imaging:   No results found for this or any previous visit (fom the past 48 hour(s)).

## 2022-12-02 ENCOUNTER — APPOINTMENT (OUTPATIENT)
Dept: GENERAL RADIOLOGY | Facility: CLINIC | Age: 57
DRG: 896 | End: 2022-12-02
Attending: INTERNAL MEDICINE
Payer: MEDICARE

## 2022-12-02 LAB
HGB BLD-MCNC: 12.5 G/DL (ref 13.3–17.7)
HGB BLD-MCNC: 13.4 G/DL (ref 13.3–17.7)
MAGNESIUM SERPL-MCNC: 1.7 MG/DL (ref 1.7–2.3)
PHOSPHATE SERPL-MCNC: 3.3 MG/DL (ref 2.5–4.5)
POTASSIUM SERPL-SCNC: 3.7 MMOL/L (ref 3.4–5.3)

## 2022-12-02 PROCEDURE — 73110 X-RAY EXAM OF WRIST: CPT | Mod: LT

## 2022-12-02 PROCEDURE — 36415 COLL VENOUS BLD VENIPUNCTURE: CPT | Performed by: STUDENT IN AN ORGANIZED HEALTH CARE EDUCATION/TRAINING PROGRAM

## 2022-12-02 PROCEDURE — 83735 ASSAY OF MAGNESIUM: CPT | Performed by: INTERNAL MEDICINE

## 2022-12-02 PROCEDURE — 84132 ASSAY OF SERUM POTASSIUM: CPT | Performed by: INTERNAL MEDICINE

## 2022-12-02 PROCEDURE — 250N000011 HC RX IP 250 OP 636: Performed by: STUDENT IN AN ORGANIZED HEALTH CARE EDUCATION/TRAINING PROGRAM

## 2022-12-02 PROCEDURE — 250N000009 HC RX 250: Performed by: INTERNAL MEDICINE

## 2022-12-02 PROCEDURE — 258N000003 HC RX IP 258 OP 636: Performed by: INTERNAL MEDICINE

## 2022-12-02 PROCEDURE — 200N000001 HC R&B ICU

## 2022-12-02 PROCEDURE — 85018 HEMOGLOBIN: CPT | Performed by: STUDENT IN AN ORGANIZED HEALTH CARE EDUCATION/TRAINING PROGRAM

## 2022-12-02 PROCEDURE — 250N000013 HC RX MED GY IP 250 OP 250 PS 637

## 2022-12-02 PROCEDURE — 250N000013 HC RX MED GY IP 250 OP 250 PS 637: Performed by: STUDENT IN AN ORGANIZED HEALTH CARE EDUCATION/TRAINING PROGRAM

## 2022-12-02 PROCEDURE — 250N000011 HC RX IP 250 OP 636: Performed by: INTERNAL MEDICINE

## 2022-12-02 PROCEDURE — 99233 SBSQ HOSP IP/OBS HIGH 50: CPT | Performed by: INTERNAL MEDICINE

## 2022-12-02 PROCEDURE — 250N000013 HC RX MED GY IP 250 OP 250 PS 637: Performed by: INTERNAL MEDICINE

## 2022-12-02 PROCEDURE — 258N000003 HC RX IP 258 OP 636: Performed by: STUDENT IN AN ORGANIZED HEALTH CARE EDUCATION/TRAINING PROGRAM

## 2022-12-02 PROCEDURE — 84100 ASSAY OF PHOSPHORUS: CPT | Performed by: INTERNAL MEDICINE

## 2022-12-02 RX ADMIN — GABAPENTIN 100 MG: 100 CAPSULE ORAL at 04:48

## 2022-12-02 RX ADMIN — LORAZEPAM 2 MG: 2 INJECTION INTRAMUSCULAR; INTRAVENOUS at 12:16

## 2022-12-02 RX ADMIN — DEXTROSE AND SODIUM CHLORIDE: 5; 900 INJECTION, SOLUTION INTRAVENOUS at 12:36

## 2022-12-02 RX ADMIN — LORAZEPAM 2 MG: 2 INJECTION INTRAMUSCULAR; INTRAVENOUS at 00:57

## 2022-12-02 RX ADMIN — THIAMINE HYDROCHLORIDE 250 MG: 100 INJECTION, SOLUTION INTRAMUSCULAR; INTRAVENOUS at 10:30

## 2022-12-02 RX ADMIN — DICLOFENAC SODIUM 2 G: 10 GEL TOPICAL at 17:18

## 2022-12-02 RX ADMIN — LORAZEPAM 2 MG: 2 INJECTION INTRAMUSCULAR; INTRAVENOUS at 00:21

## 2022-12-02 RX ADMIN — LORAZEPAM 2 MG: 2 INJECTION INTRAMUSCULAR; INTRAVENOUS at 23:51

## 2022-12-02 RX ADMIN — GABAPENTIN 100 MG: 100 CAPSULE ORAL at 21:51

## 2022-12-02 RX ADMIN — LORAZEPAM 2 MG: 2 INJECTION INTRAMUSCULAR; INTRAVENOUS at 04:04

## 2022-12-02 RX ADMIN — PANTOPRAZOLE SODIUM 40 MG: 40 TABLET, DELAYED RELEASE ORAL at 16:46

## 2022-12-02 RX ADMIN — DICLOFENAC SODIUM 2 G: 10 GEL TOPICAL at 14:14

## 2022-12-02 RX ADMIN — BUPROPION HYDROCHLORIDE 450 MG: 150 TABLET, EXTENDED RELEASE ORAL at 09:58

## 2022-12-02 RX ADMIN — BUPRENORPHINE AND NALOXONE 1 FILM: 8; 2 FILM, SOLUBLE BUCCAL; SUBLINGUAL at 09:59

## 2022-12-02 RX ADMIN — LORAZEPAM 2 MG: 0.5 TABLET ORAL at 06:07

## 2022-12-02 RX ADMIN — DEXMEDETOMIDINE HYDROCHLORIDE 1 MCG/KG/HR: 400 INJECTION INTRAVENOUS at 04:43

## 2022-12-02 RX ADMIN — LACTULOSE 20 G: 20 SOLUTION ORAL at 21:51

## 2022-12-02 RX ADMIN — BUPRENORPHINE AND NALOXONE 1 FILM: 8; 2 FILM, SOLUBLE BUCCAL; SUBLINGUAL at 16:46

## 2022-12-02 RX ADMIN — MULTIPLE VITAMINS W/ MINERALS TAB 1 TABLET: TAB at 09:58

## 2022-12-02 RX ADMIN — SENNOSIDES AND DOCUSATE SODIUM 2 TABLET: 50; 8.6 TABLET ORAL at 16:46

## 2022-12-02 RX ADMIN — DICLOFENAC SODIUM 2 G: 10 GEL TOPICAL at 10:31

## 2022-12-02 RX ADMIN — DEXMEDETOMIDINE HYDROCHLORIDE 0.9 MCG/KG/HR: 400 INJECTION INTRAVENOUS at 21:51

## 2022-12-02 RX ADMIN — DIVALPROEX SODIUM 1000 MG: 500 TABLET, EXTENDED RELEASE ORAL at 09:57

## 2022-12-02 RX ADMIN — DICLOFENAC SODIUM 2 G: 10 GEL TOPICAL at 21:52

## 2022-12-02 RX ADMIN — DEXMEDETOMIDINE HYDROCHLORIDE 1 MCG/KG/HR: 400 INJECTION INTRAVENOUS at 08:47

## 2022-12-02 RX ADMIN — DEXMEDETOMIDINE HYDROCHLORIDE 0.9 MCG/KG/HR: 400 INJECTION INTRAVENOUS at 12:36

## 2022-12-02 RX ADMIN — LACTULOSE 20 G: 20 SOLUTION ORAL at 09:58

## 2022-12-02 RX ADMIN — SENNOSIDES AND DOCUSATE SODIUM 2 TABLET: 50; 8.6 TABLET ORAL at 09:58

## 2022-12-02 RX ADMIN — POLYETHYLENE GLYCOL 3350 17 G: 17 POWDER, FOR SOLUTION ORAL at 09:59

## 2022-12-02 RX ADMIN — DEXMEDETOMIDINE HYDROCHLORIDE 0.9 MCG/KG/HR: 400 INJECTION INTRAVENOUS at 17:18

## 2022-12-02 RX ADMIN — QUETIAPINE FUMARATE 25 MG: 25 TABLET ORAL at 21:51

## 2022-12-02 RX ADMIN — DEXTROSE AND SODIUM CHLORIDE: 5; 900 INJECTION, SOLUTION INTRAVENOUS at 21:54

## 2022-12-02 RX ADMIN — TAMSULOSIN HYDROCHLORIDE 0.4 MG: 0.4 CAPSULE ORAL at 09:59

## 2022-12-02 RX ADMIN — FOLIC ACID 1 MG: 1 TABLET ORAL at 09:59

## 2022-12-02 RX ADMIN — QUETIAPINE FUMARATE 12.5 MG: 25 TABLET ORAL at 10:51

## 2022-12-02 RX ADMIN — GABAPENTIN 100 MG: 100 CAPSULE ORAL at 14:13

## 2022-12-02 RX ADMIN — HALOPERIDOL LACTATE 5 MG: 5 INJECTION, SOLUTION INTRAMUSCULAR at 23:57

## 2022-12-02 RX ADMIN — PANTOPRAZOLE SODIUM 40 MG: 40 TABLET, DELAYED RELEASE ORAL at 06:18

## 2022-12-02 ASSESSMENT — ACTIVITIES OF DAILY LIVING (ADL)
ADLS_ACUITY_SCORE: 33
ADLS_ACUITY_SCORE: 38
ADLS_ACUITY_SCORE: 33
ADLS_ACUITY_SCORE: 33
ADLS_ACUITY_SCORE: 38
ADLS_ACUITY_SCORE: 33
ADLS_ACUITY_SCORE: 38
ADLS_ACUITY_SCORE: 33
ADLS_ACUITY_SCORE: 36
ADLS_ACUITY_SCORE: 32
ADLS_ACUITY_SCORE: 32
ADLS_ACUITY_SCORE: 33

## 2022-12-02 NOTE — PROGRESS NOTES
[facility]  Hospitalist Progress Note  Alan Flores MD       Reason for Stay (Diagnosis): Black stool, severe alcohol withdrawal, DT         Assessment and Plan:      Summary of Stay: Andrea Pham is a 57 year old male with a significant past medical history of Alcohol use disorder, on Suboxone for narcotic addiction, who presents from home with alcohol withdraw concern, black emesis and stool and was admitted on 11/23/2022.   On presentation to the ER, patient arrived hypertensive, tachycardic, tremulous and anxious with nausea and vomiting.  Labs showed hemoglobin of 16.8, abnormal LFTs, lactate was 7.4, quantitative ketones of 2.8.  Blood alcohol level was 0.23. Patient was given IV fluid, Valium IV, octreotide and Protonix infusion started in the emergency department and was admitted to [facility] [place]  It was reported he started drinking heavily and last drink was the night before admission.  Per his friend he was excluded from a family gathering which made him very upset and went on a binge drinking. .      He had worsening withdrawal and was transferred to the ICU on 11/26 given significant need for IV benzos for initiation of a precedex drip.    Patient remained on Precedex drip and continues to have intermittent agitation and hallucination when trying to wean him off the Precedex drip.      Problem List/Assessment and Plan:   Black tarry stools:   -Suspected upper GI bleed.  -Some hemoconcentration on presentation and hemoglobin trended down and stabilized with IV fluid.  -Initially on octreotide but this has since been stopped by gastroenterology.    -Protonix drip changed to IV and now on p.o.   -GI consult appreciated  -No endoscopy indicated for now per GI  -Daily Hgb    Alcohol use disorder with severe withdrawal symptoms and delirium tremens:  -He was started on CIWA protocol with gabapentin taper and as needed lorazepam, patient has needed multiple doses of Haldol and Ativan.  Has had 32  milligrams of IV Ativan within 24 hours and so was transferred to ICU on 11/26 for initiation of precedex drip.  He has occasionally been able to come off the the precedex drip, but usually has ended back up on it related to agitation during which he is not redirectable.   -Continue precedex drip, wean as able  -CIWA protocol with ativan  -High dose IV thiamine given ongoing AMS.  -Continue Folate.  -I doubt alcohol is contributing to his change in mental status he is in the hospital for more than 10 days.  -Try to wean him off Precedex, use as needed medications including Haldol, Ativan, Seroquel scheduled at bedtime and as needed during daytime.    Hepatic Encephalopathy  Acute toxic/metabolic Encephalopathy: Ongoing  This is likely multifactorial in the setting of severe alcohol withdrawal requiring significant amounts of sedation,   -Continue Precedex drip.    -Slightly elevated NH3 leve and started on Lactulose that should be titrated to 2 to 3 bowel movements per day  -Etiology of his change in mental status is not fully clear, doubt alcohol is contributing anymore within the hospital for more than 10 days and treated with for CIWA protocol.    Poor p.o. intake  Possible malnutrition:  -Encourage oral intake. He is intermittently agitated.  -Nutrition consultation.     Substance abuse disorder:  -He is on Suboxone PTA, resumed.       Depression/anxiety:  PTA Wellbutrin, Depakote, trazodone and Zyprexa    Lactic acidosis:  -Serum lactate 7.4 on presentation, secondary to alcohol use.   -Improved to 1.8 with hydration.  Blood pressure did improve with IV fluid.     Alcoholic liver disease:  AST//85 with total bilirubin of 1.8.  Bilirubin, AST now normal.      Alcoholic ketosis, resolved     History of hepatitis C:    Is not clear if he ever had treatment.  Reassess once mental status is improved.     Urinary retention in setting of BPH:    Patient has needed straight caths x2 and PVR is more than  "500.  -Continue polanco cath, needs close monitoring, risk of pulling it out due to significant change in mental status/delirium.   -Continue on Flomax, will consider voiding trail prior to discharge.     Left wrist pain:   -No reported trauma, there is some swelling in the mid range of motion.  -Wrist x-ray was ordered yesterday, patient refused it on 12/1, but I believe it to be done today, no sign of fracture seen.  -Voltaren gel and cold compression.  -Pain is much better today.    Clinically Significant Risk Factors        # Hyperkalemia: Highest K = 6.8 mmol/L (Ref range: 3.4-5.3) in last 2 days, will monitor as appropriate     # Hypomagnesemia: Lowest Mg = 1.5 mg/dL (Ref range: 1.7-2.3) in last 2 days, will replace as needed   # Hypoalbuminemia: Lowest albumin = 3.4 g/dL (Ref range: 3.5-5.2) at 11/27/2022  5:30 AM, will monitor as appropriate           # Severe Malnutrition: based on nutrition assessment        DVT Prophylaxis: Pneumatic Compression Devices  Code Status: Full Code  Discharge Dispo/Date: Continue ICU care as he is on Precedex drip, expect more than 3 days in hospital, will likely need placement upon discharge.  Will be evaluated by PT/OT when off sedation and becomes more cooperative        Interval History (Subjective):      Patient seen and examined, still intermittently agitated, no significant hallucination, currently On Precedex drip, trying to titrate It down.  The left wrist pain is better, no reported trauma.                  Physical Exam:      Last Vital Signs:  BP 97/60   Pulse 80   Temp 100.2  F (37.9  C) (Temporal)   Resp 22   Ht 1.803 m (5' 11\")   Wt 78.9 kg (173 lb 15.1 oz)   SpO2 92%   BMI 24.26 kg/m      Intake/Output Summary (Last 24 hours) at 12/2/2022 0955  Last data filed at 12/2/2022 0622  Gross per 24 hour   Intake 2140.96 ml   Output 2375 ml   Net -234.04 ml     GENERAL: Awake, responding to questions appropriately, intermittently anxious, repeating himself and " feels that he was not heard..  HEENT: NC/AT, eyes anicteric, external occular movements intact, face symmetric.  PERRL.  CVS: RRR, S1, S2.  No murmurs appreciated.  CHEST: Normal work of breathing.  Lungs CTAB.  ABD: soft, non-tender, non-distended.  Bowel sounds present.  No guarding.  Extremities: no deformities.  Warm, well perfused.  Skin: no rashes or lesions noted.  Warm and dry.  Neuro: No gross deficits noted.  Speech clear.  Follows instructions, moves all his extremities.  Psych: Somnolent.         Medications:      All current medications were reviewed with changes reflected in problem list.  Current Facility-Administered Medications   Medication     acetaminophen (TYLENOL) tablet 650 mg    Or     acetaminophen (TYLENOL) Suppository 650 mg     buprenorphine HCl-naloxone HCl (SUBOXONE) 8-2 MG per film 1 Film     buPROPion (WELLBUTRIN XL) 24 hr tablet 450 mg     dexmedetomidine (PRECEDEX) 400 mcg in 0.9% sodium chloride 100 mL     dextrose 5% and 0.9% NaCl infusion     glucose gel 15-30 g    Or     dextrose 50 % injection 25-50 mL    Or     glucagon injection 1 mg     diclofenac (VOLTAREN) 1 % topical gel 2 g     divalproex sodium extended-release (DEPAKOTE ER) 24 hr tablet 1,000 mg     flumazenil (ROMAZICON) injection 0.2 mg     folic acid (FOLVITE) tablet 1 mg     gabapentin (NEURONTIN) capsule 100 mg     OLANZapine zydis (zyPREXA) ODT tab 5-10 mg    Or     haloperidol lactate (HALDOL) injection 2.5-5 mg     lactulose (CHRONULAC) solution 20 g     lidocaine (LMX4) cream     lidocaine 1 % 0.1-1 mL     LORazepam (ATIVAN) tablet 1-2 mg    Or     LORazepam (ATIVAN) injection 2 mg     melatonin tablet 5 mg     metoprolol (LOPRESSOR) injection 5 mg     multivitamin w/minerals (THERA-VIT-M) tablet 1 tablet     nitroGLYcerin (NITROSTAT) sublingual tablet 0.4 mg     pantoprazole (PROTONIX) EC tablet 40 mg     polyethylene glycol (MIRALAX) Packet 17 g     QUEtiapine (SEROquel) half-tab 12.5 mg     QUEtiapine  (SEROquel) tablet 25 mg     senna-docusate (SENOKOT-S/PERICOLACE) 8.6-50 MG per tablet 2 tablet     sodium chloride (PF) 0.9% PF flush 3 mL     sodium chloride (PF) 0.9% PF flush 3 mL     tamsulosin (FLOMAX) capsule 0.4 mg     thiamine (B-1) 250 mg in sodium chloride 0.9 % 50 mL intermittent infusion    Followed by     [START ON 12/4/2022] thiamine (B-1) tablet 100 mg     traZODone (DESYREL) tablet 50 mg          Data:      All new lab and imaging data was reviewed.   Labs:  Recent Labs   Lab 12/02/22  0513 12/01/22  1730 12/01/22  0746 12/01/22  0517 11/30/22  0530 11/29/22  1611 11/29/22  0745 11/29/22  0522 11/28/22  1223 11/28/22  1119 11/28/22  0800 11/28/22  0549 11/27/22  0818 11/27/22  0530   NA  --   --  144  --   --   --   --  139  --   --   --  141  --  141   POTASSIUM 3.7  --  5.9*  5.8* 6.8*   < >  --   --  4.4  --   --   --  4.2  --  4.2   CHLORIDE  --   --  114*  --   --   --   --  104  --   --   --  107  --  106   CO2  --   --  20*  --   --   --   --  25  --   --   --  25  --  26   ANIONGAP  --   --  10  --   --   --   --  10  --   --   --  9  --  9   GLC  --  119* 493*  --   --  122*   < > 130*   < >  --    < > 123*   < > 102*   BUN  --   --  7.1  --   --   --   --  5.9*  --   --   --  4.6*  --  3.8*   CR  --   --  0.63*  --   --   --   --  0.75  --   --   --  0.76  --  0.75   GFRESTIMATED  --   --  >90  --   --   --   --  >90  --   --   --  >90  --  >90   JOHANA  --   --  8.1*  --   --   --   --  8.8  --   --   --  8.7  --  8.6   MAG 1.7  --  1.7 1.5*   < >  --   --  1.8  --   --   --  2.0  --  1.9   PHOS 3.3  --  2.7 2.5   < >  --   --  3.3  --   --   --  4.0  --  4.1   PROTTOTAL  --   --   --   --   --   --   --   --   --  5.8*  --   --   --  5.5*   ALBUMIN  --   --   --   --   --   --   --   --   --  3.7  --   --   --  3.4*   BILITOTAL  --   --   --   --   --   --   --   --   --  0.6  --   --   --  0.8   ALKPHOS  --   --   --   --   --   --   --   --   --  66  --   --   --  61   AST  --   --   --    --   --   --   --   --   --  29  --   --   --  44   ALT  --   --   --   --   --   --   --   --   --  49  --   --   --  56*    < > = values in this interval not displayed.     Recent Labs   Lab 12/01/22  1730 12/01/22  0746 11/29/22  1611 11/29/22  1146 11/29/22  0745   * 493* 122* 100* 121*       Recent Labs   Lab 12/02/22  0513 11/29/22  1812 11/29/22  0522   WBC  --   --  4.5   HGB 13.4   < > 14.2  14.2   HCT  --   --  42.3   MCV  --   --  93   PLT  --   --  124*    < > = values in this interval not displayed.      Imaging:   No results found for this or any previous visit (fom the past 48 hour(s)).

## 2022-12-02 NOTE — PLAN OF CARE
ICU End of Shift Summary.  For vital signs and complete assessments, please see documentation flowsheets.     Pertinent assessments:   Neuro: Alert to self only. Continues to have hallucinations Halidol given x1.   CV:  pressures remain good pt had x 1 soft BP recheck wdl  Pulm: Sating well on RA  GI: No BM this shift lactulose given  : polanco in place good uo  Lines/gtts: PIV x2   Skin: scattered bruising and scabs    Major Shift Events:   Plan (Upcoming Events): wean dex as tolerated  Discharge/Transfer Needs: TBD    Bedside Shift Report Completed : yes  Bedside Safety Check Completed: yes     Goal Outcome Evaluation:      Plan of Care Reviewed With: patient    Overall Patient Progress: no changeOverall Patient Progress: no change

## 2022-12-03 ENCOUNTER — ANESTHESIA EVENT (OUTPATIENT)
Dept: INTENSIVE CARE | Facility: CLINIC | Age: 57
DRG: 896 | End: 2022-12-03
Payer: MEDICARE

## 2022-12-03 ENCOUNTER — ANESTHESIA (OUTPATIENT)
Dept: INTENSIVE CARE | Facility: CLINIC | Age: 57
DRG: 896 | End: 2022-12-03
Payer: MEDICARE

## 2022-12-03 LAB
ANION GAP SERPL CALCULATED.3IONS-SCNC: 13 MMOL/L (ref 7–15)
BUN SERPL-MCNC: 9.1 MG/DL (ref 6–20)
CALCIUM SERPL-MCNC: 8.8 MG/DL (ref 8.6–10)
CHLORIDE SERPL-SCNC: 104 MMOL/L (ref 98–107)
CK SERPL-CCNC: 54 U/L (ref 39–308)
CREAT SERPL-MCNC: 0.74 MG/DL (ref 0.67–1.17)
DEPRECATED HCO3 PLAS-SCNC: 24 MMOL/L (ref 22–29)
ERYTHROCYTE [DISTWIDTH] IN BLOOD BY AUTOMATED COUNT: 13.4 % (ref 10–15)
GFR SERPL CREATININE-BSD FRML MDRD: >90 ML/MIN/1.73M2
GLUCOSE SERPL-MCNC: 106 MG/DL (ref 70–99)
HCT VFR BLD AUTO: 37.1 % (ref 40–53)
HGB BLD-MCNC: 12.5 G/DL (ref 13.3–17.7)
HGB BLD-MCNC: 13 G/DL (ref 13.3–17.7)
HOLD SPECIMEN: NORMAL
MAGNESIUM SERPL-MCNC: 1.6 MG/DL (ref 1.7–2.3)
MCH RBC QN AUTO: 31.6 PG (ref 26.5–33)
MCHC RBC AUTO-ENTMCNC: 33.7 G/DL (ref 31.5–36.5)
MCV RBC AUTO: 94 FL (ref 78–100)
PHOSPHATE SERPL-MCNC: 3.4 MG/DL (ref 2.5–4.5)
PLATELET # BLD AUTO: 282 10E3/UL (ref 150–450)
POTASSIUM SERPL-SCNC: 3.6 MMOL/L (ref 3.4–5.3)
POTASSIUM SERPL-SCNC: 3.7 MMOL/L (ref 3.4–5.3)
PROCALCITONIN SERPL IA-MCNC: 0.07 NG/ML
RBC # BLD AUTO: 3.95 10E6/UL (ref 4.4–5.9)
SODIUM SERPL-SCNC: 141 MMOL/L (ref 136–145)
WBC # BLD AUTO: 8.1 10E3/UL (ref 4–11)

## 2022-12-03 PROCEDURE — 82550 ASSAY OF CK (CPK): CPT | Performed by: INTERNAL MEDICINE

## 2022-12-03 PROCEDURE — 82310 ASSAY OF CALCIUM: CPT | Performed by: INTERNAL MEDICINE

## 2022-12-03 PROCEDURE — 85018 HEMOGLOBIN: CPT | Performed by: STUDENT IN AN ORGANIZED HEALTH CARE EDUCATION/TRAINING PROGRAM

## 2022-12-03 PROCEDURE — 84132 ASSAY OF SERUM POTASSIUM: CPT | Performed by: INTERNAL MEDICINE

## 2022-12-03 PROCEDURE — 84145 PROCALCITONIN (PCT): CPT | Performed by: INTERNAL MEDICINE

## 2022-12-03 PROCEDURE — 258N000003 HC RX IP 258 OP 636: Performed by: INTERNAL MEDICINE

## 2022-12-03 PROCEDURE — 250N000011 HC RX IP 250 OP 636: Performed by: INTERNAL MEDICINE

## 2022-12-03 PROCEDURE — 87040 BLOOD CULTURE FOR BACTERIA: CPT | Performed by: INTERNAL MEDICINE

## 2022-12-03 PROCEDURE — 84100 ASSAY OF PHOSPHORUS: CPT | Performed by: INTERNAL MEDICINE

## 2022-12-03 PROCEDURE — 250N000013 HC RX MED GY IP 250 OP 250 PS 637

## 2022-12-03 PROCEDURE — 250N000013 HC RX MED GY IP 250 OP 250 PS 637: Performed by: INTERNAL MEDICINE

## 2022-12-03 PROCEDURE — 250N000013 HC RX MED GY IP 250 OP 250 PS 637: Performed by: STUDENT IN AN ORGANIZED HEALTH CARE EDUCATION/TRAINING PROGRAM

## 2022-12-03 PROCEDURE — 370N000003 HC ANESTHESIA WARD SERVICE

## 2022-12-03 PROCEDURE — 250N000011 HC RX IP 250 OP 636: Performed by: STUDENT IN AN ORGANIZED HEALTH CARE EDUCATION/TRAINING PROGRAM

## 2022-12-03 PROCEDURE — 99233 SBSQ HOSP IP/OBS HIGH 50: CPT | Performed by: INTERNAL MEDICINE

## 2022-12-03 PROCEDURE — 85027 COMPLETE CBC AUTOMATED: CPT | Performed by: INTERNAL MEDICINE

## 2022-12-03 PROCEDURE — 36415 COLL VENOUS BLD VENIPUNCTURE: CPT | Performed by: INTERNAL MEDICINE

## 2022-12-03 PROCEDURE — 258N000003 HC RX IP 258 OP 636: Performed by: STUDENT IN AN ORGANIZED HEALTH CARE EDUCATION/TRAINING PROGRAM

## 2022-12-03 PROCEDURE — 250N000009 HC RX 250: Performed by: INTERNAL MEDICINE

## 2022-12-03 PROCEDURE — 200N000001 HC R&B ICU

## 2022-12-03 PROCEDURE — 83735 ASSAY OF MAGNESIUM: CPT | Performed by: INTERNAL MEDICINE

## 2022-12-03 RX ORDER — DEXMEDETOMIDINE HYDROCHLORIDE 4 UG/ML
.1-1.2 INJECTION, SOLUTION INTRAVENOUS CONTINUOUS
Status: DISCONTINUED | OUTPATIENT
Start: 2022-12-03 | End: 2022-12-07

## 2022-12-03 RX ORDER — SODIUM CHLORIDE 9 MG/ML
INJECTION, SOLUTION INTRAVENOUS CONTINUOUS
Status: ACTIVE | OUTPATIENT
Start: 2022-12-03 | End: 2022-12-03

## 2022-12-03 RX ORDER — LORAZEPAM 2 MG/ML
2 INJECTION INTRAMUSCULAR EVERY 4 HOURS PRN
Status: DISCONTINUED | OUTPATIENT
Start: 2022-12-03 | End: 2022-12-04

## 2022-12-03 RX ORDER — QUETIAPINE FUMARATE 50 MG/1
50 TABLET, FILM COATED ORAL AT BEDTIME
Status: DISCONTINUED | OUTPATIENT
Start: 2022-12-03 | End: 2022-12-08

## 2022-12-03 RX ORDER — QUETIAPINE FUMARATE 25 MG/1
25 TABLET, FILM COATED ORAL 2 TIMES DAILY
Status: DISCONTINUED | OUTPATIENT
Start: 2022-12-03 | End: 2022-12-08

## 2022-12-03 RX ORDER — LORAZEPAM 1 MG/1
1-2 TABLET ORAL EVERY 4 HOURS PRN
Status: DISCONTINUED | OUTPATIENT
Start: 2022-12-03 | End: 2022-12-04

## 2022-12-03 RX ORDER — MAGNESIUM SULFATE HEPTAHYDRATE 40 MG/ML
2 INJECTION, SOLUTION INTRAVENOUS ONCE
Status: COMPLETED | OUTPATIENT
Start: 2022-12-03 | End: 2022-12-03

## 2022-12-03 RX ADMIN — DICLOFENAC SODIUM 2 G: 10 GEL TOPICAL at 12:47

## 2022-12-03 RX ADMIN — ACETAMINOPHEN 650 MG: 325 TABLET, FILM COATED ORAL at 17:56

## 2022-12-03 RX ADMIN — LORAZEPAM 2 MG: 2 INJECTION INTRAMUSCULAR; INTRAVENOUS at 00:46

## 2022-12-03 RX ADMIN — GABAPENTIN 100 MG: 100 CAPSULE ORAL at 12:49

## 2022-12-03 RX ADMIN — DIVALPROEX SODIUM 1000 MG: 500 TABLET, EXTENDED RELEASE ORAL at 08:14

## 2022-12-03 RX ADMIN — BUPRENORPHINE AND NALOXONE 1 FILM: 8; 2 FILM, SOLUBLE BUCCAL; SUBLINGUAL at 16:16

## 2022-12-03 RX ADMIN — TAMSULOSIN HYDROCHLORIDE 0.4 MG: 0.4 CAPSULE ORAL at 08:14

## 2022-12-03 RX ADMIN — MAGNESIUM SULFATE HEPTAHYDRATE 2 G: 40 INJECTION, SOLUTION INTRAVENOUS at 15:57

## 2022-12-03 RX ADMIN — QUETIAPINE FUMARATE 25 MG: 25 TABLET ORAL at 08:40

## 2022-12-03 RX ADMIN — SENNOSIDES AND DOCUSATE SODIUM 2 TABLET: 50; 8.6 TABLET ORAL at 17:30

## 2022-12-03 RX ADMIN — FOLIC ACID 1 MG: 1 TABLET ORAL at 08:14

## 2022-12-03 RX ADMIN — QUETIAPINE FUMARATE 25 MG: 25 TABLET ORAL at 16:16

## 2022-12-03 RX ADMIN — SODIUM CHLORIDE: 9 INJECTION, SOLUTION INTRAVENOUS at 13:34

## 2022-12-03 RX ADMIN — POLYETHYLENE GLYCOL 3350 17 G: 17 POWDER, FOR SOLUTION ORAL at 08:14

## 2022-12-03 RX ADMIN — BUPROPION HYDROCHLORIDE 450 MG: 150 TABLET, EXTENDED RELEASE ORAL at 08:15

## 2022-12-03 RX ADMIN — DEXMEDETOMIDINE HYDROCHLORIDE 0.9 MCG/KG/HR: 400 INJECTION INTRAVENOUS at 08:15

## 2022-12-03 RX ADMIN — THIAMINE HYDROCHLORIDE 250 MG: 100 INJECTION, SOLUTION INTRAMUSCULAR; INTRAVENOUS at 08:39

## 2022-12-03 RX ADMIN — PANTOPRAZOLE SODIUM 40 MG: 40 TABLET, DELAYED RELEASE ORAL at 16:29

## 2022-12-03 RX ADMIN — DEXMEDETOMIDINE HYDROCHLORIDE 0.9 MCG/KG/HR: 400 INJECTION INTRAVENOUS at 03:43

## 2022-12-03 RX ADMIN — SENNOSIDES AND DOCUSATE SODIUM 2 TABLET: 50; 8.6 TABLET ORAL at 08:15

## 2022-12-03 RX ADMIN — MULTIPLE VITAMINS W/ MINERALS TAB 1 TABLET: TAB at 08:15

## 2022-12-03 RX ADMIN — LORAZEPAM 2 MG: 2 INJECTION INTRAMUSCULAR; INTRAVENOUS at 17:24

## 2022-12-03 RX ADMIN — LORAZEPAM 2 MG: 2 INJECTION INTRAMUSCULAR; INTRAVENOUS at 20:45

## 2022-12-03 RX ADMIN — LACTULOSE 20 G: 20 SOLUTION ORAL at 20:36

## 2022-12-03 RX ADMIN — BUPRENORPHINE AND NALOXONE 1 FILM: 8; 2 FILM, SOLUBLE BUCCAL; SUBLINGUAL at 08:15

## 2022-12-03 RX ADMIN — TRAZODONE HYDROCHLORIDE 50 MG: 50 TABLET ORAL at 16:17

## 2022-12-03 RX ADMIN — GABAPENTIN 100 MG: 100 CAPSULE ORAL at 05:54

## 2022-12-03 RX ADMIN — DEXTROSE AND SODIUM CHLORIDE: 5; 900 INJECTION, SOLUTION INTRAVENOUS at 20:36

## 2022-12-03 RX ADMIN — HALOPERIDOL LACTATE 5 MG: 5 INJECTION, SOLUTION INTRAMUSCULAR at 19:54

## 2022-12-03 RX ADMIN — DICLOFENAC SODIUM 2 G: 10 GEL TOPICAL at 08:33

## 2022-12-03 RX ADMIN — PANTOPRAZOLE SODIUM 40 MG: 40 TABLET, DELAYED RELEASE ORAL at 08:15

## 2022-12-03 ASSESSMENT — ACTIVITIES OF DAILY LIVING (ADL)
ADLS_ACUITY_SCORE: 36
ADLS_ACUITY_SCORE: 32
ADLS_ACUITY_SCORE: 32
ADLS_ACUITY_SCORE: 36
ADLS_ACUITY_SCORE: 36
ADLS_ACUITY_SCORE: 32
ADLS_ACUITY_SCORE: 32
ADLS_ACUITY_SCORE: 36
ADLS_ACUITY_SCORE: 36
ADLS_ACUITY_SCORE: 32

## 2022-12-03 NOTE — PLAN OF CARE
ICU End of Shift Summary.  For vital signs and complete assessments, please see documentation flowsheets.      Pertinent assessments: RAAS -1, confused, aggressive and hallucinates at times. LS diminished in base, SR and normotensive throughout the night. Lino in and adequate UOP (sediment and joycelyn color). No BM this shift.     Major Shift Events: Pt agitated at times, trying to get out of bed, yelling at staff. Ativan x2 given and Haldol x1 given. Loss 3 IV accesses, and writer and flyer unable to establish access. CRNA finally contacted and 1 PIV access established. Pt gets quicly aggressive.   Plan (Upcoming Events): Continue ICU cares.   Discharge/Transfer Needs: TBD     Bedside Shift Report Completed : Y  Bedside Safety Check Completed: Y

## 2022-12-03 NOTE — PROGRESS NOTES
Pt CIWA scored 11 and was highly agitated; once ativan was obtained, pt calmed and is now sleeping after he was able to take his oral seroquel and trazedone.  Per nursing judement, holding PRN ativan per CIWA protocol at this time.

## 2022-12-03 NOTE — PROVIDER NOTIFICATION
0032: Called Tele hub. 2 IV access occluded. Currently has only one access and pt reluctant to be pocked. Currently on D5+NS fluid running which according to [employer]romedex is not tested to be Y-sited with Dex drip. Pt's blood sugars have been WNL. Is a change of fluid possible to have Dex drop running with on one IV?      0110: Tele called writer. Provider decides to keep pt on current fluid and have the Dex drip Y-sited with that.

## 2022-12-03 NOTE — PROGRESS NOTES
[facility]  Hospitalist Progress Note  Alan Flores MD       Reason for Stay (Diagnosis): Black stool, severe alcohol withdrawal, DT.         Assessment and Plan:      Summary of Stay: Andrea Pham is a 57 year old male with a significant past medical history of Alcohol use disorder, on Suboxone for narcotic addiction, who presents from home with alcohol withdraw concern, black emesis and stool and was admitted on 11/23/2022.   On presentation to the ER, patient arrived hypertensive, tachycardic, tremulous and anxious with nausea and vomiting.  Labs showed hemoglobin of 16.8, abnormal LFTs, lactate was 7.4, quantitative ketones of 2.8.  Blood alcohol level was 0.23. Patient was given IV fluid, Valium IV, octreotide and Protonix infusion started in the emergency department and was admitted to [facility]  It was reported he started drinking heavily and last drink was the night before admission.  Per his friend he was excluded from a family gathering which made him very upset and went on a binge drinking. .      He had worsening withdrawal and was transferred to the ICU on 11/26 given significant need for IV benzos for initiation of a precedex drip.    Patient remained on Precedex drip and continues to have intermittent agitation and hallucination when trying to wean him off the Precedex drip.  12/3, planning to titrate down and stop the Precedex gtt today and use other PRN medications.    Problem List/Assessment and Plan:   Black tarry stools: Resolved  -Suspected upper GI bleed, resolved  -Some hemoconcentration on presentation and hemoglobin trended down and stabilized with IV fluid.  -Initially on octreotide but this has since been stopped by gastroenterology.    -Continue oral Protonix.   -GI consult appreciated  -No endoscopy indicated for now per GI      Alcohol use disorder with severe withdrawal symptoms and delirium tremens:  -He was started on CIWA protocol with gabapentin taper and as  needed lorazepam, patient has needed multiple doses of Haldol and Ativan.  Has had 32 milligrams of IV Ativan within 24 hours and so was transferred to ICU on 11/26 for initiation of precedex drip.  He has occasionally been able to come off the the precedex drip, but usually has ended back up on it related to agitation during which he is not redirectable.   -Continue precedex drip, wean as able  -CIWA protocol with ativan  -High dose IV thiamine given ongoing AMS.  -Continue Folate.  -I doubt alcohol this is contributing to his ongoing change in mental status as he has passed the withdrawal time, in the hospital for more than 10 days.  -Wean and STOP precidex    Hepatic Encephalopathy  Acute toxic/metabolic Encephalopathy: Ongoing  This is likely multifactorial in the setting of severe alcohol withdrawal requiring significant amounts of sedation,   -Continue Precedex drip.    -Slightly elevated NH3 leve and started on Lactulose that should be titrated to 2 to 3 bowel movements per day  -Etiology of his change in mental status is not fully clear, doubt alcohol is contributing anymore within the hospital for more than 10 days and treated with for CIWA protocol.  -Seroquel 25 mg twice a day and milligram at bedtime.  -Continue as needed Haldol and Ativan.  -Will use soft restraints as patient is agitated when coming off Precedex drip.    Poor p.o. intake:  Possible malnutrition:  -Encourage oral intake.   -He is intermittently agitated.  -Nutrition consultation.  -Patient is not meeting his nutritional requirement, is slightly sedated or when coming off sedating medication agitated.     Substance abuse disorder:  -He is on Suboxone PTA, resumed.       Depression/anxiety:  PTA Wellbutrin, Depakote, trazodone and Zyprexa    Lactic acidosis:  -Serum lactate 7.4 on presentation, secondary to alcohol use.   -Improved to 1.8 with hydration.  Blood pressure did improve with IV fluid.     Alcoholic liver disease:  AST/ALT  106/85 with total bilirubin of 1.8.  Bilirubin, AST now normal.      Alcoholic ketosis, resolved     History of hepatitis C:    Is not clear if he ever had treatment.  Reassess once mental status is improved.     Urinary retention in setting of BPH:    -Patient has needed straight caths x2 and PVR is more than 500.  -Continue polanco cath, needs close monitoring, risk of pulling it out due to significant change in mental status/delirium.   -Continue on Flomax, will consider voiding trail prior to discharge, when he is off sedating meds and more cooperative.     Left wrist pain:   -No reported trauma, there is some swelling in the mid range of motion.  -Wrist x-ray was ordered yesterday, patient refused it on 12/1, but I believe it to be done today, no sign of fracture seen.  -Voltaren gel and cold compression.  -Pain is much better today.    Addendum.  Sinus tachycardia transient hypotension:  -Patient developed sinus tachycardia and also transient hypotension.  -Patient was given Seroquel 50 mg tab p.o. x1 earlier.  -Able to come off Precedex drip.  -We will bolus him with IV fluid 500 mill per hour x1.  -Magnesium is 1.6 and will give magnesium 2 g IV x1.  -Continue close monitoring.  -Patient is hard stick and reports IV lines, will consider getting midline.      Clinically Significant Risk Factors            # Hypomagnesemia: Lowest Mg = 1.6 mg/dL (Ref range: 1.7-2.3) in last 2 days, will replace as needed   # Hypoalbuminemia: Lowest albumin = 3.4 g/dL (Ref range: 3.5-5.2) at 11/27/2022  5:30 AM, will monitor as appropriate           # Severe Malnutrition: based on nutrition assessment        DVT Prophylaxis: Pneumatic Compression Devices.  Code Status: Full Code.  Discharge Dispo/Date: Continue ICU care as he is on Precedex drip, planning to wean him off.  -Expect in the hospital at least 3 more days.  Patient should be calm and cooperative and able to eat and ambulate prior to discharge.  -We will be evaluated  "by PT/OT when off sedation and becomes more cooperative.  Discussed with patient, with ICU staff,      Interval History (Subjective):      Patient seen and examined, Still intermittently agitated, No significant hallucination, currently On Precedex drip, trying to titrate It down. The left wrist pain is better, no reported trauma.                  Physical Exam:      Last Vital Signs:  /77 (BP Location: Left arm)   Pulse 112   Temp 97.9  F (36.6  C) (Axillary)   Resp (!) 33   Ht 1.803 m (5' 11\")   Wt 80.9 kg (178 lb 5.6 oz)   SpO2 97%   BMI 24.88 kg/m      Intake/Output Summary (Last 24 hours) at 12/3/2022 1059  Last data filed at 12/3/2022 1000  Gross per 24 hour   Intake 2912.86 ml   Output 2400 ml   Net 512.86 ml     GENERAL: Awake, responding to questions appropriately, intermittently anxious, repeating himself and feels that he was not heard..  HEENT: NC/AT, eyes anicteric, external occular movements intact, face symmetric.  PERRL.  CVS: RRR, S1, S2.  No murmurs appreciated.  CHEST: Normal work of breathing.  Lungs CTAB.  ABD: soft, non-tender, non-distended.  Bowel sounds present.  No guarding.  Extremities: no deformities.  Warm, well perfused.  Skin: no rashes or lesions noted.  Warm and dry.  Neuro: No gross deficits noted.  Speech clear.  Follows instructions, moves all his extremities.  Psych: Somnolent.         Medications:      All current medications were reviewed with changes reflected in problem list.  Current Facility-Administered Medications   Medication     acetaminophen (TYLENOL) tablet 650 mg    Or     acetaminophen (TYLENOL) Suppository 650 mg     buprenorphine HCl-naloxone HCl (SUBOXONE) 8-2 MG per film 1 Film     buPROPion (WELLBUTRIN XL) 24 hr tablet 450 mg     dexmedetomidine (PRECEDEX) 400 mcg in 0.9% sodium chloride 100 mL     dextrose 5% and 0.9% NaCl infusion     glucose gel 15-30 g    Or     dextrose 50 % injection 25-50 mL    Or     glucagon injection 1 mg     diclofenac " (VOLTAREN) 1 % topical gel 2 g     divalproex sodium extended-release (DEPAKOTE ER) 24 hr tablet 1,000 mg     flumazenil (ROMAZICON) injection 0.2 mg     folic acid (FOLVITE) tablet 1 mg     gabapentin (NEURONTIN) capsule 100 mg     OLANZapine zydis (zyPREXA) ODT tab 5-10 mg    Or     haloperidol lactate (HALDOL) injection 2.5-5 mg     lactulose (CHRONULAC) solution 20 g     lidocaine (LMX4) cream     lidocaine 1 % 0.1-1 mL     LORazepam (ATIVAN) tablet 1-2 mg    Or     LORazepam (ATIVAN) injection 2 mg     melatonin tablet 5 mg     metoprolol (LOPRESSOR) injection 5 mg     multivitamin w/minerals (THERA-VIT-M) tablet 1 tablet     nitroGLYcerin (NITROSTAT) sublingual tablet 0.4 mg     pantoprazole (PROTONIX) EC tablet 40 mg     polyethylene glycol (MIRALAX) Packet 17 g     QUEtiapine (SEROquel) tablet 25 mg     QUEtiapine (SEROquel) tablet 50 mg     senna-docusate (SENOKOT-S/PERICOLACE) 8.6-50 MG per tablet 2 tablet     sodium chloride (PF) 0.9% PF flush 3 mL     sodium chloride (PF) 0.9% PF flush 3 mL     tamsulosin (FLOMAX) capsule 0.4 mg     [START ON 12/4/2022] thiamine (B-1) tablet 100 mg     traZODone (DESYREL) tablet 50 mg          Data:      All new lab and imaging data was reviewed.   Labs:  Recent Labs   Lab 12/03/22  0538 12/02/22  0513 12/01/22  1730 12/01/22  0746 11/29/22  0745 11/29/22  0522 11/28/22  1223 11/28/22  1119 11/27/22  0818 11/27/22  0530     --   --  144  --  139  --   --    < > 141   POTASSIUM 3.7  3.6 3.7  --  5.9*  5.8*   < > 4.4  --   --    < > 4.2   CHLORIDE 104  --   --  114*  --  104  --   --    < > 106   CO2 24  --   --  20*  --  25  --   --    < > 26   ANIONGAP 13  --   --  10  --  10  --   --    < > 9   *  --  119* 493*   < > 130*   < >  --    < > 102*   BUN 9.1  --   --  7.1  --  5.9*  --   --    < > 3.8*   CR 0.74  --   --  0.63*  --  0.75  --   --    < > 0.75   GFRESTIMATED >90  --   --  >90  --  >90  --   --    < > >90   JOHANA 8.8  --   --  8.1*  --  8.8  --    --    < > 8.6   MAG 1.6* 1.7  --  1.7   < > 1.8  --   --    < > 1.9   PHOS 3.4 3.3  --  2.7   < > 3.3  --   --    < > 4.1   PROTTOTAL  --   --   --   --   --   --   --  5.8*  --  5.5*   ALBUMIN  --   --   --   --   --   --   --  3.7  --  3.4*   BILITOTAL  --   --   --   --   --   --   --  0.6  --  0.8   ALKPHOS  --   --   --   --   --   --   --  66  --  61   AST  --   --   --   --   --   --   --  29  --  44   ALT  --   --   --   --   --   --   --  49  --  56*    < > = values in this interval not displayed.     Recent Labs   Lab 12/03/22  0538 12/01/22  1730 12/01/22  0746 11/29/22  1611 11/29/22  1146   * 119* 493* 122* 100*       Recent Labs   Lab 12/03/22  0538 11/29/22  1812 11/29/22  0522   WBC  --   --  4.5   HGB 13.0*   < > 14.2  14.2   HCT  --   --  42.3   MCV  --   --  93   PLT  --   --  124*    < > = values in this interval not displayed.      Imaging:   No results found for this or any previous visit (fom the past 48 hour(s)).

## 2022-12-04 LAB
ANION GAP SERPL CALCULATED.3IONS-SCNC: 9 MMOL/L (ref 7–15)
BUN SERPL-MCNC: 6.9 MG/DL (ref 6–20)
CALCIUM SERPL-MCNC: 8.6 MG/DL (ref 8.6–10)
CHLORIDE SERPL-SCNC: 104 MMOL/L (ref 98–107)
CREAT SERPL-MCNC: 0.74 MG/DL (ref 0.67–1.17)
DEPRECATED HCO3 PLAS-SCNC: 27 MMOL/L (ref 22–29)
GFR SERPL CREATININE-BSD FRML MDRD: >90 ML/MIN/1.73M2
GLUCOSE SERPL-MCNC: 101 MG/DL (ref 70–99)
HGB BLD-MCNC: 11.5 G/DL (ref 13.3–17.7)
HGB BLD-MCNC: 12.2 G/DL (ref 13.3–17.7)
MAGNESIUM SERPL-MCNC: 1.9 MG/DL (ref 1.7–2.3)
PHOSPHATE SERPL-MCNC: 3.1 MG/DL (ref 2.5–4.5)
POTASSIUM SERPL-SCNC: 3.6 MMOL/L (ref 3.4–5.3)
SODIUM SERPL-SCNC: 140 MMOL/L (ref 136–145)

## 2022-12-04 PROCEDURE — 36415 COLL VENOUS BLD VENIPUNCTURE: CPT | Performed by: STUDENT IN AN ORGANIZED HEALTH CARE EDUCATION/TRAINING PROGRAM

## 2022-12-04 PROCEDURE — 272N000580 HC KIT, 4 OR 5FR DUAL LUMEN POWER PICC

## 2022-12-04 PROCEDURE — 258N000003 HC RX IP 258 OP 636: Performed by: INTERNAL MEDICINE

## 2022-12-04 PROCEDURE — 250N000011 HC RX IP 250 OP 636: Performed by: INTERNAL MEDICINE

## 2022-12-04 PROCEDURE — 250N000009 HC RX 250: Performed by: INTERNAL MEDICINE

## 2022-12-04 PROCEDURE — 85018 HEMOGLOBIN: CPT | Performed by: STUDENT IN AN ORGANIZED HEALTH CARE EDUCATION/TRAINING PROGRAM

## 2022-12-04 PROCEDURE — 84100 ASSAY OF PHOSPHORUS: CPT | Performed by: INTERNAL MEDICINE

## 2022-12-04 PROCEDURE — 200N000001 HC R&B ICU

## 2022-12-04 PROCEDURE — 250N000013 HC RX MED GY IP 250 OP 250 PS 637

## 2022-12-04 PROCEDURE — 250N000013 HC RX MED GY IP 250 OP 250 PS 637: Performed by: INTERNAL MEDICINE

## 2022-12-04 PROCEDURE — 05H733Z INSERTION OF INFUSION DEVICE INTO RIGHT AXILLARY VEIN, PERCUTANEOUS APPROACH: ICD-10-PCS | Performed by: INTERNAL MEDICINE

## 2022-12-04 PROCEDURE — 250N000013 HC RX MED GY IP 250 OP 250 PS 637: Performed by: STUDENT IN AN ORGANIZED HEALTH CARE EDUCATION/TRAINING PROGRAM

## 2022-12-04 PROCEDURE — 99233 SBSQ HOSP IP/OBS HIGH 50: CPT | Performed by: INTERNAL MEDICINE

## 2022-12-04 PROCEDURE — 36569 INSJ PICC 5 YR+ W/O IMAGING: CPT

## 2022-12-04 PROCEDURE — 83735 ASSAY OF MAGNESIUM: CPT | Performed by: INTERNAL MEDICINE

## 2022-12-04 PROCEDURE — 80048 BASIC METABOLIC PNL TOTAL CA: CPT | Performed by: INTERNAL MEDICINE

## 2022-12-04 RX ORDER — LIDOCAINE 40 MG/G
CREAM TOPICAL
Status: DISCONTINUED | OUTPATIENT
Start: 2022-12-04 | End: 2022-12-12 | Stop reason: HOSPADM

## 2022-12-04 RX ORDER — LORAZEPAM 2 MG/ML
1-2 INJECTION INTRAMUSCULAR EVERY 4 HOURS PRN
Status: DISCONTINUED | OUTPATIENT
Start: 2022-12-04 | End: 2022-12-12 | Stop reason: HOSPADM

## 2022-12-04 RX ORDER — LORAZEPAM 2 MG/ML
2 INJECTION INTRAMUSCULAR ONCE
Status: DISCONTINUED | OUTPATIENT
Start: 2022-12-04 | End: 2022-12-08 | Stop reason: CLARIF

## 2022-12-04 RX ORDER — HALOPERIDOL 5 MG/ML
2.5-5 INJECTION INTRAMUSCULAR EVERY 6 HOURS PRN
Status: DISCONTINUED | OUTPATIENT
Start: 2022-12-04 | End: 2022-12-06

## 2022-12-04 RX ORDER — OLANZAPINE 5 MG/1
5-10 TABLET, ORALLY DISINTEGRATING ORAL EVERY 6 HOURS PRN
Status: DISCONTINUED | OUTPATIENT
Start: 2022-12-04 | End: 2022-12-06

## 2022-12-04 RX ORDER — HALOPERIDOL 5 MG/ML
5 INJECTION INTRAMUSCULAR ONCE
Status: DISCONTINUED | OUTPATIENT
Start: 2022-12-04 | End: 2022-12-08 | Stop reason: CLARIF

## 2022-12-04 RX ORDER — HALOPERIDOL 5 MG/ML
10 INJECTION INTRAMUSCULAR ONCE
Status: DISCONTINUED | OUTPATIENT
Start: 2022-12-04 | End: 2022-12-08 | Stop reason: CLARIF

## 2022-12-04 RX ORDER — LORAZEPAM 1 MG/1
1-2 TABLET ORAL EVERY 4 HOURS PRN
Status: DISCONTINUED | OUTPATIENT
Start: 2022-12-04 | End: 2022-12-12 | Stop reason: HOSPADM

## 2022-12-04 RX ADMIN — FOLIC ACID 1 MG: 1 TABLET ORAL at 08:53

## 2022-12-04 RX ADMIN — LORAZEPAM 2 MG: 2 INJECTION INTRAMUSCULAR; INTRAVENOUS at 06:43

## 2022-12-04 RX ADMIN — PANTOPRAZOLE SODIUM 40 MG: 40 TABLET, DELAYED RELEASE ORAL at 16:04

## 2022-12-04 RX ADMIN — DICLOFENAC SODIUM 2 G: 10 GEL TOPICAL at 12:31

## 2022-12-04 RX ADMIN — THIAMINE HCL TAB 100 MG 100 MG: 100 TAB at 08:53

## 2022-12-04 RX ADMIN — QUETIAPINE FUMARATE 25 MG: 25 TABLET ORAL at 16:04

## 2022-12-04 RX ADMIN — DICLOFENAC SODIUM 2 G: 10 GEL TOPICAL at 22:06

## 2022-12-04 RX ADMIN — DEXTROSE AND SODIUM CHLORIDE: 5; 900 INJECTION, SOLUTION INTRAVENOUS at 06:09

## 2022-12-04 RX ADMIN — DEXMEDETOMIDINE HYDROCHLORIDE 0.6 MCG/KG/HR: 400 INJECTION INTRAVENOUS at 18:41

## 2022-12-04 RX ADMIN — BUPRENORPHINE AND NALOXONE 1 FILM: 8; 2 FILM, SOLUBLE BUCCAL; SUBLINGUAL at 16:07

## 2022-12-04 RX ADMIN — LORAZEPAM 2 MG: 1 TABLET ORAL at 16:04

## 2022-12-04 RX ADMIN — QUETIAPINE FUMARATE 50 MG: 50 TABLET ORAL at 22:06

## 2022-12-04 RX ADMIN — DEXMEDETOMIDINE HYDROCHLORIDE 1 MCG/KG/HR: 400 INJECTION INTRAVENOUS at 06:08

## 2022-12-04 RX ADMIN — HALOPERIDOL LACTATE 5 MG: 5 INJECTION, SOLUTION INTRAMUSCULAR at 06:03

## 2022-12-04 RX ADMIN — DICLOFENAC SODIUM 2 G: 10 GEL TOPICAL at 17:17

## 2022-12-04 RX ADMIN — TAMSULOSIN HYDROCHLORIDE 0.4 MG: 0.4 CAPSULE ORAL at 08:53

## 2022-12-04 RX ADMIN — POLYETHYLENE GLYCOL 3350 17 G: 17 POWDER, FOR SOLUTION ORAL at 08:51

## 2022-12-04 RX ADMIN — DICLOFENAC SODIUM 2 G: 10 GEL TOPICAL at 09:23

## 2022-12-04 RX ADMIN — DEXMEDETOMIDINE HYDROCHLORIDE 0.9 MCG/KG/HR: 400 INJECTION INTRAVENOUS at 00:35

## 2022-12-04 RX ADMIN — OLANZAPINE 10 MG: 5 TABLET, ORALLY DISINTEGRATING ORAL at 12:19

## 2022-12-04 RX ADMIN — LORAZEPAM 2 MG: 1 TABLET ORAL at 10:47

## 2022-12-04 RX ADMIN — MULTIPLE VITAMINS W/ MINERALS TAB 1 TABLET: TAB at 08:56

## 2022-12-04 RX ADMIN — BUPROPION HYDROCHLORIDE 450 MG: 150 TABLET, EXTENDED RELEASE ORAL at 08:53

## 2022-12-04 RX ADMIN — BUPRENORPHINE AND NALOXONE 1 FILM: 8; 2 FILM, SOLUBLE BUCCAL; SUBLINGUAL at 08:57

## 2022-12-04 RX ADMIN — SENNOSIDES AND DOCUSATE SODIUM 2 TABLET: 50; 8.6 TABLET ORAL at 08:56

## 2022-12-04 RX ADMIN — PANTOPRAZOLE SODIUM 40 MG: 40 TABLET, DELAYED RELEASE ORAL at 09:00

## 2022-12-04 RX ADMIN — LACTULOSE 20 G: 20 SOLUTION ORAL at 08:58

## 2022-12-04 RX ADMIN — DIVALPROEX SODIUM 1000 MG: 500 TABLET, EXTENDED RELEASE ORAL at 08:52

## 2022-12-04 RX ADMIN — TRAZODONE HYDROCHLORIDE 50 MG: 50 TABLET ORAL at 16:04

## 2022-12-04 RX ADMIN — QUETIAPINE FUMARATE 25 MG: 25 TABLET ORAL at 08:52

## 2022-12-04 ASSESSMENT — ACTIVITIES OF DAILY LIVING (ADL)
ADLS_ACUITY_SCORE: 32
ADLS_ACUITY_SCORE: 36
ADLS_ACUITY_SCORE: 32
ADLS_ACUITY_SCORE: 36
ADLS_ACUITY_SCORE: 36
ADLS_ACUITY_SCORE: 32
ADLS_ACUITY_SCORE: 36
ADLS_ACUITY_SCORE: 36
ADLS_ACUITY_SCORE: 32

## 2022-12-04 NOTE — PROGRESS NOTES
Pt not consistent with taking his medications, Sometimes he will accept pills and other times he  Will refuse and spit them out. Refused Bedtime seroquel.

## 2022-12-04 NOTE — PLAN OF CARE
Very agitated, kicking and biding and yelling at staff. Pt on precedex and given Haldol and Ativan with minimal effect. Pt still continued to kick and get very agitated to try to get of bed, kicking staff and yelling. Security called to have ankle soft restraints placed. Loss IV access and Vascular team paged to have midline placed.     Right ankle, Right wrist, Left ankle, and Left wrist restraints continued 12/4/2022    Clinical Justification: Pulling lines, pulling tubes, and pulling equipment  Less Restrictive Alternative: 1:1 patient care, Repositioning, Re-evaluate equipment, Disguise equipment, Reorientation  Attending Physician Notified: MD ordered restraint, Attending Physician's Name: Dr. Flores   New orders placed Yes  Length of Order: 1 Day      Miranda Florence RN

## 2022-12-04 NOTE — PROVIDER NOTIFICATION
Notified Dr. Flores that pt became agitated, tried getting out of bed but pt unsteady. Pt kicking, cursing and trying to hit staff. Ask for Haldol because haldol and zyprexa order is linked and only gives option of one or the other. MD gave telephone verbal order for 5 mg I.V Haldol once now.

## 2022-12-04 NOTE — PLAN OF CARE
Pt agitated throughout shift; sometimes redirectable.  Scheduled medications given and pt swallowing appropriately.  2 mg ativan given per CIWA protocol.  Pt assisted with repositioning x2.  Mag replaced; recheck in AM.  Per MD, pt is to be managed with PRN medications and plan is to stay off of precedex.     Tele: ST

## 2022-12-04 NOTE — PLAN OF CARE
Goal Outcome Evaluation:      Plan of Care Reviewed With: patient    Goal Outcome Evaluation:       Plan of Care Reviewed With: patient     Assumed care of patient from [id number]  ICU End of Shift Summary.  For vital signs and complete assessments, please see documentation flowsheets.      Pertinent assessments: Patient agitated most of shift. MD ordered precedex to be off and use restraints and PRNs if needed.  RA. Lungs clear. Lino in place for retention; adequate UOP. Mechanical soft diet. Total feed. Little intake. BS+.      Major Shift Events: -Weaned off precedex per MD request. Patient agitated and combative. Continuously attempts to get out of bed. Increased Seroquel dose and effective                              Plan (Upcoming Events): Weaned off precedex per MD request. Continue to utilize PRNs. Monitor for withdrawals.      Discharge/Transfer Needs: TBD     Bedside Shift Report Completed : yes  Bedside Safety Check Completed: yes

## 2022-12-04 NOTE — PLAN OF CARE
Pt is confused. Intermittently agitated and restless and impulsive. Following ciwa and giving prn meds for alcohol withdrawal. Lino catheter removed. Midline picc placed. Pt ate lunch and dinner with assist. Drinking protein shakes. Pt took all meds. Tried to dangle pt at the bed but pt too unsteady and leaning back. Unable to hold self up. Goal is to try to wean off precedex however its challenging because pt becomes aggressive and tries to hit and kick staff.       Right wrist and Left wrist restraints continued 12/4/2022    Clinical Justification: Pulling lines, pulling tubes, and pulling equipment  Less Restrictive Alternative: Repositioning, Re-evaluate equipment, Disguise equipment, Reorientation  Attending Physician Notified: MD ordered restraint, Attending Physician's Name: Dr. Flores   New orders placed Yes  Length of Order: 1 Day      Funmi Rizzo, RN            Goal Outcome Evaluation:      Plan of Care Reviewed With: patient

## 2022-12-04 NOTE — PROGRESS NOTES
[facility]  Hospitalist Progress Note  Alan Flores MD       Reason for Stay (Diagnosis): Black stool, severe alcohol withdrawal, DT.         Assessment and Plan:      Summary of Stay: Andrea Pham is a 57 year old male with a significant past medical history of Alcohol use disorder, on Suboxone for narcotic addiction, who presents from home with alcohol withdraw concern, black emesis and stool and was admitted on 11/23/2022.   On presentation to the ER, patient arrived hypertensive, tachycardic, tremulous and anxious with nausea and vomiting.  Labs showed hemoglobin of 16.8, abnormal LFTs, lactate was 7.4, quantitative ketones of 2.8.  Blood alcohol level was 0.23. Patient was given IV fluid, Valium IV, octreotide and Protonix infusion started in the emergency department and was admitted to [facility]  It was reported he started drinking heavily and last drink was the night before admission.  Per his friend he was excluded from a family gathering which made him very upset and went on a binge drinking. .      He had worsening withdrawal symptoms and signs and was transferred to the ICU on 11/26 given significant need for IV benzos for initiation of a precedex drip.    Patient remained on Precedex drip and continues to have intermittent agitation and hallucination when trying to wean him off the Precedex drip.  12/3, planning to titrate down and stop the Precedex gtt and use other PRN medications, but was very agiated and restarted it at lower dose..    Problem List/Assessment and Plan:   Black tarry stools: Resolved  -Initially suspected upper GI bleed, resolved  -Some hemoconcentration on presentation and hemoglobin trended down and stabilized with IV fluid.  -Initially on octreotide but this has since been stopped by gastroenterology.    -Continue oral Protonix.   -GI consult appreciated, no endoscopy indicated for now per GI      Alcohol use disorder with severe withdrawal symptoms and delirium  tremens:  -He was started on CIWA protocol with gabapentin taper and as needed lorazepam, patient has needed multiple doses of Haldol and Ativan.  Has had 32 milligrams of IV Ativan within 24 hours and so was transferred to ICU on 11/26 for initiation of precedex drip.  He has occasionally been able to come off the the precedex drip, but usually has ended back up on it related to agitation during which he is not redirectable.   -Wean him of Precedex drip, wean as able  -Stopped CIWA protocol.  -Ativan PRN doses as ordered.  -High dose IV thiamine and Folate.  -I doubt alcohol is contributing to his ongoing change in mental status as he has passed the withdrawal time.  -This is likely substance abuse disorder and dependence on sedating medications.   -He has been in hospital for over 2 weeks now.   -Wean and STOP Precedex.  -Patient is intermittently agitated, use PRN medications.    Hepatic Encephalopathy  Acute toxic/metabolic Encephalopathy  Intermittent agitation and combativeness: Ongoing  This is likely multifactorial in the setting of severe alcohol withdrawal requiring significant amounts of sedation initially.   -Stopped Precedex drip on 12/3 and patient became agitated and combative.    -Slightly elevated NH3 leve and started on Lactulose that should be titrated to 2 to 3 bowel movements per day  -Etiology of his change in mental status is not fully clear, doubt alcohol is contributing anymore as he has been in hospital for more than 10 days and treated with for CIWA protocol.  -Seroquel 25 mg twice a day and 50 mg at at bedtime.  -Continue as needed Haldol and Ativan.  -Will use soft restraints as patient is agitated when coming off Precedex drip.  -Midline was placed as he loses PIV and refuses oral medications and agitated.   -May require 4 point restraints and a sitter if ongoing agitation and aggression.    Poor p.o. intake:  Sever Malnutrition:  -Encourage oral intake. Drinking protein  shakes.   -He  was either sedated and sleeping or when coming of sedation, agitated and oral intake has been minimal.   -Nutrition consulted.   -Patient is not meetng his nutritional requirement.     Substance abuse disorder:  -He is on Suboxone PTA, resumed.       Depression/anxiety:  PTA Wellbutrin, Depakote, trazodone and Zyprexa    Lactic acidosis:  -Serum lactate 7.4 on presentation, secondary to alcohol use.   -Improved to 1.8 with hydration.  Blood pressure did improve with IV fluid.     Alcoholic liver disease:  AST//85 with total bilirubin of 1.8.  Bilirubin, AST now normal.      Alcoholic ketosis, resolved     History of hepatitis C:    Is not clear if he ever had treatment.  Reassess once mental status is improved.     Urinary retention in setting of BPH:    -Patient has needed straight caths x2 and PVR was more than 500.  -he had polanco cathter for several days and removed it 12/4.    -Continue on Flomax,     Left wrist pain: No more complaint, seems to be resolved  -No reported trauma, there is some swelling in the mid range of motion.  -Wrist x-ray was ordered yesterday, patient refused it on 12/1, but I believe it to be done today, no sign of fracture seen.  -Voltaren gel and cold compression.  -Pain is much better today.    Sinus tachycardia with transient hypotension.  Episode of fever of 102.5  -Patient developed sinus tachycardia and also transient hypotension, also reported one episode of fever.  -This is after agitation, when off Precedex gtt.Fever resolved by itself.   -Patient was given Seroquel 50 mg tab p.o. x1 earlier.  -He was given IV fluid 500 mill per hour x1.  -Magnesium is 1.6 and will ordered Magnesium 2 g IV x1.  -Continue close monitoring.  -Patient is hard stick and reports IV lines and midline was placed.  -Procal is 0.7.  -BC x 2 done so far negative..  -Polanco cath removed 12/3.        Clinically Significant Risk Factors            # Hypomagnesemia: Lowest Mg = 1.6 mg/dL (Ref range:  "1.7-2.3) in last 2 days, will replace as needed   # Hypoalbuminemia: Lowest albumin = 3.4 g/dL (Ref range: 3.5-5.2) at 11/27/2022  5:30 AM, will monitor as appropriate           # Severe Malnutrition: based on nutrition assessment        DVT Prophylaxis: Pneumatic Compression Devices.  Code Status: Full Code.  Discharge Dispo/Date: Continue ICU care as he is on Precedex drip, planning to wean him off.  -Expect in the hospital at least 3 more days.  Patient should be calm and cooperative and able to eat and ambulate prior to discharge.  -he will be evaluated by PT/OT when off sedation and he is more cooperative.  I discussed with patient, with ICU staff.      Interval History (Subjective):      Patient seen and examined, eating few bites with the help of RN, he was able to drink protein shakes. No significant hallucination, restarted on low dose  Precedex drip, which will be stopped soon. he left wrist pain is better, no reported trauma.                   Physical Exam:      Last Vital Signs:  /64   Pulse 116   Temp 99.2  F (37.3  C) (Oral)   Resp (!) 7   Ht 1.803 m (5' 11\")   Wt 80.9 kg (178 lb 5.6 oz)   SpO2 95%   BMI 24.88 kg/m      Intake/Output Summary (Last 24 hours) at 12/3/2022 1059  Last data filed at 12/3/2022 1000  Gross per 24 hour   Intake 2912.86 ml   Output 2400 ml   Net 512.86 ml     GENERAL: Awake, responding to questions appropriately, intermittently anxious, repeating himself and feels that he was not heard..  HEENT: NC/AT, eyes anicteric, external occular movements intact, face symmetric.  PERRL.  CVS: RRR, S1, S2.  No murmurs appreciated.  CHEST: Normal work of breathing.  Lungs CTAB.  ABD: soft, non-tender, non-distended.  Bowel sounds present.  No guarding.  Extremities: no deformities.  Warm, well perfused.  Skin: no rashes or lesions noted.  Warm and dry.  Neuro: No gross deficits noted.  Speech clear.  Follows instructions, moves all his extremities.  Psych: Somnolent.         " Medications:      All current medications were reviewed with changes reflected in problem list.  Current Facility-Administered Medications   Medication     acetaminophen (TYLENOL) tablet 650 mg    Or     acetaminophen (TYLENOL) Suppository 650 mg     buprenorphine HCl-naloxone HCl (SUBOXONE) 8-2 MG per film 1 Film     buPROPion (WELLBUTRIN XL) 24 hr tablet 450 mg     dexmedetomidine (PRECEDEX) 400 mcg in 0.9% sodium chloride 100 mL     dextrose 5% and 0.9% NaCl infusion     glucose gel 15-30 g    Or     dextrose 50 % injection 25-50 mL    Or     glucagon injection 1 mg     diclofenac (VOLTAREN) 1 % topical gel 2 g     divalproex sodium extended-release (DEPAKOTE ER) 24 hr tablet 1,000 mg     flumazenil (ROMAZICON) injection 0.2 mg     folic acid (FOLVITE) tablet 1 mg     haloperidol lactate (HALDOL) injection 10 mg     OLANZapine zydis (zyPREXA) ODT tab 5-10 mg    Or     haloperidol lactate (HALDOL) injection 2.5-5 mg     lactulose (CHRONULAC) solution 20 g     lidocaine (LMX4) cream     lidocaine (LMX4) cream     lidocaine 1 % 0.1-1 mL     lidocaine 1 % 0.1-1 mL     LORazepam (ATIVAN) injection 2 mg     LORazepam (ATIVAN) tablet 1-2 mg    Or     LORazepam (ATIVAN) injection 2 mg     melatonin tablet 5 mg     metoprolol (LOPRESSOR) injection 5 mg     multivitamin w/minerals (THERA-VIT-M) tablet 1 tablet     nitroGLYcerin (NITROSTAT) sublingual tablet 0.4 mg     pantoprazole (PROTONIX) EC tablet 40 mg     polyethylene glycol (MIRALAX) Packet 17 g     QUEtiapine (SEROquel) tablet 25 mg     QUEtiapine (SEROquel) tablet 50 mg     senna-docusate (SENOKOT-S/PERICOLACE) 8.6-50 MG per tablet 2 tablet     sodium chloride (PF) 0.9% PF flush 10 mL     sodium chloride (PF) 0.9% PF flush 10-20 mL     sodium chloride (PF) 0.9% PF flush 3 mL     sodium chloride (PF) 0.9% PF flush 3 mL     tamsulosin (FLOMAX) capsule 0.4 mg     thiamine (B-1) tablet 100 mg     traZODone (DESYREL) tablet 50 mg          Data:      All new lab and  imaging data was reviewed.   Labs:  Recent Labs   Lab 12/04/22  0544 12/03/22  0538 12/02/22  0513 12/01/22 1730 12/01/22  0746 11/28/22  1223 11/28/22  1119    141  --   --  144   < >  --    POTASSIUM 3.6 3.7  3.6 3.7  --  5.9*  5.8*   < >  --    CHLORIDE 104 104  --   --  114*   < >  --    CO2 27 24  --   --  20*   < >  --    ANIONGAP 9 13  --   --  10   < >  --    * 106*  --  119* 493*   < >  --    BUN 6.9 9.1  --   --  7.1   < >  --    CR 0.74 0.74  --   --  0.63*   < >  --    GFRESTIMATED >90 >90  --   --  >90   < >  --    JOHANA 8.6 8.8  --   --  8.1*   < >  --    MAG 1.9 1.6* 1.7  --  1.7   < >  --    PHOS 3.1 3.4 3.3  --  2.7   < >  --    PROTTOTAL  --   --   --   --   --   --  5.8*   ALBUMIN  --   --   --   --   --   --  3.7   BILITOTAL  --   --   --   --   --   --  0.6   ALKPHOS  --   --   --   --   --   --  66   AST  --   --   --   --   --   --  29   ALT  --   --   --   --   --   --  49    < > = values in this interval not displayed.     Recent Labs   Lab 12/04/22 0544 12/03/22  0538 12/01/22 1730 12/01/22  0746 11/29/22  1611   * 106* 119* 493* 122*       Recent Labs   Lab 12/04/22 0544 12/03/22  1656   WBC  --  8.1   HGB 12.2* 12.5*   HCT  --  37.1*   MCV  --  94   PLT  --  282      Imaging:   No results found for this or any previous visit (fom the past 48 hour(s)).

## 2022-12-04 NOTE — PROCEDURES
[facility]    Double Lumen Midline Placement    Date/Time: 12/4/2022 8:53 AM  Performed by: Charlotte Moore RN  Authorized by: Alan Flores MD   Indications: vascular access      UNIVERSAL PROTOCOL   Site Marked: Yes  Prior Images Obtained and Reviewed:  Yes  Required items: Required blood products, implants, devices and special equipment available    Patient identity confirmed:  Arm band and hospital-assigned identification number  NA - No sedation, light sedation, or local anesthesia  Confirmation Checklist:  Patient's identity using two indicators, relevant allergies, procedure was appropriate and matched the consent or emergent situation and correct equipment/implants were available  Time out: Immediately prior to the procedure a time out was called    Universal Protocol: the Joint Commission Universal Protocol was followed    Preparation: Patient was prepped and draped in usual sterile fashion       ANESTHESIA    Anesthesia: Local infiltration  Local Anesthetic:  Lidocaine 1% without epinephrine  Anesthetic Total (mL):  3      SEDATION    Patient Sedated: No        Preparation: skin prepped with ChloraPrep  Skin prep agent: skin prep agent completely dried prior to procedure  Sterile barriers: maximum sterile barriers were used: cap, mask, sterile gown, sterile gloves, and large sterile sheet  Hand hygiene: hand hygiene performed prior to central venous catheter insertion  Type of line used: Midline  Catheter type: double lumen  Lumen type: non-valved  Lumen Identification: Purple  Catheter size: 5 Fr  Brand: Bard  Lot number: [id number]  Placement method: venipuncture and ultrasound  Number of attempts: 1  Difficulty threading catheter: no  Successful placement: yes  Orientation: right  : .70cm diameter.  Location: basilic vein  Tip Location: distal to axillary vein  Arm circumference: adults 15 cm  Extremity circumference: 31  Visible catheter length: 2  Total catheter  length: 15  Dressing and securement: adhesive securement device, blood cleaned with CHG, chlorhexidine disc applied, site cleansed, sterile dressing applied and subcutaneous anchor securement system  Post procedure assessment: blood return through all ports and free fluid flow  PROCEDURE   Patient Tolerance:  Patient tolerated the procedure well with no immediate complications   Okay to use Midline. Funmi NORRIS advised.

## 2022-12-05 LAB
HGB BLD-MCNC: 12.1 G/DL (ref 13.3–17.7)
MAGNESIUM SERPL-MCNC: 1.7 MG/DL (ref 1.7–2.3)
PHOSPHATE SERPL-MCNC: 3.4 MG/DL (ref 2.5–4.5)
POTASSIUM SERPL-SCNC: 4 MMOL/L (ref 3.4–5.3)

## 2022-12-05 PROCEDURE — 84100 ASSAY OF PHOSPHORUS: CPT | Performed by: INTERNAL MEDICINE

## 2022-12-05 PROCEDURE — 258N000003 HC RX IP 258 OP 636: Performed by: INTERNAL MEDICINE

## 2022-12-05 PROCEDURE — 99233 SBSQ HOSP IP/OBS HIGH 50: CPT | Performed by: INTERNAL MEDICINE

## 2022-12-05 PROCEDURE — 250N000013 HC RX MED GY IP 250 OP 250 PS 637: Performed by: INTERNAL MEDICINE

## 2022-12-05 PROCEDURE — 200N000001 HC R&B ICU

## 2022-12-05 PROCEDURE — 85018 HEMOGLOBIN: CPT | Performed by: STUDENT IN AN ORGANIZED HEALTH CARE EDUCATION/TRAINING PROGRAM

## 2022-12-05 PROCEDURE — 250N000011 HC RX IP 250 OP 636: Performed by: INTERNAL MEDICINE

## 2022-12-05 PROCEDURE — 250N000013 HC RX MED GY IP 250 OP 250 PS 637: Performed by: STUDENT IN AN ORGANIZED HEALTH CARE EDUCATION/TRAINING PROGRAM

## 2022-12-05 PROCEDURE — 250N000013 HC RX MED GY IP 250 OP 250 PS 637

## 2022-12-05 PROCEDURE — 83735 ASSAY OF MAGNESIUM: CPT | Performed by: INTERNAL MEDICINE

## 2022-12-05 PROCEDURE — 84132 ASSAY OF SERUM POTASSIUM: CPT | Performed by: INTERNAL MEDICINE

## 2022-12-05 PROCEDURE — 250N000009 HC RX 250: Performed by: INTERNAL MEDICINE

## 2022-12-05 RX ADMIN — DICLOFENAC SODIUM 2 G: 10 GEL TOPICAL at 09:30

## 2022-12-05 RX ADMIN — THIAMINE HCL TAB 100 MG 100 MG: 100 TAB at 09:13

## 2022-12-05 RX ADMIN — QUETIAPINE FUMARATE 25 MG: 25 TABLET ORAL at 09:13

## 2022-12-05 RX ADMIN — MULTIPLE VITAMINS W/ MINERALS TAB 1 TABLET: TAB at 09:13

## 2022-12-05 RX ADMIN — DICLOFENAC SODIUM 2 G: 10 GEL TOPICAL at 13:54

## 2022-12-05 RX ADMIN — LACTULOSE 20 G: 20 SOLUTION ORAL at 21:17

## 2022-12-05 RX ADMIN — TRAZODONE HYDROCHLORIDE 50 MG: 50 TABLET ORAL at 16:00

## 2022-12-05 RX ADMIN — DEXMEDETOMIDINE HYDROCHLORIDE 0.6 MCG/KG/HR: 400 INJECTION INTRAVENOUS at 00:59

## 2022-12-05 RX ADMIN — PANTOPRAZOLE SODIUM 40 MG: 40 TABLET, DELAYED RELEASE ORAL at 09:13

## 2022-12-05 RX ADMIN — HALOPERIDOL LACTATE 5 MG: 5 INJECTION, SOLUTION INTRAMUSCULAR at 16:41

## 2022-12-05 RX ADMIN — DICLOFENAC SODIUM 2 G: 10 GEL TOPICAL at 17:38

## 2022-12-05 RX ADMIN — DEXTROSE AND SODIUM CHLORIDE: 5; 900 INJECTION, SOLUTION INTRAVENOUS at 18:22

## 2022-12-05 RX ADMIN — DICLOFENAC SODIUM 2 G: 10 GEL TOPICAL at 21:17

## 2022-12-05 RX ADMIN — FOLIC ACID 1 MG: 1 TABLET ORAL at 09:13

## 2022-12-05 RX ADMIN — LORAZEPAM 2 MG: 2 INJECTION INTRAMUSCULAR; INTRAVENOUS at 22:35

## 2022-12-05 RX ADMIN — QUETIAPINE FUMARATE 25 MG: 25 TABLET ORAL at 15:58

## 2022-12-05 RX ADMIN — HALOPERIDOL LACTATE 5 MG: 5 INJECTION, SOLUTION INTRAMUSCULAR at 22:35

## 2022-12-05 RX ADMIN — LORAZEPAM 2 MG: 2 INJECTION INTRAMUSCULAR; INTRAVENOUS at 17:29

## 2022-12-05 RX ADMIN — DEXMEDETOMIDINE HYDROCHLORIDE 0.4 MCG/KG/HR: 400 INJECTION INTRAVENOUS at 09:21

## 2022-12-05 RX ADMIN — TAMSULOSIN HYDROCHLORIDE 0.4 MG: 0.4 CAPSULE ORAL at 09:13

## 2022-12-05 RX ADMIN — PANTOPRAZOLE SODIUM 40 MG: 40 TABLET, DELAYED RELEASE ORAL at 15:54

## 2022-12-05 RX ADMIN — LORAZEPAM 2 MG: 1 TABLET ORAL at 10:28

## 2022-12-05 RX ADMIN — BUPROPION HYDROCHLORIDE 450 MG: 150 TABLET, EXTENDED RELEASE ORAL at 09:13

## 2022-12-05 RX ADMIN — DIVALPROEX SODIUM 1000 MG: 500 TABLET, EXTENDED RELEASE ORAL at 09:13

## 2022-12-05 RX ADMIN — QUETIAPINE FUMARATE 50 MG: 50 TABLET ORAL at 21:16

## 2022-12-05 RX ADMIN — LACTULOSE 20 G: 20 SOLUTION ORAL at 09:13

## 2022-12-05 RX ADMIN — BUPRENORPHINE AND NALOXONE 1 FILM: 8; 2 FILM, SOLUBLE BUCCAL; SUBLINGUAL at 16:00

## 2022-12-05 RX ADMIN — DEXTROSE AND SODIUM CHLORIDE: 5; 900 INJECTION, SOLUTION INTRAVENOUS at 00:57

## 2022-12-05 RX ADMIN — BUPRENORPHINE AND NALOXONE 1 FILM: 8; 2 FILM, SOLUBLE BUCCAL; SUBLINGUAL at 09:13

## 2022-12-05 ASSESSMENT — ACTIVITIES OF DAILY LIVING (ADL)
ADLS_ACUITY_SCORE: 37
ADLS_ACUITY_SCORE: 36
ADLS_ACUITY_SCORE: 37
ADLS_ACUITY_SCORE: 36
ADLS_ACUITY_SCORE: 37
ADLS_ACUITY_SCORE: 36
ADLS_ACUITY_SCORE: 37
ADLS_ACUITY_SCORE: 37

## 2022-12-05 NOTE — PLAN OF CARE
Problem: Malnutrition  Goal: Improved Nutritional Intake  Outcome: Progressing   Goal Outcome Evaluation: intake improving, taking about 50% of meals as well as Ensure Enlive with meals per RN notes

## 2022-12-05 NOTE — PROGRESS NOTES
"SPIRITUAL HEALTH SERVICES  SPIRITUAL ASSESSMENT Progress Note  [facility]. Unit ICU     REFERRAL SOURCE: Pt request for spiritual care during admission.    Spoke with sister Abida outside of pt room who shared briefly about Andrea's hospitalization \"since before Thanksgiving\" and how relieved she is that he is alert today.    Brief visit with Andrea who drifted easily back to sleep, gave short responses.  Andrea smiled and welcomed a visit from \"the \" and agreed to writer's suggestion of a short visit today stating that he would like a  to come tomorrow also.  Writer shared reading from [facility] 139 and prayer.    Plan: I have triaged request for  follow up tomorrow.    Niru Martinez MDiv  Staff   [facility] routine referrals *[phone]  [facility] available 24/7 for emergent requests/referrals, either by having the on-call  paged or by entering an ASAP/STAT consult in Epic (this will also page the on-call ).    "

## 2022-12-05 NOTE — PLAN OF CARE
Right ankle and Left ankle restraints discontinued at 10:00 AM on 12/4/2022.    Restraint discontinue criteria met, patient is calm, cooperative and safe. Restraints removed. Pt agrees to not kick staff.    Patient's Response: Needs reinforcement  Family Notification: Other  Attending Physician Notified: MD ordered restraint, Attending Physician's Name: Dr. Sandra Rizzo, RN    Goal Outcome Evaluation:      Plan of Care Reviewed With: patient

## 2022-12-05 NOTE — PLAN OF CARE
ICU End of Shift Summary.  For vital signs and complete assessments, please see documentation flowsheets.      Pertinent assessments: Pt confused, some episodes of restlessness between pericares and repositionings. Pt was able to take all his scheduled morning and nighttime medications yesterday. Overall, pt was did better this shift. Started to gradually wean off precedex. No PRN meds administered this shift. No fevers, normotensive and SR. Ext cath in place and voids well, pt will sometimes pull off ext cath when restless. No BM.   Major Shift Events: See note above   Plan (Upcoming Events): Wean off precedex and evaluate pt's respond  Discharge/Transfer Needs:      Bedside Shift Report Completed : Y  Bedside Safety Check Completed: Y    Right wrist and Left wrist restraints continued 12/5/2022    Clinical Justification: Pulling lines, pulling tubes, and pulling equipment  Less Restrictive Alternative: Repositioning, Re-evaluate equipment, Disguise equipment, Reorientation  Attending Physician Notified: MD ordered restraint, Attending Physician's Name: Dr. Flores   New orders placed No  Length of Order: 1 Day      Miranda Florence RN

## 2022-12-05 NOTE — PROGRESS NOTES
[facility]  Hospitalist Progress Note  Alan Flores MD       Reason for Stay (Diagnosis): Black stool, severe alcohol withdrawal, DT.         Assessment and Plan:      Summary of Stay: Andrea Pham is a 57 year old male with a significant past medical history of Alcohol use disorder, on Suboxone for narcotic addiction, who presents from home with alcohol withdraw concern, black emesis and stool and was admitted on 11/23/2022.   On presentation to the ER, patient arrived hypertensive, tachycardic, tremulous and anxious with nausea and vomiting.  Labs showed hemoglobin of 16.8, abnormal LFTs, lactate was 7.4, quantitative ketones of 2.8.  Blood alcohol level was 0.23. Patient was given IV fluid, Valium IV, octreotide and Protonix infusion started in the emergency department and was admitted to [facility]  It was reported he started drinking heavily and last drink was the night before admission.  Per his friend he was excluded from a family gathering which made him very upset and went on a binge drinking. .      He had worsening withdrawal symptoms and signs and was transferred to the ICU on 11/26 given significant need for IV benzos for initiation of a precedex drip.    Patient remained on Precedex drip and continues to have intermittent agitation and hallucination when trying to wean him off the Precedex drip.  12/3, planning to titrate down and stop the Precedex gtt and use other PRN medications, but was very agiated and restarted it at lower dose..    Problem List/Assessment and Plan:   Black tarry stools: Resolved  -Initially suspected upper GI bleed, resolved  -Some hemoconcentration on presentation and hemoglobin trended down and stabilized with IV fluid.  -Initially on octreotide but this has since been stopped by gastroenterology.    -Continue oral Protonix.   -GI consult appreciated, no endoscopy indicated for now per GI      Alcohol use disorder with severe withdrawal symptoms and delirium  tremens: On presentation  -He was started on CIWA protocol with gabapentin taper and as needed lorazepam, patient has needed multiple doses of Haldol and Ativan.  Has had 32 milligrams of IV Ativan within 24 hours and so was transferred to ICU on 11/26 for initiation of precedex drip.  He has occasionally been able to come off the the precedex drip, but usually has ended back up on it related to agitation during which he is not redirectable.   -Wean him of Precedex drip, wean as able  -Stopped CIWA protocol.  -Ativan PRN doses as ordered.  -Was on high-dose of thiamine and folate.  -I doubt alcohol is contributing to his ongoing change in mental status as he has passed the withdrawal time.  -This is likely substance abuse disorder and dependence on sedating medications.   -He has been in hospital for over 2 weeks now.   -Wean and STOP Precedex.  -Patient is intermittently agitated, use PRN medications.    Hepatic Encephalopathy  Acute toxic/metabolic Encephalopathy  Intermittent agitation and combativeness: Ongoing  This is likely multifactorial in the setting of severe alcohol withdrawal requiring significant amounts of sedation initially.   -Stopped Precedex drip on 12/3 and patient became agitated and combative.    -Slightly elevated NH3 leve and started on Lactulose that should be titrated to 2 to 3 bowel movements per day  -Etiology of his change in mental status is not fully clear, doubt alcohol is contributing anymore as he has been in hospital for more than 10 days and treated with for CIWA protocol.  -Seroquel 25 mg twice a day and 50 mg at at bedtime.  -Continue as needed Haldol and Ativan.  -Will use soft restraints as patient is agitated when off Precedex drip.  -Midline was placed as he loses PIV and refuses oral medications and agitated.   -May require 4 point restraints and a sitter if ongoing agitation and aggression.  -Goal for today is to completely stop Precedex drip and try other PRN  medications    Poor p.o. intake:  Sever Malnutrition:  -Encourage oral intake. Drinking protein  shakes.   -He was either sedated and sleeping or when coming of sedation, agitated and oral intake has been minimal.   -Nutrition consulted.   -Patient is not meetng his nutritional requirement.     Substance abuse disorder:  -He is on Suboxone PTA, resumed.    -Patient continued to be agitated when off Ativan and Precedex drip which is likely feeding his addiction and making him feel high.  He is agitated and combative when off the medications.  -This is a very challenging situation and not clear for how long we are going to keep him on Precedex drip for his safety and others and feeding his addictions     Depression/anxiety:  PTA Wellbutrin, Depakote, trazodone and Zyprexa    Lactic acidosis:  -Serum lactate 7.4 on presentation, secondary to alcohol use.   -Improved to 1.8 with hydration.  Blood pressure did improve with IV fluid.     Alcoholic liver disease:  -Patient history of alcohol abuse with alcoholic liver disease.     Alcoholic ketosis, resolved     History of hepatitis C:    Is not clear if he ever had treatment.  Reassess once mental status is improved.     Urinary retention in setting of BPH:    -Patient had needed straight caths x2 and PVR was more than 500.  -He had polanco cathter for several days and removed it 12/3.    -Continue on Flomax,     Left wrist pain: No more complaint, seems to be resolved  -No reported trauma, there is some swelling in the mid range of motion.  -Wrist x-ray was ordered yesterday, patient refused it on 12/1, but I believe it to be done today, no sign of fracture seen.  -Voltaren gel and cold compression.  -Pain is much better today.    Sinus tachycardia with transient hypotension.  Episode of fever of 102.5  -Patient developed sinus tachycardia and also transient hypotension, also reported one episode of fever.  -This is due to severe agitation when Precedex drip was stopped.  "Fever subsided by itself, no sign of infection   -Patient was given Seroquel 50 mg tab p.o. x1 earlier.  -He was given IV fluid 500 mill per hour x1.  -Magnesium is 1.6 and will ordered Magnesium 2 g IV x1.  -Continue close monitoring.  -Patient is hard stick and lost multiple IV lines and the midline was placed.  -Procal is 0.7.  -BC x 2 done so far negative..  -Lino cath removed 12/3.  -No Indication for antibiotics.      Clinically Significant Risk Factors              # Hypoalbuminemia: Lowest albumin = 3.4 g/dL (Ref range: 3.5-5.2) at 11/27/2022  5:30 AM, will monitor as appropriate           # Severe Malnutrition: based on nutrition assessment      DVT Prophylaxis: Pneumatic Compression Devices.  Code Status: Full Code.  Discharge Dispo/Date: Continue ICU care as he is on Precedex drip, planning to wean him off.  -Expect in the hospital at least for several days.  -He more than 3 days. Patient should be calm and cooperative and able to eat and ambulate prior to discharge.  I discussed with patient who agreed to talk with his juaquinterAbida at bedside. I had long discussion with her the plan of care.   The primary contact is patient's daughter Sasha, [phone], who is out of state. I called  and discussed with her the overall plan. She appreciate if she is called everyday. As patient is in agreement, will call her daily to update her.        Interval History (Subjective):      Patient seen and examined, lightly sedated this morning, slurring, somewhat restless, not comprehending questions and answers, soft wrist restraints.  Needs help with his diet, does not meet his nutritional requirements but able to drink protein shakes. No significant hallucination.  I went back and talked with him and his sister at bedside.                  Physical Exam:      Last Vital Signs:  /69   Pulse 66   Temp 98  F (36.7  C) (Temporal)   Resp 13   Ht 1.803 m (5' 11\")   Wt 78 kg (171 lb 15.3 oz)   SpO2 96%   BMI 23.98 " kg/m      Intake/Output Summary (Last 24 hours) at 12/5/2022 0953  Last data filed at 12/5/2022 0658  Gross per 24 hour   Intake 2982.44 ml   Output 950 ml   Net 2032.44 ml     GENERAL: Awake, responding to questions appropriately, intermittently anxious, repeating himself and feels that he was not heard..  HEENT: NC/AT, eyes anicteric, external occular movements intact, face symmetric.  PERRL.  CVS: RRR, S1, S2.  No murmurs appreciated.  CHEST: Normal work of breathing.  Lungs CTAB.  ABD: soft, non-tender, non-distended.  Bowel sounds present.  No guarding.  Extremities: no deformities.  Warm, well perfused.  Skin: no rashes or lesions noted.  Warm and dry.  Neuro: No gross deficits noted.  Speech clear.  Follows instructions, moves all his extremities.  Psych: Somnolent.         Medications:      All current medications were reviewed with changes reflected in problem list.  Current Facility-Administered Medications   Medication     acetaminophen (TYLENOL) tablet 650 mg    Or     acetaminophen (TYLENOL) Suppository 650 mg     buprenorphine HCl-naloxone HCl (SUBOXONE) 8-2 MG per film 1 Film     buPROPion (WELLBUTRIN XL) 24 hr tablet 450 mg     dexmedetomidine (PRECEDEX) 400 mcg in 0.9% sodium chloride 100 mL     dextrose 5% and 0.9% NaCl infusion     glucose gel 15-30 g    Or     dextrose 50 % injection 25-50 mL    Or     glucagon injection 1 mg     diclofenac (VOLTAREN) 1 % topical gel 2 g     divalproex sodium extended-release (DEPAKOTE ER) 24 hr tablet 1,000 mg     flumazenil (ROMAZICON) injection 0.2 mg     folic acid (FOLVITE) tablet 1 mg     haloperidol lactate (HALDOL) injection 10 mg     OLANZapine zydis (zyPREXA) ODT tab 5-10 mg    Or     haloperidol lactate (HALDOL) injection 2.5-5 mg     haloperidol lactate (HALDOL) injection 5 mg     lactulose (CHRONULAC) solution 20 g     lidocaine (LMX4) cream     lidocaine (LMX4) cream     lidocaine 1 % 0.1-1 mL     lidocaine 1 % 0.1-1 mL     LORazepam (ATIVAN) tablet  1-2 mg    Or     LORazepam (ATIVAN) injection 1-2 mg     LORazepam (ATIVAN) injection 2 mg     melatonin tablet 5 mg     metoprolol (LOPRESSOR) injection 5 mg     multivitamin w/minerals (THERA-VIT-M) tablet 1 tablet     nitroGLYcerin (NITROSTAT) sublingual tablet 0.4 mg     pantoprazole (PROTONIX) EC tablet 40 mg     polyethylene glycol (MIRALAX) Packet 17 g     QUEtiapine (SEROquel) tablet 25 mg     QUEtiapine (SEROquel) tablet 50 mg     senna-docusate (SENOKOT-S/PERICOLACE) 8.6-50 MG per tablet 2 tablet     sodium chloride (PF) 0.9% PF flush 10 mL     sodium chloride (PF) 0.9% PF flush 10-20 mL     sodium chloride (PF) 0.9% PF flush 3 mL     sodium chloride (PF) 0.9% PF flush 3 mL     tamsulosin (FLOMAX) capsule 0.4 mg     thiamine (B-1) tablet 100 mg     traZODone (DESYREL) tablet 50 mg          Data:      All new lab and imaging data was reviewed.   Labs:  Recent Labs   Lab 12/05/22  0445 12/04/22  0544 12/03/22  0538 12/02/22  0513 12/01/22  1730 12/01/22  0746 11/28/22  1223 11/28/22  1119   NA  --  140 141  --   --  144   < >  --    POTASSIUM 4.0 3.6 3.7  3.6   < >  --  5.9*  5.8*   < >  --    CHLORIDE  --  104 104  --   --  114*   < >  --    CO2  --  27 24  --   --  20*   < >  --    ANIONGAP  --  9 13  --   --  10   < >  --    GLC  --  101* 106*  --  119* 493*   < >  --    BUN  --  6.9 9.1  --   --  7.1   < >  --    CR  --  0.74 0.74  --   --  0.63*   < >  --    GFRESTIMATED  --  >90 >90  --   --  >90   < >  --    JOHANA  --  8.6 8.8  --   --  8.1*   < >  --    MAG 1.7 1.9 1.6*   < >  --  1.7   < >  --    PHOS 3.4 3.1 3.4   < >  --  2.7   < >  --    PROTTOTAL  --   --   --   --   --   --   --  5.8*   ALBUMIN  --   --   --   --   --   --   --  3.7   BILITOTAL  --   --   --   --   --   --   --  0.6   ALKPHOS  --   --   --   --   --   --   --  66   AST  --   --   --   --   --   --   --  29   ALT  --   --   --   --   --   --   --  49    < > = values in this interval not displayed.     Recent Labs   Lab  12/04/22  0544 12/03/22  0538 12/01/22  1730 12/01/22  0746 11/29/22  1611   * 106* 119* 493* 122*       Recent Labs   Lab 12/05/22  0445 12/04/22  0544 12/03/22  1656   WBC  --   --  8.1   HGB 12.1*   < > 12.5*   HCT  --   --  37.1*   MCV  --   --  94   PLT  --   --  282    < > = values in this interval not displayed.      Imaging:   No results found for this or any previous visit (fom the past 48 hour(s)).

## 2022-12-06 LAB
ANION GAP SERPL CALCULATED.3IONS-SCNC: 11 MMOL/L (ref 7–15)
ATRIAL RATE - MUSE: 138 BPM
BUN SERPL-MCNC: 8.9 MG/DL (ref 6–20)
CALCIUM SERPL-MCNC: 8.7 MG/DL (ref 8.6–10)
CHLORIDE SERPL-SCNC: 102 MMOL/L (ref 98–107)
CREAT SERPL-MCNC: 0.73 MG/DL (ref 0.67–1.17)
DEPRECATED HCO3 PLAS-SCNC: 26 MMOL/L (ref 22–29)
DIASTOLIC BLOOD PRESSURE - MUSE: NORMAL MMHG
GFR SERPL CREATININE-BSD FRML MDRD: >90 ML/MIN/1.73M2
GLUCOSE SERPL-MCNC: 81 MG/DL (ref 70–99)
INTERPRETATION ECG - MUSE: NORMAL
MAGNESIUM SERPL-MCNC: 1.8 MG/DL (ref 1.7–2.3)
P AXIS - MUSE: 55 DEGREES
PHOSPHATE SERPL-MCNC: 3.7 MG/DL (ref 2.5–4.5)
POTASSIUM SERPL-SCNC: 3.7 MMOL/L (ref 3.4–5.3)
PR INTERVAL - MUSE: 122 MS
QRS DURATION - MUSE: 90 MS
QT - MUSE: 276 MS
QTC - MUSE: 418 MS
R AXIS - MUSE: -24 DEGREES
SODIUM SERPL-SCNC: 139 MMOL/L (ref 136–145)
SYSTOLIC BLOOD PRESSURE - MUSE: NORMAL MMHG
T AXIS - MUSE: 77 DEGREES
VENTRICULAR RATE- MUSE: 138 BPM

## 2022-12-06 PROCEDURE — 250N000013 HC RX MED GY IP 250 OP 250 PS 637: Performed by: INTERNAL MEDICINE

## 2022-12-06 PROCEDURE — 80048 BASIC METABOLIC PNL TOTAL CA: CPT | Performed by: INTERNAL MEDICINE

## 2022-12-06 PROCEDURE — 250N000011 HC RX IP 250 OP 636: Performed by: INTERNAL MEDICINE

## 2022-12-06 PROCEDURE — 36415 COLL VENOUS BLD VENIPUNCTURE: CPT | Performed by: INTERNAL MEDICINE

## 2022-12-06 PROCEDURE — 200N000001 HC R&B ICU

## 2022-12-06 PROCEDURE — 250N000013 HC RX MED GY IP 250 OP 250 PS 637: Performed by: STUDENT IN AN ORGANIZED HEALTH CARE EDUCATION/TRAINING PROGRAM

## 2022-12-06 PROCEDURE — 250N000013 HC RX MED GY IP 250 OP 250 PS 637

## 2022-12-06 PROCEDURE — 84100 ASSAY OF PHOSPHORUS: CPT | Performed by: INTERNAL MEDICINE

## 2022-12-06 PROCEDURE — 83735 ASSAY OF MAGNESIUM: CPT | Performed by: INTERNAL MEDICINE

## 2022-12-06 PROCEDURE — 99233 SBSQ HOSP IP/OBS HIGH 50: CPT | Performed by: INTERNAL MEDICINE

## 2022-12-06 RX ORDER — OLANZAPINE 2.5 MG/1
5-10 TABLET, FILM COATED ORAL EVERY 6 HOURS PRN
Status: DISCONTINUED | OUTPATIENT
Start: 2022-12-06 | End: 2022-12-12 | Stop reason: HOSPADM

## 2022-12-06 RX ORDER — HALOPERIDOL 5 MG/ML
5 INJECTION INTRAMUSCULAR EVERY 4 HOURS PRN
Status: DISCONTINUED | OUTPATIENT
Start: 2022-12-06 | End: 2022-12-12 | Stop reason: HOSPADM

## 2022-12-06 RX ADMIN — LORAZEPAM 2 MG: 1 TABLET ORAL at 09:39

## 2022-12-06 RX ADMIN — TAMSULOSIN HYDROCHLORIDE 0.4 MG: 0.4 CAPSULE ORAL at 08:23

## 2022-12-06 RX ADMIN — BUPROPION HYDROCHLORIDE 450 MG: 150 TABLET, EXTENDED RELEASE ORAL at 08:23

## 2022-12-06 RX ADMIN — BUPRENORPHINE AND NALOXONE 1 FILM: 8; 2 FILM, SOLUBLE BUCCAL; SUBLINGUAL at 08:22

## 2022-12-06 RX ADMIN — MULTIPLE VITAMINS W/ MINERALS TAB 1 TABLET: TAB at 08:23

## 2022-12-06 RX ADMIN — QUETIAPINE FUMARATE 25 MG: 25 TABLET ORAL at 15:00

## 2022-12-06 RX ADMIN — BUPRENORPHINE AND NALOXONE 1 FILM: 8; 2 FILM, SOLUBLE BUCCAL; SUBLINGUAL at 20:15

## 2022-12-06 RX ADMIN — LORAZEPAM 2 MG: 2 INJECTION INTRAMUSCULAR; INTRAVENOUS at 02:09

## 2022-12-06 RX ADMIN — THIAMINE HCL TAB 100 MG 100 MG: 100 TAB at 08:23

## 2022-12-06 RX ADMIN — FOLIC ACID 1 MG: 1 TABLET ORAL at 08:23

## 2022-12-06 RX ADMIN — DICLOFENAC SODIUM 2 G: 10 GEL TOPICAL at 09:51

## 2022-12-06 RX ADMIN — QUETIAPINE FUMARATE 25 MG: 25 TABLET ORAL at 08:22

## 2022-12-06 RX ADMIN — DICLOFENAC SODIUM 2 G: 10 GEL TOPICAL at 17:43

## 2022-12-06 RX ADMIN — TRAZODONE HYDROCHLORIDE 50 MG: 50 TABLET ORAL at 20:14

## 2022-12-06 RX ADMIN — LORAZEPAM 2 MG: 1 TABLET ORAL at 15:00

## 2022-12-06 RX ADMIN — DIVALPROEX SODIUM 1000 MG: 500 TABLET, EXTENDED RELEASE ORAL at 08:23

## 2022-12-06 RX ADMIN — POLYETHYLENE GLYCOL 3350 17 G: 17 POWDER, FOR SOLUTION ORAL at 09:39

## 2022-12-06 RX ADMIN — SENNOSIDES AND DOCUSATE SODIUM 2 TABLET: 50; 8.6 TABLET ORAL at 09:41

## 2022-12-06 RX ADMIN — PANTOPRAZOLE SODIUM 40 MG: 40 TABLET, DELAYED RELEASE ORAL at 08:23

## 2022-12-06 ASSESSMENT — ACTIVITIES OF DAILY LIVING (ADL)
ADLS_ACUITY_SCORE: 36

## 2022-12-06 NOTE — PROGRESS NOTES
CLINICAL NUTRITION SERVICES - REASSESSMENT NOTE     Nutrition Prescription      Malnutrition Status:    % Weight Loss:  > 10% in 6 months (severe malnutrition)  % Intake:  </= 50% for >/= 5 days (severe malnutrition)- slowly improving  Subcutaneous Fat Loss:  Orbital region moderate depletion  Muscle Loss:  Buccal region severe, Temporal region severe depletion, Clavicle bone region moderate depletion, Acromion bone region moderate depletion, Patellar region severe depletion, Anterior thigh region severe depletion and Posterior calf region severe depletion  Fluid Retention:  None noted    Malnutrition Diagnosis: Severe malnutrition- previously noted, remains current  In Context of:  Environmental or social circumstances       EVALUATION OF THE PROGRESS TOWARD GOALS   Diet: Mechanical/Dental Soft + Ensure Enlive tid with meals     Intake/Tolerance:  Improved-had been taking bites only, now eating 25-50% and is taking Ensure supplements per RN. Intake varies with mental status.     NEW FINDINGS   Patient continues to eat less than assessed needs though this is improving. Need for possible FT discussed last week and again over weekend--decision was made by provider not to place feeding tube d/t how difficult patient's behavior has been to control at times and concern that he would simply pull it out immediately.     Weight: weights volatile, all are bedscale weights, which makes accuracy questionable--monitor, obtain standing scale weight when able and follow trends  Vitals:    11/23/22 1209 11/24/22 0612 11/27/22 0737 11/28/22 0400   Weight: 85.3 kg (188 lb) 86.9 kg (191 lb 9.6 oz) 85.1 kg (187 lb 9.8 oz) 80.8 kg (178 lb 2.1 oz)    11/29/22 0430 11/29/22 1540 11/30/22 0600 12/03/22 0553   Weight: 84.5 kg (186 lb 4.6 oz) 84.5 kg (186 lb 4.6 oz) 78.9 kg (173 lb 15.1 oz) 80.9 kg (178 lb 5.6 oz)    12/05/22 0641 12/06/22 0355   Weight: 78 kg (171 lb 15.3 oz) 79.9 kg (176 lb 2.4 oz)     Labs: reviewed    GI: LBM  12/5    Skin: no concerns noted    Meds: folic acid, lactulose, mvit w/min, pantoprazole, senna-docusate, thiamine    ASSESSED NUTRITION NEEDS:  Dosing Weight: 84.5 kg      Estimated Energy Needs: 2029 kcals/day ([place] Jeor)  Justification: Overweight  Estimated Protein Needs: + grams protein/day (1-1.2+ grams of pro/kg)  Justification: maintenance, Repletion  Estimated Fluid Needs: 2330 mL/day (30 mL/kg)   Justification: Maintenance    MALNUTRITION  As noted above    Previous Goals   Patient to consume % of nutritionally adequate meals three times per day, or the equivalent with supplements/snacks.  Evaluation: Not met, now improving  Total avg nutritional intake to meet a minimum of 2029 kcal/kg and 93 g PRO/kg daily (per dosing wt 84.5kg and 77.6 kg).  Evaluation: Not met    Previous Nutrition Diagnosis  Malnutrition related to alcohol use disorder as evidenced by prolonged poor intake of < 50% estimated energy needs for 5 days, 13% unintentional weight loss in 6 months, moderate subcutaneous fat loss, and severe muscle loss    Evaluation: No change    CURRENT NUTRITION DIAGNOSIS  Malnutrition related to alcohol use disorder as evidenced by prolonged poor intake of < 50% estimated energy needs for 5 days, 13% unintentional weight loss in 6 months, moderate subcutaneous fat loss, and severe muscle loss      INTERVENTIONS  Implementation  Continue current diet order and supplements    Goals  Patient to consume % of nutritionally adequate meal trays TID, or the equivalent with supplements/snacks.    Monitoring/Evaluation  Progress toward goals will be monitored and evaluated per protocol.    Jackie Herring, YAZ, LD, [facility]  Pager - 3rd floor/ICU: [phone]  Pager - All other floors: [phone]  Pager - Weekend/holiday: [phone]  Office: [phone]

## 2022-12-06 NOTE — PROGRESS NOTES
[facility]  Hospitalist Progress Note  Alan Flores MD       Reason for Stay (Diagnosis): Black stool, severe alcohol withdrawal, DT.         Assessment and Plan:      Summary of Stay: Andrea Pham is a 57 year old male with a significant past medical history of Alcohol use disorder, on Suboxone for narcotic addiction, who presents from home with alcohol withdraw concern, black emesis and stool and was admitted on 11/23/2022.   On presentation to the ER, patient arrived hypertensive, tachycardic, tremulous and anxious with nausea and vomiting.  Labs showed hemoglobin of 16.8, abnormal LFTs, lactate was 7.4, quantitative ketones of 2.8.  Blood alcohol level was 0.23. Patient was given IV fluid, Valium IV, octreotide and Protonix infusion started in the emergency department and was admitted to [facility]  It was reported he started drinking heavily and last drink was the night before admission.  Per his friend he was excluded from a family gathering which made him very upset and went on a binge drinking. .      He had worsening withdrawal symptoms and signs and was transferred to the ICU on 11/26 given significant need for IV benzos for initiation of a precedex drip.    Patient remained on Precedex drip and continues to have intermittent agitation and hallucination when trying to wean him off the Precedex drip.  12/3, tried  to titrate down and stop the Precedex gtt and use other PRN medications, but was very agiated and restarted it at lower dose.  12/6. He is off Precedex drip for about 12 hrs now, and plan to keep him off of it as much as possible.    Problem List/Assessment and Plan:   Black tarry stools: Resolved.  -Initially suspected upper GI bleed, resolved  -Some hemoconcentration on presentation and hemoglobin trended down and stabilized with IV fluid.  -Initially on Octreotide but this has since been stopped by gastroenterology.    -Continue oral Protonix.   -GI consult appreciated, no  endoscopy indicated for now per GI.      Alcohol use disorder with severe withdrawal symptoms and delirium tremens: On presentation  -He was started on CIWA protocol with gabapentin taper and as needed lorazepam, patient has needed multiple doses of Haldol and Ativan.  Has had 32 milligrams of IV Ativan within 24 hours and so was transferred to ICU on 11/26 for initiation of precedex drip.  He has occasionally been able to come off the the precedex drip, but usually has ended back up on it related to agitation during which he is not redirectable.   -Wean him of Precedex drip, wean as able  -Stopped CIWA protocol.  -Ativan PRN doses as ordered.  -Was on high-dose of thiamine and folate.  -I doubt alcohol is contributing to his ongoing change in mental status as he has passed the withdrawal time.  -This is likely substance abuse disorder and dependence on sedating medications.   -He has been in hospital for over 2 weeks now.   -Off the Precedex drip for 12 hours now.  -Patient is calmer today.    Hepatic Encephalopathy  Acute toxic/metabolic Encephalopathy  Intermittent agitation and combativeness: Ongoing, slowly improving  This is likely multifactorial in the setting of severe alcohol withdrawal requiring significant amounts of sedation initially.   -Stopped Precedex drip on 12/3 and patient became agitated and combative.    -Slightly elevated NH3 leve and started on Lactulose that should be titrated to 2 to 3 bowel movements per day  -Etiology of his change in mental status is not fully clear, doubt alcohol is contributing anymore as he has been in hospital for more than 10 days and treated with for CIWA protocol.  -Seroquel 25 mg twice a day and 50 mg at at bedtime.  -Continue as needed Haldol and Ativan and Zyprexia.  -Use soft restraints as patient is agitated when off Precedex drip.  -Midline was placed as he removed multiple PIV and refused oral medications and agitated.   -Did not require restraint for over  24 hours.  -Goal for today remains to keep him off Precedex drip and try other ordered PRN medications  -The patient needs MRI of the brain when he is more calm and cooperative.  His sister reported that he has multiple traumatic brain injuries and concern if that is contributing to his substance abuse and hallucinations with possible structural brain injuries.     Poor p.o. intake:  Sever Malnutrition:  -Encouraged oral intake. Drinking protein shakes.   -He was able to eat more today. He was off  sedation, agitated and oral intake has been minimal.   -Nutrition consulted.   -Patient does not meet his meet his nutritional requirement.    Substance abuse disorder:  -He is on Suboxone PTA, resumed.    -Patient continued to be agitated when off Ativan and Precedex drip which is likely feeding his addiction and making him feel high.  He is agitated and combative when off the medications.  -He will definitely need treatment for polysubstance use disorder.     Depression/anxiety:  PTA Wellbutrin, Depakote, trazodone and Zyprexa    Lactic acidosis: Resolsved  -Serum lactate 7.4 on presentation, secondary to alcohol use.   -Improved to 1.8 with hydration.  Blood pressure did improve with IV fluid.     Alcoholic liver disease:  -Patient history of alcohol abuse with alcoholic liver disease.     Alcoholic ketosis, resolved     History of hepatitis C:    Is not clear if he ever had treatment.  Reassess once mental status is improved.     Urinary retention in setting of BPH:    -Lino is out on 12/3 and now voiding.    -Continue on Flomax,     Left wrist pain: No more complaint, seems to be resolved  -No reported trauma, there was some swelling in the mid range of motion.  -Wrist x-ray was ordered yesterday, patient refused it on 12/1, but I believe it to be done today, no sign of fracture seen.  -Voltaren gel and cold compression.  -Pain is much better today.    Sinus tachycardia with transient hypotension.  Episode of fever  up to 102.5.  -Patient developed sinus tachycardia and also transient hypotension, also reported one episode of fever.  -This is during severe agitation and precedex drip was stopped.   -Blood culture was done so far negative.   -Lino catheter was removed .  -Patient has no urinary symptoms .  -Continue close monitoring, trend temperature.  -Procal was 0.7.  -BC x 2 done so far negative..  -Lino cath removed 12/3.  -No Indication for antibiotics at this time, may consider empiric treatment if there is any focal infection identified.  -Overall patient is improving.      Clinically Significant Risk Factors              # Hypoalbuminemia: Lowest albumin = 3.4 g/dL (Ref range: 3.5-5.2) at 11/27/2022  5:30 AM, will monitor as appropriate           # Severe Malnutrition: based on nutrition assessment      DVT Prophylaxis: Pneumatic Compression Devices.  Code Status: Full Code.  Discharge Dispo/Date: Continue ICU care for today, if she remains off Precedex drip and remains calm for about 24 hours, he can be transferred to general medical floor.  -Patient is agreeable to talk with his sister, Abida and with his daughter Sahsa.   I had long discussion with her the plan of care.     The primary contact is patient's daughter Sasha, [phone], who is out of state.   I called  and discussed with her the overall plan, she appreciate if she is called everyday.   His sister Abida is now also involved in his care, [phone]. I called and discussed with her. She requested if MRI of the brain can be done and if it is reasonable to do when patient is calm and cooperative.        Interval History (Subjective):      Patient seen and examined, off Precedex, calmer, more cooperative following simple instruction.  He tolerated oral intake, reported he ate well today. No significant hallucination.                  Physical Exam:      Last Vital Signs:  /67   Pulse 90   Temp 98.2  F (36.8  C) (Temporal)   Resp 16   Ht 1.803 m  "(5' 11\")   Wt 79.9 kg (176 lb 2.4 oz)   SpO2 98%   BMI 24.57 kg/m       Intake/Output Summary (Last 24 hours) at 12/6/2022 1524  Last data filed at 12/6/2022 1349  Gross per 24 hour   Intake 1720 ml   Output 1900 ml   Net -180 ml   GENERAL: Awake, alert today, more cooperative, no agitation today.  HEENT: NC/AT, eyes Unicteric, moist oral mucosa.  CVS: RRR, S1, S2.  No murmurs appreciated.  CHEST: Normal work of breathing.  Lungs CTAB.  ABD: Soft, non-tender, non-distended.  Bowel sounds present.  No guarding.  EXT: no deformities.  Warm, well perfused.  Skin: no rashes or lesions noted.  Warm and dry.  Neuro: No gross deficits noted.  Speech clear.  Follows instructions, moves all his extremities.  Psych: Somnolent.         Medications:      All current medications were reviewed with changes reflected in problem list.  Current Facility-Administered Medications   Medication     acetaminophen (TYLENOL) tablet 650 mg    Or     acetaminophen (TYLENOL) Suppository 650 mg     buprenorphine HCl-naloxone HCl (SUBOXONE) 8-2 MG per film 1 Film     buPROPion (WELLBUTRIN XL) 24 hr tablet 450 mg     dexmedetomidine (PRECEDEX) 400 mcg in 0.9% sodium chloride 100 mL     glucose gel 15-30 g    Or     dextrose 50 % injection 25-50 mL    Or     glucagon injection 1 mg     diclofenac (VOLTAREN) 1 % topical gel 2 g     divalproex sodium extended-release (DEPAKOTE ER) 24 hr tablet 1,000 mg     flumazenil (ROMAZICON) injection 0.2 mg     folic acid (FOLVITE) tablet 1 mg     haloperidol lactate (HALDOL) injection 10 mg     OLANZapine zydis (zyPREXA) ODT tab 5-10 mg    Or     haloperidol lactate (HALDOL) injection 2.5-5 mg     haloperidol lactate (HALDOL) injection 5 mg     lactulose (CHRONULAC) solution 20 g     lidocaine (LMX4) cream     lidocaine (LMX4) cream     lidocaine 1 % 0.1-1 mL     lidocaine 1 % 0.1-1 mL     LORazepam (ATIVAN) tablet 1-2 mg    Or     LORazepam (ATIVAN) injection 1-2 mg     LORazepam (ATIVAN) injection 2 mg "     melatonin tablet 5 mg     metoprolol (LOPRESSOR) injection 5 mg     multivitamin w/minerals (THERA-VIT-M) tablet 1 tablet     nitroGLYcerin (NITROSTAT) sublingual tablet 0.4 mg     pantoprazole (PROTONIX) EC tablet 40 mg     polyethylene glycol (MIRALAX) Packet 17 g     QUEtiapine (SEROquel) tablet 25 mg     QUEtiapine (SEROquel) tablet 50 mg     senna-docusate (SENOKOT-S/PERICOLACE) 8.6-50 MG per tablet 2 tablet     sodium chloride (PF) 0.9% PF flush 10 mL     sodium chloride (PF) 0.9% PF flush 10-20 mL     sodium chloride (PF) 0.9% PF flush 3 mL     sodium chloride (PF) 0.9% PF flush 3 mL     tamsulosin (FLOMAX) capsule 0.4 mg     thiamine (B-1) tablet 100 mg     traZODone (DESYREL) tablet 50 mg          Data:      All new lab and imaging data was reviewed.   Labs:  Recent Labs   Lab 12/06/22 0727 12/05/22 0445 12/04/22 0544 12/03/22 0538     --  140 141   POTASSIUM 3.7 4.0 3.6 3.7  3.6   CHLORIDE 102  --  104 104   CO2 26  --  27 24   ANIONGAP 11  --  9 13   GLC 81  --  101* 106*   BUN 8.9  --  6.9 9.1   CR 0.73  --  0.74 0.74   GFRESTIMATED >90  --  >90 >90   JOHANA 8.7  --  8.6 8.8   MAG 1.8 1.7 1.9 1.6*   PHOS 3.7 3.4 3.1 3.4     Recent Labs   Lab 12/06/22 0727 12/04/22  0544 12/03/22  0538 12/01/22  1730 12/01/22  0746   GLC 81 101* 106* 119* 493*       Recent Labs   Lab 12/05/22 0445 12/04/22 0544 12/03/22  1656   WBC  --   --  8.1   HGB 12.1*   < > 12.5*   HCT  --   --  37.1*   MCV  --   --  94   PLT  --   --  282    < > = values in this interval not displayed.      Imaging:   No results found for this or any previous visit (fom the past 48 hour(s)).

## 2022-12-06 NOTE — PLAN OF CARE
Goal Outcome Evaluation:      Plan of Care Reviewed With: patient, sibling    Overall Patient Progress: no changeOverall Patient Progress: no change      Right wrist and Left wrist restraints discontinued at 1400 on 12/5/2022.    Restraint discontinue criteria met, patient is calm, cooperative and safe. Restraints removed.     Patient's Response: Demonstrated understanding (sister at bedside, restraints removed at this time.) Not pulling at lines.  Family Notification: Other  Attending Physician Notified: MD ordered restraint, Attending Physician's Name: Sandra Freeman RN

## 2022-12-06 NOTE — PROGRESS NOTES
"0236: Called tele [employer]. Pt is very agitated, trying to get out of bed, throwing legs out of bed and pulling his sheet. Pt is screaming and yelling \"I Iwant to get out of the bed, I want to get out of here\" he repeats.  Attempted to redirect pt and explain to him why it is important for him to stay in the setting and also explained that he is very weak thus not safe for ambulation. Pt seems to understand at time of explanation and will repeat \" Yes madam, I understand\", but will immediately start throwing his legs out of bed and pacing. Administered Ativan with no effect and next dose of Haldol not due yet.     MD agrees to have Precedex restarted PRN while using PRNs in an attempt to wean off precedex as soon as possible.     0541: Called [employer]. FYI.  Pt is not able to spontaneously void. Bladder scanned x3 and had to straight after each bladder scan as value exceeds 450cc.     1 incontinent episode noted as writer was getting ready to straight. Post void scan 8cc.   "

## 2022-12-06 NOTE — PLAN OF CARE
"Goal Outcome Evaluation:      Plan of Care Reviewed With: patient, sibling    Overall Patient Progress: no changeOverall Patient Progress: no change     Neuro: Patient remains disoriented to Place/situation. Labile mood; intermittently redirectable. Becomes anxious/agitated, attempting to get out of bed several times. Kicks legs into air attempts to sit on side of the bed. CIWA scale used as needed.   Cardiac: Tachycardic. Heart reat up to 150's with activity and agitation. Blood pressures soft this am, MD aware of pressures and heart rate. Precedex stopped per verbal order.  Tele: Tachycardic, rates 115-140 bpm.   Respiratory: LS clear  02: 2 lpm/NC  GI: 1 small BM this shift. Lactulose. Fair intake. Supplements ordered. Diet: Regular, needs assistance feeding.   : Voiding via bedside commode. Bladderscanned to check PVR= 17 mL.   Labs: Electrolyte protocols WNL  Skin: Intact  Musculoskeletal/CMS: Moderate weakness. Moves all extremities.   Pain: Chronic back pain intermittent, denies pain  Mobility: Up with assist of 2 and adonis steady; unsteady gait. Needs a lot of redirection.   Consults: Nutrition.  Plan of Care:   Continue to wean off ativan/haldol as able. Needs a lot of reminders and redirection.    Monitor heart rate and blood pressures.     Of note: CIWA scale was utilized when patient became agitated, kicking legs into air, attempting to get out of bed. Patient not re-directable at this time.     Patient's sister, Abida, visited today. She was upset that \"no one has updated me and I was told from other staff that I cannot get any health information. He should not be on this medication this long; all you are doing is trying to keep him sedated.\" Discussed that the precedex was turned off today; medication used PRN for his safety and staff safety. Pt sat up in bed and ate lunch with his sister present. Ok per patient to give Abida and patient's daughter, Sasha, updates.     Also, Patient's sister, Abida, wants " the physician to know that Andrea has a history of multiple concussions. She would like to have an MRI ordered and would like to talk with the physician regarding this information on 12/6 AM- information passed on in report and written in physician sticky note.

## 2022-12-06 NOTE — PROGRESS NOTES
[facility] Progress Note  [facility] ICU    Saw pt Andrea Pham per length of stay.    Andrea was a bit agitated at beginning of visit but settled in and dozed off with a prayer and scripture reading.     Plan of Care - will follow for support and fellowship.       Jose Alfaro MA  Staff   [phone]    [facility] routine referrals *[phone]  [facility] available 24/7 for emergent requests/referrals, either by having the on-call  paged or by entering an ASAP/STAT consult in Epic   (this will also page the on-call ).

## 2022-12-06 NOTE — PLAN OF CARE
ICU End of Shift Summary.  For vital signs and complete assessments, please see documentation flowsheets.      Pertinent assessments: Pt confused, drowsy, follows command slowly, calm and cooperative. At start of shift, pt was very agitated despite administration of PRN medications. Pt was pacing, yelling and throwing his legs out of bed. MD notified and ordered to use Precedex drip PRN while using other prn meds so as to quickly and efficiently wean pt off Precedex. Currently off Precedex. LS diminished, SR and normotensive. Urinary retention through the shift, Bladder scanned straight cath X2. Provider aware and maintenance fluids discontinued.   Major Shift Events: See note above   Plan (Upcoming Events): Withdraw safely, return to baseline parameters   Discharge/Transfer Needs: TBD  Bedside Shift Report Completed : Y  Bedside Safety Check Completed: Y

## 2022-12-07 LAB
AMMONIA PLAS-SCNC: 54 UMOL/L (ref 16–60)
MAGNESIUM SERPL-MCNC: 2 MG/DL (ref 1.7–2.3)
PHOSPHATE SERPL-MCNC: 4 MG/DL (ref 2.5–4.5)
POTASSIUM SERPL-SCNC: 4.4 MMOL/L (ref 3.4–5.3)

## 2022-12-07 PROCEDURE — 250N000011 HC RX IP 250 OP 636: Performed by: INTERNAL MEDICINE

## 2022-12-07 PROCEDURE — 250N000013 HC RX MED GY IP 250 OP 250 PS 637

## 2022-12-07 PROCEDURE — 84132 ASSAY OF SERUM POTASSIUM: CPT | Performed by: INTERNAL MEDICINE

## 2022-12-07 PROCEDURE — 36415 COLL VENOUS BLD VENIPUNCTURE: CPT | Performed by: INTERNAL MEDICINE

## 2022-12-07 PROCEDURE — 84100 ASSAY OF PHOSPHORUS: CPT | Performed by: INTERNAL MEDICINE

## 2022-12-07 PROCEDURE — 83735 ASSAY OF MAGNESIUM: CPT | Performed by: INTERNAL MEDICINE

## 2022-12-07 PROCEDURE — 250N000013 HC RX MED GY IP 250 OP 250 PS 637: Performed by: STUDENT IN AN ORGANIZED HEALTH CARE EDUCATION/TRAINING PROGRAM

## 2022-12-07 PROCEDURE — 99233 SBSQ HOSP IP/OBS HIGH 50: CPT | Performed by: INTERNAL MEDICINE

## 2022-12-07 PROCEDURE — 200N000001 HC R&B ICU

## 2022-12-07 PROCEDURE — 82140 ASSAY OF AMMONIA: CPT | Performed by: INTERNAL MEDICINE

## 2022-12-07 PROCEDURE — 250N000013 HC RX MED GY IP 250 OP 250 PS 637: Performed by: INTERNAL MEDICINE

## 2022-12-07 RX ADMIN — PANTOPRAZOLE SODIUM 40 MG: 40 TABLET, DELAYED RELEASE ORAL at 18:08

## 2022-12-07 RX ADMIN — QUETIAPINE FUMARATE 50 MG: 50 TABLET ORAL at 01:04

## 2022-12-07 RX ADMIN — THIAMINE HCL TAB 100 MG 100 MG: 100 TAB at 08:48

## 2022-12-07 RX ADMIN — QUETIAPINE FUMARATE 50 MG: 50 TABLET ORAL at 20:31

## 2022-12-07 RX ADMIN — QUETIAPINE FUMARATE 25 MG: 25 TABLET ORAL at 18:08

## 2022-12-07 RX ADMIN — LORAZEPAM 2 MG: 1 TABLET ORAL at 01:03

## 2022-12-07 RX ADMIN — SENNOSIDES AND DOCUSATE SODIUM 2 TABLET: 50; 8.6 TABLET ORAL at 08:48

## 2022-12-07 RX ADMIN — MULTIPLE VITAMINS W/ MINERALS TAB 1 TABLET: TAB at 08:48

## 2022-12-07 RX ADMIN — TRAZODONE HYDROCHLORIDE 50 MG: 50 TABLET ORAL at 18:08

## 2022-12-07 RX ADMIN — QUETIAPINE FUMARATE 50 MG: 50 TABLET ORAL at 00:58

## 2022-12-07 RX ADMIN — LACTULOSE 20 G: 20 SOLUTION ORAL at 01:12

## 2022-12-07 RX ADMIN — LACTULOSE 20 G: 20 SOLUTION ORAL at 08:49

## 2022-12-07 RX ADMIN — PANTOPRAZOLE SODIUM 40 MG: 40 TABLET, DELAYED RELEASE ORAL at 08:49

## 2022-12-07 RX ADMIN — HALOPERIDOL LACTATE 5 MG: 5 INJECTION, SOLUTION INTRAMUSCULAR at 05:04

## 2022-12-07 RX ADMIN — SENNOSIDES AND DOCUSATE SODIUM 2 TABLET: 50; 8.6 TABLET ORAL at 18:08

## 2022-12-07 RX ADMIN — TAMSULOSIN HYDROCHLORIDE 0.4 MG: 0.4 CAPSULE ORAL at 08:48

## 2022-12-07 RX ADMIN — LORAZEPAM 2 MG: 2 INJECTION INTRAMUSCULAR; INTRAVENOUS at 06:10

## 2022-12-07 RX ADMIN — ACETAMINOPHEN 650 MG: 325 TABLET, FILM COATED ORAL at 01:05

## 2022-12-07 RX ADMIN — DIVALPROEX SODIUM 1000 MG: 500 TABLET, EXTENDED RELEASE ORAL at 08:48

## 2022-12-07 RX ADMIN — BUPRENORPHINE AND NALOXONE 1 FILM: 8; 2 FILM, SOLUBLE BUCCAL; SUBLINGUAL at 18:14

## 2022-12-07 RX ADMIN — LORAZEPAM 2 MG: 2 INJECTION INTRAMUSCULAR; INTRAVENOUS at 03:36

## 2022-12-07 RX ADMIN — DICLOFENAC SODIUM 2 G: 10 GEL TOPICAL at 08:58

## 2022-12-07 RX ADMIN — LACTULOSE 20 G: 20 SOLUTION ORAL at 20:31

## 2022-12-07 RX ADMIN — DICLOFENAC SODIUM 2 G: 10 GEL TOPICAL at 00:59

## 2022-12-07 RX ADMIN — QUETIAPINE FUMARATE 25 MG: 25 TABLET ORAL at 08:48

## 2022-12-07 RX ADMIN — BUPRENORPHINE AND NALOXONE 1 FILM: 8; 2 FILM, SOLUBLE BUCCAL; SUBLINGUAL at 08:50

## 2022-12-07 RX ADMIN — FOLIC ACID 1 MG: 1 TABLET ORAL at 08:49

## 2022-12-07 RX ADMIN — POLYETHYLENE GLYCOL 3350 17 G: 17 POWDER, FOR SOLUTION ORAL at 08:49

## 2022-12-07 RX ADMIN — BUPROPION HYDROCHLORIDE 450 MG: 150 TABLET, EXTENDED RELEASE ORAL at 08:48

## 2022-12-07 ASSESSMENT — ACTIVITIES OF DAILY LIVING (ADL)
ADLS_ACUITY_SCORE: 36
ADLS_ACUITY_SCORE: 32
ADLS_ACUITY_SCORE: 36
ADLS_ACUITY_SCORE: 29
ADLS_ACUITY_SCORE: 29
ADLS_ACUITY_SCORE: 36
ADLS_ACUITY_SCORE: 29
ADLS_ACUITY_SCORE: 36
ADLS_ACUITY_SCORE: 32
ADLS_ACUITY_SCORE: 29
ADLS_ACUITY_SCORE: 36
ADLS_ACUITY_SCORE: 29

## 2022-12-07 NOTE — PLAN OF CARE
Problem: Malnutrition  Goal: Improved Nutritional Intake  Outcome: Progressing   Goal Outcome Evaluation: progressing slowly, patient now eating more of meals, taking Ensure

## 2022-12-07 NOTE — PROGRESS NOTES
[facility]  Hospitalist Progress Note  Brian Machado MD       Reason for Stay (Diagnosis):     Acute encephalopathy suspected to be from alcohol withdrawal syndrome and DT.  Possible underlying chronic encephalopathy from alcohol use disorder    Acute GI bleed with black tarry stools    Hepatic encephalopathy    Fever           Assessment and Plan:        Summary of Stay:   Andrea Pham is a 57 year old male with a significant past medical history of Alcohol use disorder, on Suboxone for narcotic addiction, who presents from home with alcohol withdraw concern, black emesis and stool and was admitted on 11/23/2022.     On presentation to the ER, patient arrived hypertensive, tachycardic, tremulous and anxious with nausea and vomiting.  Labs showed hemoglobin of 16.8, abnormal LFTs, lactate was 7.4, quantitative ketones of 2.8.  Blood alcohol level was 0.23. Patient was given IV fluid, Valium IV, octreotide and Protonix infusion started in the emergency department and was admitted to [facility]    It was reported he started drinking heavily and last drink was the night before admission.  Per his friend he was excluded from a family gathering which made him very upset and went on a binge drinking. .      He had worsening withdrawal symptoms and signs and was transferred to the ICU on 11/26 given significant need for IV benzos for initiation of a precedex drip.      Patient intermittently required Precedex infusion for agitation    12/3, tried  to titrate down and stop the Precedex gtt and use other PRN medications, but was very agiated and restarted it at lower dose.     12/5. He is off Precedex drip     Problem List/Assessment and Plan:     Black tarry stools: Resolved.  -Initially suspected upper GI bleed, resolved  -Some hemoconcentration on presentation and hemoglobin trended down and stabilized with IV fluid.  -Initially on Octreotide but this has since been stopped by gastroenterology.    -Continued  with  oral Protonix.   -GI consult appreciated, no endoscopy indicated for now per GI.      Alcohol use disorder with severe withdrawal symptoms and delirium tremens: On presentation  -He was started on CIWA protocol with gabapentin taper and as needed lorazepam, patient has needed multiple doses of Haldol and Ativan.  Has had 32 milligrams of IV Ativan within 24 hours and so was transferred to ICU on 11/26 for initiation of precedex drip.  He has occasionally been able to come off the the precedex drip, but usually has ended back up on it related to agitation during which he is not redirectable.   -Off Precedex drip since December 5  -Stopped CIWA protocol.  -Ativan PRN doses as ordered.  -Was on high-dose of thiamine and folate.  -Encephalopathy is likely multifactorial including substance use disorder, alcohol related brain injury versus others  -Currently patient is calmer, off Precedex drip.  He continues to be confused and disoriented x3.  He requires bedside sitter.  Unable to do MRI study.  Continue to monitor for now    Hepatic Encephalopathy  Acute toxic/metabolic Encephalopathy  Intermittent agitation and combativeness: Ongoing, slowly improving  --This is likely multifactorial in the setting of severe alcohol withdrawal requiring significant amounts of sedation initially.   -Stopped Precedex drip on 12/5    -Slightly elevated NH3 leve and started on Lactulose that should be titrated to 2 to 3 bowel movements per day  -Etiology of his change in mental status is not fully clear, doubt alcohol is contributing anymore as he has been in hospital for more than 10 days and treated with for CIWA protocol.  -Has been on Seroquel 25 mg twice a day and 50 mg at at bedtime.  We will increase Seroquel to 50 twice daily  -Continue as needed Haldol and Ativan and Zyprexia.  -Use soft restraints as patient is agitated when off Precedex drip.  -Midline was placed as he removed multiple PIV and refused oral medications  and agitated.   -Goal for today remains to keep him off Precedex drip and try other ordered PRN medications  -The patient needs MRI of the brain when he is more calm and cooperative.  --  His sister reported that he has multiple traumatic brain injuries and concern if that is contributing to his substance abuse and hallucinations with possible structural brain injuries.     Poor p.o. intake:  Sever Malnutrition:  -Encouraged oral intake. Drinking protein shakes.   -He was able to eat more . He was off  sedation, agitated and oral intake has been minimal.   -Nutrition consulted.   -Patient does not meet his meet his nutritional requirement.    Substance abuse disorder:  -He is on Suboxone PTA, resumed.    -He will definitely need treatment for polysubstance use disorder.  May need addiction medicine consult     Depression/anxiety:  --PTA Wellbutrin, Depakote, trazodone and Zyprexa    Lactic acidosis: Resolsved  -Serum lactate 7.4 on presentation, secondary to alcohol use.   -Improved to 1.8 with hydration.  Blood pressure did improve with IV fluid.     Alcoholic liver disease:  -Patient history of alcohol abuse with alcoholic liver disease.     Alcoholic ketosis, resolved     History of hepatitis C:    --Is not clear if he ever had treatment.  Reassess once mental status is improved.     Urinary retention in setting of BPH:    -Lino is out on 12/3 and now voiding.    -Continue on Flomax,     Left wrist pain: No more complaint, seems to be resolved  -No reported trauma, there was some swelling in the mid range of motion.  -Wrist x-ray  With no acute fractures   -Voltaren gel and cold compression.    Sinus tachycardia with transient hypotension.  Episode of fever up to 102.5.  -Patient developed sinus tachycardia and also transient hypotension, also reported one episode of fever 12/5  -This was  during severe agitation and precedex drip was stopped.   -Blood culture was done so far negative.   -Lino catheter was  "removed .  -Patient has no urinary symptoms .  -Continue close monitoring, trend temperature.  -Procal was 0.7.  -BC x 2 done so far negative..  -Lino cath removed 12/3.  -No Indication for antibiotics at this time, may consider empiric treatment if there is any focal infection identified.  -Overall patient is improving.      Clinically Significant Risk Factors              # Hypoalbuminemia: Lowest albumin = 3.4 g/dL (Ref range: 3.5-5.2) at 11/27/2022  5:30 AM, will monitor as appropriate           # Severe Malnutrition: based on nutrition assessment      DVT Prophylaxis: Pneumatic Compression Devices.    Code Status: Full Code.    Discharge Dispo/Date: Continue ICU care due to risk of deterioration   -- spoke with  the primary contact is patient's daughter Sasha, [phone], who is out of state.         Interval History (Subjective):      Patient was seen and examined by me at bedside.  He remains encephalopathic requiring a sitter.  He has been off pressor since December 5 now.  Patient is eating very good no reported fever.  I spoke with patient's daughter                   Physical Exam:      Last Vital Signs:  /71   Pulse 85   Temp 98.1  F (36.7  C) (Oral)   Resp 13   Ht 1.803 m (5' 11\")   Wt 79.9 kg (176 lb 2.4 oz)   SpO2 91%   BMI 24.57 kg/m         GENERAL: Awake, alert today, more cooperative, no agitation   HEENT: NC/AT, eyes Unicteric, moist oral mucosa.  CVS: RRR, S1, S2.  No murmurs appreciated.  CHEST: Normal work of breathing.  Lungs CTAB.  ABD: Soft, non-tender, non-distended.  Bowel sounds present.  No guarding.  EXT: no deformities.  Warm, well perfused.  Skin: no rashes or lesions noted.  Warm and dry.  Neuro: No gross deficits noted.  Speech clear.  Follows instructions, moves all his extremities.  Psych: Awake, alert comfortable.  Confused         Medications:      All current medications were reviewed with changes reflected in problem list.  Current Facility-Administered " Medications   Medication     acetaminophen (TYLENOL) tablet 650 mg    Or     acetaminophen (TYLENOL) Suppository 650 mg     buprenorphine HCl-naloxone HCl (SUBOXONE) 8-2 MG per film 1 Film     buPROPion (WELLBUTRIN XL) 24 hr tablet 450 mg     glucose gel 15-30 g    Or     dextrose 50 % injection 25-50 mL    Or     glucagon injection 1 mg     diclofenac (VOLTAREN) 1 % topical gel 2 g     divalproex sodium extended-release (DEPAKOTE ER) 24 hr tablet 1,000 mg     flumazenil (ROMAZICON) injection 0.2 mg     folic acid (FOLVITE) tablet 1 mg     haloperidol lactate (HALDOL) injection 10 mg     haloperidol lactate (HALDOL) injection 5 mg     haloperidol lactate (HALDOL) injection 5 mg     lactulose (CHRONULAC) solution 20 g     lidocaine (LMX4) cream     lidocaine (LMX4) cream     lidocaine 1 % 0.1-1 mL     lidocaine 1 % 0.1-1 mL     LORazepam (ATIVAN) tablet 1-2 mg    Or     LORazepam (ATIVAN) injection 1-2 mg     LORazepam (ATIVAN) injection 2 mg     melatonin tablet 5 mg     metoprolol (LOPRESSOR) injection 5 mg     multivitamin w/minerals (THERA-VIT-M) tablet 1 tablet     nitroGLYcerin (NITROSTAT) sublingual tablet 0.4 mg     OLANZapine (zyPREXA) tablet 5-10 mg     pantoprazole (PROTONIX) EC tablet 40 mg     polyethylene glycol (MIRALAX) Packet 17 g     QUEtiapine (SEROquel) tablet 25 mg     QUEtiapine (SEROquel) tablet 50 mg     senna-docusate (SENOKOT-S/PERICOLACE) 8.6-50 MG per tablet 2 tablet     sodium chloride (PF) 0.9% PF flush 10 mL     sodium chloride (PF) 0.9% PF flush 10-20 mL     sodium chloride (PF) 0.9% PF flush 3 mL     sodium chloride (PF) 0.9% PF flush 3 mL     tamsulosin (FLOMAX) capsule 0.4 mg     thiamine (B-1) tablet 100 mg     traZODone (DESYREL) tablet 50 mg          Data:      All new lab and imaging data was reviewed.   Labs:  Recent Labs   Lab 12/07/22  0820 12/06/22  0727 12/05/22  0445 12/04/22  0544 12/03/22  0538   NA  --  139  --  140 141   POTASSIUM 4.4 3.7 4.0 3.6 3.7  3.6   CHLORIDE   --  102  --  104 104   CO2  --  26  --  27 24   ANIONGAP  --  11  --  9 13   GLC  --  81  --  101* 106*   BUN  --  8.9  --  6.9 9.1   CR  --  0.73  --  0.74 0.74   GFRESTIMATED  --  >90  --  >90 >90   JOHANA  --  8.7  --  8.6 8.8   MAG 2.0 1.8 1.7 1.9 1.6*   PHOS 4.0 3.7 3.4 3.1 3.4     Recent Labs   Lab 12/06/22  0727 12/04/22  0544 12/03/22  0538 12/01/22  1730 12/01/22  0746   GLC 81 101* 106* 119* 493*       Recent Labs   Lab 12/05/22  0445 12/04/22  0544 12/03/22  1656   WBC  --   --  8.1   HGB 12.1*   < > 12.5*   HCT  --   --  37.1*   MCV  --   --  94   PLT  --   --  282    < > = values in this interval not displayed.      Imaging:   No results found for this or any previous visit (fom the past 48 hour(s)).

## 2022-12-07 NOTE — PLAN OF CARE
ICU End of Shift Summary.  For vital signs and complete assessments, please see documentation flowsheets.      Pertinent assessments: Pt was alert, confused this shift. Reorients well and responded well to positive reinforcement.  Restless and agitated at times.  Gave prn ativan x2.  Urine output 450 ml total this shift.  IV is saline locked, will continue to monitor for urinary output.  Pt able to stand at bedside and used external cath, as unable to sustain standing position for very long, pitching top part of body forward.  Required frequent redirection to stand up right. Tried kalodeadonis worked well this AM. Tachycardic at times, sinus rhythm with rare PVC  Major Shift Events: pt more responsive to redirection.  Plan (Upcoming Events): Continue to assess for orientation and reorient appropriately.  Discharge/Transfer Needs: TBD     Bedside Shift Report Completed : Yes  Bedside Safety Check Completed: Yes

## 2022-12-07 NOTE — PLAN OF CARE
Goal Outcome Evaluation:       Neuro: Patient remains disoriented to place/time/situation. More easily redirectable this shift with the presence of a sitter; decreased agitation.  Cardiac: BP WNL  Tele: SR, tachycardic with activity, rates into 120's when up to bedside commode.   Respiratory: LS clear  02: 2 lpm/NC when sleeping  GI: Tolerating diet. Able to feed himself meals when up in the  chair. No BM this shift; Lactulose and laxatives given per order.  Diet: Regular/soft  : Voiding spontaneously. Bladderscanned for 101 mL.   Labs: Ammonia WNL; K/Mag/Phos protocol  Pain: Denies  Mobility: Up with assist of 1-2 with walker and gait belt. Up to chair x2 this afternoon.   Consults: PT/OT   Plan of Care: Continue to wean off CIWA medications. Closely monitor for safety.

## 2022-12-08 ENCOUNTER — APPOINTMENT (OUTPATIENT)
Dept: OCCUPATIONAL THERAPY | Facility: CLINIC | Age: 57
DRG: 896 | End: 2022-12-08
Attending: INTERNAL MEDICINE
Payer: MEDICARE

## 2022-12-08 ENCOUNTER — APPOINTMENT (OUTPATIENT)
Dept: PHYSICAL THERAPY | Facility: CLINIC | Age: 57
DRG: 896 | End: 2022-12-08
Attending: INTERNAL MEDICINE
Payer: MEDICARE

## 2022-12-08 LAB
ALBUMIN SERPL BCG-MCNC: 3.4 G/DL (ref 3.5–5.2)
ALP SERPL-CCNC: 72 U/L (ref 40–129)
ALT SERPL W P-5'-P-CCNC: 21 U/L (ref 10–50)
ANION GAP SERPL CALCULATED.3IONS-SCNC: 10 MMOL/L (ref 7–15)
AST SERPL W P-5'-P-CCNC: 16 U/L (ref 10–50)
BACTERIA BLD CULT: NO GROWTH
BACTERIA BLD CULT: NO GROWTH
BILIRUB SERPL-MCNC: 0.3 MG/DL
BUN SERPL-MCNC: 15.1 MG/DL (ref 6–20)
CALCIUM SERPL-MCNC: 9.1 MG/DL (ref 8.6–10)
CHLORIDE SERPL-SCNC: 101 MMOL/L (ref 98–107)
CREAT SERPL-MCNC: 0.74 MG/DL (ref 0.67–1.17)
DEPRECATED HCO3 PLAS-SCNC: 27 MMOL/L (ref 22–29)
ERYTHROCYTE [DISTWIDTH] IN BLOOD BY AUTOMATED COUNT: 13.4 % (ref 10–15)
GFR SERPL CREATININE-BSD FRML MDRD: >90 ML/MIN/1.73M2
GLUCOSE SERPL-MCNC: 109 MG/DL (ref 70–99)
HCT VFR BLD AUTO: 39.2 % (ref 40–53)
HGB BLD-MCNC: 12.5 G/DL (ref 13.3–17.7)
MAGNESIUM SERPL-MCNC: 1.9 MG/DL (ref 1.7–2.3)
MCH RBC QN AUTO: 30.9 PG (ref 26.5–33)
MCHC RBC AUTO-ENTMCNC: 31.9 G/DL (ref 31.5–36.5)
MCV RBC AUTO: 97 FL (ref 78–100)
PHOSPHATE SERPL-MCNC: 3.3 MG/DL (ref 2.5–4.5)
PLATELET # BLD AUTO: 340 10E3/UL (ref 150–450)
POTASSIUM SERPL-SCNC: 4.6 MMOL/L (ref 3.4–5.3)
PROT SERPL-MCNC: 6.6 G/DL (ref 6.4–8.3)
RBC # BLD AUTO: 4.05 10E6/UL (ref 4.4–5.9)
SODIUM SERPL-SCNC: 138 MMOL/L (ref 136–145)
WBC # BLD AUTO: 6.1 10E3/UL (ref 4–11)

## 2022-12-08 PROCEDURE — 99233 SBSQ HOSP IP/OBS HIGH 50: CPT | Performed by: INTERNAL MEDICINE

## 2022-12-08 PROCEDURE — 250N000013 HC RX MED GY IP 250 OP 250 PS 637: Performed by: INTERNAL MEDICINE

## 2022-12-08 PROCEDURE — 80053 COMPREHEN METABOLIC PANEL: CPT | Performed by: INTERNAL MEDICINE

## 2022-12-08 PROCEDURE — 250N000013 HC RX MED GY IP 250 OP 250 PS 637: Performed by: STUDENT IN AN ORGANIZED HEALTH CARE EDUCATION/TRAINING PROGRAM

## 2022-12-08 PROCEDURE — 83735 ASSAY OF MAGNESIUM: CPT | Performed by: INTERNAL MEDICINE

## 2022-12-08 PROCEDURE — 200N000001 HC R&B ICU

## 2022-12-08 PROCEDURE — 85014 HEMATOCRIT: CPT | Performed by: INTERNAL MEDICINE

## 2022-12-08 PROCEDURE — 250N000013 HC RX MED GY IP 250 OP 250 PS 637

## 2022-12-08 PROCEDURE — 97530 THERAPEUTIC ACTIVITIES: CPT | Mod: GP | Performed by: PHYSICAL THERAPIST

## 2022-12-08 PROCEDURE — 250N000011 HC RX IP 250 OP 636: Performed by: INTERNAL MEDICINE

## 2022-12-08 PROCEDURE — 97535 SELF CARE MNGMENT TRAINING: CPT | Mod: GO | Performed by: OCCUPATIONAL THERAPIST

## 2022-12-08 PROCEDURE — 97166 OT EVAL MOD COMPLEX 45 MIN: CPT | Mod: GO | Performed by: OCCUPATIONAL THERAPIST

## 2022-12-08 PROCEDURE — 99221 1ST HOSP IP/OBS SF/LOW 40: CPT | Mod: 95 | Performed by: INTERNAL MEDICINE

## 2022-12-08 PROCEDURE — 36415 COLL VENOUS BLD VENIPUNCTURE: CPT | Performed by: INTERNAL MEDICINE

## 2022-12-08 PROCEDURE — 97162 PT EVAL MOD COMPLEX 30 MIN: CPT | Mod: GP | Performed by: PHYSICAL THERAPIST

## 2022-12-08 PROCEDURE — 84100 ASSAY OF PHOSPHORUS: CPT | Performed by: INTERNAL MEDICINE

## 2022-12-08 RX ORDER — GABAPENTIN 100 MG/1
100 CAPSULE ORAL EVERY 8 HOURS
Status: DISCONTINUED | OUTPATIENT
Start: 2022-12-15 | End: 2022-12-08 | Stop reason: ALTCHOICE

## 2022-12-08 RX ORDER — GABAPENTIN 300 MG/1
300 CAPSULE ORAL 3 TIMES DAILY
Status: DISCONTINUED | OUTPATIENT
Start: 2022-12-08 | End: 2022-12-12

## 2022-12-08 RX ORDER — FOLIC ACID 1 MG/1
1 TABLET ORAL DAILY
Status: DISCONTINUED | OUTPATIENT
Start: 2022-12-09 | End: 2022-12-12 | Stop reason: HOSPADM

## 2022-12-08 RX ORDER — CLONIDINE HYDROCHLORIDE 0.1 MG/1
0.1 TABLET ORAL EVERY 8 HOURS
Status: DISCONTINUED | OUTPATIENT
Start: 2022-12-08 | End: 2022-12-11

## 2022-12-08 RX ORDER — GABAPENTIN 300 MG/1
600 CAPSULE ORAL EVERY 8 HOURS
Status: DISCONTINUED | OUTPATIENT
Start: 2022-12-11 | End: 2022-12-08 | Stop reason: ALTCHOICE

## 2022-12-08 RX ORDER — FLUMAZENIL 0.1 MG/ML
0.2 INJECTION, SOLUTION INTRAVENOUS
Status: DISCONTINUED | OUTPATIENT
Start: 2022-12-08 | End: 2022-12-08

## 2022-12-08 RX ORDER — GABAPENTIN 300 MG/1
900 CAPSULE ORAL EVERY 8 HOURS
Status: DISCONTINUED | OUTPATIENT
Start: 2022-12-08 | End: 2022-12-08 | Stop reason: ALTCHOICE

## 2022-12-08 RX ORDER — GABAPENTIN 600 MG/1
1200 TABLET ORAL ONCE
Status: DISCONTINUED | OUTPATIENT
Start: 2022-12-08 | End: 2022-12-08 | Stop reason: ALTCHOICE

## 2022-12-08 RX ORDER — MULTIPLE VITAMINS W/ MINERALS TAB 9MG-400MCG
1 TAB ORAL DAILY
Status: DISCONTINUED | OUTPATIENT
Start: 2022-12-08 | End: 2022-12-08 | Stop reason: ALTCHOICE

## 2022-12-08 RX ORDER — GABAPENTIN 600 MG/1
600 TABLET ORAL 3 TIMES DAILY
Status: DISCONTINUED | OUTPATIENT
Start: 2022-12-08 | End: 2022-12-08

## 2022-12-08 RX ORDER — PROPRANOLOL HYDROCHLORIDE 10 MG/1
10 TABLET ORAL 3 TIMES DAILY
Status: DISCONTINUED | OUTPATIENT
Start: 2022-12-08 | End: 2022-12-10

## 2022-12-08 RX ORDER — QUETIAPINE FUMARATE 50 MG/1
50 TABLET, FILM COATED ORAL 2 TIMES DAILY
Status: DISCONTINUED | OUTPATIENT
Start: 2022-12-08 | End: 2022-12-12

## 2022-12-08 RX ADMIN — LACTULOSE 20 G: 20 SOLUTION ORAL at 09:12

## 2022-12-08 RX ADMIN — CLONIDINE HYDROCHLORIDE 0.1 MG: 0.1 TABLET ORAL at 13:06

## 2022-12-08 RX ADMIN — LORAZEPAM 1 MG: 1 TABLET ORAL at 13:06

## 2022-12-08 RX ADMIN — GABAPENTIN 300 MG: 300 CAPSULE ORAL at 13:06

## 2022-12-08 RX ADMIN — FOLIC ACID 1 MG: 1 TABLET ORAL at 09:11

## 2022-12-08 RX ADMIN — SENNOSIDES AND DOCUSATE SODIUM 2 TABLET: 50; 8.6 TABLET ORAL at 17:17

## 2022-12-08 RX ADMIN — DIVALPROEX SODIUM 1000 MG: 500 TABLET, EXTENDED RELEASE ORAL at 09:10

## 2022-12-08 RX ADMIN — THIAMINE HCL TAB 100 MG 100 MG: 100 TAB at 09:11

## 2022-12-08 RX ADMIN — BUPRENORPHINE AND NALOXONE 1 FILM: 8; 2 FILM, SOLUBLE BUCCAL; SUBLINGUAL at 09:15

## 2022-12-08 RX ADMIN — CLONIDINE HYDROCHLORIDE 0.1 MG: 0.1 TABLET ORAL at 21:00

## 2022-12-08 RX ADMIN — GABAPENTIN 300 MG: 300 CAPSULE ORAL at 20:59

## 2022-12-08 RX ADMIN — PANTOPRAZOLE SODIUM 40 MG: 40 TABLET, DELAYED RELEASE ORAL at 17:19

## 2022-12-08 RX ADMIN — GABAPENTIN 300 MG: 300 CAPSULE ORAL at 17:17

## 2022-12-08 RX ADMIN — BUPRENORPHINE AND NALOXONE 1 FILM: 8; 2 FILM, SOLUBLE BUCCAL; SUBLINGUAL at 17:17

## 2022-12-08 RX ADMIN — PROPRANOLOL HYDROCHLORIDE 10 MG: 10 TABLET ORAL at 17:19

## 2022-12-08 RX ADMIN — LACTULOSE 20 G: 20 SOLUTION ORAL at 20:59

## 2022-12-08 RX ADMIN — MULTIPLE VITAMINS W/ MINERALS TAB 1 TABLET: TAB at 09:11

## 2022-12-08 RX ADMIN — SENNOSIDES AND DOCUSATE SODIUM 2 TABLET: 50; 8.6 TABLET ORAL at 09:11

## 2022-12-08 RX ADMIN — TAMSULOSIN HYDROCHLORIDE 0.4 MG: 0.4 CAPSULE ORAL at 09:18

## 2022-12-08 RX ADMIN — POLYETHYLENE GLYCOL 3350 17 G: 17 POWDER, FOR SOLUTION ORAL at 09:12

## 2022-12-08 RX ADMIN — LORAZEPAM 1 MG: 1 TABLET ORAL at 06:59

## 2022-12-08 RX ADMIN — QUETIAPINE FUMARATE 50 MG: 50 TABLET ORAL at 17:18

## 2022-12-08 RX ADMIN — HALOPERIDOL LACTATE 5 MG: 5 INJECTION, SOLUTION INTRAMUSCULAR at 11:33

## 2022-12-08 RX ADMIN — BUPROPION HYDROCHLORIDE 450 MG: 150 TABLET, EXTENDED RELEASE ORAL at 09:10

## 2022-12-08 RX ADMIN — TRAZODONE HYDROCHLORIDE 50 MG: 50 TABLET ORAL at 17:18

## 2022-12-08 RX ADMIN — PANTOPRAZOLE SODIUM 40 MG: 40 TABLET, DELAYED RELEASE ORAL at 06:59

## 2022-12-08 RX ADMIN — QUETIAPINE FUMARATE 25 MG: 25 TABLET ORAL at 09:12

## 2022-12-08 ASSESSMENT — ACTIVITIES OF DAILY LIVING (ADL)
ADLS_ACUITY_SCORE: 29
ADLS_ACUITY_SCORE: 25
PREVIOUS_RESPONSIBILITIES: MEAL PREP;HOUSEKEEPING;LAUNDRY;SHOPPING;YARDWORK;MEDICATION MANAGEMENT;FINANCES;DRIVING
ADLS_ACUITY_SCORE: 29
ADLS_ACUITY_SCORE: 29

## 2022-12-08 NOTE — CONSULTS
Addiction Medicine Inpatient Consultation      Andrea Pham,  1965, MRN [id number]    [facility]  Delirium tremens (H) [F10.931]   Alcohol withdrawal syndrome, with delirium (H) [F10.931]    PCP: No Ref-Primary, Physician, None   Code status:  Full Code       Extended Emergency Contact Information  Primary Emergency Contact: Sasha Martinez (De[place])  Mobile Phone: [phone]  Relation: Daughter  Secondary Emergency Contact: Abida Combs  Mobile Phone: [phone]  Relation: Sister        to Medicine;   to ICU.  Date of Consult: 2022    Tele-Visit Details    Type of service:  Video Visit  Time Service Began (time 1st connected with pt): 1413  Time Service Ended (time completely finished with pt): 143  Originating Location (pt. Location): Patient hospital room  Distant Location (provider location): [facility]  Reason for Televisit: COVID 19  Mode of Communication:  Video Conference via [employer]  Physician has received verbal consent for a video visit from the patient? Yes      Reason for Consultation:  Alcohol use       ASSESSMENT and RECOMMENDATIONS:   1.  Alcohol use disorder, severe -daughter reported that patient return to drinking within days of discharging on 10/8/2022.  Likely has been drinking since that time until his admission 2022.  Unable to discuss medications or CD treatment for his alcohol use but clearly causing him significant problems and complicated withdrawal.  LFTs were initially elevated but have now normalized.  Patient cannot be on naltrexone due to being on Suboxone.  Will consider acamprosate, but unclear if patient takes medications at home.      2.  Encephalopathy -  Persistent confusion and disorientation, 15 days since last drink of alcohol.   Other withdrawal symptoms have resolved.  It is noted that patient had prolonged confusion following his last detox  - 10/ 8 but was thought to eventually improve.  His  daughter reports that she felt he remained confused even at the time of discharge from the hospital on 10 8, but this is based on long distance phone calls only.  Certainly possible that patient does have some alcohol-related dementia.  Has received high dose thiamine and currently on 250 mg daily.       3. Opioid Use Disorder, severe - onn buprenorphine and apparently in remission, but no UDS to confirm.     4. Methamphetamine Use Disorder, severe - ? In remission      Recommendations:  1.  Continue high doses of thiamine.   Discontinue all mood altering meds that are not necessary to manage behaviors.    2.  Reduce buprenorphine to 8 mg daily (although doubt that this is contributing to cognitive function).   3.  Check depakote level and ammonia.   4.   Agree with MRI to better evaluate for causes of dementia. May need to sedate to get it done.  5.  Supportive care for a few more days.  If not improving, will need to consider NH placement.          Paula West MD  Addiction Medicine Service  [facility]  Page me (click here for Erma West)              Note: Patient is confused and not reliable historian.  Information was obtained from the chart and from the patient's daughter, Sasha.    HPI:    Andrea Pham is a 57 year old old male who was admitted on 11/23/22 to evaluate black emesis and stool.   He has known alcohol use disorder and ROSEMARY was .23 on presentation.   TERRI was reported to be the night before admission.   The patient had alcohol withdrawal, and required large doses of benzodiazepines, so was transferred to the ICU on 11/26 and treated with precedex.  He required the precedex for a number of day.  He was agitated and having hallucinations so precedx was continued until 12/5.  Since discontinuation, he continues to be confused but less agitated and it is unclear whether this is still related to alcohol withdrawal or whether there is another etiology.       Patient was here for admission from  9/23-10/8/22.  His presentation was similar with tremors and agitation.  He was treated for alcohol withdrawal and did require Precedex on that admission which continued until 10/1/2022 afterwards he was continued to be confused and weak for several days after the withdrawal seems to be resolved.  It is also noted he had a rapid response called on 927 for unresponsiveness and was transferred to the ICU there was suggestion that he may have had a seizure but it was not witnessed.  It was recommended the patient enter CD treatment but he did not.      Hx of opioid use disorder, on Suboxone 8 mg bid      ETOH:  Type and method of use:  1 pint per day   first use:   Last use:  Evening of 11/25?  Amount/frequency:   Previous pharmacotherapy: Patient says he has been on both naltrexone and Campral.  Was on naltrexone until May 2021  Hx of complicated withdrawal: Seizures, DTs with prolonged confusion    Opioids:  Type and method of use:  History of heroin and fentanyl and opiate pills  First use:   Last use:   Amount/frequency:  Has used by inhalation and IV  Previous methadone therapy:  Unknown  Previous buprenorphine therapy:  Yes patient has been on this at least a year; previous naltrexone therapy: Yes     Stimulants/Cocaine/Amphetamines:  Type and method of use:  Methamphetamines IV and by smoking   first use:   Last use:  Unknown - said to be in remission - was on wellbutrin and naltrexone for this.  Amount/frequency:  Unknown     Benzodiazepines:   no evidence for this.    Cannabis:      Tobacco:      Treatment Hx:    10-15 previous treatments, sober 1 year until this fall.    Past Medical History:    Hypertension  Pancreatitis  History of aspiration pneumonitis  History of chronic hepatitis C  MDD  Possible bipolar disorder    Family History:   Substance Use History: Unknown  Social History:  Lives here in [place].  .  Daughter currently lives in [place].  Also has a son.  Note that patient has history  of multiple head traumas-having played hockey and wrestled in the [facility].    PTA Meds:  Medications Prior to Admission   Medication Sig Dispense Refill Last Dose     acetaminophen (TYLENOL) 500 MG tablet Take 2 tablets (1,000 mg) by mouth 3 times daily (Patient taking differently: Take 1,000 mg by mouth every 6 hours as needed for pain)    at prn     aspirin (ASA) 325 MG tablet Take 1 tablet (325 mg) by mouth daily 30 tablet 0 3-4 days ago     buprenorphine HCl-naloxone HCl (SUBOXONE) 8-2 MG per film Place 1 Film under the tongue 2 times daily   3-4 days ago     buPROPion (WELLBUTRIN XL) 150 MG 24 hr tablet Take 150 mg by mouth every morning Takes with 300mg tablet for total dose = 450mg   3-4 days ago     buPROPion (WELLBUTRIN XL) 300 MG 24 hr tablet Take 300 mg by mouth every morning Takes with 150mg tablet for total dose = 450mg   3-4 days ago     divalproex sodium extended-release (DEPAKOTE ER) 500 MG 24 hr tablet Take 1,000 mg by mouth At Bedtime   3-4 days ago     folic acid (FOLVITE) 1 MG tablet Take 1 mg by mouth daily   3-4 days ago     multivitamin w/minerals (THERA-VIT-M) tablet Take 1 tablet by mouth every evening   3-4 days ago     naloxone (NARCAN) 4 MG/0.1ML nasal spray Spray 1 spray (4 mg) into one nostril alternating nostrils once as needed for opioid reversal every 2-3 minutes until assistance arrives 2 each 0  at prn     OLANZapine (ZYPREXA) 5 MG tablet 1 tablet (5 mg) by Oral or Feeding Tube route every evening 30 tablet 0 3-4 days ago     pregabalin (LYRICA) 100 MG capsule Take 100 mg by mouth daily   3-4 days ago     tamsulosin (FLOMAX) 0.4 MG capsule Take 1 capsule (0.4 mg) by mouth daily 30 capsule 0 3-4 days ago     testosterone cypionate (DEPOTESTOSTERONE) 200 MG/ML injection Inject 200 mg into the muscle every 14 days   11/7/2022     thiamine (B-1) 100 MG tablet Take 1 tablet (100 mg) by mouth daily 30 tablet 0 3-4 days ago     traZODone (DESYREL) 50 MG tablet 1 tablet (50 mg) by Oral or  "Feeding Tube route every evening 30 tablet 0 3-4 days ago         Allergies:  No Known Allergies    [place] Prescription Monitoring Program:  Suboxone 8/2 mg twice daily.  Last prescribed 10/24/2022  Pregabalin 100 mg 1 daily  Testosterone    Review of Systems:    Constitutional : Complains of pain in his back and neck   skin:  No piloerection or diaphoresis   Respiratory:  No SOB or cough.  CV:  No chest pain.   Gastrointestinal: No nausea, vomiting, diarrhea, or constipation.    Urologic:  No urinary symptoms; had urinary retention last admission  Neurologic: Denies headache,or focal weakness.     Psychiatric: Currently denies anxiety or depression.    Rheumatologic: No arthralgias or joint swelling        Physical Exam:  /85   Pulse 115   Temp 98.1  F (36.7  C) (Oral)   Resp (!) 9   Ht 1.803 m (5' 11\")   Wt 76.6 kg (168 lb 14 oz)   SpO2 97%   BMI 23.55 kg/m      CIWA equals 10.  Has received 2 mg lorazepam today and 6 mg yesterday also getting quetiapine and haloperidol    Physical Exam by hospitalist reviewed.   Significant findings: Remains confused, less agitated    General appearance   Appears stated age   Dermatologic              No piloerection or diaphoresis  Neurologic   Oriented to person only   no Tremor   Psychiatric Mental Status Examination   Pleasant.  Not agitated     mood:   Cannot assess                Affect:   Pleasant                Thought content:  No SI, paranoia or hallucinations detected                Thought processes:   Scattered                Speech:  Normal                Motor:  Normal                Insight/judgement: Poor/poor    Pertinent Labs, Radiology, and EKG:    Lab Results   Component Value Date    WBC 6.1 12/08/2022    HGB 12.5 (L) 12/08/2022    HCT 39.2 (L) 12/08/2022    MCV 97 12/08/2022     12/08/2022     Lab Results   Component Value Date     12/08/2022    POTASSIUM 4.6 12/08/2022    CHLORIDE 101 12/08/2022    CO2 27 12/08/2022     " (H) 12/08/2022     Lab Results   Component Value Date    AST 16 12/08/2022    ALT 21 12/08/2022     (H) 02/18/2019    ALKPHOS 72 12/08/2022    BILITOTAL 0.3 12/08/2022    BILICONJ 0.0 07/14/2014    GLYNN 54 12/07/2022     Lab Results   Component Value Date    BUN 15.1 12/08/2022    CR 0.74 12/08/2022          Amphetamine Qual Urine   Date Value Ref Range Status   06/14/2021 Negative NEG^Negative Final     Comment:     Cutoff for a negative amphetamine is 500 ng/mL or less.     Amphetamines Urine   Date Value Ref Range Status   09/23/2022 Screen Negative Screen Negative Final     Comment:     Cutoff for a negative amphetamine is less than 500 ng/mL.     Barbiturates Qual Urine   Date Value Ref Range Status   06/14/2021 Negative NEG^Negative Final     Comment:     Cutoff for a negative barbiturate is 200 ng/mL or less.     Barbituates Urine   Date Value Ref Range Status   09/23/2022 Screen Negative Screen Negative Final     Comment:     Cutoff for a negative barbiturate is less than 200 ng/mL.     Benzodiazepine Qual Urine   Date Value Ref Range Status   06/14/2021 Negative NEG^Negative Final     Comment:     Cutoff for a negative benzodiazepine is 200 ng/mL or less.     Cannabinoids Qual Urine   Date Value Ref Range Status   06/14/2021 Negative NEG^Negative Final     Comment:     Cutoff for a negative cannabinoid is 50 ng/mL or less.     Cannabinoids Urine   Date Value Ref Range Status   09/23/2022 Screen Negative Screen Negative Final     Comment:     Cutoff for a negative cannabinoid is less than 50 ng/mL.     Cocaine Qual Urine   Date Value Ref Range Status   06/14/2021 Negative NEG^Negative Final     Comment:     Cutoff for a negative cocaine is 300 ng/mL or less.     Cocaine Urine   Date Value Ref Range Status   09/23/2022 Screen Negative Screen Negative Final     Comment:     Cutoff for a negative cocaine is less than 300 ng/mL.     Opiates Qualitative Urine   Date Value Ref Range Status   06/14/2021  Negative NEG^Negative Final     Comment:     Cutoff for a negative opiate is 300 ng/mL or less.     Opiates Urine   Date Value Ref Range Status   09/23/2022 Screen Negative Screen Negative Final     Comment:     Cutoff for a negative opiate is less than 300 ng/mL.     PCP Qual Urine   Date Value Ref Range Status   06/14/2021 Negative NEG^Negative Final     Comment:     Cutoff for a negative PCP is 25 ng/mL or less.

## 2022-12-08 NOTE — PROGRESS NOTES
12/08/22 0842   Appointment Info   Signing Clinician's Name / Credentials (PT) Frida Singh, [email or web address]   Living Environment   People in Home alone   Current Living Arrangements apartment   Home Accessibility no concerns   Self-Care   Equipment Currently Used at Home   (reports has used a walker before he is unclear if has one at home)   Fall history within last six months yes   Number of times patient has fallen within last six months 1   Activity/Exercise/Self-Care Comment Pt reports inde with cares prior to admit.   General Information   Onset of Illness/Injury or Date of Surgery 11/23/22   Referring Physician Dr. Machado   Patient/Family Therapy Goals Statement (PT) No stated goals   Pertinent History of Current Problem (include personal factors and/or comorbidities that impact the POC) Andrea Pham is a 57 year old male with a significant past medical history of Alcohol use disorder, on Suboxone for narcotic addiction, who presents from home with alcohol withdraw concern, black emesis and stool and was admitted on 11/23/2022.      On presentation to the ER, patient arrived hypertensive, tachycardic, tremulous and anxious with nausea and vomiting.  Labs showed hemoglobin of 16.8, abnormal LFTs, lactate was 7.4, quantitative ketones of 2.8.  Blood alcohol level was 0.23. Patient was given IV fluid, Valium IV, octreotide and Protonix infusion started in the emergency department and was admitted to [facility]     It was reported he started drinking heavily and last drink was the night before admission.  Per his friend he was excluded from a family gathering which made him very upset and went on a binge drinking. .       He had worsening withdrawal symptoms and signs and was transferred to the ICU on 11/26 given significant need for IV benzos for initiation of a precedex drip.       Patient intermittently required Precedex infusion for agitation     12/3, tried  to titrate down and stop the Precedex gtt and use other PRN  medications, but was very agiated and restarted it at lower dose.     12/5. He is off Precedex drip   Existing Precautions/Restrictions fall   Cognition   Affect/Mental Status (Cognition) confused   Orientation Status (Cognition) disoriented to;place;situation;time   Follows Commands (Cognition) follows one-step commands;increased processing time needed   Safety Deficit (Cognition) impulsivity;judgment;problem-solving   Posture    Posture Forward head position;Protracted shoulders   Range of Motion (ROM)   Range of Motion ROM is WFL   Strength (Manual Muscle Testing)   Strength (Manual Muscle Testing) Deficits observed during functional mobility   Bed Mobility   Comment, (Bed Mobility) CGA   Transfers   Comment, (Transfers) CGA x 2   Gait/Stairs (Locomotion)   Comment, (Gait/Stairs) CGA x 2, HR in 140s with limited activity   Balance   Balance Comments poor high falls risk   Clinical Impression   Criteria for Skilled Therapeutic Intervention Yes, treatment indicated   PT Diagnosis (PT) decreased functional mobility   Influenced by the following impairments decreased safe CV response, poor balance, decreased strength   Functional limitations due to impairments decreased bed mob, transfers, ambulation   Clinical Presentation (PT Evaluation Complexity) Evolving/Changing   Clinical Presentation Rationale High HR, balance deficits, cognition deficits   Clinical Decision Making (Complexity) moderate complexity   Planned Therapy Interventions (PT) balance training;bed mobility training;gait training;home exercise program;neuromuscular re-education;patient/family education;ROM (range of motion);strengthening;transfer training;progressive activity/exercise;home program guidelines;risk factor education   Anticipated Equipment Needs at Discharge (PT)   (FWW)   Risk & Benefits of therapy have been explained evaluation/treatment results reviewed;care plan/treatment goals reviewed;risks/benefits reviewed;current/potential  barriers reviewed;participants voiced agreement with care plan;participants included;patient   PT Total Evaluation Time   PT Eval, Moderate Complexity Minutes (85840) 5   Physical Therapy Goals   PT Frequency 5x/week   PT Predicted Duration/Target Date for Goal Attainment 12/13/22   PT Goals Bed Mobility;Transfers;Gait   PT: Bed Mobility Supine to/from sit;Independent   PT: Transfers Modified independent;Bed to/from chair;Sit to/from stand;Assistive device   PT: Gait Modified independent;Rolling walker;100 feet   Interventions   Interventions Quick Adds Therapeutic Activity   Therapeutic Activity   Therapeutic Activities: dynamic activities to improve functional performance Minutes (21426) 10   Treatment Detail/Skilled Intervention Pt cued for safe transfer, CGA x 2 from chair to bed, Cues for decreased impulsivity and waiting for help. Pt educated HR 140s so recommend limited activity today. Pt was cued for transfer back to supine, CGA/SBA for repo. Alarm on, limited due to HR elevated   PT Discharge Planning   PT Plan Progress all mobility, but monitor HR   PT Discharge Recommendation (DC Rec) Transitional Care Facility   PT Rationale for DC Rec Pt currently poor cognition skills and poor balance unsafe to DC home, Recommend TCU to improve mobility. If were to DC home recommend A x 1 for all transfers, FWW, shower chair, 24 hour supervision   PT Brief overview of current status limited to transfer due to HR elevated 140s with little to no activity   Total Session Time   Timed Code Treatment Minutes 10   Total Session Time (sum of timed and untimed services) 15

## 2022-12-08 NOTE — PROGRESS NOTES
12/08/22 1042   Appointment Info   Signing Clinician's Name / Credentials (OT) Lui Ko OTR/L   Living Environment   People in Home alone   Current Living Arrangements apartment   Home Accessibility no concerns   Transportation Anticipated car, drives self;family or friend will provide   Living Environment Comments Lives alone in 3rd floor apartment. Walk ion shower.   Self-Care   Usual Activity Tolerance good   Current Activity Tolerance poor   Regular Exercise Yes   Activity/Exercise Type strength training   Exercise Amount/Frequency 3-5 times/wk  (Reports going to gym regularily)   Equipment Currently Used at Home none   Fall history within last six months yes   Number of times patient has fallen within last six months 1   Activity/Exercise/Self-Care Comment Reports IND with self care ADL   Instrumental Activities of Daily Living (IADL)   Previous Responsibilities meal prep;housekeeping;laundry;shopping;yardwork;medication management;finances;driving  (Reports being on disability)   General Information   Onset of Illness/Injury or Date of Surgery 11/26/22   Referring Physician Brian Machado MD   Patient/Family Therapy Goal Statement (OT) Andrea Pham is a 57 year old male with a significant past medical history of Alcohol use disorder, on Suboxone for narcotic addiction, who presents from home with alcohol withdraw concern, black emesis and stool and was admitted on 11/23/2022.      On presentation to the ER, patient arrived hypertensive, tachycardic, tremulous and anxious with nausea and vomiting.  Labs showed hemoglobin of 16.8, abnormal LFTs, lactate was 7.4, quantitative ketones of 2.8.  Blood alcohol level was 0.23. Patient was given IV fluid, Valium IV, octreotide and Protonix infusion started in the emergency department and was admitted to [facility]     It was reported he started drinking heavily and last drink was the night before admission.  Per his friend he was excluded from a family gathering  which made him very upset and went on a binge drinking. .       He had worsening withdrawal symptoms and signs and was transferred to the ICU on 11/26 given significant need for IV benzos for initiation of a precedex drip.       Patient intermittently required Precedex infusion for agitation     12/3, tried  to titrate down and stop the Precedex gtt and use other PRN medications, but was very agiated and restarted it at lower dose.     12/5. He is off Precedex drip   Existing Precautions/Restrictions fall   Cognitive Status Examination   Orientation Status person   Affect/Mental Status (Cognitive) confused   Follows Commands follows one-step commands;50-74% accuracy   Safety Deficit impulsivity;moderate deficit;insight into deficits/self-awareness;judgment;problem-solving;safety precautions awareness   Memory Deficit short-term memory;immediate recall   Attention Deficit concentration   Executive Function Deficit insight/awareness of deficits;judgment;organization/sequencing   Cognitive Status Comments Pt. with general confusion. Disoriented to situation location and month/date.   Range of Motion Comprehensive   General Range of Motion upper extremity range of motion deficits identified   General Upper Extremity Assessment (Range of Motion)   Comment: Upper Extremity ROM Decreased Shouler flexion AROM. PROM WFL   Upper Extremity: Range of Motion shoulder, right: UE ROM   Strength Comprehensive (MMT)   Comment, General Manual Muscle Testing (MMT) Assessment B UE stregnth WFL in available range   Coordination   Gross Motor Coordination Mild Ataxic UE movement   Bed Mobility   Bed Mobility supine-sit;sit-supine   Supine-Sit [place] (Bed Mobility) contact guard;nonverbal cues (demo/gesture);verbal cues   Sit-Supine [place] (Bed Mobility) verbal cues;supervision   Assistive Device (Bed Mobility) bed rails   Transfers   Transfers sit-stand transfer;toilet transfer   Sit-Stand Transfer   Sit-Stand [place]  (Transfers) minimum assist (75% patient effort);nonverbal cues (demo/gesture);verbal cues   Assistive Device (Sit-Stand Transfers) walker, front-wheeled   Sit/Stand Transfer Comments Unsteady gait. Required safety cues due to impulsivity.   Toilet Transfer   Type (Toilet Transfer) sit-stand;stand-sit   [place] Level (Toilet Transfer) minimum assist (75% patient effort)   Assistive Device (Toilet Transfer) grab bars/safety frame;walker, front-wheeled   Toilet Transfer Comments Unsteady with sit>stand. Safety cues required   Activities of Daily Living   BADL Assessment/Intervention lower body dressing;upper body dressing   Upper Body Dressing Assessment/Training   Position (Upper Body Dressing) unsupported sitting   Comment, (Upper Body Dressing) Cues to sequence task   [place] Level (Upper Body Dressing) moderate assist (50% patient effort);nonverbal cues (demo/gesture);verbal cues   Lower Body Dressing Assessment/Training   Position (Lower Body Dressing) unsupported sitting   Comment, (Lower Body Dressing) Cues to sequence task   [place] Level (Lower Body Dressing) moderate assist (50% patient effort)   Clinical Impression   Criteria for Skilled Therapeutic Interventions Met (OT) Yes, treatment indicated   OT Diagnosis Confusion, dereased mobility, Decreased ADL I   OT Problem List-Impairments impacting ADL problems related to;activity tolerance impaired;balance;cognition;strength;pain   Assessment of Occupational Performance 3-5 Performance Deficits   Identified Performance Deficits Dressing, toileting, G/H,, IADL   Planned Therapy Interventions (OT) ADL retraining;IADL retraining;strengthening;transfer training;progressive activity/exercise   Clinical Decision Making Complexity (OT) moderate complexity   Risk & Benefits of therapy have been explained evaluation/treatment results reviewed;care plan/treatment goals reviewed;risks/benefits reviewed;current/potential barriers reviewed;participants  voiced agreement with care plan;participants included;patient   OT Total Evaluation Time   OT Eval, Moderate Complexity Minutes (97491) 14   OT Goals   Therapy Frequency (OT) 5 times/wk   OT Predicted Duration/Target Date for Goal Attainment 12/19/22   OT Goals Hygiene/Grooming;Upper Body Dressing;Lower Body Dressing;Transfers;Toilet Transfer/Toileting;Cognition   OT: Hygiene/Grooming supervision/stand-by assist;within precautions;while standing   OT: Upper Body Dressing Supervision/stand-by assist;including set-up/clothing retrieval;within precautions   OT: Lower Body Dressing Supervision/stand-by assist;within precautions;including set-up/clothing retrieval   OT: Transfer Supervision/stand-by assist;with assistive device;within precautions  (Step over shower t/f)   OT: Toilet Transfer/Toileting Supervision/stand-by assist;using adaptive equipment;within precautions;toilet transfer;cleaning and garment management   OT: Cognitive Patient/caregiver will verbalize understanding of cognitive assessment results/recommendations as needed for safe discharge planning   Interventions   Interventions Quick Adds Therapeutic Activity;Therapeutic Procedures/Exercise;Cognitive Retraining   Self-Care/Home Management   Self-Care/Home Mgmt/ADL, Compensatory, Meal Prep Minutes (06272) 25   Symptoms Noted During/After Treatment (Meal Preparation/Planning Training) fatigue;significant change in vital signs   Treatment Detail/Skilled Intervention Eval completed and pt enaged in safe mobility strategies for LE dressing. Pt. cued to cross LE to figure four posture to improve stability and decrease stress to heart. Pt. able to follow one step demonstration  and VCs. Impulsive with movement and cues to not stand and attempt were required after initial cueing. Pt. set up with WW and gait belt required MIN A vith verbal and demonstration cues for safe sit>stand. MIN A balance to ambulated to toilet and MOD A VC and demonstration for safety  with clothing management and use of grab bar for balance. Pt. with unsteady gait during mobility. HR inceased to 135 BPM during toilet mobility. Pt. redirected to BS chair with MIN A for balance VCs for safe stand > sit to reach for chair armrests. Pt. set up with lunch tray and cue to attempt opening containers using LE UE to stabilize container on tray surface and open with Right UE due to mild aaxia with UE movement. Pt. required MIN A for self feeding set up. Pt. with mild discoordination yet able to assume functional grgasp of utensil. Pt. left with 1:1 attendant.   OT Discharge Planning   OT Plan MOnitor HR. Toiolet transfer mobility, Cog screen. TB dressing from EOB   OT Discharge Recommendation (DC Rec) Transitional Care Facility   OT Rationale for DC Rec Pt. is significantly below baseline LOF. He lives alone in apartment. Decreased orientation/confusion with ataxia in UE and LE movmeent. Pt. would need to have marked improvement with cognmtiion and safe mobility via IP OT for return home. Pt. will need TCU placement to work on functional mobility, cognition assessment and retraining  for ADL/IADL.   OT Brief overview of current status Oriented to self with limited orientation to time and place. Impulsive with mobility. HR Inreasing to   Total Session Time   Timed Code Treatment Minutes 25   Total Session Time (sum of timed and untimed services) 39

## 2022-12-08 NOTE — PLAN OF CARE
Pt alert, confused but able to reorient.  VSS.  On 1.5 LPM while sleeping.  LS clear.  No BM.  Voiding without difficulty. One BM tonight.  Bedside sitter.  Up with 1, gait belt, and walker to BSC.  No PRNS given tonight.

## 2022-12-08 NOTE — PROGRESS NOTES
[facility]  Hospitalist Progress Note  Brian Machado MD       Reason for Stay (Diagnosis):     Acute encephalopathy suspected to be from alcohol withdrawal syndrome and DT.  Possible underlying chronic encephalopathy from alcohol use disorder    Acute GI bleed with black tarry stools    Hepatic encephalopathy    Fever           Assessment and Plan:        Summary of Stay:   Andrea Pham is a 57 year old male with a significant past medical history of Alcohol use disorder, on Suboxone for narcotic addiction, who presents from home with alcohol withdraw concern, black emesis and stool and was admitted on 11/23/2022.     On presentation to the ER, patient arrived hypertensive, tachycardic, tremulous and anxious with nausea and vomiting.  Labs showed hemoglobin of 16.8, abnormal LFTs, lactate was 7.4, quantitative ketones of 2.8.  Blood alcohol level was 0.23. Patient was given IV fluid, Valium IV, octreotide and Protonix infusion started in the emergency department and was admitted to [facility]    It was reported he started drinking heavily and last drink was the night before admission.  Per his friend he was excluded from a family gathering which made him very upset and went on a binge drinking. .      He had worsening withdrawal symptoms and signs and was transferred to the ICU on 11/26 given significant need for IV benzos for initiation of a precedex drip.      Patient intermittently required Precedex infusion for agitation    12/3, tried  to titrate down and stop the Precedex gtt and use other PRN medications, but was very agiated and restarted it at lower dose.     12/5. He got  off Precedex drip     Problem List/Assessment and Plan:     Black tarry stools: Resolved.  -Initially suspected upper GI bleed, resolved  -Some hemoconcentration on presentation and hemoglobin trended down and stabilized with IV fluid.  -Initially on Octreotide but this has since been stopped by gastroenterology.     -Continued with  oral Protonix.   -GI consult appreciated, no endoscopy indicated for now per GI.      Alcohol use disorder with severe withdrawal symptoms and delirium tremens: On presentation  -He was started on CIWA protocol with gabapentin taper and as needed lorazepam, patient has needed multiple doses of Haldol and Ativan.  Has had 32 milligrams of IV Ativan within 24 hours and so was transferred to ICU on 11/26 for initiation of precedex drip.  He has occasionally been able to come off the the precedex drip, but usually has ended back up on it related to agitation during which he is not redirectable.   -Off Precedex drip since December 5  -Stopped CIWA protocol.  -Ativan PRN doses as ordered.  -Was on high-dose of thiamine and folate.  -Encephalopathy is likely multifactorial including substance use disorder, alcohol related brain injury versus others  -Addiction medicine consulted for his polysubstance abuse including history of methamphetamine use disorder, opiate use disorder and alcohol use disorder.  Patient was seen remotely by addiction medicine physician.  Recommendation obtained to continue high-dose thiamine, discontinue all mood altering medications, reduce buprenorphine to 8 mg daily, check Depakote level and ammonia and consider MRI when he is  more stable.  Input from Dr. Delgado appreciated    -Currently patient is calmer, off Precedex drip.  He continues to be confused and disoriented x2.  He requires bedside sitter.  Unable to do MRI study.  Continue to monitor for now.  We will start him on gabapentin, beta-blocker for tachycardia.  Continue to monitor.        Hepatic Encephalopathy  Acute toxic/metabolic Encephalopathy  Intermittent agitation and combativeness: Ongoing, slowly improving  --This is likely multifactorial in the setting of severe alcohol withdrawal requiring significant amounts of sedation initially.   -Stopped Precedex drip on 12/5    -Slightly elevated NH3 leve and started  on Lactulose that should be titrated to 2 to 3 bowel movements per day  -Etiology of his change in mental status is not fully clear, doubt alcohol is contributing anymore as he has been in hospital for more than 10 days and treated with for CIWA protocol.  -Has been on Seroquel 25 mg twice a day and 50 mg at at bedtime.  We will increase Seroquel to 50 twice daily  -Continue as needed Haldol and Ativan and Zyprexia.  -Use soft restraints as patient is agitated when off Precedex drip.  -Midline was placed as he removed multiple PIV and refused oral medications and agitated.     --  His sister reported that he has multiple traumatic brain injuries and concern if that is contributing to his substance abuse and hallucinations with possible structural brain injuries.  Unable to do MRI due to patient's condition.  Plan to consider MRI study when he is more stable     Poor p.o. intake:  Sever Malnutrition:  -Encouraged oral intake. Drinking protein shakes.   -He was able to eat more . He was off  sedation, agitated and oral intake has been minimal.   -Nutrition consulted.   -Patient does not meet his meet his nutritional requirement.    Substance abuse disorder:  -He is on Suboxone PTA, resumed.    -He will definitely need treatment for polysubstance use disorder.    -- Addiction medicine consulted and recommendation as above     Depression/anxiety:  --PTA Wellbutrin, Depakote, trazodone and Zyprexa    Lactic acidosis: Resolsved  -Serum lactate 7.4 on presentation, secondary to alcohol use.   -Improved to 1.8 with hydration.  Blood pressure did improve with IV fluid.     Alcoholic liver disease:  -Patient history of alcohol abuse with alcoholic liver disease.     Alcoholic ketosis, resolved     History of hepatitis C:    --Is not clear if he ever had treatment.  Reassess once mental status is improved.     Urinary retention in setting of BPH:    -Lino is out on 12/3 and now voiding.    -Continue on Flomax,     Left wrist  "pain: No more complaint, seems to be resolved  -No reported trauma, there was some swelling in the mid range of motion.  -Wrist x-ray  With no acute fractures   -Voltaren gel and cold compression.    Sinus tachycardia with transient hypotension.  Episode of fever up to 102.5.  -Patient developed sinus tachycardia and also transient hypotension, also reported one episode of fever 12/5  -This was  during severe agitation and precedex drip was stopped.   -Blood culture was done so far negative.   -Lino catheter was removed .  -Patient has no urinary symptoms .  -Continue close monitoring, trend temperature.  -Procal was 0.7.  -BC x 2 done so far negative..  -Lino cath removed 12/3.  -No Indication for antibiotics at this time, may consider empiric treatment if there is any focal infection identified.  -He remained tachycardic.  Started him with beta-blocker and monitor patient's vital signs.      Clinically Significant Risk Factors              # Hypoalbuminemia: Lowest albumin = 3.4 g/dL at 12/8/2022  5:13 AM, will monitor as appropriate           # Severe Malnutrition: based on nutrition assessment      DVT Prophylaxis: Pneumatic Compression Devices.    Code Status: Full Code.    Family update : Daughter updated     Discharge Dispo/Date: May transfer him out of ICU if he continues to improve and remains off Precedex.          Interval History (Subjective):      Patient was seen and examined by me at bedside.  Patient is feeling better.  He was able to eat.  Denies any hallucinations but he appears to be confused to time and place.  He is a retired  and remembers his previous job.  Addiction medicine consultation was requested and patient was remotely seen by Dr. Delgado.         Physical Exam:      Last Vital Signs:  BP 97/61   Pulse 86   Temp 98.2  F (36.8  C) (Oral)   Resp 14   Ht 1.803 m (5' 11\")   Wt 76.6 kg (168 lb 14 oz)   SpO2 92%   BMI 23.55 kg/m         GENERAL: Awake, alert today, more " cooperative, no agitation   HEENT: NC/AT, eyes Unicteric, moist oral mucosa.  CVS: RRR, S1, S2.  No murmurs appreciated.  CHEST: Normal work of breathing.  Lungs CTAB.  ABD: Soft, non-tender, non-distended.  Bowel sounds present.  No guarding.  EXT: no deformities.  Warm, well perfused.  Skin: no rashes or lesions noted.  Warm and dry.  Neuro: No gross deficits noted.  Speech clear.  Follows instructions, moves all his extremities.  Psych: Awake, alert comfortable.  Confused         Medications:      All current medications were reviewed with changes reflected in problem list.  Current Facility-Administered Medications   Medication     acetaminophen (TYLENOL) tablet 650 mg    Or     acetaminophen (TYLENOL) Suppository 650 mg     buprenorphine HCl-naloxone HCl (SUBOXONE) 8-2 MG per film 1 Film     buPROPion (WELLBUTRIN XL) 24 hr tablet 450 mg     cloNIDine (CATAPRES) tablet 0.1 mg     glucose gel 15-30 g    Or     dextrose 50 % injection 25-50 mL    Or     glucagon injection 1 mg     diclofenac (VOLTAREN) 1 % topical gel 2 g     divalproex sodium extended-release (DEPAKOTE ER) 24 hr tablet 1,000 mg     flumazenil (ROMAZICON) injection 0.2 mg     [START ON 12/9/2022] folic acid (FOLVITE) tablet 1 mg     gabapentin (NEURONTIN) capsule 300 mg     haloperidol lactate (HALDOL) injection 5 mg     lactulose (CHRONULAC) solution 20 g     lidocaine (LMX4) cream     lidocaine 1 % 0.1-1 mL     LORazepam (ATIVAN) tablet 1-2 mg    Or     LORazepam (ATIVAN) injection 1-2 mg     melatonin tablet 5 mg     metoprolol (LOPRESSOR) injection 5 mg     multivitamin w/minerals (THERA-VIT-M) tablet 1 tablet     nitroGLYcerin (NITROSTAT) sublingual tablet 0.4 mg     OLANZapine (zyPREXA) tablet 5-10 mg     pantoprazole (PROTONIX) EC tablet 40 mg     polyethylene glycol (MIRALAX) Packet 17 g     propranolol (INDERAL) tablet 10 mg     QUEtiapine (SEROquel) tablet 50 mg     senna-docusate (SENOKOT-S/PERICOLACE) 8.6-50 MG per tablet 2 tablet      sodium chloride (PF) 0.9% PF flush 10 mL     sodium chloride (PF) 0.9% PF flush 10-20 mL     sodium chloride (PF) 0.9% PF flush 3 mL     tamsulosin (FLOMAX) capsule 0.4 mg     thiamine (B-1) tablet 100 mg     traZODone (DESYREL) tablet 50 mg          Data:      All new lab and imaging data was reviewed.   Labs:  Recent Labs   Lab 12/08/22 0513 12/07/22  0820 12/06/22 0727 12/05/22  0445 12/04/22  0544     --  139  --  140   POTASSIUM 4.6 4.4 3.7   < > 3.6   CHLORIDE 101  --  102  --  104   CO2 27  --  26  --  27   ANIONGAP 10  --  11  --  9   *  --  81  --  101*   BUN 15.1  --  8.9  --  6.9   CR 0.74  --  0.73  --  0.74   GFRESTIMATED >90  --  >90  --  >90   JOHANA 9.1  --  8.7  --  8.6   MAG 1.9 2.0 1.8   < > 1.9   PHOS 3.3 4.0 3.7   < > 3.1   PROTTOTAL 6.6  --   --   --   --    ALBUMIN 3.4*  --   --   --   --    BILITOTAL 0.3  --   --   --   --    ALKPHOS 72  --   --   --   --    AST 16  --   --   --   --    ALT 21  --   --   --   --     < > = values in this interval not displayed.     Recent Labs   Lab 12/08/22 0513 12/06/22 0727 12/04/22  0544 12/03/22  0538 12/01/22  1730   * 81 101* 106* 119*       Recent Labs   Lab 12/08/22 0513   WBC 6.1   HGB 12.5*   HCT 39.2*   MCV 97         Imaging:   No results found for this or any previous visit (fom the past 48 hour(s)).

## 2022-12-09 ENCOUNTER — APPOINTMENT (OUTPATIENT)
Dept: PHYSICAL THERAPY | Facility: CLINIC | Age: 57
DRG: 896 | End: 2022-12-09
Attending: INTERNAL MEDICINE
Payer: MEDICARE

## 2022-12-09 ENCOUNTER — APPOINTMENT (OUTPATIENT)
Dept: MRI IMAGING | Facility: CLINIC | Age: 57
DRG: 896 | End: 2022-12-09
Attending: INTERNAL MEDICINE
Payer: MEDICARE

## 2022-12-09 ENCOUNTER — APPOINTMENT (OUTPATIENT)
Dept: OCCUPATIONAL THERAPY | Facility: CLINIC | Age: 57
DRG: 896 | End: 2022-12-09
Attending: INTERNAL MEDICINE
Payer: MEDICARE

## 2022-12-09 LAB
MAGNESIUM SERPL-MCNC: 1.9 MG/DL (ref 1.7–2.3)
PHOSPHATE SERPL-MCNC: 3.5 MG/DL (ref 2.5–4.5)
POTASSIUM SERPL-SCNC: 4.2 MMOL/L (ref 3.4–5.3)

## 2022-12-09 PROCEDURE — 97535 SELF CARE MNGMENT TRAINING: CPT | Mod: GO

## 2022-12-09 PROCEDURE — 80165 DIPROPYLACETIC ACID FREE: CPT | Performed by: INTERNAL MEDICINE

## 2022-12-09 PROCEDURE — G1010 CDSM STANSON: HCPCS

## 2022-12-09 PROCEDURE — 97129 THER IVNTJ 1ST 15 MIN: CPT | Mod: GO

## 2022-12-09 PROCEDURE — 36415 COLL VENOUS BLD VENIPUNCTURE: CPT | Performed by: INTERNAL MEDICINE

## 2022-12-09 PROCEDURE — 255N000002 HC RX 255 OP 636: Performed by: INTERNAL MEDICINE

## 2022-12-09 PROCEDURE — 200N000001 HC R&B ICU

## 2022-12-09 PROCEDURE — 250N000013 HC RX MED GY IP 250 OP 250 PS 637: Performed by: INTERNAL MEDICINE

## 2022-12-09 PROCEDURE — 70553 MRI BRAIN STEM W/O & W/DYE: CPT | Mod: MG

## 2022-12-09 PROCEDURE — 83735 ASSAY OF MAGNESIUM: CPT | Performed by: INTERNAL MEDICINE

## 2022-12-09 PROCEDURE — A9585 GADOBUTROL INJECTION: HCPCS | Performed by: INTERNAL MEDICINE

## 2022-12-09 PROCEDURE — 84100 ASSAY OF PHOSPHORUS: CPT | Performed by: INTERNAL MEDICINE

## 2022-12-09 PROCEDURE — 97110 THERAPEUTIC EXERCISES: CPT | Mod: GP | Performed by: PHYSICAL THERAPIST

## 2022-12-09 PROCEDURE — 250N000011 HC RX IP 250 OP 636: Performed by: INTERNAL MEDICINE

## 2022-12-09 PROCEDURE — 99233 SBSQ HOSP IP/OBS HIGH 50: CPT | Performed by: INTERNAL MEDICINE

## 2022-12-09 PROCEDURE — 84132 ASSAY OF SERUM POTASSIUM: CPT | Performed by: INTERNAL MEDICINE

## 2022-12-09 PROCEDURE — 97116 GAIT TRAINING THERAPY: CPT | Mod: GP | Performed by: PHYSICAL THERAPIST

## 2022-12-09 RX ORDER — GADOBUTROL 604.72 MG/ML
7.5 INJECTION INTRAVENOUS ONCE
Status: COMPLETED | OUTPATIENT
Start: 2022-12-09 | End: 2022-12-09

## 2022-12-09 RX ADMIN — PANTOPRAZOLE SODIUM 40 MG: 40 TABLET, DELAYED RELEASE ORAL at 15:42

## 2022-12-09 RX ADMIN — BUPRENORPHINE AND NALOXONE 1 FILM: 8; 2 FILM, SOLUBLE BUCCAL; SUBLINGUAL at 17:24

## 2022-12-09 RX ADMIN — LACTULOSE 20 G: 20 SOLUTION ORAL at 20:40

## 2022-12-09 RX ADMIN — PROPRANOLOL HYDROCHLORIDE 10 MG: 10 TABLET ORAL at 15:42

## 2022-12-09 RX ADMIN — SENNOSIDES AND DOCUSATE SODIUM 2 TABLET: 50; 8.6 TABLET ORAL at 08:50

## 2022-12-09 RX ADMIN — LORAZEPAM 1 MG: 1 TABLET ORAL at 17:23

## 2022-12-09 RX ADMIN — LACTULOSE 20 G: 20 SOLUTION ORAL at 08:52

## 2022-12-09 RX ADMIN — BUPROPION HYDROCHLORIDE 450 MG: 150 TABLET, EXTENDED RELEASE ORAL at 08:51

## 2022-12-09 RX ADMIN — TAMSULOSIN HYDROCHLORIDE 0.4 MG: 0.4 CAPSULE ORAL at 08:51

## 2022-12-09 RX ADMIN — GADOBUTROL 7.5 ML: 604.72 INJECTION INTRAVENOUS at 14:44

## 2022-12-09 RX ADMIN — GABAPENTIN 300 MG: 300 CAPSULE ORAL at 15:42

## 2022-12-09 RX ADMIN — DICLOFENAC SODIUM 2 G: 10 GEL TOPICAL at 21:42

## 2022-12-09 RX ADMIN — PROPRANOLOL HYDROCHLORIDE 10 MG: 10 TABLET ORAL at 08:51

## 2022-12-09 RX ADMIN — MULTIPLE VITAMINS W/ MINERALS TAB 1 TABLET: TAB at 08:51

## 2022-12-09 RX ADMIN — Medication 5 MG: at 20:40

## 2022-12-09 RX ADMIN — OLANZAPINE 10 MG: 2.5 TABLET, FILM COATED ORAL at 16:19

## 2022-12-09 RX ADMIN — GABAPENTIN 300 MG: 300 CAPSULE ORAL at 21:42

## 2022-12-09 RX ADMIN — POLYETHYLENE GLYCOL 3350 17 G: 17 POWDER, FOR SOLUTION ORAL at 08:50

## 2022-12-09 RX ADMIN — QUETIAPINE FUMARATE 50 MG: 50 TABLET ORAL at 08:51

## 2022-12-09 RX ADMIN — DIVALPROEX SODIUM 1000 MG: 500 TABLET, EXTENDED RELEASE ORAL at 08:51

## 2022-12-09 RX ADMIN — DICLOFENAC SODIUM 2 G: 10 GEL TOPICAL at 17:25

## 2022-12-09 RX ADMIN — PROPRANOLOL HYDROCHLORIDE 10 MG: 10 TABLET ORAL at 21:42

## 2022-12-09 RX ADMIN — BUPRENORPHINE AND NALOXONE 1 FILM: 8; 2 FILM, SOLUBLE BUCCAL; SUBLINGUAL at 08:52

## 2022-12-09 RX ADMIN — CLONIDINE HYDROCHLORIDE 0.1 MG: 0.1 TABLET ORAL at 05:31

## 2022-12-09 RX ADMIN — TRAZODONE HYDROCHLORIDE 50 MG: 50 TABLET ORAL at 17:23

## 2022-12-09 RX ADMIN — THIAMINE HCL TAB 100 MG 100 MG: 100 TAB at 08:52

## 2022-12-09 RX ADMIN — PANTOPRAZOLE SODIUM 40 MG: 40 TABLET, DELAYED RELEASE ORAL at 05:31

## 2022-12-09 RX ADMIN — DICLOFENAC SODIUM 2 G: 10 GEL TOPICAL at 08:54

## 2022-12-09 RX ADMIN — LORAZEPAM 1 MG: 2 INJECTION INTRAMUSCULAR; INTRAVENOUS at 14:34

## 2022-12-09 RX ADMIN — FOLIC ACID 1 MG: 1 TABLET ORAL at 08:51

## 2022-12-09 RX ADMIN — GABAPENTIN 300 MG: 300 CAPSULE ORAL at 08:51

## 2022-12-09 RX ADMIN — QUETIAPINE FUMARATE 50 MG: 50 TABLET ORAL at 15:42

## 2022-12-09 RX ADMIN — CLONIDINE HYDROCHLORIDE 0.1 MG: 0.1 TABLET ORAL at 20:40

## 2022-12-09 ASSESSMENT — ACTIVITIES OF DAILY LIVING (ADL)
ADLS_ACUITY_SCORE: 29

## 2022-12-09 NOTE — PLAN OF CARE
Goal Outcome Evaluation:  ICU End of Shift Summary.  For vital signs and complete assessments, please see documentation flowsheets.      Pertinent assessments: VSS, oriented to self only, Pt still needs PSC. Afebrile. Ls: clear. Tele:SR/ST.  BS: audible X2 loose stools on this shift.    Major Shift Events: Episodes of confusion/agitation haldol and ativan x1. Pt started on clonidine and Neurontin.    Plan (Upcoming Events):     Discharge/Transfer Needs: Pt can transfer to medical floor when bed available.     Bedside Shift Report Completed : Y  Bedside Safety Check Completed: Y

## 2022-12-09 NOTE — PROGRESS NOTES
[facility]  Hospitalist Progress Note  Brian Machado MD       Reason for Stay (Diagnosis):     Acute encephalopathy suspected to be from alcohol withdrawal syndrome and DT.  Possible underlying chronic encephalopathy from alcohol use disorder    Acute GI bleed with black tarry stools    Hepatic encephalopathy    Fever           Assessment and Plan:        Summary of Stay:   Andrea Pham is a 57 year old male with a significant past medical history of Alcohol use disorder, on Suboxone for narcotic addiction, who presents from home with alcohol withdraw concern, black emesis and stool and was admitted on 11/23/2022.     On presentation to the ER, patient arrived hypertensive, tachycardic, tremulous and anxious with nausea and vomiting.  Labs showed hemoglobin of 16.8, abnormal LFTs, lactate was 7.4, quantitative ketones of 2.8.  Blood alcohol level was 0.23. Patient was given IV fluid, Valium IV, octreotide and Protonix infusion started in the emergency department and was admitted to [facility] [place]    It was reported he started drinking heavily and last drink was the night before admission.  Per his friend he was excluded from a family gathering which made him very upset and went on a binge drinking. .      He had worsening withdrawal symptoms and signs and was transferred to the ICU on 11/26 given significant need for IV benzos for initiation of a precedex drip.      Patient intermittently required Precedex infusion for agitation    12/3, tried  to titrate down and stop the Precedex gtt and use other PRN medications, but was very agiated and restarted it at lower dose.     12/5. He got  off Precedex drip     Problem List/Assessment and Plan:     Black tarry stools: Resolved.  -Initially suspected upper GI bleed, resolved  -Some hemoconcentration on presentation and hemoglobin trended down and stabilized with IV fluid.  -Initially on Octreotide but this has since been stopped by gastroenterology.     -Continued with  oral Protonix.   -GI consult appreciated, no endoscopy indicated for now per GI.      Alcohol use disorder with severe withdrawal symptoms and delirium tremens: On presentation  -He was started on CIWA protocol with gabapentin taper and as needed lorazepam, patient has needed multiple doses of Haldol and Ativan.  Has had 32 milligrams of IV Ativan within 24 hours and so was transferred to ICU on 11/26 for initiation of precedex drip.  He has occasionally been able to come off the the precedex drip, but usually has ended back up on it related to agitation during which he is not redirectable.   -Off Precedex drip since December 5  -Stopped CIWA protocol.  -Ativan PRN doses as ordered.  -Was on high-dose of thiamine and folate.  -Encephalopathy is likely multifactorial including substance use disorder, alcohol related brain injury versus others  -Addiction medicine consulted for his polysubstance abuse including history of methamphetamine use disorder, opiate use disorder and alcohol use disorder.  Patient was seen remotely by addiction medicine physician.  Recommendation obtained to continue high-dose thiamine, discontinue all mood altering medications, reduce buprenorphine to 8 mg daily, check Depakote level and ammonia and consider MRI when he is  more stable.  Input from Dr. Delgado appreciated    -Currently patient is calmer, off Precedex drip.  He continues to be confused and disoriented x2.  He requires bedside sitter.  Will get MRI with ativan if possible , continue current medications          Hepatic Encephalopathy  Acute toxic/metabolic Encephalopathy  Intermittent agitation and combativeness: Ongoing, slowly improving  --This is likely multifactorial in the setting of severe alcohol withdrawal requiring significant amounts of sedation initially.   -Stopped Precedex drip on 12/5    -Slightly elevated NH3 leve and started on Lactulose that should be titrated to 2 to 3 bowel movements per  day  -Etiology of his change in mental status is not fully clear, doubt alcohol is contributing anymore as he has been in hospital for more than 10 days and treated with for [facility] protocol.  -Has been on Seroquel 25 mg twice a day and 50 mg at at bedtime.  We will increase Seroquel to 50 twice daily  -Continue as needed Haldol and Ativan and Zyprexia.  -Use soft restraints as patient is agitated when off Precedex drip.  -Midline was placed as he removed multiple PIV and refused oral medications and agitated.     --  His sister reported that he has multiple traumatic brain injuries and concern if that is contributing to his substance abuse and hallucinations with possible structural brain injuries.  Unable to do MRI due to patient's condition.  Plan to do  MRI study if possible     Poor p.o. intake:  Sever Malnutrition:  -Encouraged oral intake. Drinking protein shakes.   -He was able to eat more . He was off  sedation, agitated and oral intake has been minimal.   -Nutrition consulted.   -Patient does not meet his meet his nutritional requirement.    Substance abuse disorder:  -He is on Suboxone PTA, resumed.    -He will definitely need treatment for polysubstance use disorder.    -- Addiction medicine consulted and recommendation as above     Depression/anxiety:  --PTA Wellbutrin, Depakote, trazodone and Zyprexa    Lactic acidosis: Resolsved  -Serum lactate 7.4 on presentation, secondary to alcohol use.   -Improved to 1.8 with hydration.  Blood pressure did improve with IV fluid.     Alcoholic liver disease:  -Patient history of alcohol abuse with alcoholic liver disease.     Alcoholic ketosis, resolved     History of hepatitis C:    --Is not clear if he ever had treatment.  Reassess once mental status is improved.     Urinary retention in setting of BPH:    -Lino is out on 12/3 and now voiding.    -Continue on Flomax,     Left wrist pain: No more complaint, seems to be resolved  -No reported trauma, there was some  "swelling in the mid range of motion.  -Wrist x-ray  With no acute fractures   -Voltaren gel and cold compression.    Sinus tachycardia with transient hypotension.  Episode of fever up to 102.5.  -Patient developed sinus tachycardia and also transient hypotension, also reported one episode of fever 12/5  -This was  during severe agitation and precedex drip was stopped.   -Blood culture was done so far negative.   -Lino catheter was removed .  -Patient has no urinary symptoms .  -Continue close monitoring, trend temperature.  -Procal was 0.7.  -BC x 2 done so far negative..  -Lino cath removed 12/3.  -No Indication for antibiotics at this time, may consider empiric treatment if there is any focal infection identified.  -He remained tachycardic.  Started him with beta-blocker and monitor patient's vital signs.      Clinically Significant Risk Factors              # Hypoalbuminemia: Lowest albumin = 3.4 g/dL at 12/8/2022  5:13 AM, will monitor as appropriate           # Severe Malnutrition: based on nutrition assessment      DVT Prophylaxis: Pneumatic Compression Devices.    Code Status: Full Code.    Family update : Patient's Sister Abida was updated.  MRI was done but result pending    Discharge Dispo/Date: May transfer him out of ICU if he continues to improve and remains off Precedex.          Interval History (Subjective):      Patient was seen and examined by me at bedside.  Patient is calm and pleasant but remains confused. Sitter still needed but he is more cooperative and did not need precedex          Physical Exam:      Last Vital Signs:  BP (!) 106/29   Pulse 94   Temp 98.4  F (36.9  C)   Resp 16   Ht 1.803 m (5' 11\")   Wt 76.6 kg (168 lb 14 oz)   SpO2 97%   BMI 23.55 kg/m         GENERAL: Awake, alert today, more cooperative, no agitation   HEENT: NC/AT, eyes Unicteric, moist oral mucosa.  CVS: RRR, S1, S2.  No murmurs appreciated.  CHEST: Normal work of breathing.  Lungs CTAB.  ABD: Soft, " non-tender, non-distended.  Bowel sounds present.  No guarding.  EXT: no deformities.  Warm, well perfused.  Skin: no rashes or lesions noted.  Warm and dry.  Neuro: No gross deficits noted.  Speech clear.  Follows instructions, moves all his extremities.  Psych: Awake, alert comfortable.  Confused         Medications:      All current medications were reviewed with changes reflected in problem list.  Current Facility-Administered Medications   Medication     acetaminophen (TYLENOL) tablet 650 mg    Or     acetaminophen (TYLENOL) Suppository 650 mg     buprenorphine HCl-naloxone HCl (SUBOXONE) 8-2 MG per film 1 Film     buPROPion (WELLBUTRIN XL) 24 hr tablet 450 mg     cloNIDine (CATAPRES) tablet 0.1 mg     glucose gel 15-30 g    Or     dextrose 50 % injection 25-50 mL    Or     glucagon injection 1 mg     diclofenac (VOLTAREN) 1 % topical gel 2 g     divalproex sodium extended-release (DEPAKOTE ER) 24 hr tablet 1,000 mg     flumazenil (ROMAZICON) injection 0.2 mg     folic acid (FOLVITE) tablet 1 mg     gabapentin (NEURONTIN) capsule 300 mg     haloperidol lactate (HALDOL) injection 5 mg     lactulose (CHRONULAC) solution 20 g     lidocaine (LMX4) cream     lidocaine 1 % 0.1-1 mL     LORazepam (ATIVAN) tablet 1-2 mg    Or     LORazepam (ATIVAN) injection 1-2 mg     melatonin tablet 5 mg     metoprolol (LOPRESSOR) injection 5 mg     multivitamin w/minerals (THERA-VIT-M) tablet 1 tablet     nitroGLYcerin (NITROSTAT) sublingual tablet 0.4 mg     OLANZapine (zyPREXA) tablet 5-10 mg     pantoprazole (PROTONIX) EC tablet 40 mg     polyethylene glycol (MIRALAX) Packet 17 g     propranolol (INDERAL) tablet 10 mg     QUEtiapine (SEROquel) tablet 50 mg     senna-docusate (SENOKOT-S/PERICOLACE) 8.6-50 MG per tablet 2 tablet     sodium chloride (PF) 0.9% PF flush 10 mL     sodium chloride (PF) 0.9% PF flush 10-20 mL     sodium chloride (PF) 0.9% PF flush 3 mL     tamsulosin (FLOMAX) capsule 0.4 mg     thiamine (B-1) tablet 100  mg     traZODone (DESYREL) tablet 50 mg          Data:      All new lab and imaging data was reviewed.   Labs:  Recent Labs   Lab 12/09/22  0508 12/08/22  0513 12/07/22  0820 12/06/22 0727 12/05/22  0445 12/04/22 0544   NA  --  138  --  139  --  140   POTASSIUM 4.2 4.6 4.4 3.7   < > 3.6   CHLORIDE  --  101  --  102  --  104   CO2  --  27  --  26  --  27   ANIONGAP  --  10  --  11  --  9   GLC  --  109*  --  81  --  101*   BUN  --  15.1  --  8.9  --  6.9   CR  --  0.74  --  0.73  --  0.74   GFRESTIMATED  --  >90  --  >90  --  >90   JOHANA  --  9.1  --  8.7  --  8.6   MAG 1.9 1.9 2.0 1.8   < > 1.9   PHOS 3.5 3.3 4.0 3.7   < > 3.1   PROTTOTAL  --  6.6  --   --   --   --    ALBUMIN  --  3.4*  --   --   --   --    BILITOTAL  --  0.3  --   --   --   --    ALKPHOS  --  72  --   --   --   --    AST  --  16  --   --   --   --    ALT  --  21  --   --   --   --     < > = values in this interval not displayed.     Recent Labs   Lab 12/08/22 0513 12/06/22 0727 12/04/22 0544 12/03/22  0538   * 81 101* 106*       Recent Labs   Lab 12/08/22 0513   WBC 6.1   HGB 12.5*   HCT 39.2*   MCV 97         Imaging:   No results found for this or any previous visit (fom the past 48 hour(s)).

## 2022-12-09 NOTE — PLAN OF CARE
Pt confused, but directable.  Anxious at times.  VSS.  Up with SBA, walker and gait belt to BR. Had BM.  On RA, LS clear.  Voiding without problems.  No PRNs given.

## 2022-12-09 NOTE — PLAN OF CARE
Patient alert but confused and impulsive. Tolerating diet and activity.  Mri of head done. Ot/pt following-see recomendations  Sitter at bedside for patient safety.  Tcu bed when available.

## 2022-12-10 ENCOUNTER — APPOINTMENT (OUTPATIENT)
Dept: PHYSICAL THERAPY | Facility: CLINIC | Age: 57
DRG: 896 | End: 2022-12-10
Payer: MEDICARE

## 2022-12-10 LAB
AMMONIA PLAS-SCNC: 59 UMOL/L (ref 16–60)
MAGNESIUM SERPL-MCNC: 1.8 MG/DL (ref 1.7–2.3)
PHOSPHATE SERPL-MCNC: 3.8 MG/DL (ref 2.5–4.5)
POTASSIUM SERPL-SCNC: 3.9 MMOL/L (ref 3.4–5.3)

## 2022-12-10 PROCEDURE — 250N000013 HC RX MED GY IP 250 OP 250 PS 637: Performed by: INTERNAL MEDICINE

## 2022-12-10 PROCEDURE — 99233 SBSQ HOSP IP/OBS HIGH 50: CPT | Performed by: INTERNAL MEDICINE

## 2022-12-10 PROCEDURE — 84100 ASSAY OF PHOSPHORUS: CPT | Performed by: INTERNAL MEDICINE

## 2022-12-10 PROCEDURE — 36415 COLL VENOUS BLD VENIPUNCTURE: CPT | Performed by: INTERNAL MEDICINE

## 2022-12-10 PROCEDURE — 200N000001 HC R&B ICU

## 2022-12-10 PROCEDURE — 82140 ASSAY OF AMMONIA: CPT | Performed by: INTERNAL MEDICINE

## 2022-12-10 PROCEDURE — 83735 ASSAY OF MAGNESIUM: CPT | Performed by: INTERNAL MEDICINE

## 2022-12-10 PROCEDURE — 84132 ASSAY OF SERUM POTASSIUM: CPT | Performed by: INTERNAL MEDICINE

## 2022-12-10 PROCEDURE — 97112 NEUROMUSCULAR REEDUCATION: CPT | Mod: GP | Performed by: PHYSICAL THERAPIST

## 2022-12-10 PROCEDURE — 97116 GAIT TRAINING THERAPY: CPT | Mod: GP | Performed by: PHYSICAL THERAPIST

## 2022-12-10 PROCEDURE — 97750 PHYSICAL PERFORMANCE TEST: CPT | Mod: GP | Performed by: PHYSICAL THERAPIST

## 2022-12-10 RX ORDER — PROPRANOLOL HYDROCHLORIDE 10 MG/1
10 TABLET ORAL 2 TIMES DAILY
Status: DISCONTINUED | OUTPATIENT
Start: 2022-12-10 | End: 2022-12-11

## 2022-12-10 RX ADMIN — THIAMINE HCL TAB 100 MG 100 MG: 100 TAB at 09:11

## 2022-12-10 RX ADMIN — PANTOPRAZOLE SODIUM 40 MG: 40 TABLET, DELAYED RELEASE ORAL at 09:07

## 2022-12-10 RX ADMIN — QUETIAPINE FUMARATE 50 MG: 50 TABLET ORAL at 09:14

## 2022-12-10 RX ADMIN — DICLOFENAC SODIUM 2 G: 10 GEL TOPICAL at 15:38

## 2022-12-10 RX ADMIN — DIVALPROEX SODIUM 1000 MG: 500 TABLET, EXTENDED RELEASE ORAL at 09:10

## 2022-12-10 RX ADMIN — LORAZEPAM 1 MG: 1 TABLET ORAL at 20:59

## 2022-12-10 RX ADMIN — MULTIPLE VITAMINS W/ MINERALS TAB 1 TABLET: TAB at 09:07

## 2022-12-10 RX ADMIN — GABAPENTIN 300 MG: 300 CAPSULE ORAL at 18:11

## 2022-12-10 RX ADMIN — CLONIDINE HYDROCHLORIDE 0.1 MG: 0.1 TABLET ORAL at 15:38

## 2022-12-10 RX ADMIN — QUETIAPINE FUMARATE 50 MG: 50 TABLET ORAL at 18:11

## 2022-12-10 RX ADMIN — LACTULOSE 20 G: 20 SOLUTION ORAL at 09:08

## 2022-12-10 RX ADMIN — BUPRENORPHINE AND NALOXONE 1 FILM: 8; 2 FILM, SOLUBLE BUCCAL; SUBLINGUAL at 18:11

## 2022-12-10 RX ADMIN — LACTULOSE 20 G: 20 SOLUTION ORAL at 20:58

## 2022-12-10 RX ADMIN — DICLOFENAC SODIUM 2 G: 10 GEL TOPICAL at 09:18

## 2022-12-10 RX ADMIN — DICLOFENAC SODIUM 2 G: 10 GEL TOPICAL at 18:11

## 2022-12-10 RX ADMIN — BUPRENORPHINE AND NALOXONE 1 FILM: 8; 2 FILM, SOLUBLE BUCCAL; SUBLINGUAL at 09:15

## 2022-12-10 RX ADMIN — OLANZAPINE 10 MG: 2.5 TABLET, FILM COATED ORAL at 20:59

## 2022-12-10 RX ADMIN — TAMSULOSIN HYDROCHLORIDE 0.4 MG: 0.4 CAPSULE ORAL at 09:12

## 2022-12-10 RX ADMIN — CLONIDINE HYDROCHLORIDE 0.1 MG: 0.1 TABLET ORAL at 20:59

## 2022-12-10 RX ADMIN — GABAPENTIN 300 MG: 300 CAPSULE ORAL at 20:58

## 2022-12-10 RX ADMIN — FOLIC ACID 1 MG: 1 TABLET ORAL at 09:11

## 2022-12-10 RX ADMIN — PANTOPRAZOLE SODIUM 40 MG: 40 TABLET, DELAYED RELEASE ORAL at 18:11

## 2022-12-10 RX ADMIN — GABAPENTIN 300 MG: 300 CAPSULE ORAL at 09:12

## 2022-12-10 RX ADMIN — PROPRANOLOL HYDROCHLORIDE 10 MG: 10 TABLET ORAL at 20:59

## 2022-12-10 RX ADMIN — BUPROPION HYDROCHLORIDE 450 MG: 150 TABLET, EXTENDED RELEASE ORAL at 09:14

## 2022-12-10 RX ADMIN — TRAZODONE HYDROCHLORIDE 50 MG: 50 TABLET ORAL at 20:59

## 2022-12-10 ASSESSMENT — ACTIVITIES OF DAILY LIVING (ADL)
ADLS_ACUITY_SCORE: 29

## 2022-12-10 NOTE — PROGRESS NOTES
12/10/22 1600   Signing Clinician's Name / Credentials   Signing clinician's name / credentials Bella Suresh DPT   Arevalo Balance Scale (MARTHA SPARKS, FRANSICO S, ROBERTO COKER, YENI SHEIKH: MEASURING BALANCE IN THE ELDERLY: VALIDATION OF AN INSTRUMENT. CAN. J. PUB. HEALTH, JULY/AUGUST SUPPLEMENT 2:S7-11, 1992.)   Sit To Stand 4   Standing Unsupported 3   Sitting Unsupported 4   Stand to Sit 3   Transfers 4   Standing with Eyes Closed 3   Standing Unsupported, Feet Together 2   Reach Forward With Outstretched Arm 0   Retrieve Object From Floor 3   Turning to Look Behind 1   Turn 360 Degrees 1   Placing Alternate Foot on Stool (4-6 inches) 1   Unsupported Tandem Stand (Demonstrate to Subject) 0   One Leg Stand 1   Total Score (A score of 45 or less has been correlated with an increased risk of falls)   Total Score (out of 56) 30     Arevalo Balance Scale (BBS) Cutoff Scores: A score of < 45 /56 indicates an increased risk for falls.     The BBS is a measure of static and dynamic standing balance that has been validated in community dwelling elderly individuals and individuals who have Parkinson's Disease, MS, and those who are s/p CVA and TBI. The test is administered without an assistive device. Scores from the Arevalo are used to determine the probability of falling based on the patient's previous history of falls and their test performance.     Minimal Detectable Change = 6.5   & Minimal Detectable Change (Parkinson's Disease) = 5 according to [employer] 2008    Assessment (rationale for performing, application to patient s function & care plan): Pt presenting as a falls risk; recommend use of assistive device with high level mobility tasks.  (Minutes billed as physical performance test)

## 2022-12-10 NOTE — PROGRESS NOTES
[facility]  Hospitalist Progress Note  Brian Machado MD       Reason for Stay (Diagnosis):     Acute encephalopathy suspected to be from alcohol withdrawal syndrome and DT.  Possible underlying chronic encephalopathy from alcohol use disorder    Acute GI bleed with black tarry stools    Hepatic encephalopathy    Fever           Assessment and Plan:        Summary of Stay:   Andrea Pham is a 57 year old male with a significant past medical history of Alcohol use disorder, on Suboxone for narcotic addiction, who presents from home with alcohol withdraw concern, black emesis and stool and was admitted on 11/23/2022.     On presentation to the ER, patient arrived hypertensive, tachycardic, tremulous and anxious with nausea and vomiting.  Labs showed hemoglobin of 16.8, abnormal LFTs, lactate was 7.4, quantitative ketones of 2.8.  Blood alcohol level was 0.23. Patient was given IV fluid, Valium IV, octreotide and Protonix infusion started in the emergency department and was [place] to [facility] [place]    It was reported he started drinking heavily and last drink was the night before admission.  Per his friend he was excluded from a family gathering which made him very upset and went on a binge drinking. .      He had worsening withdrawal symptoms and signs and was transferred to the ICU on 11/26 given significant need for IV benzos for initiation of a precedex drip.      Patient intermittently required Precedex infusion for agitation    12/3, tried  to titrate down and stop the Precedex gtt and use other PRN medications, but was very agiated and restarted it at lower dose.     12/5. He got  off Precedex drip     Problem List/Assessment and Plan:     Black tarry stools: Resolved.  -Initially suspected upper GI bleed, resolved  -Some hemoconcentration on presentation and hemoglobin trended down and stabilized with IV fluid.  -Initially on Octreotide but this has since been stopped by gastroenterology.     -Continued with  oral Protonix.   -GI consult appreciated, no endoscopy indicated for now per GI.      Alcohol use disorder with severe withdrawal symptoms and delirium tremen  -He was started on CIWA protocol with gabapentin taper and as needed lorazepam, patient has needed multiple doses of Haldol and Ativan.  Has had 32 milligrams of IV Ativan within 24 hours and so was transferred to ICU on 11/26 for initiation of precedex drip.  He has occasionally been able to come off the the precedex drip, but usually has ended back up on it related to agitation during which he is not redirectable.   -Off Precedex drip since December 5  -Stopped CIWA protocol.  -Ativan PRN doses as ordered.  -Was on high-dose of thiamine and folate.  -Encephalopathy is likely multifactorial including substance use disorder, alcohol related brain injury versus others  -Addiction medicine consulted for his polysubstance abuse including history of methamphetamine use disorder, opiate use disorder and alcohol use disorder.  Patient was seen remotely seen by addiction medicine physician.  Recommendation obtained to continue high-dose thiamine, discontinue all mood altering medications, reduce buprenorphine to 8 mg daily, check Depakote level and ammonia and consider MRI when he is  more stable.  Input from Dr. Delgado appreciated     -Currently patient is calmer, off Precedex drip.  He continues to be confused and disoriented x2.  He requires bedside sitter.  MRI of the brain showed no evidence of acute pathology.  Continue to reorient patient, continue current medications, follow Depakote level, consider neurology consultation if he continues to be encephalopathic       Hepatic Encephalopathy  Acute toxic/metabolic Encephalopathy  Intermittent agitation and combativeness: Ongoing, slowly improving  --This is likely multifactorial in the setting of severe alcohol withdrawal requiring significant amounts of sedation initially.   -Stopped  Precedex drip on 12/5    -Slightly elevated NH3 leve and started on Lactulose that should be titrated to 2 to 3 bowel movements per day  -Etiology of his change in mental status is not fully clear, doubt alcohol is contributing anymore as he has been in hospital for more than 10 days and treated with for CIWA protocol.  -Has been on Seroquel 25 mg twice a day and 50 mg at at bedtime.  Increased Seroquel to 50 twice daily.    -Continue as needed Haldol and Ativan and Zyprexia.  -Use soft restraints as patient is agitated when off Precedex drip.  -Midline was placed as he removed multiple PIV and refused oral medications and agitated.   --  His sister reported that he has multiple traumatic brain injuries and concern if that is contributing to his substance abuse and hallucinations with possible structural brain injuries.  MRI was done which showed no acute pathology.       Poor p.o. intake:  Sever Malnutrition:  -Encouraged oral intake. Drinking protein shakes.   -He was able to eat more . He was off  sedation, agitated and oral intake has been minimal.   -Nutrition consulted.   -His intake is improving    Substance abuse disorder:  -He is on Suboxone PTA, resumed.    -He will definitely need treatment for polysubstance use disorder.    -- Addiction medicine consulted and recommendation as above     Depression/anxiety:  --PTA Wellbutrin, Depakote, trazodone and Zyprexa    Lactic acidosis: Resolsved  -Serum lactate 7.4 on presentation, secondary to alcohol use.   -Improved to 1.8 with hydration.  Blood pressure did improve with IV fluid.     Alcoholic liver disease:  -Patient history of alcohol abuse with alcoholic liver disease.     Alcoholic ketosis, resolved     History of hepatitis C:    --Is not clear if he ever had treatment.  Reassess once mental status is improved.     Urinary retention in setting of BPH:    -Lino is out on 12/3 and now voiding.    -Continue on Flomax,     Left wrist pain: No more complaint,  "seems to be resolved  -No reported trauma, there was some swelling in the mid range of motion.  -Wrist x-ray  With no acute fractures   -Voltaren gel and cold compression.    Sinus tachycardia with transient hypotension.  Episode of fever up to 102.5.  -Patient developed sinus tachycardia and also transient hypotension, also reported one episode of fever 12/5  -This was  during severe agitation and precedex drip was stopped.   -Blood culture was done so far negative.   -Lino catheter was removed .  -Patient has no urinary symptoms .  -Continue close monitoring, trend temperature.  -Procal was 0.7.  -BC x 2 done so far negative..  -Lino cath removed 12/3.  -No Indication for antibiotics at this time, may consider empiric treatment if there is any focal infection identified.  -Tachycardia improved with propranolol, will decrease dose      Clinically Significant Risk Factors              # Hypoalbuminemia: Lowest albumin = 3.4 g/dL at 12/8/2022  5:13 AM, will monitor as appropriate           # Severe Malnutrition: based on nutrition assessment      DVT Prophylaxis: Pneumatic Compression Devices.    Code Status: Full Code.    Family update : Patient's Sister Abida was updated. Daughter Sasha updated     Discharge Dispo/Date: May transfer to the floor, possible discharge to TCU in the next few days if he remains stable        Interval History (Subjective):      Patient was seen and examined by me at bedside.  Patient continues to better.  He denies any fever or chills.  No shortness of breath.  No hallucination.  He appears to be little confused but more cooperative and able to interact with me.         Physical Exam:      Last Vital Signs:  BP (!) 88/65 (Cuff Size: Adult Regular)   Pulse 94   Temp 97.6  F (36.4  C) (Oral)   Resp 18   Ht 1.803 m (5' 11\")   Wt 76.6 kg (168 lb 14 oz)   SpO2 94%   BMI 23.55 kg/m         GENERAL: Awake, alert today, more cooperative, no agitation   HEENT: NC/AT, eyes Unicteric, moist " oral mucosa.  CVS: RRR, S1, S2.  No murmurs appreciated.  CHEST: Normal work of breathing.  Lungs CTAB.  ABD: Soft, non-tender, non-distended.  Bowel sounds present.  No guarding.  EXT: no deformities.  Warm, well perfused.  Skin: no rashes or lesions noted.  Warm and dry.  Neuro: No gross deficits noted.  Speech clear.  Follows instructions, moves all his extremities.  Psych: Awake, alert comfortable.  Confused         Medications:      All current medications were reviewed with changes reflected in problem list.  Current Facility-Administered Medications   Medication     acetaminophen (TYLENOL) tablet 650 mg    Or     acetaminophen (TYLENOL) Suppository 650 mg     buprenorphine HCl-naloxone HCl (SUBOXONE) 8-2 MG per film 1 Film     buPROPion (WELLBUTRIN XL) 24 hr tablet 450 mg     cloNIDine (CATAPRES) tablet 0.1 mg     glucose gel 15-30 g    Or     dextrose 50 % injection 25-50 mL    Or     glucagon injection 1 mg     diclofenac (VOLTAREN) 1 % topical gel 2 g     divalproex sodium extended-release (DEPAKOTE ER) 24 hr tablet 1,000 mg     flumazenil (ROMAZICON) injection 0.2 mg     folic acid (FOLVITE) tablet 1 mg     gabapentin (NEURONTIN) capsule 300 mg     haloperidol lactate (HALDOL) injection 5 mg     lactulose (CHRONULAC) solution 20 g     lidocaine (LMX4) cream     lidocaine 1 % 0.1-1 mL     LORazepam (ATIVAN) tablet 1-2 mg    Or     LORazepam (ATIVAN) injection 1-2 mg     melatonin tablet 5 mg     metoprolol (LOPRESSOR) injection 5 mg     multivitamin w/minerals (THERA-VIT-M) tablet 1 tablet     nitroGLYcerin (NITROSTAT) sublingual tablet 0.4 mg     OLANZapine (zyPREXA) tablet 5-10 mg     pantoprazole (PROTONIX) EC tablet 40 mg     polyethylene glycol (MIRALAX) Packet 17 g     propranolol (INDERAL) tablet 10 mg     QUEtiapine (SEROquel) tablet 50 mg     senna-docusate (SENOKOT-S/PERICOLACE) 8.6-50 MG per tablet 2 tablet     sodium chloride (PF) 0.9% PF flush 10 mL     sodium chloride (PF) 0.9% PF flush 10-20  mL     sodium chloride (PF) 0.9% PF flush 3 mL     tamsulosin (FLOMAX) capsule 0.4 mg     thiamine (B-1) tablet 100 mg     traZODone (DESYREL) tablet 50 mg          Data:      All new lab and imaging data was reviewed.   Labs:  Recent Labs   Lab 12/10/22  0524 12/09/22  0508 12/08/22 0513 12/07/22  0820 12/06/22 0727 12/05/22 0445 12/04/22 0544   NA  --   --  138  --  139  --  140   POTASSIUM 3.9 4.2 4.6   < > 3.7   < > 3.6   CHLORIDE  --   --  101  --  102  --  104   CO2  --   --  27  --  26  --  27   ANIONGAP  --   --  10  --  11  --  9   GLC  --   --  109*  --  81  --  101*   BUN  --   --  15.1  --  8.9  --  6.9   CR  --   --  0.74  --  0.73  --  0.74   GFRESTIMATED  --   --  >90  --  >90  --  >90   OJHANA  --   --  9.1  --  8.7  --  8.6   MAG 1.8 1.9 1.9   < > 1.8   < > 1.9   PHOS 3.8 3.5 3.3   < > 3.7   < > 3.1   PROTTOTAL  --   --  6.6  --   --   --   --    ALBUMIN  --   --  3.4*  --   --   --   --    BILITOTAL  --   --  0.3  --   --   --   --    ALKPHOS  --   --  72  --   --   --   --    AST  --   --  16  --   --   --   --    ALT  --   --  21  --   --   --   --     < > = values in this interval not displayed.     Recent Labs   Lab 12/08/22 0513 12/06/22 0727 12/04/22 0544   * 81 101*       Recent Labs   Lab 12/08/22 0513   WBC 6.1   HGB 12.5*   HCT 39.2*   MCV 97         Imaging:   No results found for this or any previous visit (fom the past 48 hour(s)).

## 2022-12-10 NOTE — PLAN OF CARE
Neuro: Alert to self and knows he's in the hospital. PSC at bedside dt impulsive behavior  Cardiac: S1 S2 heard. SOft pressures in the night, improving towards morning  Resp: Fine crackles in the bases on RA  GI: On Lactulose   : WDL  Lines: R Midline double lumen both SL      Plan (Upcoming Events): Discharge to TCU     Bedside Shift Report Completed: y   Bedside Safety Check Completed: y

## 2022-12-11 ENCOUNTER — APPOINTMENT (OUTPATIENT)
Dept: OCCUPATIONAL THERAPY | Facility: CLINIC | Age: 57
DRG: 896 | End: 2022-12-11
Payer: MEDICARE

## 2022-12-11 LAB
ALBUMIN SERPL BCG-MCNC: 3.8 G/DL (ref 3.5–5.2)
ALP SERPL-CCNC: 71 U/L (ref 40–129)
ALT SERPL W P-5'-P-CCNC: 18 U/L (ref 10–50)
AMMONIA PLAS-SCNC: 58 UMOL/L (ref 16–60)
ANION GAP SERPL CALCULATED.3IONS-SCNC: 11 MMOL/L (ref 7–15)
AST SERPL W P-5'-P-CCNC: 18 U/L (ref 10–50)
BASE EXCESS BLDV CALC-SCNC: 5.5 MMOL/L (ref -7.7–1.9)
BASOPHILS # BLD AUTO: 0.1 10E3/UL (ref 0–0.2)
BASOPHILS NFR BLD AUTO: 2 %
BILIRUB SERPL-MCNC: 0.2 MG/DL
BUN SERPL-MCNC: 17.1 MG/DL (ref 6–20)
CALCIUM SERPL-MCNC: 9 MG/DL (ref 8.6–10)
CHLORIDE SERPL-SCNC: 99 MMOL/L (ref 98–107)
CREAT SERPL-MCNC: 0.78 MG/DL (ref 0.67–1.17)
DEPRECATED HCO3 PLAS-SCNC: 27 MMOL/L (ref 22–29)
EOSINOPHIL # BLD AUTO: 0.2 10E3/UL (ref 0–0.7)
EOSINOPHIL NFR BLD AUTO: 3 %
ERYTHROCYTE [DISTWIDTH] IN BLOOD BY AUTOMATED COUNT: 13.2 % (ref 10–15)
GFR SERPL CREATININE-BSD FRML MDRD: >90 ML/MIN/1.73M2
GLUCOSE SERPL-MCNC: 89 MG/DL (ref 70–99)
HCO3 BLDV-SCNC: 30 MMOL/L (ref 21–28)
HCT VFR BLD AUTO: 39.2 % (ref 40–53)
HGB BLD-MCNC: 12.3 G/DL (ref 13.3–17.7)
IMM GRANULOCYTES # BLD: 0.3 10E3/UL
IMM GRANULOCYTES NFR BLD: 4 %
LYMPHOCYTES # BLD AUTO: 1.2 10E3/UL (ref 0.8–5.3)
LYMPHOCYTES NFR BLD AUTO: 18 %
MAGNESIUM SERPL-MCNC: 1.9 MG/DL (ref 1.7–2.3)
MCH RBC QN AUTO: 30.5 PG (ref 26.5–33)
MCHC RBC AUTO-ENTMCNC: 31.4 G/DL (ref 31.5–36.5)
MCV RBC AUTO: 97 FL (ref 78–100)
MONOCYTES # BLD AUTO: 0.8 10E3/UL (ref 0–1.3)
MONOCYTES NFR BLD AUTO: 12 %
NEUTROPHILS # BLD AUTO: 4.2 10E3/UL (ref 1.6–8.3)
NEUTROPHILS NFR BLD AUTO: 61 %
NRBC # BLD AUTO: 0 10E3/UL
NRBC BLD AUTO-RTO: 0 /100
O2/TOTAL GAS SETTING VFR VENT: 0 %
PCO2 BLDV: 44 MM HG (ref 40–50)
PH BLDV: 7.45 [PH] (ref 7.32–7.43)
PHOSPHATE SERPL-MCNC: 3.4 MG/DL (ref 2.5–4.5)
PLATELET # BLD AUTO: 461 10E3/UL (ref 150–450)
PO2 BLDV: 59 MM HG (ref 25–47)
POTASSIUM SERPL-SCNC: 4.6 MMOL/L (ref 3.4–5.3)
PROT SERPL-MCNC: 6.7 G/DL (ref 6.4–8.3)
RBC # BLD AUTO: 4.03 10E6/UL (ref 4.4–5.9)
SODIUM SERPL-SCNC: 137 MMOL/L (ref 136–145)
VALPROATE FREE MFR SERPL: ABNORMAL %
VALPROATE FREE SERPL-MCNC: <7 UG/ML
VALPROATE SERPL-MCNC: 39 UG/ML
WBC # BLD AUTO: 6.8 10E3/UL (ref 4–11)

## 2022-12-11 PROCEDURE — 120N000001 HC R&B MED SURG/OB

## 2022-12-11 PROCEDURE — 82803 BLOOD GASES ANY COMBINATION: CPT | Performed by: INTERNAL MEDICINE

## 2022-12-11 PROCEDURE — 83735 ASSAY OF MAGNESIUM: CPT | Performed by: INTERNAL MEDICINE

## 2022-12-11 PROCEDURE — 85025 COMPLETE CBC W/AUTO DIFF WBC: CPT | Performed by: INTERNAL MEDICINE

## 2022-12-11 PROCEDURE — 99232 SBSQ HOSP IP/OBS MODERATE 35: CPT | Performed by: INTERNAL MEDICINE

## 2022-12-11 PROCEDURE — 36415 COLL VENOUS BLD VENIPUNCTURE: CPT | Performed by: INTERNAL MEDICINE

## 2022-12-11 PROCEDURE — 250N000013 HC RX MED GY IP 250 OP 250 PS 637: Performed by: INTERNAL MEDICINE

## 2022-12-11 PROCEDURE — 84100 ASSAY OF PHOSPHORUS: CPT | Performed by: INTERNAL MEDICINE

## 2022-12-11 PROCEDURE — 250N000011 HC RX IP 250 OP 636: Performed by: INTERNAL MEDICINE

## 2022-12-11 PROCEDURE — 97535 SELF CARE MNGMENT TRAINING: CPT | Mod: GO

## 2022-12-11 PROCEDURE — 999N000040 HC STATISTIC CONSULT NO CHARGE VASC ACCESS

## 2022-12-11 PROCEDURE — 82310 ASSAY OF CALCIUM: CPT | Performed by: INTERNAL MEDICINE

## 2022-12-11 PROCEDURE — 82140 ASSAY OF AMMONIA: CPT | Performed by: INTERNAL MEDICINE

## 2022-12-11 RX ORDER — TESTOSTERONE CYPIONATE 200 MG/ML
200 INJECTION, SOLUTION INTRAMUSCULAR
Status: DISCONTINUED | OUTPATIENT
Start: 2022-12-11 | End: 2022-12-12 | Stop reason: HOSPADM

## 2022-12-11 RX ADMIN — TAMSULOSIN HYDROCHLORIDE 0.4 MG: 0.4 CAPSULE ORAL at 08:58

## 2022-12-11 RX ADMIN — THIAMINE HCL TAB 100 MG 100 MG: 100 TAB at 08:59

## 2022-12-11 RX ADMIN — TRAZODONE HYDROCHLORIDE 50 MG: 50 TABLET ORAL at 20:40

## 2022-12-11 RX ADMIN — DICLOFENAC SODIUM 2 G: 10 GEL TOPICAL at 08:59

## 2022-12-11 RX ADMIN — FOLIC ACID 1 MG: 1 TABLET ORAL at 08:58

## 2022-12-11 RX ADMIN — GABAPENTIN 300 MG: 300 CAPSULE ORAL at 08:58

## 2022-12-11 RX ADMIN — QUETIAPINE FUMARATE 50 MG: 50 TABLET ORAL at 08:58

## 2022-12-11 RX ADMIN — PANTOPRAZOLE SODIUM 40 MG: 40 TABLET, DELAYED RELEASE ORAL at 16:58

## 2022-12-11 RX ADMIN — QUETIAPINE FUMARATE 50 MG: 50 TABLET ORAL at 16:59

## 2022-12-11 RX ADMIN — GABAPENTIN 300 MG: 300 CAPSULE ORAL at 22:57

## 2022-12-11 RX ADMIN — MULTIPLE VITAMINS W/ MINERALS TAB 1 TABLET: TAB at 08:58

## 2022-12-11 RX ADMIN — GABAPENTIN 300 MG: 300 CAPSULE ORAL at 16:58

## 2022-12-11 RX ADMIN — TESTOSTERONE CYPIONATE 200 MG: 200 INJECTION, SOLUTION INTRAMUSCULAR at 11:39

## 2022-12-11 RX ADMIN — LACTULOSE 20 G: 20 SOLUTION ORAL at 08:59

## 2022-12-11 RX ADMIN — DIVALPROEX SODIUM 1000 MG: 500 TABLET, EXTENDED RELEASE ORAL at 08:58

## 2022-12-11 RX ADMIN — PANTOPRAZOLE SODIUM 40 MG: 40 TABLET, DELAYED RELEASE ORAL at 08:58

## 2022-12-11 RX ADMIN — BUPROPION HYDROCHLORIDE 450 MG: 150 TABLET, EXTENDED RELEASE ORAL at 08:58

## 2022-12-11 RX ADMIN — DICLOFENAC SODIUM 2 G: 10 GEL TOPICAL at 16:59

## 2022-12-11 RX ADMIN — BUPRENORPHINE AND NALOXONE 1 FILM: 8; 2 FILM, SOLUBLE BUCCAL; SUBLINGUAL at 16:58

## 2022-12-11 RX ADMIN — BUPRENORPHINE AND NALOXONE 1 FILM: 8; 2 FILM, SOLUBLE BUCCAL; SUBLINGUAL at 08:56

## 2022-12-11 ASSESSMENT — ACTIVITIES OF DAILY LIVING (ADL)
ADLS_ACUITY_SCORE: 29
ADLS_ACUITY_SCORE: 29
ADLS_ACUITY_SCORE: 24
ADLS_ACUITY_SCORE: 29
ADLS_ACUITY_SCORE: 24
ADLS_ACUITY_SCORE: 24
ADLS_ACUITY_SCORE: 25
ADLS_ACUITY_SCORE: 29
ADLS_ACUITY_SCORE: 24
ADLS_ACUITY_SCORE: 25

## 2022-12-11 NOTE — PLAN OF CARE
Goal Outcome Evaluation:      Plan of Care Reviewed With: patient    Overall Patient Progress: no changeOverall Patient Progress: no change    Outcome Evaluation: Pt transferred to 6th floor. Sitter at bedside.    Pt transferred to 6th floor around 0320. Oriented to room with sitter at bedside. Was alert and cooperative earlier in shift until morning. Pt was upset about ordering food and having someone to help him in room. Discussed importance of safety and concerns for risk of falling. Pt states that he understands but continued to be frustrated with what was explained. Up with supervision. Pt did eat breakfast and decided to take a shower this AM. Ativan is available as needed.

## 2022-12-11 NOTE — PLAN OF CARE
ICU End of Shift Summary.  For vital signs and complete assessments, please see documentation flowsheets.      Pertinent assessments: Alert, disoriented to situation only. Forgetful. Afebrile. Denies pain. BP soft, MAPs >65. LS clear, sats high 90s on room air. Urinating without difficulty. BMx3, loose. Fair appetite.   Major Shift Events: Less labile, more agreeable.   Plan (Upcoming Events): TCU at discharge, sitter for safety, pt continues to have poor recollection of his environment, unsteady on feet, impulsive  Discharge/Transfer Needs: TBD     Bedside Shift Report Completed : Y  Bedside Safety Check Completed: Y    Goal Outcome Evaluation:      Plan of Care Reviewed With: patient, family

## 2022-12-11 NOTE — PLAN OF CARE
"Goal Outcome Evaluation:      Plan of Care Reviewed With: patient    Overall Patient Progress: improving    CIWA 5 this afternoon.   Pleasant mood. Alert, oriented x4. Talkative. Was anxious this AM and then showered and had breakfast and then feeling better. Mild tremors. Denies pain. BP 90s- no BP meds provided and Dr. Cruz discontinued BP meds. Lungs clear. Bowel and bladder intact, voided. No edema.   Has been cooperative. Has been following plan of care. Has been stable on his feet and ambulating steady. Has been independent in the room with a bed and chair alarm on. I have instructed patient to use the call light for assistance. If not using the call light and alarm sounds, we have been going in room to assist with cares and safety. Pleasant and following directions.   1:1 discontinued at 1015 this morning. Does not exhibit any signs of elopement risk. He notes he is here and follows what the doctors says and said I will follow the safety rules. Dr. Cruz did okay no sitter at the bedside.   When asked if he would like treatment post hospital, patient notes   \"I have went to treatment before and that is not for me.\"    1827 has been cooperative all shift. Pleasant mood. Nice to talk with. No signs of flight risk. Has been independent in room and steady on feet.       "

## 2022-12-11 NOTE — PROGRESS NOTES
Report called to MYRNA Hdz on 6th. Pt and belongings tranferred by wheel chair to room 602. Pt's bedside  transferred with pt.

## 2022-12-11 NOTE — PROGRESS NOTES
[facility]  Hospitalist Progress Note  Osmel Cruz MD 12/11/2022    Reason for Stay (Diagnosis): encephalopathy, agitation, ETOH use         Assessment and Plan:      Summary of Stay: Andrea Pham is a 57 year old male with a significant past medical history of Alcohol use disorder, on Suboxone for narcotic addiction, who presented from home with alcohol withdraw concern, black emesis and stool and was admitted on 11/23/2022.      On presentation to the ER, patient arrived hypertensive, tachycardic, tremulous and anxious with nausea and vomiting.  Labs showed hemoglobin of 16.8, abnormal LFTs, lactate was 7.4, quantitative ketones of 2.8.  Blood alcohol level was 0.23. Patient was given IV fluid, Valium IV, octreotide and Protonix infusion started in the emergency department and was admitted to [facility]     It was reported he started drinking heavily and last drink was the night before admission.  Per his friend he was excluded from a family gathering which made him very upset and went on a binge drinking. .       He had worsening withdrawal symptoms and signs and was transferred to the ICU on 11/26 given significant need for IV benzos for initiation of a precedex drip.       Patient intermittently required Precedex infusion for agitation     On 12/5 he was successfully weaned off Precedex gtt    He was transferred out of the ICU 12/10     Plans today  - can discontinue bedside attendant today  - repeat labs today  - stop lactulose  - stop propranolol  - stop clonidine  - nearing discharge from the hospital assuming no further issues with agitation/encephalopathy and able to safely mobilize     Problem List/Assessment and Plan:      Black tarry stools: Resolved.  -Initially suspected upper GI bleed, resolved  -Some hemoconcentration on presentation and hemoglobin trended down and stabilized with IV fluid.  -Initially on Octreotide but this has since been stopped by gastroenterology.     -Continued with  oral Protonix.   -GI consult appreciated, no endoscopy indicated for now per GI.        Alcohol use disorder with severe withdrawal symptoms and delirium tremen - resolved  -He was started on CIWA protocol with gabapentin taper and as needed lorazepam, patient has needed multiple doses of Haldol and Ativan.  Has had 32 milligrams of IV Ativan within 24 hours and so was transferred to ICU on 11/26 for initiation of precedex drip.  He has occasionally been able to come off the the precedex drip, but usually has ended back up on it related to agitation during which he is not redirectable.   -Off Precedex drip since December 5  -Stopped CIWA protocol.  -Ativan PRN doses as ordered.  -Was on high-dose of thiamine and folate.  -Encephalopathy has improved/resolved and is likely multifactorial including substance use disorder, alcohol related brain injury versus others        Hepatic Encephalopathy  Acute toxic/metabolic Encephalopathy  Intermittent agitation and combativeness: Ongoing, slowly improving  --This is likely multifactorial in the setting of severe alcohol withdrawal requiring significant amounts of sedation initially.   -Stopped Precedex drip on 12/5    - pt had been started on lactulose.  Doubt hepatic encephalopathy and will stop lactulose and monitor sx  -Has been on Seroquel 50 mg bid   --  His sister reported that he has multiple traumatic brain injuries and concern if that is contributing to his substance abuse and hallucinations with possible structural brain injuries.  MRI was done which showed no acute pathology.        Severe Malnutrition:  - intake has improved with resolution of encephalopathy     Substance abuse disorder:  -He is on Suboxone PTA, resumed.      Depression/anxiety:  --PTA Wellbutrin, Depakote, trazodone and Zyprexa     Alcoholic liver disease:  -Patient history of alcohol abuse with alcoholic liver disease.  - LFTs normal 12/11     History of hepatitis C:  "      Urinary retention in setting of BPH:    -Lino is out on 12/3 and now voiding.    -Continue on Flomax,         Sinus tachycardia with transient hypotension.  Episode of fever up to 102.5.  -Patient developed sinus tachycardia and also transient hypotension, also reported one episode of fever 12/5  -This was  during severe agitation and precedex drip was stopped.   -Blood culture was done so far negative.   -Lino catheter was removed .  -Patient has no urinary symptoms .  -Continue close monitoring, trend temperature.  -Procal was 0.7.  -BC x 2 done so far negative..  -Lino cath removed 12/3.  -No Indication for antibiotics at this time, may consider empiric treatment if there is any focal infection identified.  -Tachycardia improved with propranolol, will decrease dose       DVT Prophylaxis: Pneumatic Compression Devices  Code Status: Full Code  Discharge Dispo: home   Estimated Disch Date / # of Days until Disch: 1-2 days after agitation/AMS improves further, able to mobilize        Interval History (Subjective):      Asking when he can discharge.  D/w him his hx of being a  for many years and resultant multiple orthopedic injuries related to that.  Transferred out of ICU yesterday.  No fever.  No c/o pain.                    Physical Exam:      Last Vital Signs:  /62 (BP Location: Left arm)   Pulse 81   Temp 98.4  F (36.9  C) (Temporal)   Resp 22   Ht 1.803 m (5' 11\")   Wt 76.6 kg (168 lb 14 oz)   SpO2 99%   BMI 23.55 kg/m        Intake/Output Summary (Last 24 hours) at 12/11/2022 1316  Last data filed at 12/11/2022 1100  Gross per 24 hour   Intake 580 ml   Output --   Net 580 ml       Constitutional: Awake, alert, cooperative, no apparent distress   Respiratory: Clear to auscultation bilaterally, no crackles or wheezing   Cardiovascular: Regular rate and rhythm, normal S1 and S2, and no murmur noted   Abdomen: Normal bowel sounds, soft, non-distended, non-tender   Skin: " No rashes, no cyanosis, dry to touch   Neuro: Alert and oriented x3, no weakness, numbness, memory loss   Extremities: No edema, normal range of motion   Other(s): Mobilizing in room without assist.  Bedside attendant present       All other systems: Negative          Medications:      All current medications were reviewed with changes reflected in problem list.         Data:      All new lab and imaging data was reviewed.   Labs:  Recent Labs   Lab 12/11/22  1046 12/10/22  0524 12/09/22  0508 12/08/22  0513 12/07/22  0820 12/06/22  0727     --   --  138  --  139   POTASSIUM 4.6 3.9 4.2 4.6   < > 3.7   CHLORIDE 99  --   --  101  --  102   CO2 27  --   --  27  --  26   ANIONGAP 11  --   --  10  --  11   GLC 89  --   --  109*  --  81   BUN 17.1  --   --  15.1  --  8.9   CR 0.78  --   --  0.74  --  0.73   GFRESTIMATED >90  --   --  >90  --  >90   JOHANA 9.0  --   --  9.1  --  8.7    < > = values in this interval not displayed.     Recent Labs   Lab 12/11/22  1046   WBC 6.8   HGB 12.3*   HCT 39.2*   MCV 97   *      Imaging:   No results found for this or any previous visit (from the past 24 hour(s)).

## 2022-12-12 ENCOUNTER — APPOINTMENT (OUTPATIENT)
Dept: OCCUPATIONAL THERAPY | Facility: CLINIC | Age: 57
DRG: 896 | End: 2022-12-12
Payer: MEDICARE

## 2022-12-12 VITALS
HEART RATE: 98 BPM | SYSTOLIC BLOOD PRESSURE: 110 MMHG | DIASTOLIC BLOOD PRESSURE: 65 MMHG | RESPIRATION RATE: 20 BRPM | WEIGHT: 168.87 LBS | OXYGEN SATURATION: 97 % | BODY MASS INDEX: 23.64 KG/M2 | HEIGHT: 71 IN | TEMPERATURE: 98.5 F

## 2022-12-12 LAB
MAGNESIUM SERPL-MCNC: 1.9 MG/DL (ref 1.7–2.3)
PHOSPHATE SERPL-MCNC: 2.9 MG/DL (ref 2.5–4.5)
POTASSIUM SERPL-SCNC: 4.7 MMOL/L (ref 3.4–5.3)

## 2022-12-12 PROCEDURE — 250N000011 HC RX IP 250 OP 636: Performed by: INTERNAL MEDICINE

## 2022-12-12 PROCEDURE — 83735 ASSAY OF MAGNESIUM: CPT | Performed by: INTERNAL MEDICINE

## 2022-12-12 PROCEDURE — G0008 ADMIN INFLUENZA VIRUS VAC: HCPCS | Performed by: INTERNAL MEDICINE

## 2022-12-12 PROCEDURE — 36415 COLL VENOUS BLD VENIPUNCTURE: CPT | Performed by: INTERNAL MEDICINE

## 2022-12-12 PROCEDURE — 250N000013 HC RX MED GY IP 250 OP 250 PS 637: Performed by: INTERNAL MEDICINE

## 2022-12-12 PROCEDURE — 84132 ASSAY OF SERUM POTASSIUM: CPT | Performed by: INTERNAL MEDICINE

## 2022-12-12 PROCEDURE — 84100 ASSAY OF PHOSPHORUS: CPT | Performed by: INTERNAL MEDICINE

## 2022-12-12 PROCEDURE — 99238 HOSP IP/OBS DSCHRG MGMT 30/<: CPT | Performed by: INTERNAL MEDICINE

## 2022-12-12 PROCEDURE — 90682 RIV4 VACC RECOMBINANT DNA IM: CPT | Performed by: INTERNAL MEDICINE

## 2022-12-12 PROCEDURE — 97535 SELF CARE MNGMENT TRAINING: CPT | Mod: GO

## 2022-12-12 RX ORDER — ACETAMINOPHEN 500 MG
1000 TABLET ORAL EVERY 6 HOURS PRN
Start: 2022-12-12

## 2022-12-12 RX ORDER — TESTOSTERONE CYPIONATE 200 MG/ML
200 INJECTION, SOLUTION INTRAMUSCULAR
Qty: 2 ML | Refills: 0 | Status: SHIPPED | OUTPATIENT
Start: 2022-12-12 | End: 2024-05-16

## 2022-12-12 RX ORDER — OLANZAPINE 5 MG/1
5 TABLET ORAL EVERY EVENING
Qty: 30 TABLET | Refills: 0 | Status: SHIPPED | OUTPATIENT
Start: 2022-12-12 | End: 2024-02-15

## 2022-12-12 RX ORDER — TAMSULOSIN HYDROCHLORIDE 0.4 MG/1
0.4 CAPSULE ORAL DAILY
Qty: 30 CAPSULE | Refills: 0 | Status: SHIPPED | OUTPATIENT
Start: 2022-12-12 | End: 2024-02-15

## 2022-12-12 RX ORDER — PREGABALIN 100 MG/1
100 CAPSULE ORAL DAILY
Qty: 30 CAPSULE | Refills: 0 | Status: SHIPPED | OUTPATIENT
Start: 2022-12-12 | End: 2024-02-15

## 2022-12-12 RX ORDER — DIVALPROEX SODIUM 500 MG/1
1000 TABLET, EXTENDED RELEASE ORAL AT BEDTIME
Status: DISCONTINUED | OUTPATIENT
Start: 2022-12-12 | End: 2022-12-12 | Stop reason: HOSPADM

## 2022-12-12 RX ORDER — BUPROPION HYDROCHLORIDE 300 MG/1
300 TABLET ORAL EVERY MORNING
Qty: 30 TABLET | Refills: 0 | Status: SHIPPED | OUTPATIENT
Start: 2022-12-12

## 2022-12-12 RX ORDER — LANOLIN ALCOHOL/MO/W.PET/CERES
100 CREAM (GRAM) TOPICAL DAILY
Qty: 30 TABLET | Refills: 0 | Status: SHIPPED | OUTPATIENT
Start: 2022-12-12 | End: 2024-02-15

## 2022-12-12 RX ORDER — DIVALPROEX SODIUM 500 MG/1
1000 TABLET, EXTENDED RELEASE ORAL AT BEDTIME
Qty: 60 TABLET | Refills: 0 | Status: SHIPPED | OUTPATIENT
Start: 2022-12-12

## 2022-12-12 RX ORDER — TRAZODONE HYDROCHLORIDE 50 MG/1
50 TABLET, FILM COATED ORAL EVERY EVENING
Qty: 30 TABLET | Refills: 0 | Status: SHIPPED | OUTPATIENT
Start: 2022-12-12 | End: 2024-02-15

## 2022-12-12 RX ORDER — BUPROPION HYDROCHLORIDE 150 MG/1
150 TABLET ORAL EVERY MORNING
Qty: 30 TABLET | Refills: 0 | Status: SHIPPED | OUTPATIENT
Start: 2022-12-12

## 2022-12-12 RX ADMIN — THIAMINE HCL TAB 100 MG 100 MG: 100 TAB at 08:18

## 2022-12-12 RX ADMIN — PANTOPRAZOLE SODIUM 40 MG: 40 TABLET, DELAYED RELEASE ORAL at 08:03

## 2022-12-12 RX ADMIN — QUETIAPINE FUMARATE 50 MG: 50 TABLET ORAL at 08:18

## 2022-12-12 RX ADMIN — INFLUENZA A VIRUS A/WISCONSIN/588/2019 (H1N1) RECOMBINANT HEMAGGLUTININ ANTIGEN, INFLUENZA A VIRUS A/DARWIN/6/2021 (H3N2) RECOMBINANT HEMAGGLUTININ ANTIGEN, INFLUENZA B VIRUS B/AUSTRIA/1359417/2021 RECOMBINANT HEMAGGLUTININ ANTIGEN, AND INFLUENZA B VIRUS B/PHUKET/3073/2013 RECOMBINANT HEMAGGLUTININ ANTIGEN 0.5 ML: 45; 45; 45; 45 INJECTION INTRAMUSCULAR at 10:15

## 2022-12-12 RX ADMIN — MULTIPLE VITAMINS W/ MINERALS TAB 1 TABLET: TAB at 08:03

## 2022-12-12 RX ADMIN — DICLOFENAC SODIUM 2 G: 10 GEL TOPICAL at 10:22

## 2022-12-12 RX ADMIN — TAMSULOSIN HYDROCHLORIDE 0.4 MG: 0.4 CAPSULE ORAL at 08:02

## 2022-12-12 RX ADMIN — BUPRENORPHINE AND NALOXONE 1 FILM: 8; 2 FILM, SOLUBLE BUCCAL; SUBLINGUAL at 08:03

## 2022-12-12 RX ADMIN — POLYETHYLENE GLYCOL 3350 17 G: 17 POWDER, FOR SOLUTION ORAL at 08:18

## 2022-12-12 RX ADMIN — GABAPENTIN 300 MG: 300 CAPSULE ORAL at 08:18

## 2022-12-12 RX ADMIN — FOLIC ACID 1 MG: 1 TABLET ORAL at 08:18

## 2022-12-12 RX ADMIN — BUPROPION HYDROCHLORIDE 450 MG: 150 TABLET, EXTENDED RELEASE ORAL at 08:02

## 2022-12-12 ASSESSMENT — ACTIVITIES OF DAILY LIVING (ADL)
ADLS_ACUITY_SCORE: 22
ADLS_ACUITY_SCORE: 24
ADLS_ACUITY_SCORE: 22
ADLS_ACUITY_SCORE: 24

## 2022-12-12 NOTE — PROGRESS NOTES
I saw and examined this patient today.  He is doing well.  No agitation.  No confusion.  Cooperative with staff.  Did well overnight.  Understands need to completely abstain from ETOH    Appears stable for discharge home today

## 2022-12-12 NOTE — PROGRESS NOTES
"  Up and stable. Independent. Passed therapy. Oriented x4. Cooperative, pleasant, and following directions. Denies pain, tremors noted. No nausea/vomiting noted. On room air, and voids spontaneously. Midline removed, eating well. Plan is home today with family. Pt in agreement with discharge plan.     1311: Verbalized understanding to all discharge instructions. We are waiting for discharge prescriptions, home meds that pt requested prior to discharge \"I threw my meds away. I need a new supply\" Dr. Cruz aware.  "

## 2022-12-12 NOTE — DISCHARGE SUMMARY
Admit Date: 11/23/2022  Discharge Date: 12/12/2022    PRINCIPAL FINAL DIAGNOSES:     1.  Acute toxic and metabolic encephalopathy due to severe alcohol withdrawal this admission.  2.  Alcohol dependence.  3.  Delirium tremens.  4.  History of substance abuse disorder, maintained on chronic Suboxone.  5.  Depression.  6.  Anxiety.  7.  History of hepatitis C.  8.  History of bladder outlet obstruction.  9.  History of BPH/urine retention.  10.  Severe malnutrition in the setting of chronic alcohol use.  Appreciate Nutrition input.    PRINCIPAL PROCEDURES THIS ADMISSION:     1.  Neuro ICU care.  2.  Precedex drip for agitation and encephalopathy treatment.  3.  [facility] protocol for alcohol withdrawal treatment.  4.  GI consultation.  5.  Addiction Medicine consultation.  6.  Brain MRI.  7.  Chemical Dependency consultation.    REASON FOR ADMISSION:  Please see dictated history and physical.  In brief, Mr. Collado is a 57-year-old male with a history of polysubstance abuse, alcohol abuse, who presented to the hospital on 11/23/2022 for concerns about alcohol withdrawal.  Please see dictated history and physical for details.    HOSPITAL COURSE:  Acute toxic/metabolic encephalopathy related to underlying alcohol dependence and acute alcohol withdrawal with delirium tremens:  Patient had a prolonged hospitalization due to severe alcohol withdrawal this admission.  He required ICU care.  He required Precedex drip.  He continued to have intermittent agitation while in the ICU. Precedex drip was attempted to be weaned and was unsuccessful, requiring resumption.  Mental status was slow to improve.  The patient was successfully weaned off of Precedex 12/05/2022.  After that, he continued to have some issues with encephalopathy, but over time he has gradually improved.  By day of discharge, encephalopathy is completely resolved.  Agitation resolved.  He appears to be back to his normal mental status by day of discharge.  The  patient was seen by occupational therapy today who felt the patient could discharge home.  He required a bedside attendant during his hospitalization, but this was discontinued prior to discharge and he remained cooperative and compliant with nursing staff.  The patient appeared medically stable for discharge from the hospital to home today.    The importance of continuing his home medications and complete abstinence from alcohol were discussed with the patient prior to discharge.  The patient declined offer of assistance with chemical dependency treatment, stating that he has been through that, saying that he has had treatment in the past.    The patient is being discharged home today.    DISCHARGE MEDICATIONS:     1.  Zyprexa 5 mg every evening.  2.  Trazodone 50 mg every evening.  3.  Acetaminophen.  4.  Aspirin 325 mg daily.  5.  Suboxone 1 film under the tongue 2 times a day -- chronic medication.  6.  Wellbutrin- mg every morning.  The patient takes 150 mg and 300 mg every day.  7.  Depakote extended release 1000 mg at bedtime.  8.  Folic acid 1 mg daily.  9.  Multivitamin daily.  10.  Narcan as needed for opioid reversal.  11.  Lyrica 100 mg daily.  12.  Tamsulosin 0.4 mg daily.  13.  Testosterone injection every 14 days.  14.  Thiamine 100 mg daily.    FOLLOWUP INSTRUCTIONS:   Recommend to see your primary MD in 1 week after your hospital stay.  Recommend complete alcohol cessation.    I examined the patient on day of discharge.    Osmel Cruz MD        D: 2022   T: 2022   MT: [id number]    Name:     ARLEN MARX  MRN:      [id number]        Account:      [id number]   :      1965           Service Date: 2022                                  Discharge Date: 2022     Document: [id number]

## 2022-12-12 NOTE — PLAN OF CARE
Goal Outcome Evaluation:                  Pt is Alert and oriented X4 Pleasnt, cooperative.  Deyvi pain no sweats, mildly anxious, tremor preset. Pt requested ice cream and  jello, ate all.IV SL. Pt is on RA. Voiding without difficulty.pt ambulated in the hallways.slept , nursing will continue monitoring.Expected discharge  on 12/12/22 afternoon.

## 2022-12-12 NOTE — PROGRESS NOTES
discharged to home with friend. Will plan to  new supply of medications from [facility] once meds RX filled. Edd.

## 2022-12-12 NOTE — PLAN OF CARE
Occupational Therapy Discharge Summary    Reason for therapy discharge:    Discharged to home.with assist from family/friend with IADLs.     Progress towards therapy goal(s). See goals on Care Plan in [facility] electronic health record for goal details.  Goals met    Therapy recommendation(s):    At this time, pt would benefit from assist with IADLs as he is recovering, however if pt and/or family still notice cog impairment after 2 weeks after discharge from hospital, recommend outpatient OT for cog rehab.

## 2022-12-13 ENCOUNTER — PATIENT OUTREACH (OUTPATIENT)
Dept: CARE COORDINATION | Facility: CLINIC | Age: 57
End: 2022-12-13

## 2022-12-13 NOTE — PLAN OF CARE
Physical Therapy Discharge Summary    Reason for therapy discharge:    Discharged to home.    Progress towards therapy goal(s). See goals on Care Plan in [facility] electronic health record for goal details.  Goals not met.  Barriers to achieving goals:   discharge from facility.    Therapy recommendation(s):    Continued therapy is recommended.  Rationale/Recommendations:  PT was recommending TCU at discharge due to poor safety awareness/cognition and impaired balance. Pt was recommended to have Ax1/supervision for all mobility if he was to discharge home.      Note: Pt not seen by documenting PT on this date. Information obtained from chart review.

## 2022-12-13 NOTE — PROGRESS NOTES
Clinic Care Coordination Contact  [facility]: Post-Discharge Note  SITUATION                                                      Admission:    Admission Date: 11/23/22   Reason for Admission: Encephalopathy, agitation, alcohol withdrawal  Discharge:   Discharge Date: 12/12/22  Discharge Diagnosis: Encephalopathy, agitation, alcohol withdrawal    BACKGROUND                                                      Per hospital discharge summary and inpatient provider notes:    Andrea Pham is a 57 year old  male with a significant past medical history of Alcohol use disorder, on Suboxone for narcotic addiction, who presents from home with alcohol withdraw concern, black emesis and stool.     On presentation to the ER, patient arrived hypertensive at mildly tachycardic.  Labs showed hemoglobin of 16.8, abnormal LFTs, lactate was 7.4, quantitative ketones of 2.8.     Patient was given IV fluid, Valium IV, octreotide and Protonix infusion started in the emergency department.  [facility] admission was requested for further management.    ASSESSMENT           Discharge Assessment  How are you doing now that you are home?: I am doing better. Patient has some questions about billing and [facility] provided [facility] Billing phone number.  How are your symptoms? (Red Flag symptoms escalate to triage hotline per guidelines): Improved  Do you feel your condition is stable enough to be safe at home until your provider visit?: Yes  Does the patient have their discharge instructions? : Yes  Does the patient have questions regarding their discharge instructions? : No  Were you started on any new medications or were there changes to any of your previous medications? : No (Patient did not start any new medications.)  Discharge follow-up appointment scheduled within 14 calendar days? : No (Patient plans on calling his PCP to schedule follow up.)  Is patient agreeable to assistance with scheduling? : No                PLAN                                                       Outpatient Plan: Recommend you see your primary MD in 1 week after your hospital stay - follow up after your hospitalization  Recommend complete alcohol cessation    No future appointments.      For any urgent concerns, please contact our 24 hour nurse triage line: [phone] ([phone]         GADIEL Vazquez  [phone]  [facility]

## 2023-04-01 ENCOUNTER — HEALTH MAINTENANCE LETTER (OUTPATIENT)
Age: 58
End: 2023-04-01

## 2023-12-28 ENCOUNTER — HOSPITAL ENCOUNTER (EMERGENCY)
Facility: CLINIC | Age: 58
Discharge: LEFT BEFORE DISCHARGE | End: 2023-12-28
Admitting: EMERGENCY MEDICINE
Payer: MEDICARE

## 2023-12-28 VITALS
HEART RATE: 86 BPM | OXYGEN SATURATION: 99 % | DIASTOLIC BLOOD PRESSURE: 80 MMHG | TEMPERATURE: 98.3 F | RESPIRATION RATE: 20 BRPM | SYSTOLIC BLOOD PRESSURE: 115 MMHG

## 2023-12-28 PROCEDURE — 99281 EMR DPT VST MAYX REQ PHY/QHP: CPT

## 2023-12-28 NOTE — ED NOTES
Pt harassing triage staff demanding to be seen immediatly. Pt informed that there is a bit of a wait today but a doctor will evaluate him as soon as possible. Pt continues to rant non-sensibly.

## 2023-12-28 NOTE — ED TRIAGE NOTES
Pt noticed small red wound on top of foot one hour PTA. Pt reports redness is moving up leg. Reports injecting heroin and meth into same foot approximately two weeks ago. VSS. ABCs intact. A/Ox4. Anxious in triage.

## 2024-02-15 ENCOUNTER — HOSPITAL ENCOUNTER (EMERGENCY)
Facility: CLINIC | Age: 59
Discharge: HOME | End: 2024-02-16
Attending: EMERGENCY MEDICINE | Admitting: EMERGENCY MEDICINE
Payer: MEDICARE

## 2024-02-15 DIAGNOSIS — R45.1 AGITATION: ICD-10-CM

## 2024-02-15 DIAGNOSIS — F20.0 PARANOID SCHIZOPHRENIA (H): ICD-10-CM

## 2024-02-15 DIAGNOSIS — F22 PARANOID DELUSION (H): ICD-10-CM

## 2024-02-15 LAB
ALBUMIN SERPL BCG-MCNC: 4.8 G/DL (ref 3.5–5.2)
ALP SERPL-CCNC: 62 U/L (ref 40–150)
ALT SERPL W P-5'-P-CCNC: 30 U/L (ref 0–70)
AMPHETAMINES UR QL SCN: ABNORMAL
ANION GAP SERPL CALCULATED.3IONS-SCNC: 13 MMOL/L (ref 7–15)
AST SERPL W P-5'-P-CCNC: 40 U/L (ref 0–45)
BARBITURATES UR QL SCN: ABNORMAL
BASOPHILS # BLD AUTO: 0.1 10E3/UL (ref 0–0.2)
BASOPHILS NFR BLD AUTO: 1 %
BENZODIAZ UR QL SCN: ABNORMAL
BILIRUB SERPL-MCNC: 0.6 MG/DL
BUN SERPL-MCNC: 25.4 MG/DL (ref 8–23)
BZE UR QL SCN: ABNORMAL
CALCIUM SERPL-MCNC: 8.9 MG/DL (ref 8.6–10)
CANNABINOIDS UR QL SCN: ABNORMAL
CHLORIDE SERPL-SCNC: 100 MMOL/L (ref 98–107)
CREAT SERPL-MCNC: 1.02 MG/DL (ref 0.67–1.17)
DEPRECATED HCO3 PLAS-SCNC: 25 MMOL/L (ref 22–29)
EGFRCR SERPLBLD CKD-EPI 2021: 85 ML/MIN/1.73M2
EOSINOPHIL # BLD AUTO: 0.1 10E3/UL (ref 0–0.7)
EOSINOPHIL NFR BLD AUTO: 1 %
ERYTHROCYTE [DISTWIDTH] IN BLOOD BY AUTOMATED COUNT: 13.3 % (ref 10–15)
FENTANYL UR QL: ABNORMAL
GLUCOSE SERPL-MCNC: 77 MG/DL (ref 70–99)
HCT VFR BLD AUTO: 45.2 % (ref 40–53)
HGB BLD-MCNC: 15.3 G/DL (ref 13.3–17.7)
IMM GRANULOCYTES # BLD: 0 10E3/UL
IMM GRANULOCYTES NFR BLD: 0 %
LYMPHOCYTES # BLD AUTO: 0.9 10E3/UL (ref 0.8–5.3)
LYMPHOCYTES NFR BLD AUTO: 13 %
MCH RBC QN AUTO: 31.2 PG (ref 26.5–33)
MCHC RBC AUTO-ENTMCNC: 33.8 G/DL (ref 31.5–36.5)
MCV RBC AUTO: 92 FL (ref 78–100)
MONOCYTES # BLD AUTO: 0.7 10E3/UL (ref 0–1.3)
MONOCYTES NFR BLD AUTO: 11 %
NEUTROPHILS # BLD AUTO: 4.7 10E3/UL (ref 1.6–8.3)
NEUTROPHILS NFR BLD AUTO: 74 %
NRBC # BLD AUTO: 0 10E3/UL
NRBC BLD AUTO-RTO: 0 /100
OPIATES UR QL SCN: ABNORMAL
PCP QUAL URINE (ROCHE): ABNORMAL
PLATELET # BLD AUTO: 218 10E3/UL (ref 150–450)
POTASSIUM SERPL-SCNC: 4.5 MMOL/L (ref 3.4–5.3)
PROT SERPL-MCNC: 7.7 G/DL (ref 6.4–8.3)
RBC # BLD AUTO: 4.9 10E6/UL (ref 4.4–5.9)
SODIUM SERPL-SCNC: 138 MMOL/L (ref 135–145)
WBC # BLD AUTO: 6.3 10E3/UL (ref 4–11)

## 2024-02-15 PROCEDURE — 85025 COMPLETE CBC W/AUTO DIFF WBC: CPT | Performed by: EMERGENCY MEDICINE

## 2024-02-15 PROCEDURE — 250N000013 HC RX MED GY IP 250 OP 250 PS 637: Performed by: EMERGENCY MEDICINE

## 2024-02-15 PROCEDURE — 80307 DRUG TEST PRSMV CHEM ANLYZR: CPT | Performed by: EMERGENCY MEDICINE

## 2024-02-15 PROCEDURE — 96372 THER/PROPH/DIAG INJ SC/IM: CPT | Performed by: EMERGENCY MEDICINE

## 2024-02-15 PROCEDURE — 250N000011 HC RX IP 250 OP 636: Mod: JZ | Performed by: EMERGENCY MEDICINE

## 2024-02-15 PROCEDURE — 80053 COMPREHEN METABOLIC PANEL: CPT | Performed by: EMERGENCY MEDICINE

## 2024-02-15 PROCEDURE — 36415 COLL VENOUS BLD VENIPUNCTURE: CPT | Performed by: EMERGENCY MEDICINE

## 2024-02-15 PROCEDURE — 99285 EMERGENCY DEPT VISIT HI MDM: CPT

## 2024-02-15 RX ORDER — TRAZODONE HYDROCHLORIDE 150 MG/1
150 TABLET ORAL AT BEDTIME
COMMUNITY

## 2024-02-15 RX ORDER — DEXTROAMPHETAMINE SACCHARATE, AMPHETAMINE ASPARTATE MONOHYDRATE, DEXTROAMPHETAMINE SULFATE AND AMPHETAMINE SULFATE 6.25; 6.25; 6.25; 6.25 MG/1; MG/1; MG/1; MG/1
25 CAPSULE, EXTENDED RELEASE ORAL DAILY
COMMUNITY

## 2024-02-15 RX ORDER — FOLIC ACID 1 MG/1
1 TABLET ORAL DAILY
Status: DISCONTINUED | OUTPATIENT
Start: 2024-02-16 | End: 2024-02-16 | Stop reason: HOSPADM

## 2024-02-15 RX ORDER — OLANZAPINE 5 MG/1
10 TABLET ORAL AT BEDTIME
Status: DISCONTINUED | OUTPATIENT
Start: 2024-02-15 | End: 2024-02-16 | Stop reason: HOSPADM

## 2024-02-15 RX ORDER — MULTIPLE VITAMINS W/ MINERALS TAB 9MG-400MCG
1 TAB ORAL DAILY
Status: DISCONTINUED | OUTPATIENT
Start: 2024-02-16 | End: 2024-02-16 | Stop reason: HOSPADM

## 2024-02-15 RX ORDER — BUPROPION HYDROCHLORIDE 150 MG/1
150 TABLET ORAL EVERY MORNING
Status: DISCONTINUED | OUTPATIENT
Start: 2024-02-16 | End: 2024-02-15

## 2024-02-15 RX ORDER — LORAZEPAM 2 MG/1
2 TABLET ORAL ONCE
Status: COMPLETED | OUTPATIENT
Start: 2024-02-15 | End: 2024-02-15

## 2024-02-15 RX ORDER — OLANZAPINE 10 MG/1
10 TABLET ORAL AT BEDTIME
COMMUNITY

## 2024-02-15 RX ORDER — DIVALPROEX SODIUM 500 MG/1
1000 TABLET, EXTENDED RELEASE ORAL AT BEDTIME
Status: DISCONTINUED | OUTPATIENT
Start: 2024-02-15 | End: 2024-02-16 | Stop reason: HOSPADM

## 2024-02-15 RX ORDER — TESTOSTERONE CYPIONATE 200 MG/ML
200 INJECTION, SOLUTION INTRAMUSCULAR
Status: DISCONTINUED | OUTPATIENT
Start: 2024-02-15 | End: 2024-02-16 | Stop reason: HOSPADM

## 2024-02-15 RX ORDER — BUPROPION HYDROCHLORIDE 300 MG/1
300 TABLET ORAL EVERY MORNING
Status: DISCONTINUED | OUTPATIENT
Start: 2024-02-16 | End: 2024-02-15

## 2024-02-15 RX ORDER — ASPIRIN 81 MG/1
40.5 TABLET, CHEWABLE ORAL DAILY PRN
COMMUNITY

## 2024-02-15 RX ORDER — ACETAMINOPHEN 500 MG
1000 TABLET ORAL EVERY 6 HOURS PRN
Status: DISCONTINUED | OUTPATIENT
Start: 2024-02-15 | End: 2024-02-16 | Stop reason: HOSPADM

## 2024-02-15 RX ORDER — BUPRENORPHINE AND NALOXONE 8; 2 MG/1; MG/1
1 FILM, SOLUBLE BUCCAL; SUBLINGUAL 2 TIMES DAILY
Status: DISCONTINUED | OUTPATIENT
Start: 2024-02-15 | End: 2024-02-16 | Stop reason: HOSPADM

## 2024-02-15 RX ORDER — LORAZEPAM 1 MG/1
1 TABLET ORAL EVERY 4 HOURS PRN
Status: DISCONTINUED | OUTPATIENT
Start: 2024-02-15 | End: 2024-02-16 | Stop reason: HOSPADM

## 2024-02-15 RX ORDER — TRAZODONE HYDROCHLORIDE 50 MG/1
150 TABLET, FILM COATED ORAL AT BEDTIME
Status: DISCONTINUED | OUTPATIENT
Start: 2024-02-15 | End: 2024-02-16 | Stop reason: HOSPADM

## 2024-02-15 RX ORDER — OLANZAPINE 10 MG/1
10 TABLET, ORALLY DISINTEGRATING ORAL ONCE
Status: COMPLETED | OUTPATIENT
Start: 2024-02-15 | End: 2024-02-15

## 2024-02-15 RX ORDER — DEXTROAMPHETAMINE SACCHARATE, AMPHETAMINE ASPARTATE MONOHYDRATE, DEXTROAMPHETAMINE SULFATE AND AMPHETAMINE SULFATE 5; 5; 5; 5 MG/1; MG/1; MG/1; MG/1
40 CAPSULE, EXTENDED RELEASE ORAL DAILY
Status: DISCONTINUED | OUTPATIENT
Start: 2024-02-16 | End: 2024-02-16 | Stop reason: HOSPADM

## 2024-02-15 RX ADMIN — THIAMINE HCL TAB 100 MG 100 MG: 100 TAB at 18:20

## 2024-02-15 RX ADMIN — Medication 1 TABLET: at 18:20

## 2024-02-15 RX ADMIN — BUPRENORPHINE AND NALOXONE 1 FILM: 8; 2 FILM, SOLUBLE BUCCAL; SUBLINGUAL at 20:27

## 2024-02-15 RX ADMIN — OLANZAPINE 10 MG: 10 TABLET, ORALLY DISINTEGRATING ORAL at 12:06

## 2024-02-15 RX ADMIN — LORAZEPAM 1 MG: 1 TABLET ORAL at 23:48

## 2024-02-15 RX ADMIN — OLANZAPINE 10 MG: 5 TABLET, FILM COATED ORAL at 21:39

## 2024-02-15 RX ADMIN — TRAZODONE HYDROCHLORIDE 150 MG: 50 TABLET ORAL at 21:39

## 2024-02-15 RX ADMIN — DIVALPROEX SODIUM 1000 MG: 500 TABLET, EXTENDED RELEASE ORAL at 21:39

## 2024-02-15 RX ADMIN — TESTOSTERONE CYPIONATE 200 MG: 200 INJECTION, SOLUTION INTRAMUSCULAR at 20:27

## 2024-02-15 RX ADMIN — LORAZEPAM 2 MG: 2 TABLET ORAL at 12:07

## 2024-02-15 ASSESSMENT — ACTIVITIES OF DAILY LIVING (ADL)
ADLS_ACUITY_SCORE: 39
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
## 2024-02-15 NOTE — ED NOTES
Patient arrives with glasses, slide sandals, 2 vapes, a small tablet which were locked in belonging cupboard. Is wearing shorts and has a hoodie in his lap. States he cannot find his keys.

## 2024-02-15 NOTE — ED NOTES
IP MH Referral Acuity Rating Score (RARS)    LMHP complete at referral to IP MH, with DEC; and, daily while awaiting IP MH placement. Call score to PPS.  CRITERIA SCORING   New 71 HH and Involuntary for IP MH (not adolescent) 0/1   Boarding over 24 hours 0/1   Vulnerable adult at least 55+ with multiple co morbidities; or, Patient age 11 or under 1/1   Suicide ideation without relief of precipitating factors 0/1   Current plan for suicide 0/1   Current plan for homicide 1/1   Imminent risk or actual attempt to seriously harm another without relief of factors precipitating the attempt 0/1   Severe dysfunction in daily living (ex: complete neglect for self care, extreme disruption in vegetative function, extreme deterioration in social interactions) 1/1   Recent (last 2 weeks) or current physical aggression in the ED 0/1   Restraints or seclusion episode in ED 0/1   Verbal aggression, agitation, yelling, etc., while in the ED 0/1   Active psychosis with psychomotor agitation or catatonia 1/1   Need for constant or near constant redirection (from leaving, from others, etc).  0/1   Intrusive or disruptive behaviors 0/1   TOTAL Acuity Total Score: 4

## 2024-02-15 NOTE — ED NOTES
Patient continues to wander out of room and in to nursing station. Is difficult to redirect at times. Stating someone in the room told him to come out and is also looking for coconuts. Continues to interact with self and internal stimuli.
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
## 2024-02-15 NOTE — CONSULTS
Diagnostic Evaluation Consultation  Crisis Assessment    Patient Name: Andrea Pham  Age:  58 year old  Legal Sex: male  Gender Identity: male  Pronouns:   Race: White  Ethnicity: Not  or   Language: English      Patient was assessed: In person      Patient location: [facility] EMERGENCY DEPT                             ED16    Referral Data and Chief Complaint  Andrea Pham presents to the ED via EMS. Patient is presenting to the ED for the following concerns: Verbal agitation, Significant behavioral change, Paranoia, Substance use.   Factors that make the mental health crisis life threatening or complex are:  patient is drinking with diagnosis of EtOH use disorder and abuse..      Informed Consent and Assessment Methods  Explained the crisis assessment process, including applicable information disclosures and limits to confidentiality, assessed understanding of the process, and obtained consent to proceed with the assessment.  Assessment methods included conducting a formal interview with patient, review of medical records, collaboration with medical staff, and obtaining relevant collateral information from family and community providers when available.  : done     Patient response to interventions: acceptance expressed, verbalizes understanding  Coping skills were attempted to reduce the crisis:  None noted.     History of the Crisis   Patient pesents to [facility] ED via EMS after calling 911 to report his concern of his brother possible hurting him. EMS left and he called back and was BIBA for a mental health evaluation. Patient has prior diagnosis of MDD, TIEN, Bipolar Disorder unspecified, ADD, Psychosis and EtOH use disorder severe. Patient appears to be reacting to internal stimuli, is exhibiting paranoia, and is found pacing in his room at time of assessment. patient is a retired [employer] . Patient retired in 2016 on disability. patient has a history of  EtOH use and abuse, and has a diagnosed EtOH use disorder. Patient has an extensive medication list and it is unclear if patient is current with medications. Patient currently denies any SI/SIB/HI/AH/VH. Patient is presenting as disorganized, restless, and has odd facial tics when he falls asleep mid sentence. When he abruptly awakens he continues right where he left off, or is confused as to where he is until he regains his bearings. Patient is tangential in assessment, although he is redirectable.    Brief Psychosocial History  Family:  Single, Children yes  Support System:  Children, Neighbor  Employment Status:  retired  Source of Income:  pension/custodial  Financial Environmental Concerns:  none  Current Hobbies:  outdoor activities, group/social activities, family functions, sports/team sports  Barriers in Personal Life:  cognitive limitations, mental health concerns    Significant Clinical History  Current Anxiety Symptoms:  anxious  Current Depression/Trauma:  difficulty concentrating, impaired decision making  Current Somatic Symptoms:  racing thoughts, anxious  Current Psychosis/Thought Disturbance:  forgetful, inattentive, hyperactive, hyperverbal, flight of ideas  Current Eating Symptoms:   (None noted.)  Chemical Use History:  Alcohol: Daily  Last Use:: 02/15/24  Benzodiazepines: None  Opiates: None  Cocaine: None  Marijuana: None  Other Use: None  Withdrawal Symptoms:  (None noted.)  Addictions:  (None noted.)   Past diagnosis:  Anxiety Disorder, Bipolar Disorder, Substance Use Disorder, ADHD  Family history:  Anxiety Disorder, Depression  Past treatment:  Individual therapy, Psychiatric Medication Management  Details of most recent treatment:  Ongoing medication management  Other relevant history:  Patient has a hx of DT's in re to withdrawal from EtOH.       Collateral Information  Is there collateral information: Yes     Collateral information name, relationship, phone number:  Sasha Martinez  daughter, [phone]    What happened today: Unclear as I live out of state in [place].     What is different about patient's functioning: The last 15 years he has had a lot of substance use/abuse issues. He had been doing good until the holiday's, then he began to be paranoid. Patient said he stopped his medications but could not say when. I saw him on a visit in June and he looked amazing. The last few months he has been going downhill. He was last hospitalized a year ago for alcohol relted issues.     Concern about alcohol/drug use:  Yes    What do you think the patient needs:  unsure    Has patient made comments about wanting to kill themselves/others: no    If d/c is recommended, can they take part in safety/aftercare planning:  no    Additional collateral information:  None noted.     Risk Assessment  [place] Suicide Severity Rating Scale Full Clinical Version:  Suicidal Ideation  Q1 Wish to be Dead (Lifetime): No  Q2 Non-Specific Active Suicidal Thoughts (Lifetime): No  Q6 Suicide Behavior (Lifetime): no     Suicidal Behavior (Lifetime)  Actual Attempt (Lifetime): No  Has subject engaged in non-suicidal self-injurious behavior? (Lifetime): No  Interrupted Attempts (Lifetime): No  Aborted or Self-Interrupted Attempt (Lifetime): No  Preparatory Acts or Behavior (Lifetime): No    [place] Suicide Severity Rating Scale Recent:   Suicidal Ideation (Recent)  Q1 Wished to be Dead (Past Month): no  Q2 Suicidal Thoughts (Past Month): no  Intensity of Ideation (Recent)  Most Severe Ideation Rating (Past 1 Month):  (None noted.)  Description of Most Severe Ideation (Past 1 Month): None noted.  Frequency (Past 1 Month):  (None noted.)  Duration (Past 1 Month):  (None noted.)  Controllability (Past 1 Month):  (None noted.)  Deterrents (Past 1 Month): Does not apply (None noted.)  Reasons for Ideation (Past 1 Month): Does not apply  Suicidal Behavior (Recent)  Actual Attempt (Past 3 Months): No  Total Number of Actual  Attempts (Past 3 Months): 0  Actual Attempt Description (Past 3 Months): None noted.  Has subject engaged in non-suicidal self-injurious behavior? (Past 3 Months): No  Interrupted Attempts (Past 3 Months): No  Interrupted Attempt Description (Past 3 Months): None noted.  Aborted or Self-Interrupted Attempt (Past 3 Months): No  Total Number of Aborted or Self-Interrupted Attempts (Past 3 Months): 0  Aborted or Self-Interrupted Attempt Description (Past 3 Months): None noted.  Preparatory Acts or Behavior (Past 3 Months): No  Total Number of Preparatory Acts (Past 3 Months): 0  Preparatory Acts or Behavior Description (Past 3 Months): None noted.    Environmental or Psychosocial Events: impulsivity/recklessness, helplessness/hopelessness, other life stressors, ongoing abuse of substances  Protective Factors: Protective Factors: strong bond to family unit, community support, or employment, lives in a responsibly safe and stable environment, good treatment engagement, sense of importance of health and wellness, able to access care without barriers, sense of self-efficacy and/or positive self-esteem, optimistic outlook - identification of future goals, constructive use of leisure time, enjoyable activities, resilience    Does the patient have thoughts of harming others? Feels Like Hurting Others: no  Previous Attempt to Hurt Others: no  Current presentation: Confused  Violence Threats in Past 6 Months: None noted.  Current Violence Plan or Thoughts: None noted.  Is the patient engaging in sexually inappropriate behavior?: no  Duty to warn initiated: no  Duty to warn details: None noted.    Is the patient engaging in sexually inappropriate behavior?  no        Mental Status Exam   Affect: Dramatic  Appearance: Appropriate  Attention Span/Concentration: Attentive  Eye Contact: Variable, Engaged    Fund of Knowledge: Delayed   Language /Speech Content: Fluent  Language /Speech Volume: Normal  Language /Speech  Rate/Productions: Hyperverbal, Pressured  Recent Memory: Variable  Remote Memory: Variable  Mood: Normal  Orientation to Person: Yes   Orientation to Place: Yes  Orientation to Time of Day: Yes  Orientation to Date: Yes     Situation (Do they understand why they are here?): Yes  Psychomotor Behavior: Hyperactive  Thought Content: Paranoia  Thought Form: Flight of Ideas, Paranoia, Tangential       Medication  Psychotropic medications:   Medication Orders - Psychiatric (From admission, onward)      None             Current Care Team  Patient Care Team:  No Ref-Primary, Physician as PCP - Rian Rowe MD (Psychiatry)    Diagnosis  Patient Active Problem List   Diagnosis Code    ETOH abuse F10.10    Depression F32.A    Low testosterone R79.89    CARDIOVASCULAR SCREENING; LDL GOAL LESS THAN 160 Z13.6    Psychosis (H) F29    Bipolar affective disorder, current episode manic with psychotic symptoms (H) F31.2    Depression, major, severe recurrence (H) F33.2    Alcohol abuse F10.10    Alcohol withdrawal (H) F10.939    Anxiety and depression F41.9, F32.A    Chronic pain disorder G89.4    Drug-seeking behavior Z76.5    Polysubstance abuse (H) F19.10    Right hip pain M25.551    Suicidal ideation R45.851    Testosterone deficiency E34.9    Tobacco dependence F17.200    Chemical dependency (H) F19.20    Alcohol use disorder, severe, dependence (H) F10.20    Altered mental status R41.82    Closed fracture of right ankle, initial encounter S82.891A    Fall, initial encounter W19.XXXA    Closed nondisplaced fracture of second metatarsal bone of right foot, initial encounter S92.324A    Delirium tremens (H) F10.931    Alcohol withdrawal syndrome, with delirium (H) F10.931       Primary Problem This Admission  Active Hospital Problems    Depression, major, severe recurrence (H)      Bipolar affective disorder, current episode manic with psychotic symptoms (H)      Psychosis (H)        Clinical Summary and  Substantiation of Recommendations   Patient presents as disorganized, tangential, restless, paranoid, and responding to internal stimuli. Patient denies any SI/SIB/HI/AH/VH but is in need of mental health stabilization due to the extent of his presenting issues of response to internal stimuli, and his delusions that his brother enters his room electronically through the cameras. Charo huerta. Patient is voluntary for admission at this time but is deemed inappropriate for Empath until baseline can be reached due to his current  intrusive mannerisms and restlessness. Patient is admitted for  stabilization.          Severe psychiatric, behavioral or other comorbid conditions are appropriate for management at inpatient mental health as indicated by at least one of the following: Psychiatric Symptoms, Symptoms of impact to function, Cognitive or memory impairment, Comorbid substance use disorder, Impaired impulse control, judgement, or insight  Severe dysfunction in daily living is present as indicated by at least one of the following: Extreme deterioration in social interactions, Complete neglect of self care with associated impairment in physical status, Extreme disruption in vegetative function, Complete inability to maintain any appropriate aspect of personal responsibility in any adult roles, Other evidence of severe dysfunction  Situation and expectations are appropriate for inpatient care: Voluntary treatment at lower level of care is not feasible, Patient management/treatment at lower level of care is not feasible or is inappropriate  Inpatient mental health services are necessary to meet patient needs and at least one of the following: Specific condition related to admission diagnosis is present and judged likely to further improve at proposed level of care, Specific condition related to admission diagnosis is present and judged likely to deteriorate in absence of treatment at proposed level of  care      Patient coping skills attempted to reduce the crisis:  None noted.    Disposition  Recommended disposition: Inpatient Mental Health        Reviewed case and recommendations with attending provider. Attending Name: Dr. BRENDA Crowley MD       Attending concurs with disposition: yes       Patient and/or validated legal guardian concurs with disposition:   yes       Final disposition:  inpatient mental health    Legal status on admission: Voluntary/Patient has signed consent for treatment    Assessment Details   Total duration spent with the patient: 35 min     CPT code(s) utilized: 71067 - Psychotherapy for Crisis - 60 (30-74*) min    Jd Jimenes MA Psychotherapist  [facility] - Triage & Transition Services  Callback: [phone]

## 2024-02-16 VITALS
OXYGEN SATURATION: 98 % | SYSTOLIC BLOOD PRESSURE: 112 MMHG | HEART RATE: 85 BPM | RESPIRATION RATE: 20 BRPM | TEMPERATURE: 99.1 F | DIASTOLIC BLOOD PRESSURE: 61 MMHG

## 2024-02-16 PROCEDURE — 250N000013 HC RX MED GY IP 250 OP 250 PS 637: Performed by: EMERGENCY MEDICINE

## 2024-02-16 PROCEDURE — 99284 EMERGENCY DEPT VISIT MOD MDM: CPT | Performed by: PSYCHIATRY & NEUROLOGY

## 2024-02-16 RX ADMIN — Medication 1 TABLET: at 08:19

## 2024-02-16 RX ADMIN — THIAMINE HCL TAB 100 MG 100 MG: 100 TAB at 08:19

## 2024-02-16 RX ADMIN — BUPROPION HYDROCHLORIDE 450 MG: 300 TABLET, EXTENDED RELEASE ORAL at 08:19

## 2024-02-16 RX ADMIN — BUPRENORPHINE AND NALOXONE 1 FILM: 8; 2 FILM, SOLUBLE BUCCAL; SUBLINGUAL at 09:55

## 2024-02-16 RX ADMIN — FOLIC ACID 1 MG: 1 TABLET ORAL at 08:19

## 2024-02-16 RX ADMIN — LORAZEPAM 1 MG: 1 TABLET ORAL at 09:56

## 2024-02-16 RX ADMIN — DEXTROAMPHETAMINE SACCHARATE, AMPHETAMINE ASPARTATE MONOHYDRATE, DEXTROAMPHETAMINE SULFATE, AMPHETAMINE SULFATE 40 MG: 5; 5; 5; 5 CAPSULE, EXTENDED RELEASE ORAL at 09:56

## 2024-02-16 ASSESSMENT — ACTIVITIES OF DAILY LIVING (ADL)
ADLS_ACUITY_SCORE: 39

## 2024-02-16 ASSESSMENT — COLUMBIA-SUICIDE SEVERITY RATING SCALE - C-SSRS
SUICIDE, SINCE LAST CONTACT: NO
1. SINCE LAST CONTACT, HAVE YOU WISHED YOU WERE DEAD OR WISHED YOU COULD GO TO SLEEP AND NOT WAKE UP?: NO
2. HAVE YOU ACTUALLY HAD ANY THOUGHTS OF KILLING YOURSELF?: NO
TOTAL  NUMBER OF ABORTED OR SELF INTERRUPTED ATTEMPTS SINCE LAST CONTACT: NO
TOTAL  NUMBER OF INTERRUPTED ATTEMPTS SINCE LAST CONTACT: NO
ATTEMPT SINCE LAST CONTACT: NO
6. HAVE YOU EVER DONE ANYTHING, STARTED TO DO ANYTHING, OR PREPARED TO DO ANYTHING TO END YOUR LIFE?: NO

## 2024-02-16 NOTE — ED NOTES
Student nurse spoke with daughter who called. Daughter asked how pt was doing. Daughter informed that pt was eating breakfast and appeared comfortable this morning.

## 2024-02-16 NOTE — PROGRESS NOTES
"  Triage and Transition Services Extended Care Reassessment     Patient: Anrdea goes by \"Andrea,\" uses he/him pronouns  Date of Service: February 16, 2024  Site of Service: [facility] EMERGENCY DEPT                             ED16  Patient was seen yes  Mode of Assessment: In person     Reason for Reassessment: paranoia, health stressors, substance use    History of Patient's Original Emergency Room Encounter: \"Patient pesents to [facility] ED via EMS after calling 911 to report his concern of his brother possible hurting him. EMS left and he called back and was BIBA for a mental health evaluation. Patient has prior diagnosis of MDD, TIEN, Bipolar Disorder unspecified, ADD, Psychosis and EtOH use disorder severe. Patient appears to be reacting to internal stimuli, is exhibiting paranoia, and is found pacing in his room at time of assessment. patient is a retired [employer] . Patient retired in 2016 on disability. patient has a history of EtOH use and abuse, and has a diagnosed EtOH use disorder. Patient has an extensive medication list and it is unclear if patient is current with medications. Patient currently denies any SI/SIB/HI/AH/VH. Patient is presenting as disorganized, restless, and has odd facial tics when he falls asleep mid sentence. When he abruptly awakens he continues right where he left off, or is confused as to where he is until he regains his bearings. Patient is tangential in assessment, although he is redirectable.\"    Current Patient Presentation: Pt presented with an irritable and anxious affect. Pts mood was congruent with this presentation. Pt was oriented x4. Pt was somewhat cooperative during assessment. Pts speech was pressured and at times rapid. Pt would get angry with writer but was able to be redirected. Pt did not endorse any SI/SIB/HI or AH/VH. Pt does present with paranoid ideation about his brother and law enforcement. Pt talked about feeling like he is " "being stereotyped as \"crazy\" and Pt talked about stigmatization of mental health. Pt does appear to have some limited insight into his own mental health sx. Pt endorsed that he knows his brother is putting cameras in his house and watching him and that he has video footage to prove that this is happening. Pt also endorsed stressors with his other family members. Pt endorsed that he would rather be alone. Pt endorsed that he does have an outpatient psychiatrist and therapist that he meets with, Pt was not interested in medication recommendations. Pt does have a significant hx of substance use and this may be an exacerbating factor for Pts current presentation, But Pt adamantly denied any recent substance use besides \"a little bit  of alcohol.\" Per public record it indicated that Pt was under MICD commitment through [facility] since 2021. Writer called and attempted to speak to [facility] about contact information for outpatient  but was only able to leave .       Changes Observed Since Initial Assessment: decrease in presenting symptoms    Therapeutic Interventions Provided: Reviewed healthy living that supports positive mental health, including looking at sleep hygiene, regular movement, nutrition, and regular socialization., Engaged in guided discovery, explored patient's perspectives and helped expand them through socratic dialogue., Identified and practiced coping skills.    Current Symptoms: anxious, racing thoughts negativistic, avoidance anxious, somatic symptoms (abdominal pain, headache, tension), racing thoughts, excessive worry, shortness of breath or racing heart displaces blame, impulsive, agitation      Mental Status Exam   Affect: Dramatic  Appearance: Appropriate  Attention Span/Concentration: Attentive  Eye Contact: Variable, Engaged    Fund of Knowledge: Delayed   Language /Speech Content: Fluent  Language /Speech Volume: Normal  Language /Speech Rate/Productions: Hyperverbal, " Pressured  Recent Memory: Variable  Remote Memory: Variable  Mood: Normal  Orientation to Person: Yes   Orientation to Place: Yes  Orientation to Time of Day: Yes  Orientation to Date: Yes     Situation (Do they understand why they are here?): Yes  Psychomotor Behavior: Hyperactive  Thought Content: Paranoia  Thought Form: Flight of Ideas, Paranoia, Tangential    Treatment Objective(s) Addressed: rapport building, processing feelings, safety planning, identifying treatment goals, assessing safety, identifying additional supports, identifying an appropriate aftercare plan, identifying and practicing coping strategies    Patient Response to Interventions: unacceptance expressed, verbalizes understanding    Progress Towards Goals:  Patient Reports Symptoms Are: ongoing  Patient Progress Toward Goals: is making progress  Comment: Pt has been somewhat receptive to ED interventions and was able to safety plan with writer.  Next Step to Work Toward Discharge: symptom stabilization  Symptom Stabilization Comment: Pt contiues to present with paranoid ideation    Case Management: Summary of Interaction: None at time of assessment.    C-SSRS Since Last Contact:   1. Wish to be Dead (Since Last Contact): No  2. Non-Specific Active Suicidal Thoughts (Since Last Contact): No     Actual Attempt (Since Last Contact): No  Has subject engaged in non-suicidal self-injurious behavior? (Since Last Contact): No  Interrupted Attempts (Since Last Contact): No  Aborted or Self-Interrupted Attempt (Since Last Contact): No  Preparatory Acts or Behavior (Since Last Contact): No  Suicide (Since Last Contact): No     Calculated C-SSRS Risk Score (Since Last Contact): No Risk Indicated    Plan: Final Disposition / Recommended Care Path: discharge  Plan for Care reviewed with assigned Medical Provider: yes  Plan for Care Team Review: provider, RN  Patient and/or validated legal guardian concurs: yes  Clinical Substantiation: At this time [facility]  admission is not being recommended due to Pt not endorsing any SI/SIB/HI or AH/VH. Pt does present with paranoid ideation, per assessment and psychiatry assessment Pts current sx do not appear to meet criteria for a 72 hour hold. Pt was placed on a 72 hour hold on  2/15/24 at 1734. Pts hold will be discontinued per reassessment. Pt was able to fully safety plan with writer. Pts current presentation appears to be chronic in nature and Pts current sx appear to be at Pts baseline. Pt does appear to be at higher risk of death by suicide accidental or intentional due to mental health hx and substance use hx.  At this time IP MH admission does not appear to be the most therapeutically beneficial intervention/ level of care for Pt. Pt appears to be able to use and motivated to engage in supportive mental health/ community resources.    Legal Status: Legal Status at Admission: 72 Hour Hold  72 Hour Hold - Date/Time Initiated: 02/15/24 at 1734  72 Hour Hold - Date/Time Ends: 02/21/24 at 1734    Session Status: Time session started: 0900  Time session ended: 0930  Session Duration (minutes): 30 minutes  Session Number: 1  Anticipated number of sessions or this episode of care: 3    Session Start Time: 0900  Session Stop Time: 0930  CPT codes: 12774 - Psychotherapy (with patient) - 30 (16-37*) min  Time Spent: 30 minutes      CPT code(s) utilized: 77826 - Psychotherapy (with patient) - 30 (16-37*) min    Diagnosis:   Patient Active Problem List   Diagnosis Code    ETOH abuse F10.10    Depression F32.A    Low testosterone R79.89    CARDIOVASCULAR SCREENING; LDL GOAL LESS THAN 160 Z13.6    Psychosis (H) F29    Bipolar affective disorder, current episode manic with psychotic symptoms (H) F31.2    Depression, major, severe recurrence (H) F33.2    Alcohol abuse F10.10    Alcohol withdrawal (H) F10.939    Anxiety and depression F41.9, F32.A    Chronic pain disorder G89.4    Drug-seeking behavior Z76.5    Polysubstance abuse (H)  F19.10    Right hip pain M25.551    Suicidal ideation R45.851    Testosterone deficiency E34.9    Tobacco dependence F17.200    Chemical dependency (H) F19.20    Alcohol use disorder, severe, dependence (H) F10.20    Altered mental status R41.82    Closed fracture of right ankle, initial encounter S82.891A    Fall, initial encounter W19.XXXA    Closed nondisplaced fracture of second metatarsal bone of right foot, initial encounter S92.324A    Delirium tremens (H) F10.931    Alcohol withdrawal syndrome, with delirium (H) F10.931       Primary Problem This Admission: Active Hospital Problems    Bipolar affective disorder, current episode manic with psychotic symptoms (H)      Psychosis (H)        Karo Alexandra, [facility]   Licensed Mental Health Professional (LMHP), Extended Care  [phone]

## 2024-02-16 NOTE — DISCHARGE INSTRUCTIONS
Mental health recommendations    Please follow-up with your outpatient team about your visit today.    2.  One of our care coordinators will reach out to you after your discharge.  If you have any questions about additional resources please call our coordinators at # [phone]    3.  Please use aftercare plan and already established coping skills and safety planning as needed.     4.  Please call 911 and/or return to the emergency department if her symptoms worsen or your safety becomes compromised.       The following DBT skills can assist me when: I want to act on your emotions and acting on them will only make things worse, I am overwhelmed by my emotions, I want to try to be skillful and not act on self destructive behavior.     Reduce Extreme Emotion  QUICKLY:  Changing Your Body Chemistry       T:  Change your body Temperature to change your autonomic nervous system    Use Ice pack to calm yourself down FAST. Place ice pack underneath your eyes for a count of 30 seconds to initiate the divers reflex which will naturally calm down your heart rate and breathing.      I:  Intensely exercise to calm down a body revved up by emotion    Examples: running, walking fast, jumping, playing basketball, weight lifting, swimming, calisthenics, etc.      Engage in exercises that DO NOT include violent behaviors. Exercises that utilize violent behaviors tend to function as  behavioral rehearsal,  and rather than calming the person down, may actually  rev  the person up more, increasing the likelihood of violence, and lessening the likelihood that they will  burn off  energy     P:  Progressively relax your muscles    Starting with your hands, moving to your forearms, upper arms, shoulders, neck, forehead, eyes, cheeks and lips, tongue and teeth, chest, upper back, stomach, buttocks, thighs, calves, ankles, feet      Tense (10 seconds,   of the way), then relax each muscle (all the way)    Notice the tension    Notice the  difference when relaxed (by tensing first, and then relaxing, you are able to get a more thorough relaxation than by simply relaxing)      P: Paced breathing to relax    The standard technique is to begin with counting the number of steps one takes for a typical inhale, then counting the steps one takes for a typical exhale, and then lengthening the amount of steps for the exhalation by one or two steps.  OR repeat this pattern for 1-2 minutes:   Inhale for four (4) seconds    Exhale for six (6) to eight (8) seconds        After using Distress Tolerance TIPP, TRY TO STOP!     S- Stop    Do not just react on your emotion urge. Stop! Freeze! Do not move a muscle! Your emotions may try to make you act without thinking. Stay in control! Take a step back Take a step back from the situation.    T- Take a break    Let go. Take a deep breath. Do not let your feelings make you act impulsively.    O- Observe    Notice what is going on inside and outside you. What is the situation? What are your thoughts and feelings? What are others saying or doing? Does my emotion make sense, is it justified? What is it that my emotions want me to do? Would that be effective?    P- Proceed mindfully    Act with awareness. In deciding what to do, consider your thoughts and feelings, the situation, and other people s thoughts and feelings. Think about your goals. Ask Wise Mind: Which actions will make it better or worse?        If my emotion action urge would not be effective or helpful, practice acting OPPOSITE to the EMOTION ACTION URGE can help reduce the intensity or even change the emotion.   Consider these examples: with FEAR we have the urge to run away/avoid. OPPOSITE would be to approach it with caution. ANGER we have the urge to attack. OPPOSITE would be to gently avoid or to demonstrate kindness towards it. SADNESS we have the urge to withdraw/isolate. OPPOSITE would be to get self to move and be active physically or socially.       These additional skills may help with self-soothing and distracting you:      Activities   Focus attention on a task you need to get done. Rent movies; watch TV. Clean a room in your house. Find an event to go to. Play computer games. Go walking. Exercise. Surf the Internet. Write e-mails. Play sports. Go out for a meal or eat a favorite food. Call or go out with a friend. Listen to your iPod; download music. Build something. Spend time with your children. Play cards. Read magazines, books, comics. Do crossword puzzles or Sudoku.     Emotions   Read emotional books or stories, old letters. Watch emotional TV shows; go to emotional movies. Listen to emotional music. (Be sure the event creates different emotions.) Ideas: Scary movies, joke books, comedies, funny records, Baptism music, soothing music or music that fires you up, going to a store and reading funny greeting cards.     Thoughts   Count to 10; count colors in a painting or poster or out the window; count anything. Repeat words to a song in your mind. Work puzzles. Watch TV or read.     Sensations   Squeeze a rubber ball very hard. Listen to very loud music. Hold ice in your hand or mouth. Go out in the rain or snow. Take a hot or cold shower.   Remember that you can use your 5 senses as helpful self-soothing tools!       I can help my own emotions by practicing the following to keep my emotional mind healthy and bring positive emotions:     The ABC PLEASE skill is about taking good care of ourselves so that we can take care of others. Also, an important component of DBT is to reduce our vulnerability. When we take good care of ourselves, we are less likely to be vulnerable to disease and emotional crisis.     ABC   A- Accumulate positive emotions by doing things that are pleasant.   B- Build mastery by doing things we enjoy. Whether it is reading, cooking, cleaning, fixing a car, working a cross word puzzle, or playing a musical instrument. Practice  these things to  and in time we feel competent.   C- Evening Shade Ahead by rehearsing a plan ahead of time so that we can be prepared to cope skillfully. (Think of what makes situations difficult, and what helps in those situations)      PLEASE   Treat Physical Illness and take medications as prescribed.   Balance eating in order to avoid mood swings.   Avoid mood-Altering substances and have mood control.   Maintain good sleep so you can enjoy your life.   Get exercise to maintain high spirits.        Aftercare Plan    If I am feeling unsafe or I am in a crisis, I will:   Contact my established care providers   Call the National Suicide Prevention Lifeline: 266.792.8594   Go to the nearest emergency room   Call 911   Your [facility] has a mental health crisis team you can call 24/7: [facility], [phone]    Warning signs that I or other people might notice when a crisis is developing for me: crying, feeling bad about self    Things I am able to do on my own to cope or help me feel better:   -Practice square breathing when I begin to feel anxious - in breath through the nose for the count   of 4 and the first line on the square. Out breath through the mouth for the count of 4 for the second line   of the square. Repeat to complete the square. Repeat the square as many times as needed.    Things that I am able to do with others to cope or help me feel better: -Use community resources, including hotline numbers, [facility] crisis and support meetings    Things I can use or do for distraction: -Distraction skills of: going for walks, watching TV, spending time outside, calling a friend or family member  -Download a meditation janet and spend 15-20 minutes per day mediating/relaxing. Some apps to   download include: Calm, Headspace and Insight Timer. All 3 of these apps have free version    Changes I can make to support my mental health and wellness:   -Attend scheduled mental health therapy and psychiatric appointments  "and follow all recommendations  -Maintain a daily schedule/routine  -Practice deep breathing skills  -Abstain from all mood altering chemicals not currently prescribed to me     People in my life that I can ask for help: [employer] [place] [employer]ness (NANDO)  153.609.2491 or 1.888.NANDO.HELPS    Other things that are important when I m in crisis: -Commit to 30 minutes of self care daily - this can be as simple as taking a shower, going for a walk, cooking a meal, read, writing, etc        Crisis Lines  Crisis Text Line  Text 082203  You will be connected with a trained live crisis counselor to provide support.    Por espanol, texto  LINDA a 524662 o texto a 442-AYUDAME en WhatsApp    National Hope Line  1.800.SUICIDE [0486318]      Community Resources  Fast Tracker  Linking people to mental health and substance use disorder resources  fasttrackScriptPadn.org     [place] Mental [facility] Warm Line  Peer to peer support  Monday thru Saturday, 12 pm to 10 pm  252.589.3039 or 1.546.618.4733  Text \"Support\" to 61624    [employer] [place] [employer]ness (NANDO)  155.299.2671 or 1.888.NANDO.HELPS        Mental Health Apps  My3  https://myDigitickpp.org/    VirtualHopeBox  https://Dolphin.org/apps/virtual-hope-box/      Additional Information  Today you were seen by a licensed mental health professional through Triage and Transition services, [facility] (P)  for a crisis assessment in the Emergency Department at [facility].  It is recommended that you follow up with your established providers (psychiatrist, mental health therapist, and/or primary care doctor - as relevant) as soon as possible. Coordinators from [facility] will be calling you in the next 24-48 hours to ensure that you have the resources you need.  You can also contact [facility] coordinators directly at [phone]. You may have been scheduled for or offered an appointment with a mental health provider. [facility] maintains an extensive " network of licensed behavioral health providers to connect patients with the services they need.  We do not charge providers a fee to participate in our referral network.  We match patients with providers based on a patient's specific needs, insurance coverage, and location.  Our first effort will be to refer you to a provider within your care system, and will utilize providers outside your care system as needed.

## 2024-02-16 NOTE — ED NOTES
"Pt has been calm and cooperative since 7 pm. Pt is now becoming aggitated and paranoid. Claiming \"they're going to kill him toight\". Pt requested ativan and to speak to his nurse. RN aware  "

## 2024-02-16 NOTE — ED NOTES
"Pt during med admin stated \"I just want to go home and read a book and watch a movie. I don't want to bother anyone or need anyone around me\" Pt states he doesn't want to go to treatment/inpatient.  "
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
## 2024-02-16 NOTE — ED NOTES
Pt aroused easily and cooperative with blood draw and up to the restroom with steady gait. Blood and urine specimens sent. Pt is fidgety and anxious. He asked for his nelia and glasses which I got for him from his belongings.

## 2024-02-16 NOTE — ED NOTES
Pt is resting in bed with his eyes closed and respirations are regular and unlabored. Pt has readjusted himself independently in bed.

## 2024-02-16 NOTE — ED PROVIDER NOTES
ED course:  Patient received as a handoff from my partner Dr. Strickland.  On face-to-face evaluation patient reports no additional concerns and continues to deny suicidal/homicidal ideation.  Underwent evaluation by psychiatrist Dr. Crouch in the ED; discharge recommended as patient is no longer holdable at this time.  He is not felt to be a danger to himself or others at the time of discharge.  Close follow-up with PCP recommended.  Return precautions discussed with patient.    Impression:    ICD-10-CM    1. Paranoid schizophrenia (H)  F20.0       2. Paranoid delusion (H)  F22       3. Agitation  R45.1             Disposition:  Discharge         Homar Rios,   02/16/24 1419

## 2024-02-16 NOTE — ED NOTES
Pt talking loudly on phone and appears upset. I asked him to please not be yelling and he apologized. Pt c/o still being hungry and given a courtesy meal.

## 2024-02-16 NOTE — ED NOTES
Pt continue to lay on his back resting with his eyes closed. RR regular non-labored. Continue to monitor

## 2024-02-16 NOTE — PHARMACY-ADMISSION MEDICATION HISTORY
Pharmacist Admission Medication History    Admission medication history is complete. The information provided in this note is only as accurate as the sources available at the time of the update.    Information Source(s): Patient and CareEverywhere/SureScripts via in-person    Pertinent Information:    - Patient reports not taking pregabalin, but reports taking gabapentin. Pregabalin removed from list and gabapentin not added due to no fill history in [place] PDMP for either   - Patient reports that he is due for testosterone injection    Changes made to PTA medication list:  Added: dextroamphetamine ER, aspirin 81 mg  Deleted: pregabalin, tamsulosin, thiamine, aspirin 325mg  Changed:   Olanzapine; 5 mg at bedtime -> 10 mg at bedtime (per dispense report, patient unsure of dose)  Trazodone; 50 mg at bedtime -> 150 mg at bedtime    Allergies reviewed with patient and updates made in EHR: no    Medication History Completed By: [facility] 2/15/2024 7:20 PM    PTA Med List   Medication Sig Last Dose    acetaminophen (TYLENOL) 500 MG tablet Take 2 tablets (1,000 mg) by mouth every 6 hours as needed for pain Unknown    amphetamine-dextroamphetamine (ADDERALL XR) 20 MG 24 hr capsule Take 40 mg by mouth daily 2/14/2024    aspirin (ASA) 81 MG chewable tablet Take 40.5 mg by mouth daily as needed for other ((patient unable to specify when/why he takes))     buprenorphine HCl-naloxone HCl (SUBOXONE) 8-2 MG per film Place 1 Film under the tongue 2 times daily 2/15/2024    buPROPion (WELLBUTRIN XL) 150 MG 24 hr tablet Take 1 tablet (150 mg) by mouth every morning Takes with 300mg tablet for total dose = 450mg 2/14/2024    buPROPion (WELLBUTRIN XL) 300 MG 24 hr tablet Take 1 tablet (300 mg) by mouth every morning Takes with 150mg tablet for total dose = 450mg 2/14/2024    divalproex sodium extended-release (DEPAKOTE ER) 500 MG 24 hr tablet Take 2 tablets (1,000 mg) by mouth At Bedtime 2/14/2024    folic acid (FOLVITE) 1 MG  tablet Take 1 mg by mouth daily 2/14/2024    multivitamin w/minerals (THERA-VIT-M) tablet Take 1 tablet by mouth every evening 2/14/2024    naloxone (NARCAN) 4 MG/0.1ML nasal spray Spray 1 spray (4 mg) into one nostril alternating nostrils once as needed for opioid reversal every 2-3 minutes until assistance arrives  at prn    OLANZapine (ZYPREXA) 10 MG tablet Take 10 mg by mouth at bedtime Past Month    traZODone (DESYREL) 150 MG tablet Take 150 mg by mouth at bedtime 2/14/2024
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
## 2024-04-05 ENCOUNTER — NURSE TRIAGE (OUTPATIENT)
Dept: NURSING | Facility: CLINIC | Age: 59
End: 2024-04-05
Payer: MEDICARE

## 2024-04-05 NOTE — TELEPHONE ENCOUNTER
Patient has had two sores on his leg for about a month. Patient has treated with neosporin for a few weeks but then switched to band aides that are really sticky. One is the size of a dime with bumpy redness around it. The lower one is the size of a paper clip and also has redness. The skin is itchy around it which started after using the band aides. Both are on the left lower leg. The redness and bumpiness is about the size of a silver dollar around the top sore and the lower sore the redness spreads up just under 2 inches.   The sores are dried and scabbed over.   Denies fever, pus draining.   Protocol recommends see in office today  Patient care advice given. UC location and hours given.   Patient to call back with worsening symptoms.   Cat Bates RN   04/05/24 2:34 PM  [facility] Nurse Advisor      Reason for Disposition   Looks infected (e.g., spreading redness, pus) and no fever    Additional Information   Negative: Sounds like a life-threatening emergency to the triager   Negative: Followed a burn   Negative: Followed an injury to the skin (such as a cut or scrape)   Negative: Boil (abscess) suspected (painful red lump)   Negative: Widespread rash or redness   Negative: Cold sore suspected (i.e., fever blister sore on the outer lip)   Negative: Insect bite(s) suspected   Negative: Poison ivy, oak, or sumac rash suspected (e.g., itchy rash after contact with poison ivy)   Negative: Sore(s) on female genital area  (e.g., labia, vagina, vulva)   Negative: Sore(s) on male genital area (e.g., penis, scrotum)   Negative: Wound infection suspected (in traumatic or surgical wound)   Negative: Black (necrotic), dark purple, or blisters develop in area of sore   Negative: Patient sounds very sick or weak to the triager   Negative: Looks infected (e.g., spreading redness, pus) and fever   Negative: Looks infected and large red area (> 2 inches or 5 cm) or streak   Negative: Ulcer with black scab that is  spreading, painless and cause unknown    Protocols used: Sores-A-OH

## 2024-05-16 ENCOUNTER — HOSPITAL ENCOUNTER (EMERGENCY)
Facility: CLINIC | Age: 59
Discharge: HOME | End: 2024-05-16
Attending: EMERGENCY MEDICINE | Admitting: EMERGENCY MEDICINE
Payer: MEDICARE

## 2024-05-16 VITALS
HEART RATE: 113 BPM | DIASTOLIC BLOOD PRESSURE: 90 MMHG | RESPIRATION RATE: 24 BRPM | SYSTOLIC BLOOD PRESSURE: 123 MMHG | OXYGEN SATURATION: 100 % | TEMPERATURE: 98.2 F

## 2024-05-16 DIAGNOSIS — F22 PARANOIA (H): ICD-10-CM

## 2024-05-16 PROBLEM — F10.20 ALCOHOL USE DISORDER, SEVERE, DEPENDENCE (H): Status: ACTIVE | Noted: 2019-04-23

## 2024-05-16 PROBLEM — F10.20 ALCOHOL USE DISORDER, SEVERE, DEPENDENCE (H): Status: RESOLVED | Noted: 2019-04-23 | Resolved: 2024-05-16

## 2024-05-16 PROBLEM — F90.9 ADHD (ATTENTION DEFICIT HYPERACTIVITY DISORDER): Status: ACTIVE | Noted: 2024-03-28

## 2024-05-16 PROBLEM — E51.2: Status: ACTIVE | Noted: 2021-01-13

## 2024-05-16 PROCEDURE — 99285 EMERGENCY DEPT VISIT HI MDM: CPT

## 2024-05-16 PROCEDURE — 250N000013 HC RX MED GY IP 250 OP 250 PS 637: Performed by: EMERGENCY MEDICINE

## 2024-05-16 RX ORDER — TESTOSTERONE CYPIONATE 200 MG/ML
1.1 INJECTION, SOLUTION INTRAMUSCULAR
COMMUNITY

## 2024-05-16 RX ORDER — CEPHALEXIN 500 MG/1
500 CAPSULE ORAL 2 TIMES DAILY
COMMUNITY
Start: 2024-05-03 | End: 2024-05-16

## 2024-05-16 RX ORDER — TRIAMCINOLONE ACETONIDE 1 MG/G
CREAM TOPICAL 2 TIMES DAILY
COMMUNITY

## 2024-05-16 RX ORDER — LORAZEPAM 1 MG/1
1 TABLET ORAL ONCE
Status: COMPLETED | OUTPATIENT
Start: 2024-05-16 | End: 2024-05-16

## 2024-05-16 RX ORDER — TERBINAFINE HYDROCHLORIDE 250 MG/1
250 TABLET ORAL DAILY
COMMUNITY
Start: 2024-05-03 | End: 2024-05-16

## 2024-05-16 RX ORDER — ACETAMINOPHEN 500 MG
1000 TABLET ORAL ONCE
Status: COMPLETED | OUTPATIENT
Start: 2024-05-16 | End: 2024-05-16

## 2024-05-16 RX ADMIN — LORAZEPAM 1 MG: 1 TABLET ORAL at 09:31

## 2024-05-16 RX ADMIN — ACETAMINOPHEN 1000 MG: 500 TABLET, FILM COATED ORAL at 09:49

## 2024-05-16 ASSESSMENT — ACTIVITIES OF DAILY LIVING (ADL)
ADLS_ACUITY_SCORE: 39

## 2024-05-16 NOTE — ED NOTES
Patient appears to be less anxious and verbally agitated after being told phone can be charged for patient . Agrees to stay in room until AVS is given.

## 2024-05-16 NOTE — ED NOTES
Patient anxious to be discharged, patient was told that we are waiting for AVS  and Dec note. Patient requesting for  cab service back home. Voiced that his girlfriend is at home waiting for him but unable to provide him a ride and that this is the states problem and hospital problem which pay for his ride home. This writer kindly explained to patient that we are able to provide bus tokens . Patient at this time refused. Redirected back into room

## 2024-05-16 NOTE — ED NOTES
Pt has been searched by security. Pt has his phone in the room.     Pt had shoes, small carrying pack, and keys placed in DEC office.

## 2024-05-16 NOTE — ED NOTES
AVS given to patient. Attempted to  educated on AVS. No evidence of learning. Belongings provided, phone given back to patient. Offered bus tokens. Patient accepted. Escorted to lobby by security guards.

## 2024-05-16 NOTE — DISCHARGE INSTRUCTIONS
"   Aftercare Plan  Follow up with your primary care provider     Schedule an appointment with your psychiatrist     Schedule an appointment with an individual therapist    Follow up with [facility] for additional supports [phone]      If you need more support for your mental health or to maintain sobriety, please contact [facility] at [phone] to schedule an assessment appointment.          SEE ADDITIONAL PAGE FOR YOUR PERSONALIZED SAFETY PLAN      If I am feeling unsafe or I am in a crisis, I will:   Contact my established care providers   Call the [employer] [phone]   Go to the nearest emergency room   Call 911   Your [facility] has a mental health crisis team you can call 24/7: [facility], [phone]      Crisis Text Line  Text 386121  You will be connected with a trained live crisis counselor to provide support.     Por moni, siso  LINDA a 001261 o texto a 442-AYUDAME en [facility] Warm Line  Peer to peer support  Monday thru Saturday, 12 pm to 10 pm  [phone] or 1.723.570.3193  Text \"Support\" to 37275     National [place] on Mental Illness (NANDO)  040.303.1643 or 1.888.NANDO.HELPS        Mental Health Apps  My3  https://[employer].org/     VirtualHopeBox  https://TheGrid.org/apps/virtual-hope-box/    Additional Information  Today you were seen by a licensed mental health professional through Triage and Transition services, [facility] (P)  for a crisis assessment in the Emergency Department at [facility].  It is recommended that you follow up with your established providers (psychiatrist, mental health therapist, and/or primary care doctor - as relevant) as soon as possible. Coordinators from [facility] will be calling you in the next 24-48 hours to ensure that you have the resources you need.  You can also contact [facility] coordinators directly at [phone]. You may " have been scheduled for or offered an appointment with a mental health provider. [facility] maintains an extensive network of licensed behavioral health providers to connect patients with the services they need.  We do not charge providers a fee to participate in our referral network.  We match patients with providers based on a patient's specific needs, insurance coverage, and location.  Our first effort will be to refer you to a provider within your care system, and will utilize providers outside your care system as needed.
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
## 2024-05-16 NOTE — CONSULTS
Diagnostic Evaluation Consultation  Crisis Assessment    Patient Name: Andrea Pham  Age:  58 year old  Legal Sex: male  Gender Identity: male  Pronouns: he/him  Race: White  Ethnicity: Not  or   Language: English      Patient was assessed: In person Crisis Assessment Start Time: 1149 Crisis Assessment Stop Time: 1219  Patient location: [facility] EMERGENCY DEPT                                 Referral Data and Chief Complaint  Andrea Pham presents to the ED via EMS. Patient is presenting to the ED for the following concerns: Anxiety, Paranoia (hallucinations).   Factors that make the mental health crisis life threatening or complex are:  Pt presents with paranoia, visual hallucinations, and persecutory delusions. Pt appears hypomanic with pressured speech and mild-moderate psychomotor agitation. Pt denies SI/HI, plan, intent, and previous attempts. Pt describes a group of 5 people who follow him everywhere, and who are in the ED room during interview. Pt reported paranoid ideation about his brother having placed various surveillance equipment in pt's apartment. Pt reported frustration that his family repeatedly sends him to the ED with concerns about his mental health. Pt demonstrates lack of insight into his paranoia and hallucinations. No evidence, however, that pt is a danger to himself or others. Pt able to coherently engage in risk assessment interview and to adequately engage in safety and aftercare planning..      Informed Consent and Assessment Methods  Explained the crisis assessment process, including applicable information disclosures and limits to confidentiality, assessed understanding of the process, and obtained consent to proceed with the assessment.  Assessment methods included conducting a formal interview with patient, review of medical records, collaboration with medical staff, and obtaining relevant collateral information from family and community providers when  available.  : done     Patient response to interventions: acceptance expressed, verbalizes understanding    Coping skills were attempted to reduce the crisis:  Pt has estalished psych med mgmnt, PCP, chronic condition care management, and recently completed DA for individual therapy     History of the Crisis   Extensive chart review performed, including thorough review of pt's most recent psychiatry visit (3/28/24) and pt's most recent DEC assessment and ED psychiatry during visit to [facility] earlier this year. Pt has hx diagnoses of Bipolar with kayleen and psychotic features, Wernicke's encephalopathy, and polysubstance use. Per chart, pt is currently prescribed Suboxone, Wellbutrin, Adderall, Depakote, Zyprexa, Trazadone. Pt reports medicaiton adherence, though chart review casts some doubt on consistency of medication adherence over the last year. Pt reported he was last hospitalized about a year ago.    Brief Psychosocial History  Family:  Single (lives alone), Children yes (adult children)  Support System:  Other (specify) (outpatient providers)  Employment Status:  retired  Source of Income:  pension/[employer]PC, disability  Financial Environmental Concerns:  none (pt reports financial stability)  Current Hobbies:  exercise/fitness  Barriers in Personal Life:  mental health concerns, cognitive limitations    Significant Clinical History  Current Anxiety Symptoms:  racing thoughts, anxious  Current Depression/Trauma:  difficulty concentrating  Current Somatic Symptoms:  racing thoughts, anxious, excessive worry  Current Psychosis/Thought Disturbance:  hyperverbal, visual hallucinations (paranoia, persecutory delusions)  Current Eating Symptoms:   (none noted)  Chemical Use History:  Alcohol:  (pt denies)  Benzodiazepines:  (pt denies)  Opiates: Other (comments) (pt on Suboxone; denies other opioid use)  Cocaine:  (pt denies)  Marijuana:  (pt denies)  Other Use:  (pt denies; hx meth use)  Withdrawal Symptoms:   (pt denies)  Addictions:  (pt denies)   Past diagnosis:  ADHD, Bipolar Disorder, Substance Use Disorder  Family history:  Anxiety Disorder, Depression  Past treatment:  Individual therapy, Civil Commitment, Primary Care, Psychiatric Medication Management, Inpatient Hospitalization       Collateral Information  Is there collateral information: Yes     Reviewed medical records    [facility]   Additional collateral information:  [facility] commitment  in 2021. Pt is not currently on commitment. Court admin will update the record, which is currently incorrect and shows on MCRO as Under Court Jurisdiction.     Risk Assessment  [place] Suicide Severity Rating Scale Full Clinical Version:  Suicidal Ideation  Q1 Wish to be Dead (Lifetime): No  Q2 Non-Specific Active Suicidal Thoughts (Lifetime): No  Q6 Suicide Behavior (Lifetime): no     Suicidal Behavior (Lifetime)  Actual Attempt (Lifetime): No  Has subject engaged in non-suicidal self-injurious behavior? (Lifetime): No  Interrupted Attempts (Lifetime): No  Aborted or Self-Interrupted Attempt (Lifetime): No  Preparatory Acts or Behavior (Lifetime): No    [place] Suicide Severity Rating Scale Recent:   Suicidal Ideation (Recent)  Q1 Wished to be Dead (Past Month): no  Q2 Suicidal Thoughts (Past Month): no  Level of Risk per Screen: no risks indicated     Suicidal Behavior (Recent)  Actual Attempt (Past 3 Months): No  Has subject engaged in non-suicidal self-injurious behavior? (Past 3 Months): No  Interrupted Attempts (Past 3 Months): No  Aborted or Self-Interrupted Attempt (Past 3 Months): No  Preparatory Acts or Behavior (Past 3 Months): No    Environmental or Psychosocial Events: challenging interpersonal relationships, neither working nor attending school  Protective Factors: Protective Factors: lives in a responsibly safe and stable environment, sense of importance of health  and wellness, able to access care without barriers, supportive ongoing medical and mental health care relationships, help seeking, sense of self-efficacy and/or positive self-esteem, optimistic outlook - identification of future goals    Does the patient have thoughts of harming others? Feels Like Hurting Others: no  Previous Attempt to Hurt Others: no (not in recent years)  Is the patient engaging in sexually inappropriate behavior?: no    Is the patient engaging in sexually inappropriate behavior?  no        Mental Status Exam   Affect: Dramatic  Appearance: Other (please comment) (stains on shirt, otherwise dressed casusally, appropriately)  Attention Span/Concentration: Attentive  Eye Contact: Engaged    Fund of Knowledge: Other (please comment) (WDL)   Language /Speech Content:    Language /Speech Volume: Other (please comment) (WDL)  Language /Speech Rate/Productions: Hyperverbal  Recent Memory: Intact (also affected by paranoia, persecutory delusions)  Remote Memory: Intact (also affected by paranoia, persecutory delusions)  Mood: Anxious  Orientation to Person: Yes   Orientation to Place: Yes  Orientation to Time of Day: Yes  Orientation to Date: Yes     Situation (Do they understand why they are here?): Yes (understand that family wants his evaluated because they are concerned for his mental health; understands that he is not a danger to himself or others; lack of insight into hallucinations and paranoia)  Psychomotor Behavior:  (psychomotor agitation)  Thought Content: Hallucinations, Paranoia (persecutory delusions)  Thought Form: Paranoia, Obsessive/Perseverative        Medication  Psychotropic medications:   Medication Orders - Psychiatric (From admission, onward)      None             Current Care Team  Patient Care Team:  Cayden, Dorothy Cheatham as PCP - General  Elie Nichols MD as Psychiatrist (Addiction Psychiatry)  Rm Davis as Psychologist (PSYCHOLOGIST  CLINICAL)    Diagnosis  Patient Active Problem List   Diagnosis Code    ETOH abuse F10.10    Depression F32.A    Low testosterone R79.89    CARDIOVASCULAR SCREENING; LDL GOAL LESS THAN 160 Z13.6    Psychosis (H) F29    Bipolar affective disorder, current episode manic with psychotic symptoms (H) F31.2    Depression, major, severe recurrence (H) F33.2    Alcohol abuse F10.10    Alcohol withdrawal (H) F10.939    Anxiety and depression F41.9, F32.A    Chronic pain disorder G89.4    Drug-seeking behavior Z76.5    Polysubstance abuse (H) F19.10    Right hip pain M25.551    Suicidal ideation R45.851    Testosterone deficiency E34.9    Tobacco dependence F17.200    Chemical dependency (H) F19.20    Altered mental status R41.82    Closed fracture of right ankle, initial encounter S82.891A    Fall, initial encounter W19.XXXA    Closed nondisplaced fracture of second metatarsal bone of right foot, initial encounter S92.324A    Delirium tremens (H) F10.931    Alcohol withdrawal syndrome, with delirium (H) F10.931    ADHD (attention deficit hyperactivity disorder) F90.9    Wernicke's encephalopathy with cerebellar manifestation E51.2       Primary Problem This Admission  Active Hospital Problems    Wernicke's encephalopathy with cerebellar manifestation      *Bipolar affective disorder, current episode manic with psychotic symptoms (H)      Polysubstance abuse (H)        Clinical Summary and Substantiation of Recommendations   After therapeutic assessment, intervention and aftercare planning by ED care team and LM and in consultation with attending provider, the patient's circumstances and mental state were appropriate for outpatient management. It is the recommendation of this clinician that pt discharge with outpatient mental heatlh supporst. A this time the pt is not presenting as an acute risk to self or others due to the following factors: Denies SI/HI, plan, intent, previous attempts; stable housing; established  outapatient providers; signed GABO for referral to [facility] crisis stabilization servcies.        Disposition  Recommended disposition: Medication Management, Individual Therapy, Other. please comment ([facility], [facility] crisis stabilization services referral)        Reviewed case and recommendations with attending provider. Attending Name: Bradford Rondon MD       Attending concurs with disposition: yes       Patient and/or validated legal guardian concurs with disposition:   yes       Final disposition:  discharge    Legal status on admission: Voluntary/Patient has signed consent for treatment ([facility]  2021 per Court Admin, who stated she would get the record corrected as MCRO still show Under Court Jurisdiction)    Assessment Details   Total duration spent with the patient: 30 min     CPT code(s) utilized: 91910 - Psychotherapy for Crisis - 60 (30-74*) min    SAMI MARAVILLA M.Ed., [facility], [facility]  Licensed Mental Health Professional  Triage and Transition Services - [phone]

## 2024-05-16 NOTE — ED NOTES
"Pt is singing in his room. Is talking to himself, unknown if he is responding to internal stimuli.     Pt has taken pictures of the TV and storage cabinet, shows these pictures to RN. \"Don't you see the face right there?\"    "

## 2024-05-16 NOTE — ED TRIAGE NOTES
"      Pt BIBA. PD was called by Pt's sister for a welfare check. Pt does live alone. EMS reported that police  were on scene for around an hour before EMS was called.       Pt is A/O x 4 and hyper verbal upon presentation. \" I am not crazy, I do not need to stay here for 72 hours.\"      Pt is stating/and fixated on the idea/thought that his brother wants to kill him. Pt has shown RN and Dr. Rondon many pictures of random items around his house and claming they \"are faces\" that my brother has put here. \"He wants to end my life and kill me. He is watching me.\" Pt has easily shown 20 different pics. He has gone into life detail of things his brother has done to make his life difficult, \"I lost my job because of him.\"      Pt is noted to have dried blood on his left leg. When asked what this is, \"some wires tried going in my leg! My brother is a  and he gets this stuff from the [employer] team. I stopped the wire from going in my leg and then it flew off.  But this is another example how how my brother wants to hurt me.\"     Pt does endorse using meth 2 days ago. Does admit to drinking ETOH this morning.     Pt is not homicidal or suicidal.     Pt does appear paranoid.   "

## 2025-01-09 ENCOUNTER — HOSPITAL ENCOUNTER (INPATIENT)
Facility: CLINIC | Age: 60
End: 2025-01-09
Attending: STUDENT IN AN ORGANIZED HEALTH CARE EDUCATION/TRAINING PROGRAM | Admitting: SURGERY
Payer: MEDICARE

## 2025-01-09 ENCOUNTER — APPOINTMENT (OUTPATIENT)
Dept: CT IMAGING | Facility: CLINIC | Age: 60
End: 2025-01-09
Attending: STUDENT IN AN ORGANIZED HEALTH CARE EDUCATION/TRAINING PROGRAM
Payer: MEDICARE

## 2025-01-09 ENCOUNTER — ANESTHESIA (OUTPATIENT)
Dept: SURGERY | Facility: CLINIC | Age: 60
End: 2025-01-09
Payer: MEDICARE

## 2025-01-09 ENCOUNTER — ANESTHESIA EVENT (OUTPATIENT)
Dept: SURGERY | Facility: CLINIC | Age: 60
End: 2025-01-09
Payer: MEDICARE

## 2025-01-09 VITALS
DIASTOLIC BLOOD PRESSURE: 66 MMHG | OXYGEN SATURATION: 91 % | SYSTOLIC BLOOD PRESSURE: 108 MMHG | HEIGHT: 71 IN | WEIGHT: 229.06 LBS | RESPIRATION RATE: 16 BRPM | BODY MASS INDEX: 32.07 KG/M2 | TEMPERATURE: 99.8 F | HEART RATE: 56 BPM

## 2025-01-09 DIAGNOSIS — N28.9 KIDNEY LESION, NATIVE, LEFT: ICD-10-CM

## 2025-01-09 DIAGNOSIS — E27.9 ADRENAL NODULE: ICD-10-CM

## 2025-01-09 DIAGNOSIS — F10.91 HISTORY OF ALCOHOL WITHDRAWAL SYNDROME: ICD-10-CM

## 2025-01-09 DIAGNOSIS — F10.931 ALCOHOL WITHDRAWAL SYNDROME, WITH DELIRIUM (H): ICD-10-CM

## 2025-01-09 DIAGNOSIS — K35.32 PERFORATED APPENDICITIS: ICD-10-CM

## 2025-01-09 DIAGNOSIS — K35.30 ACUTE APPENDICITIS WITH LOCALIZED PERITONITIS, WITHOUT PERFORATION, ABSCESS, OR GANGRENE: Primary | ICD-10-CM

## 2025-01-09 LAB
ALBUMIN SERPL BCG-MCNC: 4.5 G/DL (ref 3.5–5.2)
ALBUMIN UR-MCNC: NEGATIVE MG/DL
ALP SERPL-CCNC: 54 U/L (ref 40–150)
ALT SERPL W P-5'-P-CCNC: 71 U/L (ref 0–70)
ANION GAP SERPL CALCULATED.3IONS-SCNC: 14 MMOL/L (ref 7–15)
APPEARANCE UR: CLEAR
AST SERPL W P-5'-P-CCNC: 83 U/L (ref 0–45)
BASOPHILS # BLD AUTO: 0 10E3/UL (ref 0–0.2)
BASOPHILS NFR BLD AUTO: 0 %
BILIRUB SERPL-MCNC: 0.4 MG/DL
BILIRUB UR QL STRIP: NEGATIVE
BUN SERPL-MCNC: 9.2 MG/DL (ref 8–23)
CALCIUM SERPL-MCNC: 8.6 MG/DL (ref 8.8–10.4)
CHLORIDE SERPL-SCNC: 97 MMOL/L (ref 98–107)
COLOR UR AUTO: ABNORMAL
CREAT SERPL-MCNC: 0.81 MG/DL (ref 0.67–1.17)
EGFRCR SERPLBLD CKD-EPI 2021: >90 ML/MIN/1.73M2
EOSINOPHIL # BLD AUTO: 0.2 10E3/UL (ref 0–0.7)
EOSINOPHIL NFR BLD AUTO: 1 %
ERYTHROCYTE [DISTWIDTH] IN BLOOD BY AUTOMATED COUNT: 13.5 % (ref 10–15)
GLUCOSE SERPL-MCNC: 115 MG/DL (ref 70–99)
GLUCOSE UR STRIP-MCNC: NEGATIVE MG/DL
HCO3 SERPL-SCNC: 23 MMOL/L (ref 22–29)
HCT VFR BLD AUTO: 44.4 % (ref 40–53)
HGB BLD-MCNC: 15 G/DL (ref 13.3–17.7)
HGB UR QL STRIP: NEGATIVE
HOLD SPECIMEN: NORMAL
HOLD SPECIMEN: NORMAL
IMM GRANULOCYTES # BLD: 0.1 10E3/UL
IMM GRANULOCYTES NFR BLD: 1 %
KETONES UR STRIP-MCNC: 10 MG/DL
LACTATE SERPL-SCNC: 0.9 MMOL/L (ref 0.7–2)
LEUKOCYTE ESTERASE UR QL STRIP: ABNORMAL
LIPASE SERPL-CCNC: 27 U/L (ref 13–60)
LYMPHOCYTES # BLD AUTO: 0.4 10E3/UL (ref 0.8–5.3)
LYMPHOCYTES NFR BLD AUTO: 4 %
MAGNESIUM SERPL-MCNC: 1.9 MG/DL (ref 1.7–2.3)
MCH RBC QN AUTO: 30.1 PG (ref 26.5–33)
MCHC RBC AUTO-ENTMCNC: 33.8 G/DL (ref 31.5–36.5)
MCV RBC AUTO: 89 FL (ref 78–100)
MONOCYTES # BLD AUTO: 0.9 10E3/UL (ref 0–1.3)
MONOCYTES NFR BLD AUTO: 8 %
MUCOUS THREADS #/AREA URNS LPF: PRESENT /LPF
NEUTROPHILS # BLD AUTO: 9.8 10E3/UL (ref 1.6–8.3)
NEUTROPHILS NFR BLD AUTO: 86 %
NITRATE UR QL: NEGATIVE
NRBC # BLD AUTO: 0 10E3/UL
NRBC BLD AUTO-RTO: 0 /100
PH UR STRIP: 7 [PH] (ref 5–7)
PHOSPHATE SERPL-MCNC: 2.3 MG/DL (ref 2.5–4.5)
PLAT MORPH BLD: NORMAL
PLATELET # BLD AUTO: 203 10E3/UL (ref 150–450)
POTASSIUM SERPL-SCNC: 4.3 MMOL/L (ref 3.4–5.3)
PROT SERPL-MCNC: 7.3 G/DL (ref 6.4–8.3)
RBC # BLD AUTO: 4.98 10E6/UL (ref 4.4–5.9)
RBC MORPH BLD: NORMAL
RBC URINE: <1 /HPF
SODIUM SERPL-SCNC: 134 MMOL/L (ref 135–145)
SP GR UR STRIP: 1.01 (ref 1–1.03)
UROBILINOGEN UR STRIP-MCNC: NORMAL MG/DL
WBC # BLD AUTO: 11.4 10E3/UL (ref 4–11)
WBC URINE: 1 /HPF

## 2025-01-09 PROCEDURE — 81003 URINALYSIS AUTO W/O SCOPE: CPT | Performed by: STUDENT IN AN ORGANIZED HEALTH CARE EDUCATION/TRAINING PROGRAM

## 2025-01-09 PROCEDURE — 83690 ASSAY OF LIPASE: CPT | Performed by: STUDENT IN AN ORGANIZED HEALTH CARE EDUCATION/TRAINING PROGRAM

## 2025-01-09 PROCEDURE — 96376 TX/PRO/DX INJ SAME DRUG ADON: CPT | Mod: XU

## 2025-01-09 PROCEDURE — 250N000011 HC RX IP 250 OP 636: Performed by: NURSE ANESTHETIST, CERTIFIED REGISTERED

## 2025-01-09 PROCEDURE — 258N000003 HC RX IP 258 OP 636: Performed by: STUDENT IN AN ORGANIZED HEALTH CARE EDUCATION/TRAINING PROGRAM

## 2025-01-09 PROCEDURE — 710N000009 HC RECOVERY PHASE 1, LEVEL 1, PER MIN: Performed by: SURGERY

## 2025-01-09 PROCEDURE — 36415 COLL VENOUS BLD VENIPUNCTURE: CPT | Performed by: STUDENT IN AN ORGANIZED HEALTH CARE EDUCATION/TRAINING PROGRAM

## 2025-01-09 PROCEDURE — 250N000011 HC RX IP 250 OP 636: Performed by: ANESTHESIOLOGY

## 2025-01-09 PROCEDURE — 36415 COLL VENOUS BLD VENIPUNCTURE: CPT | Performed by: SURGERY

## 2025-01-09 PROCEDURE — 370N000017 HC ANESTHESIA TECHNICAL FEE, PER MIN: Performed by: SURGERY

## 2025-01-09 PROCEDURE — 258N000003 HC RX IP 258 OP 636: Performed by: NURSE ANESTHETIST, CERTIFIED REGISTERED

## 2025-01-09 PROCEDURE — 250N000025 HC SEVOFLURANE, PER MIN: Performed by: SURGERY

## 2025-01-09 PROCEDURE — 85004 AUTOMATED DIFF WBC COUNT: CPT | Performed by: STUDENT IN AN ORGANIZED HEALTH CARE EDUCATION/TRAINING PROGRAM

## 2025-01-09 PROCEDURE — 250N000009 HC RX 250: Performed by: NURSE ANESTHETIST, CERTIFIED REGISTERED

## 2025-01-09 PROCEDURE — 96375 TX/PRO/DX INJ NEW DRUG ADDON: CPT

## 2025-01-09 PROCEDURE — 250N000009 HC RX 250: Performed by: STUDENT IN AN ORGANIZED HEALTH CARE EDUCATION/TRAINING PROGRAM

## 2025-01-09 PROCEDURE — 250N000012 HC RX MED GY IP 250 OP 636 PS 637: Performed by: SURGERY

## 2025-01-09 PROCEDURE — 999N000141 HC STATISTIC PRE-PROCEDURE NURSING ASSESSMENT: Performed by: SURGERY

## 2025-01-09 PROCEDURE — 96374 THER/PROPH/DIAG INJ IV PUSH: CPT

## 2025-01-09 PROCEDURE — 88304 TISSUE EXAM BY PATHOLOGIST: CPT | Mod: TC | Performed by: SURGERY

## 2025-01-09 PROCEDURE — 272N000001 HC OR GENERAL SUPPLY STERILE: Performed by: SURGERY

## 2025-01-09 PROCEDURE — 360N000076 HC SURGERY LEVEL 3, PER MIN: Performed by: SURGERY

## 2025-01-09 PROCEDURE — 84155 ASSAY OF PROTEIN SERUM: CPT | Performed by: STUDENT IN AN ORGANIZED HEALTH CARE EDUCATION/TRAINING PROGRAM

## 2025-01-09 PROCEDURE — 83735 ASSAY OF MAGNESIUM: CPT | Performed by: SURGERY

## 2025-01-09 PROCEDURE — 80051 ELECTROLYTE PANEL: CPT | Performed by: STUDENT IN AN ORGANIZED HEALTH CARE EDUCATION/TRAINING PROGRAM

## 2025-01-09 PROCEDURE — 250N000011 HC RX IP 250 OP 636: Performed by: STUDENT IN AN ORGANIZED HEALTH CARE EDUCATION/TRAINING PROGRAM

## 2025-01-09 PROCEDURE — 99223 1ST HOSP IP/OBS HIGH 75: CPT | Mod: 57 | Performed by: SURGERY

## 2025-01-09 PROCEDURE — 250N000013 HC RX MED GY IP 250 OP 250 PS 637: Performed by: ANESTHESIOLOGY

## 2025-01-09 PROCEDURE — 250N000013 HC RX MED GY IP 250 OP 250 PS 637: Performed by: SURGERY

## 2025-01-09 PROCEDURE — 85018 HEMOGLOBIN: CPT | Performed by: STUDENT IN AN ORGANIZED HEALTH CARE EDUCATION/TRAINING PROGRAM

## 2025-01-09 PROCEDURE — 99285 EMERGENCY DEPT VISIT HI MDM: CPT | Mod: 25

## 2025-01-09 PROCEDURE — 74177 CT ABD & PELVIS W/CONTRAST: CPT

## 2025-01-09 PROCEDURE — 83605 ASSAY OF LACTIC ACID: CPT | Performed by: STUDENT IN AN ORGANIZED HEALTH CARE EDUCATION/TRAINING PROGRAM

## 2025-01-09 PROCEDURE — 87040 BLOOD CULTURE FOR BACTERIA: CPT | Performed by: STUDENT IN AN ORGANIZED HEALTH CARE EDUCATION/TRAINING PROGRAM

## 2025-01-09 PROCEDURE — 84100 ASSAY OF PHOSPHORUS: CPT | Performed by: SURGERY

## 2025-01-09 PROCEDURE — 120N000001 HC R&B MED SURG/OB

## 2025-01-09 PROCEDURE — 96361 HYDRATE IV INFUSION ADD-ON: CPT

## 2025-01-09 PROCEDURE — 250N000011 HC RX IP 250 OP 636: Mod: JZ | Performed by: SURGERY

## 2025-01-09 PROCEDURE — 44970 LAPAROSCOPY APPENDECTOMY: CPT | Performed by: SURGERY

## 2025-01-09 RX ORDER — KETOROLAC TROMETHAMINE 30 MG/ML
30 INJECTION, SOLUTION INTRAMUSCULAR; INTRAVENOUS
Status: COMPLETED | OUTPATIENT
Start: 2025-01-09 | End: 2025-01-09

## 2025-01-09 RX ORDER — NALOXONE HYDROCHLORIDE 0.4 MG/ML
0.4 INJECTION, SOLUTION INTRAMUSCULAR; INTRAVENOUS; SUBCUTANEOUS
Status: DISCONTINUED | OUTPATIENT
Start: 2025-01-09 | End: 2025-01-10 | Stop reason: HOSPADM

## 2025-01-09 RX ORDER — PIPERACILLIN SODIUM, TAZOBACTAM SODIUM 3; .375 G/15ML; G/15ML
3.38 INJECTION, POWDER, LYOPHILIZED, FOR SOLUTION INTRAVENOUS ONCE
Status: COMPLETED | OUTPATIENT
Start: 2025-01-09 | End: 2025-01-09

## 2025-01-09 RX ORDER — SODIUM CHLORIDE, SODIUM LACTATE, POTASSIUM CHLORIDE, CALCIUM CHLORIDE 600; 310; 30; 20 MG/100ML; MG/100ML; MG/100ML; MG/100ML
INJECTION, SOLUTION INTRAVENOUS CONTINUOUS PRN
Status: DISCONTINUED | OUTPATIENT
Start: 2025-01-09 | End: 2025-01-09

## 2025-01-09 RX ORDER — OLANZAPINE 5 MG/1
5-10 TABLET, ORALLY DISINTEGRATING ORAL EVERY 6 HOURS PRN
Status: DISCONTINUED | OUTPATIENT
Start: 2025-01-09 | End: 2025-01-10 | Stop reason: HOSPADM

## 2025-01-09 RX ORDER — NALOXONE HYDROCHLORIDE 0.4 MG/ML
0.2 INJECTION, SOLUTION INTRAMUSCULAR; INTRAVENOUS; SUBCUTANEOUS
Status: DISCONTINUED | OUTPATIENT
Start: 2025-01-09 | End: 2025-01-10 | Stop reason: HOSPADM

## 2025-01-09 RX ORDER — FENTANYL CITRATE 50 UG/ML
25 INJECTION, SOLUTION INTRAMUSCULAR; INTRAVENOUS
Status: DISCONTINUED | OUTPATIENT
Start: 2025-01-09 | End: 2025-01-09 | Stop reason: HOSPADM

## 2025-01-09 RX ORDER — ONDANSETRON 2 MG/ML
INJECTION INTRAMUSCULAR; INTRAVENOUS PRN
Status: DISCONTINUED | OUTPATIENT
Start: 2025-01-09 | End: 2025-01-09

## 2025-01-09 RX ORDER — AMOXICILLIN 250 MG
1-2 CAPSULE ORAL 2 TIMES DAILY
Qty: 30 TABLET | Refills: 0 | Status: SHIPPED | OUTPATIENT
Start: 2025-01-09

## 2025-01-09 RX ORDER — HYDROMORPHONE HCL IN WATER/PF 6 MG/30 ML
0.2 PATIENT CONTROLLED ANALGESIA SYRINGE INTRAVENOUS EVERY 5 MIN PRN
Status: DISCONTINUED | OUTPATIENT
Start: 2025-01-09 | End: 2025-01-09 | Stop reason: HOSPADM

## 2025-01-09 RX ORDER — BUPIVACAINE HYDROCHLORIDE 5 MG/ML
INJECTION, SOLUTION EPIDURAL; INTRACAUDAL PRN
Status: DISCONTINUED | OUTPATIENT
Start: 2025-01-09 | End: 2025-01-09 | Stop reason: HOSPADM

## 2025-01-09 RX ORDER — OXYCODONE HYDROCHLORIDE 10 MG/1
10 TABLET ORAL EVERY 4 HOURS PRN
Status: DISCONTINUED | OUTPATIENT
Start: 2025-01-09 | End: 2025-01-09 | Stop reason: HOSPADM

## 2025-01-09 RX ORDER — ONDANSETRON 2 MG/ML
4 INJECTION INTRAMUSCULAR; INTRAVENOUS EVERY 6 HOURS PRN
Status: DISCONTINUED | OUTPATIENT
Start: 2025-01-09 | End: 2025-01-10 | Stop reason: HOSPADM

## 2025-01-09 RX ORDER — DIAZEPAM 10 MG/2ML
5 INJECTION, SOLUTION INTRAMUSCULAR; INTRAVENOUS ONCE
Status: COMPLETED | OUTPATIENT
Start: 2025-01-09 | End: 2025-01-09

## 2025-01-09 RX ORDER — FENTANYL CITRATE 50 UG/ML
INJECTION, SOLUTION INTRAMUSCULAR; INTRAVENOUS PRN
Status: DISCONTINUED | OUTPATIENT
Start: 2025-01-09 | End: 2025-01-09

## 2025-01-09 RX ORDER — ALBUTEROL SULFATE 0.83 MG/ML
2.5 SOLUTION RESPIRATORY (INHALATION) EVERY 4 HOURS PRN
Status: DISCONTINUED | OUTPATIENT
Start: 2025-01-09 | End: 2025-01-09 | Stop reason: HOSPADM

## 2025-01-09 RX ORDER — BUPROPION HYDROCHLORIDE 150 MG/1
150 TABLET ORAL EVERY MORNING
Status: DISCONTINUED | OUTPATIENT
Start: 2025-01-10 | End: 2025-01-10 | Stop reason: HOSPADM

## 2025-01-09 RX ORDER — OXYCODONE HYDROCHLORIDE 5 MG/1
5-10 TABLET ORAL EVERY 4 HOURS PRN
Qty: 12 TABLET | Refills: 0 | Status: SHIPPED | OUTPATIENT
Start: 2025-01-09

## 2025-01-09 RX ORDER — DIAZEPAM 10 MG/2ML
5-10 INJECTION, SOLUTION INTRAMUSCULAR; INTRAVENOUS EVERY 30 MIN PRN
Status: DISCONTINUED | OUTPATIENT
Start: 2025-01-09 | End: 2025-01-10 | Stop reason: HOSPADM

## 2025-01-09 RX ORDER — DEXAMETHASONE SODIUM PHOSPHATE 4 MG/ML
4 INJECTION, SOLUTION INTRA-ARTICULAR; INTRALESIONAL; INTRAMUSCULAR; INTRAVENOUS; SOFT TISSUE
Status: DISCONTINUED | OUTPATIENT
Start: 2025-01-09 | End: 2025-01-09 | Stop reason: HOSPADM

## 2025-01-09 RX ORDER — FENTANYL CITRATE 50 UG/ML
25 INJECTION, SOLUTION INTRAMUSCULAR; INTRAVENOUS EVERY 5 MIN PRN
Status: DISCONTINUED | OUTPATIENT
Start: 2025-01-09 | End: 2025-01-09 | Stop reason: HOSPADM

## 2025-01-09 RX ORDER — DEXTROAMPHETAMINE SACCHARATE, AMPHETAMINE ASPARTATE MONOHYDRATE, DEXTROAMPHETAMINE SULFATE AND AMPHETAMINE SULFATE 5; 5; 5; 5 MG/1; MG/1; MG/1; MG/1
20 CAPSULE, EXTENDED RELEASE ORAL DAILY
Status: DISCONTINUED | OUTPATIENT
Start: 2025-01-10 | End: 2025-01-10 | Stop reason: HOSPADM

## 2025-01-09 RX ORDER — MORPHINE SULFATE 4 MG/ML
4 INJECTION, SOLUTION INTRAMUSCULAR; INTRAVENOUS ONCE
Status: COMPLETED | OUTPATIENT
Start: 2025-01-09 | End: 2025-01-09

## 2025-01-09 RX ORDER — ONDANSETRON 4 MG/1
4 TABLET, ORALLY DISINTEGRATING ORAL EVERY 6 HOURS PRN
Status: DISCONTINUED | OUTPATIENT
Start: 2025-01-09 | End: 2025-01-10 | Stop reason: HOSPADM

## 2025-01-09 RX ORDER — CEFAZOLIN SODIUM/WATER 2 G/20 ML
2 SYRINGE (ML) INTRAVENOUS
Status: DISCONTINUED | OUTPATIENT
Start: 2025-01-09 | End: 2025-01-09 | Stop reason: HOSPADM

## 2025-01-09 RX ORDER — MEPERIDINE HYDROCHLORIDE 25 MG/ML
12.5 INJECTION INTRAMUSCULAR; INTRAVENOUS; SUBCUTANEOUS EVERY 5 MIN PRN
Status: DISCONTINUED | OUTPATIENT
Start: 2025-01-09 | End: 2025-01-09 | Stop reason: HOSPADM

## 2025-01-09 RX ORDER — NALOXONE HYDROCHLORIDE 0.4 MG/ML
0.1 INJECTION, SOLUTION INTRAMUSCULAR; INTRAVENOUS; SUBCUTANEOUS
Status: DISCONTINUED | OUTPATIENT
Start: 2025-01-09 | End: 2025-01-09 | Stop reason: HOSPADM

## 2025-01-09 RX ORDER — OXYCODONE HYDROCHLORIDE 5 MG/1
5 TABLET ORAL EVERY 4 HOURS PRN
Status: DISCONTINUED | OUTPATIENT
Start: 2025-01-09 | End: 2025-01-10 | Stop reason: HOSPADM

## 2025-01-09 RX ORDER — PROPOFOL 10 MG/ML
INJECTION, EMULSION INTRAVENOUS CONTINUOUS PRN
Status: DISCONTINUED | OUTPATIENT
Start: 2025-01-09 | End: 2025-01-09

## 2025-01-09 RX ORDER — PROCHLORPERAZINE MALEATE 5 MG/1
10 TABLET ORAL EVERY 6 HOURS PRN
Status: DISCONTINUED | OUTPATIENT
Start: 2025-01-09 | End: 2025-01-10 | Stop reason: HOSPADM

## 2025-01-09 RX ORDER — ACETAMINOPHEN 325 MG/1
650 TABLET ORAL EVERY 4 HOURS PRN
Qty: 100 TABLET | Refills: 0 | Status: SHIPPED | OUTPATIENT
Start: 2025-01-09

## 2025-01-09 RX ORDER — BUPRENORPHINE AND NALOXONE 8; 2 MG/1; MG/1
1 FILM, SOLUBLE BUCCAL; SUBLINGUAL 2 TIMES DAILY
Status: DISCONTINUED | OUTPATIENT
Start: 2025-01-09 | End: 2025-01-10 | Stop reason: HOSPADM

## 2025-01-09 RX ORDER — LABETALOL HYDROCHLORIDE 5 MG/ML
INJECTION, SOLUTION INTRAVENOUS PRN
Status: DISCONTINUED | OUTPATIENT
Start: 2025-01-09 | End: 2025-01-09

## 2025-01-09 RX ORDER — FLUMAZENIL 0.1 MG/ML
0.2 INJECTION, SOLUTION INTRAVENOUS
Status: DISCONTINUED | OUTPATIENT
Start: 2025-01-09 | End: 2025-01-10 | Stop reason: HOSPADM

## 2025-01-09 RX ORDER — MULTIPLE VITAMINS W/ MINERALS TAB 9MG-400MCG
1 TAB ORAL DAILY
Status: DISCONTINUED | OUTPATIENT
Start: 2025-01-10 | End: 2025-01-10 | Stop reason: HOSPADM

## 2025-01-09 RX ORDER — ONDANSETRON 4 MG/1
4 TABLET, ORALLY DISINTEGRATING ORAL EVERY 30 MIN PRN
Status: DISCONTINUED | OUTPATIENT
Start: 2025-01-09 | End: 2025-01-09 | Stop reason: HOSPADM

## 2025-01-09 RX ORDER — DEXTROAMPHETAMINE SACCHARATE, AMPHETAMINE ASPARTATE MONOHYDRATE, DEXTROAMPHETAMINE SULFATE AND AMPHETAMINE SULFATE 1.25; 1.25; 1.25; 1.25 MG/1; MG/1; MG/1; MG/1
5 CAPSULE, EXTENDED RELEASE ORAL DAILY
Status: DISCONTINUED | OUTPATIENT
Start: 2025-01-10 | End: 2025-01-10 | Stop reason: HOSPADM

## 2025-01-09 RX ORDER — ONDANSETRON 2 MG/ML
4 INJECTION INTRAMUSCULAR; INTRAVENOUS EVERY 30 MIN PRN
Status: DISCONTINUED | OUTPATIENT
Start: 2025-01-09 | End: 2025-01-09 | Stop reason: HOSPADM

## 2025-01-09 RX ORDER — HYDROMORPHONE HCL IN WATER/PF 6 MG/30 ML
0.4 PATIENT CONTROLLED ANALGESIA SYRINGE INTRAVENOUS
Status: DISCONTINUED | OUTPATIENT
Start: 2025-01-09 | End: 2025-01-10 | Stop reason: HOSPADM

## 2025-01-09 RX ORDER — DIVALPROEX SODIUM 500 MG/1
1000 TABLET, FILM COATED, EXTENDED RELEASE ORAL AT BEDTIME
Status: DISCONTINUED | OUTPATIENT
Start: 2025-01-09 | End: 2025-01-10 | Stop reason: HOSPADM

## 2025-01-09 RX ORDER — MORPHINE SULFATE 4 MG/ML
4 INJECTION, SOLUTION INTRAMUSCULAR; INTRAVENOUS ONCE
Status: DISCONTINUED | OUTPATIENT
Start: 2025-01-09 | End: 2025-01-10 | Stop reason: HOSPADM

## 2025-01-09 RX ORDER — ACETAMINOPHEN 325 MG/1
650 TABLET ORAL
Status: DISCONTINUED | OUTPATIENT
Start: 2025-01-09 | End: 2025-01-09

## 2025-01-09 RX ORDER — ACETAMINOPHEN 325 MG/1
975 TABLET ORAL ONCE
Status: COMPLETED | OUTPATIENT
Start: 2025-01-09 | End: 2025-01-09

## 2025-01-09 RX ORDER — PROPOFOL 10 MG/ML
INJECTION, EMULSION INTRAVENOUS PRN
Status: DISCONTINUED | OUTPATIENT
Start: 2025-01-09 | End: 2025-01-09

## 2025-01-09 RX ORDER — DIAZEPAM 5 MG/1
10 TABLET ORAL EVERY 30 MIN PRN
Status: DISCONTINUED | OUTPATIENT
Start: 2025-01-09 | End: 2025-01-10 | Stop reason: HOSPADM

## 2025-01-09 RX ORDER — HALOPERIDOL 5 MG/ML
2.5-5 INJECTION INTRAMUSCULAR EVERY 6 HOURS PRN
Status: DISCONTINUED | OUTPATIENT
Start: 2025-01-09 | End: 2025-01-10 | Stop reason: HOSPADM

## 2025-01-09 RX ORDER — ACETAMINOPHEN 500 MG
1000 TABLET ORAL EVERY 6 HOURS PRN
Status: DISCONTINUED | OUTPATIENT
Start: 2025-01-09 | End: 2025-01-10 | Stop reason: HOSPADM

## 2025-01-09 RX ORDER — OXYCODONE HYDROCHLORIDE 5 MG/1
5 TABLET ORAL
Status: DISCONTINUED | OUTPATIENT
Start: 2025-01-09 | End: 2025-01-09

## 2025-01-09 RX ORDER — LABETALOL HYDROCHLORIDE 5 MG/ML
10 INJECTION, SOLUTION INTRAVENOUS EVERY 10 MIN PRN
Status: DISCONTINUED | OUTPATIENT
Start: 2025-01-09 | End: 2025-01-09 | Stop reason: HOSPADM

## 2025-01-09 RX ORDER — IBUPROFEN 200 MG
600 TABLET ORAL EVERY 6 HOURS PRN
COMMUNITY
Start: 2025-01-09

## 2025-01-09 RX ORDER — OXYCODONE HYDROCHLORIDE 10 MG/1
10 TABLET ORAL EVERY 4 HOURS PRN
Status: DISCONTINUED | OUTPATIENT
Start: 2025-01-09 | End: 2025-01-10 | Stop reason: HOSPADM

## 2025-01-09 RX ORDER — IBUPROFEN 600 MG/1
600 TABLET, FILM COATED ORAL EVERY 6 HOURS PRN
Status: DISCONTINUED | OUTPATIENT
Start: 2025-01-09 | End: 2025-01-10 | Stop reason: HOSPADM

## 2025-01-09 RX ORDER — ONDANSETRON 2 MG/ML
4 INJECTION INTRAMUSCULAR; INTRAVENOUS ONCE
Status: COMPLETED | OUTPATIENT
Start: 2025-01-09 | End: 2025-01-09

## 2025-01-09 RX ORDER — DEXAMETHASONE SODIUM PHOSPHATE 4 MG/ML
INJECTION, SOLUTION INTRA-ARTICULAR; INTRALESIONAL; INTRAMUSCULAR; INTRAVENOUS; SOFT TISSUE PRN
Status: DISCONTINUED | OUTPATIENT
Start: 2025-01-09 | End: 2025-01-09

## 2025-01-09 RX ORDER — IOPAMIDOL 755 MG/ML
500 INJECTION, SOLUTION INTRAVASCULAR ONCE
Status: COMPLETED | OUTPATIENT
Start: 2025-01-09 | End: 2025-01-09

## 2025-01-09 RX ORDER — BUPROPION HYDROCHLORIDE 150 MG/1
300 TABLET ORAL EVERY MORNING
Status: DISCONTINUED | OUTPATIENT
Start: 2025-01-10 | End: 2025-01-10 | Stop reason: HOSPADM

## 2025-01-09 RX ORDER — CEFAZOLIN SODIUM/WATER 2 G/20 ML
2 SYRINGE (ML) INTRAVENOUS SEE ADMIN INSTRUCTIONS
Status: DISCONTINUED | OUTPATIENT
Start: 2025-01-09 | End: 2025-01-09 | Stop reason: HOSPADM

## 2025-01-09 RX ORDER — SODIUM CHLORIDE, SODIUM LACTATE, POTASSIUM CHLORIDE, CALCIUM CHLORIDE 600; 310; 30; 20 MG/100ML; MG/100ML; MG/100ML; MG/100ML
INJECTION, SOLUTION INTRAVENOUS CONTINUOUS
Status: DISCONTINUED | OUTPATIENT
Start: 2025-01-09 | End: 2025-01-09 | Stop reason: HOSPADM

## 2025-01-09 RX ORDER — OLANZAPINE 5 MG/1
10 TABLET ORAL AT BEDTIME
Status: DISCONTINUED | OUTPATIENT
Start: 2025-01-09 | End: 2025-01-10 | Stop reason: HOSPADM

## 2025-01-09 RX ORDER — HYDROMORPHONE HCL IN WATER/PF 6 MG/30 ML
0.2 PATIENT CONTROLLED ANALGESIA SYRINGE INTRAVENOUS
Status: DISCONTINUED | OUTPATIENT
Start: 2025-01-09 | End: 2025-01-10 | Stop reason: HOSPADM

## 2025-01-09 RX ORDER — DEXTROAMPHETAMINE SACCHARATE, AMPHETAMINE ASPARTATE MONOHYDRATE, DEXTROAMPHETAMINE SULFATE AND AMPHETAMINE SULFATE 6.25; 6.25; 6.25; 6.25 MG/1; MG/1; MG/1; MG/1
25 CAPSULE, EXTENDED RELEASE ORAL DAILY
Status: DISCONTINUED | OUTPATIENT
Start: 2025-01-10 | End: 2025-01-09

## 2025-01-09 RX ORDER — OXYCODONE HYDROCHLORIDE 5 MG/1
5 TABLET ORAL EVERY 4 HOURS PRN
Status: DISCONTINUED | OUTPATIENT
Start: 2025-01-09 | End: 2025-01-09 | Stop reason: HOSPADM

## 2025-01-09 RX ADMIN — FENTANYL CITRATE 25 MCG: 50 INJECTION, SOLUTION INTRAMUSCULAR; INTRAVENOUS at 20:56

## 2025-01-09 RX ADMIN — PROPOFOL 20 MG: 10 INJECTION, EMULSION INTRAVENOUS at 19:49

## 2025-01-09 RX ADMIN — DIAZEPAM 10 MG: 5 TABLET ORAL at 21:21

## 2025-01-09 RX ADMIN — SODIUM CHLORIDE, POTASSIUM CHLORIDE, SODIUM LACTATE AND CALCIUM CHLORIDE: 600; 310; 30; 20 INJECTION, SOLUTION INTRAVENOUS at 19:28

## 2025-01-09 RX ADMIN — PROPOFOL 100 MG: 10 INJECTION, EMULSION INTRAVENOUS at 19:47

## 2025-01-09 RX ADMIN — FENTANYL CITRATE 25 MCG: 50 INJECTION, SOLUTION INTRAMUSCULAR; INTRAVENOUS at 21:11

## 2025-01-09 RX ADMIN — SODIUM CHLORIDE 1000 ML: 9 INJECTION, SOLUTION INTRAVENOUS at 10:55

## 2025-01-09 RX ADMIN — PIPERACILLIN AND TAZOBACTAM 3.38 G: 3; .375 INJECTION, POWDER, FOR SOLUTION INTRAVENOUS at 15:05

## 2025-01-09 RX ADMIN — PROPOFOL 200 MG: 10 INJECTION, EMULSION INTRAVENOUS at 19:45

## 2025-01-09 RX ADMIN — LABETALOL HYDROCHLORIDE 7.5 MG: 5 INJECTION, SOLUTION INTRAVENOUS at 20:09

## 2025-01-09 RX ADMIN — ROCURONIUM BROMIDE 8 MG: 50 INJECTION, SOLUTION INTRAVENOUS at 19:45

## 2025-01-09 RX ADMIN — PROPOFOL 65 MCG/KG/MIN: 10 INJECTION, EMULSION INTRAVENOUS at 19:52

## 2025-01-09 RX ADMIN — DIAZEPAM 5 MG: 10 INJECTION, SOLUTION INTRAMUSCULAR; INTRAVENOUS at 15:05

## 2025-01-09 RX ADMIN — FENTANYL CITRATE 25 MCG: 50 INJECTION, SOLUTION INTRAMUSCULAR; INTRAVENOUS at 21:03

## 2025-01-09 RX ADMIN — OXYCODONE HYDROCHLORIDE 5 MG: 5 TABLET ORAL at 21:31

## 2025-01-09 RX ADMIN — DEXMEDETOMIDINE HYDROCHLORIDE 12 MCG: 100 INJECTION, SOLUTION INTRAVENOUS at 19:45

## 2025-01-09 RX ADMIN — MIDAZOLAM 2 MG: 1 INJECTION INTRAMUSCULAR; INTRAVENOUS at 17:30

## 2025-01-09 RX ADMIN — ONDANSETRON 4 MG: 2 INJECTION INTRAMUSCULAR; INTRAVENOUS at 20:11

## 2025-01-09 RX ADMIN — FENTANYL CITRATE 100 MCG: 50 INJECTION INTRAMUSCULAR; INTRAVENOUS at 19:45

## 2025-01-09 RX ADMIN — ROCURONIUM BROMIDE 42 MG: 50 INJECTION, SOLUTION INTRAVENOUS at 19:48

## 2025-01-09 RX ADMIN — SUGAMMADEX 200 MG: 100 INJECTION, SOLUTION INTRAVENOUS at 20:29

## 2025-01-09 RX ADMIN — DEXAMETHASONE SODIUM PHOSPHATE 8 MG: 4 INJECTION, SOLUTION INTRA-ARTICULAR; INTRALESIONAL; INTRAMUSCULAR; INTRAVENOUS; SOFT TISSUE at 19:45

## 2025-01-09 RX ADMIN — IOPAMIDOL 99 ML: 755 INJECTION, SOLUTION INTRAVENOUS at 12:47

## 2025-01-09 RX ADMIN — TRAZODONE HYDROCHLORIDE 150 MG: 100 TABLET ORAL at 22:47

## 2025-01-09 RX ADMIN — MORPHINE SULFATE 4 MG: 4 INJECTION, SOLUTION INTRAMUSCULAR; INTRAVENOUS at 10:55

## 2025-01-09 RX ADMIN — SODIUM CHLORIDE 65 ML: 9 INJECTION, SOLUTION INTRAVENOUS at 12:47

## 2025-01-09 RX ADMIN — DEXMEDETOMIDINE HYDROCHLORIDE 8 MCG: 100 INJECTION, SOLUTION INTRAVENOUS at 19:49

## 2025-01-09 RX ADMIN — KETOROLAC TROMETHAMINE 30 MG: 30 INJECTION, SOLUTION INTRAMUSCULAR at 20:51

## 2025-01-09 RX ADMIN — HYDROMORPHONE HYDROCHLORIDE 0.5 MG: 1 INJECTION, SOLUTION INTRAMUSCULAR; INTRAVENOUS; SUBCUTANEOUS at 19:55

## 2025-01-09 RX ADMIN — ONDANSETRON 4 MG: 2 INJECTION INTRAMUSCULAR; INTRAVENOUS at 10:55

## 2025-01-09 RX ADMIN — ACETAMINOPHEN 975 MG: 325 TABLET, FILM COATED ORAL at 17:42

## 2025-01-09 RX ADMIN — BUPRENORPHINE AND NALOXONE 1 FILM: 8; 2 FILM, SOLUBLE BUCCAL; SUBLINGUAL at 22:48

## 2025-01-09 RX ADMIN — OLANZAPINE 10 MG: 5 TABLET, FILM COATED ORAL at 22:47

## 2025-01-09 RX ADMIN — DIVALPROEX SODIUM 1000 MG: 500 TABLET, FILM COATED, EXTENDED RELEASE ORAL at 22:47

## 2025-01-09 RX ADMIN — DIAZEPAM 10 MG: 5 TABLET ORAL at 23:17

## 2025-01-09 RX ADMIN — MIDAZOLAM 2 MG: 1 INJECTION INTRAMUSCULAR; INTRAVENOUS at 19:39

## 2025-01-09 RX ADMIN — SODIUM CHLORIDE, POTASSIUM CHLORIDE, SODIUM LACTATE AND CALCIUM CHLORIDE: 600; 310; 30; 20 INJECTION, SOLUTION INTRAVENOUS at 20:05

## 2025-01-09 RX ADMIN — HYDROMORPHONE HYDROCHLORIDE 0.5 MG: 1 INJECTION, SOLUTION INTRAMUSCULAR; INTRAVENOUS; SUBCUTANEOUS at 20:07

## 2025-01-09 RX ADMIN — FENTANYL CITRATE 25 MCG: 50 INJECTION, SOLUTION INTRAMUSCULAR; INTRAVENOUS at 21:17

## 2025-01-09 RX ADMIN — MORPHINE SULFATE 4 MG: 4 INJECTION, SOLUTION INTRAMUSCULAR; INTRAVENOUS at 13:04

## 2025-01-09 RX ADMIN — ACETAMINOPHEN 1000 MG: 500 TABLET, FILM COATED ORAL at 22:47

## 2025-01-09 ASSESSMENT — ACTIVITIES OF DAILY LIVING (ADL)
ADLS_ACUITY_SCORE: 59
ADLS_ACUITY_SCORE: 59
ADLS_ACUITY_SCORE: 40
ADLS_ACUITY_SCORE: 59

## 2025-01-09 ASSESSMENT — COLUMBIA-SUICIDE SEVERITY RATING SCALE - C-SSRS
2. HAVE YOU ACTUALLY HAD ANY THOUGHTS OF KILLING YOURSELF IN THE PAST MONTH?: NO
6. HAVE YOU EVER DONE ANYTHING, STARTED TO DO ANYTHING, OR PREPARED TO DO ANYTHING TO END YOUR LIFE?: NO
1. IN THE PAST MONTH, HAVE YOU WISHED YOU WERE DEAD OR WISHED YOU COULD GO TO SLEEP AND NOT WAKE UP?: NO

## 2025-01-09 ASSESSMENT — ENCOUNTER SYMPTOMS: SEIZURES: 1

## 2025-01-09 NOTE — PHARMACY-ADMISSION MEDICATION HISTORY
Pharmacist Admission Medication History    Admission medication history is complete. The information provided in this note is only as accurate as the sources available at the time of the update.    Information Source(s): Patient and CareEverywhere/SureScripts via in-person    Pertinent Information:      Changes made to PTA medication list:  Added: None  Deleted: Folic acid  Changed: triamcinolone to prn    Allergies reviewed with patient and updates made in EHR: yes    Medication History Completed By: Luis Berger [facility] 1/9/2025 3:18 PM    PTA Med List   Medication Sig Last Dose/Taking    acetaminophen (TYLENOL) 500 MG tablet Take 2 tablets (1,000 mg) by mouth every 6 hours as needed for pain Past Month    amphetamine-dextroamphetamine (ADDERALL XR) 25 MG 24 hr capsule Take 25 mg by mouth daily 1/7/2025 Noon    aspirin (ASA) 81 MG chewable tablet Take 40.5 mg by mouth daily as needed for other ((patient unable to specify when/why he takes)) More than a month    buprenorphine HCl-naloxone HCl (SUBOXONE) 8-2 MG per film Place 1 Film under the tongue 2 times daily 1/7/2025 Noon    buPROPion (WELLBUTRIN XL) 150 MG 24 hr tablet Take 1 tablet (150 mg) by mouth every morning Takes with 300mg tablet for total dose = 450mg 1/7/2025 Noon    buPROPion (WELLBUTRIN XL) 300 MG 24 hr tablet Take 1 tablet (300 mg) by mouth every morning Takes with 150mg tablet for total dose = 450mg 1/7/2025 Noon    divalproex sodium extended-release (DEPAKOTE ER) 500 MG 24 hr tablet Take 2 tablets (1,000 mg) by mouth At Bedtime 1/7/2025 Bedtime    multivitamin w/minerals (THERA-VIT-M) tablet Take 1 tablet by mouth daily 1/7/2025    naloxone (NARCAN) 4 MG/0.1ML nasal spray Spray 1 spray (4 mg) into one nostril alternating nostrils once as needed for opioid reversal every 2-3 minutes until assistance arrives Taking As Needed    OLANZapine (ZYPREXA) 10 MG tablet Take 10 mg by mouth at bedtime 1/7/2025 Bedtime    testosterone cypionate  (DEPOTESTOSTERONE) 200 MG/ML injection Inject 1.1 mLs into the muscle every 14 days 1/1/2025 Morning    traZODone (DESYREL) 150 MG tablet Take 150 mg by mouth at bedtime 1/7/2025 Bedtime    triamcinolone (KENALOG) 0.1 % external cream Apply topically 2 times daily as needed Past Month

## 2025-01-09 NOTE — ED TRIAGE NOTES
Arrived EMS from home. Upper GI generalized pain began this am. Hx pancreatitis multiple years prior and states sx similar. Denies N/V, prior but c/o nausea; ems gave 4mg zofran PTA.  , tachycardia. A/OX4 ABC in tact.

## 2025-01-09 NOTE — ED PROVIDER NOTES
Emergency Department Note      History of Present Illness     Chief Complaint   Abdominal Pain      HPI   Andrea Pham is a 59 year old male with a past medical history significant for Wernicke's encephalopathy, ADHD, drug seeking behavior, anxiety and depression, bipolar disorder, seizures, substance abuse including methamphetamine use, tobacco dependence, cocaine use, heroin use, and alcohol abuse, DVT, bipolar disorder, and schizoaffective disorder who presents to the emergency department with EMS for evaluation of abdominal pain. He reports that he had the sudden onset of generalized abdominal pain to his epigastric region at about 0200 am this morning without radiating pain. He was able to fall asleep for a couple more hours and woke up at 0400 am like he typically does in order to go to the gym. The pain had worsened since the onset and was so intense that he was not able to go to the gym. He endorses some mild nausea without emesis. He is also experiencing mild intermittent dysuria with urination but no other urinary symptoms. This dysuria does seem to be more chronic in nature and he is not overly concerned by this. He does not have any concerns for STD. He is also having some difficulty with moving his bowels as well. He does endorse previous history of pancreatitis, and states that this is consistent with his symptoms previously. He endorses daily alcohol use and states that he typically has a few beers each night. He did have one beer in the past 24 hours but was unable to continue drinking due to the onset of abdominal pain. He does have a past medical history significant for polysubstance abuse including methamphetamine use, tobacco use, cocaine use, heroin use, and alcohol abuse and dependence. He was brought to the ED for further evaluation, and EMS gave 4 mg zofran prior to arrival. His blood glucose levels were 119 en route to the ED, and he was tachycardic at this time as well.     Independent  "Historian   None    Review of External Notes   none    Past Medical History   Medical History and Problem List   Alcohol use disorder, severe, dependence  Ataxia  Bipolar disorder   Chemical dependency  Chronic hip pain  Chronic pain syndrome  Cocaine abuse  Depressive disorder  DVT   Hepatitis C virus infection  Heroin abuse  methamphetamine abuse   Schizoaffective disorder  Hypertension  Seizures   Substance abuse  Low testosterone  Psychosis  Bipolar affective disorder  Alcohol withdrawal  Anxiety and depression  Drug-seeking behavior  Suicidal ideation  Tobacco dependence  Closed fracture of right ankle  Fall  Closed nondisplaced fracture of second metatarsal bone of right foot  Delirium tremens  ADHD  Wernicke's encephalopathy    Medications   amphetamine-dextroamphetamine  aspirin 81  buprenorphine HCl-naloxone HCl  bupropion   divalproex sodium extended-release   naloxone  Olanzapine  testosterone cypionate  Naloxone  Dextroamphetamine  Divalproex  Olanzapine  trazodone    Surgical History   Chest surgery- flail chest repair  Hip surgery  Knee surgery  ORIF ankle  Orthopedic surgery left shoulder repair  Orthopedic surgery meniscus tear repair  Orthopedic surgery c spine fracture  Removal of hardware in ankle  Hip arthroplasty  Back surgery  Sternal surgery  Right elbow surgery  Colonoscopy  Ablation of left saphenous veins  Physical Exam     Patient Vitals for the past 24 hrs:   BP Temp Temp src Pulse Resp SpO2 Height Weight   01/09/25 1229 121/83 -- -- 88 -- 96 % -- --   01/09/25 1121 -- -- -- -- -- 92 % -- --   01/09/25 1111 -- -- -- -- -- 93 % -- --   01/09/25 1110 -- -- -- -- -- 93 % -- --   01/09/25 1036 (!) 160/96 97.7  F (36.5  C) Oral 117 18 95 % 1.803 m (5' 11\") 103.3 kg (227 lb 11.8 oz)     Physical Exam  GENERAL: Patient well-appearing  HEAD: Atraumatic.  NECK: No rigidity  CV: RRR, no murmurs, rubs or gallops  PULM: CTAB with good aeration; no retractions, rales, rhonchi, or wheezing  ABD: Soft, " generalized abdominal tenderness palpation.  Not rigid.  No guarding.  DERM: No rash. Skin warm and dry  EXTREMITY: Moving all extremities without difficulty.      Diagnostics     Lab Results   Labs Ordered and Resulted from Time of ED Arrival to Time of ED Departure   COMPREHENSIVE METABOLIC PANEL - Abnormal       Result Value    Sodium 134 (*)     Potassium 4.3      Carbon Dioxide (CO2) 23      Anion Gap 14      Urea Nitrogen 9.2      Creatinine 0.81      GFR Estimate >90      Calcium 8.6 (*)     Chloride 97 (*)     Glucose 115 (*)     Alkaline Phosphatase 54      AST 83 (*)     ALT 71 (*)     Protein Total 7.3      Albumin 4.5      Bilirubin Total 0.4     ROUTINE UA WITH MICROSCOPIC REFLEX TO CULTURE - Abnormal    Color Urine Light Yellow      Appearance Urine Clear      Glucose Urine Negative      Bilirubin Urine Negative      Ketones Urine 10 (*)     Specific Gravity Urine 1.011      Blood Urine Negative      pH Urine 7.0      Protein Albumin Urine Negative      Urobilinogen Urine Normal      Nitrite Urine Negative      Leukocyte Esterase Urine Small (*)     Mucus Urine Present (*)     RBC Urine <1      WBC Urine 1     CBC WITH PLATELETS AND DIFFERENTIAL - Abnormal    WBC Count 11.4 (*)     RBC Count 4.98      Hemoglobin 15.0      Hematocrit 44.4      MCV 89      MCH 30.1      MCHC 33.8      RDW 13.5      Platelet Count 203      % Neutrophils 86      % Lymphocytes 4      % Monocytes 8      % Eosinophils 1      % Basophils 0      % Immature Granulocytes 1      NRBCs per 100 WBC 0      Absolute Neutrophils 9.8 (*)     Absolute Lymphocytes 0.4 (*)     Absolute Monocytes 0.9      Absolute Eosinophils 0.2      Absolute Basophils 0.0      Absolute Immature Granulocytes 0.1      Absolute NRBCs 0.0     LIPASE - Normal    Lipase 27     RBC AND PLATELET MORPHOLOGY    RBC Morphology Confirmed RBC Indices      Platelet Assessment        Value: Automated Count Confirmed. Platelet morphology is normal.   LACTIC ACID WHOLE  BLOOD   BLOOD CULTURE   BLOOD CULTURE       Imaging   CT Abdomen Pelvis w Contrast   Final Result   IMPRESSION:    1.  Prominent acute appendicitis. Appendix extends medial to the cecum and measures 1.6 cm with adjacent prominent inflammatory change. Adjacent small locule of fluid is 1.6 cm.   2.  Fatty liver.   3.  Indeterminate hypodensity at the anterior left kidney. Recommend further assessment with nonemergent renal MRI.   4.  Slightly larger left adrenal nodule that is nonspecific. This could be further evaluated with nonemergent adrenal CT or MRI.          EKG   None       ED Course      Medications Administered   Medications   piperacillin-tazobactam (ZOSYN) 3.375 g vial to attach to  mL bag (has no administration in time range)   morphine (PF) injection 4 mg (has no administration in time range)   sodium chloride 0.9% BOLUS 1,000 mL (0 mLs Intravenous Stopped 1/9/25 1302)   morphine (PF) injection 4 mg (4 mg Intravenous $Given 1/9/25 1055)   ondansetron (ZOFRAN) injection 4 mg (4 mg Intravenous $Given 1/9/25 1055)   morphine (PF) injection 4 mg (4 mg Intravenous $Given 1/9/25 1304)   iopamidol (ISOVUE-370) solution 500 mL (99 mLs Intravenous $Given 1/9/25 1247)   CT scan flush (65 mLs Intravenous $Given 1/9/25 1247)       Procedures   Procedures     Discussion of Management   Discussed with Surgeon Dr. Burns.    ED Course   ED Course as of 01/09/25 1408   Thu Jan 09, 2025   1047 I obtained history and examined the patient as noted above.     1333 I spoke with Dr. Burns from general surgery regarding patient care.       Additional Documentation  Social Determinants of Health: Polysubstance abuse    Medical Decision Making / Diagnosis        ARVIN Pham is a 59 year old male with history of alcohol abuse and pancreatitis, presenting with concerns of pancreatitis, ultimately found to have appendicitis.  DDx: Consider mesenteric ischemia, stone, diverticulitis, among other abdominal  pathology.  Initially evaluated patient for pancreatitis.  However lipase normal.  White, minimally elevated.  Given IV fluids and IV morphine.  Patient states pain improved, but still is significantly elevated.  Although thankfully he is very well-appearing.  Therefore proceeded to imaging.  CT scan demonstrates acute appendicitis and looks like there is a degree of perforation as there is a fluid collection adjacent to the appendix.  Ordered blood culture and lactate due to given IV antibiotics.  Given IV Zosyn.    Patient kept NPO.    Plan for patient to go to OR.    Patient made aware of incidental adrenal nodule and kidney lesion and need for outpatient MRI.  Patient to discuss this with his primary provider.    Disposition   The patient was admitted to the hospital.     Diagnosis     ICD-10-CM    1. Kidney lesion, native, left  N28.9       2. Adrenal nodule  E27.9       3. Perforated appendicitis  K35.32 Case Request: LAPAROSCOPIC APPENDECTOMY     Case Request: LAPAROSCOPIC APPENDECTOMY           Discharge Medications   New Prescriptions    No medications on file         Scribe Disclosure:  ILashay, am serving as a scribe at 12:49 PM on 1/9/2025 to document services personally performed by Sundeep Meier MD based on my observations and the provider's statements to me.        Sundeep Meier MD  01/09/25 0789

## 2025-01-09 NOTE — ANESTHESIA PREPROCEDURE EVALUATION
Anesthesia Pre-Procedure Evaluation    Patient: Andrea Pham   MRN: [id number] : 1965        Procedure : Procedure(s):  LAPAROSCOPIC APPENDECTOMY          Past Medical History:   Diagnosis Date    Alcohol use disorder, severe, dependence (H) 2019    Ataxia     Bipolar disorder (H)     Chemical dependency (H)     Chronic hip pain     Chronic pain syndrome     Cocaine abuse (H)     Depressive disorder     DTs (delirium tremens) (H)     DVT (deep venous thrombosis) (H) 5 y ago    left foot, treated with coumadin    Elevated LFTs     Flail chest     broken sterum, wiring     Hepatitis C virus infection, unspecified chronicity     Heroin abuse (H)     History of methamphetamine abuse (H)     History of total hip arthroplasty     right    Hypertension     Seizures (H)     Substance abuse (H)     alcohol, opiates    Wernicke's encephalopathy       Past Surgical History:   Procedure Laterality Date    CHEST SURGERY      flail chest, sterum fractured    HIP SURGERY      KNEE SURGERY      OPEN REDUCTION INTERNAL FIXATION ANKLE Right 2021    Procedure: Open reduction internal fixation right ankle syndesmotic injury;  Surgeon: Leroy Thomason MD;  Location:  OR    ORTHOPEDIC SURGERY      KIMBER right. Stephanie left shoulder surgery, debridement right shoulder, neck surgery- fracture cervical spine. 6 knee surgeries- bucket handle meniscal tear and debridement. 3 heel surgeries.     ORTHOPEDIC SURGERY      REMOVE HARDWARE ANKLE Right 2022    Procedure: removal of hardware of right ankle;  Surgeon: Leroy Thomason MD;  Location:  OR      No Known Allergies   Social History     Tobacco Use    Smoking status: Every Day     Current packs/day: 0.00     Average packs/day: 0.5 packs/day for 10.0 years (5.0 ttl pk-yrs)     Types: Vaping Device, Cigarettes     Start date: 2009     Last attempt to quit: 2019     Years since quittin.7    Smokeless tobacco: Never    Tobacco comments:     Quit     Substance Use Topics    Alcohol use: Not Currently     Alcohol/week: 24.0 standard drinks of alcohol     Types: 24 Cans of beer per week     Comment: drinks 1.75L grain alcohol daily since 4/28/18, clean 11 months as of 7/7/2022      Wt Readings from Last 1 Encounters:   01/09/25 103.3 kg (227 lb 11.8 oz)        Anesthesia Evaluation   Pt has had prior anesthetic. Type: General.    No history of anesthetic complications       ROS/MED HX  ENT/Pulmonary:  - neg pulmonary ROS     Neurologic:  - neg neurologic ROS   (+)       seizures,                         Cardiovascular:     (+)  hypertension- -   -  - -                                      METS/Exercise Tolerance:     Hematologic:  - neg hematologic  ROS     Musculoskeletal:  - neg musculoskeletal ROS     GI/Hepatic:     (+)         appendicitis,  hepatitis type C,        Renal/Genitourinary: Comment: Kidney lesion      Endo:  - neg endo ROS     Psychiatric/Substance Use: Comment: Ethanol abuse - neg psychiatric ROS   (+) psychiatric history bipolar alcohol abuse H/O chronic opiod use . Recreational drug usage: Other (Comment).    Infectious Disease:  - neg infectious disease ROS     Malignancy:  - neg malignancy ROS     Other:  - neg other ROS          Physical Exam    Airway        Mallampati: I   TM distance: > 3 FB   Neck ROM: full   Mouth opening: > 3 cm    Respiratory Devices and Support         Dental  no notable dental history     (+) Modest Abnormalities - crowns, retainers, 1 or 2 missing teeth      Cardiovascular   cardiovascular exam normal          Pulmonary   pulmonary exam normal                OUTSIDE LABS:  CBC:   Lab Results   Component Value Date    WBC 11.4 (H) 01/09/2025    WBC 6.3 02/15/2024    HGB 15.0 01/09/2025    HGB 15.3 02/15/2024    HCT 44.4 01/09/2025    HCT 45.2 02/15/2024     01/09/2025     02/15/2024     BMP:   Lab Results   Component Value Date     (L) 01/09/2025     02/15/2024    POTASSIUM 4.3  01/09/2025    POTASSIUM 4.5 02/15/2024    CHLORIDE 97 (L) 01/09/2025    CHLORIDE 100 02/15/2024    CO2 23 01/09/2025    CO2 25 02/15/2024    BUN 9.2 01/09/2025    BUN 25.4 (H) 02/15/2024    CR 0.81 01/09/2025    CR 1.02 02/15/2024     (H) 01/09/2025    GLC 77 02/15/2024     COAGS:   Lab Results   Component Value Date    PTT 27 12/23/2021    INR 1.03 11/23/2022     POC:   Lab Results   Component Value Date    BGM 71 12/01/2020     HEPATIC:   Lab Results   Component Value Date    ALBUMIN 4.5 01/09/2025    PROTTOTAL 7.3 01/09/2025    ALT 71 (H) 01/09/2025    AST 83 (H) 01/09/2025     (H) 02/18/2019    ALKPHOS 54 01/09/2025    BILITOTAL 0.4 01/09/2025    GLYNN 58 12/11/2022     OTHER:   Lab Results   Component Value Date    LACT 0.9 01/09/2025    A1C 5.0 09/23/2022    JOHANA 8.6 (L) 01/09/2025    PHOS 2.9 12/12/2022    MAG 1.9 12/12/2022    LIPASE 27 01/09/2025    TSH 1.31 11/28/2022       Anesthesia Plan    ASA Status:  3    NPO Status:  NPO Appropriate    Anesthesia Type: General.     - Airway: ETT   Induction: Intravenous, Propofol.   Maintenance: Balanced.        Consents    Anesthesia Plan(s) and associated risks, benefits, and realistic alternatives discussed. Questions answered and patient/representative(s) expressed understanding.     - Discussed:     - Discussed with:  Patient            Postoperative Care    Pain management: IV analgesics, Oral pain medications, Multi-modal analgesia.   PONV prophylaxis: Ondansetron (or other 5HT-3), Dexamethasone or Solumedrol     Comments:               Pawel George MD    I have reviewed the pertinent notes and labs in the chart from the past 30 days and (re)examined the patient.  Any updates or changes from those notes are reflected in this note.     # Hyponatremia: Lowest Na = 134 mmol/L in last 2 days, will monitor as appropriate  # Hypochloremia: Lowest Cl = 97 mmol/L in last 2 days, will monitor as appropriate  # Hypocalcemia: Lowest Ca = 8.6  "mg/dL in last 2 days, will monitor and replace as appropriate       # Drug Induced Platelet Defect: home medication list includes an antiplatelet medication       # Acute Hypoxic Respiratory Failure: Documented O2 saturation < 90%. Continue supplemental oxygen as needed        # Obesity: Estimated body mass index is 31.76 kg/m  as calculated from the following:    Height as of this encounter: 1.803 m (5' 11\").    Weight as of this encounter: 103.3 kg (227 lb 11.8 oz).       # Financial/Environmental Concerns:          "

## 2025-01-09 NOTE — ED TRIAGE NOTES
"Pt is complaining of AB pain that started around 0200 this am. Pt has a history of pancreatitis, states it feels just like that. Pt did have two beers this am, has two beers everyday. Pt also endorses nausea, and \"have had burning on urination for awhile.. I didn't think it was a big deal.\"     A/O x 4. ABCs intact.         "

## 2025-01-09 NOTE — CONSULTS
[facility]  General Surgery Consult    Andrea Pham MRN# [id number]   Age: 59 year old YOB: 1965     HPI:  History is obtained from the patient. Andrea Pham is a 59 year old year old patient who has been experiencing acute periumbilical and RLQ pain for the past 1 day associated with anorexia.  Negative for associated fever, chills, nausea, and vomiting. No similar episodes of pain in the past.     Review Of Systems:  The 10 point review of systems is negative other than noted in the HPI.    PMH:  Past Medical History:   Diagnosis Date    Alcohol use disorder, severe, dependence (H) 04/23/2019    Ataxia     Bipolar disorder (H)     Chemical dependency (H)     Chronic hip pain     Chronic pain syndrome     Cocaine abuse (H)     Depressive disorder     DTs (delirium tremens) (H)     DVT (deep venous thrombosis) (H) 5 y ago    left foot, treated with coumadin    Elevated LFTs     Flail chest     broken sterum, wiring     Hepatitis C virus infection, unspecified chronicity     Heroin abuse (H)     History of methamphetamine abuse (H)     History of total hip arthroplasty     right    Hypertension     Seizures (H)     Substance abuse (H)     alcohol, opiates    Wernicke's encephalopathy        PSH:  Past Surgical History:   Procedure Laterality Date    CHEST SURGERY  1987    flail chest, sterum fractured    HIP SURGERY      KNEE SURGERY      OPEN REDUCTION INTERNAL FIXATION ANKLE Right 12/24/2021    Procedure: Open reduction internal fixation right ankle syndesmotic injury;  Surgeon: Leroy Thomason MD;  Location:  OR    ORTHOPEDIC SURGERY      KIMBER right. [place] left shoulder surgery, debridement right shoulder, neck surgery- fracture cervical spine. 6 knee surgeries- bucket handle meniscal tear and debridement. 3 heel surgeries.     ORTHOPEDIC SURGERY      REMOVE HARDWARE ANKLE Right 7/13/2022    Procedure: removal of hardware of right ankle;  Surgeon: Leroy Thomason MD;  Location:  OR        Allergies:  No Known Allergies    Home Medications:  Current Outpatient Medications   Medication Sig Dispense Refill    acetaminophen (TYLENOL) 500 MG tablet Take 2 tablets (1,000 mg) by mouth every 6 hours as needed for pain      amphetamine-dextroamphetamine (ADDERALL XR) 25 MG 24 hr capsule Take 25 mg by mouth daily      aspirin (ASA) 81 MG chewable tablet Take 40.5 mg by mouth daily as needed for other ((patient unable to specify when/why he takes))      buprenorphine HCl-naloxone HCl (SUBOXONE) 8-2 MG per film Place 1 Film under the tongue 2 times daily      buPROPion (WELLBUTRIN XL) 150 MG 24 hr tablet Take 1 tablet (150 mg) by mouth every morning Takes with 300mg tablet for total dose = 450mg 30 tablet 0    buPROPion (WELLBUTRIN XL) 300 MG 24 hr tablet Take 1 tablet (300 mg) by mouth every morning Takes with 150mg tablet for total dose = 450mg 30 tablet 0    divalproex sodium extended-release (DEPAKOTE ER) 500 MG 24 hr tablet Take 2 tablets (1,000 mg) by mouth At Bedtime 60 tablet 0    folic acid (FOLVITE) 1 MG tablet Take 1 mg by mouth daily      multivitamin w/minerals (THERA-VIT-M) tablet Take 1 tablet by mouth daily      naloxone (NARCAN) 4 MG/0.1ML nasal spray Spray 1 spray (4 mg) into one nostril alternating nostrils once as needed for opioid reversal every 2-3 minutes until assistance arrives 2 each 0    OLANZapine (ZYPREXA) 10 MG tablet Take 10 mg by mouth at bedtime      testosterone cypionate (DEPOTESTOSTERONE) 200 MG/ML injection Inject 1.1 mLs into the muscle every 14 days      traZODone (DESYREL) 150 MG tablet Take 150 mg by mouth at bedtime      triamcinolone (KENALOG) 0.1 % external cream Apply topically 2 times daily         Social History:  Social History     Tobacco Use    Smoking status: Every Day     Current packs/day: 0.00     Average packs/day: 0.5 packs/day for 10.0 years (5.0 ttl pk-yrs)     Types: Vaping Device, Cigarettes     Start date: 4/19/2009     Last attempt to quit:  "2019     Years since quittin.7    Smokeless tobacco: Never    Tobacco comments:     Quit 2019   Vaping Use    Vaping status: Every Day   Substance Use Topics    Alcohol use: Not Currently     Alcohol/week: 24.0 standard drinks of alcohol     Types: 24 Cans of beer per week     Comment: drinks 1.75L grain alcohol daily since 17, clean 11 months as of 2022    Drug use: Not Currently     Types: Methamphetamines, Amphetamines     Comment: \"using daily, 1 dose/day\"  Poly substance has not used for 6 months       Family History:  No family history chronic diarrhea, inflammatory bowel disease or colon cancer.  No bleeding disorders, clotting disorders, or problems with anesthesia.    Physical Exam:  /79   Pulse 111   Temp 97.7  F (36.5  C) (Oral)   Resp 18   Ht 1.803 m (5' 11\")   Wt 103.3 kg (227 lb 11.8 oz)   SpO2 93%   BMI 31.76 kg/m    General appearance: Resting Comfortably in bed, no apparent distress  Lungs: Breathing comfortably on room air  Heart: Regular rate and rhythm'  Abdomen: Soft, nondistended, tender to palpation in RLQ with guarding, no palpable masses or hernias  Extremities: Without clubbing, cyanosis, edema  Neurologic: Grossly intact times four extremities, alert and oriented times three  Psychiatric: Mood and affect are appropriate  Skin: Without lesions or rashes      Labs Reviewed:  Lab Results   Component Value Date    WBC 11.4 2025    WBC 5.6 2020     Lab Results   Component Value Date    HGB 15.0 2025    HGB 13.8 2020     Lab Results   Component Value Date     2025     2020     Last Basic Metabolic Panel:  Lab Results   Component Value Date     2025     2020      Lab Results   Component Value Date    POTASSIUM 4.3 2025    POTASSIUM 3.9 2021    POTASSIUM 3.8 2020     Lab Results   Component Value Date    CHLORIDE 97 2025    CHLORIDE 99 2021    CHLORIDE 107 " 12/01/2020     Lab Results   Component Value Date    JOHANA 8.6 01/09/2025    JOHANA 8.5 12/01/2020     Lab Results   Component Value Date    CO2 23 01/09/2025    CO2 26 12/24/2021    CO2 24 12/01/2020     Lab Results   Component Value Date    BUN 9.2 01/09/2025    BUN 20 12/24/2021    BUN 16 12/01/2020     Lab Results   Component Value Date    CR 0.81 01/09/2025    CR 0.82 12/01/2020     Lab Results   Component Value Date     01/09/2025     12/01/2022     12/24/2021    GLC 83 12/01/2020       Radiology:  All imaging studies reviewed by me.    Results for orders placed or performed during the hospital encounter of 01/09/25   CT Abdomen Pelvis w Contrast    Narrative    EXAM: CT ABDOMEN PELVIS W CONTRAST  LOCATION: [facility]  DATE: 1/9/2025    INDICATION: Epigastric abd pain. History of pancreatitis.  COMPARISON: CT 4/16/2014.  TECHNIQUE: CT scan of the abdomen and pelvis was performed following injection of IV contrast. Multiplanar reformats were obtained. Dose reduction techniques were used.  CONTRAST: 99 mL Isovue 370    FINDINGS:   LOWER CHEST: Normal.    HEPATOBILIARY: Fatty liver. No acute liver or gallbladder abnormality.    PANCREAS: Normal.    SPLEEN: Normal.    ADRENAL GLANDS: Normal right adrenal. Left adrenal nodule is 1.6 cm, previously 1 cm (series 3, image 54).    KIDNEYS/BLADDER: No hydronephrosis or obstructing stone. Bladder is obscured. There is a hypodensity that is indeterminate along the anterior left mid kidney measuring 9 mm (series 3, image 87).    BOWEL: Inflamed and enlarged appendix extending medial to the cecum is identified. Appendix measures 1.6 cm (series 3, image 40) with adjacent prominent inflammatory edema. Tiny locule of fluid adjacent to the appendix is 1.6 cm (image 133). Remainder of   the bowel shows no acute abnormality. Moderate stool throughout the colon.    LYMPH NODES: Normal.    VASCULATURE: Normal.    PELVIC ORGANS: Largely  obscured by streak.    MUSCULOSKELETAL: Diffuse degenerative changes of the spine and bilateral hip arthroplasties.      Impression    IMPRESSION:   1.  Prominent acute appendicitis. Appendix extends medial to the cecum and measures 1.6 cm with adjacent prominent inflammatory change. Adjacent small locule of fluid is 1.6 cm.  2.  Fatty liver.  3.  Indeterminate hypodensity at the anterior left kidney. Recommend further assessment with nonemergent renal MRI.  4.  Slightly larger left adrenal nodule that is nonspecific. This could be further evaluated with nonemergent adrenal CT or MRI.         ASSESSMENT/PLAN:  The patient's history, physical exam, laboratory and imaging studies are suspicious for acute appendicitis, possibly with perforation of the tip.  I have offered the patient a laparoscopic appendectomy.      We have had a detailed discussion of nature of appendicitis, the procedure, its risks, benefits, alternatives, recovery, postop limitations, anesthesia, bleeding, blood transfusion,  DVT, PE, postoperative infections, injury to adjacent organs and structures, open conversion, bowel resection, prolonged convalescence in the event of gangrene or perforation of the appendix, abdominal wall hernia, intraabdominal adhesions which can lead to bowel obstruction.  We have discussed interventions and treatment for these complications.  The patient understands the possibility of a diagnosis other than appendicitis.  All questions have been answered to the best of my ability.      This case was discussed with ED physician, Dr. Meier.    He elects to proceed.      Pre-operative antibiotics have been given.     Tita Burns MD    Time spent with the patient, reviewing the EMR, reviewing laboratory and imaging studies, more than 50% of which was counseling and coordinating care:  30 minutes.

## 2025-01-09 NOTE — ED NOTES
[facility]  ED Nurse Handoff Report    ED Chief complaint: Abdominal Pain  . ED Diagnosis:   Final diagnoses:   Kidney lesion, native, left   Adrenal nodule   Perforated appendicitis       Allergies: No Known Allergies    Code Status: Full Code    Activity level - Baseline/Home:  independent.  Activity Level - Current:   independent.   Lift room needed: No.   Bariatric: No   Needed: No   Isolation: No.   Infection: Not Applicable.     Respiratory status: Room air    Vital Signs (within 30 minutes):   Vitals:    01/09/25 1110 01/09/25 1111 01/09/25 1121 01/09/25 1229   BP:    121/83   Pulse:    88   Resp:       Temp:       TempSrc:       SpO2: 93% 93% 92% 96%   Weight:       Height:           Cardiac Rhythm:  ,      Pain level:    Patient confused: No.   Patient Falls Risk: nonskid shoes/slippers when out of bed.   Elimination Status: Has voided     Patient Report - Initial Complaint: abdominal pain.   Focused Assessment:  59 year old male with a past medical history significant for Wernicke's encephalopathy, ADHD, drug seeking behavior, anxiety and depression, bipolar disorder, seizures, substance abuse including methamphetamine use, tobacco dependence, cocaine use, heroin use, and alcohol abuse, DVT, bipolar disorder, and schizoaffective disorder who presents to the emergency department with EMS for evaluation of abdominal pain. He reports that he had the sudden onset of generalized abdominal pain to his epigastric region at about 0200 am this morning without radiating pain. He was able to fall asleep for a couple more hours and woke up at 0400 am like he typically does in order to go to the gym. The pain had worsened since the onset and was so intense that he was not able to go to the gym. He endorses some mild nausea without emesis. He is also experiencing mild intermittent dysuria with urination but no other urinary symptoms. This dysuria does seem to be more chronic in nature  and he is not overly concerned by this. He does not have any concerns for STD. He is also having some difficulty with moving his bowels as well. He does endorse previous history of pancreatitis, and states that this is consistent with his symptoms previously. He endorses daily alcohol use and states that he typically has a few beers each night. He did have one beer in the past 24 hours but was unable to continue drinking due to the onset of abdominal pain. He does have a past medical history significant for polysubstance abuse including methamphetamine use, tobacco use, cocaine use, heroin use, and alcohol abuse and dependence. He was brought to the ED for further evaluation, and EMS gave 4 mg zofran prior to arrival. His blood glucose levels were 119 en route to the ED, and he was tachycardic at this time as well.      Abnormal Results:   Labs Ordered and Resulted from Time of ED Arrival to Time of ED Departure   COMPREHENSIVE METABOLIC PANEL - Abnormal       Result Value    Sodium 134 (*)     Potassium 4.3      Carbon Dioxide (CO2) 23      Anion Gap 14      Urea Nitrogen 9.2      Creatinine 0.81      GFR Estimate >90      Calcium 8.6 (*)     Chloride 97 (*)     Glucose 115 (*)     Alkaline Phosphatase 54      AST 83 (*)     ALT 71 (*)     Protein Total 7.3      Albumin 4.5      Bilirubin Total 0.4     ROUTINE UA WITH MICROSCOPIC REFLEX TO CULTURE - Abnormal    Color Urine Light Yellow      Appearance Urine Clear      Glucose Urine Negative      Bilirubin Urine Negative      Ketones Urine 10 (*)     Specific Gravity Urine 1.011      Blood Urine Negative      pH Urine 7.0      Protein Albumin Urine Negative      Urobilinogen Urine Normal      Nitrite Urine Negative      Leukocyte Esterase Urine Small (*)     Mucus Urine Present (*)     RBC Urine <1      WBC Urine 1     CBC WITH PLATELETS AND DIFFERENTIAL - Abnormal    WBC Count 11.4 (*)     RBC Count 4.98      Hemoglobin 15.0      Hematocrit 44.4      MCV 89       MCH 30.1      MCHC 33.8      RDW 13.5      Platelet Count 203      % Neutrophils 86      % Lymphocytes 4      % Monocytes 8      % Eosinophils 1      % Basophils 0      % Immature Granulocytes 1      NRBCs per 100 WBC 0      Absolute Neutrophils 9.8 (*)     Absolute Lymphocytes 0.4 (*)     Absolute Monocytes 0.9      Absolute Eosinophils 0.2      Absolute Basophils 0.0      Absolute Immature Granulocytes 0.1      Absolute NRBCs 0.0     LIPASE - Normal    Lipase 27     RBC AND PLATELET MORPHOLOGY    RBC Morphology Confirmed RBC Indices      Platelet Assessment        Value: Automated Count Confirmed. Platelet morphology is normal.   LACTIC ACID WHOLE BLOOD   BLOOD CULTURE   BLOOD CULTURE        CT Abdomen Pelvis w Contrast   Final Result   IMPRESSION:    1.  Prominent acute appendicitis. Appendix extends medial to the cecum and measures 1.6 cm with adjacent prominent inflammatory change. Adjacent small locule of fluid is 1.6 cm.   2.  Fatty liver.   3.  Indeterminate hypodensity at the anterior left kidney. Recommend further assessment with nonemergent renal MRI.   4.  Slightly larger left adrenal nodule that is nonspecific. This could be further evaluated with nonemergent adrenal CT or MRI.          Treatments provided: see MAR  Family Comments: NA  OBS brochure/video discussed/provided to patient:  N/A  ED Medications:   Medications   piperacillin-tazobactam (ZOSYN) 3.375 g vial to attach to  mL bag (has no administration in time range)   morphine (PF) injection 4 mg (has no administration in time range)   sodium chloride 0.9% BOLUS 1,000 mL (0 mLs Intravenous Stopped 1/9/25 1302)   morphine (PF) injection 4 mg (4 mg Intravenous $Given 1/9/25 1055)   ondansetron (ZOFRAN) injection 4 mg (4 mg Intravenous $Given 1/9/25 1055)   morphine (PF) injection 4 mg (4 mg Intravenous $Given 1/9/25 1304)   iopamidol (ISOVUE-370) solution 500 mL (99 mLs Intravenous $Given 1/9/25 1247)   CT scan flush (65 mLs Intravenous  $Given 1/9/25 2757)       Drips infusing:  No  For the majority of the shift this patient was Green.   Interventions performed were NA.    Sepsis treatment initiated: No    Cares/treatment/interventions/medications to be completed following ED care: see inpatient orders    ED Nurse Name: Alma Valentin RN  2:18 PM

## 2025-01-10 VITALS
DIASTOLIC BLOOD PRESSURE: 62 MMHG | HEART RATE: 70 BPM | OXYGEN SATURATION: 92 % | SYSTOLIC BLOOD PRESSURE: 111 MMHG | HEIGHT: 71 IN | RESPIRATION RATE: 18 BRPM | TEMPERATURE: 98.4 F | WEIGHT: 229.06 LBS | BODY MASS INDEX: 32.07 KG/M2

## 2025-01-10 PROBLEM — F10.91 HISTORY OF ALCOHOL WITHDRAWAL SYNDROME: Status: ACTIVE | Noted: 2025-01-10

## 2025-01-10 PROBLEM — F10.931 ALCOHOL WITHDRAWAL SYNDROME, WITH DELIRIUM (H): Status: ACTIVE | Noted: 2022-11-23

## 2025-01-10 PROBLEM — K35.30 ACUTE APPENDICITIS WITH LOCALIZED PERITONITIS, WITHOUT PERFORATION, ABSCESS, OR GANGRENE: Status: ACTIVE | Noted: 2025-01-10

## 2025-01-10 PROCEDURE — 250N000013 HC RX MED GY IP 250 OP 250 PS 637: Performed by: SURGERY

## 2025-01-10 PROCEDURE — 250N000012 HC RX MED GY IP 250 OP 636 PS 637: Performed by: SURGERY

## 2025-01-10 RX ADMIN — DIAZEPAM 10 MG: 5 TABLET ORAL at 10:03

## 2025-01-10 RX ADMIN — Medication 1 TABLET: at 08:16

## 2025-01-10 RX ADMIN — BUPROPION HYDROCHLORIDE 300 MG: 150 TABLET, EXTENDED RELEASE ORAL at 08:16

## 2025-01-10 RX ADMIN — BUPROPION HYDROCHLORIDE 150 MG: 150 TABLET, EXTENDED RELEASE ORAL at 08:16

## 2025-01-10 RX ADMIN — DEXTROAMPHETAMINE SACCHARATE, AMPHETAMINE ASPARTATE MONOHYDRATE, DEXTROAMPHETAMINE SULFATE, AND AMPHETAMINE SULFATE 20 MG: 5; 5; 5; 5 CAPSULE, EXTENDED RELEASE ORAL at 08:16

## 2025-01-10 RX ADMIN — OXYCODONE HYDROCHLORIDE 5 MG: 5 TABLET ORAL at 06:33

## 2025-01-10 RX ADMIN — DEXTROAMPHETAMINE SACCHARATE, AMPHETAMINE ASPARTATE MONOHYDRATE, DEXTROAMPHETAMINE SULFATE, AMPHETAMINE SULFATE 5 MG: 1.25; 1.25; 1.25; 1.25 CAPSULE, EXTENDED RELEASE ORAL at 08:16

## 2025-01-10 RX ADMIN — BUPRENORPHINE AND NALOXONE 1 FILM: 8; 2 FILM, SOLUBLE BUCCAL; SUBLINGUAL at 08:16

## 2025-01-10 ASSESSMENT — ACTIVITIES OF DAILY LIVING (ADL)
ADLS_ACUITY_SCORE: 40
ADLS_ACUITY_SCORE: 36
ADLS_ACUITY_SCORE: 35
ADLS_ACUITY_SCORE: 36
ADLS_ACUITY_SCORE: 36
ADLS_ACUITY_SCORE: 40
ADLS_ACUITY_SCORE: 40
ADLS_ACUITY_SCORE: 36
ADLS_ACUITY_SCORE: 34
ADLS_ACUITY_SCORE: 36
ADLS_ACUITY_SCORE: 34
ADLS_ACUITY_SCORE: 36

## 2025-01-10 NOTE — PLAN OF CARE
"Goal Outcome Evaluation:      Plan of Care Reviewed With: patient    Overall Patient Progress: no changeOverall Patient Progress: no change    Outcome Evaluation: VSS; moderate abd pain; no drainage from lap sites; valium per CIWA last night; slept til 630am; likely d/c today      Problem: Adult Inpatient Plan of Care  Goal: Plan of Care Review  Description: The Plan of Care Review/Shift note should be completed every shift.  The Outcome Evaluation is a brief statement about your assessment that the patient is improving, declining, or no change.  This information will be displayed automatically on your shift  note.  Outcome: Progressing  Flowsheets (Taken 1/10/2025 0701)  Outcome Evaluation:   VSS   moderate abd pain   no drainage from lap sites   valium per CIWA last night   slept til 630am   likely d/c today  Plan of Care Reviewed With: patient  Overall Patient Progress: no change  Goal: Patient-Specific Goal (Individualized)  Description: You can add care plan individualizations to a care plan. Examples of Individualization might be:  \"Parent requests to be called daily at 9am for status\", \"I have a hard time hearing out of my right ear\", or \"Do not touch me to wake me up as it startles  me\".  Outcome: Progressing  Goal: Absence of Hospital-Acquired Illness or Injury  Outcome: Progressing  Intervention: Identify and Manage Fall Risk  Recent Flowsheet Documentation  Taken 1/9/2025 2315 by Nilsa Suresh, RN  Safety Promotion/Fall Prevention:   supervised activity   safety round/check completed   room door open   nonskid shoes/slippers when out of bed   lighting adjusted   increased rounding and observation   clutter free environment maintained   activity supervised  Intervention: Prevent Skin Injury  Recent Flowsheet Documentation  Taken 1/9/2025 2202 by Nilsa Suresh, RN  Body Position: position changed independently  Intervention: Prevent and Manage VTE (Venous Thromboembolism) Risk  Recent Flowsheet " Documentation  Taken 1/9/2025 2315 by Nilsa Suresh RN  VTE Prevention/Management: SCDs off (sequential compression devices)  Intervention: Prevent Infection  Recent Flowsheet Documentation  Taken 1/9/2025 2315 by Nilsa Suresh RN  Infection Prevention:   rest/sleep promoted   single patient room provided  Goal: Optimal Comfort and Wellbeing  Outcome: Progressing  Intervention: Monitor Pain and Promote Comfort  Recent Flowsheet Documentation  Taken 1/9/2025 2248 by Nilsa Suresh RN  Pain Management Interventions: medication (see MAR)  Goal: Readiness for Transition of Care  Outcome: Progressing  Intervention: Mutually Develop Transition Plan  Recent Flowsheet Documentation  Taken 1/9/2025 2200 by Nilsa Suresh RN  Equipment Currently Used at Home: cane, straight     Problem: Appendectomy  Goal: Absence of Bleeding  Outcome: Progressing  Goal: Effective Bowel Elimination  Outcome: Progressing  Goal: Fluid and Electrolyte Balance  Outcome: Progressing  Goal: Absence of Infection Signs and Symptoms  Outcome: Progressing  Goal: Anesthesia/Sedation Recovery  Outcome: Progressing  Intervention: Optimize Anesthesia Recovery  Recent Flowsheet Documentation  Taken 1/9/2025 2315 by Nilsa Suresh RN  Safety Promotion/Fall Prevention:   supervised activity   safety round/check completed   room door open   nonskid shoes/slippers when out of bed   lighting adjusted   increased rounding and observation   clutter free environment maintained   activity supervised  Goal: Acceptable Pain Control  Outcome: Progressing  Intervention: Prevent or Manage Pain  Recent Flowsheet Documentation  Taken 1/9/2025 2248 by Nilsa Suresh RN  Pain Management Interventions: medication (see MAR)  Goal: Nausea and Vomiting Relief  Outcome: Progressing  Goal: Effective Urinary Elimination  Outcome: Progressing  Goal: Effective Oxygenation and Ventilation  Outcome: Progressing

## 2025-01-10 NOTE — ANESTHESIA CARE TRANSFER NOTE
Patient: Andrea Pham    Procedure: Procedure(s):  LAPAROSCOPIC APPENDECTOMY       Diagnosis: Perforated appendicitis [K35.32]  Diagnosis Additional Information: No value filed.    Anesthesia Type:   General     Note:    Oropharynx: spontaneously breathing  Level of Consciousness: awake  Oxygen Supplementation: face mask    Independent Airway: airway patency satisfactory and stable  Dentition: dentition unchanged  Vital Signs Stable: post-procedure vital signs reviewed and stable  Report to RN Given: handoff report given  Patient transferred to: PACU  Comments: Awake, alert, oxygen per face mask.  Handoff Report: Discussed the surgical course      Vitals:  Vitals Value Taken Time   /103 01/09/25 2045   Temp 98.42  F (36.9  C) 01/09/25 2047   Pulse 84 01/09/25 2048   Resp 22 01/09/25 2048   SpO2 94 % 01/09/25 2048   Vitals shown include unfiled device data.    Electronically Signed By: RAYMUNDO Love CRNA  January 9, 2025  8:49 PM

## 2025-01-10 NOTE — PLAN OF CARE
Reviewed discharge instructions with patient. Questions answered. Patient discharged home with personal belongings, medications, and discharge paperwork via WC.    Problem: Adult Inpatient Plan of Care  Goal: Plan of Care Review  Outcome: Adequate for Care Transition  Goal: Patient-Specific Goal (Individualized)  Outcome: Adequate for Care Transition  Goal: Absence of Hospital-Acquired Illness or Injury  Outcome: Adequate for Care Transition  Intervention: Identify and Manage Fall Risk  Recent Flowsheet Documentation  Taken 1/10/2025 0816 by Lily El RN  Safety Promotion/Fall Prevention:   assistive device/personal items within reach   clutter free environment maintained   increased rounding and observation   increase visualization of patient   lighting adjusted   mobility aid in reach   nonskid shoes/slippers when out of bed   patient and family education   room organization consistent   safety round/check completed   toileting scheduled  Intervention: Prevent Skin Injury  Recent Flowsheet Documentation  Taken 1/10/2025 0816 by Lily El RN  Body Position: position changed independently  Intervention: Prevent and Manage VTE (Venous Thromboembolism) Risk  Recent Flowsheet Documentation  Taken 1/10/2025 0816 by Lily El RN  VTE Prevention/Management: SCDs off (sequential compression devices)  Goal: Optimal Comfort and Wellbeing  Outcome: Adequate for Care Transition  Goal: Readiness for Transition of Care  Outcome: Adequate for Care Transition     Problem: Appendectomy  Goal: Absence of Bleeding  Outcome: Adequate for Care Transition  Goal: Effective Bowel Elimination  Outcome: Adequate for Care Transition  Goal: Fluid and Electrolyte Balance  Outcome: Adequate for Care Transition  Goal: Absence of Infection Signs and Symptoms  Outcome: Adequate for Care Transition  Goal: Anesthesia/Sedation Recovery  Outcome: Adequate for Care Transition  Intervention: Optimize Anesthesia Recovery  Recent  Flowsheet Documentation  Taken 1/10/2025 0816 by Lily El RN  Safety Promotion/Fall Prevention:   assistive device/personal items within reach   clutter free environment maintained   increased rounding and observation   increase visualization of patient   lighting adjusted   mobility aid in reach   nonskid shoes/slippers when out of bed   patient and family education   room organization consistent   safety round/check completed   toileting scheduled  Administration (IS): (With encouragment) self-administered  Patient Tolerance (IS): good  Goal: Acceptable Pain Control  Outcome: Adequate for Care Transition  Goal: Nausea and Vomiting Relief  Outcome: Adequate for Care Transition  Goal: Effective Urinary Elimination  Outcome: Adequate for Care Transition  Goal: Effective Oxygenation and Ventilation  Outcome: Adequate for Care Transition

## 2025-01-10 NOTE — ANESTHESIA PROCEDURE NOTES
Airway       Patient location during procedure: OR       Procedure Start/Stop Times: 1/9/2025 7:49 PM  Staff -        CRNA: Yony Kern APRN CRNA       Performed By: CRNA  Consent for Airway        Urgency: elective  Indications and Patient Condition       Indications for airway management: tejinder-procedural and airway protection       Induction type:intravenous       Mask difficulty assessment: 1 - vent by mask    Final Airway Details       Final airway type: endotracheal airway       Successful airway: ETT - single  Endotracheal Airway Details        ETT size (mm): 8.0       Cuffed: yes       Successful intubation technique: direct laryngoscopy       DL Blade Type: Mejia 2       Grade View of Cords: 1       Adjucts: stylet       Position: Right       Measured from: lips       Secured at (cm): 24       Bite block used: None    Post intubation assessment        Placement verified by: capnometry, equal breath sounds and chest rise        Number of attempts at approach: 1       Secured with: tape       Ease of procedure: easy       Dentition: Intact    Medication(s) Administered   Medication Administration Time: 1/9/2025 7:49 PM

## 2025-01-10 NOTE — OP NOTE
General Surgery Operative Note      Pre-operative diagnosis: acute appendicitis   Post-operative diagnosis: acute appendicitis    Procedure: laparoscopic appendectomy   Surgeon: Tita Burns MD   Assistant(s): None   Anesthesia: General    Estimated blood loss:  Complications: 5 ml  None   Specimens: ID Type Source Tests Collected by Time Destination   1 : APPENDIX Tissue Appendix SURGICAL PATHOLOGY EXAM Tita Burns MD 1/9/2025  8:24 PM         INDICATION FOR PROCEDURE: This is a 59 year old male who presented with abdominal pain of 1 day's duration. CT scan of the abdomen was consistent with acute appendicitis without evidence for abscess or perforation. We discussed operative management of appendicitis including risks and benefits, and the patient agreed to proceed.    DESCRIPTION OF PROCEDURE:  The patient was placed on the table in supine position.  General endotracheal anesthesia was induced and the abdomen was prepped and draped in standard sterile fashion.  An infraumbilical incision was made and a 12 mm Gena Trocar was placed under direct visualization.  Pneumoperitoneum was established and the abdomen was surveyed. A 5 mm trocar was placed in the suprapubic region, and a 5 mm trocar was placed in the left lower quadrant.  The patient was placed in Trendelenburg and right side up.  The appendix was identified in the right lower quadrant.  It appeared edematous and dilated.  The mesoappendix was divided with a ligasure device.  The base of the appendix was healthy-appearing and was divided using a white load of the endo-DERREK stapler. The appendix was passed into an Endocatch bag and removed through the 12mm trocar site.  The right lower quadrant was inspected for hemostasis.  Hemostasis was assured.  We irrigated with sterile saline and aspirated the effluent.  The two 5 mm trocars were removed under direct visualization to ensure no bleeding from port sites. The abdomen was evacuated of CO2.   The 12mm port site fascia was closed with 0 vicryl.  The skin of all 3 incisions was anesthetized with local anesthetic and closed with interrupted 4-0 Vicryl subcuticular sutures and skin glue.  The patient tolerated the procedure well.  Sponge and instrument counts were correct.    FINDINGS: Acute appendicitis without perforation    Tita Burns MD

## 2025-01-10 NOTE — ANESTHESIA POSTPROCEDURE EVALUATION
Patient: Andrea Pham    Procedure: Procedure(s):  LAPAROSCOPIC APPENDECTOMY       Anesthesia Type:  General    Note:  Disposition: Inpatient   Postop Pain Control:    PONV: No   Neuro/Psych: Uneventful            Sign Out: Acceptable/Baseline neuro status   Airway/Respiratory: Uneventful            Sign Out: Acceptable/Baseline resp. status   CV/Hemodynamics: Uneventful            Sign Out: Acceptable CV status; No obvious hypovolemia; No obvious fluid overload   Other NRE:    DID A NON-ROUTINE EVENT OCCUR? No           Last vitals:  Vitals Value Taken Time   /75 01/09/25 2145   Temp 99.5  F (37.5  C) 01/09/25 2130   Pulse 89 01/09/25 2149   Resp 12 01/09/25 2149   SpO2 90 % 01/09/25 2150   Vitals shown include unfiled device data.    Electronically Signed By: Josefina Dent MD  January 9, 2025  9:59 PM

## 2025-01-14 LAB
BACTERIA BLD CULT: NO GROWTH
BACTERIA BLD CULT: NO GROWTH
PATH REPORT.COMMENTS IMP SPEC: NORMAL
PATH REPORT.COMMENTS IMP SPEC: NORMAL
PATH REPORT.FINAL DX SPEC: NORMAL
PATH REPORT.GROSS SPEC: NORMAL
PATH REPORT.MICROSCOPIC SPEC OTHER STN: NORMAL
PATH REPORT.RELEVANT HX SPEC: NORMAL
PHOTO IMAGE: NORMAL

## 2025-01-14 PROCEDURE — 88304 TISSUE EXAM BY PATHOLOGIST: CPT | Mod: 26 | Performed by: PATHOLOGY

## 2025-01-27 ENCOUNTER — TELEPHONE (OUTPATIENT)
Dept: SURGERY | Facility: CLINIC | Age: 60
End: 2025-01-27
Payer: MEDICARE

## 2025-01-27 NOTE — TELEPHONE ENCOUNTER
Attempted to call patient for post op check.  No answer. Unable to leave message as the mailbox was full.    Angelica Ponce PA-C

## 2025-03-06 ENCOUNTER — HOSPITAL ENCOUNTER (EMERGENCY)
Facility: CLINIC | Age: 60
End: 2025-03-06
Attending: EMERGENCY MEDICINE
Payer: MEDICARE

## 2025-03-06 ENCOUNTER — TELEPHONE (OUTPATIENT)
Dept: BEHAVIORAL HEALTH | Facility: CLINIC | Age: 60
End: 2025-03-06
Payer: MEDICARE

## 2025-03-06 VITALS
OXYGEN SATURATION: 98 % | SYSTOLIC BLOOD PRESSURE: 126 MMHG | RESPIRATION RATE: 18 BRPM | DIASTOLIC BLOOD PRESSURE: 96 MMHG | TEMPERATURE: 98.5 F | HEART RATE: 100 BPM

## 2025-03-06 DIAGNOSIS — F23 ACUTE PSYCHOSIS (H): ICD-10-CM

## 2025-03-06 DIAGNOSIS — F19.90 DRUG USE: ICD-10-CM

## 2025-03-06 DIAGNOSIS — Z86.59 HISTORY OF PSYCHIATRIC DISORDER: ICD-10-CM

## 2025-03-06 PROBLEM — E51.2: Status: ACTIVE | Noted: 2021-01-13

## 2025-03-06 PROBLEM — F90.9 ADHD (ATTENTION DEFICIT HYPERACTIVITY DISORDER): Status: ACTIVE | Noted: 2024-03-28

## 2025-03-06 LAB
ANION GAP SERPL CALCULATED.3IONS-SCNC: 13 MMOL/L (ref 7–15)
BUN SERPL-MCNC: 27.5 MG/DL (ref 8–23)
CALCIUM SERPL-MCNC: 9.2 MG/DL (ref 8.8–10.4)
CHLORIDE SERPL-SCNC: 101 MMOL/L (ref 98–107)
CREAT SERPL-MCNC: 1.09 MG/DL (ref 0.67–1.17)
EGFRCR SERPLBLD CKD-EPI 2021: 78 ML/MIN/1.73M2
ETHANOL SERPL-MCNC: <0.01 G/DL
GLUCOSE SERPL-MCNC: 111 MG/DL (ref 70–99)
HCO3 SERPL-SCNC: 19 MMOL/L (ref 22–29)
POTASSIUM SERPL-SCNC: 4.3 MMOL/L (ref 3.4–5.3)
SODIUM SERPL-SCNC: 133 MMOL/L (ref 135–145)
VALPROATE SERPL-MCNC: <2.8 UG/ML

## 2025-03-06 PROCEDURE — 80048 BASIC METABOLIC PNL TOTAL CA: CPT | Performed by: EMERGENCY MEDICINE

## 2025-03-06 PROCEDURE — 250N000012 HC RX MED GY IP 250 OP 636 PS 637: Performed by: EMERGENCY MEDICINE

## 2025-03-06 PROCEDURE — 82077 ASSAY SPEC XCP UR&BREATH IA: CPT | Performed by: EMERGENCY MEDICINE

## 2025-03-06 PROCEDURE — 250N000013 HC RX MED GY IP 250 OP 250 PS 637: Performed by: EMERGENCY MEDICINE

## 2025-03-06 PROCEDURE — 99285 EMERGENCY DEPT VISIT HI MDM: CPT

## 2025-03-06 PROCEDURE — 84295 ASSAY OF SERUM SODIUM: CPT | Performed by: EMERGENCY MEDICINE

## 2025-03-06 PROCEDURE — 36415 COLL VENOUS BLD VENIPUNCTURE: CPT | Performed by: EMERGENCY MEDICINE

## 2025-03-06 PROCEDURE — 82310 ASSAY OF CALCIUM: CPT | Performed by: EMERGENCY MEDICINE

## 2025-03-06 PROCEDURE — 80164 ASSAY DIPROPYLACETIC ACD TOT: CPT | Performed by: EMERGENCY MEDICINE

## 2025-03-06 RX ORDER — OLANZAPINE 5 MG/1
10 TABLET, ORALLY DISINTEGRATING ORAL EVERY 6 HOURS PRN
Status: DISCONTINUED | OUTPATIENT
Start: 2025-03-06 | End: 2025-03-07 | Stop reason: HOSPADM

## 2025-03-06 RX ORDER — BUPRENORPHINE AND NALOXONE 8; 2 MG/1; MG/1
1 FILM, SOLUBLE BUCCAL; SUBLINGUAL 2 TIMES DAILY
Status: DISCONTINUED | OUTPATIENT
Start: 2025-03-06 | End: 2025-03-06

## 2025-03-06 RX ORDER — BUPROPION HYDROCHLORIDE 150 MG/1
300 TABLET ORAL EVERY MORNING
Status: DISCONTINUED | OUTPATIENT
Start: 2025-03-06 | End: 2025-03-07 | Stop reason: HOSPADM

## 2025-03-06 RX ORDER — OLANZAPINE 10 MG/2ML
10 INJECTION, POWDER, FOR SOLUTION INTRAMUSCULAR 2 TIMES DAILY PRN
Status: DISCONTINUED | OUTPATIENT
Start: 2025-03-06 | End: 2025-03-07 | Stop reason: HOSPADM

## 2025-03-06 RX ORDER — LORAZEPAM 0.5 MG/1
0.5 TABLET ORAL ONCE
Status: COMPLETED | OUTPATIENT
Start: 2025-03-06 | End: 2025-03-06

## 2025-03-06 RX ORDER — BUPROPION HYDROCHLORIDE 150 MG/1
150 TABLET ORAL EVERY MORNING
Status: DISCONTINUED | OUTPATIENT
Start: 2025-03-06 | End: 2025-03-07 | Stop reason: HOSPADM

## 2025-03-06 RX ORDER — LORAZEPAM 1 MG/1
1 TABLET ORAL ONCE
Status: COMPLETED | OUTPATIENT
Start: 2025-03-06 | End: 2025-03-06

## 2025-03-06 RX ORDER — DIVALPROEX SODIUM 500 MG/1
1000 TABLET, FILM COATED, EXTENDED RELEASE ORAL AT BEDTIME
Status: DISCONTINUED | OUTPATIENT
Start: 2025-03-06 | End: 2025-03-07 | Stop reason: HOSPADM

## 2025-03-06 RX ORDER — BUPRENORPHINE AND NALOXONE 8; 2 MG/1; MG/1
1 FILM, SOLUBLE BUCCAL; SUBLINGUAL 2 TIMES DAILY
Status: DISCONTINUED | OUTPATIENT
Start: 2025-03-06 | End: 2025-03-07 | Stop reason: HOSPADM

## 2025-03-06 RX ADMIN — LORAZEPAM 1 MG: 1 TABLET ORAL at 14:25

## 2025-03-06 RX ADMIN — BUPRENORPHINE AND NALOXONE 1 FILM: 8; 2 FILM BUCCAL; SUBLINGUAL at 22:46

## 2025-03-06 RX ADMIN — DIVALPROEX SODIUM 1000 MG: 500 TABLET, FILM COATED, EXTENDED RELEASE ORAL at 22:56

## 2025-03-06 RX ADMIN — OLANZAPINE 10 MG: 5 TABLET, ORALLY DISINTEGRATING ORAL at 13:11

## 2025-03-06 RX ADMIN — LORAZEPAM 0.5 MG: 0.5 TABLET ORAL at 23:14

## 2025-03-06 RX ADMIN — TRAZODONE HYDROCHLORIDE 150 MG: 100 TABLET ORAL at 22:56

## 2025-03-06 RX ADMIN — BUPRENORPHINE AND NALOXONE 1 FILM: 8; 2 FILM BUCCAL; SUBLINGUAL at 14:59

## 2025-03-06 ASSESSMENT — ACTIVITIES OF DAILY LIVING (ADL)
ADLS_ACUITY_SCORE: 54

## 2025-03-06 NOTE — ED NOTES
IP MH Referral Acuity Rating Score (RARS)    LMHP complete at referral to IP MH, with DEC; and, daily while awaiting IP MH placement. Call score to PPS.  CRITERIA SCORING   New 71 HH and Involuntary for IP MH (not adolescent) 3/3   Boarding over 24 hours 0/1   Vulnerable adult at least 55+ with multiple co morbidities; or, Patient age 11 or under 1/1   Suicide ideation without relief of precipitating factors 0/1   Current plan for suicide 0/1   Current plan for homicide 0/1   Imminent risk or actual attempt to seriously harm another without relief of factors precipitating the attempt 0/1   Severe dysfunction in daily living (ex: complete neglect for self care, extreme disruption in vegetative function, extreme deterioration in social interactions) 1/1   Recent (last 2 weeks) or current physical aggression in the ED 0/1   Restraints or seclusion episode in ED 0/1   Verbal aggression, agitation, yelling, etc., while in the ED 1/1   Active psychosis with psychomotor agitation or catatonia 1/1   Need for constant or near constant redirection (from leaving, from others, etc).  1/1   Intrusive or disruptive behaviors 1/1   TOTAL 9

## 2025-03-06 NOTE — CONSULTS
"Diagnostic Evaluation Consultation  Crisis Assessment    Patient Name: Andrea Pham  Age:  59 year old  Legal Sex: male  Gender Identity: male  Pronouns: he/him  Race: White  Ethnicity: Not  or   Language: English      Patient was assessed: In person   Crisis Assessment Start Date: 03/06/25  Crisis Assessment Start Time: 1439  Crisis Assessment Stop Time: 1447  Patient location: [facility] Emergency Dept                             ED06    Referral Data and Chief Complaint  Andrea Pham presents to the ED via EMS, via police. Patient is presenting to the ED for the following concerns: Anxiety, Paranoia, Verbal agitation, Significant behavioral change, Worsening psychosocial stress, Substance use, Delusions, Hallucinations; collateral reported witnessing pt verbalized command hallucinations related to killing brother (no specific plan, no intent)).   Factors that make the mental health crisis life threatening or complex are: Pt presents with agitation, paranoia, A/V hallucinations, and persecutory delusions. Pt denies SI/HI, plan, intent, and previous attempts. During interview with writer in his ED room, pt pointed to unilluminated TV screen and insisted that there were \"gang stalkers\" there. Pt described \"gang stalkers\" as being present in his apartment frequently, then stated that they are \"virtual projections\" and that \"they are real!\" Pt showed writer photo on his smartphone that he took in his apartment of a wall and stated that there are \"faint shadows\" of people visible, \"But now you can't see them. They were there before.\" Pt very upset and insistent that police and  who visited his home today \"would not listen to me.\"   Pt continued to speak in disorganized fashion with pressured speech, describing more details of a paranoid delusion about his brother having called him several times this week threatening to kill him and having paid \"$110,000 in welfare checks\" to a " " to take my brother to court tomorrow.   Pt also rambled about a delusion that wires had been inserted into his body for the purposes of surveillance via a break in his skin on his toe. Pt reported that he felt \"a pain in my toe\" earlier today that resolved.   Pt demonstrated lack of insight into his paranoia and hallucinations. Received collateral information in-person from [place]  who visited pt's home today. Received collateral information from  embedded with [place] [employer] who visited pt's home today due to six 911 calls within the last 72 hours. Attempted to engage pt in conversation about how collateral stated that he has been doing better over the last several months, and that the last few days seem to be different. Attempted to deescalate crisis and explore obstacles to safety in the community. Pt unable to coherently engage in risk assessment interview. At time of interview, pt unable to coherently engage enough for writer to attempt safety planning for management outside secure setting. Pt increasingly agitated, begging to leave hospital. Writer concluded interview. Pt heard yelling louder in room.      Informed Consent and Assessment Methods  Explained the crisis assessment process, including applicable information disclosures and limits to confidentiality, assessed understanding of the process, and obtained consent to proceed with the assessment.  Assessment methods included conducting a formal interview with patient, review of medical records, collaboration with medical staff, and obtaining relevant collateral information from family and community providers when available.  : done     History of the Crisis   Chart review performed. Recent medical records available from pt's outpatient Addiction Psychiatry visits with his provider at [facility]. Records also available from pt's 2024 ED visits for mental health concerns including to [facility] ED. " "Pt has hx diagnoses of Bipolar with kayleen and psychotic features, Wernicke's encephalopathy, and polysubstance use. Per chart, pt is currently prescribed Suboxone, Wellbutrin, Adderall, Depakote, Zyprexa, Trazodone. Pt poor historian at time of interview today. Unclear about pt's recent medication adherence other than pt stating: \"I didn't get my meds at 4AM today.\" Per chart, pt was most recently hospitalized at [facility] January 9 for appendectomy. Hx medical admissions for alcohol withdrawal, various orthopedic injuries, etc. No IP MH admission found in medical record for recent years.    Brief Psychosocial History  Family:  Single, Children yes (adult children, per chart review)  Support System:   (unable to assess; per collateral appears socially isolated)  Employment Status:  disabled  Source of Income:  disability (per pt report today)  Financial Environmental Concerns:   (unable to assess)  Current Hobbies:  exercise/fitness  Barriers in Personal Life:  mental health concerns    Significant Clinical History  Current Anxiety Symptoms:  racing thoughts, anxious  Current Depression/Trauma:  difficulty concentrating, impaired decision making, irritable  Current Somatic Symptoms:  anxious (reports \"pain in my toe\" where pt believes something was inserted to place surveillance wires inside his body)  Current Psychosis/Thought Disturbance:  displaces blame, agitation, hyperverbal, auditory hallucinations, visual hallucinations, tactile hallucinations, forgetful (disorganization, paranoia, delusions)  Current Eating Symptoms:   (unable to assess)  Chemical Use History:  Alcohol:  (Pt stated he drank 2 beers this morning. Police office reported a half bottle of beer present in home, no other remarkable alcohol or drug evidence observed by police.)  Last Use:: 03/06/25  Benzodiazepines: None  Opiates:  (per chart and pt, pt on Suboxone maintenance)  Cocaine:  (pt denies)  Marijuana:  (unable to assess)  Other Use:  " "(suspect methamphetamine use; per [facility]  embedded with police pt called 911 and gave \"a pile of meth\" to responding police last night stating that he did not know how the meth got onto his kitchen counter as he had never seen it before)  Withdrawal Symptoms:  (pt complained of withdrawal from not yet having today's Suboxone dose)  Addictions:  (pt denied)   Past diagnosis:  ADHD, Bipolar Disorder, Substance Use Disorder  Family history:  Anxiety Disorder, Depression  Past treatment:  Individual therapy, Civil Commitment, Primary Care, Psychiatric Medication Management, Inpatient Hospitalization  Details of most recent treatment:     Other relevant history:       Have there been any medication changes in the past two weeks:  no (per available outpatient psychiatry records)       Is the patient compliant with medications:   (unable to assess)        Collateral Information  Is there collateral information: Yes      Collateral information name, relationship, phone number:   [place] police office Yehuda Imig provided collateral information in person to MD and writer in ED, including that pt was transported to ED via EMS with police escort due to concerns about pt's level of agitation and disorganization.      Kendra Aly [facility] clinical  embedded with [employer] provided additional collateral information to writer via phone. (See Care Teams for contact info)    What happened today: Kendra reported that she has been following pt for more than a year due to patterns of mental health decompensation correlated with repeated 911 calls. Kendra reported that pt has been relatively stable over the last year and has not made as many calls. Kendra reported that she has mostly been following up with pt by phone, but that this morning she went out to pt's apartment with a [place]  to check on pt because of six 911 calls within 72 hours, including police having responded last " "night after midnight and pt \"handing over a pile of meth\" that pt claimed he found on his kitchen counter and did not know where it came from.   Kendra stated that when she and the  approached pt's apartment, they heard pt talking. Kendra stated that at first she thought pt was talking on the phone, but after about 10 minutes when pt opened the door, pt stated: \"I talk to myself, is that illegal?\" Kendra stated that she and  heard pt make the following statement, which Kendra observed as pt talking to/about a command hallucination: \"Well you know my brother, why not just get it done. Let's go murder him. We could call someone.\" Kendra stated there was a long pause and then pt began speaking again saying: \"But you know I can't do that. I have to get after the 90 days.\" Kendra stated that they did not hear anything about pt having plan or intent to harm his brother. Kendra stated that she has not had contact with pt's brother or sister, though she is aware that pt has a brother who was a  and has since \"honorably retired.\" Kendra reported that pt also has a sister, and that difficult family dynamic for Andrea have in the past centered around money given to him by his father that Andrea spent on drugs. Kendra and [place]  were able to confirm that pt's claims that his brother has been breaking into patients apartment or otherwise harming pt are unfounded to to their best knowledge are are part of pt's delusions.        Risk Assessment  [place] Suicide Severity Rating Scale Full Clinical Version:  Suicidal Ideation  Q1 Wish to be Dead (Lifetime): No  Q2 Non-Specific Active Suicidal Thoughts (Lifetime): No  Q6 Suicide Behavior (Lifetime): no     Suicidal Behavior (Lifetime)  Actual Attempt (Lifetime):  (pt denied)  Has subject engaged in non-suicidal self-injurious behavior? (Lifetime): No  Interrupted Attempts (Lifetime): No  Aborted or Self-Interrupted " "Attempt (Lifetime): No  Preparatory Acts or Behavior (Lifetime): No    [place] Suicide Severity Rating Scale Recent:   Suicidal Ideation (Recent)  Q1 Wished to be Dead (Past Month): no  Q2 Suicidal Thoughts (Past Month): no  Level of Risk per Screen: no risks indicated     Suicidal Behavior (Recent)  Actual Attempt (Past 3 Months): No  Has subject engaged in non-suicidal self-injurious behavior? (Past 3 Months): No  Interrupted Attempts (Past 3 Months): No  Aborted or Self-Interrupted Attempt (Past 3 Months): No  Preparatory Acts or Behavior (Past 3 Months): No    Environmental or Psychosocial Events: neither working nor attending school, ongoing abuse of substances, social isolation  Protective Factors: Protective Factors: supportive ongoing medical and mental health care relationships    Does the patient have thoughts of harming others? Feels Like Hurting Others:  (pt denies; pt denies command hallucinations about hurting brother though collateral states witnessed pt making statements about thoughts (not plan or intent))  Previous Attempt to Hurt Others: yes  Current presentation: Irritable (agitated, disorganized; no physical harm to persons or property)  Violence Threats in Past 6 Months: pt denies  Current Violence Plan or Thoughts: pt denies; pt denies command hallucinations about hurting brother though collateral states witnessed pt making statements about thoughts (not plan or intent)  Is the patient engaging in sexually inappropriate behavior?: no  Duty to warn initiated:  (n/a)  Duty to warn details: [facility]  embedded with [place] [employer] stated to writer that they do not believe pt knows where his brother is and reported that she and  present with her at home did not witness pt talking about plan or intent when they witnessed pt's verbalizations to command hallucinations about \"murder my brother.\" No information available regarding pt's brother's identity or whereabouts. " "  Does Patient have a known history of aggressive behavior: No ([facility]  embedded with [place] [employer] stated pt was posturing today during their visit to his home, in the midst of his agitated and disorganized behavior.  and police both stated: \"He has never been physically aggressive with us.\")  Has aggression occurred as a result of MH concerns/diagnosis: none noted  Does patient have history of aggression in hospital: none noted    Is the patient engaging in sexually inappropriate behavior?  no        Mental Status Exam   Affect:  dramatic  Appearance:  disheveled, not wearing socks  Attention Span/Concentration:  intense focus on speaking to staff; disorganized  Eye Contact:    variable, intense  Fund of Knowledge:   affected by psychosis  Language /Speech Content:  Expressive  Language /Speech Volume:  Loud  Language /Speech Rate/Productions:  Pressured, hyperverbal  Recent Memory:  variable, affected by psychosis  Remote Memory:  variable, affected by psychosis  Mood:  angry, irritable, anxious  Orientation to Person:   yes  Orientation to Place:  yes  Orientation to Time of Day:  unable to assess  Orientation to Date:      unable to assess  Situation (Do they understand why they are here?):  lacks insight  Psychomotor Behavior:  agitated  Thought Content:  delusions, hallucinations, paranoia  Thought Form:  paranoia; obsessive, perseverative        Medication  Psychotropic medications:   Medication Orders - Psychiatric (From admission, onward)      Start     Dose/Rate Route Frequency Ordered Stop    03/06/25 2200  traZODone (DESYREL) tablet 150 mg         150 mg Oral AT BEDTIME 03/06/25 1442      03/06/25 1450  buPROPion (WELLBUTRIN XL) 24 hr tablet 150 mg         150 mg Oral EVERY MORNING 03/06/25 1442      03/06/25 1450  buPROPion (WELLBUTRIN XL) 24 hr tablet 300 mg         300 mg Oral EVERY MORNING 03/06/25 1442      03/06/25 1250  OLANZapine zydis (zyPREXA) ODT tab 10 mg         10 " mg Oral EVERY 6 HOURS PRN 03/06/25 1250      03/06/25 1249  OLANZapine (zyPREXA) injection 10 mg         10 mg Intramuscular 2 TIMES DAILY PRN 03/06/25 1250               Current Care Team  Patient Care Team:  Clinic, [facility] as PCP - General  Kendra Aly as  ( - Clinical)  Elie Nichols MD as Psychiatrist (Addiction Psychiatry)  Rm Davis as Psychologist (PSYCHOLOGIST CLINICAL)    Diagnosis  Patient Active Problem List   Diagnosis Code    ETOH abuse F10.10    Depression F32.A    Low testosterone R79.89    CARDIOVASCULAR SCREENING; LDL GOAL LESS THAN 160 Z13.6    Psychosis (H) F29    Bipolar affective disorder, current episode manic with psychotic symptoms (H) F31.2    Depression, major, severe recurrence (H) F33.2    Alcohol abuse F10.10    Alcohol withdrawal (H) F10.939    Anxiety and depression F41.9, F32.A    Chronic pain disorder G89.4    Drug-seeking behavior Z76.5    Polysubstance abuse (H) F19.10    Right hip pain M25.551    Suicidal ideation R45.851    Testosterone deficiency E34.9    Tobacco dependence F17.200    Chemical dependency (H) F19.20    Altered mental status R41.82    Closed fracture of right ankle, initial encounter S82.891A    Fall, initial encounter W19.XXXA    Closed nondisplaced fracture of second metatarsal bone of right foot, initial encounter S92.324A    Delirium tremens (H) F10.931    Alcohol withdrawal syndrome, with delirium (H) F10.931    ADHD (attention deficit hyperactivity disorder) F90.9    Wernicke's encephalopathy with cerebellar manifestation E51.2    Adrenal nodule E27.9    Kidney lesion, native, left N28.9    Perforated appendicitis K35.32    Acute appendicitis with localized peritonitis, without perforation, abscess, or gangrene K35.30    History of alcohol withdrawal syndrome F10.91       Primary Problem This Admission  Active Hospital Problems    ADHD (attention deficit hyperactivity disorder)      Wernicke's  encephalopathy with cerebellar manifestation      *Bipolar affective disorder, current episode manic with psychotic symptoms (H)      Polysubstance abuse (H)        Clinical Summary and Substantiation of Recommendations   Clinical Substantiation:  Recommend IP MH admission for patient safety and stabilization due to acute psychosis including disorganization, agitation, delusions, A/V halluciantions, including command hallucinations related to thoughts of harming brother (no known plan or intent). Pt demonstrating impaired judgement and lack of insight. Pt unable to safely care for self outside hospital. Active JACQUELYN and unlikely medication nonadherence increases risk. Pt is unable to coherently engage in safety planning to mitigate risk level in a non-secure setting. Lower levels of care have not been successful in mitigating risk. Therefore, inpatient is the least restrictive option of care adequate to meet pt's current needs. Pt should remain inpatient until deemed safe to return to the community with outpatient supports. Pt will need assistance establishing appropriate level of outpatient services prior to discharge.       Goals for crisis stabilization:  Reduction in agitation, improved orientation to situation. Improve insight and judgement to level where pt able to safely care for self outside of hospital. Pt to metabolize any substances may have been ingested prior to ED presentation that could be affecting symptoms.    Next steps for Care Team:  Resume pta medications, psychiatry consult; reassess daily while awaiting IP MH transfer. If pt stabilizes adequately in ED, consider discharge to community with [facility] crisis stabilization services.    Treatment Objectives Addressed:  processing feelings, assessing safety, identifying treatment goals, exploring obstacles to safety in the community    Therapeutic Interventions:  Engaged in guided discovery, explored patient's perspectives and helped expand them through  socratic dialogue., Explored motivation for treatment engagement.    Has a specific means been identified for suicidal/homicide actions: No      Patient coping skills attempted to reduce the crisis:  repeated 911 calls    Disposition  Recommended referrals: Crisis Services, Medication Management, Individual Therapy, JACQUELYN Comprehensive Assessment        Reviewed case and recommendations with attending provider. Attending Name: See Diaz MD, and MYRNA Spangler       Attending concurs with disposition: yes       Patient and/or validated legal guardian concurs with disposition:   no (72HH)       Final disposition:  inpatient mental health         Imminent risk of harm: Homicidal Thoughts or Behaviors  Severe psychiatric, behavioral or other comorbid conditions are appropriate for management at inpatient mental health as indicated by at least one of the following: Psychiatric Symptoms, Comorbid substance use disorder, Impaired impulse control, judgement, or insight, Symptoms of impact to function  Severe dysfunction in daily living is present as indicated by at least one of the following: Other evidence of severe dysfunction, Extreme deterioration in social interactions  Situation and expectations are appropriate for inpatient care: Patient is unwilling to participate in treatment voluntarily and requires treatment, Patient management/treatment at lower level of care is not feasible or is inappropriate, Biopsychosocial stresses potentially contributing to clinical presentation (co morbidities) have been assessed and are absent or manageable at proposed level of care  Inpatient mental health services are necessary to meet patient needs and at least one of the following: Specific condition related to admission diagnosis is present and judged likely to further improve at proposed level of care, Specific condition related to admission diagnosis is present and judged likely to deteriorate in absence of treatment at  proposed level of care      Legal status: 72 Hour Hold                         72 Hour Hold - Date/Time Initiated: 1443 Thurs 3/6                         72 Hour Hold - Date/Time Ends: 1443 Tues 3/11                                                                             Reviewed court records: yes ([facility]  [facility]. This writer called [facility] about this in May 2024.LT)       Assessment Details   Total duration spent with the patient: 8 min     CPT code(s) utilized: Non-Billable    SAMI MARAVILLA M.Ed., [facility], [facility]  Licensed Mental Health Professional  Triage and Transition Services - [phone]

## 2025-03-06 NOTE — ED PROVIDER NOTES
Emergency Department Note      History of Present Illness     Chief Complaint:  Psych eval    HPI   Andrea Pham is a 59 year old male with a history of psychiatric illness and substance abuse who presents from his apartment in the company of EMS and police with concern for paranoia for a psychiatric evaluation.  Patient is a poor historian which limits the history can provide.  He states that his brother has been arranging for welfare checks on him, also states that his brother has been trying to kill him but is unable to provide any further details.  Patient describes how a blue object penetrated his foot recently, police state that this was actually a piece of blue lint that they saw on scene and did not have the capability of penetrating his body.  Patient states he had 2 beers today but denies any other drug or excessive alcohol use.  He denies intent to harm himself.    Independent Historian: Police, who states that the patient was having hallucinations on scene, reporting that he was seen people that were not there, showing photographs to the police and describing things that the patient perceived in the pictures that others did not perceive.  No drug paraphernalia noted on scene.    Review of External Notes: I personally reviewed prior records including January 9 surgical note when he had a laparoscopic appendectomy here at [facility].    Past Medical History     Medical History and Problem List   Past Medical History:   Diagnosis Date    Alcohol use disorder, severe, dependence (H) 04/23/2019    Ataxia     Bipolar disorder (H)     Chemical dependency (H)     Chronic hip pain     Chronic pain syndrome     Cocaine abuse (H)     Depressive disorder     DTs (delirium tremens) (H)     DVT (deep venous thrombosis) (H) 5 y ago    Elevated LFTs     Flail chest     Hepatitis C virus infection, unspecified chronicity     Heroin abuse (H)     History of methamphetamine abuse (H)     History of total hip arthroplasty      Hypertension     Seizures (H)     Substance abuse (H)     Wernicke's encephalopathy        Medications   acetaminophen (TYLENOL) 325 MG tablet  amphetamine-dextroamphetamine (ADDERALL XR) 25 MG 24 hr capsule  aspirin (ASA) 81 MG chewable tablet  buprenorphine HCl-naloxone HCl (SUBOXONE) 8-2 MG per film  buPROPion (WELLBUTRIN XL) 150 MG 24 hr tablet  buPROPion (WELLBUTRIN XL) 300 MG 24 hr tablet  divalproex sodium extended-release (DEPAKOTE ER) 500 MG 24 hr tablet  ibuprofen (ADVIL/MOTRIN) 200 MG tablet  multivitamin w/minerals (THERA-VIT-M) tablet  naloxone (NARCAN) 4 MG/0.1ML nasal spray  OLANZapine (ZYPREXA) 10 MG tablet  senna-docusate (SENOKOT-S/PERICOLACE) 8.6-50 MG tablet  traZODone (DESYREL) 150 MG tablet  testosterone cypionate (DEPOTESTOSTERONE) 200 MG/ML injection    Surgical History   Past Surgical History:   Procedure Laterality Date    CHEST SURGERY  1987    flail chest, sterum fractured    HIP SURGERY      KNEE SURGERY      LAPAROSCOPIC APPENDECTOMY N/A 1/9/2025    Procedure: LAPAROSCOPIC APPENDECTOMY;  Surgeon: Tita Burns MD;  Location:  OR    OPEN REDUCTION INTERNAL FIXATION ANKLE Right 12/24/2021    Procedure: Open reduction internal fixation right ankle syndesmotic injury;  Surgeon: Leroy Thomason MD;  Location:  OR    ORTHOPEDIC SURGERY      KIMBER right. [place] left shoulder surgery, debridement right shoulder, neck surgery- fracture cervical spine. 6 knee surgeries- bucket handle meniscal tear and debridement. 3 heel surgeries.     ORTHOPEDIC SURGERY      REMOVE HARDWARE ANKLE Right 7/13/2022    Procedure: removal of hardware of right ankle;  Surgeon: Leroy Thomason MD;  Location:  OR     Physical Exam     Patient Vitals for the past 24 hrs:   BP Temp Temp src Pulse Resp SpO2   03/06/25 1232 (!) 126/96 98.5  F (36.9  C) Oral 100 18 98 %     Physical Exam  General: Male initially encountered pacing around room 6  HENT: mucous membranes moist  Eyes: PERRL without nystagmus  CV:  extremities well perfused, regular rhythm, no murmur audible  Resp: normal effort, speaks in full phrases, no stridor, no cough observed  GI: abdomen soft and nontender, no guarding  MSK: no bony tenderness   Skin: appropriately warm and dry  Neuro: alert, clear speech, oriented though poor historian, normal tone in extremities, ambulatory with steady gait  Psych: Disorganized, slightly pressured speech, tangential but intermittently redirectable, denies feeling suicidal    Diagnostics   Lab Results   BMP and UTox - pending    ED Course      Medications Administered   Medications   OLANZapine (zyPREXA) injection 10 mg (has no administration in time range)   OLANZapine zydis (zyPREXA) ODT tab 10 mg (10 mg Oral $Given 3/6/25 1311)   buPROPion (WELLBUTRIN XL) 24 hr tablet 150 mg (has no administration in time range)   buPROPion (WELLBUTRIN XL) 24 hr tablet 300 mg (has no administration in time range)   divalproex sodium extended-release (DEPAKOTE ER) 24 hr tablet 1,000 mg (has no administration in time range)   traZODone (DESYREL) tablet 150 mg (has no administration in time range)   buprenorphine HCl-naloxone HCl (SUBOXONE) 8-2 MG per film 1 Film (1 Film Sublingual $Given 3/6/25 1459)   LORazepam (ATIVAN) tablet 1 mg (1 mg Oral $Given 3/6/25 1425)     ED Course and Discussion of Management   ED Course as of 03/06/25 1553   Thu Mar 06, 2025   1326 I spoke with RN to coordinate care.   1431 I spoke with pharmacist who states home meds reconciled.   1438 I spoke with DEC, plan for psych admission, Chantel will request bed via Central Intake.   1442 I ordered home medications     Patient placed on ANA and later 72hr hold.    Additional Documentation  Social Determinants of Health: Employment/Unemployment  and Social Connections/Isolation     MIPS       None    Medical Decision Making / Diagnosis   Medical Decision Making:  Patient presents with evidence of psychosis, he has a complex history including multiple documented  psychiatric illnesses, extensive medication list, and history of polysubstance use as well.  Vital signs were satisfactory, patient noted to have psychosis on exam, he has had similar presentations in the past.  Consideration given to intoxication with stimulant.  Patient was given Zyprexa.  I ultimately ordered some of his home medications after they were reconciled by our ED clinical pharmacist.  He was evaluated by DEC who recommended inpatient psychiatric care, he was placed on a hold, and further history was obtained from his outpatient  who has noted significant decline over the past few days.  Basic labs were ordered but patient is not cooperating with these, in light of his history of recurrent episodes like this, I think it is unlikely that these labs would alter management in the short-term.  Care signed out to my colleague Dr. Jones while patient is boarding in the emergency department while awaiting an inpatient psychiatric bed.    Disposition   Boarding for inpatient psychiatric bed, signed out to Dr. Jones    Diagnosis     ICD-10-CM    1. Acute psychosis (H)  F23       2. History of psychiatric disorder  Z86.59       3. Drug use  F19.90          3/6/2025   MD Joe Pickens, Rafita Del Rosario MD  03/06/25 4942

## 2025-03-06 NOTE — PHARMACY-ADMISSION MEDICATION HISTORY
Pharmacist Admission Medication History    Admission medication history is complete. The information provided in this note is only as accurate as the sources available at the time of the update.    Information Source(s): Patient and CareEverywhere/SureScripts via in-person    Pertinent Information: pt in crisis and limited historian. Utilized surescripts to verify recent fills.     Changes made to PTA medication list:  Added: None  Deleted: Triamcinolon cream  Changed: Divalproexv from 1000 mg to 1650-7479 mg. Pt states depends on how he is feeling.     Allergies reviewed with patient and updates made in EHR: unable to assess    Medication History Completed By: Alyx Whipple [facility] 3/6/2025 2:32 PM    PTA Med List   Medication Sig Last Dose/Taking    acetaminophen (TYLENOL) 325 MG tablet Take 2 tablets (650 mg) by mouth every 4 hours as needed for other (mild pain). Unknown    amphetamine-dextroamphetamine (ADDERALL XR) 25 MG 24 hr capsule Take 25 mg by mouth daily Unknown    aspirin (ASA) 81 MG chewable tablet Take 40.5 mg by mouth daily as needed for other ((patient unable to specify when/why he takes)) Unknown    buprenorphine HCl-naloxone HCl (SUBOXONE) 8-2 MG per film Place 1 Film under the tongue 2 times daily Unknown    buPROPion (WELLBUTRIN XL) 150 MG 24 hr tablet Take 1 tablet (150 mg) by mouth every morning Takes with 300mg tablet for total dose = 450mg Unknown    buPROPion (WELLBUTRIN XL) 300 MG 24 hr tablet Take 1 tablet (300 mg) by mouth every morning Takes with 150mg tablet for total dose = 450mg Unknown    divalproex sodium extended-release (DEPAKOTE ER) 500 MG 24 hr tablet Take 2 tablets (1,000 mg) by mouth At Bedtime (Patient taking differently: Take 1,000-1,500 mg by mouth at bedtime.) Unknown    ibuprofen (ADVIL/MOTRIN) 200 MG tablet Take 3 tablets (600 mg) by mouth every 6 hours as needed for moderate pain. Unknown    multivitamin w/minerals (THERA-VIT-M) tablet Take 1 tablet by mouth daily Unknown     naloxone (NARCAN) 4 MG/0.1ML nasal spray Spray 1 spray (4 mg) into one nostril alternating nostrils once as needed for opioid reversal every 2-3 minutes until assistance arrives Unknown    OLANZapine (ZYPREXA) 10 MG tablet Take 10 mg by mouth at bedtime Unknown    senna-docusate (SENOKOT-S/PERICOLACE) 8.6-50 MG tablet Take 1-2 tablets by mouth 2 times daily. Take while on oral narcotics to prevent or treat constipation. Unknown    traZODone (DESYREL) 150 MG tablet Take 150 mg by mouth at bedtime Unknown

## 2025-03-06 NOTE — TELEPHONE ENCOUNTER
S: [facility] ED , DEC  .now   calling at 3:19 PM  about a 59 year old/Male presenting with Paranoia NS Delusions.     B: Pt arrived via EMS. Presenting problem, stressors: Pt is endorses paranoia, delusions and disorganization. Police and SW informed ED that Pt was recently doing well. His current delusion is that his Brother is trying to kill him; unknown what triggered this. Pt has called Police 6 times in a few hours. He turned over a large amount of Meth to police last night.    Pt affect in ED: Disorganized and Dramatic  Pt Dx: Schizoaffective Disorder and Substance Use Disorder: Meth  Previous IPMH hx? No  Pt denies SI   Hx of suicide attempt? No  Pt denies SIB  Pt denies HI   Pt  endorses command AH to kill bro, no plan or intent.    Pt RARS Score: 9    Hx of aggression/violence, sexual offenses, legal concerns, Epic care plan? describe: Hx of posturing.  Current concerns for aggression this visit? Posturing, pacing and yelling in ED.  Does pt have a history of Civil Commitment? Yes, most recent commitment 2021  Is Pt their own guardian? Yes. SW is calling in AMBER Report.    Pt is prescribed medication. Is patient medication compliant? Pt refusing to take prescribed medications   Pt endorses OP services: Psychiatrist and [facility]   CD concerns: Actively using/consuming Meth and Alcohol.   Acute or chronic medical concerns: Appendix taken out 01/9/2025. No concerns.  Does Pt present with specific needs, assistive devices, or exclusionary criteria? None      Pt is ambulatory  Pt is able to perform ADLs independently      A: Pt to be reviewed for [facility] admission. Pt is on a 72HH, initiated 03/06/25 02:43 PM  Preferred placement: Statewide    COVID Symptoms: No  If yes, COVID test required   Utox: Ordered, not yet collected   CMP: Ordered, not yet collected   CBC: Not ordered, intake requested lab      R: Patient cleared and ready for behavioral bed placement: Yes  Pt placed on IP worklist?  Yes    Does Patient need a Transfer Center request created? Yes, writer completed Transfer Center request at: 3:35 PM.

## 2025-03-06 NOTE — ED TRIAGE NOTES
"Pt comes in from EMS on transport hold by [place] [employer]. Pt called PD today with concerns that his brother is trying to kill him. States his brother has been making numerous welfare checks in order to bankrupt him. Also states  his brother has friends who are \"gang stalking\" him. States these people are projecting images of people on his walls. States he also has pictures on his phone of these shadows. Pt denies suicidal thoughts, denies feeling depressed. Security searching pt, pt refusing to change into behavioral scrubs. Pt does admit to having two beers today, denies drug use. ABC's intact, alert.      Triage Assessment (Adult)       Row Name 03/06/25 1231          Triage Assessment    Airway WDL WDL        Respiratory WDL    Respiratory WDL WDL        Skin Circulation/Temperature WDL    Skin Circulation/Temperature WDL WDL        Cardiac WDL    Cardiac WDL WDL        Peripheral/Neurovascular WDL    Peripheral Neurovascular WDL WDL        Cognitive/Neuro/Behavioral WDL    Cognitive/Neuro/Behavioral WDL WDL                     "

## 2025-03-06 NOTE — TELEPHONE ENCOUNTER
R: [place]  Access Inpatient Bed Call Log 3/6/2025 @3:52 PM:    Intake has called facilities that have not updated their bed status within the last 12 hours.     [facility] is posting 0 beds.                 [facility] is posting 8 beds. [phone]. Per Salty @ 4:21PM, beds available - unknown number or acuity level  [facility] is posting 0 beds. Negative covid required.  [facility] is posting 0 beds. Neg covid. No high school/[facility]-psych. [phone]. Per call @ 4:22PM, CRN unavailable  [facility] is posting 0 beds. [phone].  [facility] is posting 0 beds. [phone].  [facility] is posting 6 beds. (Ages 18-35) Negative covid, no aggression, physical or sexual assault, violence hx or drug abuse, or psychosis.  [phone]. Per Winsome @ 4:25PM, YA beds available  [facility] is posting 0 beds.   [facility] is posting 0 beds [phone].     [facility] is posting 5 beds. LOW acuity ONLY. Mixed unit 12+. Negative covid- [phone]   [facility] has 1 bed posted. No aggression. Negative Covid. Low acuity.  [facility] is posting 0 beds. Low acuity only. Neg covid.  [phone].  [facility] is posting 0 beds. Low acuity. No current aggression.  [facility] is posting 0 beds. Negative covid. [phone] ext [phone].  [facility] ([place]) is posting 0 beds. Negative covid.  [phone].  [facility] is posting 1 bed. Low acuity. 72 HH hold preferred. Negative covid required. [phone].  [facility] ([place] is posting 1 bed. Low acuity only. Neg covid.  [phone].        [facility] in [place] is posting 4 beds.  Negative covid required.   Vol only, No history of aggression, violence, or assault. No sexual offenders. No 72 HH holds. [phone].  [facility] is posting 2 beds. Negative covid required.  (Must have the cognitive ability  to do programming. No aggressive or violent behavior or recent HX in the last 2 yrs. MH must be primary.) Always low acuity. [phone].  [facility] has 1 bed posted. Negative covid required.  Low acuity only. Violence and aggression capped. [phone].  [facility] is posting 2 beds. Low acuity, Negative covid required. [phone]. Per Marcie @ 4:29PM, they are full  [facility] [place] posting 1 bed. Negative covid required.  [phone].  [facility] [place] is posting 7 beds. Negative covid. LOW acuity. (No lines, drains, or tubes, oxygen, CPAP, IV, etc.) Must Have a Ride Home. [phone].  [facility] is posting 1 bed. Negative covid. (No. lines, drains, or tubes, oxygen, CPAP, IV, etc.) [phone]. Per Emilia @ 4:29PM, [facility] unavailable - call back re: bed availability  [facility] is posting 6 beds. No covid test required. OUT OF STATE. [phone]; Per  Intake policy, patients must be voluntary for out of state placement    6:08 PM Intake called provider to review for 3B/ [facility].    6:12 PM Provider accepted to 3B/ [facility].    6:33 PM report info exchanged.    7:10 PM Provider informed intake that they are now declining pt d/t their & unit acuity, and the unit being unable to accommodate the pt.

## 2025-03-07 ENCOUNTER — HOSPITAL ENCOUNTER (INPATIENT)
Facility: HOSPITAL | Age: 60
LOS: 5 days | Discharge: HOME | DRG: 885 | End: 2025-03-12
Attending: STUDENT IN AN ORGANIZED HEALTH CARE EDUCATION/TRAINING PROGRAM | Admitting: STUDENT IN AN ORGANIZED HEALTH CARE EDUCATION/TRAINING PROGRAM
Payer: MEDICARE

## 2025-03-07 VITALS
RESPIRATION RATE: 18 BRPM | TEMPERATURE: 98.5 F | SYSTOLIC BLOOD PRESSURE: 102 MMHG | HEART RATE: 63 BPM | OXYGEN SATURATION: 96 % | DIASTOLIC BLOOD PRESSURE: 60 MMHG

## 2025-03-07 DIAGNOSIS — F31.2 BIPOLAR AFFECTIVE DISORDER, CURRENT EPISODE MANIC WITH PSYCHOTIC SYMPTOMS (H): ICD-10-CM

## 2025-03-07 PROBLEM — B18.2 CHRONIC HEPATITIS C WITHOUT HEPATIC COMA (H): Status: ACTIVE | Noted: 2019-02-22

## 2025-03-07 LAB — SARS-COV-2 RNA RESP QL NAA+PROBE: NEGATIVE

## 2025-03-07 PROCEDURE — 36415 COLL VENOUS BLD VENIPUNCTURE: CPT

## 2025-03-07 PROCEDURE — 250N000013 HC RX MED GY IP 250 OP 250 PS 637: Performed by: NURSE PRACTITIONER

## 2025-03-07 PROCEDURE — 250N000013 HC RX MED GY IP 250 OP 250 PS 637: Performed by: EMERGENCY MEDICINE

## 2025-03-07 PROCEDURE — 86705 HEP B CORE ANTIBODY IGM: CPT | Performed by: NURSE PRACTITIONER

## 2025-03-07 PROCEDURE — 86803 HEPATITIS C AB TEST: CPT | Performed by: NURSE PRACTITIONER

## 2025-03-07 PROCEDURE — 99222 1ST HOSP IP/OBS MODERATE 55: CPT | Performed by: NURSE PRACTITIONER

## 2025-03-07 PROCEDURE — 250N000012 HC RX MED GY IP 250 OP 636 PS 637: Performed by: EMERGENCY MEDICINE

## 2025-03-07 PROCEDURE — 124N000001 HC R&B MH

## 2025-03-07 PROCEDURE — 87635 SARS-COV-2 COVID-19 AMP PRB: CPT | Performed by: EMERGENCY MEDICINE

## 2025-03-07 PROCEDURE — 87389 HIV-1 AG W/HIV-1&-2 AB AG IA: CPT | Performed by: NURSE PRACTITIONER

## 2025-03-07 PROCEDURE — 87522 HEPATITIS C REVRS TRNSCRPJ: CPT | Performed by: NURSE PRACTITIONER

## 2025-03-07 RX ORDER — TRAZODONE HYDROCHLORIDE 50 MG/1
50 TABLET ORAL
Status: DISCONTINUED | OUTPATIENT
Start: 2025-03-07 | End: 2025-03-08

## 2025-03-07 RX ORDER — ACETAMINOPHEN 325 MG/1
650 TABLET ORAL EVERY 4 HOURS PRN
Status: DISCONTINUED | OUTPATIENT
Start: 2025-03-07 | End: 2025-03-12 | Stop reason: HOSPADM

## 2025-03-07 RX ORDER — POLYETHYLENE GLYCOL 3350 17 G/17G
17 POWDER, FOR SOLUTION ORAL DAILY PRN
Status: DISCONTINUED | OUTPATIENT
Start: 2025-03-07 | End: 2025-03-12 | Stop reason: HOSPADM

## 2025-03-07 RX ORDER — OLANZAPINE 10 MG/2ML
10 INJECTION, POWDER, FOR SOLUTION INTRAMUSCULAR 3 TIMES DAILY PRN
Status: DISCONTINUED | OUTPATIENT
Start: 2025-03-07 | End: 2025-03-12 | Stop reason: HOSPADM

## 2025-03-07 RX ORDER — MAGNESIUM HYDROXIDE/ALUMINUM HYDROXICE/SIMETHICONE 120; 1200; 1200 MG/30ML; MG/30ML; MG/30ML
30 SUSPENSION ORAL EVERY 4 HOURS PRN
Status: DISCONTINUED | OUTPATIENT
Start: 2025-03-07 | End: 2025-03-12 | Stop reason: HOSPADM

## 2025-03-07 RX ORDER — HYDROXYZINE HYDROCHLORIDE 25 MG/1
25 TABLET, FILM COATED ORAL ONCE
Status: COMPLETED | OUTPATIENT
Start: 2025-03-07 | End: 2025-03-07

## 2025-03-07 RX ORDER — IBUPROFEN 200 MG
400 TABLET ORAL EVERY 6 HOURS PRN
Status: DISCONTINUED | OUTPATIENT
Start: 2025-03-07 | End: 2025-03-12 | Stop reason: HOSPADM

## 2025-03-07 RX ORDER — LORAZEPAM 1 MG/1
1 TABLET ORAL ONCE
Status: COMPLETED | OUTPATIENT
Start: 2025-03-07 | End: 2025-03-07

## 2025-03-07 RX ORDER — OLANZAPINE 10 MG/1
10 TABLET ORAL 3 TIMES DAILY PRN
Status: DISCONTINUED | OUTPATIENT
Start: 2025-03-07 | End: 2025-03-12 | Stop reason: HOSPADM

## 2025-03-07 RX ORDER — HYDROXYZINE HYDROCHLORIDE 25 MG/1
25 TABLET, FILM COATED ORAL EVERY 4 HOURS PRN
Status: DISCONTINUED | OUTPATIENT
Start: 2025-03-07 | End: 2025-03-12 | Stop reason: HOSPADM

## 2025-03-07 RX ADMIN — BUPRENORPHINE AND NALOXONE 1 FILM: 8; 2 FILM BUCCAL; SUBLINGUAL at 09:26

## 2025-03-07 RX ADMIN — HYDROXYZINE HYDROCHLORIDE 25 MG: 25 TABLET, FILM COATED ORAL at 05:22

## 2025-03-07 RX ADMIN — HYDROXYZINE HYDROCHLORIDE 25 MG: 25 TABLET, FILM COATED ORAL at 22:22

## 2025-03-07 RX ADMIN — BUPROPION HYDROCHLORIDE 300 MG: 150 TABLET, EXTENDED RELEASE ORAL at 09:26

## 2025-03-07 RX ADMIN — LORAZEPAM 1 MG: 1 TABLET ORAL at 09:34

## 2025-03-07 RX ADMIN — OLANZAPINE 10 MG: 10 TABLET, FILM COATED ORAL at 14:42

## 2025-03-07 RX ADMIN — HYDROXYZINE HYDROCHLORIDE 25 MG: 25 TABLET, FILM COATED ORAL at 14:42

## 2025-03-07 RX ADMIN — OLANZAPINE 10 MG: 10 TABLET, FILM COATED ORAL at 22:22

## 2025-03-07 RX ADMIN — BUPROPION HYDROCHLORIDE 150 MG: 150 TABLET, EXTENDED RELEASE ORAL at 09:26

## 2025-03-07 RX ADMIN — OLANZAPINE 10 MG: 5 TABLET, ORALLY DISINTEGRATING ORAL at 01:43

## 2025-03-07 ASSESSMENT — ACTIVITIES OF DAILY LIVING (ADL)
LAUNDRY: UNABLE TO COMPLETE
ADLS_ACUITY_SCORE: 54
ADLS_ACUITY_SCORE: 38
ORAL_HYGIENE: INDEPENDENT
ADLS_ACUITY_SCORE: 38
ADLS_ACUITY_SCORE: 38
DRESS: SCRUBS (BEHAVIORAL HEALTH)
ADLS_ACUITY_SCORE: 54
ADLS_ACUITY_SCORE: 54
ADLS_ACUITY_SCORE: 38
ADLS_ACUITY_SCORE: 54
ADLS_ACUITY_SCORE: 38
ADLS_ACUITY_SCORE: 54
ADLS_ACUITY_SCORE: 54
ADLS_ACUITY_SCORE: 38
ADLS_ACUITY_SCORE: 64
ADLS_ACUITY_SCORE: 38
HYGIENE/GROOMING: INDEPENDENT
ADLS_ACUITY_SCORE: 54
DRESS: INDEPENDENT

## 2025-03-07 NOTE — ED NOTES
MYRNA [place], Spoke with Officer Leroy with [employer] [employer]. He stated Pt is under arrest. They would like to be notified when Pt is discharged/if Pt is transferred.

## 2025-03-07 NOTE — ED PROVIDER NOTES
Sign-out Note    Received this patient in sign-out from Dr. Jones at 11:20 PM.  Please refer to earlier documentation detailing presenting complaints, evaluation, and ED course.  In brief, patient is being seen in the ER for paranoia.      Recommendations from previous provider: Planning for psychiatric admission at this time.  May be a component of substance induced paranoia.      ED course under my care:  ***    Disposition: ***

## 2025-03-07 NOTE — ED NOTES
Pt pacing in room, stated he was still anxious. Repeatedly asking to leave or when he will be discharged. I informed pt the plan to transfer IP.  Pt started rambling about being stalked by gang stalkers. Became agitated detailing situation and siblings PRN Zyprexa given.

## 2025-03-07 NOTE — PROGRESS NOTES
"ADMISSION NOTE    Reason for admission paranoia, psychosis.  Safety concerns hep c, scabs on extremeties  Risk for or history of violence hallucinations of hurting brother.   Full skin assessment: complete    Patient arrived on unit from [facility] accompanied by ems on 3/7/2025  1:34 PM.   Status on arrival: anxious  There were no vitals taken for this visit.  Patient given tour of unit and Welcome to  unit papers given to patient, wanding completed, belongings inventoried, and admission assessment completed.   Patient's legal status on arrival is 72 hr hold. Appropriate legal rights discussed with and copy given to patient. Patient Bill of Rights discussed with and copy given to patient.   Patient denies SI, HI, and thoughts of self harm and contracts for safety while on unit.   Pt to rm 534 states that he is here because his brother wants him out of the will and he is being set up.  States he did meth last week, but the meth they found in his apartment wasn't his.  Says there are invisible cameras around and that he has things coming out of his skin  instructed pt not to pick at skin.  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      ANNA MCNEILL RN  3/7/2025  2:35 PM      15:15 face tp face end of shift report communicated to evening shift rn.   "

## 2025-03-07 NOTE — ED NOTES
Contacted [place] [employer] to inform them of pt transfer to [facility]. I spoke to [place] with police and fire dispatch.

## 2025-03-07 NOTE — ED NOTES
Pt ambulated to bathroom, back to bed, snacks and fluids given, currently calm and cooperative, denies current SI, delusions or hallucinations

## 2025-03-07 NOTE — ED NOTES
RN ED Mental Health Handoff Note    72 hour hold    Does patient require 1:1? No    Hold and rights been given and documented for patient: Yes    Is the patient in BH scrubs? No     Has the patient been searched? Yes    Is the 15 minute observation tool up to date? Yes    Was patient issued a welcome folder? Yes    Room check completed this shift: Yes    PSS3 and [place] Assessment/Reassessment this shift:    C-SSRS ([place])      Date and Time Q1 Wished to be Dead (Past Month) Q2 Suicidal Thoughts (Past Month) Q3 Suicidal Thought Method Q4 Suicidal Intent without Specific Plan Q5 Suicide Intent with Specific Plan Q6 Suicide Behavior (Lifetime) If yes to Q6, within past 3 months? Level of Risk per Screen Level of Risk per Screen User   03/06/25 1705 0-->no 0-->no -- -- -- -- -- -- no risks indicated LTH   03/06/25 1704 -- -- -- -- -- 0-->no -- -- -- LT   03/06/25 1231 0-->no 0-->no -- -- -- 0-->no -- -- no risks indicated [facility]            Behavioral status of patient: Yellow.      Code 21 called this shift? No    Use of restraints/seclusion this shift? No    Most recent vital signs:  Temp: 98.5  F (36.9  C) Temp src: Oral BP: (!) 126/96 Pulse: 100   Resp: 18 SpO2: 98 % O2 Device: None (Room air)      Medications:  Scheduled medication compliance? Yes    PRN Meds administered this shift? yes    Medications   OLANZapine (zyPREXA) injection 10 mg (has no administration in time range)   OLANZapine zydis (zyPREXA) ODT tab 10 mg (10 mg Oral $Given 3/6/25 1311)   buPROPion (WELLBUTRIN XL) 24 hr tablet 150 mg (has no administration in time range)   buPROPion (WELLBUTRIN XL) 24 hr tablet 300 mg (has no administration in time range)   divalproex sodium extended-release (DEPAKOTE ER) 24 hr tablet 1,000 mg (has no administration in time range)   traZODone (DESYREL) tablet 150 mg (has no administration in time range)   buprenorphine HCl-naloxone HCl (SUBOXONE) 8-2 MG per film 1 Film (1 Film Sublingual $Given 3/6/25 1119)    LORazepam (ATIVAN) tablet 1 mg (1 mg Oral $Given 3/6/25 7599)         ADLs    Meal Provided this shift? Yes    Hygiene items provided? No    ADLs completed? No    Date of last shower: Unknown    Any significant events this shift? See notes.     Any information that would be helpful in caring for this patient?  Pt is under arrest. Please call [place] [employer] when discharged/transferred.     Family present/updated? No    Location of patient's belongings:     Critical Care Minutes:  Does the patient need critical care minutes documented? No

## 2025-03-07 NOTE — ED NOTES
RN ED Mental Health Handoff Note    72 hour hold    Does patient require 1:1? No    Hold and rights been given and documented for patient: No    Is the patient in  scrubs? No - refused    Has the patient been searched? Yes    Is the 15 minute observation tool up to date? No    Was patient issued a welcome folder? Yes    Room check completed this shift: Yes    PSS3 and [place] Assessment/Reassessment this shift:    C-SSRS ([place])      Date and Time Q1 Wished to be Dead (Past Month) Q2 Suicidal Thoughts (Past Month) Q3 Suicidal Thought Method Q4 Suicidal Intent without Specific Plan Q5 Suicide Intent with Specific Plan Q6 Suicide Behavior (Lifetime) If yes to Q6, within past 3 months? Level of Risk per Screen Level of Risk per Screen User   03/07/25 0016 0-->no 0-->no -- -- -- 0-->no -- -- no risks indicated ANZ   03/06/25 1705 0-->no 0-->no -- -- -- -- -- -- no risks indicated LTH   03/06/25 1704 -- -- -- -- -- 0-->no -- -- -- LTH   03/06/25 1231 0-->no 0-->no -- -- -- 0-->no -- -- no risks indicated [facility]            Behavioral status of patient: Green    Code 21 called this shift? No    Use of restraints/seclusion this shift? No    Most recent vital signs:  Temp: 98.5  F (36.9  C) Temp src: Oral BP: (!) 126/96 Pulse: 100   Resp: 18 SpO2: 98 % O2 Device: None (Room air)      Medications:  Scheduled medication compliance? Yes    PRN Meds administered this shift? Yes    Medications   OLANZapine (zyPREXA) injection 10 mg (has no administration in time range)   OLANZapine zydis (zyPREXA) ODT tab 10 mg (10 mg Oral $Given 3/7/25 0143)   buPROPion (WELLBUTRIN XL) 24 hr tablet 150 mg (150 mg Oral Not Given 3/6/25 2605)   buPROPion (WELLBUTRIN XL) 24 hr tablet 300 mg (300 mg Oral Not Given 3/6/25 3285)   divalproex sodium extended-release (DEPAKOTE ER) 24 hr tablet 1,000 mg (1,000 mg Oral $Given 3/6/25 2256)   traZODone (DESYREL) tablet 150 mg (150 mg Oral $Given 3/6/25 2256)   buprenorphine HCl-naloxone HCl (SUBOXONE)  8-2 MG per film 1 Film (1 Film Sublingual $Given 3/6/25 7524)   LORazepam (ATIVAN) tablet 1 mg (1 mg Oral $Given 3/6/25 1426)   LORazepam (ATIVAN) tablet 0.5 mg (0.5 mg Oral $Given 3/6/25 8420)   hydrOXYzine HCl (ATARAX) tablet 25 mg (25 mg Oral $Given 3/7/25 0522)         ADLs    Meal Provided this shift? Yes    Hygiene items provided? Yes    ADLs completed? Yes    Date of last shower: PTA  Any significant events this shift? No    Any information that would be helpful in caring for this patient?    Anticipating transfer to [facility]     Family present/updated? No    Location of patient's belongings: DEC    Critical Care Minutes:  Does the patient need critical care minutes documented? No

## 2025-03-07 NOTE — ED NOTES
Writer attempted to call Linden GIRON with number provided- no answer, no specific voicemail set up at [phone]. Left message requesting call back, no patient information or other identifying info provided.

## 2025-03-07 NOTE — PLAN OF CARE
"  Problem: Adult Behavioral Health Plan of Care  Goal: Patient-Specific Goal (Individualization)  Description: Pt. Will:  Take prescribed medications  Sleep at least 6 hours per night  Attend at least 50% of group therapy sessions  Eat at least 50% of meals  Be in cooperation with treatment team recommendations  Outcome: Progressing     Problem: Thought Process Alteration  Goal: Optimal Thought Clarity  Outcome: Progressing    Face to face shift report received from previously assigned nurse. Rounding completed, pt observed sitting on bed, picking at skin.    Patient is met with in his room. Denies SI, HI, and depression. Patient is unable to give rating regarding pain and possible locations. Patient denies auditory hallucinations, although remains preoccupied and delusional in nature. Demands writer to come look at his scabs, when writer does walk over to look at scabs, patient then begins picking at them, ripping his skin, stating, \"Look at that, see the nelly coming out, there is no way it heals that fast, see, I am going to be dead by morning\". Attempted to redirect patient, provide education regarding wound healing and infection risk with continuous picking of scabs. Patient not willing to accept this information, growing verbally agitated toward writer, stating, \"You are not listening to me, I need a doctor now, if you don't get me one, I am going to die and it will be your fault.\" Redirection attempts not successful. Patient continues to pick at skin, harrell and frequent reminders given to patient that it is important for him to stop picking at his skin, patient frustrated with writer and attempted redirection. No further needs at this time.     Patient slept for a good portion of the evening. When patient woke up, he continues with his delusion of gang-stalking, but has slowed down in terms of his delusions. PRN zyprexa and hydroxyzine offered and accepted for paranoia and sleep promotion. No further needs at " this time.     Face to face report communicated to oncoming RN.    Antoinette Jean-Baptiste RN  3/7/2025  4:09 PM

## 2025-03-07 NOTE — PROGRESS NOTES
03/07/25 1445   Patient Belongings   Did you bring any home meds/supplements to the hospital?  No   Patient Belongings locker;sent to security per site process   Patient Belongings Put in Hospital Secure Location (Security or Locker, etc.) cash/credit card;cell phone/electronics;clothing;keys;money (see comment);shoes;wallet;watch;other (see comments)   Belongings Search Yes   Clothing Search Yes   Second Staff .   Comment Dark gray long sleeved shirt, gray underwear, pair of socks, black sweatpants, white/black/neon [employer] shoes, black utility backpack, black book, black  and cord, 4 pens.     List items sent to safe: Iphone in black case - no damage, earbuds in white case, flashdrive in case on keychain, watch, keychain with 2 keys and 1 fob, keychain with 3 keys and 1 fob, 2 white C  plugins, Asaf in black case, black wallet, Lifetime card, JOHNNY card, AAA card, Medicare card, social security card, Progressive card, [place] drivers' license,  card, passport card, [employer] card, Mari card, 2 business cards, $40 cash.        All other belongings put in assigned cubby in belongings room.       I have reviewed my belongings list on admission and verify that it is correct.     Patient signature_______________________________    Second staff witness (if patient unable to sign) ______________________________       I have received all my belongings at discharge.    Patient signature________________________________    Tessie  3/7/2025  2:52 PM
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
## 2025-03-08 LAB
ALBUMIN SERPL BCG-MCNC: 3.6 G/DL (ref 3.5–5.2)
ALP SERPL-CCNC: 59 U/L (ref 40–150)
ALT SERPL W P-5'-P-CCNC: 22 U/L (ref 0–70)
ANION GAP SERPL CALCULATED.3IONS-SCNC: 7 MMOL/L (ref 7–15)
AST SERPL W P-5'-P-CCNC: 25 U/L (ref 0–45)
BILIRUB SERPL-MCNC: 0.3 MG/DL
BUN SERPL-MCNC: 14.4 MG/DL (ref 8–23)
CALCIUM SERPL-MCNC: 8.3 MG/DL (ref 8.8–10.4)
CHLORIDE SERPL-SCNC: 100 MMOL/L (ref 98–107)
CREAT SERPL-MCNC: 0.83 MG/DL (ref 0.67–1.17)
EGFRCR SERPLBLD CKD-EPI 2021: >90 ML/MIN/1.73M2
GLUCOSE SERPL-MCNC: 115 MG/DL (ref 70–99)
HBV CORE IGM SERPL QL IA: NONREACTIVE
HCO3 SERPL-SCNC: 26 MMOL/L (ref 22–29)
HCV AB SERPL QL IA: REACTIVE
HIV 1+2 AB+HIV1 P24 AG SERPL QL IA: NONREACTIVE
HOLD SPECIMEN: NORMAL
POTASSIUM SERPL-SCNC: 4 MMOL/L (ref 3.4–5.3)
PROT SERPL-MCNC: 5.8 G/DL (ref 6.4–8.3)
SODIUM SERPL-SCNC: 133 MMOL/L (ref 135–145)
TSH SERPL DL<=0.005 MIU/L-ACNC: 2.08 UIU/ML (ref 0.3–4.2)
VALPROATE SERPL-MCNC: 29.4 UG/ML

## 2025-03-08 PROCEDURE — 84443 ASSAY THYROID STIM HORMONE: CPT | Performed by: NURSE PRACTITIONER

## 2025-03-08 PROCEDURE — 124N000001 HC R&B MH

## 2025-03-08 PROCEDURE — 250N000012 HC RX MED GY IP 250 OP 636 PS 637: Performed by: NURSE PRACTITIONER

## 2025-03-08 PROCEDURE — 36415 COLL VENOUS BLD VENIPUNCTURE: CPT

## 2025-03-08 PROCEDURE — 80164 ASSAY DIPROPYLACETIC ACD TOT: CPT | Performed by: NURSE PRACTITIONER

## 2025-03-08 PROCEDURE — 99223 1ST HOSP IP/OBS HIGH 75: CPT | Mod: AI | Performed by: NURSE PRACTITIONER

## 2025-03-08 PROCEDURE — 82247 BILIRUBIN TOTAL: CPT | Performed by: NURSE PRACTITIONER

## 2025-03-08 PROCEDURE — 84155 ASSAY OF PROTEIN SERUM: CPT | Performed by: NURSE PRACTITIONER

## 2025-03-08 PROCEDURE — 250N000013 HC RX MED GY IP 250 OP 250 PS 637: Performed by: NURSE PRACTITIONER

## 2025-03-08 RX ORDER — OLANZAPINE 10 MG/1
10 TABLET ORAL 2 TIMES DAILY
Status: DISCONTINUED | OUTPATIENT
Start: 2025-03-08 | End: 2025-03-12 | Stop reason: HOSPADM

## 2025-03-08 RX ORDER — BUPRENORPHINE AND NALOXONE 8; 2 MG/1; MG/1
1 FILM, SOLUBLE BUCCAL; SUBLINGUAL 2 TIMES DAILY
Status: DISCONTINUED | OUTPATIENT
Start: 2025-03-08 | End: 2025-03-12 | Stop reason: HOSPADM

## 2025-03-08 RX ORDER — DIVALPROEX SODIUM 500 MG/1
500 TABLET, FILM COATED, EXTENDED RELEASE ORAL AT BEDTIME
Status: DISCONTINUED | OUTPATIENT
Start: 2025-03-08 | End: 2025-03-08

## 2025-03-08 RX ORDER — OLANZAPINE 10 MG/1
10 TABLET ORAL AT BEDTIME
Status: DISCONTINUED | OUTPATIENT
Start: 2025-03-08 | End: 2025-03-08

## 2025-03-08 RX ORDER — BUPROPION HYDROCHLORIDE 300 MG/1
300 TABLET ORAL EVERY MORNING
Status: DISCONTINUED | OUTPATIENT
Start: 2025-03-08 | End: 2025-03-12 | Stop reason: HOSPADM

## 2025-03-08 RX ORDER — DIVALPROEX SODIUM 500 MG/1
1000 TABLET, FILM COATED, EXTENDED RELEASE ORAL AT BEDTIME
Status: DISCONTINUED | OUTPATIENT
Start: 2025-03-08 | End: 2025-03-10

## 2025-03-08 RX ADMIN — BUPRENORPHINE AND NALOXONE 1 FILM: 8; 2 FILM, SOLUBLE BUCCAL; SUBLINGUAL at 20:19

## 2025-03-08 RX ADMIN — OLANZAPINE 10 MG: 10 TABLET, FILM COATED ORAL at 14:25

## 2025-03-08 RX ADMIN — BUPRENORPHINE AND NALOXONE 1 FILM: 8; 2 FILM, SOLUBLE BUCCAL; SUBLINGUAL at 09:50

## 2025-03-08 RX ADMIN — OLANZAPINE 10 MG: 10 TABLET, FILM COATED ORAL at 20:19

## 2025-03-08 RX ADMIN — HYDROXYZINE HYDROCHLORIDE 25 MG: 25 TABLET, FILM COATED ORAL at 20:35

## 2025-03-08 RX ADMIN — TRAZODONE HYDROCHLORIDE 50 MG: 50 TABLET ORAL at 01:55

## 2025-03-08 RX ADMIN — OLANZAPINE 10 MG: 10 TABLET, FILM COATED ORAL at 09:50

## 2025-03-08 RX ADMIN — ACETAMINOPHEN 650 MG: 325 TABLET, FILM COATED ORAL at 14:25

## 2025-03-08 RX ADMIN — DIVALPROEX SODIUM 1000 MG: 500 TABLET, EXTENDED RELEASE ORAL at 20:19

## 2025-03-08 RX ADMIN — BUPROPION HYDROCHLORIDE 300 MG: 300 TABLET, EXTENDED RELEASE ORAL at 10:30

## 2025-03-08 RX ADMIN — HYDROXYZINE HYDROCHLORIDE 25 MG: 25 TABLET, FILM COATED ORAL at 01:55

## 2025-03-08 ASSESSMENT — ACTIVITIES OF DAILY LIVING (ADL)
ADLS_ACUITY_SCORE: 38
LAUNDRY: UNABLE TO COMPLETE
ADLS_ACUITY_SCORE: 38
ADLS_ACUITY_SCORE: 38
DRESS: SCRUBS (BEHAVIORAL HEALTH)
ADLS_ACUITY_SCORE: 38
ORAL_HYGIENE: INDEPENDENT
ADLS_ACUITY_SCORE: 38
LAUNDRY: UNABLE TO COMPLETE
ORAL_HYGIENE: INDEPENDENT
ADLS_ACUITY_SCORE: 38
DRESS: INDEPENDENT;SCRUBS (BEHAVIORAL HEALTH)
HYGIENE/GROOMING: INDEPENDENT
ADLS_ACUITY_SCORE: 38
HYGIENE/GROOMING: INDEPENDENT
ADLS_ACUITY_SCORE: 38
ADLS_ACUITY_SCORE: 38

## 2025-03-08 NOTE — PLAN OF CARE
Problem: Adult Behavioral Health Plan of Care  Goal: Patient-Specific Goal (Individualization)  Description: Pt. Will:  Take prescribed medications  Sleep at least 6 hours per night  Attend at least 50% of group therapy sessions  Eat at least 50% of meals  Be in cooperation with treatment team recommendations    3/7- no wean at this time, treatment team and provider to assess daily.   Outcome: Progressing     Problem: Thought Process Alteration  Goal: Optimal Thought Clarity  Outcome: Progressing    Face to face shift report received from previously assigned nurse. Rounding completed, pt observed resting in bed.    Patient is met with in his room. Denies pain, SI, HI, A/V hallucinations, and depression. Endorses anxiety, which he had recently taken a PRN for. Denies auditory hallucinations, but patient does still seem preoccupied at times in conversation. Patient is pleasant in interactions. No further needs at this time.     Patient did wean for a short period this evening to make phone calls, patient was appropriate on the unit, did request to return to the MHICU for decreased stimulation on his own. Compliant with HS medication administration. Requested and received PRN hydroxyzine for anxiety and sleep promotion. No further needs at this time.     Face to face report communicated to oncoming MYRNA.    Antoinette Jean-Baptiste RN  3/8/2025  3:49 PM

## 2025-03-08 NOTE — PLAN OF CARE
"  Problem: Adult Behavioral Health Plan of Care  Goal: Patient-Specific Goal (Individualization)  Description: Pt. Will:  Take prescribed medications  Sleep at least 6 hours per night  Attend at least 50% of group therapy sessions  Eat at least 50% of meals  Be in cooperation with treatment team recommendations    3/7- no wean at this time, treatment team and provider to assess daily.   Outcome: Progressing     Problem: Thought Process Alteration  Goal: Optimal Thought Clarity  Outcome: Progressing   Goal Outcome Evaluation:       Pt. In bed sleeping at beginning of shift,  woke and ate breakfast, appetite is good, ate 100%, taking prescribed medications, slept 5 hours last night, cooperative with treatment team recommendations, continue to state \"I don't have hallucinations, you have to believe me when I tell you these things are happening to me, The  need to go to my house and see where they planted the drugs\",  behavior is calmer and cooperative, showered and dressed in clean scrubs, talked to female friend on the phone, resting quietly in bed, will continue to monitor progress.  1425-  Pt. Requested/received tylenol 650 mg po for right knee pain, 8 of 10 and zyprexa 10 mg po for anxiety.      Face to face end of shift report will be communicated to oncoming afternoon shift RN.     Ignacia Samson RN  3/8/2025  10:43 AM                      "
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
## 2025-03-08 NOTE — PLAN OF CARE
Problem: Adult Behavioral Health Plan of Care  Goal: Patient-Specific Goal (Individualization)  Description: Pt. Will:  Take prescribed medications  Sleep at least 6 hours per night  Attend at least 50% of group therapy sessions  Eat at least 50% of meals  Be in cooperation with treatment team recommendations    3/7- no wean at this time, treatment team and provider to assess daily.   3/8/2025 0003 by Sara Brar RN  Outcome: Progressing     Problem: Thought Process Alteration  Goal: Optimal Thought Clarity  Outcome: Progressing     Face to face shift report received from Antoinette NORRIS. Rounding completed, pt observed.     Pt appeared to sleep a total of 5 hours on and off this shift. Pt given Atarax 25 mg and Trazodone 50 mg at 0155.    Face to face report will be communicated to oncsánchez RN.    Sara Brar RN  3/8/2025  6:10 AM

## 2025-03-09 LAB
HCV RNA SERPL NAA+PROBE-ACNC: NOT DETECTED IU/ML
SARS-COV-2 RNA RESP QL NAA+PROBE: NEGATIVE

## 2025-03-09 PROCEDURE — 250N000012 HC RX MED GY IP 250 OP 636 PS 637: Performed by: NURSE PRACTITIONER

## 2025-03-09 PROCEDURE — 99233 SBSQ HOSP IP/OBS HIGH 50: CPT | Performed by: NURSE PRACTITIONER

## 2025-03-09 PROCEDURE — 124N000001 HC R&B MH

## 2025-03-09 PROCEDURE — 87635 SARS-COV-2 COVID-19 AMP PRB: CPT | Performed by: NURSE PRACTITIONER

## 2025-03-09 PROCEDURE — 250N000013 HC RX MED GY IP 250 OP 250 PS 637: Performed by: NURSE PRACTITIONER

## 2025-03-09 RX ADMIN — OLANZAPINE 10 MG: 10 TABLET, FILM COATED ORAL at 20:05

## 2025-03-09 RX ADMIN — DIVALPROEX SODIUM 1000 MG: 500 TABLET, EXTENDED RELEASE ORAL at 20:05

## 2025-03-09 RX ADMIN — OLANZAPINE 10 MG: 10 TABLET, FILM COATED ORAL at 08:28

## 2025-03-09 RX ADMIN — BUPROPION HYDROCHLORIDE 300 MG: 300 TABLET, EXTENDED RELEASE ORAL at 08:28

## 2025-03-09 RX ADMIN — BUPRENORPHINE AND NALOXONE 1 FILM: 8; 2 FILM, SOLUBLE BUCCAL; SUBLINGUAL at 20:05

## 2025-03-09 RX ADMIN — HYDROXYZINE HYDROCHLORIDE 25 MG: 25 TABLET, FILM COATED ORAL at 20:05

## 2025-03-09 RX ADMIN — BUPRENORPHINE AND NALOXONE 1 FILM: 8; 2 FILM, SOLUBLE BUCCAL; SUBLINGUAL at 08:28

## 2025-03-09 ASSESSMENT — ACTIVITIES OF DAILY LIVING (ADL)
LAUNDRY: UNABLE TO COMPLETE
HYGIENE/GROOMING: INDEPENDENT
ADLS_ACUITY_SCORE: 38
ORAL_HYGIENE: INDEPENDENT
ADLS_ACUITY_SCORE: 38
DRESS: INDEPENDENT;SCRUBS (BEHAVIORAL HEALTH)
LAUNDRY: UNABLE TO COMPLETE
ADLS_ACUITY_SCORE: 38
HYGIENE/GROOMING: INDEPENDENT
ADLS_ACUITY_SCORE: 38
DRESS: SCRUBS (BEHAVIORAL HEALTH)
ORAL_HYGIENE: INDEPENDENT
ADLS_ACUITY_SCORE: 38

## 2025-03-09 NOTE — PLAN OF CARE
"  Problem: Adult Behavioral Health Plan of Care  Goal: Patient-Specific Goal (Individualization)  Description: Pt. Will:  Take prescribed medications  Sleep at least 6 hours per night  Attend at least 50% of group therapy sessions  Eat at least 50% of meals  Be in cooperation with treatment team recommendations    3/8- pt able to wean at nurse's discretion.   Outcome: Progressing     Problem: Thought Process Alteration  Goal: Optimal Thought Clarity  Outcome: Progressing   Goal Outcome Evaluation:             Pt. In room so far this shift, appetite is good, eating at least 50% of meals, taking prescribed medications, slightly irritable with conversation, delusional statements, stating \"You know those people planted the drugs in my place, It sounds crazy, but it's true\", slept 4 hours last night, not wanting to wean yet this shift, is able to make needs be known, denies depression and suicidal ideation, denies hallucinations and pain, will continue to monitor progress.    Face to face end of shift report will be communicated to oncoming afternoon shift RN.     Ignacia Samson RN  3/9/2025  9:45 AM                "

## 2025-03-09 NOTE — PLAN OF CARE
Problem: Adult Behavioral Health Plan of Care  Goal: Patient-Specific Goal (Individualization)  Description: Pt. Will:  Take prescribed medications  Sleep at least 6 hours per night  Attend at least 50% of group therapy sessions  Eat at least 50% of meals  Be in cooperation with treatment team recommendations    3/8- pt able to wean at nurse's discretion.   3/8/2025 2329 by Sara Brar, RN  Outcome: Progressing     Face to face shift report received from Antoinette NORRIS. Rounding completed, pt observed.     Pt appeared to sleep 4 hours on and off this shift.    Face to face report will be communicated to oncoming RN.    Sara Brar, RN  3/9/2025  6:06 AM

## 2025-03-09 NOTE — PLAN OF CARE
Problem: Adult Behavioral Health Plan of Care  Goal: Patient-Specific Goal (Individualization)  Description: Pt. Will:  Take prescribed medications  Sleep at least 6 hours per night  Attend at least 50% of group therapy sessions  Eat at least 50% of meals  Be in cooperation with treatment team recommendations    3/8- pt able to wean at nurse's discretion.   Outcome: Progressing     Problem: Thought Process Alteration  Goal: Optimal Thought Clarity  Outcome: Progressing    Face to face shift report received from previously assigned nurse. Rounding completed, pt observed pacing around [facility].    Patient is met with in his room. Denies pain, SI, HI, A/V hallucinations, and depression. Endorses anxiety, states he does not need to be here anymore and is wishing to leave. Patient accepting of explanation of why he is here and how he is unable to leave a this time. Patient is appropriate in conversation, although repeating requests when not fulfilled immediately. Redirections are successful. No further needs at this time.     Patient weaned appropriately this evening, maintained appropriate boundaries with peers and staff. Compliant with HS medication administration, PRN hydroxyzine offered and accepted as well. No further needs at this time.     Face to face report communicated to oncoming RN.    Antoinette Jean-Baptiste RN  3/9/2025  3:50 PM
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
## 2025-03-10 PROCEDURE — 250N000013 HC RX MED GY IP 250 OP 250 PS 637

## 2025-03-10 PROCEDURE — 124N000001 HC R&B MH

## 2025-03-10 PROCEDURE — 99233 SBSQ HOSP IP/OBS HIGH 50: CPT

## 2025-03-10 PROCEDURE — 250N000013 HC RX MED GY IP 250 OP 250 PS 637: Performed by: NURSE PRACTITIONER

## 2025-03-10 PROCEDURE — 250N000012 HC RX MED GY IP 250 OP 636 PS 637: Performed by: NURSE PRACTITIONER

## 2025-03-10 RX ORDER — DIVALPROEX SODIUM 500 MG/1
1500 TABLET, FILM COATED, EXTENDED RELEASE ORAL AT BEDTIME
Status: DISCONTINUED | OUTPATIENT
Start: 2025-03-10 | End: 2025-03-12 | Stop reason: HOSPADM

## 2025-03-10 RX ADMIN — DIVALPROEX SODIUM 1500 MG: 500 TABLET, EXTENDED RELEASE ORAL at 20:26

## 2025-03-10 RX ADMIN — BUPRENORPHINE AND NALOXONE 1 FILM: 8; 2 FILM, SOLUBLE BUCCAL; SUBLINGUAL at 20:26

## 2025-03-10 RX ADMIN — OLANZAPINE 10 MG: 10 TABLET, FILM COATED ORAL at 20:26

## 2025-03-10 RX ADMIN — BUPROPION HYDROCHLORIDE 300 MG: 300 TABLET, EXTENDED RELEASE ORAL at 08:02

## 2025-03-10 RX ADMIN — POLYETHYLENE GLYCOL 3350 17 G: 17 POWDER, FOR SOLUTION ORAL at 09:27

## 2025-03-10 RX ADMIN — BUPRENORPHINE AND NALOXONE 1 FILM: 8; 2 FILM, SOLUBLE BUCCAL; SUBLINGUAL at 08:31

## 2025-03-10 RX ADMIN — HYDROXYZINE HYDROCHLORIDE 25 MG: 25 TABLET, FILM COATED ORAL at 13:55

## 2025-03-10 RX ADMIN — OLANZAPINE 10 MG: 10 TABLET, FILM COATED ORAL at 08:02

## 2025-03-10 ASSESSMENT — ACTIVITIES OF DAILY LIVING (ADL)
ADLS_ACUITY_SCORE: 38

## 2025-03-10 NOTE — PROGRESS NOTES
"Problem: Adult Behavioral Health Plan of Care  Goal: Patient-Specific Goal (Individualization)  Description: Patient will:  Take prescribed medications  Sleep at least 6 hours per night  Attend at least 50% of group therapy sessions  Eat at least 50% of meals  Be in cooperation with treatment team recommendations    3/10- patient able to wean at nurse's discretion.   Outcome: Progressing     Problem: Thought Process Alteration  Goal: Optimal Thought Clarity  Description: Patient will have linear and logical conversations by discharge  Outcome: Progressing   Goal Outcome Evaluation:    Face to face end of shift report received from RN. Patient rounding completed, in [facility].  March 10, 2025 7:46 AM    Patient reports chronic generalized pain 7/10 \"all over, from past lifetime\" but states that's his baseline and politely declines prn medication. Patient reports sleep as \"I'm always up down, up down.\" Patient reports mood as \"even keel.\" Patient agreeable to appropriate weaning behaviors. Patient a bit restless but cooperative and pleasant, med compliant. Patient denies AH/VH, SI/HI, or any harmful thoughts towards self or others at this time, agrees to notify staff if anything changes.  March 10, 2025 8:08 AM    Patient has been weaning majority of the morning. Patient reported constipation, had small bowel movement this morning. Patient denies abdominal pain, reports occasional nausea which is his baseline, denies vomiting. Prn miralax given, see MAR for times.  March 10, 2025 9:54 AM    Patient has been weaning majority of shift, interacting with peers, and attending groups. Patient restless at times, but cooperative and pleasant. Patient seeks out staff frequently, often to ask questions. Patient does not report any concerns or complaints at this time.  March 10, 2025 1:20 PM    Prn hydroxyzine given for anxiety, see MAR for times. Patient anxious about reaching his publisher, SW provided number he requested " to contact them.  March 10, 2025 1:57 PM
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
## 2025-03-10 NOTE — DISCHARGE INSTRUCTIONS
Behavioral Discharge Planning and Instructions    Summary: 59 year old male with a history of psychiatric illness and substance abuse who presents from his apartment in the company of EMS and police with concern for paranoia for a psychiatric evaluation.     Main Diagnosis: Bipolar disorder, severe with psychotic features  #. Noncompliant with medications  #. Alcohol Use Disorder, Severe   #. Methamphetamine Use Disorder, Severe  #. Opioid Use Disorder, Severe, in sustained remission    Health Care Follow-up:     [facility]   Dr. Sara Castro  - 3/24/2025  - 8:00a  [address]  [place] [address]  [phone]  F:[phone]        Attend all scheduled appointments with your outpatient providers. Call at least 24 hours in advance if you need to reschedule an appointment to ensure continued access to your outpatient providers.     Major Treatments, Procedures and Findings:  You were provided with: a psychiatric assessment, assessed for medical stability, medication evaluation and/or management, group therapy, family therapy, individual therapy, CD evaluation/assessment, milieu management, and medical interventions    Symptoms to Report: feeling more aggressive, increased confusion, losing more sleep, mood getting worse, or thoughts of suicide    Early warning signs can include: increased depression or anxiety sleep disturbances increased thoughts or behaviors of suicide or self-harm  increased unusual thinking, such as paranoia or hearing voices    Safety and Wellness:  Take all medicines as directed.  Make no changes unless your doctor suggests them.      Follow treatment recommendations.  Refrain from alcohol and non-prescribed drugs.  Ask your support system to help you reduce your access to items that could harm yourself or others. Items could include:  Firearms  Medicines (both prescribed and over-the-counter)  Knives and other sharp objects  Ropes and like materials  Car  "keys  If there is a concern for safety, call 911. If there is a concern for safety, call 911.    Resources:   Crisis Intervention: 658.231.6921 or 936-168-4232 (TTY: 757.200.7307).  Call anytime for help.  National [place] on Mental Illness (www.[place].org): 207.341.2915 or 487-290-5688.  [place] Association for Children's Mental Health (www.mac.org): 392.794.4011.  Alcoholics Anonymous (www.alcoholics-anonymous.org): Check your phone book for your local chapter.  Suicide Awareness Voices of Education (SAVE) (www.save.org): 431-325-SVFR (0395)  National Suicide Prevention Line (www.mentalhealthmn.org): 003-088-ULGD (2634)  Mental Health Consumer/Survivor Network of [place] (www.mhcsn.net): 301.654.1979 or 791-356-4388  Mental Health Association of [place] (www.mentalhealth.org): 425.902.5586 or 142-214-4639  Self- Management and Recovery Training., [employer]-- Toll free: 769.729.6086  www.[employer]  Text 4 Life: txt \"LIFE\" to 18718 for immediate support and crisis intervention  Crisis text line: Text \"[place]\" to 771378. Free, confidential, 24/7.  Crisis Intervention: 926.257.8941 or 019-989-7814. Call anytime for help.     General Medication Instructions:   See your medication sheet(s) for instructions.   Take all medicines as directed.  Make no changes unless your doctor suggests them.   Go to all your doctor visits.  Be sure to have all your required lab tests. This way, your medicines can be refilled on time.  Do not use any drugs not prescribed by your doctor.  Avoid alcohol.    Advance Directives:   Scanned document on file with [place]? No scanned doc  Is document scanned? Pt states no documents  Honoring Choices Your Rights Handout: Informed and given  Was more information offered? Pt declined    The Treatment team has appreciated the opportunity to work with you. If you have any questions or concerns about your recent admission, you can contact the unit which can receive your call 24 hours a day, 7 days a week. They " will be able to get in touch with a Provider if needed. The unit number is [phone].

## 2025-03-10 NOTE — PLAN OF CARE
Problem: Adult Behavioral Health Plan of Care  Goal: Patient-Specific Goal (Individualization)  Description: Pt. Will:  Take prescribed medications  Sleep at least 6 hours per night  Attend at least 50% of group therapy sessions  Eat at least 50% of meals  Be in cooperation with treatment team recommendations    3/8- pt able to wean at nurse's discretion.   Outcome: Progressing     Problem: Thought Process Alteration  Goal: Optimal Thought Clarity  Outcome: Progressing     Face to face shift report received from Antoinette NORRIS. Rounding completed, pt observed.     Pt appeared to sleep a total of 3.5 hours on and off this shift.    Face to face report will be communicated to oncoming RN.    Sara Brar RN  3/10/2025  6:49 AM
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
## 2025-03-10 NOTE — PLAN OF CARE
Problem: Adult Behavioral Health Plan of Care  Goal: Patient-Specific Goal (Individualization)  Description: Patient will:  Take prescribed medications  Sleep at least 6 hours per night  Attend at least 50% of group therapy sessions  Eat at least 50% of meals  Be in cooperation with treatment team recommendations    3/10- patient able to wean at nurse's discretion.   Outcome: Progressing     Problem: Thought Process Alteration  Goal: Optimal Thought Clarity  Description: Patient will have linear and logical conversations by discharge  Outcome: Progressing   Goal Outcome Evaluation:      Pt in the MHICU at the start of the shift. Pt did wean to the open unit to use the phone several times but stayed in his room for most of the shift. Pt denies all symptoms of pain, depression, anxiety, SI, HI and hallucinations. Pt medication compliant.     Pt requesting snacks often, states he needs something sweet.     Night shift    Pt up a few times to request snacks.     Pt in room laying on bed with eyes closed with regular respirations and position changes.       Face to face end of shift report communicated to night shift RN.     Paula Green RN  3/10/2025  4:09 PM

## 2025-03-11 LAB
SARS-COV-2 RNA RESP QL NAA+PROBE: NEGATIVE
VALPROATE SERPL-MCNC: 49.6 UG/ML

## 2025-03-11 PROCEDURE — 250N000013 HC RX MED GY IP 250 OP 250 PS 637

## 2025-03-11 PROCEDURE — 36415 COLL VENOUS BLD VENIPUNCTURE: CPT | Performed by: NURSE PRACTITIONER

## 2025-03-11 PROCEDURE — 99232 SBSQ HOSP IP/OBS MODERATE 35: CPT

## 2025-03-11 PROCEDURE — 250N000012 HC RX MED GY IP 250 OP 636 PS 637: Performed by: NURSE PRACTITIONER

## 2025-03-11 PROCEDURE — 124N000001 HC R&B MH

## 2025-03-11 PROCEDURE — 87635 SARS-COV-2 COVID-19 AMP PRB: CPT

## 2025-03-11 PROCEDURE — 250N000013 HC RX MED GY IP 250 OP 250 PS 637: Performed by: NURSE PRACTITIONER

## 2025-03-11 PROCEDURE — 80164 ASSAY DIPROPYLACETIC ACD TOT: CPT | Performed by: NURSE PRACTITIONER

## 2025-03-11 RX ORDER — DIVALPROEX SODIUM 500 MG/1
1500 TABLET, FILM COATED, EXTENDED RELEASE ORAL AT BEDTIME
COMMUNITY
Start: 2025-03-11

## 2025-03-11 RX ORDER — OLANZAPINE 10 MG/1
10 TABLET ORAL 2 TIMES DAILY
Qty: 60 TABLET | Refills: 2 | Status: SHIPPED | OUTPATIENT
Start: 2025-03-11

## 2025-03-11 RX ADMIN — BUPROPION HYDROCHLORIDE 300 MG: 300 TABLET, EXTENDED RELEASE ORAL at 08:29

## 2025-03-11 RX ADMIN — DIVALPROEX SODIUM 1500 MG: 500 TABLET, EXTENDED RELEASE ORAL at 20:44

## 2025-03-11 RX ADMIN — OLANZAPINE 10 MG: 10 TABLET, FILM COATED ORAL at 20:44

## 2025-03-11 RX ADMIN — BUPRENORPHINE AND NALOXONE 1 FILM: 8; 2 FILM, SOLUBLE BUCCAL; SUBLINGUAL at 20:44

## 2025-03-11 RX ADMIN — OLANZAPINE 10 MG: 10 TABLET, FILM COATED ORAL at 08:29

## 2025-03-11 RX ADMIN — BUPRENORPHINE AND NALOXONE 1 FILM: 8; 2 FILM, SOLUBLE BUCCAL; SUBLINGUAL at 08:29

## 2025-03-11 ASSESSMENT — ACTIVITIES OF DAILY LIVING (ADL)
ADLS_ACUITY_SCORE: 38
HYGIENE/GROOMING: INDEPENDENT
ADLS_ACUITY_SCORE: 38
LAUNDRY: UNABLE TO COMPLETE
ADLS_ACUITY_SCORE: 38
ORAL_HYGIENE: INDEPENDENT
ADLS_ACUITY_SCORE: 38
DRESS: SCRUBS (BEHAVIORAL HEALTH);INDEPENDENT
ADLS_ACUITY_SCORE: 38

## 2025-03-11 NOTE — PROGRESS NOTES
"Problem: Adult Behavioral Health Plan of Care  Goal: Patient-Specific Goal (Individualization)  Description: Patient will:  Take prescribed medications  Sleep at least 6 hours per night  Attend at least 50% of group therapy sessions  Eat at least 50% of meals  Be in cooperation with treatment team recommendations  Outcome: Progressing     Problem: Thought Process Alteration  Goal: Optimal Thought Clarity  Description: Patient will have linear and logical conversations by discharge  Outcome: Progressing   Goal Outcome Evaluation:    Face to face end of shift report received from RN. Patient rounding completed, in [facility] lounge requesting band-aid for scab he picked off in his sleep.  March 11, 2025 7:37 AM    Patient reports chronic pain \"all over\" which is his baseline, declines prn medication. Patient asking questions about disharging tomorrow, patient told to discuss this with his provider today, patient agreeable to plan. Patient reports he slept okay, his baseline is waking up often per patient. Patient reports he continues to have intermittent racing thoughts. Patient a bit restless, but cooperative and pleasant, med compliant. Patient denies AH/VH, SI/HI, or any harmful thoughts towards self or others at this time, agrees to notify staff if anything changes. Patient weaning without issue.  March 11, 2025 8:52 AM    Patient moved to open unit. Patient remains restless at times, but cooperative and pleasant. Patient spends time in the lounge, often writing and attending groups. Patient able to make needs known. Patient does not report any concerns or complaints at this time.  March 11, 2025 12:46 PM                            "

## 2025-03-11 NOTE — PLAN OF CARE
Face to face shift report received from Paula AMBROSE RN`. Rounding completed, pt observed.    Problem: Adult Behavioral Health Plan of Care  Goal: Patient-Specific Goal (Individualization)  Description: Patient will:  Take prescribed medications  Sleep at least 6 hours per night  Attend at least 50% of group therapy sessions  Eat at least 50% of meals  Be in cooperation with treatment team recommendations    3/10- patient able to wean as behavior is appropriate.  Treatment Team to assess daily.  Outcome: Progressing  Note: Shift Summary: Patient resting in bed when care taken over at 0300.  Respirations are visible and regular.  Independent with position changes the rest of the night.     Problem: Thought Process Alteration  Goal: Optimal Thought Clarity  Description: Patient will have linear and logical conversations by discharge  Outcome: Progressing  Face to face report will be communicated to oncoming RN.    Cathy Pierson RN  3/11/2025

## 2025-03-11 NOTE — PLAN OF CARE
Face to face shift report received from MYRNA Walsh. Rounding completed, pt observed resting in bed at start of shift.    Problem: Adult Behavioral Health Plan of Care  Goal: Patient-Specific Goal (Individualization)  Description: Patient will:  Take prescribed medications  Sleep at least 6 hours per night  Attend at least 50% of group therapy sessions  Eat at least 50% of meals  Be in cooperation with treatment team recommendations    Outcome: Progressing     Problem: Thought Process Alteration  Goal: Optimal Thought Clarity  Description: Patient will have linear and logical conversations by discharge  Outcome: Progressing   Goal Outcome Evaluation:         Pt. Denied having any physical pain this shift. They also denied having any HI, SI, or intent to self-harm. Pt. Stated that they were looking forward to discharging tomorrow and that it had made their day today. Pt. Spent much of the day out in the lounge and attended some groups. 75% was eaten at dinner. Pt. Had their valproic acid drawn this shift. This was at 49.6.       Face to face report will be communicated to oncoming RN.    Brianna Moncada RN  3/11/2025  10:39 PM
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
## 2025-03-11 NOTE — DISCHARGE SUMMARY
[facility] PSYCHIATRY  DISCHARGE SUMMARY     DISCHARGE DATA     Andrea Pham MRN# [id number]   Age: 59 year old YOB: 1965     Date of Admission: 3/7/2025  Date of Discharge: March 12, 2025  Discharge Provider: RAYMUNDO Pinzon CNP       REASON FOR ADMISSION     This is a 59 year old male with a PMH of bipolar disorder, alcohol use disorder, alcohol withdrawals, opioid use disorder, Wernicke's encephalopathy, methamphetamine use disorder and ADHD with a history of Hep C and pancreatitis who presented to the ED in [facility] via EMS and police for paranoia and a psychiatric evaluation. It was noted he is currently under arrest by [facility]. He has a significant mental health history that includes extended hospitalizations and commitment in 2021 for MI/CD, 2019 for CD, and 2013 for CD. Patient is a poor historian and much of history is obtained from medical records. Patient is currently experiencing delusions, agitation, and is disorganized in the context of noncompliance with medications in addition to potential relapse. Patient lacks insight to his mental health and has shown impaired judgement. He is not reporting thoughts of hurting others, his delusion regarding his brother involves his brother threatening him. Will admit for patient safety and stabilization        DISCHARGE DIAGNOSES     #. Bipolar disorder, severe with psychotic features  #. Noncompliant with medications  #. Alcohol Use Disorder, Severe   #. Methamphetamine Use Disorder, Severe  #. Opioid Use Disorder, Severe, in sustained remission       CONSULTS     none       HOSPITAL COURSE     Legal status: Orders Placed This Encounter      Emergency Hospitalization Hold (72 Hr Hold)    Patient was admitted to unit 5 due to the aforementioned presentation. The patient was placed under 15 minute checks to ensure patient safety. The patient participated in unit programming and groups as able.    Mr. Pham did not require  seclusion/restraint during hospitalization.     We reviewed with Mr. Pham current and past medication trials including duration, dose, response and side effects. During this hospitalization, the following changes to the patient's psychotropic medications were made:    PTA psychotropic medications stopped:     - Adderall 25 mg XR daily, pt to discuss with his provider  -Trazodone 150 mg at bedtime pt to discuss with his provider    PTA psychotropic medications continued/changed:     - buprenorphine/naloxone 8-2 mg film BID  - bupropion 450 mg XR - > ordered 300 mg XR daily  - Depakote 1000 mg ER at bedtime->1500 mg 3/10  - olanzapine 10 mg at bedtime -> ordered 10 mg BID     New psychotropic medications tried and stopped:     - none    New psychotropic medications initiated:     - none      Andrea Pham was admitted to [facility] for the above presentation. PTA medications were resumed as above with Adderall and trazodone being held for potential of worsening psychosis/kimberly. Wellbutrin was doses at 300 mg as above. Recommended pt follow up with medication provider prior to resuming. Kimberly did reduce some with these interventions. VPA level on 3/11 was 49.6, which was expected to be low as this laboratory specimen was a few days early d/t pt discharging. Recommend redrawing at follow up. Zyprexa was increased to 10 mg BID. Appeared to tolerate these changes without issue. Pt appears to have fixed persecutory delusions of brother intentionally acting in ways to get pt in trouble. This thought appears to be longstanding and pt reports he has no fear/worry in relation to his brother. Pt gradually improved with the emergency hold expiring. Pt expressed increased confidence in ability to manage sx. Pt requested to discharge home. The treatment team agreed pt appeared improved and prepared for dismissal.     With these changes and supports the patient noticed improvement in their symptoms and  felt sufficiently ready for discharge. As a result, Andrea Pham was discharged. At the time of discharge, Andrea Pham was determined to not be a danger to self or others. The patient was also medically stable for discharge. At the current time of discharge, the patient does not meet criteria for involuntary hospitalization. On the day of discharge, the patient reports that they do not have suicidal or homicidal ideation. Steps taken to minimize risk include: assessing patient s behavior and thought process daily during hospital stay, discharging patient with adequate plan for follow up for mental and physical health and discussing safety plan of returning to the hospital should the patient ever have thoughts of harming themselves or others. Therefore, based on all available evidence including the factors cited above, the patient does not appear to be at imminent risk for self-harm, and is appropriate for outpatient level of care. However, if patient uses substances or is medication non-adherent, their risk of decompensation and SI will be elevated. This was discussed with the patient.       DISCHARGE MEDICATIONS     Current Discharge Medication List        CONTINUE these medications which have CHANGED    Details   divalproex sodium extended-release (DEPAKOTE ER) 500 MG 24 hr tablet Take 3 tablets (1,500 mg) by mouth at bedtime.    Associated Diagnoses: Bipolar affective disorder, current episode manic with psychotic symptoms (H)      OLANZapine (ZYPREXA) 10 MG tablet Take 1 tablet (10 mg) by mouth 2 times daily.  Qty: 60 tablet, Refills: 2    Associated Diagnoses: Bipolar affective disorder, current episode manic with psychotic symptoms (H)           CONTINUE these medications which have NOT CHANGED    Details   amphetamine-dextroamphetamine (ADDERALL XR) 25 MG 24 hr capsule Take 25 mg by mouth daily      buprenorphine HCl-naloxone HCl (SUBOXONE) 8-2 MG per film Place 1 Film under the tongue 2 times daily       !! buPROPion (WELLBUTRIN XL) 150 MG 24 hr tablet Take 1 tablet (150 mg) by mouth every morning Takes with 300mg tablet for total dose = 450mg  Qty: 30 tablet, Refills: 0    Associated Diagnoses: Anxiety and depression      !! buPROPion (WELLBUTRIN XL) 300 MG 24 hr tablet Take 1 tablet (300 mg) by mouth every morning Takes with 150mg tablet for total dose = 450mg  Qty: 30 tablet, Refills: 0    Associated Diagnoses: Anxiety and depression      multivitamin w/minerals (THERA-VIT-M) tablet Take 1 tablet by mouth daily      traZODone (DESYREL) 150 MG tablet Take 150 mg by mouth at bedtime      acetaminophen (TYLENOL) 325 MG tablet Take 2 tablets (650 mg) by mouth every 4 hours as needed for other (mild pain).  Qty: 100 tablet, Refills: 0    Associated Diagnoses: History of alcohol withdrawal syndrome      aspirin (ASA) 81 MG chewable tablet Take 40.5 mg by mouth daily as needed for other ((patient unable to specify when/why he takes))      ibuprofen (ADVIL/MOTRIN) 200 MG tablet Take 3 tablets (600 mg) by mouth every 6 hours as needed for moderate pain.    Associated Diagnoses: Acute appendicitis with localized peritonitis, without perforation, abscess, or gangrene      naloxone (NARCAN) 4 MG/0.1ML nasal spray Spray 1 spray (4 mg) into one nostril alternating nostrils once as needed for opioid reversal every 2-3 minutes until assistance arrives  Qty: 2 each, Refills: 0    Associated Diagnoses: Closed nondisplaced fracture of second metatarsal bone of right foot, initial encounter      senna-docusate (SENOKOT-S/PERICOLACE) 8.6-50 MG tablet Take 1-2 tablets by mouth 2 times daily. Take while on oral narcotics to prevent or treat constipation.  Qty: 30 tablet, Refills: 0    Comments: While taking narcotics  Associated Diagnoses: History of alcohol withdrawal syndrome      testosterone cypionate (DEPOTESTOSTERONE) 200 MG/ML injection Inject 1.1 mLs into the muscle every 14 days       !! - Potential duplicate medications  "found. Please discuss with provider.               MENTAL STATUS EXAM   Vitals: BP 99/61   Pulse 67   Temp 98.4  F (36.9  C) (Temporal)   Resp 16   Ht 1.803 m (5' 11\")   Wt 84.7 kg (186 lb 11.2 oz)   SpO2 98%   BMI 26.04 kg/m      Appearance: Alert, oriented, dressed in hospital scrubs, appears stated age   Attitude: Cooperative   Eye Contact: Good  Mood: \"Better\"  Affect: mood congruent  Speech: Normal rate and rhythm   Psychomotor Behavior: No tremor, rigidity, or psychomotor abnormality   Thought Process: Logical, goal directed   Associations: No loose associations   Thought Content: Denies SI or plan. No SIB. Denies A/V hallucinations. Fixed persecutory delusions  Insight: fair  Judgment: fair  Oriented to: Person, place, and time  Attention Span and Concentration: Intact  Recent and Remote Memory: Intact  Language: English with appropriate syntax and vocabulary  Fund of Knowledge: Average  Muscle Strength and Tone: Grossly normal  Gait and Station: Grossly normal       DISCHARGE PLAN     1.  Education given regarding diagnostic and treatment options with risks, benefits and alternatives with adequate verbalization of understanding.  2.  Discharge to home. Upon detailed review of risk factors, patient amenable for release.   3.  Continue aforementioned medications and associated medication changes with follow-up by outpatient provider.  4.  Crisis management planning in place.    5.  Nursing and  to review further discharge recommendations.   6.  Patient is being discharged with the following appointments as detailed below.    [facility]   Dr. Sara Castro  - 3/24/2025  - 8:00a  [address] [place] [facility] [address]  [phone]  F:[phone]          DISCHARGE SERVICES PROVIDED     40 minutes spent on discharge services, including:  Final examination of patient.  Review and discussion of hospital stay.  Instructions for continued outpatient " care/goals.  Preparation of discharge records.  Preparation of medications refills and new prescriptions.  Preparation of applicable referral forms.        ATTESTATION     RAYMUNDO Pinzon CNP       LABS THIS ADMISSION     Results for orders placed or performed during the hospital encounter of 03/07/25   HIV Antigen Antibody Combo Cascade     Status: Normal   Result Value Ref Range    HIV Antigen Antibody Combo Nonreactive Nonreactive   Hepatitis C Screen Reflex to HCV RNA Quant and Genotype     Status: Abnormal   Result Value Ref Range    Hepatitis C Antibody Reactive (A) Nonreactive   Hepatitis B core antibody IgM     Status: Normal   Result Value Ref Range    Hepatitis B Core Antibody IgM Nonreactive Nonreactive   Comprehensive metabolic panel     Status: Abnormal   Result Value Ref Range    Sodium 133 (L) 135 - 145 mmol/L    Potassium 4.0 3.4 - 5.3 mmol/L    Carbon Dioxide (CO2) 26 22 - 29 mmol/L    Anion Gap 7 7 - 15 mmol/L    Urea Nitrogen 14.4 8.0 - 23.0 mg/dL    Creatinine 0.83 0.67 - 1.17 mg/dL    GFR Estimate >90 >60 mL/min/1.73m2    Calcium 8.3 (L) 8.8 - 10.4 mg/dL    Chloride 100 98 - 107 mmol/L    Glucose 115 (H) 70 - 99 mg/dL    Alkaline Phosphatase 59 40 - 150 U/L    AST 25 0 - 45 U/L    ALT 22 0 - 70 U/L    Protein Total 5.8 (L) 6.4 - 8.3 g/dL    Albumin 3.6 3.5 - 5.2 g/dL    Bilirubin Total 0.3 <=1.2 mg/dL   Valproic acid     Status: Abnormal   Result Value Ref Range    Valproic acid 29.4 (L)   ug/mL   TSH with free T4 reflex     Status: Normal   Result Value Ref Range    TSH 2.08 0.30 - 4.20 uIU/mL   Extra Tube     Status: None    Narrative    The following orders were created for panel order Extra Tube.  Procedure                               Abnormality         Status                     ---------                               -----------         ------                     Extra Purple Top Tube[[id number]]                           Final result                 Please view results for these  tests on the individual orders.   Extra Purple Top Tube     Status: None   Result Value Ref Range    Hold Specimen JIC    Hepatitis C RNA, Quantitative by PCR with Confirmatory Reflex to Genotyping     Status: Normal   Result Value Ref Range    Hepatitis C RNA IU/mL Not Detected Not Detected IU/mL    Narrative    The sylvia  Hepatitis C assay is an FDA-approved in vitro nucleic acid amplification test for the quantification of Hepatitis C virus (HCV) RNA in human EDTA plasma or serum, using the Roche sylvia  instrument system for automated viral nucleic acid extraction, purification, amplification, and detection of the viral nucleic acid target. This assay utilizes dual probes to detect and quantify, but not discriminate genotypes 1-6. The test is intended for use as an aid in the diagnosis of HCV infection and an aid in the management of HCV-infected patients undergoing anti-viral therapy. Titer results are reported in IU/mL.   COVID-19 Virus (Coronavirus) by PCR Nose     Status: Normal    Specimen: Nose; Swab   Result Value Ref Range    SARS CoV2 PCR Negative Negative    Narrative    Testing was performed using the Xpert Xpress SARS-CoV-2 Assay on the Cepheid Gene-Xpert Instrument Systems. Additional information about this assay can be found via the Test Directory. This [place] FDA cleared test should be ordered for the detection of SARS-CoV-2 in individuals with signs and symptoms of respiratory tract infection. This test is for in vitro diagnostic use under the [place] FDA for laboratories certified under CLIA to perform high complexity testing. A negative result does not rule out the presence of PCR inhibitors in the specimen or target RNA concentration below the limit of detection for the assay. The possibility of a false negative should be considered if the patient's recent exposure or clinical presentation suggests COVID-19. This test was validated by [facility]. These Laboratories are certified  under the  Clinical Laboratory Improvement Amendments (CLIA) as qualified to perform high complexity testing.   COVID-19 Virus (Coronavirus) by PCR Nose     Status: Normal    Specimen: Nose; Swab   Result Value Ref Range    SARS CoV2 PCR Negative Negative    Narrative    Testing was performed using the Xpert Xpress SARS-CoV-2 Assay on the Cepheid Gene-Xpert Instrument Systems. Additional information about this assay can be found via the Test Directory. This [place] FDA cleared test should be ordered for the detection of SARS-CoV-2 in individuals with signs and symptoms of respiratory tract infection. This test is for in vitro diagnostic use under the [place] FDA for laboratories certified under CLIA to perform high complexity testing. A negative result does not rule out the presence of PCR inhibitors in the specimen or target RNA concentration below the limit of detection for the assay. The possibility of a false negative should be considered if the patient's recent exposure or clinical presentation suggests COVID-19. This test was validated by [facility]. These Laboratories are certified under the  Clinical Laboratory Improvement Amendments (CLIA) as qualified to perform high complexity testing.

## 2025-03-11 NOTE — PROGRESS NOTES
Ilda called [facility] for jarrell SPARKS. There was no answer, voicemail left for call back. Ilda also emailed her.     JARRELL called back. Gave update and she did not need any discharge papers from hospital. She will check in with pt on Thursday at home. She suggested therapy for pt. She was also unable to pick pt up.

## 2025-03-12 VITALS
WEIGHT: 186.7 LBS | HEIGHT: 71 IN | HEART RATE: 60 BPM | SYSTOLIC BLOOD PRESSURE: 98 MMHG | TEMPERATURE: 97.4 F | DIASTOLIC BLOOD PRESSURE: 62 MMHG | RESPIRATION RATE: 14 BRPM | OXYGEN SATURATION: 95 % | BODY MASS INDEX: 26.14 KG/M2

## 2025-03-12 PROCEDURE — 250N000012 HC RX MED GY IP 250 OP 636 PS 637: Performed by: NURSE PRACTITIONER

## 2025-03-12 PROCEDURE — 99239 HOSP IP/OBS DSCHRG MGMT >30: CPT

## 2025-03-12 PROCEDURE — 250N000013 HC RX MED GY IP 250 OP 250 PS 637: Performed by: NURSE PRACTITIONER

## 2025-03-12 RX ADMIN — BUPROPION HYDROCHLORIDE 300 MG: 300 TABLET, EXTENDED RELEASE ORAL at 08:04

## 2025-03-12 RX ADMIN — BUPRENORPHINE AND NALOXONE 1 FILM: 8; 2 FILM, SOLUBLE BUCCAL; SUBLINGUAL at 08:04

## 2025-03-12 RX ADMIN — OLANZAPINE 10 MG: 10 TABLET, FILM COATED ORAL at 08:04

## 2025-03-12 ASSESSMENT — ACTIVITIES OF DAILY LIVING (ADL)
ADLS_ACUITY_SCORE: 38

## 2025-03-12 NOTE — PROGRESS NOTES
Pt is discharging at the recommendation of the treatment team. Pt is discharging to home transported by [employer]. Pt denies having any thoughts of hurting themself or anyone else. Pt denies anxiety or depression. Pt has follow up with  and PCP.  Discharge instructions, including; demographic sheet, psychiatric evaluation, discharge summary, and AVS were faxed to these next level of care providers.

## 2025-03-12 NOTE — PLAN OF CARE
"Face to face shift report received from previous shift RN.       Problem: Adult Behavioral Health Plan of Care  Goal: Patient-Specific Goal (Individualization)  Description: Patient will:  Take prescribed medications  Sleep at least 6 hours per night  Attend at least 50% of group therapy sessions  Eat at least 50% of meals  Be in cooperation with treatment team recommendations  Outcome: Adequate for Care Transition     Calm and cooperative. Medication compliant. Denies mental health issues. Denies thoughts of harming self or others. Pleasant and appropriate during conversation. Up in lounge this AM. Pt reports he feels ready for discharge. Pt reports he plans to speak with his primary provider regarding restarting his \"Adderall\". Pt reports that he disagrees with diagnoses on his discharge paperwork. These diagnoses include alcohol use disorder and methamphetamine use disorder. Per pt's report these are historic diagnoses and he states they are \"no longer an issue.\"     Discharge Note    Patient Discharged to home on 3/12/2025 9:24 AM via [employer] accompanied by writer to [facility] to meet taxi.     Patient informed of discharge instructions in AVS. patient verbalizes understanding and denies having any questions pertaining to AVS. Patient stable at time of discharge. Patient denies SI, HI, and thoughts of self harm at time of discharge. All personal belongings returned to patient. Discharge prescriptions sent to [facility] in [place], [place] via electronic communication. Psych evaluation, history and physical, AVS, and discharge summary faxed to next level of care- by [facility].     Emilia GUTIERREZ RN  3/12/2025  9:32 AM     Problem: Thought Process Alteration  Goal: Optimal Thought Clarity  Description: Patient will have linear and logical conversations by discharge  Outcome: Adequate for Care Transition     "

## 2025-03-12 NOTE — PLAN OF CARE
Problem: Adult Behavioral Health Plan of Care  Goal: Patient-Specific Goal (Individualization)  Description: Patient will:  Take prescribed medications  Sleep at least 6 hours per night  Attend at least 50% of group therapy sessions  Eat at least 50% of meals  Be in cooperation with treatment team recommendations    Outcome: Progressing   Goal Outcome Evaluation:        Face to face shift report received from RN. Rounding completed, pt observed.Client rested in room for 7 hours with eyes closed and respirations noted. Client had no falls or instances of instability this shift.Face to face report will be communicated to oncoming RN.    Felipe Tse RN  3/12/2025  6:13 AM

## 2025-05-30 ENCOUNTER — HOSPITAL ENCOUNTER (EMERGENCY)
Facility: CLINIC | Age: 60
Discharge: HOME | End: 2025-05-31
Attending: EMERGENCY MEDICINE | Admitting: EMERGENCY MEDICINE
Payer: MEDICARE

## 2025-05-30 DIAGNOSIS — S91.109D OPEN TOE WOUND, SUBSEQUENT ENCOUNTER: ICD-10-CM

## 2025-05-30 DIAGNOSIS — R07.9 CHEST PAIN, UNSPECIFIED TYPE: ICD-10-CM

## 2025-05-30 DIAGNOSIS — R00.2 PALPITATIONS: ICD-10-CM

## 2025-05-30 LAB
BASOPHILS # BLD AUTO: 0.1 10E3/UL (ref 0–0.2)
BASOPHILS NFR BLD AUTO: 1 %
EOSINOPHIL # BLD AUTO: 0.3 10E3/UL (ref 0–0.7)
EOSINOPHIL NFR BLD AUTO: 6 %
ERYTHROCYTE [DISTWIDTH] IN BLOOD BY AUTOMATED COUNT: 15.1 % (ref 10–15)
HCT VFR BLD AUTO: 42.6 % (ref 40–53)
HGB BLD-MCNC: 14.5 G/DL (ref 13.3–17.7)
IMM GRANULOCYTES # BLD: 0 10E3/UL
IMM GRANULOCYTES NFR BLD: 0 %
LYMPHOCYTES # BLD AUTO: 1.1 10E3/UL (ref 0.8–5.3)
LYMPHOCYTES NFR BLD AUTO: 20 %
MCH RBC QN AUTO: 31 PG (ref 26.5–33)
MCHC RBC AUTO-ENTMCNC: 34 G/DL (ref 31.5–36.5)
MCV RBC AUTO: 91 FL (ref 78–100)
MONOCYTES # BLD AUTO: 0.8 10E3/UL (ref 0–1.3)
MONOCYTES NFR BLD AUTO: 15 %
NEUTROPHILS # BLD AUTO: 3.2 10E3/UL (ref 1.6–8.3)
NEUTROPHILS NFR BLD AUTO: 58 %
NRBC # BLD AUTO: 0 10E3/UL
NRBC BLD AUTO-RTO: 0 /100
PLATELET # BLD AUTO: 197 10E3/UL (ref 150–450)
RBC # BLD AUTO: 4.67 10E6/UL (ref 4.4–5.9)
WBC # BLD AUTO: 5.5 10E3/UL (ref 4–11)

## 2025-05-30 PROCEDURE — 83735 ASSAY OF MAGNESIUM: CPT | Performed by: EMERGENCY MEDICINE

## 2025-05-30 PROCEDURE — 85018 HEMOGLOBIN: CPT | Performed by: EMERGENCY MEDICINE

## 2025-05-30 PROCEDURE — 85379 FIBRIN DEGRADATION QUANT: CPT | Performed by: EMERGENCY MEDICINE

## 2025-05-30 PROCEDURE — 84443 ASSAY THYROID STIM HORMONE: CPT | Performed by: EMERGENCY MEDICINE

## 2025-05-30 PROCEDURE — 93005 ELECTROCARDIOGRAM TRACING: CPT | Performed by: EMERGENCY MEDICINE

## 2025-05-30 PROCEDURE — 258N000003 HC RX IP 258 OP 636: Performed by: EMERGENCY MEDICINE

## 2025-05-30 PROCEDURE — 36415 COLL VENOUS BLD VENIPUNCTURE: CPT | Performed by: EMERGENCY MEDICINE

## 2025-05-30 PROCEDURE — 82947 ASSAY GLUCOSE BLOOD QUANT: CPT | Performed by: EMERGENCY MEDICINE

## 2025-05-30 PROCEDURE — 84484 ASSAY OF TROPONIN QUANT: CPT | Performed by: EMERGENCY MEDICINE

## 2025-05-30 PROCEDURE — 99285 EMERGENCY DEPT VISIT HI MDM: CPT | Mod: 25 | Performed by: EMERGENCY MEDICINE

## 2025-05-30 RX ADMIN — SODIUM CHLORIDE 1000 ML: 0.9 INJECTION, SOLUTION INTRAVENOUS at 23:41

## 2025-05-30 ASSESSMENT — COLUMBIA-SUICIDE SEVERITY RATING SCALE - C-SSRS
6. HAVE YOU EVER DONE ANYTHING, STARTED TO DO ANYTHING, OR PREPARED TO DO ANYTHING TO END YOUR LIFE?: NO
2. HAVE YOU ACTUALLY HAD ANY THOUGHTS OF KILLING YOURSELF IN THE PAST MONTH?: NO
1. IN THE PAST MONTH, HAVE YOU WISHED YOU WERE DEAD OR WISHED YOU COULD GO TO SLEEP AND NOT WAKE UP?: NO

## 2025-05-31 ENCOUNTER — APPOINTMENT (OUTPATIENT)
Dept: GENERAL RADIOLOGY | Facility: CLINIC | Age: 60
End: 2025-05-31
Attending: EMERGENCY MEDICINE
Payer: MEDICARE

## 2025-05-31 VITALS
DIASTOLIC BLOOD PRESSURE: 91 MMHG | OXYGEN SATURATION: 94 % | RESPIRATION RATE: 18 BRPM | WEIGHT: 185 LBS | BODY MASS INDEX: 25.8 KG/M2 | SYSTOLIC BLOOD PRESSURE: 122 MMHG | HEART RATE: 110 BPM | TEMPERATURE: 98 F

## 2025-05-31 LAB
ALBUMIN SERPL BCG-MCNC: 4.5 G/DL (ref 3.5–5.2)
ALP SERPL-CCNC: 53 U/L (ref 40–150)
ALT SERPL W P-5'-P-CCNC: 37 U/L (ref 0–70)
ANION GAP SERPL CALCULATED.3IONS-SCNC: 16 MMOL/L (ref 7–15)
AST SERPL W P-5'-P-CCNC: 38 U/L (ref 0–45)
BILIRUB SERPL-MCNC: 0.3 MG/DL
BUN SERPL-MCNC: 14.4 MG/DL (ref 8–23)
CALCIUM SERPL-MCNC: 9.3 MG/DL (ref 8.8–10.4)
CHLORIDE SERPL-SCNC: 98 MMOL/L (ref 98–107)
CREAT SERPL-MCNC: 0.83 MG/DL (ref 0.67–1.17)
D DIMER PPP FEU-MCNC: 0.31 UG/ML FEU (ref 0–0.5)
EGFRCR SERPLBLD CKD-EPI 2021: >90 ML/MIN/1.73M2
GLUCOSE SERPL-MCNC: 97 MG/DL (ref 70–99)
HCO3 SERPL-SCNC: 23 MMOL/L (ref 22–29)
MAGNESIUM SERPL-MCNC: 2 MG/DL (ref 1.7–2.3)
POTASSIUM SERPL-SCNC: 4 MMOL/L (ref 3.4–5.3)
PROT SERPL-MCNC: 7.1 G/DL (ref 6.4–8.3)
SODIUM SERPL-SCNC: 137 MMOL/L (ref 135–145)
TROPONIN T SERPL HS-MCNC: 12 NG/L
TROPONIN T SERPL HS-MCNC: 13 NG/L
TSH SERPL DL<=0.005 MIU/L-ACNC: 1.83 UIU/ML (ref 0.3–4.2)

## 2025-05-31 PROCEDURE — 73660 X-RAY EXAM OF TOE(S): CPT | Mod: RT

## 2025-05-31 PROCEDURE — 96361 HYDRATE IV INFUSION ADD-ON: CPT | Performed by: EMERGENCY MEDICINE

## 2025-05-31 PROCEDURE — 36415 COLL VENOUS BLD VENIPUNCTURE: CPT | Performed by: EMERGENCY MEDICINE

## 2025-05-31 PROCEDURE — 96360 HYDRATION IV INFUSION INIT: CPT | Performed by: EMERGENCY MEDICINE

## 2025-05-31 PROCEDURE — 84484 ASSAY OF TROPONIN QUANT: CPT | Performed by: EMERGENCY MEDICINE

## 2025-05-31 PROCEDURE — 71046 X-RAY EXAM CHEST 2 VIEWS: CPT

## 2025-05-31 ASSESSMENT — ACTIVITIES OF DAILY LIVING (ADL)
ADLS_ACUITY_SCORE: 54
ADLS_ACUITY_SCORE: 54

## 2025-05-31 NOTE — DISCHARGE INSTRUCTIONS
Return to the ER for worsening pain or any new concerning changes.    Drink plenty of fluids stay hydrated.    An order has been placed for a heart monitor for you to wear.  This will be mailed to you.  There are clear instructions for how to apply the patch to your chest.  You should wear it for 7 days and then mail it back.    Your chest pain may be related to airway spasm.  Vaping can exacerbate this condition.  You should avoid it is much as possible.  Follow-up with your primary care clinic in the next week to arrange further evaluation.    You should keep your toe clean and dry.  Keep it covered with an antibiotic ointment such as bacitracin or Neosporin and a bandage until it is completely healed.

## 2025-05-31 NOTE — ED TRIAGE NOTES
Pt arrives from home, around 2230 started having intermittent of palpitations, dizziness, right jaw pain, left arm pain and nausea.   ASA given en route, 4mg PO zofran.

## 2025-05-31 NOTE — ED PROVIDER NOTES
"  Emergency Department Note      History of Present Illness     Chief Complaint   Palpitations      HPI   Andrea Pham is a 59 year old male who presents via EMS with an episode of chest discomfort.  He describes it as an \"air\" feeling.  He describes it as a discomfort in the trachea.  Was not exertional.  He was sitting watching TV when it occurred.  He does have a history of substance abuse but denies any stimulants recently.  He did have coffee earlier in the day and occasionally vapes nicotine.  He denies any alcohol or other substance abuse.  He does have a history of a prior DVT.  He is not currently anticoagulated.  No leg swelling or hemoptysis.  No recent travel.  No trauma to the chest.  No rash noted over the chest.  No vomiting or diarrhea.  No fever, cough, congestion.  No heavy lifting or straining.    The patient says he feels dehydrated as a has been warm out lately.  He was running water earlier.  When he had his episode of discomfort that occurred approximate hour prior to arrival he also had palpitations and a sense of irregular heartbeat.  This is new for him.    The patient also notes a wound over the distal right toe.  He was actually seen through [facility] earlier today for the same issue.  He has hammertoe deformities which rub against his shoes.  He felt that an area of callus on the right second toe opened up.  He was seen at [facility] and had local wound care.    Independent Historian   History taken from EMS.    Review of External Notes   Cardiology notes reviewed from December 28, 2023 with the patient had a stress test that was felt by radiology to be normal.    Past Medical History     Medical History and Problem List   Past Medical History:   Diagnosis Date    Alcohol use disorder, severe, dependence (H) 04/23/2019    Ataxia     Bipolar disorder (H)     Chemical dependency (H)     Chronic hip pain     Chronic pain syndrome     Cocaine abuse (H)     Depressive disorder     DTs (delirium " tremens) (H)     DVT (deep venous thrombosis) (H) 5 y ago    Elevated LFTs     Flail chest     Hepatitis C virus infection, unspecified chronicity     Heroin abuse (H)     History of methamphetamine abuse (H)     History of total hip arthroplasty     Hypertension     Seizures (H)     Substance abuse (H)     Wernicke's encephalopathy        Medications   acetaminophen (TYLENOL) 325 MG tablet  amphetamine-dextroamphetamine (ADDERALL XR) 25 MG 24 hr capsule  aspirin (ASA) 81 MG chewable tablet  buprenorphine HCl-naloxone HCl (SUBOXONE) 8-2 MG per film  buPROPion (WELLBUTRIN XL) 150 MG 24 hr tablet  buPROPion (WELLBUTRIN XL) 300 MG 24 hr tablet  divalproex sodium extended-release (DEPAKOTE ER) 500 MG 24 hr tablet  ibuprofen (ADVIL/MOTRIN) 200 MG tablet  multivitamin w/minerals (THERA-VIT-M) tablet  naloxone (NARCAN) 4 MG/0.1ML nasal spray  OLANZapine (ZYPREXA) 10 MG tablet  senna-docusate (SENOKOT-S/PERICOLACE) 8.6-50 MG tablet  testosterone cypionate (DEPOTESTOSTERONE) 200 MG/ML injection  traZODone (DESYREL) 150 MG tablet        Surgical History   Past Surgical History:   Procedure Laterality Date    CHEST SURGERY  1987    flail chest, sterum fractured    HIP SURGERY      KNEE SURGERY      LAPAROSCOPIC APPENDECTOMY N/A 1/9/2025    Procedure: LAPAROSCOPIC APPENDECTOMY;  Surgeon: Tita Burns MD;  Location:  OR    OPEN REDUCTION INTERNAL FIXATION ANKLE Right 12/24/2021    Procedure: Open reduction internal fixation right ankle syndesmotic injury;  Surgeon: Leroy Thomason MD;  Location:  OR    ORTHOPEDIC SURGERY      KIMBER right. [place] left shoulder surgery, debridement right shoulder, neck surgery- fracture cervical spine. 6 knee surgeries- bucket handle meniscal tear and debridement. 3 heel surgeries.     ORTHOPEDIC SURGERY      REMOVE HARDWARE ANKLE Right 7/13/2022    Procedure: removal of hardware of right ankle;  Surgeon: Leroy Thomason MD;  Location:  OR       Physical Exam     Patient Vitals for the past  24 hrs:   BP Temp Temp src Pulse Resp SpO2 Weight   05/30/25 2328 (!) 122/91 98  F (36.7  C) Oral 110 18 94 % 83.9 kg (185 lb)     Physical Exam  Constitutional:       General: He is not in acute distress.     Appearance: Normal appearance. He is not toxic-appearing.   HENT:      Head: Atraumatic.      Right Ear: External ear normal.      Left Ear: External ear normal.   Eyes:      General: No scleral icterus.     Conjunctiva/sclera: Conjunctivae normal.   Cardiovascular:      Rate and Rhythm: Normal rate and regular rhythm.      Heart sounds: Normal heart sounds.   Pulmonary:      Effort: Pulmonary effort is normal. No respiratory distress.      Breath sounds: Normal breath sounds.   Abdominal:      Palpations: Abdomen is soft.      Tenderness: There is no abdominal tenderness.   Musculoskeletal:         General: No deformity.      Cervical back: Neck supple.      Right lower leg: No edema.      Left lower leg: No edema.      Comments: No chest wall tenderness or rash.   Skin:     General: Skin is warm.      Capillary Refill: Capillary refill takes less than 2 seconds.      Comments: There is an area of callus that has been removed over the distal right second toe.  There is also callus on the distal left second toe in the same area.  He has hammertoe deformities.   Neurological:      General: No focal deficit present.      Mental Status: He is alert and oriented to person, place, and time.   Psychiatric:         Mood and Affect: Mood normal.         Behavior: Behavior normal.           Diagnostics     Lab Results   Labs Ordered and Resulted from Time of ED Arrival to Time of ED Departure   COMPREHENSIVE METABOLIC PANEL - Abnormal       Result Value    Sodium 137      Potassium 4.0      Carbon Dioxide (CO2) 23      Anion Gap 16 (*)     Urea Nitrogen 14.4      Creatinine 0.83      GFR Estimate >90      Calcium 9.3      Chloride 98      Glucose 97      Alkaline Phosphatase 53      AST 38      ALT 37      Protein  Total 7.1      Albumin 4.5      Bilirubin Total 0.3     CBC WITH PLATELETS AND DIFFERENTIAL - Abnormal    WBC Count 5.5      RBC Count 4.67      Hemoglobin 14.5      Hematocrit 42.6      MCV 91      MCH 31.0      MCHC 34.0      RDW 15.1 (*)     Platelet Count 197      % Neutrophils 58      % Lymphocytes 20      % Monocytes 15      % Eosinophils 6      % Basophils 1      % Immature Granulocytes 0      NRBCs per 100 WBC 0      Absolute Neutrophils 3.2      Absolute Lymphocytes 1.1      Absolute Monocytes 0.8      Absolute Eosinophils 0.3      Absolute Basophils 0.1      Absolute Immature Granulocytes 0.0      Absolute NRBCs 0.0     D DIMER QUANTITATIVE - Normal    D-Dimer Quantitative 0.31     TROPONIN T, HIGH SENSITIVITY - Normal    Troponin T, High Sensitivity 13     TSH WITH FREE T4 REFLEX - Normal    TSH 1.83     MAGNESIUM - Normal    Magnesium 2.0     TROPONIN T, HIGH SENSITIVITY       Imaging   XR Chest 2 Views   Final Result   IMPRESSION: Normal heart size. No evidence of pneumonia or heart failure. No visible pneumothorax or pleural effusion.      XR Toe Right G/E 2 Views   Final Result   IMPRESSION: Bunion deformity and hallux valgus. Mild degenerative changes right 1st MTP and several IP joints. Hammertoe deformities. No soft tissue gas or radiographic evidence of osteomyelitis.          EKG   ECG results from 05/30/25   EKG 12-lead, tracing only     Value    Systolic Blood Pressure     Diastolic Blood Pressure     Ventricular Rate 83    Atrial Rate 83    WV Interval 170    QRS Duration 100        QTc 460    P Axis 54    R AXIS -12    T Axis 48    Interpretation ECG      Sinus rhythm  Nonspecific T wave abnormality  Abnormal ECG  When compared with ECG of 29-Nov-2022 19:16,  Vent. rate has decreased by  55 bpm  Nonspecific T wave abnormality now evident in Inferior leads  Nonspecific T wave abnormality now evident in Anterolateral leads          Independent Interpretation   Chest x-ray dependently  interpreted.  No pneumothorax.    ED Course      Medications Administered   Medications   sodium chloride 0.9% BOLUS 1,000 mL (1,000 mLs Intravenous $New Bag 5/30/25 9754)       Medical Decision Making / Diagnosis       ARVIN Pham is a 59 year old male who complains of chest discomfort.  He describes it as a sensation of cold air when he breathes.  D-dimer is negative.  Chest x-ray without pneumothorax or infiltrate or pleural effusion.  No mediastinal widening on the x-ray or radiation of the pain in the back to suggest aortic aneurysm or dissection.    EKG without ischemic changes.  Initial troponin negative.  As long as the repeat is negative patient will be appropriate for discharge.    The patient was more concerned about palpitations.  Electrolytes overall look good.  He is not anemic.  Blood sugar is normal.  He was advised to avoid nicotine and caffeine.  A Zio patch was ordered as well as cardiology follow-up.    The patient was also concerned about a wound on the right second toe.  He has hammertoe deformities and likely callus formation from rubbing against his shoe.  He was seen earlier in the day.  No evidence of infection.  X-ray without any significant abnormality.  He will continue to follow this through his primary care clinic.    Disposition   The patient was discharged.     Diagnosis     ICD-10-CM    1. Chest pain, unspecified type  R07.9 Follow-Up with Cardiology     Zio Patch Mail Out      2. Open toe wound, subsequent encounter  S91.109D       3. Palpitations  R00.2 Follow-Up with Cardiology     Zio Patch Mail Out            Keith Sherman MD  05/31/25 0118

## 2025-05-31 NOTE — ED NOTES
Discharge education completed, pt verbalized understanding and offered no further questions. Pt left in stable condition in [facility].     Rashida Munroe RN on 5/31/2025 at 2:02 AM

## 2025-06-01 ENCOUNTER — ORDERS ONLY (AUTO-RELEASED) (OUTPATIENT)
Dept: EMERGENCY MEDICINE | Facility: CLINIC | Age: 60
End: 2025-06-01
Payer: MEDICARE

## 2025-06-01 DIAGNOSIS — R00.2 PALPITATIONS: ICD-10-CM

## 2025-06-01 DIAGNOSIS — R07.9 CHEST PAIN, UNSPECIFIED TYPE: ICD-10-CM

## 2025-06-01 LAB
ATRIAL RATE - MUSE: 83 BPM
DIASTOLIC BLOOD PRESSURE - MUSE: NORMAL MMHG
INTERPRETATION ECG - MUSE: NORMAL
P AXIS - MUSE: 54 DEGREES
PR INTERVAL - MUSE: 170 MS
QRS DURATION - MUSE: 100 MS
QT - MUSE: 392 MS
QTC - MUSE: 460 MS
R AXIS - MUSE: -12 DEGREES
SYSTOLIC BLOOD PRESSURE - MUSE: NORMAL MMHG
T AXIS - MUSE: 48 DEGREES
VENTRICULAR RATE- MUSE: 83 BPM

## 2025-06-10 ENCOUNTER — TELEPHONE (OUTPATIENT)
Dept: NURSING | Facility: CLINIC | Age: 60
End: 2025-06-10

## 2025-06-10 ENCOUNTER — OFFICE VISIT (OUTPATIENT)
Dept: PODIATRY | Facility: CLINIC | Age: 60
End: 2025-06-10
Payer: MEDICARE

## 2025-06-10 ENCOUNTER — TELEPHONE (OUTPATIENT)
Dept: PODIATRY | Facility: CLINIC | Age: 60
End: 2025-06-10

## 2025-06-10 VITALS — HEIGHT: 71 IN | BODY MASS INDEX: 25.9 KG/M2 | WEIGHT: 185 LBS

## 2025-06-10 DIAGNOSIS — G62.1 ALCOHOL-INDUCED POLYNEUROPATHY: ICD-10-CM

## 2025-06-10 DIAGNOSIS — B35.1 ONYCHOMYCOSIS: Primary | ICD-10-CM

## 2025-06-10 DIAGNOSIS — L60.3 ONYCHODYSTROPHY: ICD-10-CM

## 2025-06-10 DIAGNOSIS — M20.41 HAMMER TOES OF BOTH FEET: ICD-10-CM

## 2025-06-10 DIAGNOSIS — M20.42 HAMMER TOES OF BOTH FEET: ICD-10-CM

## 2025-06-10 DIAGNOSIS — L84 PRE-ULCERATIVE CALLUSES: ICD-10-CM

## 2025-06-10 LAB
KOH PREPARATION: NORMAL
KOH PREPARATION: NORMAL

## 2025-06-10 PROCEDURE — 87101 SKIN FUNGI CULTURE: CPT | Performed by: PODIATRIST

## 2025-06-10 PROCEDURE — 99203 OFFICE O/P NEW LOW 30 MIN: CPT | Performed by: PODIATRIST

## 2025-06-10 PROCEDURE — 87220 TISSUE EXAM FOR FUNGI: CPT | Performed by: PODIATRIST

## 2025-06-10 RX ORDER — CICLOPIROX 80 MG/ML
SOLUTION TOPICAL
Qty: 6.6 ML | Refills: 11 | Status: SHIPPED | OUTPATIENT
Start: 2025-06-10

## 2025-06-10 NOTE — TELEPHONE ENCOUNTER
Received a voicemail from [facility] lab asking for clarification on the fungal samples sent.     [facility] She states the same label was printed for both samples.     Discussed with provider and one should be for a culture and the other KOH.     Yanet states that Epic printed them incorrectly together. She will let the lab know.     DOTTIE Lombardi RN

## 2025-06-10 NOTE — PATIENT INSTRUCTIONS
Thank you for choosing [facility] Podiatry / Foot & Ankle Surgery!    DR JENKINS CLINIC:  [facility]   [address]   [place] [address]   (Mon, Tues)     [facility]  [address], [place] [address]  (Friday)    [facility]  [address]  [place] [address]  (Wednesdays)       TRIAGE LINE: [phone]  APPOINTMENTS: [phone]  RADIOLOGY: [phone]  SET UP SURGERY: [phone]  PHYSICAL THERAPY: [phone]   BILLING QUESTIONS: [phone]  FAX: [phone]       Follow up: 4 months      Foot and Ankle Hammertoe Post Operative Instructions   Activities:  First day of surgery you need to rest.  Stay off your feet as much as possible and keep your foot elevated above the level of your heart (about 2 pillow height).  Elevate foot 23 of 24 hours a day.  Wear your surgical shoe at all times when up.  Limit walking to 5 to 10 minutes on your heel per hour over the next few days if your doctor has previously told you that you can put some weight on the foot after surgery.  If you are suppose to be non weight bearing, which means NO WEIGHT AT ALL ON THE FOOT.  Use an ice pack on the ankle 20-30 minutes per hour to help decrease pain and swelling.  The first two weeks you need to ice and elevate as much as possible.  This will help with post op pain.  Your foot requires significant rest and elevation. Expect muscle aches, back pain, cramps, etc. Optimal posture, lumbar support, back exercises, ice and heat may all help with your new aches and pains. Do not apply a heating pad to your foot or leg as this can cause increase swelling and pain. Rather use ice in those areas.   Showering is a major challenge. Your incision requires about three days to become sealed from water. Your bandage should not get wet and should not be removed. Do not attempt showering for the first three days. A sponge bath is preferred. You may  attempt to shower on the fourth day after the operation. Your foot should be covered with a bag, tape and rubber bands. Double bagging is preferred. Standing in the shower with a bag on your foot is quite hazardous. A portable shower stool would be ideal. The bandage will need to be changed in the office if it becomes moistened. A moist bandage will not dry on its own. A moist dressing may lead to infection.      External pins need continued protection. These pins are strong but do not resist the forces of bumping. Pay attention to the position and direction of the pins. Any change in pin alignment or positioning should prompt a call. A loose pin will need to be removed. Never push a pin back into your foot.    .   Do not wear regular shoes with a surgical bandage and/or external pins in your foot.  Wear loose fitting clothing that easily will slip over the bandage and/or pins.  Also, remember that dogs are not aware of your surgery.  Please keep them away from the bandages and pins.   If your surgeon places external pins in your foot, you must keep the foot dry until the pins are removed at 6-8 weeks.  Pins should be covered with a dressing for protection.  Check for any spreading redness or yellow drainage from the pins.  Do not apply ointment around the pins.  Do not push a loose pin back into the foot.  Please call the clinic if the pin is spinning or moving in and out.  If the pins are bumped or loosened, they may need to be removed early.  This may affect your surgical outcome.    Best wishes on your recovery from surgery.  Please do not hesitate to call or  My Chart  with any questions or concerns.  CALLUS / CORNS / IPKs  When there is excessive friction or pressure on the skin, the body responds by making the skin thicker to protect the deeper structures from becoming exposed. While this works well to protect the deeper structures, the thickened skin can increase pressure and pain.    CALLUS: Flat, diffuse  thickening are simple calluses and they are usually caused by friction. Often these are the result of rubbing on a shoe or going barefoot.    CORNS: Calluses with a central core between the toes are called corns. These result from prominent joints on adjacent toes rubbing together. Theses are a symptom of bone malalignment and will always recur unless the underlying bones are addressed surgically.    IPKs: Calluses with a central core on the ball of the foot are usually IPKs (intractable plantar keratosis). These are caused by excessive pressure from the metatarsals, the bones that make up the ball of the foot. Often one of these bones is too long or too prominent.  Again, these will always recur unless the underlying bone issue is addressed. There is no cure for these. They will either go away by themselves, recur, or more could develop.    ROUTINE MAINTENANCE  1. File them down with a pumice stone or callus file a couple times a week.   2. An electric callus removing device. Amope Pedi Perfect Electronic Pedicure Foot File and Callus Remover can be a good option.   3. Lotion can be applied to soften the callus. A urea based cream such as Kersal or Vanicream or thicker cream with shea butter are good options.  4. Toe spacers or toe covers can be used for corns, gel pads can be used for other lesions on the bottom of the foot.   If there is a surgical pathology noted, such as a prominent bone, often this needs to be addressed surgically to minimize recurrence. However, sometimes the lesion simply migrates to another spot after surgery, so it is not a guaranteed cure.     **If you come back to clinic for treatment, insurance does not cover it, and you would be billed. This charge could range from $100 - $227**

## 2025-06-10 NOTE — PROGRESS NOTES
PATIENT HISTORY:     Andrea Pham is a 59 year old male who presents to clinic for multiple concerns including history of ulcers to the tips of his toes right greater than left second, toenail changes with thickening and concerns of fungus.    Patient was recently seen in the ER 5/30/2025 for wounds to bilateral second toes noting the right to be worse than the left.  Patient has a past medical history significant for bipolar disorder, schizoaffective disorder, DVT lower extremity, and substance abuse.  Patient has tried treatment including Neosporin.  In the ED he was concerned that someone had implanted something into his toes and that he was being threatened by somebody.  He denies any injury.  Upon exam within the ED the provider noted a small ulceration to the distal tip of the right second toe that appeared to be healing.  The ED provider did use a #15 blade and par at the calluses.  He was given instructions of cleaning with soap and water.  Today he notes the wounds to be healed.  Nothing is draining.  He will have soreness to the tip of his right second toe but does state he has numbness to his feet with peripheral neuropathy.    His toenails have been discolored irregular and thickened to both feet multiple nails.  He has not tried any treatments.  He has noted this for many years of gradual progression.  He has no pain to the toenails most of the time but some to the side of his left great toenail with lifting up of the nail noted.  He feels like he pulled something out under that nail not too long ago stating it was plaid and fabric like.  He is not sure if there was something up under the toenail he does not remember putting anything up under the toenail but just wanting to know if this was normal.    Review of Systems:   ROS: 10 point ROS neg other than the symptoms noted above in the HPI.     PAST MEDICAL HISTORY:   Past Medical History:   Diagnosis Date    Alcohol use disorder, severe, dependence  (H) 04/23/2019    Ataxia     Bipolar disorder (H)     Chemical dependency (H)     Chronic hip pain     Chronic pain syndrome     Cocaine abuse (H)     Depressive disorder     DTs (delirium tremens) (H)     DVT (deep venous thrombosis) (H) 5 y ago    left foot, treated with coumadin    Elevated LFTs     Flail chest     broken sterum, wiring     Hepatitis C virus infection, unspecified chronicity     Heroin abuse (H)     History of methamphetamine abuse (H)     History of total hip arthroplasty     right    Hypertension     Seizures (H)     Substance abuse (H)     alcohol, opiates    Wernicke's encephalopathy         PAST SURGICAL HISTORY:   Past Surgical History:   Procedure Laterality Date    CHEST SURGERY  1987    flail chest, sterum fractured    HIP SURGERY      KNEE SURGERY      LAPAROSCOPIC APPENDECTOMY N/A 1/9/2025    Procedure: LAPAROSCOPIC APPENDECTOMY;  Surgeon: Tita Burns MD;  Location: [facility] OR    OPEN REDUCTION INTERNAL FIXATION ANKLE Right 12/24/2021    Procedure: Open reduction internal fixation right ankle syndesmotic injury;  Surgeon: Leroy Thomason MD;  Location:  OR    ORTHOPEDIC SURGERY      KIMBER right. Stephanie left shoulder surgery, debridement right shoulder, neck surgery- fracture cervical spine. 6 knee surgeries- bucket handle meniscal tear and debridement. 3 heel surgeries.     ORTHOPEDIC SURGERY      REMOVE HARDWARE ANKLE Right 7/13/2022    Procedure: removal of hardware of right ankle;  Surgeon: Leroy Thomason MD;  Location:  OR        MEDICATIONS:   Current Outpatient Medications:     acetaminophen (TYLENOL) 325 MG tablet, Take 2 tablets (650 mg) by mouth every 4 hours as needed for other (mild pain)., Disp: 100 tablet, Rfl: 0    amphetamine-dextroamphetamine (ADDERALL XR) 25 MG 24 hr capsule, Take 25 mg by mouth daily, Disp: , Rfl:     aspirin (ASA) 81 MG chewable tablet, Take 40.5 mg by mouth daily as needed for other ((patient unable to specify when/why he takes)), Disp: , Rfl:      buprenorphine HCl-naloxone HCl (SUBOXONE) 8-2 MG per film, Place 1 Film under the tongue 2 times daily, Disp: , Rfl:     buPROPion (WELLBUTRIN XL) 150 MG 24 hr tablet, Take 1 tablet (150 mg) by mouth every morning Takes with 300mg tablet for total dose = 450mg, Disp: 30 tablet, Rfl: 0    buPROPion (WELLBUTRIN XL) 300 MG 24 hr tablet, Take 1 tablet (300 mg) by mouth every morning Takes with 150mg tablet for total dose = 450mg, Disp: 30 tablet, Rfl: 0    divalproex sodium extended-release (DEPAKOTE ER) 500 MG 24 hr tablet, Take 3 tablets (1,500 mg) by mouth at bedtime., Disp: , Rfl:     ibuprofen (ADVIL/MOTRIN) 200 MG tablet, Take 3 tablets (600 mg) by mouth every 6 hours as needed for moderate pain., Disp: , Rfl:     multivitamin w/minerals (THERA-VIT-M) tablet, Take 1 tablet by mouth daily, Disp: , Rfl:     naloxone (NARCAN) 4 MG/0.1ML nasal spray, Spray 1 spray (4 mg) into one nostril alternating nostrils once as needed for opioid reversal every 2-3 minutes until assistance arrives, Disp: 2 each, Rfl: 0    OLANZapine (ZYPREXA) 10 MG tablet, Take 1 tablet (10 mg) by mouth 2 times daily., Disp: 60 tablet, Rfl: 2    senna-docusate (SENOKOT-S/PERICOLACE) 8.6-50 MG tablet, Take 1-2 tablets by mouth 2 times daily. Take while on oral narcotics to prevent or treat constipation., Disp: 30 tablet, Rfl: 0    testosterone cypionate (DEPOTESTOSTERONE) 200 MG/ML injection, Inject 1.1 mLs into the muscle every 14 days, Disp: , Rfl:     traZODone (DESYREL) 150 MG tablet, Take 150 mg by mouth at bedtime, Disp: , Rfl:      ALLERGIES:  No Known Allergies     SOCIAL HISTORY:   Social History     Socioeconomic History    Marital status:      Spouse name: Not on file    Number of children: Not on file    Years of education: Not on file    Highest education level: Not on file   Occupational History    Not on file   Tobacco Use    Smoking status: Every Day     Current packs/day: 0.00     Average packs/day: 0.5 packs/day for 10.0  "years (5.0 ttl pk-yrs)     Types: Vaping Device, Cigarettes     Start date: 2009     Last attempt to quit: 2019     Years since quittin.1    Smokeless tobacco: Never    Tobacco comments:     Quit 2019   Vaping Use    Vaping status: Every Day   Substance and Sexual Activity    Alcohol use: Not Currently     Alcohol/week: 24.0 standard drinks of alcohol     Types: 24 Cans of beer per week     Comment: drinks 1.75L grain alcohol daily since 17, clean 11 months as of 2022    Drug use: Not Currently     Types: Methamphetamines, Amphetamines     Comment: \"using daily, 1 dose/day\"  Poly substance has not used for 6 months    Sexual activity: Not Currently   Other Topics Concern    Parent/sibling w/ CABG, MI or angioplasty before 65F 55M? Not Asked   Social History Narrative    ** Merged History Encounter **          Social Drivers of Health     Financial Resource Strain: Low Risk  (3/8/2025)    Financial Resource Strain     Within the past 12 months, have you or your family members you live with been unable to get utilities (heat, electricity) when it was really needed?: No   Food Insecurity: Low Risk  (3/8/2025)    Food Insecurity     Within the past 12 months, did you worry that your food would run out before you got money to buy more?: No     Within the past 12 months, did the food you bought just not last and you didn t have money to get more?: No   Transportation Needs: Low Risk  (3/8/2025)    Transportation Needs     Within the past 12 months, has lack of transportation kept you from medical appointments, getting your medicines, non-medical meetings or appointments, work, or from getting things that you need?: No   Physical Activity: Not on file   Stress: Not on file   Social Connections: Unknown (2023)    Received from [facility]    Social Connections     Frequency of Communication with Friends and Family: Not on file   Interpersonal Safety: High Risk " (3/7/2025)    Interpersonal Safety     Do you feel physically and emotionally safe where you currently live?: No     Within the past 12 months, have you been hit, slapped, kicked or otherwise physically hurt by someone?: No     Within the past 12 months, have you been humiliated or emotionally abused in other ways by your partner or ex-partner?: No   Housing Stability: Low Risk  (3/8/2025)    Housing Stability     Do you have housing? : Yes     Are you worried about losing your housing?: No        FAMILY HISTORY:   Family History   Problem Relation Age of Onset    Substance Abuse Mother     Substance Abuse Father     Substance Abuse Sister         EXAM:Vitals: There were no vitals taken for this visit.  BMI= There is no height or weight on file to calculate BMI.    General appearance: Patient is alert and fully cooperative with history & exam.  No sign of distress is noted during the visit.     Psychiatric: Affect is pleasant & appropriate.  Patient appears motivated to improve health.     Respiratory: Breathing is regular & unlabored while sitting.     HEENT: Hearing is intact to spoken word.  Speech is clear.  No gross evidence of visual impairment that would impact ambulation.    Lower Extremity Focused:    Dermatologic: Supple, no openings.  Distal tips of bilateral second toes hyperkeratotic tissue, no openings, no drainage, no erythema, no edema.  Toenails right foot  2, 3, 5 and left foot toes 1, 2, 5 discolored yellow, irregular, brittle, minimal to no subungual debris.      Vascular: DP pulse 2/4 right 2/4 left PT pulse 2/4 right 2/4 left.  No edema, No varicosities noted.  CFT and skin temperature is normal to both lower extremities.     Neurologic: Lower extremity sensation is diminished to light touch.  No evidence of weakness or contracture in the lower extremities.       Musculoskeletal: Toes 2 through 4 bilateral dorsiflex at the metatarsal phalangeal joints with plantarflexion at the IPJ's, rigid  plantarflexion of the left second toe.  Hallux abducted with valgus rotation bilateral prominent medial eminence bilateral supple range of motion without pain.  Supple ankle and subtalar joint range of motion within normal limits.      Labs and Imaging: I have personally reviewed the following         AS   AST 38 25  83 High  40 CM  18 R    ALT 37 22  71 High  30 CM  18 R     ASSESSMENT:   Preulcerative calluses distal tips of second toe bilateral  Peripheral neuropathy idiopathic  Hammertoe deformity bilateral  Asymptomatic bunion deformity bilateral  Onychodystrophy versus onychomycosis     Medical Decision Making/Plan:  Reviewed [facility] in [facility].  Reviewed and discussed causes of hammertoes with patient.  Explained that this can be caused by an overpowering of muscles or by the way we walk.  Discussed conservative treatments such as orthotics, pads, shoe gear.  Explained that sometimes the flexor tendons can be cut to try and straighten the toe and reduce rubbing. This is normally done in office and patient is weight bearing in postop she for 1-2 weeks.  We also discussed surgical intervention to remove the joint and possibly fuse the toe.  Normally patient has a pin sticking out of the toe for about 6 weeks and can not get the foot wet. Patient would have to be minimal weight bearing in cam boot.      He will consider tenotomies to toes right 2nd and left 2nd, 3rd  in the future. Discussed 2 weeks of healing.     Discussed causes and treatments of nail fungus.  Explained that even if a culture comes back negative, a patient could still have nail fungus.  Discussed treatment options with patient and explained that there isn't one treatment that is 100% effective.  Discussed oral lamisil which is the most effective at about 70% but which can have liver effects.  Explained that if she wanted to try this that she would need serial blood draws to test her liver function.  Discussed over the counter antifungal  creams.  Explained that these are about 50% effective and need to be applied once a day for about 6-8months.  Also talked about prescription penlac which is a nail laquer.  Again this is also only 50% effective.  Also discussed that if there was damage to the nail and the nail is now dystrophic that non of the above is going to change the nail.  If there was damage, there is note anything that can be done for the nail to correct it.  Discussed that if it becomes painful, we can remove the nail in clinic.        We will start with a KOH of the toenail to rule out fungus. Prescription for Penlac nail lacquer.  Patient is not a candidate for oral Lamisil due to history of liver enzyme elevation in January 2025.  His liver enzymes have normalized at this time but concerned with potential relapse of alcohol use while taking the oral Lamisil and permanent injury to his liver at that time.     At this time reassurance given that there is no noted foreign object in his toes or toenails.     Follow-up in 4 months or sooner as needed monitor feet for signs of infection such as pus, redness, pain, fevers    Patient risk factor: Moderate for infection secondary to foot deformity with neuropathy    All questions were answered to patients satisfaction and they will call with further questions or concerns.       Yola Nathan DPM

## 2025-06-10 NOTE — TELEPHONE ENCOUNTER
"Patient is calling to ask about results.  He was seen in the ED because he was concerned about having a heart attack.  He is seeing his results now and some state that it's \"abnormal\".    Informed pt the following from the doctor's note.      He is asking what the abnormalities mean.  Informed pt that he is to discuss this with his primary doctor.  He verbalized understanding.    Clarissa Omalley, RN, BSN Nurse Triage Advisor 6/10/2025 6:24 PM   "

## 2025-06-10 NOTE — LETTER
6/10/2025      Andrea Pham  [address]  [facility] [place] [address]      Dear Colleague,    Thank you for referring your patient, Andrea Pham, to the [facility]. Please see a copy of my visit note below.    PATIENT HISTORY:     Andrea Pham is a 59 year old male who presents to clinic for multiple concerns including history of ulcers to the tips of his toes right greater than left second, toenail changes with thickening and concerns of fungus.    Patient was recently seen in the ER 5/30/2025 for wounds to bilateral second toes noting the right to be worse than the left.  Patient has a past medical history significant for bipolar disorder, schizoaffective disorder, DVT lower extremity, and substance abuse.  Patient has tried treatment including Neosporin.  In the ED he was concerned that someone had implanted something into his toes and that he was being threatened by somebody.  He denies any injury.  Upon exam within the ED the provider noted a small ulceration to the distal tip of the right second toe that appeared to be healing.  The ED provider did use a #15 blade and par at the calluses.  He was given instructions of cleaning with soap and water.  Today he notes the wounds to be healed.  Nothing is draining.  He will have soreness to the tip of his right second toe but does state he has numbness to his feet with peripheral neuropathy.    His toenails have been discolored irregular and thickened to both feet multiple nails.  He has not tried any treatments.  He has noted this for many years of gradual progression.  He has no pain to the toenails most of the time but some to the side of his left great toenail with lifting up of the nail noted.  He feels like he pulled something out under that nail not too long ago stating it was plaid and fabric like.  He is not sure if there was something up under the toenail he does not remember putting anything up under the toenail but  just wanting to know if this was normal.    Review of Systems:   ROS: 10 point ROS neg other than the symptoms noted above in the HPI.     PAST MEDICAL HISTORY:   Past Medical History:   Diagnosis Date     Alcohol use disorder, severe, dependence (H) 04/23/2019     Ataxia      Bipolar disorder (H)      Chemical dependency (H)      Chronic hip pain      Chronic pain syndrome      Cocaine abuse (H)      Depressive disorder      DTs (delirium tremens) (H)      DVT (deep venous thrombosis) (H) 5 y ago    left foot, treated with coumadin     Elevated LFTs      Flail chest     broken sterum, wiring      Hepatitis C virus infection, unspecified chronicity      Heroin abuse (H)      History of methamphetamine abuse (H)      History of total hip arthroplasty     right     Hypertension      Seizures (H)      Substance abuse (H)     alcohol, opiates     Wernicke's encephalopathy         PAST SURGICAL HISTORY:   Past Surgical History:   Procedure Laterality Date     CHEST SURGERY  1987    flail chest, sterum fractured     HIP SURGERY       KNEE SURGERY       LAPAROSCOPIC APPENDECTOMY N/A 1/9/2025    Procedure: LAPAROSCOPIC APPENDECTOMY;  Surgeon: Tita Burns MD;  Location:  OR     OPEN REDUCTION INTERNAL FIXATION ANKLE Right 12/24/2021    Procedure: Open reduction internal fixation right ankle syndesmotic injury;  Surgeon: Leroy Thomason MD;  Location:  OR     ORTHOPEDIC SURGERY      KIMBER right. Stephanie left shoulder surgery, debridement right shoulder, neck surgery- fracture cervical spine. 6 knee surgeries- bucket handle meniscal tear and debridement. 3 heel surgeries.      ORTHOPEDIC SURGERY       REMOVE HARDWARE ANKLE Right 7/13/2022    Procedure: removal of hardware of right ankle;  Surgeon: Leroy Thomason MD;  Location:  OR        MEDICATIONS:   Current Outpatient Medications:      acetaminophen (TYLENOL) 325 MG tablet, Take 2 tablets (650 mg) by mouth every 4 hours as needed for other (mild pain)., Disp: 100  tablet, Rfl: 0     amphetamine-dextroamphetamine (ADDERALL XR) 25 MG 24 hr capsule, Take 25 mg by mouth daily, Disp: , Rfl:      aspirin (ASA) 81 MG chewable tablet, Take 40.5 mg by mouth daily as needed for other ((patient unable to specify when/why he takes)), Disp: , Rfl:      buprenorphine HCl-naloxone HCl (SUBOXONE) 8-2 MG per film, Place 1 Film under the tongue 2 times daily, Disp: , Rfl:      buPROPion (WELLBUTRIN XL) 150 MG 24 hr tablet, Take 1 tablet (150 mg) by mouth every morning Takes with 300mg tablet for total dose = 450mg, Disp: 30 tablet, Rfl: 0     buPROPion (WELLBUTRIN XL) 300 MG 24 hr tablet, Take 1 tablet (300 mg) by mouth every morning Takes with 150mg tablet for total dose = 450mg, Disp: 30 tablet, Rfl: 0     divalproex sodium extended-release (DEPAKOTE ER) 500 MG 24 hr tablet, Take 3 tablets (1,500 mg) by mouth at bedtime., Disp: , Rfl:      ibuprofen (ADVIL/MOTRIN) 200 MG tablet, Take 3 tablets (600 mg) by mouth every 6 hours as needed for moderate pain., Disp: , Rfl:      multivitamin w/minerals (THERA-VIT-M) tablet, Take 1 tablet by mouth daily, Disp: , Rfl:      naloxone (NARCAN) 4 MG/0.1ML nasal spray, Spray 1 spray (4 mg) into one nostril alternating nostrils once as needed for opioid reversal every 2-3 minutes until assistance arrives, Disp: 2 each, Rfl: 0     OLANZapine (ZYPREXA) 10 MG tablet, Take 1 tablet (10 mg) by mouth 2 times daily., Disp: 60 tablet, Rfl: 2     senna-docusate (SENOKOT-S/PERICOLACE) 8.6-50 MG tablet, Take 1-2 tablets by mouth 2 times daily. Take while on oral narcotics to prevent or treat constipation., Disp: 30 tablet, Rfl: 0     testosterone cypionate (DEPOTESTOSTERONE) 200 MG/ML injection, Inject 1.1 mLs into the muscle every 14 days, Disp: , Rfl:      traZODone (DESYREL) 150 MG tablet, Take 150 mg by mouth at bedtime, Disp: , Rfl:      ALLERGIES:  No Known Allergies     SOCIAL HISTORY:   Social History     Socioeconomic History     Marital status:       "Spouse name: Not on file     Number of children: Not on file     Years of education: Not on file     Highest education level: Not on file   Occupational History     Not on file   Tobacco Use     Smoking status: Every Day     Current packs/day: 0.00     Average packs/day: 0.5 packs/day for 10.0 years (5.0 ttl pk-yrs)     Types: Vaping Device, Cigarettes     Start date: 2009     Last attempt to quit: 2019     Years since quittin.1     Smokeless tobacco: Never     Tobacco comments:     Quit 2019   Vaping Use     Vaping status: Every Day   Substance and Sexual Activity     Alcohol use: Not Currently     Alcohol/week: 24.0 standard drinks of alcohol     Types: 24 Cans of beer per week     Comment: drinks 1.75L grain alcohol daily since 17, clean 11 months as of 2022     Drug use: Not Currently     Types: Methamphetamines, Amphetamines     Comment: \"using daily, 1 dose/day\"  Poly substance has not used for 6 months     Sexual activity: Not Currently   Other Topics Concern     Parent/sibling w/ CABG, MI or angioplasty before 65F 55M? Not Asked   Social History Narrative    ** Merged History Encounter **          Social Drivers of Health     Financial Resource Strain: Low Risk  (3/8/2025)    Financial Resource Strain      Within the past 12 months, have you or your family members you live with been unable to get utilities (heat, electricity) when it was really needed?: No   Food Insecurity: Low Risk  (3/8/2025)    Food Insecurity      Within the past 12 months, did you worry that your food would run out before you got money to buy more?: No      Within the past 12 months, did the food you bought just not last and you didn t have money to get more?: No   Transportation Needs: Low Risk  (3/8/2025)    Transportation Needs      Within the past 12 months, has lack of transportation kept you from medical appointments, getting your medicines, non-medical meetings or appointments, work, or from getting things " that you need?: No   Physical Activity: Not on file   Stress: Not on file   Social Connections: Unknown (7/8/2023)    Received from [facility]    Social Connections      Frequency of Communication with Friends and Family: Not on file   Interpersonal Safety: High Risk (3/7/2025)    Interpersonal Safety      Do you feel physically and emotionally safe where you currently live?: No      Within the past 12 months, have you been hit, slapped, kicked or otherwise physically hurt by someone?: No      Within the past 12 months, have you been humiliated or emotionally abused in other ways by your partner or ex-partner?: No   Housing Stability: Low Risk  (3/8/2025)    Housing Stability      Do you have housing? : Yes      Are you worried about losing your housing?: No        FAMILY HISTORY:   Family History   Problem Relation Age of Onset     Substance Abuse Mother      Substance Abuse Father      Substance Abuse Sister         EXAM:Vitals: There were no vitals taken for this visit.  BMI= There is no height or weight on file to calculate BMI.    General appearance: Patient is alert and fully cooperative with history & exam.  No sign of distress is noted during the visit.     Psychiatric: Affect is pleasant & appropriate.  Patient appears motivated to improve health.     Respiratory: Breathing is regular & unlabored while sitting.     HEENT: Hearing is intact to spoken word.  Speech is clear.  No gross evidence of visual impairment that would impact ambulation.    Lower Extremity Focused:    Dermatologic: Supple, no openings.  Distal tips of bilateral second toes hyperkeratotic tissue, no openings, no drainage, no erythema, no edema.  Toenails right foot  2, 3, 5 and left foot toes 1, 2, 5 discolored yellow, irregular, brittle, minimal to no subungual debris.      Vascular: DP pulse 2/4 right 2/4 left PT pulse 2/4 right 2/4 left.  No edema, No varicosities noted.  CFT and skin temperature is  normal to both lower extremities.     Neurologic: Lower extremity sensation is diminished to light touch.  No evidence of weakness or contracture in the lower extremities.       Musculoskeletal: Toes 2 through 4 bilateral dorsiflex at the metatarsal phalangeal joints with plantarflexion at the IPJ's, rigid plantarflexion of the left second toe.  Hallux abducted with valgus rotation bilateral prominent medial eminence bilateral supple range of motion without pain.  Supple ankle and subtalar joint range of motion within normal limits.      Labs and Imaging: I have personally reviewed the following         AS   AST 38 25  83 High  40 CM  18 R    ALT 37 22  71 High  30 CM  18 R     ASSESSMENT:   Preulcerative calluses distal tips of second toe bilateral  Peripheral neuropathy idiopathic  Hammertoe deformity bilateral  Asymptomatic bunion deformity bilateral  Onychodystrophy versus onychomycosis     Medical Decision Making/Plan:  Reviewed patient[facility] in [facility].  Reviewed and discussed causes of hammertoes with patient.  Explained that this can be caused by an overpowering of muscles or by the way we walk.  Discussed conservative treatments such as orthotics, pads, shoe gear.  Explained that sometimes the flexor tendons can be cut to try and straighten the toe and reduce rubbing. This is normally done in office and patient is weight bearing in postop she for 1-2 weeks.  We also discussed surgical intervention to remove the joint and possibly fuse the toe.  Normally patient has a pin sticking out of the toe for about 6 weeks and can not get the foot wet. Patient would have to be minimal weight bearing in cam boot.      He will consider tenotomies to toes right 2nd and left 2nd, 3rd  in the future. Discussed 2 weeks of healing.     Discussed causes and treatments of nail fungus.  Explained that even if a culture comes back negative, a patient could still have nail fungus.  Discussed treatment options with patient and  explained that there isn't one treatment that is 100% effective.  Discussed oral lamisil which is the most effective at about 70% but which can have liver effects.  Explained that if she wanted to try this that she would need serial blood draws to test her liver function.  Discussed over the counter antifungal creams.  Explained that these are about 50% effective and need to be applied once a day for about 6-8months.  Also talked about prescription penlac which is a nail laquer.  Again this is also only 50% effective.  Also discussed that if there was damage to the nail and the nail is now dystrophic that non of the above is going to change the nail.  If there was damage, there is note anything that can be done for the nail to correct it.  Discussed that if it becomes painful, we can remove the nail in clinic.        We will start with a KOH of the toenail to rule out fungus. Prescription for Penlac nail lacquer.  Patient is not a candidate for oral Lamisil due to history of liver enzyme elevation in January 2025.  His liver enzymes have normalized at this time but concerned with potential relapse of alcohol use while taking the oral Lamisil and permanent injury to his liver at that time.     At this time reassurance given that there is no noted foreign object in his toes or toenails.     Follow-up in 4 months or sooner as needed monitor feet for signs of infection such as pus, redness, pain, fevers    Patient risk factor: Moderate for infection secondary to foot deformity with neuropathy    All questions were answered to patients satisfaction and they will call with further questions or concerns.       Yola Nathan DPM      Again, thank you for allowing me to participate in the care of your patient.        Sincerely,        Yola Nathan DPM    Electronically signed

## 2025-06-12 LAB — BACTERIA SPEC CULT: NORMAL

## 2025-06-15 ENCOUNTER — HEALTH MAINTENANCE LETTER (OUTPATIENT)
Age: 60
End: 2025-06-15

## 2025-06-19 LAB — BACTERIA SPEC CULT: NORMAL

## 2025-06-23 ENCOUNTER — RESULTS FOLLOW-UP (OUTPATIENT)
Dept: PODIATRY | Facility: CLINIC | Age: 60
End: 2025-06-23

## 2025-06-26 LAB — BACTERIA SPEC CULT: NORMAL

## 2025-07-03 LAB — BACTERIA SPEC CULT: NORMAL

## 2025-07-08 LAB — BACTERIA SPEC CULT: NO GROWTH

## (undated) DEVICE — SUCTION IRR STRYKERFLOW II W/TIP 250-070-520

## (undated) DEVICE — LINEN TOWEL PACK X10 5473

## (undated) DEVICE — ENDO TROCAR FIRST ENTRY KII FIOS Z-THRD 05X100MM CTF03

## (undated) DEVICE — SOL WATER IRRIG 1000ML BOTTLE 2F7114

## (undated) DEVICE — SU MONOCRYL 4-0 PS-2 27" UND Y426H

## (undated) DEVICE — MANIFOLD NEPTUNE 4 PORT 700-20

## (undated) DEVICE — PACK EXTREMITY SOP15EXFSD

## (undated) DEVICE — SU VICRYL 0 CT-1 27" UND J260H

## (undated) DEVICE — BAG CLEAR TRASH 1.3M 39X33" P4040C

## (undated) DEVICE — ENDO POUCH UNIV RETRIEVAL SYSTEM INZII 10MM CD001

## (undated) DEVICE — BNDG COBAN 4"X5YDS STERILE

## (undated) DEVICE — IMM LIMB ELEVATOR DC40-0203

## (undated) DEVICE — SU VICRYL 4-0 PS-2 18" UND J496H

## (undated) DEVICE — DRSG ADAPTIC 3X3" 6112

## (undated) DEVICE — GLOVE PROTEXIS BLUE W/NEU-THERA 7.5  2D73EB75

## (undated) DEVICE — SU VICRYL+ 0 27 UR6 VLT VCP603H

## (undated) DEVICE — BNDG ELASTIC 6" DBL LENGTH UNSTERILE 6611-16

## (undated) DEVICE — DRSG GAUZE 4X4" TRAY

## (undated) DEVICE — SU DERMABOND ADVANCED .7ML DNX12

## (undated) DEVICE — Device

## (undated) DEVICE — STPL POWERED ECHELON 45MM PSEE45A

## (undated) DEVICE — LINEN HALF SHEET 5512

## (undated) DEVICE — PACK LOWER EXTREMITY RIDGES

## (undated) DEVICE — DECANTER VIAL 2006S

## (undated) DEVICE — PREP CHLORAPREP 26ML TINTED HI-LITE ORANGE 930815

## (undated) DEVICE — SOL NACL 0.9% IRRIG 1000ML BOTTLE 2F7124

## (undated) DEVICE — SOL NACL 0.9% INJ 1000ML BAG 2B1324X

## (undated) DEVICE — PIN GUARD 0.062 GREEN C-062

## (undated) DEVICE — LINEN FULL SHEET 5511

## (undated) DEVICE — LINEN POUCH DBL 5427

## (undated) DEVICE — GOWN IMPERVIOUS ZONED XLG 9041

## (undated) DEVICE — ENDO TROCAR BLUNT TIP KII BALLOON 12X100MM C0R47

## (undated) DEVICE — ESU GROUND PAD UNIVERSAL W/O CORD

## (undated) DEVICE — CAST PADDING 4" UNSTERILE 9044

## (undated) DEVICE — DRSG XEROFORM 1X8"

## (undated) DEVICE — CAST PADDING 4" WEBRIL UNSTERILE

## (undated) DEVICE — DRILL BIT QUICK COUPLING 2.5X110MM GOLD 310.25

## (undated) DEVICE — GLOVE BIOGEL PI ULTRATOUCH SZ 7.5 41175

## (undated) DEVICE — TOURNIQUET SGL  BLADDER 30"X4" BLUE 5921030135

## (undated) DEVICE — LINEN ORTHO ACL PACK 5447

## (undated) DEVICE — ESU GROUND PAD ADULT W/CORD E7507

## (undated) DEVICE — SUCTION CANISTER MEDIVAC LINER 3000ML W/LID 65651-530

## (undated) DEVICE — SUCTION MANIFOLD NEPTUNE 2 SYS 4 PORT 0702-020-000

## (undated) DEVICE — DRAPE C-ARMOR 5 SIDED 5523

## (undated) DEVICE — GLOVE BIOGEL PI MICRO SZ 6.5 48565

## (undated) DEVICE — GLOVE PROTEXIS W/NEU-THERA 7.0  2D73TE70

## (undated) DEVICE — CAST PADDING 4" STERILE 9044S

## (undated) DEVICE — SU VICRYL 3-0 SH 27" UND J416H

## (undated) DEVICE — DRSG STERI STRIP 1/2X4" R1547

## (undated) DEVICE — NEEDLE HYPO MONOJECT STANDARD 22GA 1 1/2IN BLUE 1188822112

## (undated) DEVICE — ENDO TROCAR SLEEVE KII Z-THREADED 05X100MM CTS02

## (undated) DEVICE — ESU ELEC BLADE 2.75" COATED/INSULATED E1455

## (undated) DEVICE — DRAPE IOBAN INCISE 23X17" 6650EZ

## (undated) DEVICE — SU VICRYL 2-0 CT-2 27" UND J269H

## (undated) DEVICE — BNDG ELASTIC 4" DBL LENGTH UNSTERILE 6611-14

## (undated) DEVICE — DRAPE C-ARM 60X42" 1013

## (undated) DEVICE — ESU CORD MONOPOLAR 10'  E0510

## (undated) DEVICE — CAST PLASTER SPLINT 5X30" 7395

## (undated) DEVICE — BLADE CLIPPER 3M 9670

## (undated) DEVICE — SU VICRYL 2-0 CP-1 27" UND J266H

## (undated) DEVICE — TAPE DURAPORE 3" SILK 1538-3

## (undated) DEVICE — SU ETHILON 3-0 FS-1 18" 669H

## (undated) DEVICE — LINEN TOWEL PACK X5 5464

## (undated) DEVICE — ESU PENCIL W/HOLSTER E2350H

## (undated) DEVICE — GLOVE BIOGEL PI MICRO INDICATOR UNDERGLOVE SZ 6.5 48965

## (undated) DEVICE — ESU LIGASURE MARYLAND LAPAROSCOPIC SLR/DVDR 5MMX37CM LF1937

## (undated) DEVICE — DRAPE SHEET REV FOLD 3/4 9349

## (undated) DEVICE — ESU CORD BIPOLAR 12' E0512

## (undated) RX ORDER — OXYCODONE HYDROCHLORIDE 5 MG/1
TABLET ORAL
Status: DISPENSED
Start: 2025-01-09

## (undated) RX ORDER — CEFAZOLIN SODIUM/WATER 2 G/20 ML
SYRINGE (ML) INTRAVENOUS
Status: DISPENSED
Start: 2021-12-24

## (undated) RX ORDER — FENTANYL CITRATE 50 UG/ML
INJECTION, SOLUTION INTRAMUSCULAR; INTRAVENOUS
Status: DISPENSED
Start: 2021-12-24

## (undated) RX ORDER — DEXMEDETOMIDINE HYDROCHLORIDE 4 UG/ML
INJECTION, SOLUTION INTRAVENOUS
Status: DISPENSED
Start: 2025-01-09

## (undated) RX ORDER — OXYCODONE HYDROCHLORIDE 5 MG/1
TABLET ORAL
Status: DISPENSED
Start: 2022-07-13

## (undated) RX ORDER — FENTANYL CITRATE 50 UG/ML
INJECTION, SOLUTION INTRAMUSCULAR; INTRAVENOUS
Status: DISPENSED
Start: 2025-01-09

## (undated) RX ORDER — ACETAMINOPHEN 325 MG/1
TABLET ORAL
Status: DISPENSED
Start: 2022-07-13

## (undated) RX ORDER — BUPIVACAINE HYDROCHLORIDE AND EPINEPHRINE 2.5; 5 MG/ML; UG/ML
INJECTION, SOLUTION EPIDURAL; INFILTRATION; INTRACAUDAL; PERINEURAL
Status: DISPENSED
Start: 2021-12-24

## (undated) RX ORDER — DIAZEPAM 5 MG/1
TABLET ORAL
Status: DISPENSED
Start: 2025-01-09

## (undated) RX ORDER — DEXAMETHASONE SODIUM PHOSPHATE 4 MG/ML
INJECTION, SOLUTION INTRA-ARTICULAR; INTRALESIONAL; INTRAMUSCULAR; INTRAVENOUS; SOFT TISSUE
Status: DISPENSED
Start: 2021-12-24

## (undated) RX ORDER — ACETAMINOPHEN 325 MG/1
TABLET ORAL
Status: DISPENSED
Start: 2025-01-09

## (undated) RX ORDER — ONDANSETRON 2 MG/ML
INJECTION INTRAMUSCULAR; INTRAVENOUS
Status: DISPENSED
Start: 2022-07-13

## (undated) RX ORDER — BUPIVACAINE HYDROCHLORIDE 5 MG/ML
INJECTION, SOLUTION EPIDURAL; INTRACAUDAL
Status: DISPENSED
Start: 2025-01-09

## (undated) RX ORDER — CEFAZOLIN SODIUM/WATER 2 G/20 ML
SYRINGE (ML) INTRAVENOUS
Status: DISPENSED
Start: 2022-07-13

## (undated) RX ORDER — CEFAZOLIN SODIUM/WATER 2 G/20 ML
SYRINGE (ML) INTRAVENOUS
Status: DISPENSED
Start: 2025-01-09

## (undated) RX ORDER — PROPOFOL 10 MG/ML
INJECTION, EMULSION INTRAVENOUS
Status: DISPENSED
Start: 2021-12-24

## (undated) RX ORDER — HYDROXYZINE HYDROCHLORIDE 25 MG/1
TABLET, FILM COATED ORAL
Status: DISPENSED
Start: 2022-07-13

## (undated) RX ORDER — FENTANYL CITRATE 0.05 MG/ML
INJECTION, SOLUTION INTRAMUSCULAR; INTRAVENOUS
Status: DISPENSED
Start: 2022-07-13

## (undated) RX ORDER — ONDANSETRON 2 MG/ML
INJECTION INTRAMUSCULAR; INTRAVENOUS
Status: DISPENSED
Start: 2021-12-24

## (undated) RX ORDER — GLYCOPYRROLATE 0.2 MG/ML
INJECTION INTRAMUSCULAR; INTRAVENOUS
Status: DISPENSED
Start: 2021-12-24

## (undated) RX ORDER — KETOROLAC TROMETHAMINE 30 MG/ML
INJECTION, SOLUTION INTRAMUSCULAR; INTRAVENOUS
Status: DISPENSED
Start: 2025-01-09

## (undated) RX ORDER — DEXMEDETOMIDINE HYDROCHLORIDE 4 UG/ML
INJECTION, SOLUTION INTRAVENOUS
Status: DISPENSED
Start: 2021-12-24

## (undated) RX ORDER — FENTANYL CITRATE 50 UG/ML
INJECTION, SOLUTION INTRAMUSCULAR; INTRAVENOUS
Status: DISPENSED
Start: 2022-07-13

## (undated) RX ORDER — LIDOCAINE HYDROCHLORIDE 10 MG/ML
INJECTION, SOLUTION EPIDURAL; INFILTRATION; INTRACAUDAL; PERINEURAL
Status: DISPENSED
Start: 2021-12-24

## (undated) RX ORDER — LIDOCAINE HYDROCHLORIDE 20 MG/ML
INJECTION, SOLUTION EPIDURAL; INFILTRATION; INTRACAUDAL; PERINEURAL
Status: DISPENSED
Start: 2022-07-13

## (undated) RX ORDER — LABETALOL HYDROCHLORIDE 5 MG/ML
INJECTION, SOLUTION INTRAVENOUS
Status: DISPENSED
Start: 2025-01-09

## (undated) RX ORDER — DEXAMETHASONE SODIUM PHOSPHATE 4 MG/ML
INJECTION, SOLUTION INTRA-ARTICULAR; INTRALESIONAL; INTRAMUSCULAR; INTRAVENOUS; SOFT TISSUE
Status: DISPENSED
Start: 2022-07-13

## (undated) RX ORDER — EPHEDRINE SULFATE 50 MG/ML
INJECTION, SOLUTION INTRAMUSCULAR; INTRAVENOUS; SUBCUTANEOUS
Status: DISPENSED
Start: 2021-12-24

## (undated) RX ORDER — LIDOCAINE HYDROCHLORIDE 10 MG/ML
INJECTION, SOLUTION EPIDURAL; INFILTRATION; INTRACAUDAL; PERINEURAL
Status: DISPENSED
Start: 2025-01-09

## (undated) RX ORDER — OXYCODONE HCL 10 MG/1
TABLET, FILM COATED, EXTENDED RELEASE ORAL
Status: DISPENSED
Start: 2022-07-13